# Patient Record
Sex: MALE | Race: WHITE | NOT HISPANIC OR LATINO | Employment: OTHER | URBAN - METROPOLITAN AREA
[De-identification: names, ages, dates, MRNs, and addresses within clinical notes are randomized per-mention and may not be internally consistent; named-entity substitution may affect disease eponyms.]

---

## 2017-02-06 ENCOUNTER — OFFICE VISIT (OUTPATIENT)
Dept: PEDIATRICS | Facility: CLINIC | Age: 37
End: 2017-02-06
Payer: COMMERCIAL

## 2017-02-06 VITALS
TEMPERATURE: 98.2 F | BODY MASS INDEX: 41.75 KG/M2 | SYSTOLIC BLOOD PRESSURE: 140 MMHG | HEART RATE: 96 BPM | DIASTOLIC BLOOD PRESSURE: 78 MMHG | HEIGHT: 73 IN | WEIGHT: 315 LBS | OXYGEN SATURATION: 99 %

## 2017-02-06 DIAGNOSIS — E66.01 MORBID OBESITY WITH BMI OF 50.0-59.9, ADULT (H): ICD-10-CM

## 2017-02-06 DIAGNOSIS — G47.33 OSA (OBSTRUCTIVE SLEEP APNEA): Primary | ICD-10-CM

## 2017-02-06 DIAGNOSIS — R03.0 ELEVATED BLOOD PRESSURE READING WITHOUT DIAGNOSIS OF HYPERTENSION: ICD-10-CM

## 2017-02-06 PROCEDURE — 99214 OFFICE O/P EST MOD 30 MIN: CPT | Performed by: INTERNAL MEDICINE

## 2017-02-06 NOTE — PATIENT INSTRUCTIONS
INSTRUCTIONS FOR TODAY:     schedule visit with Sleep center   blood pressure--continue weight watchers, return for BP follow-up in 6 months     Dr Hsieh

## 2017-02-06 NOTE — MR AVS SNAPSHOT
After Visit Summary   2/6/2017    Jeffry Younger    MRN: [id number]           Patient Information     Date Of Birth          1980        Visit Information        Provider Department      2/6/2017 1:00 PM Russel Hsieh MD [facility]        Today's Diagnoses     PAOLA (obstructive sleep apnea)    -  1     Elevated blood pressure reading without diagnosis of hypertension           Care Instructions    INSTRUCTIONS FOR TODAY:     schedule visit with Sleep center   blood pressure--continue weight watchers, return for BP follow-up in 6 months     Dr Hsieh          Follow-ups after your visit        Additional Services     SLEEP EVALUATION & MANAGEMENT REFERRAL - ADULT       Please be aware that coverage of these services is subject to the terms and limitations of your health insurance plan.  Call member services at your health plan with any benefit or coverage questions.      Please bring the following to your appointment:    >>   List of current medications   >>   This referral request   >>   Any documents/labs given to you for this referral    [facility] [phone] (Age 18 and up)                  Your next 10 appointments already scheduled     Feb 22, 2017  8:30 AM   Office Visit with EMMANUEL Marie [facility] ([facility])    [address]  [address]  [facility] [address] [phone]           Bring a current list of meds and any records pertaining to this visit.  For Physicals, please bring immunization records and any forms needing to be filled out.  Please arrive 10 minutes early to complete paperwork.              Future tests that were ordered for you today     Open Future Orders        Priority Expected Expires Ordered    SLEEP EVALUATION & MANAGEMENT REFERRAL - ADULT Routine  2/6/2018 2/6/2017            Who to contact     If you have questions or need follow up information about today's  "clinic visit or your schedule please contact [facility] directly at [phone].  Normal or non-critical lab and imaging results will be communicated to you by Neo Technology[employer], letter or phone within 4 business days after the clinic has received the results. If you do not hear from us within 7 days, please contact the clinic through [employer] or phone. If you have a critical or abnormal lab result, we will notify you by phone as soon as possible.  Submit refill requests through [email or web address] or call your pharmacy and they will forward the refill request to us. Please allow 3 business days for your refill to be completed.          Additional Information About Your Visit        [employer]ply[employer]ured Information     [email or web address] gives you secure access to your electronic health record. If you see a primary care provider, you can also send messages to your care team and make appointments. If you have questions, please call your primary care clinic.  If you do not have a primary care provider, please call [phone] and they will assist you.        Care EveryWhere ID     This is your Care EveryWhere ID. This could be used by other organizations to access your [place] medical records  [id number]        Your Vitals Were     Pulse Temperature Height BMI (Body Mass Index) Pulse Oximetry       96 98.2  F (36.8  C) (Oral) 6' 1\" (1.854 m) 52.79 kg/m2 99%        Blood Pressure from Last 3 Encounters:   02/06/17 140/78   05/15/16 153/99   09/29/15 124/84    Weight from Last 3 Encounters:   02/06/17 400 lb (181.439 kg)   05/15/16 404 lb 14.4 oz (183.661 kg)   09/29/15 396 lb (179.624 kg)                 Today's Medication Changes          These changes are accurate as of: 2/6/17  1:19 PM.  If you have any questions, ask your nurse or doctor.               Stop taking these medicines if you haven't already. Please contact your care team if you have questions.     HYDROcodone-acetaminophen 5-325 MG per tablet   Commonly known as:  NORCO   Stopped " by:  Russel Hsieh MD                    Primary Care Provider Office Phone # Fax #    Russel Hsieh [phone]       [facility] [address] [place] [address] [address] [place] [address]        Thank you!     Thank you for choosing [facility]  for your care. Our goal is always to provide you with excellent care. Hearing back from our patients is one way we can continue to improve our services. Please take a few minutes to complete the written survey that you may receive in the mail after your visit with us. Thank you!             Your Updated Medication List - Protect others around you: Learn how to safely use, store and throw away your medicines at www.disposemymeds.org.      Notice  As of 2/6/2017  1:19 PM    You have not been prescribed any medications.

## 2017-02-06 NOTE — PROGRESS NOTES
"  SUBJECTIVE:                                                    Jeffry Younger is a 36 year old male who presents to clinic today for the following health issues:      Poor sleep      Duration: months    Description (location/character/radiation): loud snoring, sleepy throughout the day    Intensity:  moderate    Accompanying signs and symptoms: waking up feeling tired    History (similar episodes/previous evaluation): as above    Precipitating or alleviating factors: obesity    Therapies tried and outcome: None     Elevated BP today.  No current rx  Body mass index is 52.79 kg/(m^2).  Has started weight watchers with wife-did lose 20lbs  Got off track this summer when he rolled his ankle      Patient Active Problem List   Diagnosis     Morbid obesity with BMI of 50.0-59.9, adult (H)     CARDIOVASCULAR SCREENING; LDL GOAL LESS THAN 160     Past Surgical History   Procedure Laterality Date     As esophagoscopy, diagnostic       EGD       Social History   Substance Use Topics     Smoking status: Never Smoker      Smokeless tobacco: Never Used     Alcohol Use: No     Family History   Problem Relation Age of Onset     DIABETES Father      Hypertension Father      Colon Cancer No family hx of      Prostate Cancer No family hx of      CEREBROVASCULAR DISEASE No family hx of      Coronary Artery Disease No family hx of          No current outpatient prescriptions on file.       ROS: The following systems have been completely reviewed and are negative except as noted in the HPI: CONSTITUTIONAL,  CARDIOVASCULAR, PULMONARY    OBJECTIVE:                                                    /78 mmHg  Pulse 96  Temp(Src) 98.2  F (36.8  C) (Oral)  Ht 6' 1\" (1.854 m)  Wt 400 lb (181.439 kg)  BMI 52.79 kg/m2  SpO2 99% Body mass index is 52.79 kg/(m^2).  GENERAL:  alert,  no distress  HENT:  oropharynx-  Mild uvular hypertrophy  NECK: obese, no tenderness, no adenopathy  RESP: lungs clear to auscultation - no rales, no " rhonchi, no wheezes  CV: regular rates and rhythm, normal S1 S2     ASSESSMENT/PLAN:                                                        ICD-10-CM    1. PAOLA (obstructive sleep apnea) G47.33 SLEEP EVALUATION & MANAGEMENT REFERRAL - ADULT     Likely PAOLA-pathophysiology discussed  Referred to sleep center.  Discussed CPAP     2. Elevated blood pressure reading without diagnosis of hypertension R03.0 6 month trial of lifestyle changes       3. Morbid obesity with BMI of 50.0-59.9, adult (H) E66.01 Resume weight watchers    Z68.43       Russel Hsieh MD  [facility]

## 2017-02-22 ENCOUNTER — OFFICE VISIT (OUTPATIENT)
Dept: FAMILY MEDICINE | Facility: CLINIC | Age: 37
End: 2017-02-22
Payer: COMMERCIAL

## 2017-02-22 VITALS
HEART RATE: 72 BPM | OXYGEN SATURATION: 97 % | DIASTOLIC BLOOD PRESSURE: 91 MMHG | TEMPERATURE: 97.8 F | SYSTOLIC BLOOD PRESSURE: 145 MMHG

## 2017-02-22 DIAGNOSIS — Z71.84 TRAVEL ADVICE ENCOUNTER: Primary | ICD-10-CM

## 2017-02-22 DIAGNOSIS — Z23 NEED FOR VACCINATION: ICD-10-CM

## 2017-02-22 PROCEDURE — 90632 HEPA VACCINE ADULT IM: CPT | Mod: GA | Performed by: NURSE PRACTITIONER

## 2017-02-22 PROCEDURE — 90734 MENACWYD/MENACWYCRM VACC IM: CPT | Mod: GA | Performed by: NURSE PRACTITIONER

## 2017-02-22 PROCEDURE — 90717 YELLOW FEVER VACCINE SUBQ: CPT | Mod: GA | Performed by: NURSE PRACTITIONER

## 2017-02-22 PROCEDURE — 99402 PREV MED CNSL INDIV APPRX 30: CPT | Mod: 25 | Performed by: NURSE PRACTITIONER

## 2017-02-22 PROCEDURE — 90471 IMMUNIZATION ADMIN: CPT | Mod: GA | Performed by: NURSE PRACTITIONER

## 2017-02-22 PROCEDURE — 90472 IMMUNIZATION ADMIN EACH ADD: CPT | Mod: GA | Performed by: NURSE PRACTITIONER

## 2017-02-22 RX ORDER — AZITHROMYCIN 500 MG/1
500 TABLET, FILM COATED ORAL DAILY
Qty: 3 TABLET | Refills: 0 | Status: SHIPPED | OUTPATIENT
Start: 2017-02-22 | End: 2017-02-25

## 2017-02-22 RX ORDER — ATOVAQUONE AND PROGUANIL HYDROCHLORIDE 250; 100 MG/1; MG/1
1 TABLET, FILM COATED ORAL DAILY
Qty: 22 TABLET | Refills: 0 | Status: SHIPPED | OUTPATIENT
Start: 2017-02-22 | End: 2018-01-16

## 2017-02-22 NOTE — MR AVS SNAPSHOT
After Visit Summary   2/22/2017    Jeffry Younger    MRN: [id number]           Patient Information     Date Of Birth          1980        Visit Information        Provider Department      2/22/2017 8:30 AM Aruna Thompson APRN [facility]        Today's Diagnoses     Travel advice encounter    -  1    Need for vaccination          Care Instructions    Today February 22, 2017 you received the    Hepatitis A Vaccine -     Yellow Fever (YF)    Meningococcal (Menactra) Vaccine  .    These appointments can be made as a NURSE ONLY visit.    **It is very important for the vaccinations to be given on the scheduled day(s), this helps ensure you receive the full effectiveness of the vaccine.**    Please call [facility] with any questions [phone]    Thank you for visiting [facility]            Follow-ups after your visit        Your next 10 appointments already scheduled     Mar 01, 2017 10:00 AM CST   New Sleep Patient with Montana James MD   [facility]    [address] [facility] [address] [place] [address] [phone]              Who to contact     If you have questions or need follow up information about today's clinic visit or your schedule please contact [facility] directly at [phone].  Normal or non-critical lab and imaging results will be communicated to you by MyChart, letter or phone within 4 business days after the clinic has received the results. If you do not hear from us within 7 days, please contact the clinic through MyChart or phone. If you have a critical or abnormal lab result, we will notify you by phone as soon as possible.  Submit refill requests through StepLeader or call your pharmacy and they will forward the refill request to us. Please allow 3 business days for your refill to be completed.          Additional Information About Your  Visit        U[employer]ardt Information     [email or web address] gives you secure access to your electronic health record. If you see a primary care provider, you can also send messages to your care team and make appointments. If you have questions, please call your primary care clinic.  If you do not have a primary care provider, please call [phone] and they will assist you.        Care EveryWhere ID     This is your Care EveryWhere ID. This could be used by other organizations to access your [place] medical records  [id number]        Your Vitals Were     Pulse Temperature Pulse Oximetry             72 97.8  F (36.6  C) (Oral) 97%          Blood Pressure from Last 3 Encounters:   02/22/17 (!) 145/91   02/06/17 140/78   05/15/16 (!) 153/99    Weight from Last 3 Encounters:   02/06/17 (!) 400 lb (181.4 kg)   05/15/16 (!) 404 lb 14.4 oz (183.7 kg)   09/29/15 (!) 396 lb (179.6 kg)              We Performed the Following     HEPA VACCINE ADULT IM     MENINGOCOCCAL VACCINE,IM (MENACTRA)     YELLOW FEVER IMMUNIZATN,LIVE,SUBCUT          Today's Medication Changes          These changes are accurate as of: 2/22/17  8:42 AM.  If you have any questions, ask your nurse or doctor.               Start taking these medicines.        Dose/Directions    atovaquone-proguanil 250-100 MG per tablet   Commonly known as:  MALARONE   Used for:  Need for vaccination, Travel advice encounter   Started by:  Aruna Thompson APRN CNP        Dose:  1 tablet   Take 1 tablet by mouth daily Start 2 days before exposure to Malaria and continue daily till  7 days after exposure.   Quantity:  22 tablet   Refills:  0       azithromycin 500 MG tablet   Commonly known as:  ZITHROMAX   Used for:  Travel advice encounter   Started by:  Aruna Thompson APRN CNP        Dose:  500 mg   Take 1 tablet (500 mg) by mouth daily for 3 doses Take 1 tablet a day for up to 3 days for severe diarrhea   Quantity:  3 tablet   Refills:  0            Where to get your  medicines      These medications were sent to [facility] - [place], [place] - [address]  [address], [facility] [address]     Phone:  [phone]     atovaquone-proguanil 250-100 MG per tablet    azithromycin 500 MG tablet                Primary Care Provider Office Phone # Fax #    Russel Hsieh -[phone]       [facility] [address] [place] [address]        Thank you!     Thank you for choosing [facility]  for your care. Our goal is always to provide you with excellent care. Hearing back from our patients is one way we can continue to improve our services. Please take a few minutes to complete the written survey that you may receive in the mail after your visit with us. Thank you!             Your Updated Medication List - Protect others around you: Learn how to safely use, store and throw away your medicines at www.disposemymeds.org.          This list is accurate as of: 2/22/17  8:42 AM.  Always use your most recent med list.                   Brand Name Dispense Instructions for use    atovaquone-proguanil 250-100 MG per tablet    MALARONE    22 tablet    Take 1 tablet by mouth daily Start 2 days before exposure to Malaria and continue daily till  7 days after exposure.       azithromycin 500 MG tablet    ZITHROMAX    3 tablet    Take 1 tablet (500 mg) by mouth daily for 3 doses Take 1 tablet a day for up to 3 days for severe diarrhea

## 2017-02-22 NOTE — PATIENT INSTRUCTIONS
Today February 22, 2017 you received the    Hepatitis A Vaccine -     Yellow Fever (YF)    Meningococcal (Menactra) Vaccine  .    These appointments can be made as a NURSE ONLY visit.    **It is very important for the vaccinations to be given on the scheduled day(s), this helps ensure you receive the full effectiveness of the vaccine.**    Please call [facility] with any questions [phone]    Thank you for visiting [facility]

## 2017-02-22 NOTE — NURSING NOTE
"Chief Complaint   Patient presents with     Travel Clinic     Count includes the [facility]      BP (!) 145/91  Pulse 72  Temp 97.8  F (36.6  C) (Oral)  SpO2 97% Estimated body mass index is 52.77 kg/(m^2) as calculated from the following:    Height as of 2/6/17: 6' 1\" (1.854 m).    Weight as of 2/6/17: 400 lb (181.4 kg).  bp completed using cuff size: large       Health Maintenance addressed:  NONE    n/a    Karen Pena MA     "

## 2017-02-22 NOTE — PROGRESS NOTES
Nurse Note      Itinerary:  [place]      Departure Date: 04/15/2017      Return Date: 04/26/2017      Length of Trip 11 days       Reason for Travel: Tourism           Urban or rural: both      Accommodations: Hotel        IMMUNIZATION HISTORY  Have you received any immunizations within the past 4 weeks?  No  Have you ever fainted from having your blood drawn or from an injection?  No  Have you ever had a fever reaction to vaccination?  No  Have you ever had any bad reaction or side effect from any vaccination?  No  Have you ever had hepatitis A or B vaccine?  Yes  Do you live (or work closely) with anyone who has AIDS, an AIDS-like condition, any other immune disorder or who is on chemotherapy for cancer?  No  Do you have a family history of immunodeficiency?  No  Have you received any injection of immune globulin or any blood products during the past 12 months?  No    Patient roomed by REGINA Day  Jeffry Younger is a 36 year old male seen today seen today with spouse for counsultation for international travel to [place] for Tourism Visiting  relatives.  Patient will be departing in  2 month(s) and staying for   2 week(s) and  traveling with spouse.      Patient itinerary :  will be in the starting in [facility] then traveling to smaller villages to the north region of [place] which presents risk for Malaria, Yellow Fever, Dengue Fever, Chikungungya, Zika,  Trypanosomiasis, Schistosomiasis, Rabies, food borne illnesses, motor vehicle accidents, Typhoid, Leishmaniasis and Lassa Fever. exposure.      Patient's activities will include sightseeing and visiting a family member who has been living in [place] for several Years    Patient's country of birth is [place]      Special medical concerns: Obesity  Pre-travel questionnaire was completed by patient and reviewed by provider.     Vitals: There were no vitals taken for this visit.  BMI= There is no height or weight on file to calculate  BMI.    EXAM:  General:  Well-nourished, well-developed in no acute distress.  Appears to be stated age, interacts appropriately and expresses understanding of information given to patient.    No current outpatient prescriptions on file.     Patient Active Problem List   Diagnosis     Morbid obesity with BMI of 50.0-59.9, adult (H)     CARDIOVASCULAR SCREENING; LDL GOAL LESS THAN 160     No Known Allergies      Immunizations discussed include:   Hepatitis A:  Give on dose today to finish series   Hepatitis B: Up to date  Influenza: Up to date  Typhoid: Up to date  Rabies: Declined  Not concerned about risk of disease  reviewed managment of a animal bite or scratch (washing wound, seek medical care within 24 hours for post exposure prophylaxis )  Yellow Fever: Ordered/given today - side effects, precautions, allergies, risks discussed. Patient expressed understanding.  Yoruba Encephalitis: Not indicated  Meningococcus: Ordered/given today, risks, benefits and side effects reviewed  Tetanus/Diphtheria: Up to date  Measles/Mumps/Rubella: Up to date  Cholera: Not needed  Polio: Up to date  Pneumococcal: Under age of 65  Varicella: Immune by disease history per patient report  Zostavax:  Not indicated  HPV:  Not indicated  TB:  Low risk    Altitude Exposure on this trip: non    ASSESSMENT/PLAN:    ICD-10-CM    1. Travel advice encounter Z71.89 HEPA VACCINE ADULT IM     YELLOW FEVER IMMUNIZATN,LIVE,SUBCUT     MENINGOCOCCAL VACCINE,IM (MENACTRA)     atovaquone-proguanil (MALARONE) 250-100 MG per tablet     azithromycin (ZITHROMAX) 500 MG tablet   2. Need for vaccination Z23 HEPA VACCINE ADULT IM     YELLOW FEVER IMMUNIZATN,LIVE,SUBCUT     MENINGOCOCCAL VACCINE,IM (MENACTRA)     atovaquone-proguanil (MALARONE) 250-100 MG per tablet     I have reviewed general recommendations for safe travel   including: food/water precautions, insect precautions, safer sex   practices given high prevalence of Zika, HIV and other STDs,    roadway safety. Educational materials and Travax report provided.    Malaraia prophylaxis recommended: Malarone  Symptomatic treatment for traveler's diarrhea: TMP/SMX  Altitude illness prevention and treatment: none      Evacuation insurance advised and resources were provided to patient.    Total visit time 30 minutes  with over 50% of time spent counseling patient as detailed above.    Aruna Thompson CNP

## 2017-03-01 ENCOUNTER — OFFICE VISIT (OUTPATIENT)
Dept: SLEEP MEDICINE | Facility: CLINIC | Age: 37
End: 2017-03-01
Payer: COMMERCIAL

## 2017-03-01 VITALS
BODY MASS INDEX: 41.75 KG/M2 | HEIGHT: 73 IN | HEART RATE: 97 BPM | SYSTOLIC BLOOD PRESSURE: 150 MMHG | DIASTOLIC BLOOD PRESSURE: 101 MMHG | OXYGEN SATURATION: 96 % | WEIGHT: 315 LBS

## 2017-03-01 DIAGNOSIS — G47.9 SLEEP DISTURBANCE: Primary | ICD-10-CM

## 2017-03-01 DIAGNOSIS — E66.01 MORBID OBESITY WITH BMI OF 50.0-59.9, ADULT (H): ICD-10-CM

## 2017-03-01 PROCEDURE — 99244 OFF/OP CNSLTJ NEW/EST MOD 40: CPT | Performed by: INTERNAL MEDICINE

## 2017-03-01 NOTE — MR AVS SNAPSHOT
"              After Visit Summary   3/1/2017    Jeffry Younger    MRN: [id number]           Patient Information     Date Of Birth          1980        Visit Information        Provider Department      3/1/2017 10:00 AM Montana James MD [facility] [place]        Today's Diagnoses     Sleep disturbance    -  1    Morbid obesity with BMI of 50.0-59.9, adult (H)          Care Instructions    MY TREATMENT INFORMATION FOR SLEEP DISTURBANCE-  Jeffry Younger    DOCTOR : Montana James  SLEEP CENTER :  [place]  MY CONTACT NUMBER:[phone]        If I haven't had a sleep study yet, what can I expect?  A personal story from [employer]  https://www.Gridsum.com/watch?v=AxPLmlRpnCs        Suspected sleep apnea: Sleep study ordered.    Follow up in sleep clinic 1-2 weeks after sleep study to discuss results of sleep study and treatment options.    Patient was advised not to drive if drowsy or sleepy.    Frequently asked questions:  1. What is Obstructive Sleep Apnea (PAOLA)? PAOLA is the most common type of sleep apnea. Apnea literally means, \"without breath.\" It is characterized by repetitive pauses in breathing, despite continued effort to breathe, and is usually associated with a reduction in blood oxygen saturation. Apneas can last 10 to over 60 seconds. It is caused by narrowing or collapse of the upper airway as muscles relax during sleep. Severity of sleep apnea is determined by frequency of breathing events and their effect on your sleep and oxygen levels determined during sleep testing.   2. What are the consequences of PAOLA? Symptoms include: daytime sleepiness- possibly increasing the risk of falling asleep while driving, unrefreshing/restless sleep, snoring, insomnia, waking frequently to urinate, waking with heartburn or reflux, reduced concentration and memory, and morning headaches. Other health consequences may include development of high blood pressure and other cardiovascular disease in " persons who are susceptible. Untreated PAOLA  can contribute to heart disease, stroke and diabetes.   3. What are the treatment options? In most situations, sleep apnea is a lifelong disease that must be managed with daily therapy. Medications are not effective for sleep apnea and surgery is generally not performed until other therapies have been tried. Therapy is usually tailored to the individual patient based on many factors including your wishes as well as severity of sleep apnea and severity of obesity. Continuous Positive Airway (CPAP) is the most reliable treatment. An oral device to hold your jaw forward is usually the next most reliable option. Other options include postioning devices (to keep you off your back), weight loss, and surgery including a tongue pacing device. There is more detail about some of these options below.            1. CPAP-  WHAT DOES IT DO AND HOW CAN I LEARN TO WEAR IT?                               BEFORE I START, CAN I WATCH A MOVIE TO GET A PLAN ON HOW TO USE CPAP?  https://www.Vozeeme.com/watch?o=j1G59ho776H      Continuous positive airway pressure, or CPAP, is the most effective treatment for obstructive sleep apnea. It works by blowing room air, through a mask, to hold your throat open. A decision to use CPAP is a major step forward in the pursuit of a healthier life. The successful use of CPAP will help you breathe easier, sleep better and live healthier. You can choose CPAP equipment from any durable medical equipment provider that meets your needs.  Using CPAP can be a positive experience if you keep these chaudhry points in mind:  1. Commitment  CPAP is not a quick fix for your problem. It involves a long-term commitment to improve your sleep and your health.    2. Communication  Stay in close communication with both your sleep doctor and your CPAP supplier. Ask lots of questions and seek help when you need it.    3. Consistency  Use CPAP all night, every night and for every nap.  "You will receive the maximum health benefits from CPAP when you use it every time that you sleep. This will also make it easier for your body to adjust to the treatment.    4. Correction  The first machine and mask that you try may not be the best ones for you. Work with your sleep doctor and your CPAP supplier to make corrections to your equipment selection. Ask about trying a different type of machine or mask if you have ongoing problems. Make sure that your mask is a good fit and learn to use your equipment properly.    5. Challenge  Tell a family member or close friend to ask you each morning if you used your CPAP the previous night. Have someone to challenge you to give it your best effort.    6. Connection   Your adjustment to CPAP will be easier if you are able to connect with others who use the same treatment. Ask your sleep doctor if there is a support group in your area for people who have sleep apnea, or look for one on the Internet.  7. Comfort   Increase your level of comfort by using a saline spray, decongestant or heated humidifier if CPAP irritates your nose, mouth or throat. Use your unit's \"ramp\" setting to slowly get used to the air pressure level. There may be soft pads you can buy that will fit over your mask straps. Look on www.CPAP.com for accessories that can help make CPAP use more comfortable.  8. Cleaning   Clean your mask, tubing and headgear on a regular basis. Put this time in your schedule so that you don't forget to do it. Check and replace the filters for your CPAP unit and humidifier.    9. Completion   Although you are never finished with CPAP therapy, you should reward yourself by celebrating the completion of your first month of treatment. Expect this first month to be your hardest period of adjustment. It will involve some trial and error as you find the machine, mask and pressure settings that are right for you.    10. Continuation  After your first month of treatment, continue " to make a daily commitment to use your CPAP all night, every night and for every nap.    CPAP-Tips to starting with success:  Begin using your CPAP for short periods of time during the day while you watch TV or read.    Use CPAP every night and for every nap. Using it less often reduces the health benefits and makes it harder for your body to get used to it.    Make small adjustments to your mask, tubing, straps and headgear until you get the right fit. Tightening the mask may actually worsen the leak.  If it leaves significant marks on your face or irritates the bridge of your nose, it may not be the best mask for you.  Speak with the person who supplied the mask and consider trying other masks. Insurances will allow you to try different masks during the first month of starting CPAP.  Insurance also covers a new mask, hose and filter about every 6 months.    Use a saline nasal spray to ease mild nasal congestion. Neti-Pot or saline nasal rinses may also help. Nasal gel sprays can help reduce nasal dryness.  Biotene mouthwash can be helpful to protect your teeth if you experience frequent dry mouth.  Dry mouth may be a sign of air escaping out of your mouth or out of the mask in the case of a full face mask.  Speak with your provider if you expect that is the case.     Take a nasal decongestant to relieve more severe nasal or sinus congestion.  Do not use Afrin (oxymetazoline) nasal spray more than 3 days in a row.  Speak with your sleep doctor if your nasal congestion is chronic.    Use a heated humidifier that fits your CPAP model to enhance your breathing comfort. Adjust the heat setting up if you get a dry nose or throat, down if you get condensation in the hose or mask.  Position the CPAP lower than you so that any condensation in the hose drains back into the machine rather than towards the mask.    Try a system that uses nasal pillows if traditional masks give you problems.    Clean your mask, tubing and  headgear once a week. Make sure the equipment dries fully.    Regularly check and replace the filters for your CPAP unit and humidifier.    Work closely with your sleep provider and your CPAP supplier to make sure that you have the machine, mask and air pressure setting that works best for you. It is better to stop using it and call your provider to solve problems than to lay awake all night frustrated with the device.    BESIDES CPAP, WHAT OTHER THERAPIES ARE THERE?      Positioning Device  Positioning devices are generally used when sleep apnea is mild and only occurs on your back.This example shows a pillow that straps around the waist. It may be appropriate for those whose sleep study shows milder sleep apnea that occurs primarily when lying flat on one's back. Preliminary studies have shown benefit but effectiveness at home may need to be verified by a home sleep test. These devices are generally not covered by medical insurance.                      Oral Appliance  What is oral appliance therapy?  An oral appliance is a small acrylic device that fits over the upper and lower teeth or tongue (similar to an orthodontic retainer or a mouth guard). This device slightly advances the lower jaw or tongue, which moves the base of the tongue forward, opens the airway, improves breathing and can effectively treat snoring and obstructive sleep apnea sleep apnea. The appliance is fabricated and customized by a qualified dentist with experience in treating snoring and sleep apnea. Oral appliances are usually well tolerated and have relatively high compliance by patients1, 2, 3.  When is an oral appliance indicated?  Oral appliance therapy is recommended as a first-line treatment for patients with primary snoring, mild sleep apnea, and for patients with moderate sleep apnea who prefer appliance therapy to use of CPAP4, 5. Severity of sleep apnea is determined by sleep testing and is based on the number of respiratory events  per hour of sleep.   How successful is oral appliance therapy?  The success rate of oral appliance therapy in patients with mild sleep apnea is 75-80% while in patients with moderate sleep apnea it is 50-70%. The chance of success in patients with severe sleep apnea is 40-50%. The research also shows that oral appliances have a beneficial effect on the cardiovascular health of PAOLA patients at the same magnitude as CPAP therapy7.  Oral appliances should be a second-line treatment in cases of severe sleep apnea, but if not completely successful then a combination therapy utilizing CPAP plus oral appliance therapy may be effective. Oral appliances tend to be effective in a broad range of patients although studies show that the patients who have the highest success are females, younger patients, those with milder disease, and less severe obesity. 3, 6.   The chances of success are lower in patients who have more severe PAOLA, are older, and those who are morbidly obese.     Example of an oral appliance   Finding a dentist that practices dental sleep medicine  Specific training is available through the American Academy of Dental Sleep Medicine for dentists interested in working in the field of sleep. To find a dentist who is educated in the field of sleep and the use of oral appliances, near you, visit the Web site of the American Academy of Dental Sleep Medicine; also see   http://www.accpstorage.org/newOrganization/patients/oralAppliances.pdf  To search for a dentist certified in these practices:  Http://aadsm.org/FindADentist.aspx?1  1. Huy, et al. Objectively measured vs self-reported compliance during oral appliance therapy for sleep-disordered breathing. Chest 2013; 144(5): 0831-2478.  2. Price et al. Objective measurement of compliance during oral appliance therapy for sleep-disordered breathing. Thorax 2013; 68(1): 91-96.  3. Etienne et al. Mandibular advancement devices in 620 men and women with  PAOLA and snoring: tolerability and predictors of treatment success. Chest 2004; 125: 1499-3961.  4. Nevaeh et al. Oral appliances for snoring and PAOLA: a review. Sleep 2006; 29: 244-262.  5. Nahid et al. Oral appliance treatment for PAOLA: an update. J Clin Sleep Med 2014; 10(2): 215-227.  6. Moses et al. Predictors of OSAH treatment outcome. J Dent Res 2007; 86: 9139-3070.      Weight Loss:    Weight management is a personal decision.  If you are interested in exploring weight loss strategies, the following discussion covers the impact on weight loss on sleep apnea and the approaches that may be successful.    Weight loss decreases severity of sleep apnea in most people with obesity. For those with mild obesity who have developed snoring with weight gain, even 15-30 pound weight loss can improve and occasionally eliminate sleep apnea.  Structured and life-long dietary and health habits are necessary to lose weight and keep healthier weight levels.     Though there may be significant health benefits from weight loss, long-term weight loss is very difficult to achieve- studies show success with dietary management in less than 10% of people. In addition, substantial weight loss may require years of dietary control and may be difficult if patients have severe obesity. In these cases, surgical management may be considered.  Finally, older individuals who have tolerated obesity without health complications may be less likely to benefit from weight loss strategies.    Your BMI is Body mass index is 52.79 kg/(m^2).  Body mass index (BMI) is one way to tell whether you are at a healthy weight, overweight, or obese. It measures your weight in relation to your height.  A BMI of 18.5 to 24.9 is in the healthy range. A person with a BMI of 25 to 29.9 is considered overweight, and someone with a BMI of 30 or greater is considered obese. More than two-thirds of American adults are considered overweight or obese.  Being  overweight or obese increases the risk for further weight gain. Excess weight may lead to heart disease and diabetes.  Creating and following plans for healthy eating and physical activity may help you improve your health.  Weight control is part of healthy lifestyle and includes exercise, emotional health, and healthy eating habits. Careful eating habits lifelong are the mainstay of weight control. Though there are significant health benefits from weight loss, long-term weight loss with diet alone may be very difficult to achieve- studies show long-term success with dietary management in less than 10% of people. Attaining a healthy weight may be especially difficult to achieve in those with severe obesity. In some cases, medications, devices and surgical management might be considered.  What can you do?  If you are overweight or obese and are interested in methods for weight loss, you should discuss this with your provider.     Consider reducing daily calorie intake by 500 calories.     Keep a food journal.     Avoiding skipping meals, consider cutting portions instead.    Diet combined with exercise helps maintain muscle while optimizing fat loss. Strength training is particularly important for building and maintaining muscle mass. Exercise helps reduce stress, increase energy, and improves fitness. Increasing exercise without diet control, however, may not burn enough calories to loose weight.       Start walking three days a week 10-20 minutes at a time    Work towards walking thirty minutes five days a week     Eventually, increase the speed of your walking for 1-2 minutes at time    In addition, we recommend that you review healthy lifestyles and methods for weight loss available through the National Institutes of Health patient information sites:  http://win.niddk.nih.gov/publications/index.htm    And look into health and wellness programs that may be available through your health insurance provider,  employer, local community center, or [employer].    Weight management plan: Patient was referred to their PCP to discuss a diet and exercise plan.    Surgery:    Upper Airway Surgery for PAOLA  Surgery for PAOLA is a second-line treatment option in the management of sleep apnea.  Surgery should be considered for patients who are having a difficult time tolerating CPAP.    Surgery for PAOLA is directed at areas that are responsible for narrowing or complete obstruction of the airway during sleep.  There are a wide range of procedures available to enlarge and/or stabilize the airway to prevent blockage of breathing in the three major areas where it can occur: the palate, tongue, and nasal regions.  Successful surgical treatment depends on the accurate identification of the factors responsible for obstructive sleep apnea in each person.  A personalized approach is required because there is no single treatment that works well for everyone.  Because of anatomic variation, consultation with an examination by a sleep surgeon is a critical first step in determining what surgical options are best for each patient.  In some cases, examination during sedation may be recommended in order to guide the selection of procedures.  Patients will be counseled about risks and benefits as well as the typical recovery course after surgery. Surgery is typically not a cure for a person s PAOLA.  However, surgery will often significantly improve one s PAOLA severity (termed  success rate ).  Even in the absence of a cure, surgery will decrease the cardiovascular risk associated with OSA7; improve overall quality of life8 (sleepiness, functionality, sleep quality, etc).          Palate Procedures:  Patients with PAOLA often have narrowing of their airway in the region of their tonsils and uvula.  The goals of palate procedures are to widen the airway in this region as well as to help the tissues resist collapse.  Modern palate procedure techniques focus  on tissue conservation and soft tissue rearrangement, rather than tissue removal.  Often the uvula is preserved in this procedure. Residual sleep apnea is common in patient after pharyngoplasty with an average reduction in sleep apnea events of 33%2.      Tongue Procedures:  While patients are awake, the muscles that surround the throat are active and keep this region open for breathing. These muscles relax during sleep, allowing the tongue and other structures to collapse and block breathing.  There are several different tongue procedures available.  Selection of a tongue base procedure depends on characteristics seen on physical exam.  Generally, procedures are aimed at removing bulky tissues in this area or preventing the back of the tongue from falling back during sleep.  Success rates for tongue surgery range from 50-62%3.    Hypoglossal Nerve Stimulation:  Hypoglossal nerve stimulation has recently received approval from the [place] Food and Drug Administration for the treatment of obstructive sleep apnea.  This is based on research showing that the system was safe and effective in treating sleep apnea6.  Results showed that the median AHI score decreased 68%, from 29.3 to 9.0. This therapy uses an implant system that senses breathing patterns and delivers mild stimulation to airway muscles, which keeps the airway open during sleep.  The system consists of three fully implanted components: a small generator (similar in size to a pacemaker), a breathing sensor, and a stimulation lead.  Using a small handheld remote, a patient turns the therapy on before bed and off upon awakening.    Candidates for this device must be greater than 22 years of age, have moderate to severe PAOLA (AHI between 20-65), BMI less than 32, have tried CPAP/oral appliance without success, and have appropriate upper airway anatomy (determined by a sleep endoscopy performed by Dr. Melgar).    Hypoglossal Nerve Stimulation Pathway:    The  sleep surgeon s office will work with the patient through the insurance prior-authorization process (including communications and appeals).    Nasal Procedures:  Nasal obstruction can interfere with nasal breathing during the day and night.  Studies have shown that relief of nasal obstruction can improve the ability of some patients to tolerate positive airway pressure therapy for obstructive sleep apnea1.  Treatment options include medications such as nasal saline, topical corticosteroid and antihistamine sprays, and oral medications such as antihistamines or decongestants. Non-surgical treatments can include external nasal dilators for selected patients. If these are not successful by themselves, surgery can improve the nasal airway either alone or in combination with these other options.      Combination Procedures:  Combination of surgical procedures and other treatments may be recommended, particularly if patients have more than one area of narrowing or persistent positional disease.  The success rate of combination surgery ranges from 66-80%2,3.      1. [place]yenne ENGLE. The Role of the Nose in Snoring and Obstructive Sleep Apnoea: An Update.  Eur Arch Otorhinolaryngol. 2011; 268: 1365-73.  2.  Sandra SM; Karolyn JA; Samuel JR; Pallanch JF; Malcolm MB; Gayle SG; Lauryn ORDOÑEZ. Surgical modifications of the upper airway for obstructive sleep apnea in adults: a systematic review and meta-analysis. SLEEP 2010;33(10):0251-3559. Yuri HUANG. Hypopharyngeal surgery in obstructive sleep apnea: an evidence-based medicine review.  Arch Otolaryngol Head Neck Surg. 2006 Feb;132(2):206-13.  3. Cruz YH1, Panda Y, Chandler WICHO. The efficacy of anatomically based multilevel surgery for obstructive sleep apnea. Otolaryngol Head Neck Surg. 2003 Oct;129(4):327-35.  4. Yuri HUANG, Goldberg A. Hypopharyngeal Surgery in Obstructive Sleep Apnea: An Evidence-Based Medicine Review. Arch Otolaryngol Head Neck Surg. 2006  Feb;132(2):206-13.  5. Carmella KAISER et al. Upper-Airway Stimulation for Obstructive Sleep Apnea.  N Engl J Med. 2014 Jan 9;370(2):139-49.  6. Elver Y et al. Increased Incidence of Cardiovascular Disease in Middle-aged Men with Obstructive Sleep Apnea. Am J Respir Crit Care Med; 2002 166: 159-165  7. Godwin EM et al. Studying Life Effects and Effectiveness of Palatopharyngoplasty (SLEEP) study: Subjective Outcomes of Isolated Uvulopalatopharyngoplasty. Otolaryngol Head Neck Surg. 2011; 144: 623-631.  Your blood pressure was checked while you were in clinic today.  Please read the guidelines below about what these numbers mean and what you should do about them.  Your systolic blood pressure is the top number.  This is the pressure when the heart is pumping.  Your diastolic blood pressure is the bottom number.  This is the pressure in between beats.  If your systolic blood pressure is less than 120 and your diastolic blood pressure is less than 80, then your blood pressure is normal. There is nothing more that you need to do about it  If your systolic blood pressure is 120-139 or your diastolic blood pressure is 80-89, your blood pressure may be higher than it should be.  You should have your blood pressure re-checked within a year by a primary care provider.  If your systolic blood pressure is 140 or greater or your diastolic blood pressure is 90 or greater, you may have high blood pressure.  High blood pressure is treatable, but if left untreated over time it can put you at risk for heart attack, stroke, or kidney failure.  You should have your blood pressure re-checked by a primary care provider within the next four weeks.          Follow-ups after your visit        Future tests that were ordered for you today     Open Future Orders        Priority Expected Expires Ordered    Comprehensive Sleep Study Routine  8/28/2017 3/1/2017    ABG-Blood Gas Arterial ([place] / [place]) Routine  4/30/2017 3/1/2017           "  Who to contact     If you have questions or need follow up information about today's clinic visit or your schedule please contact [facility] directly at [phone].  Normal or non-critical lab and imaging results will be communicated to you by MyChart, letter or phone within 4 business days after the clinic has received the results. If you do not hear from us within 7 days, please contact the clinic through MyChart or phone. If you have a critical or abnormal lab result, we will notify you by phone as soon as possible.  Submit refill requests through [facility] or call your pharmacy and they will forward the refill request to us. Please allow 3 business days for your refill to be completed.          Additional Information About Your Visit        [email or web address] Information     [facility] gives you secure access to your electronic health record. If you see a primary care provider, you can also send messages to your care team and make appointments. If you have questions, please call your primary care clinic.  If you do not have a primary care provider, please call [phone] and they will assist you.        Care EveryWhere ID     This is your Care EveryWhere ID. This could be used by other organizations to access your [facility] medical records  [id number]        Your Vitals Were     Pulse Height Pulse Oximetry BMI (Body Mass Index)          97 1.854 m (6' 0.99\") 96% 52.79 kg/m2         Blood Pressure from Last 3 Encounters:   03/01/17 (!) 150/101   02/22/17 (!) 145/91   02/06/17 140/78    Weight from Last 3 Encounters:   03/01/17 (!) 181.4 kg (400 lb)   02/06/17 (!) 181.4 kg (400 lb)   05/15/16 (!) 183.7 kg (404 lb 14.4 oz)              We Performed the Following     SLEEP EVALUATION & MANAGEMENT REFERRAL - ADULT        Primary Care Provider Office Phone # Fax #    Russel Hsieh [phone]       [facility] [place] [address] [place] [address]        Thank " you!     [place] [facility] [place]. Our goal is always to provide you with excellent care. Hearing back from our patients is one way we can continue to improve our services. Please take a few minutes to complete the written survey that you may receive in the mail after your visit with us. Thank you!             Your Updated Medication List - Protect others around you: Learn how to safely use, store and throw away your medicines at www.disposemymeds.org.          This list is accurate as of: 3/1/17 10:31 AM.  Always use your most recent med list.                   Brand Name Dispense Instructions for use    atovaquone-proguanil 250-100 MG per tablet    MALARONE    22 tablet    Take 1 tablet by mouth daily Start 2 days before exposure to Malaria and continue daily till  7 days after exposure.

## 2017-03-01 NOTE — PATIENT INSTRUCTIONS
"MY TREATMENT INFORMATION FOR SLEEP DISTURBANCE-  Jeffry Younger    DOCTOR : [place] [facility]  SLEEP CENTER :  [place]  MY CONTACT NUMBER:[phone]        If I haven't had a sleep study yet, what can I expect?  A personal story from Rogelio  [email or web address].com/watch?v=AxPLmlRpnCs        Suspected sleep apnea: Sleep study ordered.    Follow up in sleep clinic 1-2 weeks after sleep study to discuss results of sleep study and treatment options.    Patient was advised not to drive if drowsy or sleepy.    Frequently asked questions:  1. What is Obstructive Sleep Apnea (PAOLA)? PAOLA is the most common type of sleep apnea. Apnea literally means, \"without breath.\" It is characterized by repetitive pauses in breathing, despite continued effort to breathe, and is usually associated with a reduction in blood oxygen saturation. Apneas can last 10 to over 60 seconds. It is caused by narrowing or collapse of the upper airway as muscles relax during sleep. Severity of sleep apnea is determined by frequency of breathing events and their effect on your sleep and oxygen levels determined during sleep testing.   2. What are the consequences of PAOLA? Symptoms include: daytime sleepiness- possibly increasing the risk of falling asleep while driving, unrefreshing/restless sleep, snoring, insomnia, waking frequently to urinate, waking with heartburn or reflux, reduced concentration and memory, and morning headaches. Other health consequences may include development of high blood pressure and other cardiovascular disease in persons who are susceptible. Untreated PAOLA  can contribute to heart disease, stroke and diabetes.   3. What are the treatment options? In most situations, sleep apnea is a lifelong disease that must be managed with daily therapy. Medications are not effective for sleep apnea and surgery is generally not performed until other therapies have been tried. Therapy is usually tailored to the individual patient based on " many factors including your wishes as well as severity of sleep apnea and severity of obesity. Continuous Positive Airway (CPAP) is the most reliable treatment. An oral device to hold your jaw forward is usually the next most reliable option. Other options include postioning devices (to keep you off your back), weight loss, and surgery including a tongue pacing device. There is more detail about some of these options below.            1. CPAP-  WHAT DOES IT DO AND HOW CAN I LEARN TO WEAR IT?                               BEFORE I START, CAN I WATCH A MOVIE TO GET A PLAN ON HOW TO USE CPAP?  https://www.[employer].com/watch?v=n0C01wp399T      Continuous positive airway pressure, or CPAP, is the most effective treatment for obstructive sleep apnea. It works by blowing room air, through a mask, to hold your throat open. A decision to use CPAP is a major step forward in the pursuit of a healthier life. The successful use of CPAP will help you breathe easier, sleep better and live healthier. You can choose CPAP equipment from any durable medical equipment provider that meets your needs.  Using CPAP can be a positive experience if you keep these chaudhry points in mind:  1. Commitment  CPAP is not a quick fix for your problem. It involves a long-term commitment to improve your sleep and your health.    2. Communication  Stay in close communication with both your sleep doctor and your CPAP supplier. Ask lots of questions and seek help when you need it.    3. Consistency  Use CPAP all night, every night and for every nap. You will receive the maximum health benefits from CPAP when you use it every time that you sleep. This will also make it easier for your body to adjust to the treatment.    4. Correction  The first machine and mask that you try may not be the best ones for you. Work with your sleep doctor and your CPAP supplier to make corrections to your equipment selection. Ask about trying a different type of machine or mask if  "you have ongoing problems. Make sure that your mask is a good fit and learn to use your equipment properly.    5. Challenge  Tell a family member or close friend to ask you each morning if you used your CPAP the previous night. Have someone to challenge you to give it your best effort.    6. Connection   Your adjustment to CPAP will be easier if you are able to connect with others who use the same treatment. Ask your sleep doctor if there is a support group in your area for people who have sleep apnea, or look for one on the Internet.  7. Comfort   Increase your level of comfort by using a saline spray, decongestant or heated humidifier if CPAP irritates your nose, mouth or throat. Use your unit's \"ramp\" setting to slowly get used to the air pressure level. There may be soft pads you can buy that will fit over your mask straps. Look on www.CPAP.com for accessories that can help make CPAP use more comfortable.  8. Cleaning   Clean your mask, tubing and headgear on a regular basis. Put this time in your schedule so that you don't forget to do it. Check and replace the filters for your CPAP unit and humidifier.    9. Completion   Although you are never finished with CPAP therapy, you should reward yourself by celebrating the completion of your first month of treatment. Expect this first month to be your hardest period of adjustment. It will involve some trial and error as you find the machine, mask and pressure settings that are right for you.    10. Continuation  After your first month of treatment, continue to make a daily commitment to use your CPAP all night, every night and for every nap.    CPAP-Tips to starting with success:  Begin using your CPAP for short periods of time during the day while you watch TV or read.    Use CPAP every night and for every nap. Using it less often reduces the health benefits and makes it harder for your body to get used to it.    Make small adjustments to your mask, tubing, straps " and headgear until you get the right fit. Tightening the mask may actually worsen the leak.  If it leaves significant marks on your face or irritates the bridge of your nose, it may not be the best mask for you.  Speak with the person who supplied the mask and consider trying other masks. Insurances will allow you to try different masks during the first month of starting CPAP.  Insurance also covers a new mask, hose and filter about every 6 months.    Use a saline nasal spray to ease mild nasal congestion. Neti-Pot or saline nasal rinses may also help. Nasal gel sprays can help reduce nasal dryness.  Biotene mouthwash can be helpful to protect your teeth if you experience frequent dry mouth.  Dry mouth may be a sign of air escaping out of your mouth or out of the mask in the case of a full face mask.  Speak with your provider if you expect that is the case.     Take a nasal decongestant to relieve more severe nasal or sinus congestion.  Do not use Afrin (oxymetazoline) nasal spray more than 3 days in a row.  Speak with your sleep doctor if your nasal congestion is chronic.    Use a heated humidifier that fits your CPAP model to enhance your breathing comfort. Adjust the heat setting up if you get a dry nose or throat, down if you get condensation in the hose or mask.  Position the CPAP lower than you so that any condensation in the hose drains back into the machine rather than towards the mask.    Try a system that uses nasal pillows if traditional masks give you problems.    Clean your mask, tubing and headgear once a week. Make sure the equipment dries fully.    Regularly check and replace the filters for your CPAP unit and humidifier.    Work closely with your sleep provider and your CPAP supplier to make sure that you have the machine, mask and air pressure setting that works best for you. It is better to stop using it and call your provider to solve problems than to lay awake all night frustrated with the  device.    BESIDES CPAP, WHAT OTHER THERAPIES ARE THERE?      Positioning Device  Positioning devices are generally used when sleep apnea is mild and only occurs on your back.This example shows a pillow that straps around the waist. It may be appropriate for those whose sleep study shows milder sleep apnea that occurs primarily when lying flat on one's back. Preliminary studies have shown benefit but effectiveness at home may need to be verified by a home sleep test. These devices are generally not covered by medical insurance.                      Oral Appliance  What is oral appliance therapy?  An oral appliance is a small acrylic device that fits over the upper and lower teeth or tongue (similar to an orthodontic retainer or a mouth guard). This device slightly advances the lower jaw or tongue, which moves the base of the tongue forward, opens the airway, improves breathing and can effectively treat snoring and obstructive sleep apnea sleep apnea. The appliance is fabricated and customized by a qualified dentist with experience in treating snoring and sleep apnea. Oral appliances are usually well tolerated and have relatively high compliance by patients1, 2, 3.  When is an oral appliance indicated?  Oral appliance therapy is recommended as a first-line treatment for patients with primary snoring, mild sleep apnea, and for patients with moderate sleep apnea who prefer appliance therapy to use of CPAP4, 5. Severity of sleep apnea is determined by sleep testing and is based on the number of respiratory events per hour of sleep.   How successful is oral appliance therapy?  The success rate of oral appliance therapy in patients with mild sleep apnea is 75-80% while in patients with moderate sleep apnea it is 50-70%. The chance of success in patients with severe sleep apnea is 40-50%. The research also shows that oral appliances have a beneficial effect on the cardiovascular health of PAOLA patients at the same magnitude  as CPAP therapy7.  Oral appliances should be a second-line treatment in cases of severe sleep apnea, but if not completely successful then a combination therapy utilizing CPAP plus oral appliance therapy may be effective. Oral appliances tend to be effective in a broad range of patients although studies show that the patients who have the highest success are females, younger patients, those with milder disease, and less severe obesity. 3, 6.   The chances of success are lower in patients who have more severe PAOLA, are older, and those who are morbidly obese.     Example of an oral appliance   Finding a dentist that practices dental sleep medicine  Specific training is available through the American Academy of Dental Sleep Medicine for dentists interested in working in the field of sleep. To find a dentist who is educated in the field of sleep and the use of oral appliances, near you, visit the Web site of the American Academy of Dental Sleep Medicine; also see   http://www.accpstorage.org/newOrganization/patients/oralAppliances.pdf  To search for a dentist certified in these practices:  Http://aadsm.org/FindADentist.aspx?1  1. Huy et al. Objectively measured vs self-reported compliance during oral appliance therapy for sleep-disordered breathing. Chest 2013; 144(5): 9769-0845.  2. Price, et al. Objective measurement of compliance during oral appliance therapy for sleep-disordered breathing. Thorax 2013; 68(1): 91-96.  3. Etienne, et al. Mandibular advancement devices in 620 men and women with PAOLA and snoring: tolerability and predictors of treatment success. Chest 2004; 125: 3424-2561.  4. Nevaeh, et al. Oral appliances for snoring and PAOLA: a review. Sleep 2006; 29: 244-262.  5. Nahid, et al. Oral appliance treatment for PAOLA: an update. J Clin Sleep Med 2014; 10(2): 215-227.  6. Moses et al. Predictors of OSAH treatment outcome. J Dent Res 2007; 86: 5390-1876.      Weight Loss:    Weight  management is a personal decision.  If you are interested in exploring weight loss strategies, the following discussion covers the impact on weight loss on sleep apnea and the approaches that may be successful.    Weight loss decreases severity of sleep apnea in most people with obesity. For those with mild obesity who have developed snoring with weight gain, even 15-30 pound weight loss can improve and occasionally eliminate sleep apnea.  Structured and life-long dietary and health habits are necessary to lose weight and keep healthier weight levels.     Though there may be significant health benefits from weight loss, long-term weight loss is very difficult to achieve- studies show success with dietary management in less than 10% of people. In addition, substantial weight loss may require years of dietary control and may be difficult if patients have severe obesity. In these cases, surgical management may be considered.  Finally, older individuals who have tolerated obesity without health complications may be less likely to benefit from weight loss strategies.    Your BMI is Body mass index is 52.79 kg/(m^2).  Body mass index (BMI) is one way to tell whether you are at a healthy weight, overweight, or obese. It measures your weight in relation to your height.  A BMI of 18.5 to 24.9 is in the healthy range. A person with a BMI of 25 to 29.9 is considered overweight, and someone with a BMI of 30 or greater is considered obese. More than two-thirds of American adults are considered overweight or obese.  Being overweight or obese increases the risk for further weight gain. Excess weight may lead to heart disease and diabetes.  Creating and following plans for healthy eating and physical activity may help you improve your health.  Weight control is part of healthy lifestyle and includes exercise, emotional health, and healthy eating habits. Careful eating habits lifelong are the mainstay of weight control. Though there  are significant health benefits from weight loss, long-term weight loss with diet alone may be very difficult to achieve- studies show long-term success with dietary management in less than 10% of people. Attaining a healthy weight may be especially difficult to achieve in those with severe obesity. In some cases, medications, devices and surgical management might be considered.  What can you do?  If you are overweight or obese and are interested in methods for weight loss, you should discuss this with your provider.     Consider reducing daily calorie intake by 500 calories.     Keep a food journal.     Avoiding skipping meals, consider cutting portions instead.    Diet combined with exercise helps maintain muscle while optimizing fat loss. Strength training is particularly important for building and maintaining muscle mass. Exercise helps reduce stress, increase energy, and improves fitness. Increasing exercise without diet control, however, may not burn enough calories to loose weight.       Start walking three days a week 10-20 minutes at a time    Work towards walking thirty minutes five days a week     Eventually, increase the speed of your walking for 1-2 minutes at time    In addition, we recommend that you review healthy lifestyles and methods for weight loss available through the National Institutes of Health patient information sites:  http://win.niddk.nih.gov/publications/index.htm    And look into health and wellness programs that may be available through your health insurance provider, employer, local community center, or nadir club.    Weight management plan: Patient was referred to their PCP to discuss a diet and exercise plan.    Surgery:    Upper Airway Surgery for PAOLA  Surgery for PAOLA is a second-line treatment option in the management of sleep apnea.  Surgery should be considered for patients who are having a difficult time tolerating CPAP.    Surgery for PAOLA is directed at areas that are  responsible for narrowing or complete obstruction of the airway during sleep.  There are a wide range of procedures available to enlarge and/or stabilize the airway to prevent blockage of breathing in the three major areas where it can occur: the palate, tongue, and nasal regions.  Successful surgical treatment depends on the accurate identification of the factors responsible for obstructive sleep apnea in each person.  A personalized approach is required because there is no single treatment that works well for everyone.  Because of anatomic variation, consultation with an examination by a sleep surgeon is a critical first step in determining what surgical options are best for each patient.  In some cases, examination during sedation may be recommended in order to guide the selection of procedures.  Patients will be counseled about risks and benefits as well as the typical recovery course after surgery. Surgery is typically not a cure for a person s PAOLA.  However, surgery will often significantly improve one s PAOLA severity (termed  success rate ).  Even in the absence of a cure, surgery will decrease the cardiovascular risk associated with OSA7; improve overall quality of life8 (sleepiness, functionality, sleep quality, etc).          Palate Procedures:  Patients with PAOLA often have narrowing of their airway in the region of their tonsils and uvula.  The goals of palate procedures are to widen the airway in this region as well as to help the tissues resist collapse.  Modern palate procedure techniques focus on tissue conservation and soft tissue rearrangement, rather than tissue removal.  Often the uvula is preserved in this procedure. Residual sleep apnea is common in patient after pharyngoplasty with an average reduction in sleep apnea events of 33%2.      Tongue Procedures:  While patients are awake, the muscles that surround the throat are active and keep this region open for breathing. These muscles relax  during sleep, allowing the tongue and other structures to collapse and block breathing.  There are several different tongue procedures available.  Selection of a tongue base procedure depends on characteristics seen on physical exam.  Generally, procedures are aimed at removing bulky tissues in this area or preventing the back of the tongue from falling back during sleep.  Success rates for tongue surgery range from 50-62%3.    Hypoglossal Nerve Stimulation:  Hypoglossal nerve stimulation has recently received approval from the [place] Food and Drug Administration for the treatment of obstructive sleep apnea.  This is based on research showing that the system was safe and effective in treating sleep apnea6.  Results showed that the median AHI score decreased 68%, from 29.3 to 9.0. This therapy uses an implant system that senses breathing patterns and delivers mild stimulation to airway muscles, which keeps the airway open during sleep.  The system consists of three fully implanted components: a small generator (similar in size to a pacemaker), a breathing sensor, and a stimulation lead.  Using a small handheld remote, a patient turns the therapy on before bed and off upon awakening.    Candidates for this device must be greater than 22 years of age, have moderate to severe PAOLA (AHI between 20-65), BMI less than 32, have tried CPAP/oral appliance without success, and have appropriate upper airway anatomy (determined by a sleep endoscopy performed by Dr. Melgar).    Hypoglossal Nerve Stimulation Pathway:    The sleep surgeon s office will work with the patient through the insurance prior-authorization process (including communications and appeals).    Nasal Procedures:  Nasal obstruction can interfere with nasal breathing during the day and night.  Studies have shown that relief of nasal obstruction can improve the ability of some patients to tolerate positive airway pressure therapy for obstructive sleep apnea1.   Treatment options include medications such as nasal saline, topical corticosteroid and antihistamine sprays, and oral medications such as antihistamines or decongestants. Non-surgical treatments can include external nasal dilators for selected patients. If these are not successful by themselves, surgery can improve the nasal airway either alone or in combination with these other options.      Combination Procedures:  Combination of surgical procedures and other treatments may be recommended, particularly if patients have more than one area of narrowing or persistent positional disease.  The success rate of combination surgery ranges from 66-80%2,3.      1. Cheyenne ENGLE. The Role of the Nose in Snoring and Obstructive Sleep Apnoea: An Update.  Eur Arch Otorhinolaryngol. 2011; 268: 1365-73.  2.  Sandra SM; Karolyn JA; Samuel JR; Pallanch JF; Malcolm MB; Gayle SG; Lauryn ORDOÑEZ. Surgical modifications of the upper airway for obstructive sleep apnea in adults: a systematic review and meta-analysis. SLEEP 2010;33(10):1425-4259. Yuri HUANG. Hypopharyngeal surgery in obstructive sleep apnea: an evidence-based medicine review.  Arch Otolaryngol Head Neck Surg. 2006 Feb;132(2):206-13.  3. Cruz YH1, Panda Y, Chandler WICHO. The efficacy of anatomically based multilevel surgery for obstructive sleep apnea. Otolaryngol Head Neck Surg. 2003 Oct;129(4):327-35.  4. Yuri HUANG, Goldberg A. Hypopharyngeal Surgery in Obstructive Sleep Apnea: An Evidence-Based Medicine Review. Arch Otolaryngol Head Neck Surg. 2006 Feb;132(2):206-13.  5. Carmella KAISER et al. Upper-Airway Stimulation for Obstructive Sleep Apnea.  N Engl J Med. 2014 Jan 9;370(2):139-49.  6. Elver Y et al. Increased Incidence of Cardiovascular Disease in Middle-aged Men with Obstructive Sleep Apnea. Am J Respir Crit Care Med; 2002 166: 159-165  7. Citlali MEIER et al. Studying Life Effects and Effectiveness of Palatopharyngoplasty (SLEEP) study: Subjective Outcomes of Isolated  Uvulopalatopharyngoplasty. Otolaryngol Head Neck Surg. 2011; 144: 623-631.  Your blood pressure was checked while you were in clinic today.  Please read the guidelines below about what these numbers mean and what you should do about them.  Your systolic blood pressure is the top number.  This is the pressure when the heart is pumping.  Your diastolic blood pressure is the bottom number.  This is the pressure in between beats.  If your systolic blood pressure is less than 120 and your diastolic blood pressure is less than 80, then your blood pressure is normal. There is nothing more that you need to do about it  If your systolic blood pressure is 120-139 or your diastolic blood pressure is 80-89, your blood pressure may be higher than it should be.  You should have your blood pressure re-checked within a year by a primary care provider.  If your systolic blood pressure is 140 or greater or your diastolic blood pressure is 90 or greater, you may have high blood pressure.  High blood pressure is treatable, but if left untreated over time it can put you at risk for heart attack, stroke, or kidney failure.  You should have your blood pressure re-checked by a primary care provider within the next four weeks.

## 2017-03-01 NOTE — NURSING NOTE
"Chief Complaint   Patient presents with     Consult     not sleeping well, wakes up frequently, snores heavily, witnessed apneas, sometimes dream trying to wake up but cannot. , cannot move, focuses on trying  to move a limb to wake up.       Initial BP (!) 150/101 (BP Location: Right arm, Patient Position: Chair, Cuff Size: Adult Large)  Pulse 97  Ht 1.854 m (6' 0.99\")  Wt (!) 181.4 kg (400 lb)  SpO2 96%  BMI 52.79 kg/m2 Estimated body mass index is 52.79 kg/(m^2) as calculated from the following:    Height as of this encounter: 1.854 m (6' 0.99\").    Weight as of this encounter: 181.4 kg (400 lb).  Medication Reconciliation: complete     ESS 9  Neck Circumference 53 cm, 21 inches    Mariana Alvarado CNA, Clinical Coordinator  "

## 2017-03-01 NOTE — PROGRESS NOTES
Sleep [facility]  Outpatient Sleep Medicine Consultation  March 1, 2017      Name: Jeffry Younger MRN# [id number]   Age: 36 year old YOB: 1980     Date of Consultation: March 1, 2017  Consultation is requested by: Russel Hsieh MD  [address] [place] [address]  Primary care provider: Russel Hsieh  [place] [facility]: [facility]          Reason for Sleep Consult:     Jeffry Younger is a 36 year old male nightly apnea, snoring and poor quality of sleep         Assessment and Plan:     Summary Sleep Diagnoses/Recommendations:    1. Sleep Disturbance:  High suspicion of sleep disordered breathing based on patient's symptoms (snoring, excessive daytime sleepiness, witnessed apneas), high BMI, neck circumference and oropharyngeal examination). Will schedule PSG with PAP titration in second half if patient meets criteria for PAOLA in first half of PSG with transcutaneous CO 2 monitoring and ABG's for suspected obesity hypoventilation syndrome. We also discussed the pathophysiology of sleep disordered breathing and the importance of treating it if S/he should have it. Patient is advised not to drive if he/she feels drowsy or sleepy. Maintenance insomnia is due to above. Advised good sleep hygiene. Follow up after sleep study to discuss the result of sleep study and treatment options.    2. Morbid Obesity with suspected obesity hypoventilation:  Counseled regarding weight loss through diet modification and increased physical activity. Patient was given instuctions of weight loss and advised to follow up her PCP for further weight loss interventions. Referral bariatric clinic was discussed with patient but he is not interested at this time and he wants to continue weight watcher follow up.    3.  Elevated blood pressure, most likely due to undiagnosed hypertension: may be related to untreated sleep apnea.  - Recommend follow up with PCP for  treatment of anti-hypertensive medications. As above sleep study ordered.      Orders Placed This Encounter   Procedures     Comprehensive Sleep Study     ABG-Blood Gas Arterial ([place] / [place])       Summary Counseling:  See instructions    Counseling included a comprehensive review of diagnostic and therapeutic strategies as well as risks of inadequate therapy.  Educational materials provided in instructions.    All questions were answered.  The patient indicates understanding of the above issues and agrees with the plan set forth.           History of Present Illness:     Jeffry Younger is a 36 year old male with history of morbid obesity and elevated blood pressure who presents to the [facility] in [place] with complains of sleep disturbance and evaluation of sleep apnea. Patient complains loud snoring, witnessed apnea, poor sleep quality, frequent awakening at night. His wife report loud snoring and witnessed apnea, worse for the last 3 months. Sometimes dreams, but trying to wake up and can not. He has headaches, toss and turn and dry mouth. He has occasional sleep paralysis. He has dyspnea on exertion and leg edema.      Please see below for sleep ROS details.    PREVIOUS IN- LAB or HOME SLEEP STUDIES:   None     SLEEP-WAKE SCHEDULE:     Jeffry Younger     -Describes themself as a night person;      -ON WEEKDAYS, goes to sleep at 11:15 PM during the week; awakens  8:30 AM without an alarm; falls asleep in 30-60 minutes; has difficulty falling asleep.     -ON WEEKENDS, goes to sleep at 12:30 AM and wakes up at 9:30 AM without an alarm; falls asleep in 30 minutes.       -Awakens 4 times a night for 20 minutes before falling back to sleep; awakens to bathroom, uncertain reasons and external stimuli.      -Total sleep time: 6-7 hours per night.    -Naps 5 times per week for 30-45 minutes, feels refreshed after naps; takes some inadvertant naps.       BEDTIME ACTIVITIES AND SHIFT  WORK:    Jeffry Younger    -does use electronics in bed and read in bed and does not watch TV in bed.     -does not do shift work.  He/she works day shifts.       SCALES       SLEEP APNEA: Stopbang score: 7       INSOMNIA:  Insomnia severity score: N/A       SLEEPINESS: [place] sleepiness scale (ESS):  9   Drowsy driving/near accidents: No          PHQ9: N/A    SLEEP COMPLAINTS:   Snoring- 7 days/week  Witness apnea: Yes  Gasping/Choking: No  Excessive daytime sleep: Yes  Toss/turn: Yes  Excessive tiredness/fatigue:  Yes  Morning headaches: Yes  Dry mouth/throat: Yes  Dyspnea: Yes  Coexisting Lung disease: No    Coexisting Heart disease: No    Does patient have a bed partner: Yes  Has bed partner been sleeping separately because of snoring:  No            RLS Screen: When you try to relax in the evening or sleep at  night, do you ever have unpleasant, restless feelings in your  legs that can be relieved by walking or movement? No, his legs itches but no urge to move    Periodic limb movement: No    Narcolepsy:       denies sudden urges of sleep attacks     denies cataplexy     occasional sleep paralysis, 1x a month      denies hallucinations     Sleep Behaviors:     denies leg symptoms/movements     denies motor restlessness     denies night terrors     denies bruxism     denies automatic behaviors    Other subjective complaints:     denies anxiety or rumination      denies pain and discomfort at  night     denies waking up with heart pounding or racing     denies GERD/heartburn         Parasomnia:   NREM - denies recurrent persistent confusional arousal, night eating, sleep walking or sleep terrors   REM  - denies dream enactment; injuries     Safety: None             Medications:     Current Outpatient Prescriptions   Medication Sig     atovaquone-proguanil (MALARONE) 250-100 MG per tablet Take 1 tablet by mouth daily Start 2 days before exposure to Malaria and continue daily till  7 days after exposure.     No  "current facility-administered medications for this visit.         Medication that can affect sleep: None    No Known Allergies         Past Medical History:     Does not need 02 supplement at night     Past Medical History   Diagnosis Date     Obesity                Past Surgical History:    No previous upper airway surgery     Past Surgical History   Procedure Laterality Date     As esophagoscopy, diagnostic       EGD            Social History:     Social History   Substance Use Topics     Smoking status: Never Smoker     Smokeless tobacco: Never Used     Alcohol use No         Chemical History:     Tobacco: No     Uses 1 cups/day of coffee, 2-3 sodas/day. Last caffeine intake is usually before supper    Supplements for wakefulness: No    EtOH: Yes, rarely  Recreational Drugs: No    Psych Hx:   None    Current dangers to self or others: None           Family History:     Family History   Problem Relation Age of Onset     DIABETES Father      Hypertension Father      Colon Cancer No family hx of      Prostate Cancer No family hx of      CEREBROVASCULAR DISEASE No family hx of      Coronary Artery Disease No family hx of         Sleep Family Hx:        RLS- No  PAOLA - No  Insomnia - No  Parasomnia - No         Review of Systems:     A complete 10 point review of systems was negative other than HPI or as commented below:   Patient denies chest pain, dyspnea with activity and or rest, wheezing, abdominal pain, n&v, fever, chills, dysuria, leg pain or swelling. Patient is also denies ear pain, sore throat, postnasal drip, running nose, dry cough. He has headaches.      Jeffry Younger has gained 30-40 pounds in 10 years.            Physical Examination:   BP (!) 150/101 (BP Location: Right arm, Patient Position: Chair, Cuff Size: Adult Large)  Pulse 97  Ht 1.854 m (6' 0.99\")  Wt (!) 181.4 kg (400 lb)  SpO2 96%  BMI 52.79 kg/m2     Neck Circumference: 53 cm   Constitutional: . Awake, alert, cooperative, in no apparent " distress  Mood: euthymic; affect congruent with full range and intensity.  Attention/Concentration:  Normal   Eyes: Pupils round and reactive. No icterus.  ENT: Mallampati Class: IV.   Tonsillar Stage: 3  extending beyond pillars on right side and +1 on left side  Clear nasal passages. Enlarged inferior turbinates. Nasal deviated septum mildly to left.  Oropharynx: No high arched palate. No pharyngeal erythema or exudates, elongated uvula. lateral narrowing  Tongue: No macroglossia   Dentition: Good.  Dentures: None  Neck: Supple, no thyroid enlargement.   Cardiovascular: Regular S1 and S2, no gallops or murmurs.   Pulmonary:  Chest symmetric, lungs clear bilaterally and no crackles, wheezes or rales.  Abdomen: Soft, obese, non tender.  Extremities:  trace pedal edema.  Muscle/joint: Strength and tone normal   Skin:  No rash or significant lesions.   Neurologic: Alert, oriented x3, no focal neurological deficit.           Data: All pertinent previous laboratory data reviewed     No results found for: PH, PHARTERIAL, PO2, [id number], SAT, PCO2, HCO3, BASEEXCESS, WILLIAM, BEB  No results found for: TSH  Lab Results   Component Value Date    GLC 89 09/29/2015     No results found for: HGB  Lab Results   Component Value Date    BUN 15 09/29/2015    CR 1.08 09/29/2015     Lab Results   Component Value Date    CO2 26 09/29/2015     No results found for: AMOL      Echocardiography: No    Chest x-ray: No    PFT: No        Copy to: Russel Hsieh MD 3/1/2017   [place] [address], [place], [place] [id number] [phone] Clinic    Total time spent with patient: 41 minutes with this patient today in which 25 minutes was spent in counseling/coordination of care and going over planned testing and recommendations.

## 2017-03-05 ENCOUNTER — THERAPY VISIT (OUTPATIENT)
Dept: SLEEP MEDICINE | Facility: CLINIC | Age: 37
End: 2017-03-05
Payer: COMMERCIAL

## 2017-03-05 DIAGNOSIS — G47.9 SLEEP DISTURBANCE: ICD-10-CM

## 2017-03-05 PROCEDURE — 95810 POLYSOM 6/> YRS 4/> PARAM: CPT | Performed by: INTERNAL MEDICINE

## 2017-03-05 NOTE — MR AVS SNAPSHOT
After Visit Summary   3/5/2017    Jeffry Younger    MRN: [id number]           Patient Information     Date Of Birth          1980        Visit Information        Provider Department      3/5/2017 8:30 PM BED 3  SLEEP [facility]        Today's Diagnoses     Sleep disturbance          Care Instructions    Diagnostic PSG completed per provider order.  Patient met criteria for PAP therapy. (Too late to apply CPAP)        Follow-ups after your visit        Your next 10 appointments already scheduled     Mar 13, 2017 10:00 AM CDT   Return Sleep Patient with Montana James MD   [facility]    [address]  [facility] [address] [phone]              Who to contact     If you have questions or need follow up information about today's clinic visit or your schedule please contact [facility] directly at [phone].  Normal or non-critical lab and imaging results will be communicated to you by [employer], letter or phone within 4 business days after the clinic has received the results. If you do not hear from us within 7 days, please contact the clinic through [employer] or phone. If you have a critical or abnormal lab result, we will notify you by phone as soon as possible.  Submit refill requests through [employer] or call your pharmacy and they will forward the refill request to us. Please allow 3 business days for your refill to be completed.          Additional Information About Your Visit        [employer] Information     [employer] gives you secure access to your electronic health record. If you see a primary care provider, you can also send messages to your care team and make appointments. If you have questions, please call your primary care clinic.  If you do not have a primary care provider, please call [phone] and they will assist you.        Care EveryWhere ID     This is  your Care EveryWhere ID. This could be used by other organizations to access your [facility] medical records  [id number]         Blood Pressure from Last 3 Encounters:   03/01/17 (!) 150/101   02/22/17 (!) 145/91   02/06/17 140/78    Weight from Last 3 Encounters:   03/01/17 (!) 181.4 kg (400 lb)   02/06/17 (!) 181.4 kg (400 lb)   05/15/16 (!) 183.7 kg (404 lb 14.4 oz)              We Performed the Following     Comprehensive Sleep Study        Primary Care Provider Office Phone # Fax #    Russel Hsieh [phone]       [address] [place] [address]        Thank you!     Thank you for choosing [facility]  for your care. Our goal is always to provide you with excellent care. Hearing back from our patients is one way we can continue to improve our services. Please take a few minutes to complete the written survey that you may receive in the mail after your visit with us. Thank you!             Your Updated Medication List - Protect others around you: Learn how to safely use, store and throw away your medicines at www.disposemymeds.org.          This list is accurate as of: 3/5/17 11:59 PM.  Always use your most recent med list.                   Brand Name Dispense Instructions for use    atovaquone-proguanil 250-100 MG per tablet    MALARONE    22 tablet    Take 1 tablet by mouth daily Start 2 days before exposure to Malaria and continue daily till  7 days after exposure.

## 2017-03-06 LAB
BASE DEFICIT BLDA-SCNC: 1.6 MMOL/L
HCO3 BLD-SCNC: 23 MMOL/L (ref 21–28)
OXYHGB MFR BLD: 97 % (ref 92–100)
PCO2 BLD: 35 MM HG (ref 35–45)
PH BLD: 7.42 PH (ref 7.35–7.45)
PO2 BLD: 94 MM HG (ref 80–105)

## 2017-03-06 PROCEDURE — 82805 BLOOD GASES W/O2 SATURATION: CPT | Performed by: PATHOLOGY

## 2017-03-06 PROCEDURE — 36415 COLL VENOUS BLD VENIPUNCTURE: CPT | Performed by: INTERNAL MEDICINE

## 2017-03-06 NOTE — PATIENT INSTRUCTIONS
Diagnostic PSG completed per provider order.  Patient met criteria for PAP therapy. (Too late to apply CPAP)

## 2017-03-07 PROBLEM — G47.33 OSA (OBSTRUCTIVE SLEEP APNEA): Status: ACTIVE | Noted: 2017-03-07

## 2017-03-07 NOTE — PROCEDURES
SLEEP STUDY INTERPRETATION  POLYSOMNOGRAPHY REPORT      Patient: Jeffry Younger  YOB: 1980  Study Date: 3/5/2017  MRN: [id number]  Referring Provider:  Russel Hsieh MD  Ordering Provider: MD James Abdullahi    Indications for Polysomnography: The patient is a 36 y old Male who is 6' and weighs 400.0 lbs.  His BMI is 54.8, [place] sleepiness scale 9.0 and neck size is 53.0.  Relevant medical history includes morbid obesity, elevated blood pressure, loud snoring, witnessed apnea, dyspnea on exertion and edema of lower extremities. A diagnostic polysomnogram was performed to evaluate for sleep apnea/ hypoventilation/hypoxemia.    Polysomnogram Data:  A full night polysomnogram recorded the standard physiologic parameters including EEG, EOG, EMG, ECG, nasal and oral airflow.  Respiratory parameters of chest and abdominal movements were recorded with respiratory inductance plethysmography.  Oxygen saturation was recorded by pulse oximetry.  Transcutaneous CO2 monitoring was used.    Sleep Architecture: All stages of sleep were achieved. Sleep fragmentation and poor sleep efficiency.  The total recording time of the polysomnogram was 500.1 minutes.  The total sleep time was 387.5 minutes.  Sleep latency was decreased at 5.8 minutes without the use of a sleep aid.  REM latency was delayed at 270.5 minutes.  Arousal index was increased at 28.3 arousals per hour.  Sleep efficiency was decreased at 77.5%.  Wake after sleep onset was 106.0 minutes.  The patient spent 15.1% of total sleep time in Stage N1, 53.7% in Stage N2, 11.2% in Stages N3, and 20.0% in REM.  Time in REM supine was 0 minutes.    Respiration: Mild sleep disordered breathing predominantly REM and supine sleep. Limited supine sleep position may underestimate the severity of sleep disordered breathing. There was a poor PTAF (nasal pressure transducer) signal, may also affected the scoring.    Events - The polysomnogram revealed a presence of 29  obstructive, 2 central, and 0 mixed apneas resulting in an apnea index of 4.8 events per hour.  There were 48 hypopneas resulting in a hypopnea index of 7.4 events per hour.  The combined apnea/hypopnea index was 12.2 events per hour.  The REM AHI was 32.5 events per hour.  The supine AHI was 45.0 events per hour.  The RERA index was 1.5 events per hour.   The RDI was 13.7 events per hour.    Snoring - was reported as loud.    Respiratory rate and pattern - was notable for normal respiratory rate and pattern.    Sustained Sleep Associated Hypoventilation - Transcutaneous carbon dioxide monitoring was used, however significant hypoventilation was not suggested by oximetry, or was not present with a maximum change of 4 mmHg.    Sleep Associated Hypoxemia - (Greater than 5 minutes O2 sat below 89%) was not present.  Baseline oxygen saturation was 95.0%. Lowest oxygen saturation was 85.0%.  Time spent less than or equal to 88% was 1.0 minutes.  Time spent less than or equal to 89% was 2.0 minutes.    Arterial blood gases (ABG s):  PH 7.42, PaCO2 35, PaO2 94, HCO3 23.  13.7 1.5 12.2     Movement Activity:  Mild periodic leg movements were noted mostly early portion of the study.    Periodic Limb Activity - There were 75 PLMs during the entire study. The PLM index was 11.6 movements per hour.  The PLM Arousal Index was 1.2 per hour.    REM EMG Activity - Excessive transient / sustained muscle activity was not present.    Nocturnal Behavior - Abnormal sleep related behaviors were not noted during NREM / REM sleep.      Bruxism - None apparent.    Cardiac Summary:  Normal sinus rhythm throughout the night.  The average pulse rate was 80.2 bpm.  The minimum pulse rate was 63.1 bpm while the maximum pulse rate was 107.4 bpm. The rhythm is normal sinus. Arrhythmias were not noted.  Cardiac Comments: Normal Sinus Rhythm    Assessment:    Obstructive Sleep Apnea G47.33   Periodic Limb Movement Disorder  G47.61    Recommendations:    The severity of sleep apnea was underestimated by limited time of supine sleep.    Based on the presence of mild obstructive sleep apnea and excessive daytime sleepiness, treatment could be empirically initiated with Auto-titrating PAP therapy with a range of 8 - 16 cmH2O. Recommend clinical follow up with sleep management team.     Patient may be a candidate for dental appliance through referral to Sleep Dentistry for the treatment of obstructive sleep apnea if patient is not interested PAP therapy.    the presence of periodic limb movements may be related to sleep fragmentation, but will re-evaluate the clinical presence of restless leg syndrome and Ferritin level in next clinic visit.    Advise regarding the risks of drowsy driving.    Suggest optimizing sleep schedule and avoiding sleep deprivation.    Weight management (if BMI > 30).    Follow up primary care doctor as scheduled.      _____________________________________   electronically signed by: YAHIR GRANADOS MD (3/7/17)       CC:  Russel Hsieh MD          Range(%) Time in range (min) Time in range (%) Time in or below range (min) Time in or below range (%)   0.0 - 89.0 2.0 0.4% 2.0 0.4%   0.0 - 88.0 1.0 0.2% 1.0 0.2%      12.2 45.0 32.5

## 2017-03-13 ENCOUNTER — TELEPHONE (OUTPATIENT)
Dept: SLEEP MEDICINE | Facility: CLINIC | Age: 37
End: 2017-03-13

## 2017-03-13 ENCOUNTER — OFFICE VISIT (OUTPATIENT)
Dept: SLEEP MEDICINE | Facility: CLINIC | Age: 37
End: 2017-03-13
Payer: COMMERCIAL

## 2017-03-13 VITALS
HEIGHT: 73 IN | OXYGEN SATURATION: 97 % | SYSTOLIC BLOOD PRESSURE: 151 MMHG | DIASTOLIC BLOOD PRESSURE: 89 MMHG | RESPIRATION RATE: 15 BRPM | HEART RATE: 91 BPM | BODY MASS INDEX: 41.75 KG/M2 | WEIGHT: 315 LBS

## 2017-03-13 DIAGNOSIS — E66.01 MORBID OBESITY WITH BMI OF 50.0-59.9, ADULT (H): ICD-10-CM

## 2017-03-13 DIAGNOSIS — G47.33 OSA (OBSTRUCTIVE SLEEP APNEA): Primary | ICD-10-CM

## 2017-03-13 PROCEDURE — 99214 OFFICE O/P EST MOD 30 MIN: CPT | Performed by: INTERNAL MEDICINE

## 2017-03-13 NOTE — MR AVS SNAPSHOT
After Visit Summary   3/13/2017    Jeffry Younger    MRN: [id number]           Patient Information     Date Of Birth          1980        Visit Information        Provider Department      3/13/2017 10:00 AM Montana James MD [facility] [place]        Today's Diagnoses     PALOA (obstructive sleep apnea)    -  1    Morbid obesity with BMI of 50.0-59.9, adult (H)          Care Instructions    MY TREATMENT INFORMATION FOR SLEEP APNEA-  Jeffry Younger    MY CONTACT NUMBERS ARE:  [phone]  DOCTOR : Montana James  SLEEP CENTER :  [place]  CPAP EQUIPMENT     You have sleep apnea, auto-CPAP is prescribed for you.    You will be provided with an  auto-titrating CPAP with a pressure range of 8-16 cmH2O with heated humidity to limit nasal congestion. Adjust the heat level on humidifier to find a setting that prevents dry nose but does not cause condensation in the hose or mask. Use distilled water in the humidifier.    The CPAP has a ramp function that starts the pressure lower than your prescribed pressure and gradually increases it over a number of minutes.  This may make it easier to fall asleep.                  Try to use the CPAP every-night, all night, at least 7-8 hours each night.  Daytime naps are not advised, but use CPAP if taking naps. Many insurances require that we prove you are using the CPAP at least 4 hours on at least 70% of nights over a 30 day period. We have 90 days to meet those criteria.       Patient was advised not to drive if drowsy or sleepy.                Discussed weight management and the impact of weight gain on sleep apnea.  Let me know if you snore or feel the pressure is too high.    You can get new supplies (mask, hose and filter) for your CPAP every 3-6 months, covered by insurance. You do not need to get supplies that often, but they are available if you would like them.  You may exchange the mask once within the first month if you feel the  "initial mask does not fit well.  Contact your medical equipment provider for equipment issues.  Please let me know if you have any return of snoring, daytime sleepiness or poor sleep quality. We will want to make sure your CPAP is adequately treating your apnea.  There is a website called CPAP.com that has accessories that may make CPAP use easier. Please visit it at your convenience.    Our phone number is [phone]    Follow-up 4-6 weeks after PAP usage.  Bring your CPAP machine with you to the follow up appointment.      Frequently asked questions:  1. What is Obstructive Sleep Apnea (PAOLA)? PAOLA is the most common type of sleep apnea. Apnea literally means, \"without breath.\" It is characterized by repetitive pauses in breathing, despite continued effort to breathe, and is usually associated with a reduction in blood oxygen saturation. Apneas can last 10 to over 60 seconds. It is caused by narrowing or collapse of the upper airway as muscles relax during sleep. Severity of sleep apnea is determined by frequency of breathing events and their effect on your sleep and oxygen levels determined during sleep testing.   2. What are the consequences of PAOLA? Symptoms include: daytime sleepiness- possibly increasing the risk of falling asleep while driving, unrefreshing/restless sleep, snoring, insomnia, waking frequently to urinate, waking with heartburn or reflux, reduced concentration and memory, and morning headaches. Other health consequences may include development of high blood pressure and other cardiovascular disease in persons who are susceptible. Untreated PAOLA  can contribute to heart disease, stroke and diabetes.   3. What are the treatment options? In most situations, sleep apnea is a lifelong disease that must be managed with daily therapy. Medications are not effective for sleep apnea and surgery is generally not performed until other therapies have been tried. Therapy is usually tailored to the individual " patient based on many factors including your wishes as well as severity of sleep apnea and severity of obesity. Continuous Positive Airway (CPAP) is the most reliable treatment. An oral device to hold your jaw forward is usually      IF I HAVE SLEEP APNEA.....  WHERE CAN I FIND MORE INFORMATION?    American Academy of Sleep Medicine Patient information on sleep disorders:  http://yoursleep.aasmnet.org    THINGS I SHOULD REMEMBER  In most situations, sleep apnea is a lifelong disease that must be managed with daily therapy. Untreated disease, when severe, may result in an increased risk for an array of problems from heart disease to mood changes, car accidents and shorter lifespan.    CPAP-  WHY AND HOW?                                    Continuous positive airway pressure, or CPAP, is the most effective treatment for obstructive sleep apnea. A decision to use CPAP is a major step forward in the pursuit of a healthier life. The successful use of CPAP will help you breathe easier, sleep better and live healthier. Using CPAP can be a positive experience if you keep these chaudhry points in mind:  1. Commitment  CPAP is not a quick fix for your problem. It involves a long-term commitment to improve your sleep and your health.    2. Communication  Stay in close communication with both your sleep doctor and your CPAP supplier. Ask lots of questions and seek help when you need it.    3. Consistency  Use CPAP all night, every night and for every nap. You will receive the maximum health benefits from CPAP when you use it every time that you sleep. This will also make it easier for your body to adjust to the treatment.    4. Correction  The first machine and mask that you try may not be the best ones for you. Work with your sleep doctor and your CPAP supplier to make corrections to your equipment selection. Ask about trying a different type of machine or mask if you have ongoing problems. Make sure that your mask is a good fit  "and learn to use your equipment properly.    5. Challenge  Tell a family member or close friend to ask you each morning if you used your CPAP the previous night. Have someone to challenge you to give it your best effort.    6. Connection   Your adjustment to CPAP will be easier if you are able to connect with others who use the same treatment. Ask your sleep doctor if there is a support group in your area for people who have sleep apnea, or look for one on the Internet.    7. Comfort   Increase your level of comfort by using a saline spray, decongestant or heated humidifier if CPAP irritates your nose, mouth or throat. Use your unit's \"ramp\" setting to slowly get used to the air pressure level. There may be soft pads you can buy that will fit over your mask straps. Look on www.CPAP.com for accessories such as these straps, a pillow contoured for side-sleeping with CPAP, longer hoses, hose covers to reduce condensation, or stands to keep the hose out of your way.                                                              8. Cleaning   Clean your mask, tubing and headgear on a regular basis. Put this time in your schedule so that you don't forget to do it. Check and replace the filters for your CPAP unit and humidifier.    9. Completion   Although you are never finished with CPAP therapy, you should reward yourself by celebrating the completion of your first month of treatment. Expect this first month to be your hardest period of adjustment. It will involve some trial and error as you find the machine, mask and pressure settings that are right for you.    10. Continuation  After your first month of treatment, continue to make a daily commitment to use your CPAP all night, every night and for every nap.    CPAP-Tips to starting with success:  Begin using your CPAP for short periods of time during the day while you watch TV or read.    Use CPAP every night and for every nap. Using it less often reduces the health " benefits and makes it harder for your body to get used to it.    Newer CPAP models are virtually silent; however, if you find the sound of your CPAP machine to be bothersome, place the unit under your bed to dampen the sound.     Make small adjustments to your mask, tubing, straps and headgear until you get the right fit. Tightening the mask may actually worsen the leak.  If it leaves significant marks on your face or irritates the bridge of your nose, it may not be the best mask for you.  Speak with the person who supplied the mask and consider trying other masks.    Use a saline nasal spray to ease mild nasal congestion. Neti-Pot or saline nasal rinses may also help. Nasal gel sprays can help reduce nasal dryness.  Biotene mouthwash can be helpful to protect your teeth if you experience frequent dry mouth.  Dry mouth may be a sign of air escaping out of your mouth or out of the mask in the case of a full face mask.  Speak with your provider if you expect that is the case.     Take a nasal decongestant to relieve more severe nasal or sinus congestion.  Do not use Afrin (oxymetazoline) nasal spray more than 3 days in a row.  Speak with your sleep doctor if your nasal congestion is chronic.    Use a heated humidifier that fits your CPAP model to enhance your breathing comfort. Adjust the heat setting up if you get a dry nose or throat, down if you get condensation in the hose or mask.  Position the CPAP lower than you so that any condensation in the hose drains back into the machine rather than towards the mask.    Try a system that uses nasal pillows if traditional masks give you problems.    Clean your mask, tubing and headgear once a week. Make sure the equipment dries fully.    Regularly check and replace the filters for your CPAP unit and humidifier.    Work closely with your sleep doctor and your CPAP supplier to make sure that you have the machine, mask and air pressure setting that works best for  you.      Your BMI is Body mass index is 52.77 kg/(m^2).  Weight management is a personal decision.  If you are interested in exploring weight loss strategies, the following discussion covers the approaches that may be successful. Body mass index (BMI) is one way to tell whether you are at a healthy weight, overweight, or obese. It measures your weight in relation to your height.  A BMI of 18.5 to 24.9 is in the healthy range. A person with a BMI of 25 to 29.9 is considered overweight, and someone with a BMI of 30 or greater is considered obese. More than two-thirds of American adults are considered overweight or obese.  Being overweight or obese increases the risk for further weight gain. Excess weight may lead to heart disease and diabetes.  Creating and following plans for healthy eating and physical activity may help you improve your health.  Weight control is part of healthy lifestyle and includes exercise, emotional health, and healthy eating habits. Careful eating habits lifelong are the mainstay of weight control. Though there are significant health benefits from weight loss, long-term weight loss with diet alone may be very difficult to achieve- studies show long-term success with dietary management in less than 10% of people. Attaining a healthy weight may be especially difficult to achieve in those with severe obesity. In some cases, medications, devices and surgical management might be considered.  What can you do?  If you are overweight or obese and are interested in methods for weight loss, you should discuss this with your provider.     Consider reducing daily calorie intake by 500 calories.     Keep a food journal.     Avoiding skipping meals, consider cutting portions instead.    Diet combined with exercise helps maintain muscle while optimizing fat loss. Strength training is particularly important for building and maintaining muscle mass. Exercise helps reduce stress, increase energy, and improves  fitness. Increasing exercise without diet control, however, may not burn enough calories to loose weight.       Start walking three days a week 10-20 minutes at a time    Work towards walking thirty minutes five days a week     Eventually, increase the speed of your walking for 1-2 minutes at time    In addition, we recommend that you review healthy lifestyles and methods for weight loss available through the National Institutes of Health patient information sites:  http://win.niddk.nih.gov/publications/index.htm    And look into health and wellness programs that may be available through your health insurance provider, employer, local community center, or [employer]ir club.    Weight management plan: Patient was referred to their PCP to discuss a diet and exercise plan.     Your blood pressure was checked while you were in clinic today.  Please read the guidelines below about what these numbers mean and what you should do about them.  Your systolic blood pressure is the top number.  This is the pressure when the heart is pumping.  Your diastolic blood pressure is the bottom number.  This is the pressure in between beats.  If your systolic blood pressure is less than 120 and your diastolic blood pressure is less than 80, then your blood pressure is normal. There is nothing more that you need to do about it  If your systolic blood pressure is 120-139 or your diastolic blood pressure is 80-89, your blood pressure may be higher than it should be.  You should have your blood pressure re-checked within a year by a primary care provider.  If your systolic blood pressure is 140 or greater or your diastolic blood pressure is 90 or greater, you may have high blood pressure.  High blood pressure is treatable, but if left untreated over time it can put you at risk for heart attack, stroke, or kidney failure.  You should have your blood pressure re-checked by a primary care provider within the next four weeks.            Follow-ups  "after your visit        Who to contact     If you have questions or need follow up information about today's clinic visit or your schedule please contact [facility] directly at [phone].  Normal or non-critical lab and imaging results will be communicated to you by MyChart, letter or phone within 4 business days after the clinic has received the results. If you do not hear from us within 7 days, please contact the clinic through MyChart or phone. If you have a critical or abnormal lab result, we will notify you by phone as soon as possible.  Submit refill requests through [employer] or call your pharmacy and they will forward the refill request to us. Please allow 3 business days for your refill to be completed.          Additional Information About Your Visit        [employer] Information     [employer] gives you secure access to your electronic health record. If you see a primary care provider, you can also send messages to your care team and make appointments. If you have questions, please call your primary care clinic.  If you do not have a primary care provider, please call [phone] and they will assist you.        Care EveryWhere ID     This is your Care EveryWhere ID. This could be used by other organizations to access your [facility] medical records  [id number]        Your Vitals Were     Pulse Respirations Height Pulse Oximetry BMI (Body Mass Index)       91 15 1.854 m (6' 0.99\") 97% 52.77 kg/m2        Blood Pressure from Last 3 Encounters:   03/13/17 151/89   03/01/17 (!) 150/101   02/22/17 (!) 145/91    Weight from Last 3 Encounters:   03/13/17 (!) 181.4 kg (399 lb 14.6 oz)   03/01/17 (!) 181.4 kg (400 lb)   02/06/17 (!) 181.4 kg (400 lb)              We Performed the Following     Comprehensive DME        Primary Care Provider Office Phone # Fax #    Russel Hsieh [phone]       Dorena SYEDA [address] [place] [address]      "   Thank you!     Thank you for choosing [facility]  for your care. Our goal is always to provide you with excellent care. Hearing back from our patients is one way we can continue to improve our services. Please take a few minutes to complete the written survey that you may receive in the mail after your visit with us. Thank you!             Your Updated Medication List - Protect others around you: Learn how to safely use, store and throw away your medicines at www.disposemymeds.org.          This list is accurate as of: 3/13/17 10:09 AM.  Always use your most recent med list.                   Brand Name Dispense Instructions for use    atovaquone-proguanil 250-100 MG per tablet    MALARONE    22 tablet    Take 1 tablet by mouth daily Start 2 days before exposure to Malaria and continue daily till  7 days after exposure.

## 2017-03-13 NOTE — NURSING NOTE
"Chief Complaint   Patient presents with     RECHECK     F/u Psg 3/5       Initial /89  Pulse 91  Resp 15  Ht 1.854 m (6' 0.99\")  Wt (!) 181.4 kg (399 lb 14.6 oz)  SpO2 97%  BMI 52.77 kg/m2 Estimated body mass index is 52.77 kg/(m^2) as calculated from the following:    Height as of this encounter: 1.854 m (6' 0.99\").    Weight as of this encounter: 181.4 kg (399 lb 14.6 oz).  Medication Reconciliation: complete         Marjorie Diaz LPN/MA  "

## 2017-03-13 NOTE — PATIENT INSTRUCTIONS
MY TREATMENT INFORMATION FOR SLEEP APNEA-  Jeffry Younger    MY CONTACT NUMBERS ARE:  [phone]  DOCTOR : [place] GRIM[facility]  SLEEP CENTER :  [place]  CPAP EQUIPMENT     You have sleep apnea, auto-CPAP is prescribed for you.    You will be provided with an  auto-titrating CPAP with a pressure range of 8-16 cmH2O with heated humidity to limit nasal congestion. Adjust the heat level on humidifier to find a setting that prevents dry nose but does not cause condensation in the hose or mask. Use distilled water in the humidifier.    The CPAP has a ramp function that starts the pressure lower than your prescribed pressure and gradually increases it over a number of minutes.  This may make it easier to fall asleep.                  Try to use the CPAP every-night, all night, at least 7-8 hours each night.  Daytime naps are not advised, but use CPAP if taking naps. Many insurances require that we prove you are using the CPAP at least 4 hours on at least 70% of nights over a 30 day period. We have 90 days to meet those criteria.       Patient was advised not to drive if drowsy or sleepy.                Discussed weight management and the impact of weight gain on sleep apnea.  Let me know if you snore or feel the pressure is too high.    You can get new supplies (mask, hose and filter) for your CPAP every 3-6 months, covered by insurance. You do not need to get supplies that often, but they are available if you would like them.  You may exchange the mask once within the first month if you feel the initial mask does not fit well.  Contact your medical equipment provider for equipment issues.  Please let me know if you have any return of snoring, daytime sleepiness or poor sleep quality. We will want to make sure your CPAP is adequately treating your apnea.  There is a website called CPAP.com that has accessories that may make CPAP use easier. Please visit it at your convenience.    Our phone number is  "[phone]    Follow-up 4-6 weeks after PAP usage.  Bring your CPAP machine with you to the follow up appointment.      Frequently asked questions:  1. What is Obstructive Sleep Apnea (PAOLA)? PAOLA is the most common type of sleep apnea. Apnea literally means, \"without breath.\" It is characterized by repetitive pauses in breathing, despite continued effort to breathe, and is usually associated with a reduction in blood oxygen saturation. Apneas can last 10 to over 60 seconds. It is caused by narrowing or collapse of the upper airway as muscles relax during sleep. Severity of sleep apnea is determined by frequency of breathing events and their effect on your sleep and oxygen levels determined during sleep testing.   2. What are the consequences of PAOLA? Symptoms include: daytime sleepiness- possibly increasing the risk of falling asleep while driving, unrefreshing/restless sleep, snoring, insomnia, waking frequently to urinate, waking with heartburn or reflux, reduced concentration and memory, and morning headaches. Other health consequences may include development of high blood pressure and other cardiovascular disease in persons who are susceptible. Untreated PAOLA  can contribute to heart disease, stroke and diabetes.   3. What are the treatment options? In most situations, sleep apnea is a lifelong disease that must be managed with daily therapy. Medications are not effective for sleep apnea and surgery is generally not performed until other therapies have been tried. Therapy is usually tailored to the individual patient based on many factors including your wishes as well as severity of sleep apnea and severity of obesity. Continuous Positive Airway (CPAP) is the most reliable treatment. An oral device to hold your jaw forward is usually      IF I HAVE SLEEP APNEA.....  WHERE CAN I FIND MORE INFORMATION?    American Academy of Sleep Medicine Patient information on sleep " disorders:  http://yoursleep.aasmnet.org    THINGS I SHOULD REMEMBER  In most situations, sleep apnea is a lifelong disease that must be managed with daily therapy. Untreated disease, when severe, may result in an increased risk for an array of problems from heart disease to mood changes, car accidents and shorter lifespan.    CPAP-  WHY AND HOW?                                    Continuous positive airway pressure, or CPAP, is the most effective treatment for obstructive sleep apnea. A decision to use CPAP is a major step forward in the pursuit of a healthier life. The successful use of CPAP will help you breathe easier, sleep better and live healthier. Using CPAP can be a positive experience if you keep these chaudhry points in mind:  1. Commitment  CPAP is not a quick fix for your problem. It involves a long-term commitment to improve your sleep and your health.    2. Communication  Stay in close communication with both your sleep doctor and your CPAP supplier. Ask lots of questions and seek help when you need it.    3. Consistency  Use CPAP all night, every night and for every nap. You will receive the maximum health benefits from CPAP when you use it every time that you sleep. This will also make it easier for your body to adjust to the treatment.    4. Correction  The first machine and mask that you try may not be the best ones for you. Work with your sleep doctor and your CPAP supplier to make corrections to your equipment selection. Ask about trying a different type of machine or mask if you have ongoing problems. Make sure that your mask is a good fit and learn to use your equipment properly.    5. Challenge  Tell a family member or close friend to ask you each morning if you used your CPAP the previous night. Have someone to challenge you to give it your best effort.    6. Connection   Your adjustment to CPAP will be easier if you are able to connect with others who use the same treatment. Ask your sleep  "doctor if there is a support group in your area for people who have sleep apnea, or look for one on the Internet.    7. Comfort   Increase your level of comfort by using a saline spray, decongestant or heated humidifier if CPAP irritates your nose, mouth or throat. Use your unit's \"ramp\" setting to slowly get used to the air pressure level. There may be soft pads you can buy that will fit over your mask straps. Look on www.CPAP.com for accessories such as these straps, a pillow contoured for side-sleeping with CPAP, longer hoses, hose covers to reduce condensation, or stands to keep the hose out of your way.                                                              8. Cleaning   Clean your mask, tubing and headgear on a regular basis. Put this time in your schedule so that you don't forget to do it. Check and replace the filters for your CPAP unit and humidifier.    9. Completion   Although you are never finished with CPAP therapy, you should reward yourself by celebrating the completion of your first month of treatment. Expect this first month to be your hardest period of adjustment. It will involve some trial and error as you find the machine, mask and pressure settings that are right for you.    10. Continuation  After your first month of treatment, continue to make a daily commitment to use your CPAP all night, every night and for every nap.    CPAP-Tips to starting with success:  Begin using your CPAP for short periods of time during the day while you watch TV or read.    Use CPAP every night and for every nap. Using it less often reduces the health benefits and makes it harder for your body to get used to it.    Newer CPAP models are virtually silent; however, if you find the sound of your CPAP machine to be bothersome, place the unit under your bed to dampen the sound.     Make small adjustments to your mask, tubing, straps and headgear until you get the right fit. Tightening the mask may actually worsen " the leak.  If it leaves significant marks on your face or irritates the bridge of your nose, it may not be the best mask for you.  Speak with the person who supplied the mask and consider trying other masks.    Use a saline nasal spray to ease mild nasal congestion. Neti-Pot or saline nasal rinses may also help. Nasal gel sprays can help reduce nasal dryness.  Biotene mouthwash can be helpful to protect your teeth if you experience frequent dry mouth.  Dry mouth may be a sign of air escaping out of your mouth or out of the mask in the case of a full face mask.  Speak with your provider if you expect that is the case.     Take a nasal decongestant to relieve more severe nasal or sinus congestion.  Do not use Afrin (oxymetazoline) nasal spray more than 3 days in a row.  Speak with your sleep doctor if your nasal congestion is chronic.    Use a heated humidifier that fits your CPAP model to enhance your breathing comfort. Adjust the heat setting up if you get a dry nose or throat, down if you get condensation in the hose or mask.  Position the CPAP lower than you so that any condensation in the hose drains back into the machine rather than towards the mask.    Try a system that uses nasal pillows if traditional masks give you problems.    Clean your mask, tubing and headgear once a week. Make sure the equipment dries fully.    Regularly check and replace the filters for your CPAP unit and humidifier.    Work closely with your sleep doctor and your CPAP supplier to make sure that you have the machine, mask and air pressure setting that works best for you.      Your BMI is Body mass index is 52.77 kg/(m^2).  Weight management is a personal decision.  If you are interested in exploring weight loss strategies, the following discussion covers the approaches that may be successful. Body mass index (BMI) is one way to tell whether you are at a healthy weight, overweight, or obese. It measures your weight in relation to your  height.  A BMI of 18.5 to 24.9 is in the healthy range. A person with a BMI of 25 to 29.9 is considered overweight, and someone with a BMI of 30 or greater is considered obese. More than two-thirds of American adults are considered overweight or obese.  Being overweight or obese increases the risk for further weight gain. Excess weight may lead to heart disease and diabetes.  Creating and following plans for healthy eating and physical activity may help you improve your health.  Weight control is part of healthy lifestyle and includes exercise, emotional health, and healthy eating habits. Careful eating habits lifelong are the mainstay of weight control. Though there are significant health benefits from weight loss, long-term weight loss with diet alone may be very difficult to achieve- studies show long-term success with dietary management in less than 10% of people. Attaining a healthy weight may be especially difficult to achieve in those with severe obesity. In some cases, medications, devices and surgical management might be considered.  What can you do?  If you are overweight or obese and are interested in methods for weight loss, you should discuss this with your provider.     Consider reducing daily calorie intake by 500 calories.     Keep a food journal.     Avoiding skipping meals, consider cutting portions instead.    Diet combined with exercise helps maintain muscle while optimizing fat loss. Strength training is particularly important for building and maintaining muscle mass. Exercise helps reduce stress, increase energy, and improves fitness. Increasing exercise without diet control, however, may not burn enough calories to loose weight.       Start walking three days a week 10-20 minutes at a time    Work towards walking thirty minutes five days a week     Eventually, increase the speed of your walking for 1-2 minutes at time    In addition, we recommend that you review healthy lifestyles and methods  for weight loss available through the National Institutes of Health patient information sites:  http://win.niddk.nih.gov/publications/index.htm    And look into health and wellness programs that may be available through your health insurance provider, employer, local community center, or nadir club.    Weight management plan: Patient was referred to their PCP to discuss a diet and exercise plan.     Your blood pressure was checked while you were in clinic today.  Please read the guidelines below about what these numbers mean and what you should do about them.  Your systolic blood pressure is the top number.  This is the pressure when the heart is pumping.  Your diastolic blood pressure is the bottom number.  This is the pressure in between beats.  If your systolic blood pressure is less than 120 and your diastolic blood pressure is less than 80, then your blood pressure is normal. There is nothing more that you need to do about it  If your systolic blood pressure is 120-139 or your diastolic blood pressure is 80-89, your blood pressure may be higher than it should be.  You should have your blood pressure re-checked within a year by a primary care provider.  If your systolic blood pressure is 140 or greater or your diastolic blood pressure is 90 or greater, you may have high blood pressure.  High blood pressure is treatable, but if left untreated over time it can put you at risk for heart attack, stroke, or kidney failure.  You should have your blood pressure re-checked by a primary care provider within the next four weeks.

## 2017-03-13 NOTE — PROGRESS NOTES
Sleep Study Follow-Up Visit:    Date on this visit: 3/13/2017    ASSESSMENT / PLAN:       PAOLA (obstructive sleep apnea) REM/Supine predominant  Morbid obesity with BMI of 50.0-59.9, adult (H)    Discussed with patient the recent sleep study and treatment options, we recommend Auto-PAP 8-16 cmH2O in addition to weight loss and avoiding sleeping supine position. Patient was advised to use PAP therapy for at least 7-8 hours and during naps (naps are not recommended).  Instructions given. Follow up 4-6 weeks after PAP use.  Counseled regarding weight loss through diet modification and increased physical activity. Patient was given instuctions of weight loss and advised to follow up her PCP for further weight loss interventions. He joined weight watchers and if no success will refer to bariatric surgery team.    Elevated blood  Pressure: may be related untreated sleep apnea. advised to follow up PCP and notify Dr. Hsieh for high BP.      All questions were answered.  The patient indicates understanding of the above issues and agrees with the plan set forth.    Orders Placed This Encounter   Procedures     Comprehensive DME       He will follow up with me in about 6-8 week(s).       BRIEF SUMMARY:    Jeffry Younger is a 36 year old male with history of morbid obesity and elevated blood pressure comes in today for follow-up of his sleep study done on 3/5/17 at the [facility] for result of sleep study and treatment of sleep apnea.    Sleep latency 5.8 minutes without Ambien.  REM achieved.   REM latency delayed at 270.5 minutes.  Sleep efficiency 77.5%. Total sleep time 387.5 minutes.    Sleep architecture:  Stage 1, 15.1% (5%), stage 2, 53.7% (45-55%), stage 3, 11.2% (15-20%), stage REM, 20% (20-25%).  AHI was 12.2/hour, with mild desaturations down to 85%. S/He spent 2 minutes below 89% SpO2. RDI 13.7/hour.  REM AHI 32.5/hour.  Supine AHI 45/hour.  Periodic Limb Movement Index 11.6/hour.       ABG on room  "air: PH 7.42/ PaCO2 35/ PaO2 94/ HCO3 23    These findings were reviewed with the patient and copy of the sleep study result was given.    Comorbidities include: morbid obesity and elevated blood pressure    Past medical/surgical history, family history, social history, medications and allergies were reviewed.      Problem List:  Patient Active Problem List    Diagnosis Date Noted     PAOLA (obstructive sleep apnea) 03/07/2017     Priority: Medium     Diagnostic Study  Sleep Study 3/5/2017 - (400.0 lbs) AHI 12.2, RDI 13.7, Supine AHI 45.0, REM AHI 32.5, Low O2 85.0%, Time Spent ?88% 1.0 minutes / Time Spent ?89% 2.0 minutes       Morbid obesity with BMI of 50.0-59.9, adult (H) 09/29/2015     Priority: Medium     CARDIOVASCULAR SCREENING; LDL GOAL LESS THAN 160 09/29/2015     Priority: Medium        PHYSICAL EXAMINATION:  /89  Pulse 91  Resp 15  Ht 1.854 m (6' 0.99\")  Wt (!) 181.4 kg (399 lb 14.6 oz)  SpO2 97%  BMI 52.77 kg/m2          Data: All pertinent previous laboratory data reviewed     Lab Results   Component Value Date    PH 7.42 03/06/2017    PO2 94 03/06/2017    PCO2 35 03/06/2017    HCO3 23 03/06/2017     No results found for: TSH  Lab Results   Component Value Date    GLC 89 09/29/2015     No results found for: HGB  Lab Results   Component Value Date    BUN 15 09/29/2015    CR 1.08 09/29/2015     No results found for: AMOL     Twenty-five minutes spent with patient, all of which were spent face-to-face counseling, consulting, coordinating plan of care and going over sleep test results.          [place] James MD 3/13/2017   [facility]  [address], [place] [address] [phone] Clinic      CC: No ref. provider found  Copy to: Russel Hsieh    "

## 2017-03-17 ENCOUNTER — DOCUMENTATION ONLY (OUTPATIENT)
Dept: SLEEP MEDICINE | Facility: CLINIC | Age: 37
End: 2017-03-17

## 2017-03-17 NOTE — PROGRESS NOTES
Patient was offered choice of vendor and chose [facility].  Patient Jeffry Younger was set up at [place] on March 17, 2017. Patient received a Resmed AirSense 10 Auto. Pressures were set at 8-16 cm H2O.   Patient s ramp is 5 cm H2O for 15 min and FLEX/EPR is EPR.  Patient received a [employer] Mask name: [id number]  Nasal mask Size Medium, heated tubing and heated humidifier.  Patient is enrolled in the STM Program and does need to meet compliance. Patient has a follow up on 05/01/2017 with Dr. James.    Isha Gonzalez

## 2017-03-20 ENCOUNTER — DOCUMENTATION ONLY (OUTPATIENT)
Dept: SLEEP MEDICINE | Facility: CLINIC | Age: 37
End: 2017-03-20

## 2017-03-20 NOTE — PROGRESS NOTES
3 DAY STM VISIT    Patient contacted for 3 day STM visit  Message left for patient to return call    Current settings:  EPAP Min Auto CPAP: 8.0 (The minimum allowable pressure in cmH2O)       EPAP Max Auto CPAP: 16.0 (The maximum allowable pressure in cmH2O)       Assessment:  Pt is using nightly  Action plan: Pt to have f/u 14 day STM visit.

## 2017-03-21 NOTE — PROGRESS NOTES
Pt returned call and states things are going well and has no issues or complaints.  Pt is benefiting from therapy.

## 2017-04-03 ENCOUNTER — DOCUMENTATION ONLY (OUTPATIENT)
Dept: SLEEP MEDICINE | Facility: CLINIC | Age: 37
End: 2017-04-03

## 2017-04-03 NOTE — PROGRESS NOTES
14 DAY STM VISIT    Subjective measures:  Score 0  Pt states things are going well and has no issues or complaints.  Pt is benefiting from therapy.      Assessment: Pt meeting objective benchmarks.  Patient meeting subjective benchmarks.   Action plan: Pt to have 30 day STM visit.  [place] will [place] end [place] and will not be taking device with him.    Device settings:    EPAP Min Auto CPAP: 8.0 (The minimum allowable pressure in cmH2O)    EPAP Max Auto CPAP: 16.0 (The maximum allowable pressure in cmH2O)    Target (95% of Target) 14 day average (Resmed): 11.3cm H20      Objective measures: 14 day rolling measure     % compliance greater than four hours rolling average 14 days: 92.8 %     95% OF Leak in litres Rolling Average 14 Days: 3.84 lpm last data upload        AHI Rolling Average 14 Day: 0.59  last data upload        Time mask on face 14 day average: 427 min         Objective measure goal  Compliance   Goal >70%  Leak   Goal < 10%  AHI  Goal < 5  Usage  Goal >240

## 2017-04-14 ENCOUNTER — DOCUMENTATION ONLY (OUTPATIENT)
Dept: SLEEP MEDICINE | Facility: CLINIC | Age: 37
End: 2017-04-14

## 2017-04-14 NOTE — PROGRESS NOTES
Patient returned call.    Subjective measure:  Score 0.  Pt doing well with therapy. No issues with pressure or leak.  Pt will be going out of the country and will not be bringing his device. (10 days)

## 2017-04-14 NOTE — PROGRESS NOTES
30 DAY [facility] VISIT    Message left for patient to return call     Assessment: Pt meeting objective benchmarks.     Action plan: Waiting for patient to return call.  and Pt to have 6 month [facility] visit  Patient has a follow up visit with Dr. James on 05/01/2017.   Device settings:    EPAP Min Auto CPAP: 8.0 (The minimum allowable pressure in cmH2O)    EPAP Max Auto CPAP: 16.0 (The maximum allowable pressure in cmH2O)    Target (95% of Target) 14 day average (Resmed): 11.1cm H20    Objective measures: 14 day rolling measures      % compliance greater than four hours rolling average 14 days: 100 %     95% OF Leak in litres Rolling Average 14 Days: 5.6 lpm  last  upload     AHI Rolling Average 14 Day: 0.67 last  upload     Time mask on face 14 day average: 414 min        Objective measure goal  Compliance   Goal >70%  Leak   Goal < 10%  AHI  Goal < 5  Usage  Goal >240

## 2017-05-01 ENCOUNTER — OFFICE VISIT (OUTPATIENT)
Dept: SLEEP MEDICINE | Facility: CLINIC | Age: 37
End: 2017-05-01
Payer: COMMERCIAL

## 2017-05-01 VITALS
OXYGEN SATURATION: 98 % | RESPIRATION RATE: 12 BRPM | DIASTOLIC BLOOD PRESSURE: 86 MMHG | SYSTOLIC BLOOD PRESSURE: 138 MMHG | BODY MASS INDEX: 41.75 KG/M2 | HEART RATE: 97 BPM | HEIGHT: 73 IN | WEIGHT: 315 LBS

## 2017-05-01 DIAGNOSIS — E66.01 MORBID OBESITY WITH BMI OF 50.0-59.9, ADULT (H): ICD-10-CM

## 2017-05-01 DIAGNOSIS — G47.33 OSA (OBSTRUCTIVE SLEEP APNEA): Primary | ICD-10-CM

## 2017-05-01 PROCEDURE — 99214 OFFICE O/P EST MOD 30 MIN: CPT | Performed by: INTERNAL MEDICINE

## 2017-05-01 NOTE — PATIENT INSTRUCTIONS
Your BMI is Body mass index is 52.77 kg/(m^2).  Weight management is a personal decision.  If you are interested in exploring weight loss strategies, the following discussion covers the approaches that may be successful. Body mass index (BMI) is one way to tell whether you are at a healthy weight, overweight, or obese. It measures your weight in relation to your height.  A BMI of 18.5 to 24.9 is in the healthy range. A person with a BMI of 25 to 29.9 is considered overweight, and someone with a BMI of 30 or greater is considered obese. More than two-thirds of American adults are considered overweight or obese.  Being overweight or obese increases the risk for further weight gain. Excess weight may lead to heart disease and diabetes.  Creating and following plans for healthy eating and physical activity may help you improve your health.  Weight control is part of healthy lifestyle and includes exercise, emotional health, and healthy eating habits. Careful eating habits lifelong are the mainstay of weight control. Though there are significant health benefits from weight loss, long-term weight loss with diet alone may be very difficult to achieve- studies show long-term success with dietary management in less than 10% of people. Attaining a healthy weight may be especially difficult to achieve in those with severe obesity. In some cases, medications, devices and surgical management might be considered.  What can you do?  If you are overweight or obese and are interested in methods for weight loss, you should discuss this with your provider.     Consider reducing daily calorie intake by 500 calories.     Keep a food journal.     Avoiding skipping meals, consider cutting portions instead.    Diet combined with exercise helps maintain muscle while optimizing fat loss. Strength training is particularly important for building and maintaining muscle mass. Exercise helps reduce stress, increase energy, and  improves fitness. Increasing exercise without diet control, however, may not burn enough calories to loose weight.       Start walking three days a week 10-20 minutes at a time    Work towards walking thirty minutes five days a week     Eventually, increase the speed of your walking for 1-2 minutes at time    In addition, we recommend that you review healthy lifestyles and methods for weight loss available through the National Institutes of Health patient information sites:  http://win.niddk.nih.gov/publications/index.htm    And look into health and wellness programs that may be available through your health insurance provider, employer, local community center, or nadir club.    Weight management plan: Patient was referred to their PCP to discuss a diet and exercise plan.     Your blood pressure was checked while you were in clinic today.  Please read the guidelines below about what these numbers mean and what you should do about them.  Your systolic blood pressure is the top number.  This is the pressure when the heart is pumping.  Your diastolic blood pressure is the bottom number.  This is the pressure in between beats.  If your systolic blood pressure is less than 120 and your diastolic blood pressure is less than 80, then your blood pressure is normal. There is nothing more that you need to do about it  If your systolic blood pressure is 120-139 or your diastolic blood pressure is 80-89, your blood pressure may be higher than it should be.  You should have your blood pressure re-checked within a year by a primary care provider.  If your systolic blood pressure is 140 or greater or your diastolic blood pressure is 90 or greater, you may have high blood pressure.  High blood pressure is treatable, but if left untreated over time it can put you at risk for heart attack, stroke, or kidney failure.  You should have your blood pressure re-checked by a primary care provider within the next four weeks.

## 2017-05-01 NOTE — NURSING NOTE
"Chief Complaint   Patient presents with     RECHECK     f/u cpap       Initial Pulse 97  Resp 12  Ht 1.854 m (6' 0.99\")  Wt (!) 181.4 kg (399 lb 14.6 oz)  SpO2 98%  BMI 52.77 kg/m2 Estimated body mass index is 52.77 kg/(m^2) as calculated from the following:    Height as of this encounter: 1.854 m (6' 0.99\").    Weight as of this encounter: 181.4 kg (399 lb 14.6 oz).  Medication Reconciliation: complete         Marjorie Diaz LPN/MA  "

## 2017-05-01 NOTE — PROGRESS NOTES
[facility]  Out[facility] Medicine Follow-up  May 1, 2017      Name: Jeffry Younger MRN# [id number]   Age: 37 year old YOB: 1980   Date of visit: May 1, 2017  Primary care provider: Russel Hsieh  Home clinic: [facility]              Assessment and Plan:     1. Obstructive Sleep Apnea:  - Partial compliance of PAP use because he did not use for 10 days while trip in [place] where there was no electricity. Explained the need for PAP compliance and he showed understanding..  - Clinical response: good  - Recommend using CPAP every night for at least 7-8 hours each night  - Daytime naps are not advised, but use CPAP if taking naps  - Patient was advised not to drive if drowsy or sleepy.  - Follow up in sleep clinic in 3 months    2. Obesity: Encouraged patient to lose weight. Counseled regarding weight loss through diet modification and increased physical activity. Patient was given nstuctions of weight loss and advised to follow up her PCP for further weight loss interventions. Weight loss instructions given.      No orders of the defined types were placed in this encounter.        Summary Counseling:  New sleep schedule recommendation: given    Check out http://yoursleep.aasmnet.org/    All questions were answered.  The patient indicates understanding of the above issues and agrees with the plan set forth.           Chief Complaint:    Follow up            History of Present Illness:     Jeffry Younger is a 37 year old male with history of PAOLA and morbid obesity who comes to [facility] for follow up. Patient was diagnosed sleep apnea and was prescribed CPAP and was setup on March 17, 2017. Patient received a Resmed AirSense 10 Auto. Pressures were set at 8-16 cm H2O. Patient s ramp is 5 cm H2O for 15 min and FLEX/EPR is EPR. Patient received a [employer] Mask name: DREAMWEAR Nasal mask Size Medium, heated tubing and heated humidifier. He uses  his CPAP every night except he did not use of 10 days in the last one month due to travel in [place]. When he uses CPAP, he feels well, refreshed in morning and no snoring during the sleep as per his wife.      PREVIOUS SLEEP STUDIES:  Date: 3/5/17  AHI: 12.2/hr (REM 32.5/hr and supine 45/hr)  Intervention: CPAP  Lowest O 2 sat: 85%    CPAP Compliance:  Dates: 4/01 /2017- 4/30 /2017       63 % >4hour use   Days used: 19/30  Average daily use (days used): 6.40 hrs  Leak 95 percentile: 7.6L/min  Residual AHI: 0.7/hr  Pressure: 8-16 cmH2O  95% percentile pressure: 11 cmH2O    [place] Sleepiness Scale score today: 3/24  Pre-PAP [place] Sleepiness Scale score: 9/24    Interface:  Mask: nasal  Chin strap: No  Leak: No  Humidity: Yes    Difficulties with therapy:    [-] Difficulty tolerating the pressure  [-] Epistaxis:   [-] Nasal congestion   [-] Dry mouth:  [-] Mouth breathing:   [-] Pain/skin breakdown:     Improvements noted with CPAP:   [+] waking up more refreshed  [+] sleeping better with less arousals  [+] nocturia improved   [+] improved energy level during the day    SLEEP-WAKE SCHEDULE: unchanged    Other sleep problems: None    Drowsy driving / near incidents: No    Medications that affect sleep: None            Medications:     Current Outpatient Prescriptions   Medication Sig     order for DME Equipment ordered: RESMED Auto PAP Mask type: Nasal Settings: 8-16 cmH2O     atovaquone-proguanil (MALARONE) 250-100 MG per tablet Take 1 tablet by mouth daily Start 2 days before exposure to [place] and continue daily till  7 days after exposure.     No current facility-administered medications for this visit.         No Known Allergies         Past Medical History:     Past Medical History:   Diagnosis Date     Obesity              Past Surgical History:      Past Surgical History:   Procedure Laterality Date     AS ESOPHAGOSCOPY, DIAGNOSTIC      EGD       Social History   Substance Use Topics     Smoking status:  "Never Smoker     Smokeless tobacco: Never Used     Alcohol use No       Family History   Problem Relation Age of Onset     DIABETES Father      Hypertension Father      Colon Cancer No family hx of      Prostate Cancer No family hx of      CEREBROVASCULAR DISEASE No family hx of      Coronary Artery Disease No family hx of             Physical Examination:   /86  Pulse 97  Resp 12  Ht 1.854 m (6' 0.99\")  Wt (!) 181.4 kg (399 lb 14.6 oz)  SpO2 98%  BMI 52.77 kg/m2            Data: All pertinent previous laboratory data reviewed     Lab Results   Component Value Date    PH 7.42 03/06/2017    PO2 94 03/06/2017    PCO2 35 03/06/2017    HCO3 23 03/06/2017     No results found for: TSH  Lab Results   Component Value Date    GLC 89 09/29/2015     No results found for: HGB  Lab Results   Component Value Date    BUN 15 09/29/2015    CR 1.08 09/29/2015     No results found for: AMOL      He will follow up with me in about 3 month(s).   Patient is discharged from sleep clinic and instructions given.        Copy to: Russel Hsieh MD 5/1/2017     [facility]  [id number] [place] [address]       Twenty-five minutes spent with patient, all of which were spent face-to-face counseling, consulting, coordinating plan of care and going over PAP download/sleep study and chart review.        "

## 2017-05-01 NOTE — MR AVS SNAPSHOT
After Visit Summary   5/1/2017    Jeffry Younger    MRN: [id number]           Patient Information     Date Of Birth          1980        Visit Information        Provider Department      5/1/2017 2:30 PM oMntana James MD [facility]        Today's Diagnoses     PAOLA (obstructive sleep apnea)    -  1    Morbid obesity with BMI of 50.0-59.9, adult (H)          Care Instructions                Your BMI is Body mass index is 52.77 kg/(m^2).  Weight management is a personal decision.  If you are interested in exploring weight loss strategies, the following discussion covers the approaches that may be successful. Body mass index (BMI) is one way to tell whether you are at a healthy weight, overweight, or obese. It measures your weight in relation to your height.  A BMI of 18.5 to 24.9 is in the healthy range. A person with a BMI of 25 to 29.9 is considered overweight, and someone with a BMI of 30 or greater is considered obese. More than two-thirds of American adults are considered overweight or obese.  Being overweight or obese increases the risk for further weight gain. Excess weight may lead to heart disease and diabetes.  Creating and following plans for healthy eating and physical activity may help you improve your health.  Weight control is part of healthy lifestyle and includes exercise, emotional health, and healthy eating habits. Careful eating habits lifelong are the mainstay of weight control. Though there are significant health benefits from weight loss, long-term weight loss with diet alone may be very difficult to achieve- studies show long-term success with dietary management in less than 10% of people. Attaining a healthy weight may be especially difficult to achieve in those with severe obesity. In some cases, medications, devices and surgical management might be considered.  What can you do?  If you are overweight or obese and are interested in methods for  weight loss, you should discuss this with your provider.     Consider reducing daily calorie intake by 500 calories.     Keep a food journal.     Avoiding skipping meals, consider cutting portions instead.    Diet combined with exercise helps maintain muscle while optimizing fat loss. Strength training is particularly important for building and maintaining muscle mass. Exercise helps reduce stress, increase energy, and improves fitness. Increasing exercise without diet control, however, may not burn enough calories to loose weight.       Start walking three days a week 10-20 minutes at a time    Work towards walking thirty minutes five days a week     Eventually, increase the speed of your walking for 1-2 minutes at time    In addition, we recommend that you review healthy lifestyles and methods for weight loss available through the National Institutes of Health patient information sites:  http://win.niddk.nih.gov/publications/index.htm    And look into health and wellness programs that may be available through your health insurance provider, employer, local community center, or nadir club.    Weight management plan: Patient was referred to their PCP to discuss a diet and exercise plan.     Your blood pressure was checked while you were in clinic today.  Please read the guidelines below about what these numbers mean and what you should do about them.  Your systolic blood pressure is the top number.  This is the pressure when the heart is pumping.  Your diastolic blood pressure is the bottom number.  This is the pressure in between beats.  If your systolic blood pressure is less than 120 and your diastolic blood pressure is less than 80, then your blood pressure is normal. There is nothing more that you need to do about it  If your systolic blood pressure is 120-139 or your diastolic blood pressure is 80-89, your blood pressure may be higher than it should be.  You should have your blood pressure re-checked within a  year by a primary care provider.  If your systolic blood pressure is 140 or greater or your diastolic blood pressure is 90 or greater, you may have high blood pressure.  High blood pressure is treatable, but if left untreated over time it can put you at risk for heart attack, stroke, or kidney failure.  You should have your blood pressure re-checked by a primary care provider within the next four weeks.              Follow-ups after your visit        Your next 10 appointments already scheduled     Aug 07, 2017  2:00 PM CDT   Return Sleep Patient with Montana James MD   [facility] ([facility])    [address]  [place] [address] [phone]              Who to contact     If you have questions or need follow up information about today's clinic visit or your schedule please contact [facility] directly at [phone].  Normal or non-critical lab and imaging results will be communicated to you by MyChart, letter or phone within 4 business days after the clinic has received the results. If you do not hear from us within 7 days, please contact the clinic through [employer] or phone. If you have a critical or abnormal lab result, we will notify you by phone as soon as possible.  Submit refill requests through [employer] or call your pharmacy and they will forward the refill request to us. Please allow 3 business days for your refill to be completed.          Additional Information About Your Visit        [employer] Information     [employer] gives you secure access to your electronic health record. If you see a primary care provider, you can also send messages to your care team and make appointments. If you have questions, please call your primary care clinic.  If you do not have a primary care provider, please call [phone] and they will assist you.        Care EveryWhere ID     This is your Care EveryWhere ID. This could be  "used by other organizations to access your [facility] medical records  [id number]        Your Vitals Were     Pulse Respirations Height Pulse Oximetry BMI (Body Mass Index)       97 12 1.854 m (6' 0.99\") 98% 52.77 kg/m2        Blood Pressure from Last 3 Encounters:   05/01/17 138/86   03/13/17 151/89   03/01/17 (!) 150/101    Weight from Last 3 Encounters:   05/01/17 (!) 181.4 kg (399 lb 14.6 oz)   03/13/17 (!) 181.4 kg (399 lb 14.6 oz)   03/01/17 (!) 181.4 kg (400 lb)              Today, you had the following     No orders found for display       Primary Care Provider Office Phone # Fax #    Russel Hsieh [phone]       [address] [place] [address]        Thank you!     Thank you for choosing [facility]  for your care. Our goal is always to provide you with excellent care. Hearing back from our patients is one way we can continue to improve our services. Please take a few minutes to complete the written survey that you may receive in the mail after your visit with us. Thank you!             Your Updated Medication List - Protect others around you: Learn how to safely use, store and throw away your medicines at www.disposemymeds.org.          This list is accurate as of: 5/1/17  2:52 PM.  Always use your most recent med list.                   Brand Name Dispense Instructions for use    atovaquone-proguanil 250-100 MG per tablet    MALARONE    22 tablet    Take 1 tablet by mouth daily Start 2 days before exposure to Malaria and continue daily till  7 days after exposure.       order for DME      Equipment ordered: RESMED Auto PAP Mask type: Nasal Settings: 8-16 cmH2O         "

## 2017-08-07 ENCOUNTER — OFFICE VISIT (OUTPATIENT)
Dept: SLEEP MEDICINE | Facility: CLINIC | Age: 37
End: 2017-08-07
Payer: COMMERCIAL

## 2017-08-07 VITALS
WEIGHT: 315 LBS | OXYGEN SATURATION: 97 % | HEART RATE: 83 BPM | RESPIRATION RATE: 16 BRPM | DIASTOLIC BLOOD PRESSURE: 87 MMHG | BODY MASS INDEX: 41.75 KG/M2 | SYSTOLIC BLOOD PRESSURE: 134 MMHG | HEIGHT: 73 IN

## 2017-08-07 DIAGNOSIS — G47.33 OSA (OBSTRUCTIVE SLEEP APNEA): Primary | ICD-10-CM

## 2017-08-07 DIAGNOSIS — E66.01 MORBID OBESITY WITH BMI OF 50.0-59.9, ADULT (H): ICD-10-CM

## 2017-08-07 PROCEDURE — 99213 OFFICE O/P EST LOW 20 MIN: CPT | Performed by: INTERNAL MEDICINE

## 2017-08-07 NOTE — PROGRESS NOTES
[facility]  Outpatient Sleep Medicine Follow-up  August 7, 2017      Name: Jeffry Younger MRN# [id number]   Age: 37 year old YOB: 1980   Date of visit: August 7, 2017  Primary care provider: Russel Hsieh         Assessment and Plan:     1. Obstructive Sleep Apnea:  - Full compliance of PAP use.  - Clinical response: good  - Recommend using CPAP every night for at least 7-8 hours each night  - Daytime naps are not advised, but use CPAP if taking naps  - Patient was advised not to drive if drowsy or sleepy.  - Follow up in sleep clinic in 12 months    2. Obesity: Encouraged patient to lose weight. Counseled regarding weight loss through diet modification and increased physical activity. Patient was given nstuctions of weight loss and advised to follow up her PCP for further weight loss interventions. Weight loss instructions given.      No orders of the defined types were placed in this encounter.        Summary Counseling:  New sleep schedule recommendation: given    Check out http://yoursleep.aasmnet.org/    All questions were answered.  The patient indicates understanding of the above issues and agrees with the plan set forth.           Chief Complaint:    Routine CPAP Follow up            History of Present Illness:     Jeffry Younger is a 37 year old male with history of morbid obesity and PAOLA  who comes to [facility] for follow up. He was diagnosed sleep apnea and was setup auto-CPAP on 3/7/17 with pressure of 8-16 cmH2O. Last visit, he was 63% compliant of PAP use, but no he is 100% compliant. He uses PAP every night. He has no complaints. He feels better.      PREVIOUS SLEEP STUDIES:  Date: 3/5/17  AHI: 12.2/hr (REM 32.5/hr and supine 45/hr)  Intervention: CPAP  Lowest O 2 sat: 85%    CPAP Compliance:  Dates: 6/28 /2017- 7/27 /2017       100 % >4hour use   Days used: 30/30  Average daily use (days used): 7.23 hrs  Leak 95 percentile: 5.4L/min  Residual AHI:  "0.8/hr  Pressure:  8-16 cmH2O  95% percentile pressure: 11.3 cmH2O    [place] Sleepiness Scale score today:6 /24  [place] Sleepiness Scale score last visit: 3/24   Pre-PAP [place] Sleepiness Scale score:9 /24    PAP machine: ResMed    Interface:  Mask: nasal pillow  Chin strap: No  Leak: No  Humidity: Yes    Difficulties with therapy:    [-] Difficulty tolerating the pressure  [-] Epistaxis:   [-] Nasal congestion   [-] Dry mouth:  [-] Mouth breathing:   [-] Pain/skin breakdown:     Improvements noted with CPAP:   [+] waking up more refreshed  [+] sleeping better with less arousals  [+] nocturia improved   [+] improved energy level during the day    SLEEP-WAKE SCHEDULE: unchanged    Other sleep problems: None     Drowsy driving / near incidents: No    Medications that affect sleep: No            Medications:     Current Outpatient Prescriptions   Medication Sig     order for DME Equipment ordered: RESMED Auto PAP Mask type: Nasal Settings: 8-16 cmH2O     atovaquone-proguanil (MALARONE) 250-100 MG per tablet Take 1 tablet by mouth daily Start 2 days before exposure to Malaria and continue daily till  7 days after exposure.     No current facility-administered medications for this visit.         No Known Allergies         Past Medical History:     Past Medical History:   Diagnosis Date     Obesity              Past Surgical History:      Past Surgical History:   Procedure Laterality Date     AS ESOPHAGOSCOPY, DIAGNOSTIC      EGD       Social History   Substance Use Topics     Smoking status: Never Smoker     Smokeless tobacco: Never Used     Alcohol use No       Family History   Problem Relation Age of Onset     DIABETES Father      Hypertension Father      Colon Cancer No family hx of      Prostate Cancer No family hx of      CEREBROVASCULAR DISEASE No family hx of      Coronary Artery Disease No family hx of             Physical Examination:   /87  Pulse 83  Resp 16  Ht 1.854 m (6' 1\")  Wt (!) 181 kg (399 " lb)  SpO2 97%  BMI 52.64 kg/m2            Data: All pertinent previous laboratory data reviewed     Lab Results   Component Value Date    PH 7.42 03/06/2017    PO2 94 03/06/2017    PCO2 35 03/06/2017    HCO3 23 03/06/2017     No results found for: TSH  Lab Results   Component Value Date    GLC 89 09/29/2015     No results found for: HGB  Lab Results   Component Value Date    BUN 15 09/29/2015    CR 1.08 09/29/2015     No results found for: AMOL      He will follow up with me in about 12 month(s).         Copy to: Russel Hsieh MD 8/7/2017     [facility]  [id number] [place] [id number]       Fifteen minutes spent with patient, all of which were spent face-to-face counseling, consulting, coordinating plan of care and going over PAP download/sleep study and chart review.

## 2017-08-07 NOTE — MR AVS SNAPSHOT
After Visit Summary   8/7/2017    Jeffry Younger    MRN: [id number]           Patient Information     Date Of Birth          1980        Visit Information        Provider Department      8/7/2017 2:00 PM Montana James MD [facility]        Today's Diagnoses     PAOLA (obstructive sleep apnea)    -  1    Morbid obesity with BMI of 50.0-59.9, adult (H)          Care Instructions    MY TREATMENT INFORMATION FOR SLEEP APNEA-  Jeffry Younger    MY CONTACT NUMBERS ARE:  [phone]  DOCTOR : Montana James  SLEEP CENTER :  [facility]  CPAP EQUIPMENT     Your sleep apnea is controlled with CPAP.   Continue use of CPAP:  - Recommend using CPAP every night for at least 7-8 hours each night  - Daytime naps are not advised, but use CPAP if taking naps.    You met all objective measure goal  Compliance  Goal >70%  Leak   Goal < 10%  AHI  Goal < 5 events per hour   Usage  Goal >420 minutes      Patient was advised not to drive if drowsy or sleepy.      Follow up in 12 months.          Your BMI is Body mass index is 52.64 kg/(m^2).  Weight management is a personal decision.  If you are interested in exploring weight loss strategies, the following discussion covers the approaches that may be successful. Body mass index (BMI) is one way to tell whether you are at a healthy weight, overweight, or obese. It measures your weight in relation to your height.  A BMI of 18.5 to 24.9 is in the healthy range. A person with a BMI of 25 to 29.9 is considered overweight, and someone with a BMI of 30 or greater is considered obese. More than two-thirds of American adults are considered overweight or obese.  Being overweight or obese increases the risk for further weight gain. Excess weight may lead to heart disease and diabetes.  Creating and following plans for healthy eating and physical activity may help you improve your health.  Weight control is part of healthy lifestyle and includes  exercise, emotional health, and healthy eating habits. Careful eating habits lifelong are the mainstay of weight control. Though there are significant health benefits from weight loss, long-term weight loss with diet alone may be very difficult to achieve- studies show long-term success with dietary management in less than 10% of people. Attaining a healthy weight may be especially difficult to achieve in those with severe obesity. In some cases, medications, devices and surgical management might be considered.  What can you do?  If you are overweight or obese and are interested in methods for weight loss, you should discuss this with your provider.     Consider reducing daily calorie intake by 500 calories.     Keep a food journal.     Avoiding skipping meals, consider cutting portions instead.    Diet combined with exercise helps maintain muscle while optimizing fat loss. Strength training is particularly important for building and maintaining muscle mass. Exercise helps reduce stress, increase energy, and improves fitness. Increasing exercise without diet control, however, may not burn enough calories to loose weight.       Start walking three days a week 10-20 minutes at a time    Work towards walking thirty minutes five days a week     Eventually, increase the speed of your walking for 1-2 minutes at time    In addition, we recommend that you review healthy lifestyles and methods for weight loss available through the National Institutes of Health patient information sites:  http://win.niddk.nih.gov/publications/index.htm    And look into health and wellness programs that may be available through your health insurance provider, employer, local community center, or nadir club.    Weight management plan: Patient was referred to their PCP to discuss a diet and exercise plan.     Your blood pressure was checked while you were in clinic today.  Please read the guidelines below about what these numbers mean and what  you should do about them.  Your systolic blood pressure is the top number.  This is the pressure when the heart is pumping.  Your diastolic blood pressure is the bottom number.  This is the pressure in between beats.  If your systolic blood pressure is less than 120 and your diastolic blood pressure is less than 80, then your blood pressure is normal. There is nothing more that you need to do about it  If your systolic blood pressure is 120-139 or your diastolic blood pressure is 80-89, your blood pressure may be higher than it should be.  You should have your blood pressure re-checked within a year by a primary care provider.  If your systolic blood pressure is 140 or greater or your diastolic blood pressure is 90 or greater, you may have high blood pressure.  High blood pressure is treatable, but if left untreated over time it can put you at risk for heart attack, stroke, or kidney failure.  You should have your blood pressure re-checked by a primary care provider within the next four weeks.              Follow-ups after your visit        Who to contact     If you have questions or need follow up information about today's clinic visit or your schedule please contact [facility] directly at [phone].  Normal or non-critical lab and imaging results will be communicated to you by MyChart, letter or phone within 4 business days after the clinic has received the results. If you do not hear from us within 7 days, please contact the clinic through [employer] or phone. If you have a critical or abnormal lab result, we will notify you by phone as soon as possible.  Submit refill requests through [employer] or call your pharmacy and they will forward the refill request to us. Please allow 3 business days for your refill to be completed.          Additional Information About Your Visit        [employer] Information     [employer] gives you secure access to your electronic health record. If you see a primary  "care provider, you can also send messages to your care team and make appointments. If you have questions, please call your primary care clinic.  If you do not have a primary care provider, please call [phone] and they will assist you.        Care EveryWhere ID     This is your Care EveryWhere ID. This could be used by other organizations to access your [place] medical records  [id number]        Your Vitals Were     Pulse Respirations Height Pulse Oximetry BMI (Body Mass Index)       83 16 1.854 m (6' 1\") 97% 52.64 kg/m2        Blood Pressure from Last 3 Encounters:   08/07/17 134/87   05/01/17 138/86   03/13/17 151/89    Weight from Last 3 Encounters:   08/07/17 (!) 181 kg (399 lb)   05/01/17 (!) 181.4 kg (399 lb 14.6 oz)   03/13/17 (!) 181.4 kg (399 lb 14.6 oz)              Today, you had the following     No orders found for display       Primary Care Provider Office Phone # Fax #    Russel Hsieh [phone]       [facility] [address] [place] [address]        Equal Access to Services     KESHA MEHTA AH: Hadii aad ku hadasho Soomaali, waaxda luqadaha, qaybta kaalmada adeegyada, annette loredo. So [facility] [phone].    ATENCIÓN: Si habla español, tiene a schwartz disposición servicios gratuitos de asistencia lingüística. Cesarame al [phone].    We comply with applicable federal civil rights laws and [place] laws. We do not discriminate on the basis of race, color, national origin, age, disability sex, sexual orientation or gender identity.            Thank you!     Thank you for choosing [facility]  for your care. Our goal is always to provide you with excellent care. Hearing back from our patients is one way we can continue to improve our services. Please take a few minutes to complete the written survey that you may receive in the mail after your visit with us. Thank you!             Your Updated Medication " List - Protect others around you: Learn how to safely use, store and throw away your medicines at www.disposemymeds.org.          This list is accurate as of: 8/7/17  2:18 PM.  Always use your most recent med list.                   Brand Name Dispense Instructions for use Diagnosis    atovaquone-proguanil 250-100 MG per tablet    MALARONE    22 tablet    Take 1 tablet by mouth daily Start 2 days before exposure to Malaria and continue daily till  7 days after exposure.    Need for vaccination, Travel advice encounter       order for DME      Equipment ordered: RESMED Auto PAP Mask type: Nasal Settings: 8-16 cmH2O

## 2017-08-07 NOTE — NURSING NOTE
"Chief Complaint   Patient presents with     RECHECK     f/u cpap       Initial /87  Pulse 83  Resp 16  Ht 1.854 m (6' 1\")  Wt (!) 181 kg (399 lb)  SpO2 97%  BMI 52.64 kg/m2 Estimated body mass index is 52.64 kg/(m^2) as calculated from the following:    Height as of this encounter: 1.854 m (6' 1\").    Weight as of this encounter: 181 kg (399 lb).  Medication Reconciliation: complete         Marjorie Diaz LPN/MA  "

## 2017-08-07 NOTE — PATIENT INSTRUCTIONS
MY TREATMENT INFORMATION FOR SLEEP APNEA-  Jeffry Younger    MY CONTACT NUMBERS ARE:  [phone]  DOCTOR : Montana GRIMM [facility]  SLEEP CENTER :  [place]  CPAP EQUIPMENT     Your sleep apnea is controlled with CPAP.   Continue use of CPAP:  - Recommend using CPAP every night for at least 7-8 hours each night  - Daytime naps are not advised, but use CPAP if taking naps.    You met all objective measure goal  Compliance  Goal >70%  Leak   Goal < 10%  AHI  Goal < 5 events per hour   Usage  Goal >420 minutes      Patient was advised not to drive if drowsy or sleepy.      Follow up in 12 months.          Your BMI is Body mass index is 52.64 kg/(m^2).  Weight management is a personal decision.  If you are interested in exploring weight loss strategies, the following discussion covers the approaches that may be successful. Body mass index (BMI) is one way to tell whether you are at a healthy weight, overweight, or obese. It measures your weight in relation to your height.  A BMI of 18.5 to 24.9 is in the healthy range. A person with a BMI of 25 to 29.9 is considered overweight, and someone with a BMI of 30 or greater is considered obese. More than two-thirds of American adults are considered overweight or obese.  Being overweight or obese increases the risk for further weight gain. Excess weight may lead to heart disease and diabetes.  Creating and following plans for healthy eating and physical activity may help you improve your health.  Weight control is part of healthy lifestyle and includes exercise, emotional health, and healthy eating habits. Careful eating habits lifelong are the mainstay of weight control. Though there are significant health benefits from weight loss, long-term weight loss with diet alone may be very difficult to achieve- studies show long-term success with dietary management in less than 10% of people. Attaining a healthy weight may be especially difficult to achieve in those with severe obesity.  In some cases, medications, devices and surgical management might be considered.  What can you do?  If you are overweight or obese and are interested in methods for weight loss, you should discuss this with your provider.     Consider reducing daily calorie intake by 500 calories.     Keep a food journal.     Avoiding skipping meals, consider cutting portions instead.    Diet combined with exercise helps maintain muscle while optimizing fat loss. Strength training is particularly important for building and maintaining muscle mass. Exercise helps reduce stress, increase energy, and improves fitness. Increasing exercise without diet control, however, may not burn enough calories to loose weight.       Start walking three days a week 10-20 minutes at a time    Work towards walking thirty minutes five days a week     Eventually, increase the speed of your walking for 1-2 minutes at time    In addition, we recommend that you review healthy lifestyles and methods for weight loss available through the National Institutes of Health patient information sites:  http://win.niddk.nih.gov/publications/index.htm    And look into health and wellness programs that may be available through your health insurance provider, employer, local community center, or [employer] club.    Weight management plan: Patient was referred to their PCP to discuss a diet and exercise plan.     Your blood pressure was checked while you were in clinic today.  Please read the guidelines below about what these numbers mean and what you should do about them.  Your systolic blood pressure is the top number.  This is the pressure when the heart is pumping.  Your diastolic blood pressure is the bottom number.  This is the pressure in between beats.  If your systolic blood pressure is less than 120 and your diastolic blood pressure is less than 80, then your blood pressure is normal. There is nothing more that you need to do about it  If your systolic blood pressure  is 120-139 or your diastolic blood pressure is 80-89, your blood pressure may be higher than it should be.  You should have your blood pressure re-checked within a year by a primary care provider.  If your systolic blood pressure is 140 or greater or your diastolic blood pressure is 90 or greater, you may have high blood pressure.  High blood pressure is treatable, but if left untreated over time it can put you at risk for heart attack, stroke, or kidney failure.  You should have your blood pressure re-checked by a primary care provider within the next four weeks.

## 2017-10-02 ENCOUNTER — VIRTUAL VISIT (OUTPATIENT)
Dept: FAMILY MEDICINE | Facility: OTHER | Age: 37
End: 2017-10-02

## 2018-01-16 ENCOUNTER — OFFICE VISIT (OUTPATIENT)
Dept: PEDIATRICS | Facility: CLINIC | Age: 38
End: 2018-01-16
Payer: COMMERCIAL

## 2018-01-16 VITALS
HEART RATE: 68 BPM | BODY MASS INDEX: 41.75 KG/M2 | DIASTOLIC BLOOD PRESSURE: 82 MMHG | HEIGHT: 73 IN | SYSTOLIC BLOOD PRESSURE: 138 MMHG | WEIGHT: 315 LBS | TEMPERATURE: 99.1 F

## 2018-01-16 DIAGNOSIS — R50.9 FEVER, UNSPECIFIED FEVER CAUSE: ICD-10-CM

## 2018-01-16 DIAGNOSIS — J01.90 ACUTE NON-RECURRENT SINUSITIS, UNSPECIFIED LOCATION: Primary | ICD-10-CM

## 2018-01-16 LAB
FLUAV+FLUBV AG SPEC QL: NEGATIVE
FLUAV+FLUBV AG SPEC QL: NEGATIVE
SPECIMEN SOURCE: NORMAL

## 2018-01-16 PROCEDURE — 87804 INFLUENZA ASSAY W/OPTIC: CPT | Performed by: INTERNAL MEDICINE

## 2018-01-16 PROCEDURE — 99213 OFFICE O/P EST LOW 20 MIN: CPT | Performed by: INTERNAL MEDICINE

## 2018-01-16 NOTE — NURSING NOTE
"Chief Complaint   Patient presents with     URI       Initial /82 (Cuff Size: Adult Large)  Pulse 68  Temp 99.1  F (37.3  C) (Oral)  Ht 6' 1\" (1.854 m)  Wt (!) 422 lb (191.4 kg)  BMI 55.68 kg/m2 Estimated body mass index is 55.68 kg/(m^2) as calculated from the following:    Height as of this encounter: 6' 1\" (1.854 m).    Weight as of this encounter: 422 lb (191.4 kg).  Medication Reconciliation: yolis Mahan, SHELIA    "

## 2018-01-16 NOTE — MR AVS SNAPSHOT
After Visit Summary   1/16/2018    Jeffry Younger    MRN: [id number]           Patient Information     Date Of Birth          1980        Visit Information        Provider Department      1/16/2018 10:20 AM Russel Hsieh MD [facility]        Today's Diagnoses     Acute non-recurrent sinusitis, unspecified location    -  1    Fever, unspecified fever cause          Care Instructions    INSTRUCTIONS FOR TODAY:     Augmentin for 10 days    Symptom management for cough and upper respiratory symptoms:    Sore throat: Warm salt water gargle as needed    Cough: Guaifenesin- an expectorant that helps to thin mucus and allow it to be cleared more easily.  Dextromethorphan helps to suppress cough.    Nasal and sinus congestion and drainage: Decongestants such as pseudoephedrine or phenylephrine help to reduce secretions and relieve stuffiness and pressure-related discomfort. Nasal saline spray can also be used to relieve sinus congestion as well.  Oxymetazoline (Afrin) spray can be used up to 3 days to manage nasal stuffiness.    Muscle aches and headache: Both acetaminophen and ibuprofen are effective-ibuprofen is slightly more effective for muscle aches and headache.  Ibuprofen should always be taken with food and plenty of fluids.    Probiotics (yogurt, culturelle, Florastor) for 1-2 weeks after antibiotics completed    Dr Hsieh            Follow-ups after your visit        Who to contact     If you have questions or need follow up information about today's clinic visit or your schedule please [facility] directly at [phone].  Normal or non-critical lab and imaging results will be communicated to you by MyChart, letter or phone within 4 business days after the clinic has received the results. If you do not hear from us within 7 days, please contact the clinic through MyChart or phone. If you have a critical or abnormal lab result, we will notify you by phone as  "soon as possible.  Submit refill requests through [employer]) or call your pharmacy and they will forward the refill request to us. Please allow 3 business days for your refill to be completed.          Additional Information About Your Visit        [employer] Information     [employer] gives you secure access to your electronic health record. If you see a primary care provider, you can also send messages to your care team and make appointments. If you have questions, please call your primary care clinic.  If you do not have a primary care provider, please call [phone] and they will assist you.        Care EveryWhere ID     This is your Care EveryWhere ID. This could be used by other organizations to access your [place] medical records  [id number]        Your Vitals Were     Pulse Temperature Height BMI (Body Mass Index)          68 99.1  F (37.3  C) (Oral) 6' 1\" (1.854 m) 55.68 kg/m2         Blood Pressure from Last 3 Encounters:   01/16/18 138/82   08/07/17 134/87   05/01/17 138/86    Weight from Last 3 Encounters:   01/16/18 (!) 422 lb (191.4 kg)   08/07/17 (!) 399 lb (181 kg)   05/01/17 (!) 399 lb 14.6 oz (181.4 kg)              We Performed the Following     Influenza A/B antigen          Today's Medication Changes          These changes are accurate as of: 1/16/18 11:03 AM.  If you have any questions, ask your nurse or doctor.               Start taking these medicines.        Dose/Directions    amoxicillin-clavulanate 875-125 MG per tablet   Commonly known as:  AUGMENTIN   Used for:  Acute non-recurrent sinusitis, unspecified location   Started by:  Russel Hsieh MD        Dose:  1 tablet   Take 1 tablet by mouth 2 times daily   Quantity:  20 tablet   Refills:  0         Stop taking these medicines if you haven't already. Please contact your care team if you have questions.     atovaquone-proguanil 250-100 MG per tablet   Commonly known as:  MALARONE   Stopped by:  Russel Hsieh MD           order " for DME   Stopped by:  Russel Hsieh MD                Where to get your medicines      These medications were sent to [id number] - [place], [place] - [address]  [address], [facility] [address]     Phone:  [phone]     amoxicillin-clavulanate 875-125 MG per tablet                Primary Care Provider Office Phone # Fax #    Russel Hsieh [phone] [address] [place] [address]        Equal Access to Services     [facility]: Hadii aad ku hadasho Soomaali, waaxda luqadaha, qaybta kaalmada adeegyada, waxay vincentin haylawrencen sumit weldon . So [facility] [phone].    ATENCIÓN: Si habla español, tiene a schwartz disposición servicios gratuitos de asistencia lingüística. [facility] [phone].    We comply with applicable federal civil rights laws and [place] laws. We do not discriminate on the basis of race, color, national origin, age, disability, sex, sexual orientation, or gender identity.            Thank you!     Thank you for choosing [facility]  for your care. Our goal is always to provide you with excellent care. Hearing back from our patients is one way we can continue to improve our services. Please take a few minutes to complete the written survey that you may receive in the mail after your visit with us. Thank you!             Your Updated Medication List - Protect others around you: Learn how to safely use, store and throw away your medicines at www.disposemymeds.org.          This list is accurate as of: 1/16/18 11:03 AM.  Always use your most recent med list.                   Brand Name Dispense Instructions for use Diagnosis    amoxicillin-clavulanate 875-125 MG per tablet    AUGMENTIN    20 tablet    Take 1 tablet by mouth 2 times daily    Acute non-recurrent sinusitis, unspecified location

## 2018-01-16 NOTE — PATIENT INSTRUCTIONS
INSTRUCTIONS FOR TODAY:     Augmentin for 10 days    Symptom management for cough and upper respiratory symptoms:    Sore throat: Warm salt water gargle as needed    Cough: Guaifenesin- an expectorant that helps to thin mucus and allow it to be cleared more easily.  Dextromethorphan helps to suppress cough.    Nasal and sinus congestion and drainage: Decongestants such as pseudoephedrine or phenylephrine help to reduce secretions and relieve stuffiness and pressure-related discomfort. Nasal saline spray can also be used to relieve sinus congestion as well.  Oxymetazoline (Afrin) spray can be used up to 3 days to manage nasal stuffiness.    Muscle aches and headache: Both acetaminophen and ibuprofen are effective-ibuprofen is slightly more effective for muscle aches and headache.  Ibuprofen should always be taken with food and plenty of fluids.    Probiotics (yogurt, culturelle, Florastor) for 1-2 weeks after antibiotics completed    Dr Hsieh

## 2018-01-16 NOTE — PROGRESS NOTES
"  SUBJECTIVE:   Jeffry Younger is a 37 year old male who presents to clinic today for the following health issues:    Acute Illness   Acute illness concerns: cough  Onset: intermittent nasal congestion and cough past few weeks, facial pain/fatigue in past 3-4 days    Fever: YES- started yesterday    Chills/Sweats: YES    Headache (location?): YES    Sinus Pressure:no    Conjunctivitis:  no    Ear Pain: no    Rhinorrhea: YES    Congestion: YES    Sore Throat: no     Cough: YES    Wheeze: no    Decreased Appetite: no    Nausea: no    Vomiting: no    Diarrhea:  no    Dysuria/Freq.: no    Fatigue/Achiness: YES    Sick/Strep Exposure: no     Therapies Tried and outcome:       Patient Active Problem List   Diagnosis     Morbid obesity with BMI of 50.0-59.9, adult (H)     CARDIOVASCULAR SCREENING; LDL GOAL LESS THAN 160     PAOLA (obstructive sleep apnea)     Past Surgical History:   Procedure Laterality Date     AS ESOPHAGOSCOPY, DIAGNOSTIC      EGD       Social History   Substance Use Topics     Smoking status: Never Smoker     Smokeless tobacco: Never Used     Alcohol use No     Family History   Problem Relation Age of Onset     DIABETES Father      Hypertension Father      Colon Cancer No family hx of      Prostate Cancer No family hx of      CEREBROVASCULAR DISEASE No family hx of      Coronary Artery Disease No family hx of            OBJECTIVE:                                                    /82 (Cuff Size: Adult Large)  Pulse 68  Temp 99.1  F (37.3  C) (Oral)  Ht 6' 1\" (1.854 m)  Wt (!) 422 lb (191.4 kg)  BMI 55.68 kg/m2 Body mass index is 55.68 kg/(m^2).  GENERAL:  alert,  no distress  HENT: ear canals- normal; TMs- normal; oropharynx-normal  NECK: no tenderness, no adenopathy  RESP: lungs clear to auscultation - no rales, no rhonchi, no wheezes  CV: regular rates and rhythm, normal S1 S2     Results for orders placed or performed in visit on 01/16/18   Influenza A/B antigen   Result Value Ref Range    " Influenza A/B Agn Specimen Nasal     Influenza A Negative NEG^Negative    Influenza B Negative NEG^Negative        ASSESSMENT/PLAN:                                                        ICD-10-CM    1. Acute non-recurrent sinusitis, unspecified location J01.90 amoxicillin-clavulanate (AUGMENTIN) 875-125 MG per tablet       augmentin 10 days.  Fluids/rest/ reviewed otc symptom mgmt.  rec probiotics for 1-2 weeks after abx course completed     2. Fever, unspecified fever cause R50.9 Influenza A/B antigen        Russel Hsieh MD  [facility]

## 2018-03-15 ENCOUNTER — OFFICE VISIT (OUTPATIENT)
Dept: SLEEP MEDICINE | Facility: CLINIC | Age: 38
End: 2018-03-15
Payer: COMMERCIAL

## 2018-03-15 VITALS
HEART RATE: 111 BPM | OXYGEN SATURATION: 96 % | DIASTOLIC BLOOD PRESSURE: 96 MMHG | HEIGHT: 73 IN | SYSTOLIC BLOOD PRESSURE: 153 MMHG | WEIGHT: 315 LBS | RESPIRATION RATE: 14 BRPM | BODY MASS INDEX: 41.75 KG/M2

## 2018-03-15 DIAGNOSIS — E66.01 MORBID OBESITY WITH BMI OF 50.0-59.9, ADULT (H): ICD-10-CM

## 2018-03-15 DIAGNOSIS — G47.33 OSA (OBSTRUCTIVE SLEEP APNEA): Primary | ICD-10-CM

## 2018-03-15 DIAGNOSIS — I10 ESSENTIAL HYPERTENSION: ICD-10-CM

## 2018-03-15 PROCEDURE — 99213 OFFICE O/P EST LOW 20 MIN: CPT | Performed by: INTERNAL MEDICINE

## 2018-03-15 NOTE — NURSING NOTE
"Chief Complaint   Patient presents with     RECHECK     f/u cpap       Initial BP (!) 153/96  Pulse 111  Resp 14  Ht 1.854 m (6' 1\")  Wt (!) 181 kg (399 lb)  SpO2 96%  BMI 52.64 kg/m2 Estimated body mass index is 52.64 kg/(m^2) as calculated from the following:    Height as of this encounter: 1.854 m (6' 1\").    Weight as of this encounter: 181 kg (399 lb).  Medication Reconciliation: complete       Marjorie Diaz LPN/SHELIA  "

## 2018-03-15 NOTE — PROGRESS NOTES
[facility]  [facility] Medicine Follow-up  March 15, 2018      Name: Jeffry Younger MRN# [id number]   Age: 37 year old YOB: 1980   Date of visit: March 15, 2018  Primary care provider: Russel Hsieh         Assessment and Plan:     1. Obstructive Sleep Apnea:  - Full compliance of PAP use.  - Clinical response: good  - Recommend using CPAP every night for at least 7-8 hours each night  - Daytime naps are not advised, but use CPAP if taking naps  - Patient was advised not to drive if drowsy or sleepy.  - Follow up in sleep clinic in 12 months.    2. Obesity: Encouraged patient to lose weight. Counseled regarding weight loss through diet modification and increased physical activity. Patient was given nstuctions of weight loss and advised to follow up her PCP for further weight loss interventions. Weight loss instructions given.    3.  Hypertension: his sleep disordered breathing is controlled but has high BP, recommend follow up with PCP for anti-hypertensive medications, if BP remains elevated.  -        Orders Placed This Encounter   Procedures     Comprehensive DME       Summary Counseling:  New sleep schedule recommendation: given    Check out [email or web address]    All questions were answered.  The patient indicates understanding of the above issues and agrees with the plan set forth.           Chief Complaint:    Routine Follow up            History of Present Illness:     Jeffry Younger is a 37 year old male with history of PAOLA and obesity who comes to [facility] for follow up. Please see H&P for his presenting sleep symptoms for additional details.  He was diagnosed sleep apnea and was setup auto-CPAP on 3/7/17 with pressure of 8-16 cmH2O.  Last visit, he was 100%compliant of PAP over 4 hrs usage.   Today, he is 100% compliant of PAP over 4 hrs usage and has good benefits. He sleeps well at night and no snoring. He has bad week related to  stress and his BP is elevated today.    PREVIOUS SLEEP STUDIES:  Date: 3/5/17  AHI: 12.2/hr (REM 32.5/hr and supine 45/hr)  Intervention: CPAP  Lowest O 2 sat: 85%    CPAP Compliance:  Dates:  2/12/2018- 3/13/2018       100 % >4hour use   Days used: 30/30  Average daily use (days used): 7.38 hrs  Leak 95 percentile: 12.5  L/min  Residual AHI: 0.6/hr  Pressure:  8-16 cmH2O  95% (90%) percentile pressure: 11.4 cmH2O (max pressure 12.3)    [place] Sleepiness Scale score today: 3/24  [place] Sleepiness Scale score last visit: 6/24   Pre-PAP [place] Sleepiness Scale score: 9/24    PAP machine: [employer]    Interface:  Mask: nasal mask  Chin strap: No  Leak: No  Humidity: Yes    Difficulties with therapy:    [-] Difficulty tolerating the pressure  [-] Epistaxis:   [-] Nasal congestion   [-] Dry mouth:  [-] Mouth breathing:   [-] Pain/skin breakdown:     Improvements noted with CPAP:   [+] waking up more refreshed  [+] sleeping better with less arousals  [+] nocturia improved   [+] improved energy level during the day    SLEEP-WAKE SCHEDULE: unchanged    Other sleep problems: None     Drowsy driving / near incidents: No    Medications that affect sleep: No            Medications:     No current outpatient prescriptions on file.     No current facility-administered medications for this visit.         No Known Allergies         Past Medical History:     Past Medical History:   Diagnosis Date     Obesity              Past Surgical History:      Past Surgical History:   Procedure Laterality Date     AS ESOPHAGOSCOPY, DIAGNOSTIC      EGD       Social History   Substance Use Topics     Smoking status: Never Smoker     Smokeless tobacco: Never Used     Alcohol use No       Family History   Problem Relation Age of Onset     DIABETES Father      Hypertension Father      Colon Cancer No family hx of      Prostate Cancer No family hx of      CEREBROVASCULAR DISEASE No family hx of      Coronary Artery Disease No family hx of              "Physical Examination:   BP (!) 153/96  Pulse 111  Resp 14  Ht 1.854 m (6' 1\")  Wt (!) 181 kg (399 lb)  SpO2 96%  BMI 52.64 kg/m2            Data: All pertinent previous laboratory data reviewed     Lab Results   Component Value Date    PH 7.42 03/06/2017    PO2 94 03/06/2017    PCO2 35 03/06/2017    HCO3 23 03/06/2017     No results found for: TSH  Lab Results   Component Value Date    GLC 89 09/29/2015     No results found for: HGB  Lab Results   Component Value Date    BUN 15 09/29/2015    CR 1.08 09/29/2015     No results found for: AMOL      He will follow up with me in about 12 month(s).         Copy to: uRssel Hsieh MD 3/15/2018     [facility]  [id number] [place], [place] [id number]       Fifteen minutes spent with patient, all of which were spent face-to-face counseling, consulting, coordinating plan of care and going over PAP download/sleep study and chart review.          "

## 2018-03-15 NOTE — MR AVS SNAPSHOT
After Visit Summary   3/15/2018    Jeffry Younger    MRN: [id number]           Patient Information     Date Of Birth          1980        Visit Information        Provider Department      3/15/2018 2:30 PM Montana James MD [facility]        Today's Diagnoses     PAOLA (obstructive sleep apnea)    -  1      Care Instructions    MY TREATMENT INFORMATION FOR SLEEP APNEA-  Jeffry Younger    MY CONTACT NUMBERS ARE:  [phone]  DOCTOR : Montana James  SLEEP CENTER :  [place]    Your sleep apnea is controlled with CPAP.   Continue use of CPAP:  - Recommend using CPAP every night for at least 7-8 hours each night  - Daytime naps are not advised, but use CPAP if taking naps.    You met all objective measure goal  Compliance  Goal >70% (preferrably 100%)  Leak   Goal < 10% (less than 24 L/min)  AHI  Goal < 5 events per hour   Usage  Goal 7-8 hours.      Patient was advised not to drive if drowsy or sleepy.      Follow up in 12 months.        Your BMI is Body mass index is 52.64 kg/(m^2).  Weight management is a personal decision.  If you are interested in exploring weight loss strategies, the following discussion covers the approaches that may be successful. Body mass index (BMI) is one way to tell whether you are at a healthy weight, overweight, or obese. It measures your weight in relation to your height.  A BMI of 18.5 to 24.9 is in the healthy range. A person with a BMI of 25 to 29.9 is considered overweight, and someone with a BMI of 30 or greater is considered obese. More than two-thirds of American adults are considered overweight or obese.  Being overweight or obese increases the risk for further weight gain. Excess weight may lead to heart disease and diabetes.  Creating and following plans for healthy eating and physical activity may help you improve your health.  Weight control is part of healthy lifestyle and includes exercise, emotional health, and healthy  eating habits. Careful eating habits lifelong are the mainstay of weight control. Though there are significant health benefits from weight loss, long-term weight loss with diet alone may be very difficult to achieve- studies show long-term success with dietary management in less than 10% of people. Attaining a healthy weight may be especially difficult to achieve in those with severe obesity. In some cases, medications, devices and surgical management might be considered.  What can you do?  If you are overweight or obese and are interested in methods for weight loss, you should discuss this with your provider.     Consider reducing daily calorie intake by 500 calories.     Keep a food journal.     Avoiding skipping meals, consider cutting portions instead.    Diet combined with exercise helps maintain muscle while optimizing fat loss. Strength training is particularly important for building and maintaining muscle mass. Exercise helps reduce stress, increase energy, and improves fitness. Increasing exercise without diet control, however, may not burn enough calories to loose weight.       Start walking three days a week 10-20 minutes at a time    Work towards walking thirty minutes five days a week     Eventually, increase the speed of your walking for 1-2 minutes at time    In addition, we recommend that you review healthy lifestyles and methods for weight loss available through the National Institutes of Health patient information sites:  http://win.niddk.nih.gov/publications/index.htm    And look into health and wellness programs that may be available through your health insurance provider, employer, local community center, or nadir club.    Weight management plan: Patient was referred to their PCP to discuss a diet and exercise plan.     Your blood pressure was checked while you were in clinic today.  Please read the guidelines below about what these numbers mean and what you should do about them.  Your systolic  blood pressure is the top number.  This is the pressure when the heart is pumping.  Your diastolic blood pressure is the bottom number.  This is the pressure in between beats.  If your systolic blood pressure is less than 120 and your diastolic blood pressure is less than 80, then your blood pressure is normal. There is nothing more that you need to do about it  If your systolic blood pressure is 120-139 or your diastolic blood pressure is 80-89, your blood pressure may be higher than it should be.  You should have your blood pressure re-checked within a year by a primary care provider.  If your systolic blood pressure is 140 or greater or your diastolic blood pressure is 90 or greater, you may have high blood pressure.  High blood pressure is treatable, but if left untreated over time it can put you at risk for heart attack, stroke, or kidney failure.  You should have your blood pressure re-checked by a primary care provider within the next four weeks.              Follow-ups after your visit        Who to contact     If you have questions or need follow up information about today's clinic visit or your schedule please contact [facility] directly at [phone].  Normal or non-critical lab and imaging results will be communicated to you by Ident[employer], letter or phone within 4 business days after the clinic has received the results. If you do not hear from us within 7 days, please contact the clinic through [employer] or phone. If you have a critical or abnormal lab result, we will notify you by phone as soon as possible.  Submit refill requests through [employer] or call your pharmacy and they will forward the refill request to us. Please allow 3 business days for your refill to be completed.          Additional Information About Your Visit        [employer] Information     [employer] gives you secure access to your electronic health record. If you see a primary care provider, you can also send messages  "to your care team and make appointments. If you have questions, please call your primary care clinic.  If you do not have a primary care provider, please call [phone] and they will assist you.        Care EveryWhere ID     This is your Care EveryWhere ID. This could be used by other organizations to access your [facility] medical records  [id number]        Your Vitals Were     Pulse Respirations Height Pulse Oximetry BMI (Body Mass Index)       111 14 1.854 m (6' 1\") 96% 52.64 kg/m2        Blood Pressure from Last 3 Encounters:   03/15/18 (!) 153/96   01/16/18 138/82   08/07/17 134/87    Weight from Last 3 Encounters:   03/15/18 (!) 181 kg (399 lb)   01/16/18 (!) 191.4 kg (422 lb)   08/07/17 (!) 181 kg (399 lb)              Today, you had the following     No orders found for display         Today's Medication Changes          These changes are accurate as of 3/15/18  2:34 PM.  If you have any questions, ask your nurse or doctor.               Stop taking these medicines if you haven't already. Please contact your care team if you have questions.     amoxicillin-clavulanate 875-125 MG per tablet   Commonly known as:  AUGMENTIN                    Primary Care Provider Office Phone # Fax #    Russel Hsieh [phone] [address] [place] [address]        Equal Access to Services     [facility] AH: Hadii nidia woods hadasho Sonilo, waaxda luqadaha, qaybta kaalmada flower, annette loredo. So [facility] [phone].    ATENCIÓN: Si habla español, tiene a schwartz disposición servicios gratuitos de asistencia lingüística. Llame al [phone].    We comply with applicable federal civil rights laws and [place] laws. We do not discriminate on the basis of race, color, national origin, age, disability, sex, sexual orientation, or gender identity.            Thank you!     Thank you for choosing [facility]  for your care. Our goal is " always to provide you with excellent care. Hearing back from our patients is one way we can continue to improve our services. Please take a few minutes to complete the written survey that you may receive in the mail after your visit with us. Thank you!             Your Updated Medication List - Protect others around you: Learn how to safely use, store and throw away your medicines at www.disposemymeds.org.      Notice  As of 3/15/2018  2:34 PM    You have not been prescribed any medications.

## 2018-03-15 NOTE — PATIENT INSTRUCTIONS
MY TREATMENT INFORMATION FOR SLEEP APNEA-  Jeffry Younger    MY CONTACT NUMBERS ARE:  [phone]  DOCTOR : Montana GRIMM [facility]  SLEEP CENTER :  [place]    Your sleep apnea is controlled with CPAP.   Continue use of CPAP:  - Recommend using CPAP every night for at least 7-8 hours each night  - Daytime naps are not advised, but use CPAP if taking naps.    You met all objective measure goal  Compliance  Goal >70% (preferrably 100%)  Leak   Goal < 10% (less than 24 L/min)  AHI  Goal < 5 events per hour   Usage  Goal 7-8 hours.      Patient was advised not to drive if drowsy or sleepy.      Follow up in 12 months.        Your BMI is Body mass index is 52.64 kg/(m^2).  Weight management is a personal decision.  If you are interested in exploring weight loss strategies, the following discussion covers the approaches that may be successful. Body mass index (BMI) is one way to tell whether you are at a healthy weight, overweight, or obese. It measures your weight in relation to your height.  A BMI of 18.5 to 24.9 is in the healthy range. A person with a BMI of 25 to 29.9 is considered overweight, and someone with a BMI of 30 or greater is considered obese. More than two-thirds of American adults are considered overweight or obese.  Being overweight or obese increases the risk for further weight gain. Excess weight may lead to heart disease and diabetes.  Creating and following plans for healthy eating and physical activity may help you improve your health.  Weight control is part of healthy lifestyle and includes exercise, emotional health, and healthy eating habits. Careful eating habits lifelong are the mainstay of weight control. Though there are significant health benefits from weight loss, long-term weight loss with diet alone may be very difficult to achieve- studies show long-term success with dietary management in less than 10% of people. Attaining a healthy weight may be especially difficult to achieve in those  with severe obesity. In some cases, medications, devices and surgical management might be considered.  What can you do?  If you are overweight or obese and are interested in methods for weight loss, you should discuss this with your provider.     Consider reducing daily calorie intake by 500 calories.     Keep a food journal.     Avoiding skipping meals, consider cutting portions instead.    Diet combined with exercise helps maintain muscle while optimizing fat loss. Strength training is particularly important for building and maintaining muscle mass. Exercise helps reduce stress, increase energy, and improves fitness. Increasing exercise without diet control, however, may not burn enough calories to loose weight.       Start walking three days a week 10-20 minutes at a time    Work towards walking thirty minutes five days a week     Eventually, increase the speed of your walking for 1-2 minutes at time    In addition, we recommend that you review healthy lifestyles and methods for weight loss available through the National Institutes of Health patient information sites:  http://win.niddk.nih.gov/publications/index.htm    And look into health and wellness programs that may be available through your health insurance provider, employer, local community center, or nadir club.    Weight management plan: Patient was referred to their PCP to discuss a diet and exercise plan.     Your blood pressure was checked while you were in clinic today.  Please read the guidelines below about what these numbers mean and what you should do about them.  Your systolic blood pressure is the top number.  This is the pressure when the heart is pumping.  Your diastolic blood pressure is the bottom number.  This is the pressure in between beats.  If your systolic blood pressure is less than 120 and your diastolic blood pressure is less than 80, then your blood pressure is normal. There is nothing more that you need to do about it  If your  systolic blood pressure is 120-139 or your diastolic blood pressure is 80-89, your blood pressure may be higher than it should be.  You should have your blood pressure re-checked within a year by a primary care provider.  If your systolic blood pressure is 140 or greater or your diastolic blood pressure is 90 or greater, you may have high blood pressure.  High blood pressure is treatable, but if left untreated over time it can put you at risk for heart attack, stroke, or kidney failure.  You should have your blood pressure re-checked by a primary care provider within the next four weeks.

## 2018-03-22 ENCOUNTER — OFFICE VISIT (OUTPATIENT)
Dept: URGENT CARE | Facility: URGENT CARE | Age: 38
End: 2018-03-22
Payer: COMMERCIAL

## 2018-03-22 VITALS
DIASTOLIC BLOOD PRESSURE: 90 MMHG | OXYGEN SATURATION: 98 % | SYSTOLIC BLOOD PRESSURE: 142 MMHG | TEMPERATURE: 97.8 F | HEART RATE: 106 BPM

## 2018-03-22 DIAGNOSIS — J02.9 ACUTE PHARYNGITIS, UNSPECIFIED ETIOLOGY: Primary | ICD-10-CM

## 2018-03-22 LAB
BASOPHILS # BLD AUTO: 0 10E9/L (ref 0–0.2)
BASOPHILS NFR BLD AUTO: 0.2 %
DEPRECATED S PYO AG THROAT QL EIA: NORMAL
DIFFERENTIAL METHOD BLD: NORMAL
EOSINOPHIL # BLD AUTO: 0.2 10E9/L (ref 0–0.7)
EOSINOPHIL NFR BLD AUTO: 2 %
LYMPHOCYTES # BLD AUTO: 1.7 10E9/L (ref 0.8–5.3)
LYMPHOCYTES NFR BLD AUTO: 18.6 %
MONOCYTES # BLD AUTO: 0.8 10E9/L (ref 0–1.3)
MONOCYTES NFR BLD AUTO: 8.6 %
NEUTROPHILS # BLD AUTO: 6.5 10E9/L (ref 1.6–8.3)
NEUTROPHILS NFR BLD AUTO: 70.6 %
SPECIMEN SOURCE: NORMAL
WBC # BLD AUTO: 9.3 10E9/L (ref 4–11)

## 2018-03-22 PROCEDURE — 99213 OFFICE O/P EST LOW 20 MIN: CPT | Performed by: PHYSICIAN ASSISTANT

## 2018-03-22 PROCEDURE — 85048 AUTOMATED LEUKOCYTE COUNT: CPT | Performed by: PHYSICIAN ASSISTANT

## 2018-03-22 PROCEDURE — 87880 STREP A ASSAY W/OPTIC: CPT | Performed by: PHYSICIAN ASSISTANT

## 2018-03-22 PROCEDURE — 36415 COLL VENOUS BLD VENIPUNCTURE: CPT | Performed by: PHYSICIAN ASSISTANT

## 2018-03-22 PROCEDURE — 85004 AUTOMATED DIFF WBC COUNT: CPT | Performed by: PHYSICIAN ASSISTANT

## 2018-03-22 PROCEDURE — 87081 CULTURE SCREEN ONLY: CPT | Performed by: PHYSICIAN ASSISTANT

## 2018-03-22 RX ORDER — PREDNISONE 20 MG/1
TABLET ORAL
Qty: 11 TABLET | Refills: 0 | Status: SHIPPED | OUTPATIENT
Start: 2018-03-22 | End: 2018-06-26

## 2018-03-22 ASSESSMENT — ENCOUNTER SYMPTOMS
COUGH: 0
DIARRHEA: 0
NAUSEA: 0
MYALGIAS: 0
FEVER: 0
VOMITING: 0
TROUBLE SWALLOWING: 1
SORE THROAT: 1
HEADACHES: 0
FATIGUE: 0

## 2018-03-22 NOTE — MR AVS SNAPSHOT
After Visit Summary   3/22/2018    Jeffry Younger    MRN: [id number]           Patient Information     Date Of Birth          1980        Visit Information        Provider Department      3/22/2018 9:10 AM Catalina Garcia PA-C [facility]        Today's Diagnoses     Acute pharyngitis, unspecified etiology    -  1       Follow-ups after your visit        Your next 10 appointments already scheduled     Mar 13, 2019  2:00 PM CDT   Return Sleep Patient with Montana James MD   [facility] ([facility])    [address]  [facility] [address] [phone]              Who to contact     If you have questions or need follow up information about today's clinic visit or your schedule please contact [facility] directly at [phone].  Normal or non-critical lab and imaging results will be communicated to you by MyChart, letter or phone within 4 business days after the clinic has received the results. If you do not hear from us within 7 days, please contact the clinic through [employer] or phone. If you have a critical or abnormal lab result, we will notify you by phone as soon as possible.  Submit refill requests through [employer] or call your pharmacy and they will forward the refill request to us. Please allow 3 business days for your refill to be completed.          Additional Information About Your Visit        MyChart Information     [employer] gives you secure access to your electronic health record. If you see a primary care provider, you can also send messages to your care team and make appointments. If you have questions, please call your primary care clinic.  If you do not have a primary care provider, please call [phone] and they will assist you.        Care EveryWhere ID     This is your Care EveryWhere ID. This could be used by other organizations to access your [facility]  records  [id number]        Your Vitals Were     Pulse Temperature Pulse Oximetry             106 97.8  F (36.6  C) (Tympanic) 98%          Blood Pressure from Last 3 Encounters:   03/22/18 142/90   03/15/18 (!) 153/96   01/16/18 138/82    Weight from Last 3 Encounters:   03/15/18 (!) 399 lb (181 kg)   01/16/18 (!) 422 lb (191.4 kg)   08/07/17 (!) 399 lb (181 kg)              We Performed the Following     Beta strep group A culture     Strep, Rapid Screen     WBC with Diff          Today's Medication Changes          These changes are accurate as of 3/22/18 10:18 AM.  If you have any questions, ask your nurse or doctor.               Start taking these medicines.        Dose/Directions    lidocaine (viscous) 2 % solution   Commonly known as:  XYLOCAINE   Used for:  Acute pharyngitis, unspecified etiology        Dose:  5 mL   Take 5 mLs by mouth every 2 hours as needed for moderate pain Apply to affected area PRN pain   Quantity:  100 mL   Refills:  0       predniSONE 20 MG tablet   Commonly known as:  DELTASONE   Used for:  Acute pharyngitis, unspecified etiology        Take 3 tabs qd x 1 day then 2 tabs qd x 4 days   Quantity:  11 tablet   Refills:  0            Where to get your medicines      These medications were sent to [facility] [address]  [address] [address]     Phone:  [phone]     lidocaine (viscous) 2 % solution    predniSONE 20 MG tablet                Primary Care Provider Office Phone # Fax #    Russel Hsieh [phone] [address] [place] [address]        Equal Access to Services     [facility] AH: Hadii aad chuck hadasho Soomaali, waaxda luqadaha, qaybta kaalmada adeegyada, annette goodsonn sumit loredo. So [facility] [phone].    ATENCIÓN: Si habla español, tiene a schwartz disposición servicios gratuitos de asistencia lingüística. Llame al  [phone].    We comply with applicable federal civil rights laws and [place] laws. We do not discriminate on the basis of race, color, national origin, age, disability, sex, sexual orientation, or gender identity.            Thank you!     Thank you for choosing [facility]  for your care. Our goal is always to provide you with excellent care. Hearing back from our patients is one way we can continue to improve our services. Please take a few minutes to complete the written survey that you may receive in the mail after your visit with us. Thank you!             Your Updated Medication List - Protect others around you: Learn how to safely use, store and throw away your medicines at www.disposemymeds.org.          This list is accurate as of 3/22/18 10:18 AM.  Always use your most recent med list.                   Brand Name Dispense Instructions for use Diagnosis    lidocaine (viscous) 2 % solution    XYLOCAINE    100 mL    Take 5 mLs by mouth every 2 hours as needed for moderate pain Apply to affected area PRN pain    Acute pharyngitis, unspecified etiology       predniSONE 20 MG tablet    DELTASONE    11 tablet    Take 3 tabs qd x 1 day then 2 tabs qd x 4 days    Acute pharyngitis, unspecified etiology

## 2018-03-23 LAB
BACTERIA SPEC CULT: NORMAL
SPECIMEN SOURCE: NORMAL

## 2018-06-24 ASSESSMENT — ENCOUNTER SYMPTOMS
DIZZINESS: 0
SORE THROAT: 0
HEARTBURN: 0
HEMATURIA: 0
WEAKNESS: 0
SHORTNESS OF BREATH: 0
EYE PAIN: 0
CHILLS: 0
CONSTIPATION: 0
HEADACHES: 0
DIARRHEA: 0
JOINT SWELLING: 0
MYALGIAS: 0
ABDOMINAL PAIN: 0
PARESTHESIAS: 0
FREQUENCY: 0
NERVOUS/ANXIOUS: 1
COUGH: 0
FEVER: 0
DYSURIA: 0
HEMATOCHEZIA: 0
PALPITATIONS: 0
NAUSEA: 0
ARTHRALGIAS: 0

## 2018-06-26 ENCOUNTER — OFFICE VISIT (OUTPATIENT)
Dept: PEDIATRICS | Facility: CLINIC | Age: 38
End: 2018-06-26
Payer: COMMERCIAL

## 2018-06-26 VITALS
WEIGHT: 315 LBS | BODY MASS INDEX: 41.75 KG/M2 | HEART RATE: 66 BPM | SYSTOLIC BLOOD PRESSURE: 132 MMHG | HEIGHT: 73 IN | DIASTOLIC BLOOD PRESSURE: 86 MMHG | TEMPERATURE: 98.1 F

## 2018-06-26 DIAGNOSIS — Z13.6 CARDIOVASCULAR SCREENING; LDL GOAL LESS THAN 160: ICD-10-CM

## 2018-06-26 DIAGNOSIS — G47.33 OSA (OBSTRUCTIVE SLEEP APNEA): ICD-10-CM

## 2018-06-26 DIAGNOSIS — Z00.01 ENCOUNTER FOR ROUTINE ADULT HEALTH EXAMINATION WITH ABNORMAL FINDINGS: Primary | ICD-10-CM

## 2018-06-26 DIAGNOSIS — F41.1 GAD (GENERALIZED ANXIETY DISORDER): ICD-10-CM

## 2018-06-26 DIAGNOSIS — E66.01 MORBID OBESITY WITH BMI OF 50.0-59.9, ADULT (H): ICD-10-CM

## 2018-06-26 LAB — HBA1C MFR BLD: 6.5 % (ref 0–5.6)

## 2018-06-26 PROCEDURE — 99213 OFFICE O/P EST LOW 20 MIN: CPT | Mod: 25 | Performed by: INTERNAL MEDICINE

## 2018-06-26 PROCEDURE — 36415 COLL VENOUS BLD VENIPUNCTURE: CPT | Performed by: INTERNAL MEDICINE

## 2018-06-26 PROCEDURE — 99395 PREV VISIT EST AGE 18-39: CPT | Performed by: INTERNAL MEDICINE

## 2018-06-26 PROCEDURE — 80053 COMPREHEN METABOLIC PANEL: CPT | Performed by: INTERNAL MEDICINE

## 2018-06-26 PROCEDURE — 80061 LIPID PANEL: CPT | Performed by: INTERNAL MEDICINE

## 2018-06-26 PROCEDURE — 83036 HEMOGLOBIN GLYCOSYLATED A1C: CPT | Performed by: INTERNAL MEDICINE

## 2018-06-26 ASSESSMENT — ENCOUNTER SYMPTOMS
HEMATOCHEZIA: 0
PALPITATIONS: 0
SHORTNESS OF BREATH: 0
FEVER: 0
PARESTHESIAS: 0
SORE THROAT: 0
DYSURIA: 0
HEADACHES: 0
CONSTIPATION: 0
NAUSEA: 0
ARTHRALGIAS: 0
ABDOMINAL PAIN: 0
MYALGIAS: 0
WEAKNESS: 0
COUGH: 0
FREQUENCY: 0
DIARRHEA: 0
HEARTBURN: 0
HEMATURIA: 0
JOINT SWELLING: 0
NERVOUS/ANXIOUS: 1
EYE PAIN: 0
DIZZINESS: 0
CHILLS: 0

## 2018-06-26 ASSESSMENT — ANXIETY QUESTIONNAIRES
7. FEELING AFRAID AS IF SOMETHING AWFUL MIGHT HAPPEN: NOT AT ALL
GAD7 TOTAL SCORE: 4
1. FEELING NERVOUS, ANXIOUS, OR ON EDGE: SEVERAL DAYS
3. WORRYING TOO MUCH ABOUT DIFFERENT THINGS: SEVERAL DAYS
5. BEING SO RESTLESS THAT IT IS HARD TO SIT STILL: NOT AT ALL
IF YOU CHECKED OFF ANY PROBLEMS ON THIS QUESTIONNAIRE, HOW DIFFICULT HAVE THESE PROBLEMS MADE IT FOR YOU TO DO YOUR WORK, TAKE CARE OF THINGS AT HOME, OR GET ALONG WITH OTHER PEOPLE: SOMEWHAT DIFFICULT
2. NOT BEING ABLE TO STOP OR CONTROL WORRYING: NOT AT ALL
6. BECOMING EASILY ANNOYED OR IRRITABLE: SEVERAL DAYS

## 2018-06-26 ASSESSMENT — PATIENT HEALTH QUESTIONNAIRE - PHQ9: 5. POOR APPETITE OR OVEREATING: SEVERAL DAYS

## 2018-06-26 NOTE — PROGRESS NOTES
SUBJECTIVE:   CC: Jeffry Younger is an 38 year old male who presents for preventative health visit.     Physical   Annual:     Getting at least 3 servings of Calcium per day:  Yes    Bi-annual eye exam:  Yes    Dental care twice a year:  Yes    Sleep apnea or symptoms of sleep apnea:  Excessive snoring    Diet:  Regular (no restrictions)    Frequency of exercise:  2-3 days/week    Duration of exercise:  15-30 minutes    Taking medications regularly:  Yes    Medication side effects:  Not applicable    Additional concerns today regarding anxiety.  Patient complains of anxiety symptoms which have been increasingly problematic both at home and at work over the past year or so.  Does indicate he has had intermittent problems with anxiety for the past few years.  Denies depression.  Often manages anxiety with soda pop intake and uses food as comfort at times.    Today's PHQ-2 Score:   PHQ-2 ( 1999 [employer]) 6/24/2018   Q1: Little interest or pleasure in doing things 1   Q2: Feeling down, depressed or hopeless 0   PHQ-2 Score 1   Q1: Little interest or pleasure in doing things Several days   Q2: Feeling down, depressed or hopeless Not at all   PHQ-2 Score 1       Abuse: Current or Past(Physical, Sexual or Emotional)- No  Do you feel safe in your environment - Yes    Social History   Substance Use Topics     Smoking status: Never Smoker     Smokeless tobacco: Never Used     Alcohol use No     Alcohol Use 6/24/2018   If you drink alcohol do you typically have greater than 3 drinks per day OR greater than 7 drinks per week? No   No flowsheet data found.    Last PSA: No results found for: PSA    Reviewed orders with patient. Reviewed health maintenance and updated orders accordingly - Yes      Reviewed and updated as needed this visit by clinical staff  Tobacco  Allergies  Meds         Reviewed and updated as needed this visit by Provider        Patient Active Problem List   Diagnosis     Morbid obesity with BMI of 50.0-59.9,  "adult (H)     CARDIOVASCULAR SCREENING; LDL GOAL LESS THAN 160     PAOLA (obstructive sleep apnea)     Past Medical History:   Diagnosis Date     Obesity        Past Surgical History:   Procedure Laterality Date     AS ESOPHAGOSCOPY, DIAGNOSTIC      EGD       Family History   Problem Relation Age of Onset     Diabetes Father      Hypertension Father      Colon Cancer No family hx of      Prostate Cancer No family hx of      Cerebrovascular Disease No family hx of      Coronary Artery Disease No family hx of        ALLERGIES   No Known Allergies      Review of Systems   Constitutional: Negative for chills and fever.   HENT: Negative for congestion, ear pain, hearing loss and sore throat.    Eyes: Negative for pain and visual disturbance.   Respiratory: Negative for cough and shortness of breath.    Cardiovascular: Negative for chest pain, palpitations and peripheral edema.   Gastrointestinal: Negative for abdominal pain, constipation, diarrhea, heartburn, hematochezia and nausea.   Genitourinary: Negative for discharge, dysuria, frequency, genital sores, hematuria, impotence and urgency.   Musculoskeletal: Negative for arthralgias, joint swelling and myalgias.   Skin: Negative for rash.   Neurological: Negative for dizziness, weakness, headaches and paresthesias.   Psychiatric/Behavioral: Negative for mood changes. The patient is nervous/anxious.          OBJECTIVE:   /86 (Cuff Size: Adult Large)  Pulse 66  Temp 98.1  F (36.7  C) (Oral)  Ht 6' 1\" (1.854 m)  Wt (!) 428 lb (194.1 kg)  BMI 56.47 kg/m2    Physical Exam  GENERAL: alert and no distress  EYES: Eyes grossly normal to inspection, PERRL and conjunctivae and sclerae normal  HENT: ear canals and TM's normal, nose and mouth without ulcers or lesions  NECK: no adenopathy, no asymmetry, masses, or scars and thyroid normal to palpation  RESP: lungs clear to auscultation - no rales, rhonchi or wheezes  CV: regular rate and rhythm, normal S1 S2, no S3 or S4, " "no murmur, click or rub, no peripheral edema and peripheral pulses strong  ABDOMEN: soft, nontender, no hepatosplenomegaly, no masses and bowel sounds normal  MS: no gross musculoskeletal defects noted, no edema  SKIN: no suspicious lesions or rashes  NEURO: Normal strength and tone, mentation intact and speech normal  PSYCH: mentation appears normal, affect normal/bright    ASSESSMENT/PLAN:       ICD-10-CM    1. Encounter for routine adult health examination with abnormal findings Z00.01        2. Morbid obesity with BMI of 50.0-59.9, adult (H) E66.01 Weight loss goals and weight mgmt discussed  See AVS    Z68.43    3. PAOLA (obstructive sleep apnea) G47.33 Improved, continue CPAP     4. ADAMA (generalized anxiety disorder) F41.1 MENTAL HEALTH REFERRAL  - Adult; Outpatient Treatment; Individual/Couples/Family/Group Therapy/Health Psychology; Other: [facility] [phone]; We will contact you to schedule the appointment or please call with any questi...       Generalized anxiety disorder.  Discussed counseling referral for CBT as well as potential role for SSRI.  Patient prefers to start with counseling.          5. CARDIOVASCULAR SCREENING; LDL GOAL LESS THAN 160 Z13.6 Lipid panel reflex to direct LDL Fasting     Comprehensive metabolic panel (BMP + Alb, Alk Phos, ALT, AST, Total. Bili, TP)     Hemoglobin A1c       COUNSELING:   Reviewed preventive health counseling, as reflected in patient instructions       Regular exercise       Healthy diet/nutrition    BP Readings from Last 1 Encounters:   06/26/18 132/86     Estimated body mass index is 56.47 kg/(m^2) as calculated from the following:    Height as of this encounter: 6' 1\" (1.854 m).    Weight as of this encounter: 428 lb (194.1 kg).    BP Screening:   Last 3 BP Readings:    BP Readings from Last 3 Encounters:   06/26/18 132/86   03/22/18 142/90   03/15/18 (!) 153/96       The following was recommended to the patient:  Re-screen BP within a " year and recommended lifestyle modifications  Weight management plan: Discussed healthy diet and exercise guidelines and patient will follow up in 6 months in clinic to re-evaluate.     reports that he has never smoked. He has never used smokeless tobacco.      Counseling Resources:  ATP IV Guidelines  Pooled Cohorts Equation Calculator  FRAX Risk Assessment  ICSI Preventive Guidelines  Dietary Guidelines for Americans, 2010  USDA's MyPlate  ASA Prophylaxis  Lung CA Screening    Russel Hsieh MD, MD  [facility]

## 2018-06-26 NOTE — MR AVS SNAPSHOT
After Visit Summary   6/26/2018    Jeffry Younger    MRN: [id number]           Patient Information     Date Of Birth          1980        Visit Information        Provider Department      6/26/2018 9:20 AM Russel Hsieh MD [facility]        Today's Diagnoses     Encounter for routine adult health examination with abnormal findings    -  1    ADAMA (generalized anxiety disorder)        PAOLA (obstructive sleep apnea)        CARDIOVASCULAR SCREENING; LDL GOAL LESS THAN 160          Care Instructions    INSTRUCTIONS FOR TODAY:    Schedule with counseling     weight management:  Consider [employer] and [place] weight loss program.    Factors:  Soda.   Start substituting bottle water gradually.    Reducing portion sizes (?MyFitness Pal elizabeth or other method)  Identify stress eating    Weight Management Strategies:     Start tracking calories:  Daily calorie goals for successful weight loss:  Women: 1200-1500Kcal/day  Men 1500-1800Kcal/day  Start with reducing your diet by 250-500Kcal daily for 1 week at a time  (for example:   reduce 2500Kcal/day diet to 2000Kcal/day diet for 1-2 weeks then try to restrict further)    Examples of effective diets or weight loss plans:      Reduced calorie   (Weight Watchers, Jo Cisneros, Nutrisystem)      Low to moderate carbohydrate restrictive diet  (South Beach, Zone, Paleo)      Low fat   (American Heart Association diet)      Meal Replacement diet (liquid meals, bars)    Exercise is important for losing weight, but diet changes and calorie restriction should be the focus.    Once the goal weight is achieved, exercise and an active lifestyle plays an important part in preventing recurrent weight gain:       Guidelines to maintain a healthy weight:  60 min or more of moderate activity at least 5 days per week.        Dr Hsieh    Preventive Health Recommendations  Male Ages 26 - 39    Yearly exam:             See your health care provider every year in  order to  o   Review health changes.   o   Discuss preventive care.    o   Review your medicines if your doctor has prescribed any.    You should be tested each year for STDs (sexually transmitted diseases), if you re at risk.     After age 35, talk to your provider about cholesterol testing. If you are at risk for heart disease, have your cholesterol tested at least every 5 years.     If you are at risk for diabetes, you should have a diabetes test (fasting glucose).  Shots: Get a flu shot each year. Get a tetanus shot every 10 years.     Nutrition:    Eat at least 5 servings of fruits and vegetables daily.     Eat whole-grain bread, whole-wheat pasta and brown rice instead of white grains and rice.     Get adequate Calcium and Vitamin D.     Lifestyle    Exercise for at least 150 minutes a week (30 minutes a day, 5 days a week). This will help you control your weight and prevent disease.     Limit alcohol to one drink per day.     No smoking.     Wear sunscreen to prevent skin cancer.     See your dentist every six months for an exam and cleaning.             Follow-ups after your visit        Additional Services     MENTAL HEALTH REFERRAL  - Adult; Outpatient Treatment; Individual/Couples/Family/Group Therapy/Health Psychology; Other: [facility] [phone]; We will contact you to schedule the appointment or please call with any questi...       All scheduling is subject to the client's specific insurance plan & benefits, provider/location availability, and provider clinical specialities.  Please arrive 15 minutes early for your first appointment and bring your completed paperwork.    Please be aware that coverage of these services is subject to the terms and limitations of your health insurance plan.  Call member services at your health plan with any benefit or coverage questions.                            Your next 10 appointments already scheduled     Mar 13, 2019  2:00 PM CDT   Return  "Sleep Patient with Montana James MD   [facility] ([facility])    [address]  [place] [address] [phone]              Who to contact     If you have questions or need follow up information about today's clinic visit or your schedule please contact [facility] directly at [phone].  Normal or non-critical lab and imaging results will be communicated to you by Pilot Systemshart, letter or phone within 4 business days after the clinic has received the results. If you do not hear from us within 7 days, please contact the clinic through Resilinct or phone. If you have a critical or abnormal lab result, we will notify you by phone as soon as possible.  Submit refill requests through [employer] or call your pharmacy and they will forward the refill request to us. Please allow 3 business days for your refill to be completed.          Additional Information About Your Visit        Pilot Systemsharbulletn. Information     [employer] gives you secure access to your electronic health record. If you see a primary care provider, you can also send messages to your care team and make appointments. If you have questions, please call your primary care clinic.  If you do not have a primary care provider, please call [phone] and they will assist you.        Care EveryWhere ID     This is your Care EveryWhere ID. This could be used by other organizations to access your [facility] medical records  [id number]        Your Vitals Were     Pulse Temperature Height BMI (Body Mass Index)          66 98.1  F (36.7  C) (Oral) 6' 1\" (1.854 m) 56.47 kg/m2         Blood Pressure from Last 3 Encounters:   06/26/18 132/86   03/22/18 142/90   03/15/18 (!) 153/96    Weight from Last 3 Encounters:   06/26/18 (!) 428 lb (194.1 kg)   03/15/18 (!) 399 lb (181 kg)   01/16/18 (!) 422 lb (191.4 kg)              We Performed the Following     Comprehensive metabolic panel (BMP + Alb, Alk " Phos, ALT, AST, Total. Bili, TP)     Hemoglobin A1c     Lipid panel reflex to direct LDL Fasting     MENTAL HEALTH REFERRAL  - Adult; Outpatient Treatment; Individual/Couples/Family/Group Therapy/Health Psychology; Other: [facility] [phone]; We will contact you to schedule the appointment or please call with any questi...          Today's Medication Changes          These changes are accurate as of 6/26/18  9:52 AM.  If you have any questions, ask your nurse or doctor.               Stop taking these medicines if you haven't already. Please contact your care team if you have questions.     lidocaine (viscous) 2 % solution   Commonly known as:  XYLOCAINE   Stopped by:  Russel Hsieh MD           predniSONE 20 MG tablet   Commonly known as:  DELTASONE   Stopped by:  Russel Hsieh MD                    Primary Care Provider Office Phone # Fax #    Russel Hsieh [phone]       [address] [place] [address]        Equal Access to Services     [facility] AH: Hadii aad ku hadasho Soomaali, waaxda luqadaha, qaybta kaalmada adeegyada, waxay idiin hayaan sumit weldon . So [facility] [phone].    ATENCIÓN: Si habla español, tiene a schwartz disposición servicios gratuitos de asistencia lingüística. Llame al [phone].    We comply with applicable federal civil rights laws and [place] laws. We do not discriminate on the basis of race, color, national origin, age, disability, sex, sexual orientation, or gender identity.            Thank you!     Thank you for choosing [facility]  for your care. Our goal is always to provide you with excellent care. Hearing back from our patients is one way we can continue to improve our services. Please take a few minutes to complete the written survey that you may receive in the mail after your visit with us. Thank you!             Your Updated Medication List - Protect others around you: Learn  how to safely use, store and throw away your medicines at www.disposemymeds.org.      Notice  As of 6/26/2018  9:52 AM    You have not been prescribed any medications.

## 2018-06-26 NOTE — PATIENT INSTRUCTIONS
INSTRUCTIONS FOR TODAY:    Schedule with counseling     weight management:  Consider [employer] and [place] weight loss program.    Factors:  Soda.   Start substituting bottle water gradually.    Reducing portion sizes (?MyFitness Pal elizabeth or other method)  Identify stress eating    Weight Management Strategies:     Start tracking calories:  Daily calorie goals for successful weight loss:  Women: 1200-1500Kcal/day  Men 1500-1800Kcal/day  Start with reducing your diet by 250-500Kcal daily for 1 week at a time  (for example:   reduce 2500Kcal/day diet to 2000Kcal/day diet for 1-2 weeks then try to restrict further)    Examples of effective diets or weight loss plans:      Reduced calorie   (Weight Watchers, [place], Nutrisystem)      Low to moderate carbohydrate restrictive diet  (South Beach, Zone, Paleo)      Low fat   (American Heart Association diet)      Meal Replacement diet (liquid meals, bars)    Exercise is important for losing weight, but diet changes and calorie restriction should be the focus.    Once the goal weight is achieved, exercise and an active lifestyle plays an important part in preventing recurrent weight gain:       Guidelines to maintain a healthy weight:  60 min or more of moderate activity at least 5 days per week.        Dr Hsieh    Preventive Health Recommendations  Male Ages 26 - 39    Yearly exam:             See your health care provider every year in order to  o   Review health changes.   o   Discuss preventive care.    o   Review your medicines if your doctor has prescribed any.    You should be tested each year for STDs (sexually transmitted diseases), if you re at risk.     After age 35, talk to your provider about cholesterol testing. If you are at risk for heart disease, have your cholesterol tested at least every 5 years.     If you are at risk for diabetes, you should have a diabetes test (fasting glucose).  Shots: Get a flu shot each year. Get a tetanus shot every 10 years.      Nutrition:    Eat at least 5 servings of fruits and vegetables daily.     Eat whole-grain bread, whole-wheat pasta and brown rice instead of white grains and rice.     Get adequate Calcium and Vitamin D.     Lifestyle    Exercise for at least 150 minutes a week (30 minutes a day, 5 days a week). This will help you control your weight and prevent disease.     Limit alcohol to one drink per day.     No smoking.     Wear sunscreen to prevent skin cancer.     See your dentist every six months for an exam and cleaning.

## 2018-06-27 LAB
ALBUMIN SERPL-MCNC: 3.6 G/DL (ref 3.4–5)
ALP SERPL-CCNC: 76 U/L (ref 40–150)
ALT SERPL W P-5'-P-CCNC: 40 U/L (ref 0–70)
ANION GAP SERPL CALCULATED.3IONS-SCNC: 8 MMOL/L (ref 3–14)
AST SERPL W P-5'-P-CCNC: 16 U/L (ref 0–45)
BILIRUB SERPL-MCNC: 0.6 MG/DL (ref 0.2–1.3)
BUN SERPL-MCNC: 14 MG/DL (ref 7–30)
CALCIUM SERPL-MCNC: 8.8 MG/DL (ref 8.5–10.1)
CHLORIDE SERPL-SCNC: 107 MMOL/L (ref 94–109)
CHOLEST SERPL-MCNC: 147 MG/DL
CO2 SERPL-SCNC: 25 MMOL/L (ref 20–32)
CREAT SERPL-MCNC: 0.93 MG/DL (ref 0.66–1.25)
GFR SERPL CREATININE-BSD FRML MDRD: >90 ML/MIN/1.7M2
GLUCOSE SERPL-MCNC: 128 MG/DL (ref 70–99)
HDLC SERPL-MCNC: 33 MG/DL
LDLC SERPL CALC-MCNC: 84 MG/DL
NONHDLC SERPL-MCNC: 114 MG/DL
POTASSIUM SERPL-SCNC: 4.2 MMOL/L (ref 3.4–5.3)
PROT SERPL-MCNC: 7.2 G/DL (ref 6.8–8.8)
SODIUM SERPL-SCNC: 140 MMOL/L (ref 133–144)
TRIGL SERPL-MCNC: 152 MG/DL

## 2018-06-27 ASSESSMENT — ANXIETY QUESTIONNAIRES: GAD7 TOTAL SCORE: 4

## 2018-07-01 ENCOUNTER — TELEPHONE (OUTPATIENT)
Dept: PEDIATRICS | Facility: CLINIC | Age: 38
End: 2018-07-01

## 2018-07-01 DIAGNOSIS — E11.65 TYPE 2 DIABETES MELLITUS WITH HYPERGLYCEMIA, WITHOUT LONG-TERM CURRENT USE OF INSULIN (H): Primary | ICD-10-CM

## 2018-07-01 DIAGNOSIS — E78.5 HYPERLIPIDEMIA LDL GOAL <100: ICD-10-CM

## 2018-07-01 NOTE — TELEPHONE ENCOUNTER
Please call patient, sent a my chart note regarding recent results.  A1c is 6.5, meeting the criteria for adult onset diabetes.  I have recommended he meet with a diabetes educator for diabetes education sessions and follow-up with me in 1 month.  He does not require any additional medication at this time.  Please assist patient with scheduling diabetes education visit and schedule patient for a follow-up visit with me in 4 weeks.

## 2018-07-02 PROBLEM — F41.1 GAD (GENERALIZED ANXIETY DISORDER): Status: ACTIVE | Noted: 2018-06-26

## 2018-07-02 PROBLEM — F41.1 GAD (GENERALIZED ANXIETY DISORDER): Status: ACTIVE | Noted: 2018-07-02

## 2018-07-02 NOTE — TELEPHONE ENCOUNTER
Called patient and helped him set up the diabetes Ed appointment and the follow up appointment with Dr Hsieh.

## 2018-07-25 ENCOUNTER — ALLIED HEALTH/NURSE VISIT (OUTPATIENT)
Dept: EDUCATION SERVICES | Facility: CLINIC | Age: 38
End: 2018-07-25
Payer: COMMERCIAL

## 2018-07-25 DIAGNOSIS — E11.65 TYPE 2 DIABETES MELLITUS WITH HYPERGLYCEMIA, WITHOUT LONG-TERM CURRENT USE OF INSULIN (H): Primary | ICD-10-CM

## 2018-07-25 PROCEDURE — G0108 DIAB MANAGE TRN  PER INDIV: HCPCS | Performed by: DIETITIAN, REGISTERED

## 2018-07-25 RX ORDER — LANCETS
EACH MISCELLANEOUS
Qty: 100 EACH | Refills: 3 | Status: SHIPPED | OUTPATIENT
Start: 2018-07-25 | End: 2018-07-25 | Stop reason: ALTCHOICE

## 2018-07-25 RX ORDER — LANCETS
1 EACH MISCELLANEOUS DAILY
Qty: 102 EACH | Refills: 3 | Status: SHIPPED | OUTPATIENT
Start: 2018-07-25

## 2018-07-25 NOTE — PATIENT INSTRUCTIONS
My Diabetes Care Goals:    1. Aim for no more than 60 gms of carbohydrate at meals. Snacks can be up to 15 -30 gms if needed.    2. Blood sugar targets:  before meals, less than 180 mg/dl 2 hrs after a meal.    3. Try to increase walking to 30 min 5x/week.    Jackie Santos RD, LD, CDE  Diabetes      Bring blood glucose meter and logbook with you to all doctor and follow-up appointments.     [facility] for the [facility]:  For Your Diabetes Education and Nutrition Appointments Call:  [phone]   For Diabetes Education or Nutrition Related Questions:   Phone: [phone]  E-mail: [email or web address]  Fax: [phone]   If you need a medication refill please contact your pharmacy. Please allow 3 business days for your refills to be completed.    Instructions for emailing the Diabetes Educators    If you need to communicate a non-urgent message to a Diabetes Educator via email, please send to [email or web address].    Please follow the following email guidelines:    Subject line: Secure: your clinic name (example: Secure: [facility])  In the email please include: First name, middle initial, last name and date of birth.    We will be in touch with you within one (1) business day.

## 2018-07-25 NOTE — PROGRESS NOTES
"Diabetes Self Management Training: Initial Assessment Visit for Newly Diagnosed Patients (Complete AADE Goals Flowsheet)    Jeffry Younger presents today for education related to Type 2 diabetes.    He is accompanied by self    Patient's diabetes management related comments/concerns: knows what he needs to do- \"it's just a matter of doing it\"- but I am making this a priority, wife has T1DM    Patient's emotional response to diabetes: expresses readiness to learn and concern for health and well-being    Patient would like this visit to be focused around the following diabetes-related behaviors and goals: Healthy Eating    ASSESSMENT:  Patient Problem List and Family Medical History reviewed for relevant medical history, current medical status, and diabetes risk factors.    Current Diabetes Management per Patient:  Taking diabetes medications? no    Do you have any difficulty affording your medications or glucose monitoring supplies?     No    Patient glucose self monitoring as follows: BG meter taught today.   BG meter: Accu-Chek Guide meter  BG result at today's visit: post breakfast 202 mg/dl    BG values are: unable to assess  Patient's most recent   Lab Results   Component Value Date    A1C 6.5 06/26/2018    is meeting goal of <7.0    Nutrition:  Patient typically eats 2 meals per day- usually at home (works remotely as an ), usually shares an entree with wife when dining out    Breakfast - usually skips  Lunch - 11am- leftover chicken delano or sandwich or left over tacos   Dinner - [employer] (meal ingredient delivery)  Snacks - likes salty snacks- chips or popcorn    Beverages: regular soda 3-4 cans per day    Cultural/Gnosticist diet restrictions: No     Biggest Challenge to Healthy Eating: avoiding regular soda    Physical Activity:    Limitations: none  Takes short walks with the dog    Diabetes Risk Factors:  family history, hyperlipidemia, overweight/obesity and inactivity    Relevant co-morbidities " "and related health problems:  Significant for:  dyslipidemia and obesity    Diabetes Complications:  Not discussed today.    Recent health service and resource utilization related to diabetes (hyperglycemia, hypoglycemia, etc):   None    Vitals:  There were no vitals taken for this visit.  Estimated body mass index is 56.47 kg/(m^2) as calculated from the following:    Height as of 6/26/18: 1.854 m (6' 1\").    Weight as of 6/26/18: 194.1 kg (428 lb).   Last 3 BP:   BP Readings from Last 3 Encounters:   06/26/18 132/86   03/22/18 142/90   03/15/18 (!) 153/96       History   Smoking Status     Never Smoker   Smokeless Tobacco     Never Used       Labs:  Lab Results   Component Value Date    A1C 6.5 06/26/2018     Lab Results   Component Value Date     06/26/2018     Lab Results   Component Value Date    LDL 84 06/26/2018     HDL Cholesterol   Date Value Ref Range Status   06/26/2018 33 (L) >39 mg/dL Final   ]  GFR Estimate   Date Value Ref Range Status   06/26/2018 >90 >60 mL/min/1.7m2 Final     Comment:     Non  GFR Calc     GFR Estimate If Black   Date Value Ref Range Status   06/26/2018 >90 >60 mL/min/1.7m2 Final     Comment:      GFR Calc     Lab Results   Component Value Date    CR 0.93 06/26/2018     No results found for: MICROALBUMIN    Socio/Economic/Cultural Considerations:    Support system: spouse/significant other    Cultural Influences/Ethnic Background:  American    Health Literacy/Numeracy:  \"With diabetes, it's helpful to use forms and log books to write down blood sugars and what you're eating at times to help understand how foods affect your blood sugars. With this in mind how confident are you at filling out medical forms, such as these, by yourself?  Extremely    Health Beliefs and Attitudes:   Patient Activation Measure Survey Score:  No flowsheet data found.    Stage of Change: PREPARATION (Decided to change - considering how)      Diabetes knowledge and " skills assessment:     Patient is knowledgeable in diabetes management concepts related to: new dx    Patient needs further education on the following diabetes management concepts: Healthy Eating, Being Active and Monitoring    Barriers to Learning Assessment: No Barriers identified    Based on learning assessment above, most appropriate setting for further diabetes education would be: Group class or Individual setting.    INTERVENTION:   Education provided today on:  AADE Self-Care Behaviors:  Healthy Eating: carbohydrate counting, consistency in amount, composition, and timing of food intake, weight reduction, heart healthy diet, eating out, portion control, plate planning method and label reading  Being Active: relationship to blood glucose and describe appropriate activity program  Monitoring: purpose, proper technique, log and interpret results, individual blood glucose targets, frequency of monitoring and proper sharps disposal    Opportunities for ongoing education and support in diabetes-self management were discussed.    Pt verbalized understanding of concepts discussed and recommendations provided today.       Education Materials Provided:  [facility] Understanding Diabetes Booklet, Safe Disposal Options for Needles & Syringes and BG Log Sheet    PLAN:  See Patient Instructions for co-developed, patient-stated behavior change goals.  AVS printed and provided to patient today.    FOLLOW-UP:  Follow-up appointment scheduled on 9/25/18.  Chart routed to referring provider.    Ongoing plan for education and support: Written resources (magazines, books, etc.), Follow-up visit with diabetes educator  and Follow-up with primary care provider    Jackie Santos RD, ABDIE  Diabetes     Time Spent: 60 minutes  Encounter Type: Individual    Any diabetes medication dose changes were made via the CDE Protocol and Collaborative Practice Agreement with the patient's primary care provider. A copy of this  encounter was shared with the provider.

## 2018-07-25 NOTE — LETTER
"    7/25/2018         RE: Jeffry Younger  [address]  [place] [address]        Dear Colleague,    Thank you for referring your patient, Jeffry Younger, to the [facility]. Please see a copy of my visit note below.    Diabetes Self Management Training: Initial Assessment Visit for Newly Diagnosed Patients (Complete AADE Goals Flowsheet)    Jeffry Younger presents today for education related to Type 2 diabetes.    He is accompanied by self    Patient's diabetes management related comments/concerns: knows what he needs to do- \"it's just a matter of doing it\"- but I am making this a priority, wife has T1DM    Patient's emotional response to diabetes: expresses readiness to learn and concern for health and well-being    Patient would like this visit to be focused around the following diabetes-related behaviors and goals: Healthy Eating    ASSESSMENT:  Patient Problem List and Family Medical History reviewed for relevant medical history, current medical status, and diabetes risk factors.    Current Diabetes Management per Patient:  Taking diabetes medications? no    Do you have any difficulty affording your medications or glucose monitoring supplies?     No    Patient glucose self monitoring as follows: BG meter taught today.   BG meter: Accu-Chek Guide meter  BG result at today's visit: post breakfast 202 mg/dl    BG values are: unable to assess  Patient's most recent   Lab Results   Component Value Date    A1C 6.5 06/26/2018    is meeting goal of <7.0    Nutrition:  Patient typically eats 2 meals per day- usually at home (works remotely as an ), usually shares an entree with wife when dining out    Breakfast - usually skips  Lunch - 11am- leftover chicken delano or sandwich or left over tacos   Dinner - [employer] (meal ingredient delivery)  Snacks - likes salty snacks- chips or popcorn    Beverages: regular soda 3-4 cans per day    Cultural/Hoahaoism diet restrictions: No " "    Biggest Challenge to Healthy Eating: avoiding regular soda    Physical Activity:    Limitations: none  Takes short walks with the dog    Diabetes Risk Factors:  family history, hyperlipidemia, overweight/obesity and inactivity    Relevant co-morbidities and related health problems:  Significant for:  dyslipidemia and obesity    Diabetes Complications:  Not discussed today.    Recent health service and resource utilization related to diabetes (hyperglycemia, hypoglycemia, etc):   None    Vitals:  There were no vitals taken for this visit.  Estimated body mass index is 56.47 kg/(m^2) as calculated from the following:    Height as of 6/26/18: 1.854 m (6' 1\").    Weight as of 6/26/18: 194.1 kg (428 lb).   Last 3 BP:   BP Readings from Last 3 Encounters:   06/26/18 132/86   03/22/18 142/90   03/15/18 (!) 153/96       History   Smoking Status     Never Smoker   Smokeless Tobacco     Never Used       Labs:  Lab Results   Component Value Date    A1C 6.5 06/26/2018     Lab Results   Component Value Date     06/26/2018     Lab Results   Component Value Date    LDL 84 06/26/2018     HDL Cholesterol   Date Value Ref Range Status   06/26/2018 33 (L) >39 mg/dL Final   ]  GFR Estimate   Date Value Ref Range Status   06/26/2018 >90 >60 mL/min/1.7m2 Final     Comment:     Non  GFR Calc     GFR Estimate If Black   Date Value Ref Range Status   06/26/2018 >90 >60 mL/min/1.7m2 Final     Comment:      GFR Calc     Lab Results   Component Value Date    CR 0.93 06/26/2018     No results found for: MICROALBUMIN    Socio/Economic/Cultural Considerations:    Support system: spouse/significant other    Cultural Influences/Ethnic Background:  American    Health Literacy/Numeracy:  \"With diabetes, it's helpful to use forms and log books to write down blood sugars and what you're eating at times to help understand how foods affect your blood sugars. With this in mind how confident are you at filling out " medical forms, such as these, by yourself?  Extremely    Health Beliefs and Attitudes:   Patient Activation Measure Survey Score:  No flowsheet data found.    Stage of Change: PREPARATION (Decided to change - considering how)      Diabetes knowledge and skills assessment:     Patient is knowledgeable in diabetes management concepts related to: new dx    Patient needs further education on the following diabetes management concepts: Healthy Eating, Being Active and Monitoring    Barriers to Learning Assessment: No Barriers identified    Based on learning assessment above, most appropriate setting for further diabetes education would be: Group class or Individual setting.    INTERVENTION:   Education provided today on:  AADE Self-Care Behaviors:  Healthy Eating: carbohydrate counting, consistency in amount, composition, and timing of food intake, weight reduction, heart healthy diet, eating out, portion control, plate planning method and label reading  Being Active: relationship to blood glucose and describe appropriate activity program  Monitoring: purpose, proper technique, log and interpret results, individual blood glucose targets, frequency of monitoring and proper sharps disposal    Opportunities for ongoing education and support in diabetes-self management were discussed.    Pt verbalized understanding of concepts discussed and recommendations provided today.       Education Materials Provided:  Demond Understanding Diabetes Booklet, Safe Disposal Options for Needles & Syringes and BG Log Sheet    PLAN:  See Patient Instructions for co-developed, patient-stated behavior change goals.  AVS printed and provided to patient today.    FOLLOW-UP:  Follow-up appointment scheduled on 9/25/18.  Chart routed to referring provider.    Ongoing plan for education and support: Written resources (magazines, books, etc.), Follow-up visit with diabetes educator  and Follow-up with primary care provider    Jackie Santos RD,  CDE  Diabetes     Time Spent: 60 minutes  Encounter Type: Individual    Any diabetes medication dose changes were made via the CDE Protocol and Collaborative Practice Agreement with the patient's primary care provider. A copy of this encounter was shared with the provider.

## 2018-07-25 NOTE — MR AVS SNAPSHOT
After Visit Summary   7/25/2018    Jeffry Younger    MRN: [id number]           Patient Information     Date Of Birth          1980        Visit Information        Provider Department      7/25/2018 10:30 AM Jackie Santos [facility]        Today's Diagnoses     Type 2 diabetes mellitus with hyperglycemia, without long-term current use of insulin (H)    -  1      Care Instructions    My Diabetes Care Goals:    1. Aim for no more than 60 gms of carbohydrate at meals. Snacks can be up to 15 -30 gms if needed.    2. Blood sugar targets:  before meals, less than 180 mg/dl 2 hrs after a meal.    3. Try to increase walking to 30 min 5x/week.    Jackie Santos RD, LD, CDE  Diabetes      Bring blood glucose meter and logbook with you to all doctor and follow-up appointments.     [facility] for the [facility]:  For Your Diabetes Education and Nutrition Appointments Call:  [phone]   For Diabetes Education or Nutrition Related Questions:   Phone: [phone]  E-mail: [email or web address]  Fax: [phone]   If you need a medication refill please contact your pharmacy. Please allow 3 business days for your refills to be completed.    Instructions for emailing the Diabetes Educators    If you need to communicate a non-urgent message to a Diabetes Educator via email, please send to [email or web address].    Please follow the following email guidelines:    Subject line: Secure: your clinic name (example: Secure: [facility])  In the email please include: First name, middle initial, last name and date of birth.    We will be in touch with you within one (1) business day.             Follow-ups after your visit        Your next 10 appointments already scheduled     Aug 06, 2018  9:20 AM CDT   Office Visit with Russel Hsieh MD   [facility] [place] ([facility] [place])    [address] [place] [address]  [facility]  [address]  [place] [address] [phone]           Bring a current list of meds and any records pertaining to this visit. For Physicals, please bring immunization records and any forms needing to be filled out. Please arrive 10 minutes early to complete paperwork.            Sep 25, 2018 10:30 AM CDT   Diabetic Education with [facility]   [facility] ([facility])    [address]  [address]  [place] [address] [phone]            Mar 13, 2019  2:00 PM CDT   Return Sleep Patient with Montana James MD   [facility] ([facility])    [address]  [facility] [address] [phone]              Who to contact     If you have questions or need follow up information about today's clinic visit or your schedule please contact [facility] directly at [phone].  Normal or non-critical lab and imaging results will be communicated to you by Vitriflexhart, letter or phone within 4 business days after the clinic has received the results. If you do not hear from us within 7 days, please contact the clinic through [employer] or phone. If you have a critical or abnormal lab result, we will notify you by phone as soon as possible.  Submit refill requests through [employer] or call your pharmacy and they will forward the refill request to us. Please allow 3 business days for your refill to be completed.          Additional Information About Your Visit        [employer] Information     [employer] gives you secure access to your electronic health record. If you see a primary care provider, you can also send messages to your care team and make appointments. If you have questions, please call your primary care clinic.  If you do not have a primary care provider, please call [phone] and they will assist you.        Care EveryWhere ID     This is your Care  EveryWhere ID. This could be used by other organizations to access your [facility] medical records  [id number]         Blood Pressure from Last 3 Encounters:   06/26/18 132/86   03/22/18 142/90   03/15/18 (!) 153/96    Weight from Last 3 Encounters:   06/26/18 (!) 194.1 kg (428 lb)   03/15/18 (!) 181 kg (399 lb)   01/16/18 (!) 191.4 kg (422 lb)              Today, you had the following     No orders found for display         Today's Medication Changes          These changes are accurate as of 7/25/18 11:24 AM.  If you have any questions, ask your nurse or doctor.               Start taking these medicines.        Dose/Directions    blood glucose monitoring test strip   Commonly known as:  no brand specified   Used for:  Type 2 diabetes mellitus with hyperglycemia, without long-term current use of insulin (H)        Use to test blood sugar 1 times daily or as directed.   Quantity:  100 strip   Refills:  6       thin lancets   Commonly known as:  NO BRAND SPECIFIED   Used for:  Type 2 diabetes mellitus with hyperglycemia, without long-term current use of insulin (H)        Use to test blood sugar 1 times daily or as directed.   Quantity:  100 each   Refills:  3            Where to get your medicines      These medications were sent to [facility] [address] [place] [address]  [id number] [place] [address]     Phone:  [phone]     blood glucose monitoring test strip    thin lancets                Primary Care Provider Office Phone # Fax #    Russel Hsieh [phone]       [facility] [place] [address]        Equal Access to Services     [facility] : Hadii nidia bo Sonilo, waaxda luqadaha, qaybta kaalmada annette crenshaw. So [facility] [phone].    ATENCIÓN: Si habla español, tiene a schwartz disposición servicios gratuitos de asistencia lingüística. Llame al [phone].    We comply with applicable  federal civil rights laws and [place] laws. We do not discriminate on the basis of race, color, national origin, age, disability, sex, sexual orientation, or gender identity.            Thank you!     Thank you for choosing [facility]  for your care. Our goal is always to provide you with excellent care. Hearing back from our patients is one way we can continue to improve our services. Please take a few minutes to complete the written survey that you may receive in the mail after your visit with us. Thank you!             Your Updated Medication List - Protect others around you: Learn how to safely use, store and throw away your medicines at www.disposemymeds.org.          This list is accurate as of 7/25/18 11:24 AM.  Always use your most recent med list.                   Brand Name Dispense Instructions for use Diagnosis    blood glucose monitoring test strip    no brand specified    100 strip    Use to test blood sugar 1 times daily or as directed.    Type 2 diabetes mellitus with hyperglycemia, without long-term current use of insulin (H)       thin lancets    NO BRAND SPECIFIED    100 each    Use to test blood sugar 1 times daily or as directed.    Type 2 diabetes mellitus with hyperglycemia, without long-term current use of insulin (H)

## 2018-08-06 ENCOUNTER — OFFICE VISIT (OUTPATIENT)
Dept: PEDIATRICS | Facility: CLINIC | Age: 38
End: 2018-08-06
Payer: COMMERCIAL

## 2018-08-06 VITALS
SYSTOLIC BLOOD PRESSURE: 132 MMHG | WEIGHT: 315 LBS | HEART RATE: 64 BPM | TEMPERATURE: 98.1 F | BODY MASS INDEX: 55.28 KG/M2 | DIASTOLIC BLOOD PRESSURE: 80 MMHG

## 2018-08-06 DIAGNOSIS — E78.5 HYPERLIPIDEMIA LDL GOAL <100: ICD-10-CM

## 2018-08-06 DIAGNOSIS — E11.65 TYPE 2 DIABETES MELLITUS WITH HYPERGLYCEMIA, WITHOUT LONG-TERM CURRENT USE OF INSULIN (H): Primary | ICD-10-CM

## 2018-08-06 DIAGNOSIS — E66.01 MORBID OBESITY WITH BMI OF 50.0-59.9, ADULT (H): ICD-10-CM

## 2018-08-06 PROCEDURE — 99214 OFFICE O/P EST MOD 30 MIN: CPT | Performed by: INTERNAL MEDICINE

## 2018-08-06 RX ORDER — METFORMIN HCL 500 MG
500 TABLET, EXTENDED RELEASE 24 HR ORAL
Qty: 90 TABLET | Refills: 1 | Status: SHIPPED | OUTPATIENT
Start: 2018-08-06 | End: 2018-12-31

## 2018-08-06 RX ORDER — ATORVASTATIN CALCIUM 10 MG/1
10 TABLET, FILM COATED ORAL DAILY
Qty: 90 TABLET | Refills: 3 | Status: SHIPPED | OUTPATIENT
Start: 2018-08-06 | End: 2019-06-11

## 2018-08-06 NOTE — PATIENT INSTRUCTIONS
INSTRUCTIONS FOR TODAY:     start metformin and atorvastatin   return for diabetes follow-up with fasting labwork in 3 months    Dr Hsieh

## 2018-08-06 NOTE — PROGRESS NOTES
SUBJECTIVE:   Jeffry Younger is a 38 year old male who presents to clinic today for the following health issues:      Diabetes Follow-up    Patient is checking blood sugars: once daily.  Results are as follows:         am - 130    Diabetic concerns: None     Symptoms of hypoglycemia (low blood sugar): none     Paresthesias (numbness or burning in feet) or sores: No     Date of last diabetic eye exam: 2018    BP Readings from Last 2 Encounters:   06/26/18 132/86   03/22/18 142/90     Hemoglobin A1C (%)   Date Value   06/26/2018 6.5 (H)   09/29/2015 5.7     LDL Cholesterol Calculated (mg/dL)   Date Value   06/26/2018 84   09/29/2015 87       Diabetes Management Resources    Amount of exercise or physical activity: walk daily    Problems taking medications regularly: No    Medication side effects: none    Diet: low fat/cholesterol and carbohydrate counting      Patient Active Problem List   Diagnosis     Morbid obesity with BMI of 50.0-59.9, adult (H)     PAOLA (obstructive sleep apnea)     Type 2 diabetes mellitus with hyperglycemia, without long-term current use of insulin (H)     Hyperlipidemia LDL goal <100     ADAMA (generalized anxiety disorder)     Past Surgical History:   Procedure Laterality Date     AS ESOPHAGOSCOPY, DIAGNOSTIC      EGD       Social History   Substance Use Topics     Smoking status: Never Smoker     Smokeless tobacco: Never Used     Alcohol use No     Family History   Problem Relation Age of Onset     Diabetes Father      Hypertension Father      Colon Cancer No family hx of      Prostate Cancer No family hx of      Cerebrovascular Disease No family hx of      Coronary Artery Disease No family hx of              ROS: The following systems have been completely reviewed and are negative except as noted in the HPI: CONSTITUTIONAL, CARDIOVASCULAR, PULMONARY    OBJECTIVE:                                                    /80 (Cuff Size: Adult Large)  Pulse 64  Temp 98.1  F (36.7  C) (Oral)   Wt (!) 419 lb (190.1 kg)  BMI 55.28 kg/m2 Body mass index is 55.28 kg/(m^2).  GENERAL:  alert,  no distress  NECK: no tenderness, no adenopathy  RESP: lungs clear to auscultation - no rales, no rhonchi, no wheezes  CV: regular rates and rhythm, normal S1 S2, no S3 or S4 and no murmur, no click or rub   MS: extremities- no edema     ASSESSMENT/PLAN:                                                        ICD-10-CM    1. Type 2 diabetes mellitus with hyperglycemia, without long-term current use of insulin (H) E11.65 ASPIRIN NOT PRESCRIBED (INTENTIONAL)     metFORMIN (GLUCOPHAGE-XR) 500 MG 24 hr tablet       Recent visit with diabetes ed.  Has started diet changes (eliminating soda -from 5-6 per day to 1 and drinking more water, reducing rice/CHO intake)     2. Hyperlipidemia LDL goal <100 E78.5 atorvastatin (LIPITOR) 10 MG tablet     Lipid panel reflex to direct LDL Fasting     Comprehensive metabolic panel      Rec adding low dose atorvastatin based on guidelines, side effects reviewed      Rpt fasting labs at next follow-up in 3 months     3. Morbid obesity with BMI of 50.0-59.9, adult (H) E66.01 Discussed weight loss strategies and goals    Z68.43       Russel Hsieh MD  [facility]

## 2018-08-06 NOTE — MR AVS SNAPSHOT
After Visit Summary   8/6/2018    Jeffry Younger    MRN: [id number]           Patient Information     Date Of Birth          1980        Visit Information        Provider Department      8/6/2018 9:20 AM Russel [facility]sieh MD [facility]        Today's Diagnoses     Type 2 diabetes mellitus with hyperglycemia, without long-term current use of insulin (H)    -  1    Hyperlipidemia LDL goal <100          Care Instructions    INSTRUCTIONS FOR TODAY:     start metformin and atorvastatin   return for diabetes follow-up with fasting labwork in 3 months    Dr Hsieh            Follow-ups after your visit        Your next 10 appointments already scheduled     Sep 25, 2018 10:30 AM CDT   Diabetic Education with  DIABETIC ED RESOURCE   [facility] [place] ([facility])    [address]  [address]  [facility] [address] [phone]            Mar 13, 2019  2:00 PM CDT   Return Sleep Patient with Montana James MD   [facility] ([facility])    [address]  [facility] [address]   [phone]              Future tests that were ordered for you today     Open Future Orders        Priority Expected Expires Ordered    Lipid panel reflex to direct LDL Fasting Routine  10/6/2018 8/6/2018    Comprehensive metabolic panel Routine  10/6/2018 8/6/2018            Who to contact     If you have questions or need follow up information about today's clinic visit or your schedule please contact [facility]] directly at [phone].  Normal or non-critical lab and imaging results will be communicated to you by MyChart, letter or phone within 4 business days after the clinic has received the results. If you do not hear from us within 7 days, please contact the clinic through MyChart or phone. If you have a critical or abnormal lab result, we will notify you by phone as soon as  possible.  Submit refill requests through Kinvey or call your pharmacy and they will forward the refill request to us. Please allow 3 business days for your refill to be completed.          Additional Information About Your Visit        [employer] Information     Kinvey gives you secure access to your electronic health record. If you see a primary care provider, you can also send messages to your care team and make appointments. If you have questions, please call your primary care clinic.  If you do not have a primary care provider, please call [phone] and they will assist you.        Care EveryWhere ID     This is your Care EveryWhere ID. This could be used by other organizations to access your [place] medical records  [id number]        Your Vitals Were     Pulse Temperature BMI (Body Mass Index)             64 98.1  F (36.7  C) (Oral) 55.28 kg/m2          Blood Pressure from Last 3 Encounters:   08/06/18 132/80   06/26/18 132/86   03/22/18 142/90    Weight from Last 3 Encounters:   08/06/18 (!) 419 lb (190.1 kg)   06/26/18 (!) 428 lb (194.1 kg)   03/15/18 (!) 399 lb (181 kg)                 Today's Medication Changes          These changes are accurate as of 8/6/18  9:41 AM.  If you have any questions, ask your nurse or doctor.               Start taking these medicines.        Dose/Directions    ASPIRIN NOT PRESCRIBED   Commonly known as:  INTENTIONAL   Used for:  Type 2 diabetes mellitus with hyperglycemia, without long-term current use of insulin (H)   Started by:  Russle Hsieh MD        Please choose reason not prescribed, below   Quantity:  0 each   Refills:  0       atorvastatin 10 MG tablet   Commonly known as:  LIPITOR   Used for:  Hyperlipidemia LDL goal <100   Started by:  Russel Hsieh MD        Dose:  10 mg   Take 1 tablet (10 mg) by mouth daily   Quantity:  90 tablet   Refills:  3       metFORMIN 500 MG 24 hr tablet   Commonly known as:  GLUCOPHAGE-XR   Used for:  Type 2 diabetes  mellitus with hyperglycemia, without long-term current use of insulin (H)   Started by:  Russel Hsieh MD        Dose:  500 mg   Take 1 tablet (500 mg) by mouth daily (with dinner)   Quantity:  90 tablet   Refills:  1            Where to get your medicines      These medications were sent to [facility] - [place], [place] - [address]  [address], [facility] [address]     Phone:  [phone]     atorvastatin 10 MG tablet    metFORMIN 500 MG 24 hr tablet         Some of these will need a paper prescription and others can be bought over the counter.  Ask your nurse if you have questions.     You don't need a prescription for these medications     ASPIRIN NOT PRESCRIBED                Primary Care Provider Office Phone # Fax #    Russel Hsieh [phone] [address] [place] [address]        Equal Access to Services     [facility] AH: Hadii aad chuck hadasho Soshivamali, waaxda luqadaha, qaybta kaalmada adeegyada, waxay vincentin haylawrencen sumit weldon . So [facility] [phone].    ATENCIÓN: Si habla español, tiene a schwartz disposición servicios gratuitos de asistencia lingüística. Llame al [phone].    We comply with applicable federal civil rights laws and [place] laws. We do not discriminate on the basis of race, color, national origin, age, disability, sex, sexual orientation, or gender identity.            Thank you!     Thank you for choosing [facility]  for your care. Our goal is always to provide you with excellent care. Hearing back from our patients is one way we can continue to improve our services. Please take a few minutes to complete the written survey that you may receive in the mail after your visit with us. Thank you!             Your Updated Medication List - Protect others around you: Learn how to safely use, store and throw away your medicines at www.disposemymeds.org.           This list is accurate as of 8/6/18  9:41 AM.  Always use your most recent med list.                   Brand Name Dispense Instructions for use Diagnosis    ASPIRIN NOT PRESCRIBED    INTENTIONAL    0 each    Please choose reason not prescribed, below    Type 2 diabetes mellitus with hyperglycemia, without long-term current use of insulin (H)       atorvastatin 10 MG tablet    LIPITOR    90 tablet    Take 1 tablet (10 mg) by mouth daily    Hyperlipidemia LDL goal <100       blood glucose monitoring lancets     102 each    1 each daily    Type 2 diabetes mellitus with hyperglycemia, without long-term current use of insulin (H)       blood glucose monitoring test strip    no brand specified    100 strip    Use to test blood sugar 2 times daily or as directed. Accu-chek Guide test strips.    Type 2 diabetes mellitus with hyperglycemia, without long-term current use of insulin (H)       metFORMIN 500 MG 24 hr tablet    GLUCOPHAGE-XR    90 tablet    Take 1 tablet (500 mg) by mouth daily (with dinner)    Type 2 diabetes mellitus with hyperglycemia, without long-term current use of insulin (H)

## 2018-08-11 ENCOUNTER — OFFICE VISIT (OUTPATIENT)
Dept: URGENT CARE | Facility: URGENT CARE | Age: 38
End: 2018-08-11
Payer: COMMERCIAL

## 2018-08-11 VITALS
DIASTOLIC BLOOD PRESSURE: 92 MMHG | SYSTOLIC BLOOD PRESSURE: 140 MMHG | OXYGEN SATURATION: 97 % | TEMPERATURE: 98.2 F | WEIGHT: 315 LBS | BODY MASS INDEX: 55.94 KG/M2 | HEART RATE: 85 BPM

## 2018-08-11 DIAGNOSIS — H65.192 ACUTE MUCOID OTITIS MEDIA OF LEFT EAR: Primary | ICD-10-CM

## 2018-08-11 PROCEDURE — 99213 OFFICE O/P EST LOW 20 MIN: CPT | Performed by: PHYSICIAN ASSISTANT

## 2018-08-11 NOTE — PROGRESS NOTES
SUBJECTIVE:  Jeffry Younger is a 38 year old male who presents with left ear pain, fullness and pressure for 1 week(s).   Severity: moderate   Timing:gradual onset  Additional symptoms include none.      History of recurrent otitis: no    Past Medical History:   Diagnosis Date     Obesity      Current Outpatient Prescriptions   Medication Sig Dispense Refill     amoxicillin-clavulanate (AUGMENTIN) 875-125 MG per tablet Take 1 tablet by mouth 2 times daily 20 tablet 0     atorvastatin (LIPITOR) 10 MG tablet Take 1 tablet (10 mg) by mouth daily 90 tablet 3     metFORMIN (GLUCOPHAGE-XR) 500 MG 24 hr tablet Take 1 tablet (500 mg) by mouth daily (with dinner) 90 tablet 1     blood glucose monitoring (ACCU-CHEK FASTCLIX) lancets 1 each daily (Patient not taking: Reported on 8/11/2018) 102 each 3     blood glucose monitoring (NO BRAND SPECIFIED) test strip Use to test blood sugar 2 times daily or as directed. Accu-chek Guide test strips. (Patient not taking: Reported on 8/11/2018) 100 strip 6     Social History   Substance Use Topics     Smoking status: Never Smoker     Smokeless tobacco: Never Used     Alcohol use No       ROS:   Review of systems negative except as stated above.    OBJECTIVE:  BP (!) 140/92  Pulse 85  Temp 98.2  F (36.8  C) (Tympanic)  Wt (!) 424 lb (192.3 kg)  SpO2 97%  BMI 55.94 kg/m2   EXAM:  The right TM is normal: no effusions, no erythema, and normal landmarks     The right auditory canal is normal and without drainage, edema or erythema  The left TM is dull with mucoid effusion   The left auditory canal is normal and without drainage, edema or erythema  Oropharynx exam is normal: no lesions, erythema, adenopathy or exudate.  GENERAL: no acute distress  EYES: EOMI,  PERRL, conjunctiva clear  NECK: supple, non-tender to palpation, no adenopathy noted  RESP: lungs clear to auscultation - no rales, rhonchi or wheezes  CV: regular rates and rhythm, normal S1 S2, no murmur noted  SKIN: no suspicious  lesions or rashes     assessment/plan:  (H65.112) Acute mucoid otitis media of left ear  (primary encounter diagnosis)  Comment:   Plan: amoxicillin-clavulanate (AUGMENTIN) 875-125 MG         per tablet         Med as directed and OTC med for sx relief.  Follow-up with PCP as needed

## 2018-08-11 NOTE — MR AVS SNAPSHOT
After Visit Summary   8/11/2018    Jeffry Younger    MRN: [id number]           Patient Information     Date Of Birth          1980        Visit Information        Provider Department      8/11/2018 3:40 PM Catalina Garcia PA-C [facility]        Today's Diagnoses     Acute mucoid otitis media of left ear    -  1       Follow-ups after your visit        Your next 10 appointments already scheduled     Sep 25, 2018 10:30 AM CDT   Diabetic Education with EA DIABETIC ED RESOURCE   [facility] [place] ([facility])    [address]  [address]  [facility] [address]   [phone]            Mar 13, 2019  2:00 PM CDT   Return Sleep Patient with Montana James MD   [facility] ([facility])    [address]  [facility] [address] [phone]              Who to contact     If you have questions or need follow up information about today's clinic visit or your schedule please contact [facility] directly at [phone].  Normal or non-critical lab and imaging results will be communicated to you by Yasuuhart, letter or phone within 4 business days after the clinic has received the results. If you do not hear from us within 7 days, please contact the clinic through [employer] or phone. If you have a critical or abnormal lab result, we will notify you by phone as soon as possible.  Submit refill requests through [email or web address] or call your pharmacy and they will forward the refill request to us. Please allow 3 business days for your refill to be completed.          Additional Information About Your Visit        Yasuuhart Information     [email or web address] gives you secure access to your electronic health record. If you see a primary care provider, you can also send messages to your care team and make appointments. If you have questions, please call your primary care clinic.  If you do  not have a primary care provider, please call [phone] and they will assist you.        Care EveryWhere ID     This is your Care EveryWhere ID. This could be used by other organizations to access your [place] medical records  [id number]        Your Vitals Were     Pulse Temperature Pulse Oximetry BMI (Body Mass Index)          85 98.2  F (36.8  C) (Tympanic) 97% 55.94 kg/m2         Blood Pressure from Last 3 Encounters:   08/11/18 (!) 140/92   08/06/18 132/80   06/26/18 132/86    Weight from Last 3 Encounters:   08/11/18 (!) 424 lb (192.3 kg)   08/06/18 (!) 419 lb (190.1 kg)   06/26/18 (!) 428 lb (194.1 kg)              Today, you had the following     No orders found for display         Today's Medication Changes          These changes are accurate as of 8/11/18  6:26 PM.  If you have any questions, ask your nurse or doctor.               Start taking these medicines.        Dose/Directions    amoxicillin-clavulanate 875-125 MG per tablet   Commonly known as:  AUGMENTIN   Used for:  Acute mucoid otitis media of left ear   Started by:  Catalina Garcia PA-C        Dose:  1 tablet   Take 1 tablet by mouth 2 times daily   Quantity:  20 tablet   Refills:  0            Where to get your medicines      These medications were sent to [facility] [address] [address] [address]  [address] [address]     Phone:  [phone]     amoxicillin-clavulanate 875-125 MG per tablet                Primary Care Provider Office Phone # Fax #    Russel Hsieh [phone] [address] [place] [address]        Equal Access to Services     [facility] AH: Tammi Villasenor, ezra anna, qaybta kaalmanan crenshaw, annette loredo. So [facility] [phone].    ATENCIÓN: Si habla español, tiene a schwartz disposición servicios gratuitos de asistencia lingüística. Llame al  [phone].    We comply with applicable federal civil rights laws and [place] laws. We do not discriminate on the basis of race, color, national origin, age, disability, sex, sexual orientation, or gender identity.            Thank you!     Thank you for choosing [facility]  for your care. Our goal is always to provide you with excellent care. Hearing back from our patients is one way we can continue to improve our services. Please take a few minutes to complete the written survey that you may receive in the mail after your visit with us. Thank you!             Your Updated Medication List - Protect others around you: Learn how to safely use, store and throw away your medicines at www.disposemymeds.org.          This list is accurate as of 8/11/18  6:26 PM.  Always use your most recent med list.                   Brand Name Dispense Instructions for use Diagnosis    amoxicillin-clavulanate 875-125 MG per tablet    AUGMENTIN    20 tablet    Take 1 tablet by mouth 2 times daily    Acute mucoid otitis media of left ear       atorvastatin 10 MG tablet    LIPITOR    90 tablet    Take 1 tablet (10 mg) by mouth daily    Hyperlipidemia LDL goal <100       blood glucose monitoring lancets     102 each    1 each daily    Type 2 diabetes mellitus with hyperglycemia, without long-term current use of insulin (H)       blood glucose monitoring test strip    no brand specified    100 strip    Use to test blood sugar 2 times daily or as directed. Accu-chek Guide test strips.    Type 2 diabetes mellitus with hyperglycemia, without long-term current use of insulin (H)       metFORMIN 500 MG 24 hr tablet    GLUCOPHAGE-XR    90 tablet    Take 1 tablet (500 mg) by mouth daily (with dinner)    Type 2 diabetes mellitus with hyperglycemia, without long-term current use of insulin (H)

## 2018-09-06 ENCOUNTER — MYC MEDICAL ADVICE (OUTPATIENT)
Dept: PEDIATRICS | Facility: CLINIC | Age: 38
End: 2018-09-06

## 2018-09-06 NOTE — TELEPHONE ENCOUNTER
Recommended OV for recheck of ears. See below.     Mary Palacios RN -- [facility]

## 2018-09-07 ENCOUNTER — OFFICE VISIT (OUTPATIENT)
Dept: PEDIATRICS | Facility: CLINIC | Age: 38
End: 2018-09-07
Payer: COMMERCIAL

## 2018-09-07 VITALS
HEIGHT: 73 IN | HEART RATE: 81 BPM | OXYGEN SATURATION: 97 % | DIASTOLIC BLOOD PRESSURE: 82 MMHG | TEMPERATURE: 98.1 F | SYSTOLIC BLOOD PRESSURE: 132 MMHG | WEIGHT: 315 LBS | BODY MASS INDEX: 41.75 KG/M2

## 2018-09-07 DIAGNOSIS — E11.65 TYPE 2 DIABETES MELLITUS WITH HYPERGLYCEMIA, WITHOUT LONG-TERM CURRENT USE OF INSULIN (H): ICD-10-CM

## 2018-09-07 DIAGNOSIS — Z23 NEED FOR 23-POLYVALENT PNEUMOCOCCAL POLYSACCHARIDE VACCINE: ICD-10-CM

## 2018-09-07 DIAGNOSIS — H60.392 INFECTIVE OTITIS EXTERNA, LEFT: Primary | ICD-10-CM

## 2018-09-07 DIAGNOSIS — Z23 NEED FOR PROPHYLACTIC VACCINATION AND INOCULATION AGAINST INFLUENZA: ICD-10-CM

## 2018-09-07 PROCEDURE — 90472 IMMUNIZATION ADMIN EACH ADD: CPT | Performed by: INTERNAL MEDICINE

## 2018-09-07 PROCEDURE — 90732 PPSV23 VACC 2 YRS+ SUBQ/IM: CPT | Performed by: INTERNAL MEDICINE

## 2018-09-07 PROCEDURE — 90471 IMMUNIZATION ADMIN: CPT | Performed by: INTERNAL MEDICINE

## 2018-09-07 PROCEDURE — 99214 OFFICE O/P EST MOD 30 MIN: CPT | Mod: 25 | Performed by: INTERNAL MEDICINE

## 2018-09-07 PROCEDURE — 90686 IIV4 VACC NO PRSV 0.5 ML IM: CPT | Performed by: INTERNAL MEDICINE

## 2018-09-07 RX ORDER — CIPROFLOXACIN 500 MG/1
500 TABLET, FILM COATED ORAL 2 TIMES DAILY
Qty: 20 TABLET | Refills: 0 | Status: SHIPPED | OUTPATIENT
Start: 2018-09-07 | End: 2018-09-17

## 2018-09-07 NOTE — MR AVS SNAPSHOT
After Visit Summary   9/7/2018    Jeffry Younger    MRN: [id number]           Patient Information     Date Of Birth          1980        Visit Information        Provider Department      9/7/2018 7:10 AM Cam Hernández MD [facility]        Today's Diagnoses     Infective otitis externa, left    -  1    Need for prophylactic vaccination and inoculation against influenza        Need for 23-polyvalent pneumococcal polysaccharide vaccine          Care Instructions    Nice to meet you!    Otitis externa - treating with oral antibiotics and ear drops.     Come back in 10 days to have ear rechecked.     Call sooner if not improving, fevers, etc          Follow-ups after your visit        Follow-up notes from your care team     Return in about 10 days (around 9/17/2018).      Your next 10 appointments already scheduled     Sep 17, 2018 10:10 AM CDT   [facility] with Cam [facility]dez MD   [facility]    [address]  [address]  [facility] [address] [phone]            Mar 13, 2019  2:00 PM CDT   Return Sleep Patient with Montana James MD   [facility] ([facility])    [address]  [facility] [address] [phone]              Who to contact     If you have questions or need follow up information about today's clinic visit or your schedule please contact [facility] directly at [phone].  Normal or non-critical lab and imaging results will be communicated to you by MyChart, letter or phone within 4 business days after the clinic has received the results. If you do not hear from us within 7 days, please contact the clinic through MyChart or phone. If you have a critical or abnormal lab result, we will notify you by phone as soon as possible.  Submit refill requests through Rentabilities or call your pharmacy and they will forward the  "refill request to us. Please allow 3 business days for your refill to be completed.          Additional Information About Your Visit        [employer] Information     [facility] gives you secure access to your electronic health record. If you see a primary care provider, you can also send messages to your care team and make appointments. If you have questions, please call your primary care clinic.  If you do not have a primary care provider, please call [phone] and they will assist you.        Care EveryWhere ID     This is your Care EveryWhere ID. This could be used by other organizations to access your [place] medical records  [id number]        Your Vitals Were     Pulse Temperature Height Pulse Oximetry BMI (Body Mass Index)       81 98.1  F (36.7  C) (Oral) 6' 1\" (1.854 m) 97% 55.86 kg/m2        Blood Pressure from Last 3 Encounters:   09/07/18 132/82   08/11/18 (!) 140/92   08/06/18 132/80    Weight from Last 3 Encounters:   09/07/18 (!) 423 lb 6.4 oz (192.1 kg)   08/11/18 (!) 424 lb (192.3 kg)   08/06/18 (!) 419 lb (190.1 kg)              We Performed the Following     FLU VACCINE, SPLIT VIRUS, IM (QUADRIVALENT) [16241]- >3 YRS     PPSV23, IM/SUBQ (2+ YRS) - [id number]     VACCINE ADMINISTRATION, EACH ADDITIONAL     Vaccine Administration, Initial [11135]          Today's Medication Changes          These changes are accurate as of 9/7/18  7:50 AM.  If you have any questions, ask your nurse or doctor.               Start taking these medicines.        Dose/Directions    ciprofloxacin 500 MG tablet   Commonly known as:  CIPRO   Used for:  Infective otitis externa, left   Started by:  Cam Hernández MD        Dose:  500 mg   Take 1 tablet (500 mg) by mouth 2 times daily   Quantity:  20 tablet   Refills:  0       ciprofloxacin-hydrocortisone otic suspension   Commonly known as:  CIPRO HC   Used for:  Infective otitis externa, left   Started by:  Cam Hernández MD        Dose:  3 drop "   Place 3 drops Into the left ear 2 times daily for 7 days   Quantity:  10 mL   Refills:  0            Where to get your medicines      These medications were sent to [facility] - [place], [place] - [address]  [address], [facility] [address]     Phone:  [phone]     ciprofloxacin 500 MG tablet    ciprofloxacin-hydrocortisone otic suspension                Primary Care Provider Office Phone # Fax #    Russel Hsieh [phone] [address] [place] [address]        Equal Access to Services     [facility]: Hadii aad ku hadasho Soomaali, waaxda luqadaha, qaybta kaalmada adeegyada, annette patricia hayjyoti weldon . So [place] [phone].    ATENCIÓN: Si habla español, tiene a schwartz disposición servicios gratuitos de asistencia lingüística. [facility] [phone].    We comply with applicable federal civil rights laws and [place] laws. We do not discriminate on the basis of race, color, national origin, age, disability, sex, sexual orientation, or gender identity.            Thank you!     Thank you for choosing [facility]  for your care. Our goal is always to provide you with excellent care. Hearing back from our patients is one way we can continue to improve our services. Please take a few minutes to complete the written survey that you may receive in the mail after your visit with us. Thank you!             Your Updated Medication List - Protect others around you: Learn how to safely use, store and throw away your medicines at www.disposemymeds.org.          This list is accurate as of 9/7/18  7:50 AM.  Always use your most recent med list.                   Brand Name Dispense Instructions for use Diagnosis    atorvastatin 10 MG tablet    LIPITOR    90 tablet    Take 1 tablet (10 mg) by mouth daily    Hyperlipidemia LDL goal <100       blood glucose monitoring lancets     102 each    1  each daily    Type 2 diabetes mellitus with hyperglycemia, without long-term current use of insulin (H)       blood glucose monitoring test strip    no brand specified    100 strip    Use to test blood sugar 2 times daily or as directed. Accu-chek Guide test strips.    Type 2 diabetes mellitus with hyperglycemia, without long-term current use of insulin (H)       ciprofloxacin 500 MG tablet    CIPRO    20 tablet    Take 1 tablet (500 mg) by mouth 2 times daily    Infective otitis externa, left       ciprofloxacin-hydrocortisone otic suspension    CIPRO HC    10 mL    Place 3 drops Into the left ear 2 times daily for 7 days    Infective otitis externa, left       metFORMIN 500 MG 24 hr tablet    GLUCOPHAGE-XR    90 tablet    Take 1 tablet (500 mg) by mouth daily (with dinner)    Type 2 diabetes mellitus with hyperglycemia, without long-term current use of insulin (H)

## 2018-09-07 NOTE — PROGRESS NOTES

## 2018-09-07 NOTE — PROGRESS NOTES
SUBJECTIVE:   Jeffry Younger is a 38 year old male who presents to clinic today for the following health issues:    Ear Pain    Duration: started end of July    Description (location/character/radiation): Mostly Left ear, hard to hear, feel heavy    Intensity:  mild, 2/10    Accompanying signs and symptoms: none    History (similar episodes/previous evaluation): yes, evaluated in  8/11 and was given antibiotics, things got better for a little bit    Therapies tried and outcome: one round of antibiotics      Diabetic    Left ear pain started at the end of July after going fishing.   Was evaluated in Urgent Care on 8/11 - acute mucoid otitis media -> Augmentin.    Things initially got better but then worse.     Has had some discharge out of the left ear.     No uri symptoms. No headaches. No neuro symptoms.     Problem list and histories reviewed & adjusted, as indicated.  Additional history: as documented    Patient Active Problem List   Diagnosis     Morbid obesity with BMI of 50.0-59.9, adult (H)     PAOLA (obstructive sleep apnea)     Type 2 diabetes mellitus with hyperglycemia, without long-term current use of insulin (H)     Hyperlipidemia LDL goal <100     ADAMA (generalized anxiety disorder)     Past Surgical History:   Procedure Laterality Date     AS ESOPHAGOSCOPY, DIAGNOSTIC      EGD       Social History   Substance Use Topics     Smoking status: Never Smoker     Smokeless tobacco: Never Used     Alcohol use No     Family History   Problem Relation Age of Onset     Diabetes Father      Hypertension Father      Colon Cancer No family hx of      Prostate Cancer No family hx of      Cerebrovascular Disease No family hx of      Coronary Artery Disease No family hx of          Current Outpatient Prescriptions   Medication Sig Dispense Refill     atorvastatin (LIPITOR) 10 MG tablet Take 1 tablet (10 mg) by mouth daily 90 tablet 3     blood glucose monitoring (ACCU-CHEK FASTCLIX) lancets 1 each daily 102 each 3      "blood glucose monitoring (NO BRAND SPECIFIED) test strip Use to test blood sugar 2 times daily or as directed. Accu-chek Guide test strips. 100 strip 6     ciprofloxacin (CIPRO) 500 MG tablet Take 1 tablet (500 mg) by mouth 2 times daily 20 tablet 0     ciprofloxacin-hydrocortisone (CIPRO HC) otic suspension Place 3 drops Into the left ear 2 times daily for 7 days 10 mL 0     metFORMIN (GLUCOPHAGE-XR) 500 MG 24 hr tablet Take 1 tablet (500 mg) by mouth daily (with dinner) 90 tablet 1     No Known Allergies    Reviewed and updated as needed this visit by clinical staff  Tobacco  Allergies  Meds  Med Hx  Surg Hx  Fam Hx  Soc Hx      Reviewed and updated as needed this visit by Provider         ROS:  Constitutional, HEENT, cardiovascular, pulmonary, gi and gu systems are negative, except as otherwise noted.    OBJECTIVE:     /82 (BP Location: Right arm, Patient Position: Sitting, Cuff Size: Adult Large)  Pulse 81  Temp 98.1  F (36.7  C) (Oral)  Ht 6' 1\" (1.854 m)  Wt (!) 423 lb 6.4 oz (192.1 kg)  SpO2 97%  BMI 55.86 kg/m2  Body mass index is 55.86 kg/(m^2).  GENERAL: healthy, alert and no distress  EYES: Eyes grossly normal to inspection, PERRL and conjunctivae and sclerae normal  HENT: right ear canals and TM normal, left ear canal with purulent discharge, TM is intact but dull with an effusion, nose and mouth without ulcers or lesions, no mastoid tednerness  NECK: no adenopathy, no asymmetry, masses, or scars and thyroid normal to palpation  RESP: lungs clear to auscultation - no rales, rhonchi or wheezes  CV: regular rate and rhythm, normal S1 S2, no S3 or S4, no murmur, click or rub, no peripheral edema and peripheral pulses strong  NEURO: Normal strength and tone, mentation intact and speech normal, EOMI    Diagnostic Test Results:  none     ASSESSMENT/PLAN:       ICD-10-CM    1. Infective otitis externa, left H60.392 ciprofloxacin (CIPRO) 500 MG tablet     ciprofloxacin-hydrocortisone (CIPRO HC) " otic suspension   2. Need for prophylactic vaccination and inoculation against influenza Z23 FLU VACCINE, SPLIT VIRUS, IM (QUADRIVALENT) [95695]- >3 YRS     Vaccine Administration, Initial [42662]   3. Need for 23-polyvalent pneumococcal polysaccharide vaccine Z23 PPSV23, IM/SUBQ (2+ YRS) - [id number]     VACCINE ADMINISTRATION, EACH ADDITIONAL   4. Type 2 diabetes mellitus with hyperglycemia, without long-term current use of insulin (H) E11.65      Today with otitis externa, dull TM. Recently completed treatment with Augmentin. Will treat with both oral and topical antibiotics given DM. Recheck scheduled & anticipatory guidance given - if treatment failure may need eval by ENT.    Patient Instructions   Nice to meet you!    Otitis externa - treating with oral antibiotics and ear drops.     Come back in 10 days to have ear rechecked.     Call sooner if not improving, fevers, etc      Cam Hernández MD  [facility]

## 2018-09-07 NOTE — PATIENT INSTRUCTIONS
Nice to meet you!    Otitis externa - treating with oral antibiotics and ear drops.     Come back in 10 days to have ear rechecked.     Call sooner if not improving, fevers, etc

## 2018-09-17 ENCOUNTER — OFFICE VISIT (OUTPATIENT)
Dept: PEDIATRICS | Facility: CLINIC | Age: 38
End: 2018-09-17
Payer: COMMERCIAL

## 2018-09-17 VITALS
OXYGEN SATURATION: 95 % | HEIGHT: 73 IN | TEMPERATURE: 98.4 F | BODY MASS INDEX: 41.75 KG/M2 | WEIGHT: 315 LBS | HEART RATE: 87 BPM | DIASTOLIC BLOOD PRESSURE: 80 MMHG | SYSTOLIC BLOOD PRESSURE: 128 MMHG

## 2018-09-17 DIAGNOSIS — H60.392 INFECTIVE OTITIS EXTERNA, LEFT: Primary | ICD-10-CM

## 2018-09-17 DIAGNOSIS — E11.65 TYPE 2 DIABETES MELLITUS WITH HYPERGLYCEMIA, WITHOUT LONG-TERM CURRENT USE OF INSULIN (H): ICD-10-CM

## 2018-09-17 PROCEDURE — 99213 OFFICE O/P EST LOW 20 MIN: CPT | Performed by: INTERNAL MEDICINE

## 2018-09-17 NOTE — MR AVS SNAPSHOT
After Visit Summary   9/17/2018    Jeffry Younger    MRN: [id number]           Patient Information     Date Of Birth          1980        Visit Information        Provider Department      9/17/2018 10:10 AM Cam Hernández MD [facility]        Today's Diagnoses     Infective otitis externa, left    -  1    Type 2 diabetes mellitus with hyperglycemia, without long-term current use of insulin (H)          Care Instructions    Nice to see you today!    Ear looks better but not fully gone - keep doing the drops until you see ENT.   ENT referral - you have to call them to schedule.    Call if headaches, fevers, drainage, ear pain, etc.    Follow up with Dr. Hsieh in November for diabetes.          Follow-ups after your visit        Additional Services     OTOLARYNGOLOGY REFERRAL       Your provider has referred you to: N: [facility] - [place] [phone]   [email or web address]  N: [facility] [place] [phone]   [email or web address]  [place] [phone]   [email or web address]  [place] [phone]   [email or web address]    Please be aware that coverage of these services is subject to the terms and limitations of your health insurance plan.  Call member services at your health plan with any benefit or coverage questions.      Please bring the following with you to your appointment:    (1) Any X-Rays, CTs or MRIs which have been performed.  Contact the facility where they were done to arrange for  prior to your scheduled appointment.   (2) List of current medications  (3) This referral request   (4) Any documents/labs given to you for this referral                  Follow-up notes from your care team     Return in about 2 months (around 11/17/2018) for Diabetes with Dr. Hsieh.      Your next 10 appointments already scheduled     Mar 13, 2019  2:00 PM CDT   Return Sleep Patient with [place]  "TOOTIE James MD   [facility] ([facility])    [address]  [place] [address] [phone]              Who to contact     If you have questions or need follow up information about today's clinic visit or your schedule please contact [facility] directly at [phone].  Normal or non-critical lab and imaging results will be communicated to you by MyChart, letter or phone within 4 business days after the clinic has received the results. If you do not hear from us within 7 days, please contact the clinic through [employer] or phone. If you have a critical or abnormal lab result, we will notify you by phone as soon as possible.  Submit refill requests through [employer] or call your pharmacy and they will forward the refill request to us. Please allow 3 business days for your refill to be completed.          Additional Information About Your Visit        [employer] Information     [employer] gives you secure access to your electronic health record. If you see a primary care provider, you can also send messages to your care team and make appointments. If you have questions, please call your primary care clinic.  If you do not have a primary care provider, please call [phone] and they will assist you.        Care EveryWhere ID     This is your Care EveryWhere ID. This could be used by other organizations to access your [facility] medical records  [id number]        Your Vitals Were     Pulse Temperature Height Pulse Oximetry BMI (Body Mass Index)       87 98.4  F (36.9  C) (Oral) 6' 1\" (1.854 m) 95% 55.87 kg/m2        Blood Pressure from Last 3 Encounters:   09/17/18 128/80   09/07/18 132/82   08/11/18 (!) 140/92    Weight from Last 3 Encounters:   09/17/18 (!) 423 lb 8.1 oz (192.1 kg)   09/07/18 (!) 423 lb 6.4 oz (192.1 kg)   08/11/18 (!) 424 lb (192.3 kg)              We Performed the Following     OTOLARYNGOLOGY REFERRAL        Primary Care " Provider Office Phone # Fax #    Russel Hsieh [phone] [address] [place] [address]        Equal Access to Services     OLIVIAJOSE JANINE : Hadshruthi nidia woods cherylo Sonilo, wakristineda luqadaha, qaybta kaalmada flower, annette mariano lasumanthjarrett loredo. So [facility] [phone].    ATENCIÓN: Si habla español, tiene a schwartz disposición servicios gratuitos de asistencia lingüística. Llame al [phone].    We comply with applicable federal civil rights laws and [place] laws. We do not discriminate on the basis of race, color, national origin, age, disability, sex, sexual orientation, or gender identity.            Thank you!     Thank you for choosing [facility]  for your care. Our goal is always to provide you with excellent care. Hearing back from our patients is one way we can continue to improve our services. Please take a few minutes to complete the written survey that you may receive in the mail after your visit with us. Thank you!             Your Updated Medication List - Protect others around you: Learn how to safely use, store and throw away your medicines at www.disposemymeds.org.          This list is accurate as of 9/17/18 10:30 AM.  Always use your most recent med list.                   Brand Name Dispense Instructions for use Diagnosis    atorvastatin 10 MG tablet    LIPITOR    90 tablet    Take 1 tablet (10 mg) by mouth daily    Hyperlipidemia LDL goal <100       blood glucose monitoring lancets     102 each    1 each daily    Type 2 diabetes mellitus with hyperglycemia, without long-term current use of insulin (H)       blood glucose monitoring test strip    no brand specified    100 strip    Use to test blood sugar 2 times daily or as directed. Accu-chek Guide test strips.    Type 2 diabetes mellitus with hyperglycemia, without long-term current use of insulin (H)       metFORMIN 500 MG 24 hr tablet    GLUCOPHAGE-XR    90 tablet    Take 1  tablet (500 mg) by mouth daily (with dinner)    Type 2 diabetes mellitus with hyperglycemia, without long-term current use of insulin (H)

## 2018-09-17 NOTE — PATIENT INSTRUCTIONS
Nice to see you today!    Ear looks better but not fully gone - keep doing the drops until you see ENT.   ENT referral - you have to call them to schedule.    Call if headaches, fevers, drainage, ear pain, etc.    Follow up with Dr. Hsieh in November for diabetes.

## 2018-09-17 NOTE — PROGRESS NOTES
SUBJECTIVE:   Jeffry Younger is a 38 year old male who presents to clinic today for the following health issues:    Patient is here for follow up on Left ear. Patient reports that everything is better and there are no problems.     -----    Completed abx, ear feels better, no drainage, no fevers.     Problem list and histories reviewed & adjusted, as indicated.  Additional history: as documented    Patient Active Problem List   Diagnosis     Morbid obesity with BMI of 50.0-59.9, adult (H)     PAOLA (obstructive sleep apnea)     Type 2 diabetes mellitus with hyperglycemia, without long-term current use of insulin (H)     Hyperlipidemia LDL goal <100     ADAMA (generalized anxiety disorder)     Past Surgical History:   Procedure Laterality Date     AS ESOPHAGOSCOPY, DIAGNOSTIC      EGD       Social History   Substance Use Topics     Smoking status: Never Smoker     Smokeless tobacco: Never Used     Alcohol use No     Family History   Problem Relation Age of Onset     Diabetes Father      Hypertension Father      Colon Cancer No family hx of      Prostate Cancer No family hx of      Cerebrovascular Disease No family hx of      Coronary Artery Disease No family hx of          Current Outpatient Prescriptions   Medication Sig Dispense Refill     atorvastatin (LIPITOR) 10 MG tablet Take 1 tablet (10 mg) by mouth daily 90 tablet 3     blood glucose monitoring (ACCU-CHEK FASTCLIX) lancets 1 each daily 102 each 3     blood glucose monitoring (NO BRAND SPECIFIED) test strip Use to test blood sugar 2 times daily or as directed. Accu-chek Guide test strips. 100 strip 6     metFORMIN (GLUCOPHAGE-XR) 500 MG 24 hr tablet Take 1 tablet (500 mg) by mouth daily (with dinner) 90 tablet 1     No Known Allergies    Reviewed and updated as needed this visit by clinical staff       Reviewed and updated as needed this visit by Provider         ROS:  Constitutional, HEENT, cardiovascular, pulmonary, gi and gu systems are negative, except as  "otherwise noted.    OBJECTIVE:     /80  Pulse 87  Temp 98.4  F (36.9  C) (Oral)  Ht 6' 1\" (1.854 m)  Wt (!) 423 lb 8.1 oz (192.1 kg)  SpO2 95%  BMI 55.87 kg/m2  Body mass index is 55.87 kg/(m^2).  GENERAL: healthy, alert and no distress  HENT: right ear canal and TM normal, left ear canal with white adherent material, no debris, TM whitish color favor adherent material vs mucoid effusion. No mastoid tenderness. Nose and mouth without ulcers or lesions  NECK: no adenopathy, no asymmetry, masses, or scars and thyroid normal to palpation    Diagnostic Test Results:  none     ASSESSMENT/PLAN:       ICD-10-CM    1. Infective otitis externa, left H60.392 OTOLARYNGOLOGY REFERRAL   2. Type 2 diabetes mellitus with hyperglycemia, without long-term current use of insulin (H) E11.65      Persistent infective otitis externa vs otomycosis - s/p Augmentin, cipro po + topical. Improved from last visit - symptoms have resolved. No debris that I could send for [place]Y. Will have him continue cipro drops (has plenty at home) and follow up with ENT. Anticipatory guidance given - see PI.    Patient Instructions   Nice to see you today!    Ear looks better but not fully gone - keep doing the drops until you see ENT.   ENT referral - you have to call them to schedule.    Call if headaches, fevers, drainage, ear pain, etc.    Follow up with Dr. Hsieh in November for diabetes.    Cam Hernández MD  [facility]  "

## 2018-09-25 ENCOUNTER — TRANSFERRED RECORDS (OUTPATIENT)
Dept: HEALTH INFORMATION MANAGEMENT | Facility: CLINIC | Age: 38
End: 2018-09-25

## 2018-11-13 ENCOUNTER — ALLIED HEALTH/NURSE VISIT (OUTPATIENT)
Dept: EDUCATION SERVICES | Facility: CLINIC | Age: 38
End: 2018-11-13
Payer: COMMERCIAL

## 2018-11-13 DIAGNOSIS — E11.65 TYPE 2 DIABETES MELLITUS WITH HYPERGLYCEMIA, WITHOUT LONG-TERM CURRENT USE OF INSULIN (H): Primary | ICD-10-CM

## 2018-11-13 PROCEDURE — G0108 DIAB MANAGE TRN  PER INDIV: HCPCS

## 2018-11-13 NOTE — PATIENT INSTRUCTIONS
Work on avoiding regular soda    Consider joining gym- use treadmill 3-4x/week    Follow up with primary provider next month- consider increase in Metformin if blood sugars do not improve with further lifestyle changes    Jackie Santos RD, LD, CDE  Diabetes     Diabetes Support Resources:  Exercise: Local Fitness Centers: [employer], [employer], [employer], [employer], etc.     Bring blood glucose meter and logbook with you to all doctor and follow-up appointments.     [place] [facility] for the [facility] Area:  For Your Diabetes Education and Nutrition Appointments Call:  [phone]   For Diabetes Education or Nutrition Related Questions:   Phone: [phone]  E-mail: [email or web address]  Fax: [phone]   If you need a medication refill please contact your pharmacy. Please allow 3 business days for your refills to be completed.    Instructions for emailing the Diabetes Educators    If you need to communicate a non-urgent message to a Diabetes Educator via email, please send to [email or web address].    Please follow the following email guidelines:    Subject line: Secure: your clinic name (example: Secure: [facility])  In the email please include: First name, middle initial, last name and date of birth.    We will be in touch with you within one (1) business day.

## 2018-11-13 NOTE — PROGRESS NOTES
"Diabetes Self-Management Education & Support    Diabetes Education Self Management & Training    SUBJECTIVE/OBJECTIVE:  Diabetes education in the past 24mo: Yes  Diabetes type: Type 2  Disease course: Stable  Cultural Influences/Ethnic Background:  American    Diabetes Symptoms & Complications  Blurred vision: No  Fatigue: No  Foot ulcerations: No  Polydipsia: No  Polyphagia: No  Polyuria: No  Visual change: No  Weakness: No  Weight loss: No  Slow healing wounds: No  Weight trend: Stable  Autonomic neuropathy: No  CVA: No  Heart disease: No  Nephropathy: No  Peripheral neuropathy: No  Peripheral Vascular Disease: No  Retinopathy: No  Sexual dysfunction: No    Patient Problem List and Family Medical History reviewed for relevant medical history, current medical status, and diabetes risk factors.    Vitals:  There were no vitals taken for this visit.  Estimated body mass index is 55.87 kg/(m^2) as calculated from the following:    Height as of 9/17/18: 1.854 m (6' 1\").    Weight as of 9/17/18: 192.1 kg (423 lb 8.1 oz).   Last 3 BP:   BP Readings from Last 3 Encounters:   09/17/18 128/80   09/07/18 132/82   08/11/18 (!) 140/92       History   Smoking Status     Never Smoker   Smokeless Tobacco     Never Used       Labs:  Lab Results   Component Value Date    A1C 6.5 06/26/2018     Lab Results   Component Value Date     06/26/2018     Lab Results   Component Value Date    LDL 84 06/26/2018     HDL Cholesterol   Date Value Ref Range Status   06/26/2018 33 (L) >39 mg/dL Final   ]  GFR Estimate   Date Value Ref Range Status   06/26/2018 >90 >60 mL/min/1.7m2 Final     Comment:     Non  GFR Calc     GFR Estimate If Black   Date Value Ref Range Status   06/26/2018 >90 >60 mL/min/1.7m2 Final     Comment:      GFR Calc     Lab Results   Component Value Date    CR 0.93 06/26/2018     No results found for: MICROALBUMIN    Healthy Eating  Cultural/Buddhist diet restrictions?: No  Meal " planning: Avoiding sweets, Smaller portions  Meals include: Lunch, Dinner, Snacks  Snacks: trying to eat healthier snacks (avoiding chips)  Other: has been decreasing portions, eating more vegetables   biggest challenge is avoiding regular soda- currently drinking 2-3 cans per day  Beverages: Water, Soda  Has patient met with a dietitian in the past?: No    Being Active  How intense was your typical exercise? : Moderate (like brisk walking)- was walking dog 2x/day until recent cold weather  Barrier to exercise: Time, Access    Monitoring  Blood Glucose Meter: Accu-check  Home Glucose (Sugar) Monitorin-2 times per day  Blood glucose trend: Fluctuating minimally  Low Glucose Range (mg/dL): >200  High Glucose Range (mg/dL): 140-180  Overall Range (mg/dL): 130-140        Taking Medications  Diabetes Medication(s)     Biguanides Sig    metFORMIN (GLUCOPHAGE-XR) 500 MG 24 hr tablet Take 1 tablet (500 mg) by mouth daily (with dinner)        Current Treatments: Diet, Oral Agent (monotherapy)  Side effects: none    Problem Solving  Hypoglycemia Frequency: Rarely  Patient carries a carbohydrate source: No  Medical alert: No  Severe weather/disaster plan for diabetes management?: No  DKA prevention plan?: No  Sick day plan for diabetes management?: (P) No    Hypoglycemia symptoms  Confusion: No  Dizziness or Light-Headedness: No  Headaches: No  Hunger: No  Mood changes: No  Nervousness/Anxiety: No  Sleepiness: No  Speech difficulty: No  Sweats: No  Tremors: No    Hypoglycemia Complications  Blackouts: No  Hospitalization: No  Nocturnal hypoglycemia: No  Required assistance: No  Required glucagon injection: No  Seizures: No    Reducing Risks  CAD Risks: Obesity, Sedentary lifestyle, Stress  Has dilated eye exam at least once a year?: Yes  Sees dentist every 6 months?: Yes  Sees podiatrist (foot doctor)?: No    Healthy Coping  Informal Support system:: Family, Friends, Spouse  Difficulty affording diabetes management  "supplies?: No  Patient Activation Measure Survey Score:  No flowsheet data found.    ASSESSMENT:  Pt notes high blood sugar yesterday of 297 mg/dl after eating a [employer] fast food meal with regular soda, states \"I was curious and knew /i wasn't feeling well- I won't make that food choice again\".     BG values are above target. Patient may benefit from increased dose of Metformin- however, patient feels he can work harder on diet- and will plan to have A1c repeated next month along with PCP follow up to further discuss medication dosing as needed.    Goals Addressed as of 11/13/2018 at 9:15 AM        Patient Stated      Healthy Eating (pt-stated)     Added 11/13/18 by Jackie Santos RD           My Goal: I will work on avoiding regular soda    What I need to meet my goal: have calorie free/ sugar options that are enjoyable, keep regular soda out of the house    I plan to meet my goal by this date: 1 month (12/13/18)          Problem Solving (pt-stated)     Added 11/13/18 by Jackie Santos RD           My Goal: I will reduce blood sugars by increasing exercise, start with using treadmill 2-3x/week    What I need to meet my goal: prioritize time, obtain gym membership    I plan to meet my goal by this date: 1 month (12/13/18)              Patient's most recent   Lab Results   Component Value Date    A1C 6.5 06/26/2018    is meeting goal of <7.0    INTERVENTION:   Diabetes knowledge and skills assessment:     Patient is knowledgeable in diabetes management concepts related to: Healthy Eating, Being Active, Monitoring and Taking Medication    Patient needs further education on the following diabetes management concepts: Healthy Eating, Being Active, Monitoring, Taking Medication, Problem Solving, Reducing Risks and Healthy Coping    Based on learning assessment above, most appropriate setting for further diabetes education would be: Group class or Individual setting.    Education provided today on:  AADE Self-Care " Behaviors:  Healthy Eating: glycemic effect of high sugar beverages, discussed alternatives that patient currently keeps on hand  Being Active: discussed goal setting for increase in exercise  Monitoring: log and interpret results and individual blood glucose targets  Taking Medication: discussing Metformin dosing options  Problem Solving: high blood glucose - causes, signs/symptoms, treatment and prevention and when to call health care provider  Reducing Risks: prevention, early diagnostic measures and treatment of complications, annual eye exam, A1C - goals, relating to blood glucose levels, how often to check, lipids levels and goals and blood pressure and goals  Healthy Coping: benefits of making appropriate lifestyle changes and utilize support systems    Opportunities for ongoing education and support in diabetes-self management were discussed.    Pt verbalized understanding of concepts discussed and recommendations provided today.       Education Materials Provided:  No new materials provided today    PLAN:  See Patient Instructions for co-developed, patient-stated behavior change goals.  AVS printed and provided to patient today. See Follow-Up section for recommended follow-up.    Jackie Santos RD, CDE  Diabetes     Time Spent: 30 minutes  Encounter Type: Individual    Any diabetes medication dose changes were made via the CDE Protocol and Collaborative Practice Agreement with the patient's primary care provider. A copy of this encounter was shared with the provider.

## 2018-11-13 NOTE — MR AVS SNAPSHOT
After Visit Summary   11/13/2018    Jeffry Younger    MRN: [id number]           Patient Information     Date Of Birth          1980        Visit Information        Provider Department      11/13/2018 8:30 AM [facility] DIABETIC ED RESOURCE [facility] [place]        Today's Diagnoses     Type 2 diabetes mellitus with hyperglycemia, without long-term current use of insulin (H)    -  1      Care Instructions    Work on avoiding regular soda    Consider joining gym- use treadmill 3-4x/week    Follow up with primary provider next month- consider increase in Metformin if blood sugars do not improve with further lifestyle changes    Jackie Santos RD, LD, CDE  Diabetes     Diabetes Support Resources:  Exercise: Local Fitness Centers: [employer], [employer], [employer], [employer], etc.     Bring blood glucose meter and logbook with you to all doctor and follow-up appointments.     [facility]:  For Your Diabetes Education and Nutrition Appointments Call:  [phone]   For Diabetes Education or Nutrition Related Questions:   Phone: [phone]  E-mail: [email or web address]  Fax: [phone]   If you need a medication refill please contact your pharmacy. Please allow 3 business days for your refills to be completed.    Instructions for emailing the Diabetes Educators    If you need to communicate a non-urgent message to a Diabetes Educator via email, please send to [email or web address].    Please follow the following email guidelines:    Subject line: Secure: your clinic name (example: Secure: [facility])  In the email please include: First name, middle initial, last name and date of birth.    We will be in touch with you within one (1) business day.             Follow-ups after your visit        Your next 10 appointments already scheduled     Jan 22, 2019  8:30 AM [facility]   Diabetes Education with LU  DIABETIC ED RESOURCE   [facility] [place] ([facility])    [address]  [address]  [place] [id number] [phone]            Mar 13, 2019  2:00 PM CDT   Return Sleep Patient with Montana James MD   [facility] ([facility])    [address] [id number] [phone]              Who to contact     If you have questions or need follow up information about today's clinic visit or your schedule please contact [facility] [place] directly at [phone].  Normal or non-critical lab and imaging results will be communicated to you by Quiblyhart, letter or phone within 4 business days after the clinic has received the results. If you do not hear from us within 7 days, please contact the clinic through Quiblyhart or phone. If you have a critical or abnormal lab result, we will notify you by phone as soon as possible.  Submit refill requests through [employer] or call your pharmacy and they will forward the refill request to us. Please allow 3 business days for your refill to be completed.          Additional Information About Your Visit        QuiblyharTwillion Information     [employer] gives you secure access to your electronic health record. If you see a primary care provider, you can also send messages to your care team and make appointments. If you have questions, please call your primary care clinic.  If you do not have a primary care provider, please call [phone] and they will assist you.        Care EveryWhere ID     This is your Care EveryWhere ID. This could be used by other organizations to access your [facility] medical records  [id number]         Blood Pressure from Last 3 Encounters:   09/17/18 128/80   09/07/18 132/82   08/11/18 (!) 140/92    Weight from Last 3 Encounters:   09/17/18 (!) 192.1 kg (423 lb 8.1 oz)   09/07/18 (!) 192.1 kg (423 lb 6.4 oz)   08/11/18 (!) 192.3 kg (424 lb)               We Performed the Following     DIABETES EDUCATION - Individual  []        Primary Care Provider Office Phone # Fax #    Russel Hsieh [phone] [address] [place] [address]        Goals        General    Healthy Eating (pt-stated)     Notes - Note created  11/13/2018  9:13 AM by Jackie Santos RD    My Goal: I will try to avoid regular soda    What I need to meet my goal: have calorie free/ sugar options that are enjoyable, keep regular soda out of the house    I plan to meet my goal by this date: 1 month (12/13/18)        Problem Solving (pt-stated)     Notes - Note created  11/13/2018  9:15 AM by Jackie Santos RD    My Goal: I will reduce blood sugars by increasing exercise, start with using treadmill 2-3x/week    What I need to meet my goal: prioritize time, obtain gym membership    I plan to meet my goal by this date: 1 month (12/13/18)          Equal Access to Services     [facility]: Hadii niida woods hadasho Soomaali, waaxda luqadaha, qaybta kaalmada adeegyada, waxay belem weldon . So [facility] [phone].    ATENCIÓN: Si habla español, tiene a schwartz disposición servicios gratuitos de asistencia lingüística. Llame al [phone].    We comply with applicable federal civil rights laws and [place] laws. We do not discriminate on the basis of race, color, national origin, age, disability, sex, sexual orientation, or gender identity.            Thank you!     Thank you for choosing [facility] [place]  for your care. Our goal is always to provide you with excellent care. Hearing back from our patients is one way we can continue to improve our services. Please take a few minutes to complete the written survey that you may receive in the mail after your visit with us. Thank you!             Your Updated Medication List - Protect others around you: Learn how to safely use, store and throw away your medicines at  www.disposemymeds.org.          This list is accurate as of 11/13/18  9:25 AM.  Always use your most recent med list.                   Brand Name Dispense Instructions for use Diagnosis    atorvastatin 10 MG tablet    LIPITOR    90 tablet    Take 1 tablet (10 mg) by mouth daily    Hyperlipidemia LDL goal <100       blood glucose monitoring lancets     102 each    1 each daily    Type 2 diabetes mellitus with hyperglycemia, without long-term current use of insulin (H)       blood glucose monitoring test strip    no brand specified    100 strip    Use to test blood sugar 2 times daily or as directed. Accu-chek Guide test strips.    Type 2 diabetes mellitus with hyperglycemia, without long-term current use of insulin (H)       metFORMIN 500 MG 24 hr tablet    GLUCOPHAGE-XR    90 tablet    Take 1 tablet (500 mg) by mouth daily (with dinner)    Type 2 diabetes mellitus with hyperglycemia, without long-term current use of insulin (H)

## 2018-12-06 ENCOUNTER — MYC MEDICAL ADVICE (OUTPATIENT)
Dept: PEDIATRICS | Facility: CLINIC | Age: 38
End: 2018-12-06

## 2018-12-06 NOTE — TELEPHONE ENCOUNTER
Pt sent a [employer] message stating that his blood sugars have been running high.  Requesting recommendation on increasing his Metformin.  Pt has an appt with  on 12/31/18.    Please advise-    Cris Snowden RN

## 2018-12-31 ENCOUNTER — OFFICE VISIT (OUTPATIENT)
Dept: PEDIATRICS | Facility: CLINIC | Age: 38
End: 2018-12-31
Payer: COMMERCIAL

## 2018-12-31 VITALS
OXYGEN SATURATION: 98 % | DIASTOLIC BLOOD PRESSURE: 86 MMHG | WEIGHT: 315 LBS | BODY MASS INDEX: 55.28 KG/M2 | SYSTOLIC BLOOD PRESSURE: 138 MMHG | TEMPERATURE: 98.3 F | HEART RATE: 74 BPM

## 2018-12-31 DIAGNOSIS — E78.5 HYPERLIPIDEMIA LDL GOAL <100: ICD-10-CM

## 2018-12-31 DIAGNOSIS — E66.01 MORBID OBESITY WITH BMI OF 50.0-59.9, ADULT (H): ICD-10-CM

## 2018-12-31 DIAGNOSIS — E11.65 TYPE 2 DIABETES MELLITUS WITH HYPERGLYCEMIA, WITHOUT LONG-TERM CURRENT USE OF INSULIN (H): Primary | ICD-10-CM

## 2018-12-31 LAB — HBA1C MFR BLD: 8.1 % (ref 0–5.6)

## 2018-12-31 PROCEDURE — 36415 COLL VENOUS BLD VENIPUNCTURE: CPT | Performed by: INTERNAL MEDICINE

## 2018-12-31 PROCEDURE — 99214 OFFICE O/P EST MOD 30 MIN: CPT | Performed by: INTERNAL MEDICINE

## 2018-12-31 PROCEDURE — 82043 UR ALBUMIN QUANTITATIVE: CPT | Performed by: INTERNAL MEDICINE

## 2018-12-31 PROCEDURE — 83036 HEMOGLOBIN GLYCOSYLATED A1C: CPT | Performed by: INTERNAL MEDICINE

## 2018-12-31 PROCEDURE — 80053 COMPREHEN METABOLIC PANEL: CPT | Performed by: INTERNAL MEDICINE

## 2018-12-31 PROCEDURE — 80061 LIPID PANEL: CPT | Performed by: INTERNAL MEDICINE

## 2018-12-31 RX ORDER — METFORMIN HCL 500 MG
500 TABLET, EXTENDED RELEASE 24 HR ORAL 2 TIMES DAILY WITH MEALS
Qty: 180 TABLET | Refills: 1 | Status: SHIPPED | OUTPATIENT
Start: 2018-12-31 | End: 2019-06-11

## 2018-12-31 NOTE — PROGRESS NOTES
SUBJECTIVE:   Jeffry Younger is a 38 year old male who presents to clinic today for the following health issues:      Diabetes Follow-up    Patient is checking blood sugars: once daily.  Results are as follows:         am - 140-160 before breakfast, PM - 150-160   2hrs post prandial     recently increased metformin to 1000mg daily    Diabetic concerns: None     Symptoms of hypoglycemia (low blood sugar): none     Paresthesias (numbness or burning in feet) or sores: No     Date of last diabetic eye exam:     BP Readings from Last 2 Encounters:   12/31/18 138/86   09/17/18 128/80     Hemoglobin A1C (%)   Date Value       06/26/2018 6.5 (H)     LDL Cholesterol Calculated (mg/dL)   Date Value   06/26/2018 84   09/29/2015 87       Diabetes Management Resources    Amount of exercise or physical activity: None    Problems taking medications regularly: No    Medication side effects: none    Diet: low fat/cholesterol      Patient Active Problem List   Diagnosis     Morbid obesity with BMI of 50.0-59.9, adult (H)     PAOLA (obstructive sleep apnea)     Type 2 diabetes mellitus with hyperglycemia, without long-term current use of insulin (H)     Hyperlipidemia LDL goal <100     ADAMA (generalized anxiety disorder)     Past Surgical History:   Procedure Laterality Date     AS ESOPHAGOSCOPY, DIAGNOSTIC      EGD       Social History     Tobacco Use     Smoking status: Never Smoker     Smokeless tobacco: Never Used   Substance Use Topics     Alcohol use: No     Alcohol/week: 0.0 oz     Family History   Problem Relation Age of Onset     Diabetes Father      Hypertension Father      Colon Cancer No family hx of      Prostate Cancer No family hx of      Cerebrovascular Disease No family hx of      Coronary Artery Disease No family hx of          Current Outpatient Medications   Medication Sig Dispense Refill     atorvastatin (LIPITOR) 10 MG tablet Take 1 tablet (10 mg) by mouth daily 90 tablet 3     blood glucose monitoring (ACCU-CHEK  FASTCLIX) lancets 1 each daily 102 each 3     blood glucose monitoring (NO BRAND SPECIFIED) test strip Use to test blood sugar 2 times daily or as directed. Accu-chek Guide test strips. 100 strip 6     metFORMIN (GLUCOPHAGE-XR) 500 MG 24 hr tablet Take 1 tablet (500 mg) by mouth 2 times daily (with meals) 180 tablet 1       ROS: The following systems have been completely reviewed and are negative except as noted in the HPI: CONSTITUTIONAL, CARDIOVASCULAR, PULMONARY    OBJECTIVE:                                                    /86 (Cuff Size: Adult Large)   Pulse 74   Temp 98.3  F (36.8  C) (Oral)   Wt (!) 190.1 kg (419 lb)   SpO2 98%   BMI 55.28 kg/m   Body mass index is 55.28 kg/m .  GENERAL:  alert,  no distress  NECK: no tenderness, no adenopathy  RESP: lungs clear to auscultation - no rales, no rhonchi, no wheezes  CV: regular rates and rhythm, normal S1 S2, no S3 or S4 and no murmur, no click or rub  MS: extremities- no edema     ASSESSMENT/PLAN:                                                        ICD-10-CM    1. Type 2 diabetes mellitus with hyperglycemia, without long-term current use of insulin (H) E11.65 metFORMIN (GLUCOPHAGE-XR) 500 MG 24 hr tablet     Hemoglobin A1c     Albumin Random Urine Quantitative with Creat Ratio      Fasting morning sugar not at goal.  Some GI upset on 1000 mg metformin.  If A1c is significantly higher, discussed adding Victoza versus sulfonylurea.      Diabetes follow-up in 3 months.     2. Hyperlipidemia LDL goal <100 E78.5 Lipid panel reflex to direct LDL Fasting     Comprehensive metabolic panel     Well-controlled, continue atorvastatin     3. Morbid obesity with BMI of 50.0-59.9, adult (H) E66.01  patient is starting to make dietary changes but admits to 2 regular sodas daily-decreased from 5-6/day.  Patient continues to work on diet changes and increasing activity.    Z68.43       Russel Hsieh MD  [facility]

## 2018-12-31 NOTE — PATIENT INSTRUCTIONS
INSTRUCTIONS FOR TODAY:     goal in 3 months:  No regular soda, 10 lb weight loss.   goal blood pressure is less than 135/85 (or lower)     Dr Hsieh

## 2019-01-20 ENCOUNTER — TELEPHONE (OUTPATIENT)
Dept: PEDIATRICS | Facility: CLINIC | Age: 39
End: 2019-01-20

## 2019-01-20 DIAGNOSIS — E11.65 TYPE 2 DIABETES MELLITUS WITH HYPERGLYCEMIA, WITHOUT LONG-TERM CURRENT USE OF INSULIN (H): Primary | ICD-10-CM

## 2019-01-20 RX ORDER — LIRAGLUTIDE 6 MG/ML
INJECTION SUBCUTANEOUS
Qty: 18 ML | Refills: 1 | Status: SHIPPED | OUTPATIENT
Start: 2019-01-20 | End: 2020-01-29

## 2019-01-21 NOTE — TELEPHONE ENCOUNTER
Patient called back and is leaving to go out of town on 1/31.  Can he be seen before that?  There are no openings for 60 minutes.  Please call patient at [phone].  Ok to leave message.

## 2019-01-21 NOTE — TELEPHONE ENCOUNTER
Lab Results   Component Value Date    A1C 8.1 12/31/2018      recent lab results discussed with pt.  A1C has worsened.  Current rx: metformin 1000mg daily (does not tolerate higher dose due to GI sx)    Add Victoza.  Script sent  MTM consult regarding Victoza  Please schedule pt ASAP with MTM to learn injections    Pt will RTC for diabetes visit in 3 months

## 2019-01-21 NOTE — TELEPHONE ENCOUNTER
Called patient back and left voicemail. Patient needs to schedule a 60 minute MTM appointment before the 31st. Stalin Gamboa has openings with FRIT[facility] MOFFETTM if he is willing to drive to [place]. Try to schedule him with Stalin.    Laurence MCCLELLAND - TC/FD  1/21/2019 11:27 AM

## 2019-01-21 NOTE — TELEPHONE ENCOUNTER
Called and left voicemail for pt to call back. Needs to schedule an MTM appointment with Lizbeth Padilla.     Laurence MCCLELLAND - NEW/FD  1/21/2019 8:50 AM

## 2019-01-21 NOTE — TELEPHONE ENCOUNTER
Patient is scheduled for 1/24 on Thursday at 10am  Lizbeth Padilla, PharmD [facility]  Medication Therapy Management Practitioner   #[phone]

## 2019-01-24 ENCOUNTER — OFFICE VISIT (OUTPATIENT)
Dept: PHARMACY | Facility: CLINIC | Age: 39
End: 2019-01-24
Payer: COMMERCIAL

## 2019-01-24 VITALS — DIASTOLIC BLOOD PRESSURE: 87 MMHG | HEART RATE: 92 BPM | SYSTOLIC BLOOD PRESSURE: 150 MMHG

## 2019-01-24 DIAGNOSIS — E78.5 HYPERLIPIDEMIA LDL GOAL <100: ICD-10-CM

## 2019-01-24 DIAGNOSIS — E66.01 MORBID OBESITY WITH BMI OF 50.0-59.9, ADULT (H): ICD-10-CM

## 2019-01-24 DIAGNOSIS — R03.0 ELEVATED BLOOD PRESSURE READING WITHOUT DIAGNOSIS OF HYPERTENSION: ICD-10-CM

## 2019-01-24 DIAGNOSIS — E11.65 TYPE 2 DIABETES MELLITUS WITH HYPERGLYCEMIA, WITHOUT LONG-TERM CURRENT USE OF INSULIN (H): Primary | ICD-10-CM

## 2019-01-24 PROCEDURE — 99607 MTMS BY PHARM ADDL 15 MIN: CPT | Performed by: PHARMACIST

## 2019-01-24 PROCEDURE — 99605 MTMS BY PHARM NP 15 MIN: CPT | Performed by: PHARMACIST

## 2019-01-24 NOTE — PROGRESS NOTES
SUBJECTIVE/OBJECTIVE:                           Jeffry Younger is a 38 year old male coming in for an initial visit for Medication Therapy Management.  He was referred to me from Dr. Hsieh.    Chief Complaint: He is coming in primarily for education for a new Victoza start.  Personal Healthcare Goals: His goals are to get his blood sugar down and continue with weight loss    Allergies/ADRs: None  Tobacco: No tobacco use  Alcohol: 1-2 drinks/month  Caffeine: coffee: 2 cups/day sodas: 2 bottles/day   Activity: walking, exercise band, stretches - 20 minutes/day   PMH: Reviewed in Epic    Medication Adherence/Access:  No missed medications    Diabetes:  Pt currently taking metformin 500 mg ER BID. Pt is experiencing the following side effects: diarrhea when started. This goes away after about 1 week from increasing a dose.  SMBG: two times daily.  Ranges (patient reported): 130-145 AM and 165 2 hours after a meal  ACEi/ARB: No.   Urine Albumin:   Lab Results   Component Value Date    UMALCR 7.90 12/31/2018      Aspirin: Not taking.  Diet/Exercise: was drinking 2-4 cans of soda per day. He is currently drinking 2 per day.   He has met with a CDE twice and has 3rd appointment in March    Lab Results   Component Value Date    A1C 8.1 12/31/2018    A1C 6.5 06/26/2018    A1C 5.7 09/29/2015       Hyperlipidemia: Current therapy includes atorvastatin 10mg once daily.  Pt reports no significant myalgias or other side effects.  The ASCVD Risk score (Premont GRAY Jr., et al., 2013) failed to calculate for the following reasons:    The 2013 ASCVD risk score is only valid for ages 40 to 79     Elevated Blood pressure without diagnosis of hypertension: Blood pressure today was elevated x2. He has had good blood pressures in the recent past. He does not self-monitor his blood pressure at home.     BP Readings from Last 6 Encounters:   01/24/19 150/87   12/31/18 138/86   09/17/18 128/80   09/07/18 132/82   08/11/18 (!) 140/92   08/06/18  132/80       Recent Labs   Lab Test 12/31/18  1156 06/26/18  0957 09/29/15  1119   CHOL 123 147 158   HDL 35* 33* 40*   LDL 67 84 87   TRIG 104 152* 156*   CHOLHDLRATIO  --   --  4.0       Wt Readings from Last 4 Encounters:   01/24/19 (!) (P) 410 lb 9.6 oz (186.2 kg)   12/31/18 (!) 419 lb (190.1 kg)   09/17/18 (!) 423 lb 8.1 oz (192.1 kg)   09/07/18 (!) 423 lb 6.4 oz (192.1 kg)     Liver Function Studies -   Recent Labs   Lab Test 12/31/18  1156   PROTTOTAL 6.8   ALBUMIN 3.6   BILITOTAL 0.6   ALKPHOS 80   AST 27   ALT 59       Today's Vitals: /87   Pulse 92   Wt (!) (P) 410 lb 9.6 oz (186.2 kg)   BMI (P) 54.17 kg/m      ASSESSMENT:                             Current medications were reviewed today.     Medication Adherence: excellent, no issues identified    Diabetes/Obesity: Needs improvement. He is currently not at goal of <7%. He is currently taking 1000 mg of ER metformin daily and would benefit from increasing this dose as tolerated to 1500 mg daily. He has been taking this BID. He may take metformin once daily if this is more convenient. Comprehensive patient education provided today regarding administration and storage, side effects, and mechanism of Victoza. He demonstrated ability to administer the medication by giving his first dose today in clinic. He may benefit from a smaller needle size for his pen.  He has lost nearly 13 pounds since September and would benefit from continued exercise and weight loss.     Hyperlipidemia: stable.  Patient is currently on a moderate intensity statin which is appropriate given his diabetes without other significant CVD risk and LDL < 70. An AHA/ACC 10-year ASCVD risk is not able to be calculated given his age.    Elevated Blood pressure without diagnosis of hypertension:  Needs improvement.  He has had good blood pressures recently but was elevated x2 today, possibly due to injection anxiety. He is encouraged to check his BP again when he goes to the pharmacy  or with his family member's device. He would benefit from close monitoring and follow-up of his blood pressures. If needed in the future, an ACE/ARB may be a good initial choice given his diabetes status although not specifically indicated based on lack of albuminuria.    PLAN:                            1. Diabetes    - Pt will increase Victoza to 1.2 mg once daily after 1 week if tolerating okay.    - A prescription was sent for smaller pen needles.    - Pt can take metformin 1000 mg once daily for convenience     2. Elevated Blood pressure     - Pt will re-check blood pressure at home or at the pharmacy    I spent 35 minutes with this patient today. All changes were made via collaborative practice agreement with Dr. Hsieh. A copy of the visit note was provided to the patient's primary care provider.    Will follow up as needed.    The patient was given a summary of these recommendations as an after visit summary.     Paul Crawford, PharmD Student    Lizbeth Padilla, PharmD [facility]  Medication Therapy Management Practitioner   #[phone]

## 2019-01-24 NOTE — PATIENT INSTRUCTIONS
Recommendations from today's MTM visit:                                                    MTM (medication therapy management) is a service provided by a clinical pharmacist designed to help you get the most of out of your medicines.     Good job on being willing to start the Victoza today.    Start Victoza 0.6mg once daily and increase to 1.2mg once daily after 1 week    Sent a prescription for the smaller insulin pen needle.    Take the metformin 2 tablets at dinner for convenience.    Blood pressure:  Check at home and if elevated then recheck.    Next MTM/pharmacist visit: as needed    To schedule another MTM appointment, please call the clinic directly or you may call the MTM scheduling line at [phone] or toll-free at [phone].     My Clinical Pharmacist's contact information:                                                      It was a pleasure seeing you today!  Please feel free to contact me with any questions or concerns you have.      Lizbeth Padilla, PharmD [facility]  Medication Therapy Management Practitioner   #[phone]    You may receive a survey about the MTM services you received.  I would appreciate your feedback to help me serve you better in the future. Please fill it out and return it when you can. Your comments will be anonymous.      Diabetes Goals:    Home Monitoring of Blood Sugars:Fasting  mg/dL.    Hemoglobin A1C: Less than 7%. Yours is   Lab Results   Component Value Date    A1C 8.1 12/31/2018   .    Blood Pressure: Less than 130/80mmHg. Yours is BP (!) (P) 160/99   Pulse (P) 89   Wt (!) (P) 410 lb 9.6 oz (186.2 kg)   BMI (P) 54.17 kg/m  .    Cholesterol: You are taking atorvastatin to help decrease the risk of heart disease.    Things you can do to help lower your blood sugars:    Diet: 3 meals and 1-2 snacks per day with 60-75 grams of carbohydrates per meal and 15-30 grams of carbohydrates per snack. Try to fill your plate at least half-full with vegetables, fill  one-quarter full with lean meats or protein, and also make sure you get at least some carbohydrate with every meal.    Exercise: 30 minutes per day of anything that will increase your heart rate and make you break a sweat! Gardening, walking, cleaning the house, changing the oil in your car, etc. If you feel like 30 minutes per day is too much, start small. Even lifting canned foods or working your arms with a resistance band in front of the TV can help.

## 2019-01-26 ENCOUNTER — MYC MEDICAL ADVICE (OUTPATIENT)
Dept: PEDIATRICS | Facility: CLINIC | Age: 39
End: 2019-01-26

## 2019-02-17 ENCOUNTER — MYC MEDICAL ADVICE (OUTPATIENT)
Dept: PEDIATRICS | Facility: CLINIC | Age: 39
End: 2019-02-17

## 2019-02-17 DIAGNOSIS — R11.0 NAUSEA: Primary | ICD-10-CM

## 2019-02-18 RX ORDER — ONDANSETRON 8 MG/1
8 TABLET, FILM COATED ORAL EVERY 8 HOURS PRN
Qty: 20 TABLET | Refills: 0 | Status: SHIPPED | OUTPATIENT
Start: 2019-02-18 | End: 2019-02-25

## 2019-02-18 NOTE — TELEPHONE ENCOUNTER
Per My  Chart message of 01/26-Smaller portion sizes will help some--the nausea should gradually improve.  If you can please try to give it a bit more time and lets touch base in about 1 week.  Certainly if the symptoms are worse before then, let me know --we could try adding a medication for nausea (temporarily)     Forwarded to provider for review.  QUINTIN Wallis RN

## 2019-02-27 ENCOUNTER — TELEPHONE (OUTPATIENT)
Dept: SLEEP MEDICINE | Facility: CLINIC | Age: 39
End: 2019-02-27

## 2019-02-27 NOTE — PROGRESS NOTES
LVM and mychart message to please call nurseline or mychart reschedule appointment with DR. James on 3/13/19 at 2PM.  Dr. James out of the office the entire week.

## 2019-02-28 ENCOUNTER — MYC MEDICAL ADVICE (OUTPATIENT)
Dept: PEDIATRICS | Facility: CLINIC | Age: 39
End: 2019-02-28

## 2019-02-28 NOTE — TELEPHONE ENCOUNTER
Pt sent a [employer] message with clarification of Victoza, he should be increasing to.    Cris Snowden RN

## 2019-03-05 ENCOUNTER — ALLIED HEALTH/NURSE VISIT (OUTPATIENT)
Dept: EDUCATION SERVICES | Facility: CLINIC | Age: 39
End: 2019-03-05
Payer: COMMERCIAL

## 2019-03-05 DIAGNOSIS — E11.65 TYPE 2 DIABETES MELLITUS WITH HYPERGLYCEMIA, WITHOUT LONG-TERM CURRENT USE OF INSULIN (H): Primary | ICD-10-CM

## 2019-03-05 PROCEDURE — G0108 DIAB MANAGE TRN  PER INDIV: HCPCS

## 2019-03-05 NOTE — PROGRESS NOTES
"Diabetes Self-Management Education & Support    Diabetes Education Self Management & Training    SUBJECTIVE/OBJECTIVE:  Diabetes education in the past 24mo: Yes  Diabetes type: Type 2  Disease course: Stable  Cultural Influences/Ethnic Background:  American    Diabetes Symptoms & Complications  Blurred vision: No  Fatigue: No  Foot ulcerations: No  Polydipsia: No  Polyphagia: No  Polyuria: No  Visual change: No  Weakness: No  Weight loss: No  Slow healing wounds: No  Weight trend: Stable  Autonomic neuropathy: No  CVA: No  Heart disease: No  Nephropathy: No  Peripheral neuropathy: No  Peripheral Vascular Disease: No  Retinopathy: No  Sexual dysfunction: No    Patient Problem List and Family Medical History reviewed for relevant medical history, current medical status, and diabetes risk factors.    Vitals:  There were no vitals taken for this visit.  Estimated body mass index is 54.17 kg/m  (pended) as calculated from the following:    Height as of 9/17/18: 1.854 m (6' 1\").    Weight as of 1/24/19: (P) 186.2 kg (410 lb 9.6 oz).   Last 3 BP:   BP Readings from Last 3 Encounters:   01/24/19 150/87   12/31/18 138/86   09/17/18 128/80       History   Smoking Status     Never Smoker   Smokeless Tobacco     Never Used       Labs:  Lab Results   Component Value Date    A1C 8.1 12/31/2018     Lab Results   Component Value Date     12/31/2018     Lab Results   Component Value Date    LDL 67 12/31/2018     HDL Cholesterol   Date Value Ref Range Status   12/31/2018 35 (L) >39 mg/dL Final   ]  GFR Estimate   Date Value Ref Range Status   12/31/2018 >90 >60 mL/min/[1.73_m2] Final     Comment:     Non  GFR Calc  Starting 12/18/2018, serum creatinine based estimated GFR (eGFR) will be   calculated using the Chronic Kidney Disease Epidemiology Collaboration   (CKD-EPI) equation.       GFR Estimate If Black   Date Value Ref Range Status   12/31/2018 >90 >60 mL/min/[1.73_m2] Final     Comment:      " American GFR Calc  Starting 2018, serum creatinine based estimated GFR (eGFR) will be   calculated using the Chronic Kidney Disease Epidemiology Collaboration   (CKD-EPI) equation.       Lab Results   Component Value Date    CR 0.96 2018     No results found for: MICROALBUMIN    Healthy Eating  Cultural/Restorationism diet restrictions?: No  Meal planning: Avoiding sweets, Carbohydrate counting, Smaller portions  Meals include: Lunch, Dinner  Beverages: Water, Diet soda, Soda  Has patient met with a dietitian in the past?: No    Being Active  How intense was your typical exercise? : Light (like stretching or slow walking)  Barrier to exercise: Time, Access    Monitoring  Blood Glucose Meter: Accu-check  Home Glucose (Sugar) Monitorin-2 times per day  Blood glucose trend: Increasing steadily  Low Glucose Range (mg/dL):   High Glucose Range (mg/dL): 140-180  Overall Range (mg/dL): 130-140  Unable to download meter due to technical errors.    Taking Medications  Diabetes Medication(s)     Biguanides       metFORMIN (GLUCOPHAGE-XR) 500 MG 24 hr tablet    Take 1 tablet (500 mg) by mouth 2 times daily (with meals)    Incretin Mimetic Agents (GLP-1 Receptor Agonists)       liraglutide (VICTOZA) 18 MG/3ML solution    Inject 0.6 mg Subcutaneous daily for 14 days, THEN 1.2 mg daily.          Current Treatments: Diet, Non-insulin Injectables, Oral Agent (monotherapy)    Problem Solving  Hypoglycemia Frequency: Rarely  Hypoglycemia Treatment: Juice  Patient carries a carbohydrate source: No  Medical alert: No  Severe weather/disaster plan for diabetes management?: Yes  DKA prevention plan?: No  Sick day plan for diabetes management?: (P) No    Hypoglycemia symptoms  Confusion: No  Dizziness or Light-Headedness: No  Headaches: No  Hunger: No  Mood changes: No  Nervousness/Anxiety: No  Sleepiness: No  Speech difficulty: No  Sweats: No  Tremors: No    Hypoglycemia Complications  Blackouts: No  Hospitalization:  No  Nocturnal hypoglycemia: No  Required assistance: No  Required glucagon injection: No  Seizures: No    Reducing Risks  CAD Risks: Obesity, Sedentary lifestyle, Stress  Has dilated eye exam at least once a year?: Yes  Sees dentist every 6 months?: Yes  Sees podiatrist (foot doctor)?: No    Healthy Coping  Informal Support system:: Family, Spouse  Difficulty affording diabetes management supplies?: No  Patient Activation Measure Survey Score:  No flowsheet data found.    ASSESSMENT:  Pt BG values improved since start of Victoza, per review of meter logbook. Pt reports he has cut out most of the regular soda, and drinking more water. Has lost 12# so far.  Did check out gym memberships- but decided he would rather work on getting more walking in, especially once the weather gets nicer.     Goals Addressed as of 3/5/2019 at 1:07 PM                 Today       Patient Stated      Healthy Eating (pt-stated)   80%    Added 11/13/18 by Jackie Santos, DULCE     My Goal: I will try to avoid regular soda    What I need to meet my goal: have calorie free/ sugar options that are enjoyable, keep regular soda out of the house    I plan to meet my goal by this date: 1 month (12/13/18)          Problem Solving (pt-stated)   On track    Added 11/13/18 by Jackie Santos, DULCE     My Goal: I will reduce blood sugars by increasing exercise, start with using treadmill 2-3x/week    What I need to meet my goal: prioritize time, obtain gym membership    I plan to meet my goal by this date: 1 month (12/13/18)            Patient's most recent   Lab Results   Component Value Date    A1C 8.1 12/31/2018    is not meeting goal of <7.0    INTERVENTION:   Diabetes knowledge and skills assessment:     Patient is knowledgeable in diabetes management concepts related to: Healthy Eating, Monitoring, Taking Medication and Healthy Coping    Patient needs further education on the following diabetes management concepts: Healthy Eating, Being Active,  Monitoring, Taking Medication, Problem Solving and Reducing Risks    Based on learning assessment above, most appropriate setting for further diabetes education would be: Group class or Individual setting.    Education provided today on:  AADE Self-Care Behaviors:  Healthy Eating: reinforced portion control  Being Active: describe appropriate activity program  Monitoring: meter trouble shooting, pt advised to call [employer] 1-800# due to battery issues- pt can have meter replaced free of charge  Taking Medication: side effects of prescribed medications, answered questions about dosing  Problem Solving: high blood glucose - causes, signs/symptoms, treatment and prevention and when to call health care provider  Reducing Risks: prevention, early diagnostic measures and treatment of complications and A1C - goals, relating to blood glucose levels, how often to check    Opportunities for ongoing education and support in diabetes-self management were discussed.    Pt verbalized understanding of concepts discussed and recommendations provided today.       Education Materials Provided:  No new materials provided today    PLAN:  No medication changes at today's visit.   Continue to work on portion control and incorporating more exercise.   Recommend CDE follow up in 1 year, sooner if questions or concerns.    Jackie Santos RD, CDE  Diabetes     Time Spent: 30 minutes  Encounter Type: Individual    Any diabetes medication dose changes were made via the CDE Protocol and Collaborative Practice Agreement with the patient's primary care provider. A copy of this encounter was shared with the provider.

## 2019-03-05 NOTE — PROGRESS NOTES
Diabetes Medication(s)     Biguanides       metFORMIN (GLUCOPHAGE-XR) 500 MG 24 hr tablet    Take 1 tablet (500 mg) by mouth 2 times daily (with meals)    Incretin Mimetic Agents (GLP-1 Receptor Agonists)       liraglutide (VICTOZA) 18 MG/3ML solution    Inject 0.6 mg Subcutaneous daily for 14 days, THEN 1.2 mg daily.          .  Lab Results   Component Value Date    A1C 8.1 12/31/2018    A1C 6.5 06/26/2018    A1C 5.7 09/29/2015

## 2019-03-20 ENCOUNTER — OFFICE VISIT (OUTPATIENT)
Dept: SLEEP MEDICINE | Facility: CLINIC | Age: 39
End: 2019-03-20
Payer: COMMERCIAL

## 2019-03-20 VITALS
RESPIRATION RATE: 14 BRPM | HEART RATE: 105 BPM | DIASTOLIC BLOOD PRESSURE: 94 MMHG | HEIGHT: 73 IN | WEIGHT: 315 LBS | SYSTOLIC BLOOD PRESSURE: 151 MMHG | BODY MASS INDEX: 41.75 KG/M2 | OXYGEN SATURATION: 97 %

## 2019-03-20 DIAGNOSIS — E66.01 MORBID OBESITY WITH BMI OF 50.0-59.9, ADULT (H): ICD-10-CM

## 2019-03-20 DIAGNOSIS — G47.33 OSA (OBSTRUCTIVE SLEEP APNEA): Primary | ICD-10-CM

## 2019-03-20 PROCEDURE — 99213 OFFICE O/P EST LOW 20 MIN: CPT | Performed by: INTERNAL MEDICINE

## 2019-03-20 ASSESSMENT — MIFFLIN-ST. JEOR: SCORE: 2829.56

## 2019-03-20 NOTE — PATIENT INSTRUCTIONS
MY TREATMENT INFORMATION FOR SLEEP APNEA-  Jeffry Younger    MY CONTACT NUMBERS ARE:  [phone]  [facility]  SLEEP [facility] :  [facility]    Your sleep apnea is controlled with auto-CPAP.  Blood pressure is elevated today, please see your primary care doctor for treatment of high blood pressure  Mask and supplies are ordered    Continue use of CPAP:  - Recommend using CPAP every night for at least 7-8 hours each night  - Daytime naps are not advised, but use CPAP if taking naps.    - Do not remove mask headgear when you go to bathroom at night, but just unplug the hose.    Objective measure goal  Compliance  Goal >70% (preferrably 100%)  Leak   Goal < 10% (less than 24 L/min)  AHI  Goal < 5 events per hour   Usage  Goal 7-8 hours.      Patient was advised not to drive if drowsy or sleepy.      Follow up in 12 months.        Your BMI is Body mass index is 53.83 kg/m .  Weight management is a personal decision.  If you are interested in exploring weight loss strategies, the following discussion covers the approaches that may be successful. Body mass index (BMI) is one way to tell whether you are at a healthy weight, overweight, or obese. It measures your weight in relation to your height.  A BMI of 18.5 to 24.9 is in the healthy range. A person with a BMI of 25 to 29.9 is considered overweight, and someone with a BMI of 30 or greater is considered obese. More than two-thirds of American adults are considered overweight or obese.  Being overweight or obese increases the risk for further weight gain. Excess weight may lead to heart disease and diabetes.  Creating and following plans for healthy eating and physical activity may help you improve your health.  Weight control is part of healthy lifestyle and includes exercise, emotional health, and healthy eating habits. Careful eating habits lifelong are the mainstay of weight control. Though there are significant health benefits from weight loss, long-term  weight loss with diet alone may be very difficult to achieve- studies show long-term success with dietary management in less than 10% of people. Attaining a healthy weight may be especially difficult to achieve in those with severe obesity. In some cases, medications, devices and surgical management might be considered.  What can you do?  If you are overweight or obese and are interested in methods for weight loss, you should discuss this with your provider.     Consider reducing daily calorie intake by 500 calories.     Keep a food journal.     Avoiding skipping meals, consider cutting portions instead.    Diet combined with exercise helps maintain muscle while optimizing fat loss. Strength training is particularly important for building and maintaining muscle mass. Exercise helps reduce stress, increase energy, and improves fitness. Increasing exercise without diet control, however, may not burn enough calories to loose weight.       Start walking three days a week 10-20 minutes at a time    Work towards walking thirty minutes five days a week     Eventually, increase the speed of your walking for 1-2 minutes at time    In addition, we recommend that you review healthy lifestyles and methods for weight loss available through the National Institutes of Health patient information sites:  http://win.niddk.nih.gov/publications/index.htm    And look into health and wellness programs that may be available through your health insurance provider, employer, local community center, or [employer] club.    Weight management plan: Patient was referred to their PCP to discuss a diet and exercise plan.     Your blood pressure was checked while you were in clinic today.  Please read the guidelines below about what these numbers mean and what you should do about them.  Your systolic blood pressure is the top number.  This is the pressure when the heart is pumping.  Your diastolic blood pressure is the bottom number.  This is the  pressure in between beats.  If your systolic blood pressure is less than 120 and your diastolic blood pressure is less than 80, then your blood pressure is normal. There is nothing more that you need to do about it  If your systolic blood pressure is 120-139 or your diastolic blood pressure is 80-89, your blood pressure may be higher than it should be.  You should have your blood pressure re-checked within a year by a primary care provider.  If your systolic blood pressure is 140 or greater or your diastolic blood pressure is 90 or greater, you may have high blood pressure.  High blood pressure is treatable, but if left untreated over time it can put you at risk for heart attack, stroke, or kidney failure.  You should have your blood pressure re-checked by a primary care provider within the next four weeks.

## 2019-03-20 NOTE — PROGRESS NOTES
[facility] Follow-up  March 20, 2019      Name: Jeffry Younger MRN# [id number]   Age: 37 year old YOB: 1980   Date of visit: March 20, 2019  Primary care provider: Russel Hsieh         Assessment and Plan:     1. Obstructive Sleep Apnea:  - Full compliance of PAP use low residual AHI of 0.6 events an hour.  - Machine downloads reviewed.  - Mask and supplies are reordered.  - Clinical response: good  - Recommend using CPAP every night for at least 7-8 hours each night  - Daytime naps are not advised, but use CPAP if taking naps  - Patient was advised not to drive if drowsy or sleepy.  - Follow up in sleep clinic in 12 months.    2.  Morbid obesity: Encouraged patient to lose weight. Counseled regarding weight loss through diet modification and increased physical activity. Patient was given nstuctions of weight loss and advised to follow up her PCP for further weight loss interventions. Weight loss instructions given.    3.  Hypertension: his sleep disordered breathing is controlled but has high BP, recommend follow up with PCP for anti-hypertensive medications treatment for persistent elevation high blood pressure.  -        No orders of the defined types were placed in this encounter.      Summary Counseling:  New sleep schedule recommendation: given    Check out [email or web address]    All questions were answered.  The patient indicates understanding of the above issues and agrees with the plan set forth.           Chief Complaint:    Routine Follow up            History of Present Illness:     Jeffry Younger is a 37 year old male with history of PAOLA and obesity who comes to [facility] for follow up. Please see H&P for his presenting sleep symptoms for additional details.  He was diagnosed sleep apnea and was setup auto-CPAP on 3/7/17 with pressure of 8-16 cmH2O.  Last visit on 3/15/2018, he was 100%compliant of PAP over 4 hrs  usage.   Today, he is using his Auto-PAP therapy with 100% compliant of PAP over 4 hrs usage, low residual AHI and has good benefits. He sleeps well at night and no snoring. His BP is elevated today but denies symptoms  Including headache and vision changes etc.    PREVIOUS SLEEP STUDIES:  Date: 3/5/17  AHI: 12.2/hr (REM 32.5/hr and supine 45/hr)  Intervention: CPAP  Lowest O 2 sat: 85%    CPAP Compliance:  Dates:  2/17/2019- 3/18/2019       100 % >4hour use   Days used: 30/30  Average daily use (days used): 7.27 hrs  Leak 95 percentile: 17.9  L/min  Residual AHI: 0.6/hr  Pressure:  8-16 cmH2O  95% (90%) percentile pressure: 11 cmH2O (max pressure 11.9)    [place] Sleepiness Scale score today: 3/24  [place] Sleepiness Scale score last visit: 3/24   Pre-PAP [place] Sleepiness Scale score: 9/24    PAP machine: Portable Scores    Interface:  Mask: nasal mask  Chin strap: No  Leak: No  Humidity: Yes    Difficulties with therapy:    [-] Difficulty tolerating the pressure  [-] Epistaxis:   [-] Nasal congestion, occasional and uses nasal spray  [-] Dry mouth:  [-] Mouth breathing:   [-] Pain/skin breakdown:     Improvements noted with CPAP:   [+] waking up more refreshed  [+] sleeping better with less arousals  [+] nocturia improved   [+] improved energy level during the day    SLEEP-WAKE SCHEDULE: unchanged    Other sleep problems: None     Drowsy driving / near incidents: No    Medications that affect sleep: No            Medications:     Current Outpatient Medications   Medication Sig     atorvastatin (LIPITOR) 10 MG tablet Take 1 tablet (10 mg) by mouth daily     blood glucose monitoring (ACCU-CHEK FASTCLIX) lancets 1 each daily     blood glucose monitoring (NO BRAND SPECIFIED) test strip Use to test blood sugar 2 times daily or as directed. Accu-chek Guide test strips.     insulin pen needle 29G X 5MM Use 1 pen needles daily or as directed.     liraglutide (VICTOZA) 18 MG/3ML solution Inject 0.6 mg Subcutaneous daily for 14  "days, THEN 1.2 mg daily.     metFORMIN (GLUCOPHAGE-XR) 500 MG 24 hr tablet Take 1 tablet (500 mg) by mouth 2 times daily (with meals)     No current facility-administered medications for this visit.         No Known Allergies         Past Medical History:     Past Medical History:   Diagnosis Date     Obesity              Past Surgical History:      Past Surgical History:   Procedure Laterality Date     AS ESOPHAGOSCOPY, DIAGNOSTIC      EGD       Social History     Tobacco Use     Smoking status: Never Smoker     Smokeless tobacco: Never Used   Substance Use Topics     Alcohol use: No     Alcohol/week: 0.0 oz       Family History   Problem Relation Age of Onset     Diabetes Father      Hypertension Father      Colon Cancer No family hx of      Prostate Cancer No family hx of      Cerebrovascular Disease No family hx of      Coronary Artery Disease No family hx of             Physical Examination:   BP (!) 151/94   Pulse 105   Resp 14   Ht 1.854 m (6' 1\")   Wt (!) 185.1 kg (408 lb)   SpO2 97%   BMI 53.83 kg/m              Data: All pertinent previous laboratory data reviewed     Lab Results   Component Value Date    PH 7.42 03/06/2017    PO2 94 03/06/2017    PCO2 35 03/06/2017    HCO3 23 03/06/2017     No results found for: TSH  Lab Results   Component Value Date     (H) 12/31/2018     (H) 06/26/2018     No results found for: HGB  Lab Results   Component Value Date    BUN 15 12/31/2018    BUN 14 06/26/2018    CR 0.96 12/31/2018    CR 0.93 06/26/2018     No results found for: AMOL      He will follow up with me in about 12 month(s).         Copy to: Russel Hsieh MD 3/20/2019     [facility]  [id number] [place] [id number]       Fifteen minutes spent with patient, all of which were spent face-to-face counseling, consulting, coordinating plan of care and going over PAP download/sleep study and chart review.          "

## 2019-03-20 NOTE — NURSING NOTE
"Chief Complaint   Patient presents with     RECHECK     Annual f/u cpap       Initial BP (!) 151/94   Pulse 105   Resp 14   Ht 1.854 m (6' 1\")   Wt (!) 185.1 kg (408 lb)   SpO2 97%   BMI 53.83 kg/m   Estimated body mass index is 53.83 kg/m  as calculated from the following:    Height as of this encounter: 1.854 m (6' 1\").    Weight as of this encounter: 185.1 kg (408 lb).    Medication Reconciliation: complete    Marjorie Diaz LPN      "

## 2019-05-12 ENCOUNTER — MYC MEDICAL ADVICE (OUTPATIENT)
Dept: PEDIATRICS | Facility: CLINIC | Age: 39
End: 2019-05-12

## 2019-06-11 ENCOUNTER — OFFICE VISIT (OUTPATIENT)
Dept: PEDIATRICS | Facility: CLINIC | Age: 39
End: 2019-06-11
Payer: COMMERCIAL

## 2019-06-11 VITALS
BODY MASS INDEX: 53.17 KG/M2 | WEIGHT: 315 LBS | TEMPERATURE: 97.7 F | HEART RATE: 88 BPM | DIASTOLIC BLOOD PRESSURE: 78 MMHG | SYSTOLIC BLOOD PRESSURE: 120 MMHG | OXYGEN SATURATION: 97 %

## 2019-06-11 DIAGNOSIS — E78.5 HYPERLIPIDEMIA LDL GOAL <100: ICD-10-CM

## 2019-06-11 DIAGNOSIS — E11.65 TYPE 2 DIABETES MELLITUS WITH HYPERGLYCEMIA, WITHOUT LONG-TERM CURRENT USE OF INSULIN (H): Primary | ICD-10-CM

## 2019-06-11 DIAGNOSIS — E66.01 MORBID OBESITY WITH BMI OF 50.0-59.9, ADULT (H): ICD-10-CM

## 2019-06-11 LAB
ALBUMIN SERPL-MCNC: 3.5 G/DL (ref 3.4–5)
ALP SERPL-CCNC: 94 U/L (ref 40–150)
ALT SERPL W P-5'-P-CCNC: 56 U/L (ref 0–70)
ANION GAP SERPL CALCULATED.3IONS-SCNC: 10 MMOL/L (ref 3–14)
AST SERPL W P-5'-P-CCNC: 19 U/L (ref 0–45)
BILIRUB SERPL-MCNC: 0.6 MG/DL (ref 0.2–1.3)
BUN SERPL-MCNC: 13 MG/DL (ref 7–30)
CALCIUM SERPL-MCNC: 8.5 MG/DL (ref 8.5–10.1)
CHLORIDE SERPL-SCNC: 110 MMOL/L (ref 94–109)
CHOLEST SERPL-MCNC: 115 MG/DL
CO2 SERPL-SCNC: 19 MMOL/L (ref 20–32)
CREAT SERPL-MCNC: 0.86 MG/DL (ref 0.66–1.25)
GFR SERPL CREATININE-BSD FRML MDRD: >90 ML/MIN/{1.73_M2}
GLUCOSE SERPL-MCNC: 151 MG/DL (ref 70–99)
HBA1C MFR BLD: 7.4 % (ref 0–5.6)
HDLC SERPL-MCNC: 33 MG/DL
LDLC SERPL CALC-MCNC: 60 MG/DL
NONHDLC SERPL-MCNC: 82 MG/DL
POTASSIUM SERPL-SCNC: 4.2 MMOL/L (ref 3.4–5.3)
PROT SERPL-MCNC: 6.7 G/DL (ref 6.8–8.8)
SODIUM SERPL-SCNC: 139 MMOL/L (ref 133–144)
TRIGL SERPL-MCNC: 109 MG/DL
TSH SERPL DL<=0.005 MIU/L-ACNC: 1.42 MU/L (ref 0.4–4)

## 2019-06-11 PROCEDURE — 84443 ASSAY THYROID STIM HORMONE: CPT | Performed by: INTERNAL MEDICINE

## 2019-06-11 PROCEDURE — 36415 COLL VENOUS BLD VENIPUNCTURE: CPT | Performed by: INTERNAL MEDICINE

## 2019-06-11 PROCEDURE — 80053 COMPREHEN METABOLIC PANEL: CPT | Performed by: INTERNAL MEDICINE

## 2019-06-11 PROCEDURE — 83036 HEMOGLOBIN GLYCOSYLATED A1C: CPT | Performed by: INTERNAL MEDICINE

## 2019-06-11 PROCEDURE — 80061 LIPID PANEL: CPT | Performed by: INTERNAL MEDICINE

## 2019-06-11 PROCEDURE — 99214 OFFICE O/P EST MOD 30 MIN: CPT | Performed by: INTERNAL MEDICINE

## 2019-06-11 RX ORDER — ATORVASTATIN CALCIUM 10 MG/1
10 TABLET, FILM COATED ORAL DAILY
Qty: 90 TABLET | Refills: 3 | Status: SHIPPED | OUTPATIENT
Start: 2019-06-11 | End: 2020-01-22

## 2019-06-11 RX ORDER — LIRAGLUTIDE 6 MG/ML
1.2 INJECTION SUBCUTANEOUS DAILY
Qty: 18 ML | Refills: 1 | Status: SHIPPED | OUTPATIENT
Start: 2019-06-11 | End: 2020-01-22

## 2019-06-11 RX ORDER — METFORMIN HCL 500 MG
1500 TABLET, EXTENDED RELEASE 24 HR ORAL DAILY
Qty: 270 TABLET | Refills: 1 | Status: SHIPPED | OUTPATIENT
Start: 2019-06-11 | End: 2019-12-23

## 2019-06-11 NOTE — PROGRESS NOTES
Subjective     Jeffry Younger is a 39 year old male who presents to clinic today for the following health issues:    Diabetes     Diabetes:   He presents for follow up of diabetes.  He is checking home blood glucose one time daily. He checks blood glucose before meals and at bedtime.  Blood glucose is sometimes over 200 and never under 70. He is aware of hypoglycemia symptoms including shakiness and weakness. He is concerned about other.  He is not experiencing numbness or burning in feet, excessive thirst, blurry vision, weight changes or redness, sores or blisters on feet. The patient has not had a diabetic eye exam in the last 12 months.     Diabetes Management Resources    He eats 2-3 servings of fruits and vegetables daily.He consumes 1 sweetened beverage(s) daily.  He is taking medications regularly.  History of Present Illness     Diabetes:   He presents for follow up of diabetes.  He is checking home blood glucose one time daily. He checks blood glucose before meals and at bedtime.  Blood glucose is sometimes over 200 and never under 70. He is aware of hypoglycemia symptoms including shakiness and weakness. He is concerned about other.  He is not experiencing numbness or burning in feet, excessive thirst, blurry vision, weight changes or redness, sores or blisters on feet. The patient has not had a diabetic eye exam in the last 12 months.     Diabetes Management Resources    He eats 2-3 servings of fruits and vegetables daily.He consumes 1 sweetened beverage(s) daily.  He is taking medications regularly.     Lab Results   Component Value Date    A1C 8.1 12/31/2018    A1C 6.5 06/26/2018     Lab Results   Component Value Date    LDL 67 12/31/2018    LDL 84 06/26/2018       Patient Active Problem List   Diagnosis     Morbid obesity with BMI of 50.0-59.9, adult (H)     PAOLA (obstructive sleep apnea)     Type 2 diabetes mellitus with hyperglycemia, without long-term current use of insulin (H)     Hyperlipidemia LDL  goal <100     ADAMA (generalized anxiety disorder)     Past Surgical History:   Procedure Laterality Date     AS ESOPHAGOSCOPY, DIAGNOSTIC      EGD       Social History     Tobacco Use     Smoking status: Never Smoker     Smokeless tobacco: Never Used   Substance Use Topics     Alcohol use: No     Alcohol/week: 0.0 oz     Frequency: 2-4 times a month     Drinks per session: 1 or 2     Binge frequency: Never     Family History   Problem Relation Age of Onset     Diabetes Father      Hypertension Father      Colon Cancer No family hx of      Prostate Cancer No family hx of      Cerebrovascular Disease No family hx of      Coronary Artery Disease No family hx of          Current Outpatient Medications   Medication Sig Dispense Refill     atorvastatin (LIPITOR) 10 MG tablet Take 1 tablet (10 mg) by mouth daily 90 tablet 3     blood glucose monitoring (ACCU-CHEK FASTCLIX) lancets 1 each daily 102 each 3     blood glucose monitoring (NO BRAND SPECIFIED) test strip Use to test blood sugar 2 times daily or as directed. Accu-chek Guide test strips. 100 strip 6     insulin pen needle 29G X 5MM Use 1 pen needles daily or as directed. 100 each 3     liraglutide (VICTOZA) 18 MG/3ML solution Inject 1.2 mg Subcutaneous daily 18 mL 1     liraglutide (VICTOZA) 18 MG/3ML solution Inject 0.6 mg Subcutaneous daily for 14 days, THEN 1.2 mg daily. 18 mL 1     metFORMIN (GLUCOPHAGE-XR) 500 MG 24 hr tablet Take 3 tablets (1,500 mg) by mouth daily 270 tablet 1       ROS: The following systems have been completely reviewed and are negative except as noted in the HPI: CONSTITUTIONAL, CARDIOVASCULAR, PULMONARY    OBJECTIVE:                                                    /78 (Cuff Size: Adult Large)   Pulse 88   Temp 97.7  F (36.5  C) (Oral)   Wt (!) 182.8 kg (403 lb)   SpO2 97%   BMI 53.17 kg/m   Body mass index is 53.17 kg/m .  GENERAL:  alert,  no distress  NECK: no tenderness, no adenopathy  RESP: lungs clear to auscultation -  no rales, no rhonchi, no wheezes  CV: regular rates and rhythm, normal S1 S2, no S3 or S4 and no murmur, no click or rub  MS: extremities- no edema     ASSESSMENT/PLAN:                                                        ICD-10-CM    1. Type 2 diabetes mellitus with hyperglycemia, without long-term current use of insulin (H) E11.65 Hemoglobin A1c     TSH with free T4 reflex     metFORMIN (GLUCOPHAGE-XR) 500 MG 24 hr tablet     liraglutide (VICTOZA) 18 MG/3ML solution        Lab Results   Component Value Date    A1C 7.4 06/11/2019      Improved.   Increase metformin to 1500mg daily.   Continue victoza.   Discussed diet, weight loss goals.  Next follow-up 6 months      2. Hyperlipidemia LDL goal <100 E78.5 Lipid panel reflex to direct LDL Fasting     atorvastatin (LIPITOR) 10 MG tablet     Comprehensive metabolic panel     Adequate control.  Continue current regimen without changes.      3. Morbid obesity with BMI of 50.0-59.9, adult (H) E66.01 Has reduced soda intake, drinking more water.  Starting to reduce carbohydrate intake.  Goal: reducing processed foods, increasing vegetables           Russel Hsieh MD  [facility]

## 2019-11-04 ENCOUNTER — HEALTH MAINTENANCE LETTER (OUTPATIENT)
Age: 39
End: 2019-11-04

## 2020-01-20 ENCOUNTER — PRE VISIT (OUTPATIENT)
Dept: PEDIATRICS | Facility: CLINIC | Age: 40
End: 2020-01-20

## 2020-01-20 ASSESSMENT — ENCOUNTER SYMPTOMS
FEVER: 0
ABDOMINAL PAIN: 0
COUGH: 0
HEMATURIA: 0
JOINT SWELLING: 0
ARTHRALGIAS: 0
MYALGIAS: 0
NERVOUS/ANXIOUS: 0
PARESTHESIAS: 0
HEMATOCHEZIA: 0
HEADACHES: 0
HEARTBURN: 0
CONSTIPATION: 0
FREQUENCY: 0
CHILLS: 0
NAUSEA: 0
SHORTNESS OF BREATH: 0
WEAKNESS: 0
DIZZINESS: 0
PALPITATIONS: 0
SORE THROAT: 0
DIARRHEA: 0
EYE PAIN: 0
DYSURIA: 0

## 2020-01-20 NOTE — TELEPHONE ENCOUNTER
Called pt to [facility] for upcoming appointment, pt did not answer. LVM for pt to call us back with any questions/concerns or if they need to cancel/reschedule their appointment.     Nupur Rincon, EMT at 11:39 AM on January 20, 2020  [facility] Health Guide   [phone]

## 2020-01-20 NOTE — PROGRESS NOTES
SUBJECTIVE:   CC: Jeffry Younger is an 39 year old male who presents for preventative health visit.     Healthy Habits:     Getting at least 3 servings of Calcium per day:  Yes    Bi-annual eye exam:  Yes    Dental care twice a year:  Yes    Sleep apnea or symptoms of sleep apnea:  Sleep apnea    Diet:  Diabetic    Frequency of exercise:  2-3 days/week    Duration of exercise:  15-30 minutes    Taking medications regularly:  Yes    Medication side effects:  None    PHQ-2 Total Score: 0    Additional concerns today:  No      Diabetes Follow-up    How often are you checking your blood sugar? A few times a week  What time of day are you checking your blood sugars (select all that apply)?  Before meals and after meals  Have you had any blood sugars above 200?  Yes sometimes   Have you had any blood sugars below 70?  No    What symptoms do you notice when your blood sugar is low?  None    What concerns do you have today about your diabetes? None     Do you have any of these symptoms? (Select all that apply)  No numbness or tingling in feet.  No redness, sores or blisters on feet.  No complaints of excessive thirst.  No reports of blurry vision.  No significant changes to weight.    Have you had a diabetic eye exam in the last 12 months? No has an eye exam scheduled in March      BP Readings from Last 2 Encounters:   01/22/20 130/70   06/11/19 120/78     Hemoglobin A1C (%)   Date Value   06/11/2019 7.4 (H)   12/31/2018 8.1 (H)     LDL Cholesterol Calculated (mg/dL)   Date Value   06/11/2019 60   12/31/2018 67         Today's PHQ-2 Score:   PHQ-2 ( 1999 [employer]) 1/22/2020   Q1: Little interest or pleasure in doing things 0   Q2: Feeling down, depressed or hopeless 0   PHQ-2 Score 0   Q1: Little interest or pleasure in doing things Not at all   Q2: Feeling down, depressed or hopeless Not at all   PHQ-2 Score 0       Abuse: Current or Past(Physical, Sexual or Emotional)- No  Do you feel safe in your environment?  Yes        Social History     Tobacco Use     Smoking status: Never Smoker     Smokeless tobacco: Never Used   Substance Use Topics     Alcohol use: No     Alcohol/week: 0.0 standard drinks     Frequency: Monthly or less     Drinks per session: 1 or 2     Binge frequency: Never         Alcohol Use 1/20/2020   Prescreen: >3 drinks/day or >7 drinks/week? No   Prescreen: >3 drinks/day or >7 drinks/week? -       Last PSA: No results found for: PSA    Reviewed orders with patient. Reviewed health maintenance and updated orders accordingly - Yes      Reviewed and updated as needed this visit by clinical staff  Tobacco  Allergies  Med Hx  Surg Hx  Fam Hx  Soc Hx        Reviewed and updated as needed this visit by Provider          Patient Active Problem List   Diagnosis     Morbid obesity with BMI of 50.0-59.9, adult (H)     PAOLA (obstructive sleep apnea)     Type 2 diabetes mellitus with hyperglycemia, without long-term current use of insulin (H)     Hyperlipidemia LDL goal <100     ADAMA (generalized anxiety disorder)     Past Medical History:   Diagnosis Date     Obesity        Past Surgical History:   Procedure Laterality Date     AS ESOPHAGOSCOPY, DIAGNOSTIC      EGD       Family History   Problem Relation Age of Onset     Diabetes Father      Hypertension Father      Colon Cancer No family hx of      Prostate Cancer No family hx of      Cerebrovascular Disease No family hx of      Coronary Artery Disease No family hx of        ALLERGIES   No Known Allergies    Current Outpatient Medications   Medication Sig Dispense Refill     atorvastatin (LIPITOR) 10 MG tablet Take 1 tablet (10 mg) by mouth daily 90 tablet 3     liraglutide (VICTOZA) 18 MG/3ML solution Inject 1.2 mg Subcutaneous daily 18 mL 1     metFORMIN (GLUCOPHAGE-XR) 500 MG 24 hr tablet Take 3 tablets (1,500 mg) by mouth daily 270 tablet 1     blood glucose monitoring (ACCU-CHEK FASTCLIX) lancets 1 each daily 102 each 3     blood glucose monitoring (NO BRAND  "SPECIFIED) test strip Use to test blood sugar 2 times daily or as directed. Accu-chek Guide test strips. 100 strip 6     insulin pen needle 29G X 5MM Use 1 pen needles daily or as directed. 100 each 3     liraglutide (VICTOZA) 18 MG/3ML solution Inject 0.6 mg Subcutaneous daily for 14 days, THEN 1.2 mg daily. 18 mL 1        Review of Systems   Constitutional: Negative for chills and fever.   HENT: Negative for congestion, ear pain, hearing loss and sore throat.    Eyes: Negative for pain and visual disturbance.   Respiratory: Negative for cough and shortness of breath.    Cardiovascular: Negative for chest pain, palpitations and peripheral edema.   Gastrointestinal: Negative for abdominal pain, constipation, diarrhea, heartburn, hematochezia and nausea.   Genitourinary: Negative for discharge, dysuria, frequency, genital sores, hematuria, impotence and urgency.   Musculoskeletal: Negative for arthralgias, joint swelling and myalgias.   Skin: Negative for rash.   Neurological: Negative for dizziness, weakness, headaches and paresthesias.   Psychiatric/Behavioral: Negative for mood changes. The patient is not nervous/anxious.          OBJECTIVE:   /70 (BP Location: Left arm, Patient Position: Sitting, Cuff Size: Adult Large)   Pulse 99   Temp 98.4  F (36.9  C) (Oral)   Resp 17   Ht 1.845 m (6' 0.64\")   Wt (!) 170.6 kg (376 lb 1.6 oz)   SpO2 97%   BMI 50.12 kg/m      Physical Exam  GENERAL: healthy, alert and no distress  EYES: Eyes grossly normal to inspection, PERRL and conjunctivae and sclerae normal  HENT: ear canals and TM's normal, nose and mouth without ulcers or lesions  NECK: no adenopathy, no asymmetry, masses, or scars and thyroid normal to palpation  RESP: lungs clear to auscultation - no rales, rhonchi or wheezes  CV: regular rate and rhythm, normal S1 S2, no S3 or S4, no murmur, click or rub, no peripheral edema and peripheral pulses strong  ABDOMEN: soft, nontender, no hepatosplenomegaly, no " "masses and bowel sounds normal  MS: no gross musculoskeletal defects noted, no edema  SKIN: no suspicious lesions or rashes  NEURO: Normal strength and tone, mentation intact and speech normal  PSYCH: mentation appears normal, affect normal/bright    ASSESSMENT/PLAN:       ICD-10-CM    1. Routine general medical examination at a health care facility Z00.00        2. Type 2 diabetes mellitus with hyperglycemia, without long-term current use of insulin (H) E11.65 HEMOGLOBIN A1C     Albumin Random Urine Quantitative with Creat Ratio     metFORMIN (GLUCOPHAGE-XR) 500 MG 24 hr tablet     liraglutide (VICTOZA) 18 MG/3ML solution      Current regimen Metformin 1500 mg daily, Victoza 1.2 mg daily.  A1c above goal, however has continued to lose significant weight since last visit.  Repeat A1c next follow-up 6 months.     3. Hyperlipidemia LDL goal <100 E78.5 Lipid panel reflex to direct LDL Fasting     Comprehensive metabolic panel (BMP + Alb, Alk Phos, ALT, AST, Total. Bili, TP)     atorvastatin (LIPITOR) 10 MG tablet  Lab Results   Component Value Date    LDL 60 06/11/2019      Well-controlled, fasting for lab work today.      4. Morbid obesity with BMI of 50.0-59.9, adult (H) E66.01   Significant weight loss (intentional ) since most recent visit.  Patient met with counseling and addressed underlying anxiety issues associated with stress eating.  In addition has reduced portion sizes, eliminated soda, eliminated desserts and increased healthy snacking.  Discussed weight loss goals-return for follow-up 6 months.    Z68.43    5. ADAMA (generalized anxiety disorder) F41.1   Improved.       COUNSELING:   Reviewed preventive health counseling, as reflected in patient instructions       Regular exercise       Healthy diet/nutrition       Colon cancer screening       Prostate cancer screening    Estimated body mass index is 50.12 kg/m  as calculated from the following:    Height as of this encounter: 1.845 m (6' 0.64\").    Weight " as of this encounter: 170.6 kg (376 lb 1.6 oz).     Weight management plan: Discussed healthy diet and exercise guidelines     reports that he has never smoked. He has never used smokeless tobacco.      Counseling Resources:  ATP IV Guidelines  Pooled Cohorts Equation Calculator  FRAX Risk Assessment  ICSI Preventive Guidelines  Dietary Guidelines for Americans, 2010  USDA's MyPlate  ASA Prophylaxis  Lung CA Screening    Russel Hsieh MD, MD  [facility]    Addendum:  Lab Results   Component Value Date    A1C 10.4 01/22/2020     Contacted patient today after the clinic visit.  Unfortunately significant jump in A1c today suggests his ongoing weight loss is secondary to poorly controlled diabetes rather than healthy lifestyle changes noted above.  Recommended increase metformin from 1500 to 2000 mg daily.  Patient has not been checking his blood sugars for the past few months.  Did recommend he check blood sugars before breakfast and before dinner record and email results in about 5 to 7 days.  Continue Victoza 1.2 mg daily.  Consider adding SGLT2 inhibitor versus once daily glargine insulin.    Russel Hsieh MD

## 2020-01-20 NOTE — PROGRESS NOTES
Pre-Visit Planning     Future Appointments   Date Time Provider Department Center   1/22/2020  9:00 AM Russel Hsieh MD [facility]     Arrival Time for this Appointment:  9:00 AM   Appointment Notes for this encounter:   Annual appt and diabetic numbers follow up    Questionnaires Reviewed/Assigned  Additional questionnaires assigned: PHQ-2    Patient preferred phone number: [phone]    Unable to reach. Left voicemail. Advised patient to call clinic back at [phone].    Previous appointment instructions    Pt had no in between care since last office visit with PCP.     Nupur Rincon, EMT at 9:16 AM on January 20, 2020  [facility] Health Guide   [phone]

## 2020-01-22 ENCOUNTER — OFFICE VISIT (OUTPATIENT)
Dept: PEDIATRICS | Facility: CLINIC | Age: 40
End: 2020-01-22
Payer: COMMERCIAL

## 2020-01-22 VITALS
WEIGHT: 315 LBS | HEIGHT: 73 IN | DIASTOLIC BLOOD PRESSURE: 70 MMHG | SYSTOLIC BLOOD PRESSURE: 130 MMHG | BODY MASS INDEX: 41.75 KG/M2 | OXYGEN SATURATION: 97 % | HEART RATE: 99 BPM | TEMPERATURE: 98.4 F | RESPIRATION RATE: 17 BRPM

## 2020-01-22 DIAGNOSIS — Z00.00 ROUTINE GENERAL MEDICAL EXAMINATION AT A HEALTH CARE FACILITY: Primary | ICD-10-CM

## 2020-01-22 DIAGNOSIS — E11.65 TYPE 2 DIABETES MELLITUS WITH HYPERGLYCEMIA, WITHOUT LONG-TERM CURRENT USE OF INSULIN (H): ICD-10-CM

## 2020-01-22 DIAGNOSIS — E66.01 MORBID OBESITY WITH BMI OF 50.0-59.9, ADULT (H): ICD-10-CM

## 2020-01-22 DIAGNOSIS — F41.1 GAD (GENERALIZED ANXIETY DISORDER): ICD-10-CM

## 2020-01-22 DIAGNOSIS — E78.5 HYPERLIPIDEMIA LDL GOAL <100: ICD-10-CM

## 2020-01-22 LAB — HBA1C MFR BLD: 10.4 % (ref 0–5.6)

## 2020-01-22 PROCEDURE — 36415 COLL VENOUS BLD VENIPUNCTURE: CPT | Performed by: INTERNAL MEDICINE

## 2020-01-22 PROCEDURE — 80061 LIPID PANEL: CPT | Performed by: INTERNAL MEDICINE

## 2020-01-22 PROCEDURE — 82043 UR ALBUMIN QUANTITATIVE: CPT | Performed by: INTERNAL MEDICINE

## 2020-01-22 PROCEDURE — 83036 HEMOGLOBIN GLYCOSYLATED A1C: CPT | Performed by: INTERNAL MEDICINE

## 2020-01-22 PROCEDURE — 99395 PREV VISIT EST AGE 18-39: CPT | Performed by: INTERNAL MEDICINE

## 2020-01-22 PROCEDURE — 80053 COMPREHEN METABOLIC PANEL: CPT | Performed by: INTERNAL MEDICINE

## 2020-01-22 RX ORDER — METFORMIN HCL 500 MG
1500 TABLET, EXTENDED RELEASE 24 HR ORAL DAILY
Qty: 270 TABLET | Refills: 1 | Status: SHIPPED | OUTPATIENT
Start: 2020-01-22 | End: 2020-01-29

## 2020-01-22 RX ORDER — ATORVASTATIN CALCIUM 10 MG/1
10 TABLET, FILM COATED ORAL DAILY
Qty: 90 TABLET | Refills: 3 | Status: SHIPPED | OUTPATIENT
Start: 2020-01-22 | End: 2020-10-08

## 2020-01-22 RX ORDER — LIRAGLUTIDE 6 MG/ML
1.2 INJECTION SUBCUTANEOUS DAILY
Qty: 18 ML | Refills: 1 | Status: SHIPPED | OUTPATIENT
Start: 2020-01-22 | End: 2020-05-04

## 2020-01-22 ASSESSMENT — ENCOUNTER SYMPTOMS
WEAKNESS: 0
HEMATOCHEZIA: 0
PARESTHESIAS: 0
DIZZINESS: 0
FEVER: 0
HEARTBURN: 0
COUGH: 0
CHILLS: 0
ARTHRALGIAS: 0
DYSURIA: 0
SHORTNESS OF BREATH: 0
DIARRHEA: 0
HEADACHES: 0
SORE THROAT: 0
EYE PAIN: 0
NAUSEA: 0
CONSTIPATION: 0
HEMATURIA: 0
MYALGIAS: 0
JOINT SWELLING: 0
FREQUENCY: 0
ABDOMINAL PAIN: 0
PALPITATIONS: 0
NERVOUS/ANXIOUS: 0

## 2020-01-22 ASSESSMENT — MIFFLIN-ST. JEOR: SCORE: 2679.11

## 2020-01-23 LAB
ALBUMIN SERPL-MCNC: 3.8 G/DL (ref 3.4–5)
ALP SERPL-CCNC: 102 U/L (ref 40–150)
ALT SERPL W P-5'-P-CCNC: 60 U/L (ref 0–70)
ANION GAP SERPL CALCULATED.3IONS-SCNC: 6 MMOL/L (ref 3–14)
AST SERPL W P-5'-P-CCNC: 20 U/L (ref 0–45)
BILIRUB SERPL-MCNC: 0.7 MG/DL (ref 0.2–1.3)
BUN SERPL-MCNC: 15 MG/DL (ref 7–30)
CALCIUM SERPL-MCNC: 8.9 MG/DL (ref 8.5–10.1)
CHLORIDE SERPL-SCNC: 104 MMOL/L (ref 94–109)
CHOLEST SERPL-MCNC: 120 MG/DL
CO2 SERPL-SCNC: 25 MMOL/L (ref 20–32)
CREAT SERPL-MCNC: 0.9 MG/DL (ref 0.66–1.25)
CREAT UR-MCNC: 377 MG/DL
GFR SERPL CREATININE-BSD FRML MDRD: >90 ML/MIN/{1.73_M2}
GLUCOSE SERPL-MCNC: 249 MG/DL (ref 70–99)
HDLC SERPL-MCNC: 35 MG/DL
LDLC SERPL CALC-MCNC: 56 MG/DL
MICROALBUMIN UR-MCNC: 70 MG/L
MICROALBUMIN/CREAT UR: 18.67 MG/G CR (ref 0–17)
NONHDLC SERPL-MCNC: 85 MG/DL
POTASSIUM SERPL-SCNC: 4.4 MMOL/L (ref 3.4–5.3)
PROT SERPL-MCNC: 7.1 G/DL (ref 6.8–8.8)
SODIUM SERPL-SCNC: 135 MMOL/L (ref 133–144)
TRIGL SERPL-MCNC: 147 MG/DL

## 2020-01-24 DIAGNOSIS — E11.65 TYPE 2 DIABETES MELLITUS WITH HYPERGLYCEMIA, WITHOUT LONG-TERM CURRENT USE OF INSULIN (H): ICD-10-CM

## 2020-01-26 RX ORDER — BLOOD SUGAR DIAGNOSTIC
STRIP MISCELLANEOUS
Qty: 200 STRIP | Refills: 1 | Status: SHIPPED | OUTPATIENT
Start: 2020-01-26

## 2020-01-26 NOTE — TELEPHONE ENCOUNTER
"Requested Prescriptions   Signed Prescriptions Disp Refills    ACCU-CHEK GUIDE test strip 200 strip 1     Sig: TEST TWICE DAILY AS DIRECTED       Diabetic Supplies Protocol Passed - 1/24/2020  6:24 PM        Passed - Medication is active on med list        Passed - Patient is 18 years of age or older        Passed - Recent (6 mo) or future (30 days) visit within the authorizing provider's specialty     Patient had office visit in the last 6 months or has a visit in the next 30 days with authorizing provider.  See \"Patient Info\" tab in inbasket, or \"Choose Columns\" in Meds & Orders section of the refill encounter.              "

## 2020-01-28 ENCOUNTER — MYC MEDICAL ADVICE (OUTPATIENT)
Dept: PEDIATRICS | Facility: CLINIC | Age: 40
End: 2020-01-28

## 2020-01-28 DIAGNOSIS — E11.65 TYPE 2 DIABETES MELLITUS WITH HYPERGLYCEMIA, WITHOUT LONG-TERM CURRENT USE OF INSULIN (H): ICD-10-CM

## 2020-01-28 NOTE — TELEPHONE ENCOUNTER
Per OV 1/22:    Contacted patient today after the clinic visit.  Unfortunately significant jump in A1c today suggests his ongoing weight loss is secondary to poorly controlled diabetes rather than healthy lifestyle changes noted above.  Recommended increase metformin from 1500 to 2000 mg daily.  Patient has not been checking his blood sugars for the past few months.  Did recommend he check blood sugars before breakfast and before dinner record and email results in about 5 to 7 days.  Continue Victoza 1.2 mg daily.  Consider adding SGLT2 inhibitor versus once daily glargine insulin.     Russel Hsieh MD    Will route [employer] message to Dr. Hsieh for review.

## 2020-01-29 RX ORDER — METFORMIN HCL 500 MG
1000 TABLET, EXTENDED RELEASE 24 HR ORAL 2 TIMES DAILY WITH MEALS
Qty: 90 TABLET | Refills: 1 | COMMUNITY
Start: 2020-01-29 | End: 2020-05-04

## 2020-02-16 ENCOUNTER — MYC MEDICAL ADVICE (OUTPATIENT)
Dept: PEDIATRICS | Facility: CLINIC | Age: 40
End: 2020-02-16

## 2020-03-23 ENCOUNTER — VIRTUAL VISIT (OUTPATIENT)
Dept: SLEEP MEDICINE | Facility: CLINIC | Age: 40
End: 2020-03-23
Payer: COMMERCIAL

## 2020-03-23 VITALS — WEIGHT: 315 LBS | BODY MASS INDEX: 40.43 KG/M2 | HEIGHT: 74 IN

## 2020-03-23 DIAGNOSIS — G47.33 OSA (OBSTRUCTIVE SLEEP APNEA): Primary | ICD-10-CM

## 2020-03-23 PROCEDURE — 99212 OFFICE O/P EST SF 10 MIN: CPT | Mod: TEL | Performed by: PHYSICIAN ASSISTANT

## 2020-03-23 ASSESSMENT — MIFFLIN-ST. JEOR: SCORE: 2681.67

## 2020-03-23 NOTE — PROGRESS NOTES
"Jeffry Younger is a 40 year old male who is being evaluated via a billable telephone visit.      The patient has been notified of following:     \"This telephone visit will be conducted via a call between you and your physician/provider. We have found that certain health care needs can be provided without the need for a physical exam.  This service lets us provide the care you need with a short phone conversation.  If a prescription is necessary we can send it directly to your pharmacy.  If lab work is needed we can place an order for that and you can then stop by our lab to have the test done at a later time.    If during the course of the call the physician/provider feels a telephone visit is not appropriate, you will not be charged for this service.\"     Jeffry Younger complains of    Chief Complaint   Patient presents with     Sleep Problem     Follow up guilherme        I have reviewed and updated the patient's Past Medical History, Social History, Family History and Medication List.    ALLERGIES  Patient has no known allergies.    Sleep Follow-Up Virtual Visit:    Date on this visit: 3/23/2020    Jeffry Younger has a phone visit today for follow-up of his CPAP use for mild GUILHERME. He was initially seen at the [facility] for poor sleep quality, loud snoring, observed apnea and sleep paralysis.     PREVIOUS SLEEP STUDIES:  Date: 3/5/17. AHI: 12.2/hr (REM 32.5/hr and supine 45/hr). Lowest O2 sat: 85%. Weight: 400 pounds.    Most recent weight in his chart is from January: 376 pounds.    He says things are going just fine. He really likes CPAP and benefits from it greatly. He washes supplies weekly.     PAP machine: ResMed Airsense. Pressures 8-16 cm.     Interface:  Mask: nasal mask- Dreamwear  Chin strap: No  Leak: No. The mask fits well, has to tighten it occasionally.  Humidity: Yes. Rarely has condensation, usually if he keeps the window open. The water chamber does not run out of water.   "   Difficulties with therapy:    [-] Difficulty tolerating the pressure. Very used to it.   [-] Epistaxis: no dry nose  [-] Nasal congestion, occasional and uses nasal spray  [+] Dry mouth: Very rarely  [+] Mouth breathing: very rarely  [+] Pain/skin breakdown: slight irritation on cheeks from straps when he goes too long between changing masks. He does use strap covers but does not wash them. Lotion helps in day. Only replaces mask every 4-5 months.   [-] Snoring with CPAP     Improvements noted with CPAP:   [+] waking up more refreshed  [+] sleeping better with less arousals  [+] nocturia slightly improved. Notices waking less, but some of his diabetes medications cause him to wake to urinate.   [+] improved energy level during the day    Past medical/surgical history, family history, social history, medications and allergies were reviewed.      Problem List:  Patient Active Problem List    Diagnosis Date Noted     Type 2 diabetes mellitus with hyperglycemia, without long-term current use of insulin (H) 07/01/2018     Priority: Medium     Hyperlipidemia LDL goal <100 07/01/2018     Priority: Medium     ADAMA (generalized anxiety disorder) 06/26/2018     Priority: Medium     PAOLA (obstructive sleep apnea) 03/07/2017     Priority: Medium     Diagnostic Study  Sleep Study 3/5/2017 - (400.0 lbs) AHI 12.2, RDI 13.7, Supine AHI 45.0, REM AHI 32.5, Low O2 85.0%, Time Spent ?88% 1.0 minutes / Time Spent ?89% 2.0 minutes       Morbid obesity with BMI of 50.0-59.9, adult (H) 09/29/2015     Priority: Medium        Impression/Plan:    (G47.33) PAOLA (obstructive sleep apnea)  (primary encounter diagnosis)  Comment: Doing very well with CPAP. Some minor irritation on cheeks from straps  Plan: Comprehensive DME        Continue auto CPAP 8-16 cm. A prescription was written for new supplies. He was advised to wash the strap covers to help prevent skin irritation. We reviewed recommendations for cleaning and replacing supplies.       He  will follow up with me in about 2 year(s).     12 minutes (9:21 AM to 9:33 AM) spent with patient, all of which were spent counseling, consulting, coordinating plan of care.      Bennett Goltz, PA-C    CC: No ref. provider found

## 2020-03-23 NOTE — PATIENT INSTRUCTIONS
Your BMI is Body mass index is 47.89 kg/m .  Weight management is a personal decision.  If you are interested in exploring weight loss strategies, the following discussion covers the approaches that may be successful. Body mass index (BMI) is one way to tell whether you are at a healthy weight, overweight, or obese. It measures your weight in relation to your height.  A BMI of 18.5 to 24.9 is in the healthy range. A person with a BMI of 25 to 29.9 is considered overweight, and someone with a BMI of 30 or greater is considered obese. More than two-thirds of American adults are considered overweight or obese.  Being overweight or obese increases the risk for further weight gain. Excess weight may lead to heart disease and diabetes.  Creating and following plans for healthy eating and physical activity may help you improve your health.  Weight control is part of healthy lifestyle and includes exercise, emotional health, and healthy eating habits. Careful eating habits lifelong are the mainstay of weight control. Though there are significant health benefits from weight loss, long-term weight loss with diet alone may be very difficult to achieve- studies show long-term success with dietary management in less than 10% of people. Attaining a healthy weight may be especially difficult to achieve in those with severe obesity. In some cases, medications, devices and surgical management might be considered.  What can you do?  If you are overweight or obese and are interested in methods for weight loss, you should discuss this with your provider.     Consider reducing daily calorie intake by 500 calories.     Keep a food journal.     Avoiding skipping meals, consider cutting portions instead.    Diet combined with exercise helps maintain muscle while optimizing fat loss. Strength training is particularly important for building and maintaining muscle mass. Exercise helps reduce stress, increase energy, and improves fitness.  Increasing exercise without diet control, however, may not burn enough calories to loose weight.       Start walking three days a week 10-20 minutes at a time    Work towards walking thirty minutes five days a week     Eventually, increase the speed of your walking for 1-2 minutes at time    In addition, we recommend that you review healthy lifestyles and methods for weight loss available through the National Institutes of Health patient information sites:  http://win.niddk.nih.gov/publications/index.htm    And look into health and wellness programs that may be available through your health insurance provider, employer, local community center, or [employer] club.    Weight management plan: Patient was referred to their PCP to discuss a diet and exercise plan.

## 2020-04-30 ENCOUNTER — MYC MEDICAL ADVICE (OUTPATIENT)
Dept: PEDIATRICS | Facility: CLINIC | Age: 40
End: 2020-04-30

## 2020-05-01 NOTE — TELEPHONE ENCOUNTER
Called and spoke with patient.  Patient would like to do a telephone visit, and is scheduled for Monday 05/04/20 at 9:00 am.     Sherice Trinidad

## 2020-05-04 ENCOUNTER — VIRTUAL VISIT (OUTPATIENT)
Dept: PEDIATRICS | Facility: CLINIC | Age: 40
End: 2020-05-04
Payer: COMMERCIAL

## 2020-05-04 DIAGNOSIS — E11.65 TYPE 2 DIABETES MELLITUS WITH HYPERGLYCEMIA, WITHOUT LONG-TERM CURRENT USE OF INSULIN (H): Primary | ICD-10-CM

## 2020-05-04 DIAGNOSIS — G47.33 OSA (OBSTRUCTIVE SLEEP APNEA): ICD-10-CM

## 2020-05-04 DIAGNOSIS — E78.5 HYPERLIPIDEMIA LDL GOAL <100: ICD-10-CM

## 2020-05-04 PROCEDURE — 99213 OFFICE O/P EST LOW 20 MIN: CPT | Mod: TEL | Performed by: INTERNAL MEDICINE

## 2020-05-04 RX ORDER — LIRAGLUTIDE 6 MG/ML
1.2 INJECTION SUBCUTANEOUS DAILY
Qty: 18 ML | Refills: 1 | Status: SHIPPED | OUTPATIENT
Start: 2020-05-04 | End: 2020-10-08

## 2020-05-04 RX ORDER — METFORMIN HCL 500 MG
1000 TABLET, EXTENDED RELEASE 24 HR ORAL 2 TIMES DAILY WITH MEALS
Qty: 360 TABLET | Refills: 1 | Status: SHIPPED | OUTPATIENT
Start: 2020-05-04 | End: 2020-10-08

## 2020-05-04 NOTE — PATIENT INSTRUCTIONS
INSTRUCTIONS FOR TODAY:     no changes today--your blood sugar readings are much better!   please return for labwork in August     Dr Hsieh       Patient Education   Resources for Diabetes  Diabetes websites and resources  For more information about support groups and other resources in your area, contact your diabetes educator or national diabetes groups, such as the American Diabetes Association.  General diabetes  American Diabetes Association   1-676.857.9618; www.diabetes.org  (general information on diabetes, gestational diabetes or diabetes in children)  National Center for Chronic Disease Prevention and Health Promotion  1-254.337.5940 (CDC-INFO);   www.cdc.gov/diabetes  Medline Plus  www.nlm.nih.gov/medlineplus/diabetes.html  National Diabetes Information Clearinghouse  1-359.555.5855; www.diabetes.niddk.nih.gov  National Diabetes Education Program  1-583.451.7761; www.ndep.nih.gov  American Association of Diabetes Educators   www.diabeteseducator.org (find an educator near you)  Diabetes and nutrition  My Food Advisor  http://diabetes.org/food-and-fitness/food/myfoodadvisor.html  Academy of Nutrition and Dietetics  www.eatright.org  [employer]man [employer] Library  www.nutrition.gov  Choose My Plate, USDA  www.choosemyplate.gov  Diabetes and nutrition apps    Calorie Sanjay    MyFitness Pal    Glucose Zaire    AADE Goal Tracker    [place] diabetes education programs  [facility] offers a number of resources to help you manage your diabetes, including diabetes classes and pharmacist support.    For adults  [facility]: [phone]    [place]: [phone]  [facility]  [phone]  [facility]   [phone]  [facility]  [phone]  [place] pharmacists  Your pharmacist can answer many of your questions about your medicines. To schedule an appointment with a Medication Therapy Management  pharmacist, call [phone] (toll free: [phone]).  For a complete list of [facility] pharmacies and contact information, go to [email or web address].  Additional [facility] services  [facility] also offers the following services to support your diabetes care.     Behavioral Health    Cardiovascular and Heart Care    Care Coordination    Eye Care    Foot Care    Kidney Care    Neurology    Nutrition Care    Physical Therapy and Diabetes Activity Program    Weight Management  Ask your provider or diabetes educator if you'd like more information.  For informational purposes only. Not to replace the advice of your health care provider.   Copyright   2007 [facility]. All rights reserved. [facility] 058144 - REV 08/16.

## 2020-05-04 NOTE — PROGRESS NOTES
"Jeffry Younger is a 40 year old male who is being evaluated via a billable telephone visit.      The patient has been notified of following:     \"This telephone visit will be conducted via a call between you and your physician/provider. We have found that certain health care needs can be provided without the need for a physical exam.  This service lets us provide the care you need with a short phone conversation.  If a prescription is necessary we can send it directly to your pharmacy.  If lab work is needed we can place an order for that and you can then stop by our lab to have the test done at a later time.    Telephone visits are billed at different rates depending on your insurance coverage. During this emergency period, for some insurers they may be billed the same as an in-person visit.  Please reach out to your insurance provider with any questions.    If during the course of the call the physician/provider feels a telephone visit is not appropriate, you will not be charged for this service.\"    Patient has given verbal consent for Telephone visit?  Yes    What phone number would you like to be contacted at? [phone]    How would you like to obtain your AVS? Noreen Nova     Jeffry Younger is a 40 year old male who presents to clinic today for the following health issues:    HPI  Diabetes Follow-up  Chronic problems general questions HPI Form  How many servings of fruits and vegetables do you eat daily?: 2-3  On average, how many sweetened beverages do you drink each day (Examples: soda, juice, sweet tea, etc.  Do NOT count diet or artificially sweetened beverages)?: 2  How many minutes a day do you exercise enough to make your heart beat faster?: 20 to 29  How many days a week do you exercise enough to make your heart beat faster?: 5  How many days per week do you miss taking your medication?: 0  Frequency of checking blood sugars:: one time daily  What time of day are you checking your blood sugars " : before meals  Have you had any blood sugars above 200?: No.   Fasting sugars generally 140-145, evening sugar before dinner 150  Have you had any blood sugars below 70?: No  Hypoglycemia symptoms:: shakiness, dizziness--none  Diabetic concerns:: none  Paraesthesia present:: none of these symptoms  Have you had a diabetic eye exam within the last year?: No    BP     Data Unavailable  5/4/2020    Lab Results   Component Value Date     01/22/2020     Lab Results   Component Value Date    A1C 10.4 01/22/2020     Lab Results   Component Value Date    LDL 56 01/22/2020     Lab Results   Component Value Date    MICROL 70 01/22/2020     No results found for: MICROALBUMIN    Hyperlipidemia Follow-Up      Are you regularly taking any medication or supplement to lower your cholesterol?   Yes- lipitor    Are you having muscle aches or other side effects that you think could be caused by your cholesterol lowering medication?  No    Hypertension Follow-up      Do you check your blood pressure regularly outside of the clinic? No     Are you following a low salt diet? No    Are your blood pressures ever more than 140 on the top number (systolic) OR more   than 90 on the bottom number (diastolic), for example 140/90? Not taking    BP Readings from Last 2 Encounters:   01/22/20 130/70   06/11/19 120/78     Hemoglobin A1C (%)   Date Value   01/22/2020 10.4 (H)   06/11/2019 7.4 (H)     LDL Cholesterol Calculated (mg/dL)   Date Value   01/22/2020 56   06/11/2019 60       Patient Active Problem List   Diagnosis     Morbid obesity with BMI of 50.0-59.9, adult (H)     PAOLA (obstructive sleep apnea)     Type 2 diabetes mellitus with hyperglycemia, without long-term current use of insulin (H)     Hyperlipidemia LDL goal <100     ADAMA (generalized anxiety disorder)     Past Surgical History:   Procedure Laterality Date     AS ESOPHAGOSCOPY, DIAGNOSTIC      EGD       Social History     Tobacco Use     Smoking status: Never Smoker      Smokeless tobacco: Never Used   Substance Use Topics     Alcohol use: No     Alcohol/week: 0.0 standard drinks     Frequency: Monthly or less     Drinks per session: 1 or 2     Binge frequency: Never     Family History   Problem Relation Age of Onset     Diabetes Father      Hypertension Father      Colon Cancer No family hx of      Prostate Cancer No family hx of      Cerebrovascular Disease No family hx of      Coronary Artery Disease No family hx of          Current Outpatient Medications   Medication Sig Dispense Refill     ACCU-CHEK GUIDE test strip TEST TWICE DAILY AS DIRECTED 200 strip 1     atorvastatin (LIPITOR) 10 MG tablet Take 1 tablet (10 mg) by mouth daily 90 tablet 3     blood glucose monitoring (ACCU-CHEK FASTCLIX) lancets 1 each daily 102 each 3     canagliflozin (INVOKANA) 100 MG tablet Take 1 tablet (100 mg) by mouth every morning (before breakfast) 90 tablet 1     insulin pen needle (BD ULTRA-FINE) 29G X 12.7MM miscellaneous USE ONCE DAILY OR AS NEEDED 100 each 11     liraglutide (VICTOZA) 18 MG/3ML solution Inject 1.2 mg Subcutaneous daily 18 mL 1     metFORMIN (GLUCOPHAGE-XR) 500 MG 24 hr tablet Take 2 tablets (1,000 mg) by mouth 2 times daily (with meals) 360 tablet 1       Reviewed and updated as needed this visit by Provider         Review of Systems   ROS COMP: Constitutional, cardiovascular, pulmonary, and gu systems are negative, except as otherwise noted.       Objective   Reported vitals:  There were no vitals taken for this visit.        Assessment/Plan:      ICD-10-CM    1. Type 2 diabetes mellitus with hyperglycemia, without long-term current use of insulin (H)  E11.65 canagliflozin (INVOKANA) 100 MG tablet     liraglutide (VICTOZA) 18 MG/3ML solution     metFORMIN (GLUCOPHAGE-XR) 500 MG 24 hr tablet        Tolerating invokana without side effects /Medication side effects discussed.         BG readings much closer to goal, continue current rx, rtc 8/20 for follow-up labwork     2.  Hyperlipidemia LDL goal <100  E78.5 Adequate control.  Continue current regimen without changes.      3. PAOLA (obstructive sleep apnea)  G47.33 Continue CPAP       Phone call duration:  12 minutes    Russel Hsieh MD, MD

## 2020-06-08 ENCOUNTER — OFFICE VISIT (OUTPATIENT)
Dept: URGENT CARE | Facility: URGENT CARE | Age: 40
End: 2020-06-08
Payer: COMMERCIAL

## 2020-06-08 VITALS
DIASTOLIC BLOOD PRESSURE: 76 MMHG | WEIGHT: 315 LBS | SYSTOLIC BLOOD PRESSURE: 134 MMHG | OXYGEN SATURATION: 97 % | TEMPERATURE: 97.9 F | BODY MASS INDEX: 49.3 KG/M2 | HEART RATE: 86 BPM

## 2020-06-08 DIAGNOSIS — K64.4 EXTERNAL HEMORRHOIDS: Primary | ICD-10-CM

## 2020-06-08 PROCEDURE — 99214 OFFICE O/P EST MOD 30 MIN: CPT | Performed by: PHYSICIAN ASSISTANT

## 2020-06-08 RX ORDER — HYDROCORTISONE 25 MG/G
CREAM TOPICAL 2 TIMES DAILY PRN
Qty: 28 G | Refills: 0 | Status: SHIPPED | OUTPATIENT
Start: 2020-06-08 | End: 2024-03-11

## 2020-06-08 NOTE — PROGRESS NOTES
CHIEF COMPLAINT:   Chief Complaint   Patient presents with     Urgent Care     Rectal Problem     Had constipatipn x 4-5 days ago-  has had some rectal pain and stinging- slight bleeding with bowel movemets         HPI: Jeffry Younger is a 40 year old male who presents to clinic today for evaluation of rectal pain. Patient reports that on Thursday he had a painful bowel movement 4-5 days of having constipation. He had bleeding on Thursday, and continues to have scant bleeding with bowel movements. Blood is bright red. The pain has been constant. He has used preparation H without improvement.   Denies having fever or chills. No abd pain. NO diarrhea       Past Medical History:   Diagnosis Date     Obesity      Past Surgical History:   Procedure Laterality Date     AS ESOPHAGOSCOPY, DIAGNOSTIC      EGD     Social History     Tobacco Use     Smoking status: Never Smoker     Smokeless tobacco: Never Used   Substance Use Topics     Alcohol use: No     Alcohol/week: 0.0 standard drinks     Frequency: Monthly or less     Drinks per session: 1 or 2     Binge frequency: Never     Current Outpatient Medications   Medication     ACCU-CHEK GUIDE test strip     atorvastatin (LIPITOR) 10 MG tablet     blood glucose monitoring (ACCU-CHEK FASTCLIX) lancets     canagliflozin (INVOKANA) 100 MG tablet     insulin pen needle (BD ULTRA-FINE) 29G X 12.7MM miscellaneous     liraglutide (VICTOZA) 18 MG/3ML solution     metFORMIN (GLUCOPHAGE-XR) 500 MG 24 hr tablet     No current facility-administered medications for this visit.      No Known Allergies    10 point ROS of systems including Constitutional, Eyes, Respiratory, Cardiovascular, Gastroenterology, Genitourinary, Integumentary, Muscularskeletal, Psychiatric were all negative except for pertinent positives noted in my HPI.        Exam:  /76   Pulse 86   Temp 97.9  F (36.6  C) (Tympanic)   Wt (!) 174.2 kg (384 lb)   SpO2 97%   BMI 49.30 kg/m    Constitutional: healthy, alert  and no distress  Head: Normocephalic, atraumatic.  Eyes: conjunctiva clear, no drainage  ENT: MMM  Rectal: TTP at 9' oclock position. No skin changes. TTP in rectal canal at same spot.    Skin: no rashes  Neurologic: Speech clear, gait normal. Moves all extremities.      ASSESSMENT/PLAN:  1. External hemorrhoids  Symptoms c/w external hemorrhoids. NO evidence of external thrombosed. NO skin changes to suggest abscess or cellulitis.   He will use HTC cream and benzocaine for pain.   Advised him to use as prescribed. If he does not improve in the next 2-3 days, please follow-up with colorectal.   - hydrocortisone, Perianal, (HYDROCORTISONE) 2.5 % cream; Place rectally 2 times daily as needed for hemorrhoids  Dispense: 28 g; Refill: 0  - benzocaine (AMERICAINE) 20 % rectal ointment; Place rectally every 3 hours as needed for moderate pain  Dispense: 28 g; Refill: 0  - COLORECTAL SURGERY REFERRAL    Diagnosis and treatment plan was reviewed with patient and/or family.   We went over any labs or imaging. Discussed worsening symptoms or little to no relief despite treatment plan to follow-up with PCP or return to clinic.  Patient verbalizes understanding. All questions were addressed and answered.   Alyse Kimble PA-C

## 2020-06-08 NOTE — PATIENT INSTRUCTIONS
Patient Education     Hemorrhoids    Hemorrhoids are swollen and inflamed veins inside the rectum and near the anus. The rectum is the last several inches of the colon. The anus is the passage between the rectum and the outside of the body.  Causes  The veins can become swollen due to increased pressure in them. This is most often caused by:    Chronic constipation or diarrhea    Straining when having a bowel movement    Sitting too long on the toilet    A low-fiber diet    Pregnancy  Symptoms    Bleeding from the rectum (this may be noticeable after bowel movements)    Lump near the anus    Itching around the anus    Pain around the anus  There are different types of hemorrhoids. Depending on the type you have and the severity, you may be able to treat yourself at home. In some cases, a procedure may be the best treatment option. Your healthcare provider can tell you more about this, if needed.  Home care  General care    To get relief from pain or itching, try:  ? Medicines. Your healthcare provider may recommend stool softeners, suppositories, or laxatives to help manage constipation. Use these exactly as directed.  ? Sitz baths. A sitz bath involves sitting in a few inches of warm bath water. Be careful not to make the water so hot that you burn yourself--test it before sitting in it. Soak for about 10 to 15 minutes a few times a day. This may help relieve pain.  ? Topical products. Your healthcare provider may prescribe or recommend creams, ointments, or pads that can be applied to the hemorrhoid. Use these exactly as directed.  Tips to help prevent hemorrhoids    Eat more fiber. Fiber adds bulk to stool and absorbs water as it moves through your colon. This makes stool softer and easier to pass.  ? Increase the fiber in your diet with more fiber-rich foods. These include fresh fruit, vegetables, and whole grains.  ? Take a fiber supplement or bulking agent, if advised by your healthcare provider. These  include products such as psyllium or methylcellulose.    Drink more water. Your healthcare provider may direct you to drink plenty of water. This can help keep stool soft.    Be more active. Frequent exercise aids digestion and helps prevent constipation. It may also help make bowel movements more regular.    Don t strain during bowel movements. This can make hemorrhoids more likely. Also, don t sit on the toilet for long periods of time.  Follow-up care  Follow up with your healthcare provider as advised. If a culture or imaging tests were done, someone will let you know the results when they are ready. This may take a few days or longer. If your healthcare provider recommends a procedure for your hemorrhoids, these options can be discussed. Options may include surgery and outpatient office treatments.  When to seek medical advice  Call your healthcare provider right away if any of these occur:    Increased bleeding from the rectum    Increased pain around the rectum or anus    Weakness or dizziness  Call 911  Call 911 if any of these occur:    Trouble breathing or swallowing    Fainting or loss of consciousness    Unusually fast heart rate    Vomiting blood    Large amounts of blood in stool or black, tarry stools  Date Last Reviewed: 9/1/2017 2000-2019 [employer]. [address] [place] [address], [place] [address]. All rights reserved. This information is not intended as a substitute for professional medical care. Always follow your healthcare professional's instructions.

## 2020-08-01 ENCOUNTER — TRANSFERRED RECORDS (OUTPATIENT)
Dept: HEALTH INFORMATION MANAGEMENT | Facility: CLINIC | Age: 40
End: 2020-08-01

## 2020-08-01 LAB — RETINOPATHY: NORMAL

## 2020-08-24 DIAGNOSIS — E11.65 TYPE 2 DIABETES MELLITUS WITH HYPERGLYCEMIA, WITHOUT LONG-TERM CURRENT USE OF INSULIN (H): ICD-10-CM

## 2020-08-24 LAB
ANION GAP SERPL CALCULATED.3IONS-SCNC: 10 MMOL/L (ref 3–14)
BUN SERPL-MCNC: 15 MG/DL (ref 7–30)
CALCIUM SERPL-MCNC: 9.9 MG/DL (ref 8.5–10.1)
CHLORIDE SERPL-SCNC: 103 MMOL/L (ref 94–109)
CO2 SERPL-SCNC: 22 MMOL/L (ref 20–32)
CREAT SERPL-MCNC: 0.9 MG/DL (ref 0.66–1.25)
GFR SERPL CREATININE-BSD FRML MDRD: >90 ML/MIN/{1.73_M2}
GLUCOSE SERPL-MCNC: 191 MG/DL (ref 70–99)
HBA1C MFR BLD: 8.8 % (ref 0–5.6)
POTASSIUM SERPL-SCNC: 4.1 MMOL/L (ref 3.4–5.3)
SODIUM SERPL-SCNC: 135 MMOL/L (ref 133–144)

## 2020-08-24 PROCEDURE — 80048 BASIC METABOLIC PNL TOTAL CA: CPT | Performed by: INTERNAL MEDICINE

## 2020-08-24 PROCEDURE — 83036 HEMOGLOBIN GLYCOSYLATED A1C: CPT | Performed by: INTERNAL MEDICINE

## 2020-08-24 PROCEDURE — 36415 COLL VENOUS BLD VENIPUNCTURE: CPT | Performed by: INTERNAL MEDICINE

## 2020-10-08 ENCOUNTER — VIRTUAL VISIT (OUTPATIENT)
Dept: PEDIATRICS | Facility: CLINIC | Age: 40
End: 2020-10-08
Payer: COMMERCIAL

## 2020-10-08 DIAGNOSIS — E11.65 TYPE 2 DIABETES MELLITUS WITH HYPERGLYCEMIA, WITHOUT LONG-TERM CURRENT USE OF INSULIN (H): Primary | ICD-10-CM

## 2020-10-08 DIAGNOSIS — E78.5 HYPERLIPIDEMIA LDL GOAL <100: ICD-10-CM

## 2020-10-08 DIAGNOSIS — G47.33 OSA (OBSTRUCTIVE SLEEP APNEA): ICD-10-CM

## 2020-10-08 PROCEDURE — 99214 OFFICE O/P EST MOD 30 MIN: CPT | Mod: TEL | Performed by: INTERNAL MEDICINE

## 2020-10-08 RX ORDER — LIRAGLUTIDE 6 MG/ML
1.8 INJECTION SUBCUTANEOUS DAILY
Qty: 27 ML | Refills: 1 | Status: SHIPPED | OUTPATIENT
Start: 2020-10-08 | End: 2020-12-18

## 2020-10-08 RX ORDER — METFORMIN HCL 500 MG
1000 TABLET, EXTENDED RELEASE 24 HR ORAL 2 TIMES DAILY WITH MEALS
Qty: 360 TABLET | Refills: 1 | Status: SHIPPED | OUTPATIENT
Start: 2020-10-08 | End: 2021-03-04

## 2020-10-08 RX ORDER — ATORVASTATIN CALCIUM 10 MG/1
10 TABLET, FILM COATED ORAL DAILY
Qty: 90 TABLET | Refills: 3 | Status: SHIPPED | OUTPATIENT
Start: 2020-10-08 | End: 2021-11-01

## 2020-10-08 NOTE — PATIENT INSTRUCTIONS
Jeffry thank you for your time today.  Here's a summary of our virtual visit and the plan we discussed.   Please let us know if you have any additional questions:    BP     Data Unavailable  10/8/2020    Lab Results   Component Value Date     08/24/2020     Lab Results   Component Value Date    A1C 8.8 08/24/2020     Lab Results   Component Value Date    LDL 56 01/22/2020     Lab Results   Component Value Date    MICROL 70 01/22/2020       Diabetes: goal A1C less than 7.   Still high     Increase Victoza to 1.8mg daily     Increase Invokana to 200mg each morning (monitor for any rashes around your groin or urinary symptoms (burning or discomfort)  Please schedule 3 month follow-up visit with labwork    Have a good day!  Dr Hsieh

## 2020-12-29 DIAGNOSIS — E11.65 TYPE 2 DIABETES MELLITUS WITH HYPERGLYCEMIA, WITHOUT LONG-TERM CURRENT USE OF INSULIN (H): ICD-10-CM

## 2020-12-29 LAB — HBA1C MFR BLD: 8.8 % (ref 0–5.6)

## 2020-12-29 PROCEDURE — 83036 HEMOGLOBIN GLYCOSYLATED A1C: CPT | Performed by: INTERNAL MEDICINE

## 2020-12-29 PROCEDURE — 36415 COLL VENOUS BLD VENIPUNCTURE: CPT | Performed by: INTERNAL MEDICINE

## 2021-01-03 ENCOUNTER — TELEPHONE (OUTPATIENT)
Dept: PEDIATRICS | Facility: CLINIC | Age: 41
End: 2021-01-03

## 2021-01-03 DIAGNOSIS — E11.65 TYPE 2 DIABETES MELLITUS WITH HYPERGLYCEMIA, WITHOUT LONG-TERM CURRENT USE OF INSULIN (H): Primary | ICD-10-CM

## 2021-01-03 RX ORDER — FLURBIPROFEN SODIUM 0.3 MG/ML
SOLUTION/ DROPS OPHTHALMIC
Qty: 100 EACH | Refills: 3 | Status: SHIPPED | OUTPATIENT
Start: 2021-01-03 | End: 2021-03-04

## 2021-01-03 RX ORDER — GLUCOSAMINE HCL/CHONDROITIN SU 500-400 MG
CAPSULE ORAL
Qty: 100 EACH | Refills: 3 | Status: SHIPPED | OUTPATIENT
Start: 2021-01-03 | End: 2021-12-08

## 2021-01-04 ENCOUNTER — MYC MEDICAL ADVICE (OUTPATIENT)
Dept: PEDIATRICS | Facility: CLINIC | Age: 41
End: 2021-01-04

## 2021-01-04 DIAGNOSIS — E11.65 TYPE 2 DIABETES MELLITUS WITH HYPERGLYCEMIA, WITHOUT LONG-TERM CURRENT USE OF INSULIN (H): Primary | ICD-10-CM

## 2021-01-04 NOTE — TELEPHONE ENCOUNTER
Please call Vladimir --a1c persistently high and consistent with poorly controlled blood sugars.   Recommend adding once daily insulin to current regimen.   I have sent the script.   Should be checking blood sugars before breakfast and dinner--please have him send home readings after 1 week.  If needed we can have him meet with nursing or MTM to learn injections

## 2021-01-04 NOTE — TELEPHONE ENCOUNTER
Huddled with Dr. Мария Daley to postpone starting Lantus. Recommend following up with MTM or Diabetic Educator soon for follow up.   Follow up with diabetes visit & A1C check in 3 months.     Updated patient. He agrees with plan.     He would like to speak with Diabetes Ed again. Saw KRIS Cummings last March 2019.     Patient will continue his medications. Will hold off on Lantus.     We scheduled a fasting diabetes visit for March.   Labs entered due to patient having lab appointment before visit.

## 2021-01-04 NOTE — TELEPHONE ENCOUNTER
Patient sent in [employer] message to see if he can continue to try diet changes and postpone starting the new Lantus insulin.     Will huddle with Dr. Hsieh.

## 2021-02-05 ENCOUNTER — VIRTUAL VISIT (OUTPATIENT)
Dept: EDUCATION SERVICES | Facility: CLINIC | Age: 41
End: 2021-02-05
Payer: COMMERCIAL

## 2021-02-05 DIAGNOSIS — E11.65 TYPE 2 DIABETES MELLITUS WITH HYPERGLYCEMIA, WITHOUT LONG-TERM CURRENT USE OF INSULIN (H): Primary | ICD-10-CM

## 2021-02-05 PROCEDURE — G0108 DIAB MANAGE TRN  PER INDIV: HCPCS | Mod: 95 | Performed by: DIETITIAN, REGISTERED

## 2021-02-05 NOTE — PROGRESS NOTES
"Diabetes Self-Management Education & Support    Presents for: Individual review    Type of Service: Telephone Visit    How would patient like to obtain AVS? Noreen    SUBJECTIVE/OBJECTIVE:  Presents for: Individual review  Accompanied by: Self  Diabetes education in the past 24mo: No  Diabetes type: Type 2  Disease course: Stable  Transportation concerns: No  Other concerns:: Glasses  Cultural Influences/Ethnic Background:  American    Diabetes Symptoms & Complications:  Fatigue: No  Polydipsia: No  Polyphagia: No  Polyuria: No  Visual change: No  Slow healing wounds: No  Weight trend: Decreasing  Complications assessed today?: Yes  Autonomic neuropathy: No  CVA: No  Heart disease: No  Nephropathy: No  Peripheral neuropathy: No  Foot ulcerations: No  Peripheral Vascular Disease: No  Retinopathy: No  Sexual dysfunction: No    Patient Problem List and Family Medical History reviewed for relevant medical history, current medical status, and diabetes risk factors.    Vitals:  There were no vitals taken for this visit.  Estimated body mass index is 49.3 kg/m  as calculated from the following:    Height as of 3/23/20: 1.88 m (6' 2\").    Weight as of 6/8/20: 174.2 kg (384 lb).   Last 3 BP:   BP Readings from Last 3 Encounters:   06/08/20 134/76   01/22/20 130/70   06/11/19 120/78       History   Smoking Status     Never Smoker   Smokeless Tobacco     Never Used       Labs:  Lab Results   Component Value Date    A1C 8.8 12/29/2020     Lab Results   Component Value Date     08/24/2020     Lab Results   Component Value Date    LDL 56 01/22/2020     HDL Cholesterol   Date Value Ref Range Status   01/22/2020 35 (L) >39 mg/dL Final   ]  GFR Estimate   Date Value Ref Range Status   08/24/2020 >90 >60 mL/min/[1.73_m2] Final     Comment:     Non  GFR Calc  Starting 12/18/2018, serum creatinine based estimated GFR (eGFR) will be   calculated using the Chronic Kidney Disease Epidemiology Collaboration "   (CKD-EPI) equation.       GFR Estimate If Black   Date Value Ref Range Status   08/24/2020 >90 >60 mL/min/[1.73_m2] Final     Comment:      GFR Calc  Starting 12/18/2018, serum creatinine based estimated GFR (eGFR) will be   calculated using the Chronic Kidney Disease Epidemiology Collaboration   (CKD-EPI) equation.       Lab Results   Component Value Date    CR 0.90 08/24/2020     No results found for: MICROALBUMIN    Healthy Eating:  Healthy Eating Assessed Today: Yes  Cultural/Pentecostal diet restrictions?: No  Meal planning/habits: Smaller portions, Other, Plate planning method  How many times a week on average do you eat food made away from home (restaurant/take-out)?: 2  Meals include: Lunch, Dinner  Breakfast: skips- has earlier lunch  Lunch: turkey sandwich, soup, fruit  Dinner: chicken, roasted broccoli or carrots, air fried potatoes  Other: has reduced fast food to 1-2 times/ week - recently joined [employer], had started drinking regular soda this past summer but has since staopped  Beverages: Water, Diet soda, Tea  Has patient met with a dietitian in the past?: Yes    Being Active:  Being Active Assessed Today: Yes  How intense was your typical exercise? : Light (like stretching or slow walking)(has a treadmill and stair stepper)  Barrier to exercise: Access    Monitoring:  Monitoring Assessed Today: Yes  Times checking blood sugar at home (number): 5+  Times checking blood sugar at home (per): Week  Blood glucose trend: Decreasing    Fasting glucose ~ 145 mg/dl (decreased from 175-185 mg/dl)    Taking Medications:  Diabetes Medication(s)     Biguanides       metFORMIN (GLUCOPHAGE-XR) 500 MG 24 hr tablet    Take 2 tablets (1,000 mg) by mouth 2 times daily (with meals)    Sodium-Glucose Co-Transporter 2 (SGLT2) Inhibitors       canagliflozin (INVOKANA) 100 MG tablet    Take 2 tablets (200 mg) by mouth every morning (before breakfast)    Incretin Mimetic Agents (GLP-1 Receptor Agonists)        liraglutide (VICTOZA) 18 MG/3ML solution    Inject 1.8 mg Subcutaneous daily          Taking Medication Assessed Today: Yes  Current Treatments: Oral Medication (taken by mouth), Non-insulin Injectables  Problems taking diabetes medications regularly?: No  Diabetes medication side effects?: No    Problem Solving:  Problem Solving Assessed Today: Yes  Is the patient at risk for hypoglycemia?: No  Hypoglycemia Frequency: Never  Medical ID: No  Does patient have DKA prevention plan?: No  Does patient have severe weather/disaster plan for diabetes management?: Yes  Does patient have sick day plan for diabetes management?: No    Hypoglycemia symptoms  Confusion: No  Dizziness or Light-Headedness: No  Headaches: No  Hunger: No  Mood changes: No  Nervousness/Anxiety: No  Sleepiness: No  Speech difficulty: No  Sweats: No  Feeling shaky: No    Hypoglycemia Complications  Blackouts: No  Hospitalization: No  Nocturnal hypoglycemia: No  Required assistance: No  Required glucagon injection: No  Seizures: No    Reducing Risks:  Reducing Risks Assessed Today: No  CAD Risks: Obesity, Sedentary lifestyle, Stress, Male sex, Diabetes Mellitus  Has dilated eye exam at least once a year?: Yes  Sees dentist every 6 months?: Yes  Feet checked by healthcare provider in the last year?: No    Healthy Coping:  Healthy Coping Assessed Today: Yes  Emotional response to diabetes: Concern for health and well-being  Informal Support system:: Family, Spouse  Stage of change: ACTION (Actively working towards change)  Support resources: Weight Management  Patient Activation Measure Survey Score:  DILAN Score (Last Two) 6/10/2019 1/20/2020   DILAN Raw Score 30 34   Activation Score 56 68.9   DILAN Level 3 3       Diabetes knowledge and skills assessment:   Patient is knowledgeable in diabetes management concepts related to: Healthy Eating, Being Active, Monitoring, Taking Medication, Problem Solving, Reducing Risks and Healthy Coping    Patient needs  "further education on the following diabetes management concepts: Healthy Eating, Taking Medication, Reducing Risks and Healthy Coping    Based on learning assessment above, most appropriate setting for further diabetes education would be: Group class or Individual setting.      INTERVENTION:  Education provided today on:  AADE Self-Care Behaviors:  Healthy Eating: weight reduction and behavior modification  Taking Medication: adding insulin or additional medication when necessary  Reducing Risks: prevention, early diagnostic measures and treatment of complications and A1C - goals, relating to blood glucose levels, how often to check  Healthy Coping: benefits of making appropriate lifestyle changes and utilize support systems    Opportunities for ongoing education and support in diabetes-self management were discussed.    Pt verbalized understanding of concepts discussed and recommendations provided today.       Education Materials Provided:  No new materials provided today      ASSESSMENT:  Pt reports PCP is considering insulin if unable to get A1c to target. Per pt report, fasting BG values are improving since stopped regular soda, and reducing fast food intake. Has join [employer], wt decreased from 375# --> 365#. Reinforced principles of DM self mgmt,  weight mgmt, and goals of treatment, etc. Pt admits \"feels fine when blood sugars are high\", thus has had difficulty accepting the risk of long term health complications. Pt does appear motivated to continue healthier lifestyle efforts. Has A1c repeat next month, will contact CDE via [employer] with update and/or any needs for additional support and education.     Patient's most recent   Lab Results   Component Value Date    A1C 8.8 12/29/2020    is not meeting goal of <7.0    PLAN  See Patient Instructions for co-developed, patient-stated behavior change goals.  AVS printed and provided to patient today. See Follow-Up section for recommended follow-up.    Jackie Santos RD, " [facility]  Diabetes     Time Spent: 30 minutes  Encounter Type: Individual    Any diabetes medication dose changes were made via the CDE Protocol and Collaborative Practice Agreement with the patient's primary care provider. A copy of this encounter was shared with the provider.

## 2021-02-05 NOTE — LETTER
"    2/5/2021         RE: Jeffry Younger  [address]  [place] [address]        Dear Colleague,    Thank you for referring your patient, Jeffry Younger, to the [facility]. Please see a copy of my visit note below.    Diabetes Self-Management Education & Support    Presents for: Individual review    Type of Service: Telephone Visit    How would patient like to obtain AVS? MyChart    SUBJECTIVE/OBJECTIVE:  Presents for: Individual review  Accompanied by: Self  Diabetes education in the past 24mo: No  Diabetes type: Type 2  Disease course: Stable  Transportation concerns: No  Other concerns:: Glasses  Cultural Influences/Ethnic Background:  American    Diabetes Symptoms & Complications:  Fatigue: No  Polydipsia: No  Polyphagia: No  Polyuria: No  Visual change: No  Slow healing wounds: No  Weight trend: Decreasing  Complications assessed today?: Yes  Autonomic neuropathy: No  CVA: No  Heart disease: No  Nephropathy: No  Peripheral neuropathy: No  Foot ulcerations: No  Peripheral Vascular Disease: No  Retinopathy: No  Sexual dysfunction: No    Patient Problem List and Family Medical History reviewed for relevant medical history, current medical status, and diabetes risk factors.    Vitals:  There were no vitals taken for this visit.  Estimated body mass index is 49.3 kg/m  as calculated from the following:    Height as of 3/23/20: 1.88 m (6' 2\").    Weight as of 6/8/20: 174.2 kg (384 lb).   Last 3 BP:   BP Readings from Last 3 Encounters:   06/08/20 134/76   01/22/20 130/70   06/11/19 120/78       History   Smoking Status     Never Smoker   Smokeless Tobacco     Never Used       Labs:  Lab Results   Component Value Date    A1C 8.8 12/29/2020     Lab Results   Component Value Date     08/24/2020     Lab Results   Component Value Date    LDL 56 01/22/2020     HDL Cholesterol   Date Value Ref Range Status   01/22/2020 35 (L) >39 mg/dL Final   ]  GFR Estimate   Date Value Ref " Range Status   08/24/2020 >90 >60 mL/min/[1.73_m2] Final     Comment:     Non  GFR Calc  Starting 12/18/2018, serum creatinine based estimated GFR (eGFR) will be   calculated using the Chronic Kidney Disease Epidemiology Collaboration   (CKD-EPI) equation.       GFR Estimate If Black   Date Value Ref Range Status   08/24/2020 >90 >60 mL/min/[1.73_m2] Final     Comment:      GFR Calc  Starting 12/18/2018, serum creatinine based estimated GFR (eGFR) will be   calculated using the Chronic Kidney Disease Epidemiology Collaboration   (CKD-EPI) equation.       Lab Results   Component Value Date    CR 0.90 08/24/2020     No results found for: MICROALBUMIN    Healthy Eating:  Healthy Eating Assessed Today: Yes  Cultural/Restoration diet restrictions?: No  Meal planning/habits: Smaller portions, Other, Plate planning method  How many times a week on average do you eat food made away from home (restaurant/take-out)?: 2  Meals include: Lunch, Dinner  Breakfast: skips- has earlier lunch  Lunch: turkey sandwich, soup, fruit  Dinner: chicken, roasted broccoli or carrots, air fried potatoes  Other: has reduced fast food to 1-2 times/ week - recently joined [employer], had started drinking regular soda this past summer but has since staopped  Beverages: Water, Diet soda, Tea  Has patient met with a dietitian in the past?: Yes    Being Active:  Being Active Assessed Today: Yes  How intense was your typical exercise? : Light (like stretching or slow walking)(has a treadmill and stair stepper)  Barrier to exercise: Access    Monitoring:  Monitoring Assessed Today: Yes  Times checking blood sugar at home (number): 5+  Times checking blood sugar at home (per): Week  Blood glucose trend: Decreasing    Fasting glucose ~ 145 mg/dl (decreased from 175-185 mg/dl)    Taking Medications:  Diabetes Medication(s)     Biguanides       metFORMIN (GLUCOPHAGE-XR) 500 MG 24 hr tablet    Take 2 tablets (1,000 mg) by mouth 2  times daily (with meals)    Sodium-Glucose Co-Transporter 2 (SGLT2) Inhibitors       canagliflozin (INVOKANA) 100 MG tablet    Take 2 tablets (200 mg) by mouth every morning (before breakfast)    Incretin Mimetic Agents (GLP-1 Receptor Agonists)       liraglutide (VICTOZA) 18 MG/3ML solution    Inject 1.8 mg Subcutaneous daily          Taking Medication Assessed Today: Yes  Current Treatments: Oral Medication (taken by mouth), Non-insulin Injectables  Problems taking diabetes medications regularly?: No  Diabetes medication side effects?: No    Problem Solving:  Problem Solving Assessed Today: Yes  Is the patient at risk for hypoglycemia?: No  Hypoglycemia Frequency: Never  Medical ID: No  Does patient have DKA prevention plan?: No  Does patient have severe weather/disaster plan for diabetes management?: Yes  Does patient have sick day plan for diabetes management?: No    Hypoglycemia symptoms  Confusion: No  Dizziness or Light-Headedness: No  Headaches: No  Hunger: No  Mood changes: No  Nervousness/Anxiety: No  Sleepiness: No  Speech difficulty: No  Sweats: No  Feeling shaky: No    Hypoglycemia Complications  Blackouts: No  Hospitalization: No  Nocturnal hypoglycemia: No  Required assistance: No  Required glucagon injection: No  Seizures: No    Reducing Risks:  Reducing Risks Assessed Today: No  CAD Risks: Obesity, Sedentary lifestyle, Stress, Male sex, Diabetes Mellitus  Has dilated eye exam at least once a year?: Yes  Sees dentist every 6 months?: Yes  Feet checked by healthcare provider in the last year?: No    Healthy Coping:  Healthy Coping Assessed Today: Yes  Emotional response to diabetes: Concern for health and well-being  Informal Support system:: Family, Spouse  Stage of change: ACTION (Actively working towards change)  Support resources: Weight Management  Patient Activation Measure Survey Score:  DILAN Score (Last Two) 6/10/2019 1/20/2020   DILAN Raw Score 30 34   Activation Score 56 68.9   DILAN Level 3 3  "      Diabetes knowledge and skills assessment:   Patient is knowledgeable in diabetes management concepts related to: Healthy Eating, Being Active, Monitoring, Taking Medication, Problem Solving, Reducing Risks and Healthy Coping    Patient needs further education on the following diabetes management concepts: Healthy Eating, Taking Medication, Reducing Risks and Healthy Coping    Based on learning assessment above, most appropriate setting for further diabetes education would be: Group class or Individual setting.      INTERVENTION:  Education provided today on:  AADE Self-Care Behaviors:  Healthy Eating: weight reduction and behavior modification  Taking Medication: adding insulin or additional medication when necessary  Reducing Risks: prevention, early diagnostic measures and treatment of complications and A1C - goals, relating to blood glucose levels, how often to check  Healthy Coping: benefits of making appropriate lifestyle changes and utilize support systems    Opportunities for ongoing education and support in diabetes-self management were discussed.    Pt verbalized understanding of concepts discussed and recommendations provided today.       Education Materials Provided:  No new materials provided today      ASSESSMENT:  Pt reports PCP is considering insulin if unable to get A1c to target. Per pt report, fasting BG values are improving since stopped regular soda, and reducing fast food intake. Has join [employer], wt decreased from 375# --> 365#. Reinforced principles of DM self mgmt,  weight mgmt, and goals of treatment, etc. Pt admits \"feels fine when blood sugars are high\", thus has had difficulty accepting the risk of long term health complications. Pt does appear motivated to continue healthier lifestyle efforts. Has A1c repeat next month, will contact CDE via [employer] with update and/or any needs for additional support and education.     Patient's most recent   Lab Results   Component Value Date    A1C " 8.8 12/29/2020    is not meeting goal of <7.0    PLAN  See Patient Instructions for co-developed, patient-stated behavior change goals.  AVS printed and provided to patient today. See Follow-Up section for recommended follow-up.    Jackie Santos RD, [facility]  Diabetes     Time Spent: 30 minutes  Encounter Type: Individual    Any diabetes medication dose changes were made via the CDE Protocol and Collaborative Practice Agreement with the patient's primary care provider. A copy of this encounter was shared with the provider.

## 2021-02-06 NOTE — PATIENT INSTRUCTIONS
It was a pleasure talking with you today. Glad to hear you are making positive lifestyle changes to improve your diabetes and manage your weight!  I would recommend continue with your current [employer] program.     Please send a MyChart update in 4 weeks after your next A1c.    Jackie Santos RD, [facility]  Diabetes

## 2021-03-01 DIAGNOSIS — E11.65 TYPE 2 DIABETES MELLITUS WITH HYPERGLYCEMIA, WITHOUT LONG-TERM CURRENT USE OF INSULIN (H): ICD-10-CM

## 2021-03-01 LAB
CHOLEST SERPL-MCNC: 113 MG/DL
CREAT UR-MCNC: 182 MG/DL
HBA1C MFR BLD: 7.4 % (ref 0–5.6)
HDLC SERPL-MCNC: 35 MG/DL
LDLC SERPL CALC-MCNC: 57 MG/DL
MICROALBUMIN UR-MCNC: 34 MG/L
MICROALBUMIN/CREAT UR: 18.63 MG/G CR (ref 0–17)
NONHDLC SERPL-MCNC: 78 MG/DL
TRIGL SERPL-MCNC: 106 MG/DL

## 2021-03-01 PROCEDURE — 36415 COLL VENOUS BLD VENIPUNCTURE: CPT | Performed by: INTERNAL MEDICINE

## 2021-03-01 PROCEDURE — 83036 HEMOGLOBIN GLYCOSYLATED A1C: CPT | Performed by: INTERNAL MEDICINE

## 2021-03-01 PROCEDURE — 82043 UR ALBUMIN QUANTITATIVE: CPT | Performed by: INTERNAL MEDICINE

## 2021-03-01 PROCEDURE — 80061 LIPID PANEL: CPT | Performed by: INTERNAL MEDICINE

## 2021-03-04 ENCOUNTER — VIRTUAL VISIT (OUTPATIENT)
Dept: PEDIATRICS | Facility: CLINIC | Age: 41
End: 2021-03-04
Payer: COMMERCIAL

## 2021-03-04 DIAGNOSIS — E66.01 MORBID OBESITY WITH BMI OF 50.0-59.9, ADULT (H): ICD-10-CM

## 2021-03-04 DIAGNOSIS — E11.65 TYPE 2 DIABETES MELLITUS WITH HYPERGLYCEMIA, WITHOUT LONG-TERM CURRENT USE OF INSULIN (H): Primary | ICD-10-CM

## 2021-03-04 DIAGNOSIS — E78.5 HYPERLIPIDEMIA LDL GOAL <100: ICD-10-CM

## 2021-03-04 PROCEDURE — 99214 OFFICE O/P EST MOD 30 MIN: CPT | Mod: GT | Performed by: INTERNAL MEDICINE

## 2021-03-04 RX ORDER — METFORMIN HCL 500 MG
1000 TABLET, EXTENDED RELEASE 24 HR ORAL 2 TIMES DAILY WITH MEALS
Qty: 360 TABLET | Refills: 1 | Status: SHIPPED | OUTPATIENT
Start: 2021-03-04 | End: 2021-11-15

## 2021-03-04 RX ORDER — INSULIN GLARGINE 100 [IU]/ML
INJECTION, SOLUTION SUBCUTANEOUS
COMMUNITY
Start: 2021-01-04 | End: 2021-03-04

## 2021-03-04 RX ORDER — LIRAGLUTIDE 6 MG/ML
1.8 INJECTION SUBCUTANEOUS DAILY
Qty: 27 ML | Refills: 1 | Status: SHIPPED | OUTPATIENT
Start: 2021-03-04 | End: 2021-11-15

## 2021-03-04 NOTE — PROGRESS NOTES
"Vladimir is a 40 year old who is being evaluated via a billable video visit.      How would you like to obtain your AVS? MyChart  If the video visit is dropped, the invitation should be resent by: Text to cell phone: [phone]  Will anyone else be joining your video visit? No      Video Start Time: 8:52 AM    Assessment & Plan     Type 2 diabetes mellitus with hyperglycemia, without long-term current use of insulin (H)  Poorly controlled with improvement since last check--a1c closer to goal.   Doing well with weight loss program.  Denies medication side effects.  Did not end up starting insulin      Continue current rx, RTC 3 months   - liraglutide (VICTOZA) 18 MG/3ML solution; Inject 1.8 mg Subcutaneous daily  - metFORMIN (GLUCOPHAGE-XR) 500 MG 24 hr tablet; Take 2 tablets (1,000 mg) by mouth 2 times daily (with meals)  - canagliflozin (INVOKANA) 100 MG tablet; Take 2 tablets (200 mg) by mouth every morning (before breakfast)    Hyperlipidemia LDL goal <100    Adequate control.  Continue current medication.     Obesity.   Successful weight loss using NUM program with reduced portion sizes, increasing activity       BMI:   Estimated body mass index is 49.3 kg/m  as calculated from the following:    Height as of 3/23/20: 1.88 m (6' 2\").    Weight as of 6/8/20: 174.2 kg (384 lb).   Weight management plan: Specific weight management program called NUM discussed    Return in about 3 months (around 6/4/2021) for Diabetes follow-up.    Russel Hsieh MD  [facility] [place]   Vladimir is a 40 year old who presents for the following health issues:    HPI     Diabetes Follow-up    How often are you checking your blood sugar? Two times daily  Blood sugar testing frequency justification:  Uncontrolled diabetes  What time of day are you checking your blood sugars (select all that apply)?  Before and after meals  Have you had any blood sugars above 200?  No  Have you had any blood sugars below 70?  No    What " symptoms do you notice when your blood sugar is low?  Shaky and Lethargy    What concerns do you have today about your diabetes? None     Do you have any of these symptoms? (Select all that apply)  No numbness or tingling in feet.  No redness, sores or blisters on feet.  No complaints of excessive thirst.  No reports of blurry vision.  No significant changes to weight.    BP Readings from Last 2 Encounters:   06/08/20 134/76   01/22/20 130/70     Hemoglobin A1C (%)   Date Value   03/01/2021 7.4 (H)   12/29/2020 8.8 (H)     LDL Cholesterol Calculated (mg/dL)   Date Value   03/01/2021 57   01/22/2020 56       Hyperlipidemia Follow-Up      Are you regularly taking any medication or supplement to lower your cholesterol?   Yes- statin    Are you having muscle aches or other side effects that you think could be caused by your cholesterol lowering medication?  No    Chronic problems general questions HPI Form  How many servings of fruits and vegetables do you eat daily?: 2-3  On average, how many sweetened beverages do you drink each day (Examples: soda, juice, sweet tea, etc.  Do NOT count diet or artificially sweetened beverages)?: 2  How many minutes a day do you exercise enough to make your heart beat faster?: 10 to 19  How many days a week do you exercise enough to make your heart beat faster?: 4  How many days per week do you miss taking your medication?: 0    Patient Active Problem List   Diagnosis     Morbid obesity with BMI of 50.0-59.9, adult (H)     PAOLA (obstructive sleep apnea)     Type 2 diabetes mellitus with hyperglycemia, without long-term current use of insulin (H)     Hyperlipidemia LDL goal <100     ADAMA (generalized anxiety disorder)     Current Outpatient Medications   Medication Sig Dispense Refill     ACCU-CHEK GUIDE test strip TEST TWICE DAILY AS DIRECTED 200 strip 1     alcohol swab prep pads Use to swab area of injection/kayleigh as directed. 100 each 3     atorvastatin (LIPITOR) 10 MG tablet Take 1  tablet (10 mg) by mouth daily 90 tablet 3     benzocaine (AMERICAINE) 20 % rectal ointment Place rectally every 3 hours as needed for moderate pain 28 g 0     blood glucose monitoring (ACCU-CHEK FASTCLIX) lancets 1 each daily 102 each 3     canagliflozin (INVOKANA) 100 MG tablet Take 2 tablets (200 mg) by mouth every morning (before breakfast) 180 tablet 1     hydrocortisone, Perianal, (HYDROCORTISONE) 2.5 % cream Place rectally 2 times daily as needed for hemorrhoids 28 g 0     liraglutide (VICTOZA) 18 MG/3ML solution Inject 1.8 mg Subcutaneous daily 27 mL 1     metFORMIN (GLUCOPHAGE-XR) 500 MG 24 hr tablet Take 2 tablets (1,000 mg) by mouth 2 times daily (with meals) 360 tablet 1      Review of Systems   Constitutional, HEENT, cardiovascular, pulmonary, gi and gu systems are negative, except as otherwise noted.      Objective         Vitals:  No vitals were obtained today due to virtual visit.    Physical Exam   GENERAL: Healthy, alert and no distress  EYES: Eyes grossly normal to inspection.  No discharge or erythema, or obvious scleral/conjunctival abnormalities.  RESP: No audible wheeze, cough, or visible cyanosis.  No visible retractions or increased work of breathing.    SKIN: Visible skin clear. No significant rash, abnormal pigmentation or lesions.  NEURO: Cranial nerves grossly intact.  Mentation and speech appropriate for age.  PSYCH: Mentation appears normal, affect normal/bright, judgement and insight intact, normal speech and appearance well-groomed.        Video-Visit Details    Type of service:  Video Visit    Video End Time:9:04 AM    Originating Location (pt. Location): Home    Distant Location (provider location):  [facility]     Platform used for Video Visit: Betty

## 2021-04-03 DIAGNOSIS — E66.01 MORBID OBESITY WITH BMI OF 50.0-59.9, ADULT (H): ICD-10-CM

## 2021-04-03 DIAGNOSIS — E11.65 TYPE 2 DIABETES MELLITUS WITH HYPERGLYCEMIA, WITHOUT LONG-TERM CURRENT USE OF INSULIN (H): ICD-10-CM

## 2021-04-04 ENCOUNTER — HEALTH MAINTENANCE LETTER (OUTPATIENT)
Age: 41
End: 2021-04-04

## 2021-04-07 RX ORDER — PEN NEEDLE, DIABETIC 29 G X1/2"
NEEDLE, DISPOSABLE MISCELLANEOUS
Qty: 100 EACH | Refills: 1 | Status: SHIPPED | OUTPATIENT
Start: 2021-04-07 | End: 2021-12-08

## 2021-04-07 NOTE — TELEPHONE ENCOUNTER
Prescription approved per [facility] Refill Protocol.  Jackie Nicole RN, BSN  Message handled by CLINIC NURSE.

## 2021-04-30 DIAGNOSIS — G47.33 OBSTRUCTIVE SLEEP APNEA (ADULT) (PEDIATRIC): Primary | ICD-10-CM

## 2021-07-04 ENCOUNTER — E-VISIT (OUTPATIENT)
Dept: URGENT CARE | Facility: CLINIC | Age: 41
End: 2021-07-04
Payer: COMMERCIAL

## 2021-07-04 DIAGNOSIS — B00.1 COLD SORE: Primary | ICD-10-CM

## 2021-07-04 PROCEDURE — 99421 OL DIG E/M SVC 5-10 MIN: CPT | Performed by: INTERNAL MEDICINE

## 2021-07-04 RX ORDER — PENCICLOVIR 10 MG/G
CREAM TOPICAL
Qty: 1.5 G | Refills: 0 | Status: SHIPPED | OUTPATIENT
Start: 2021-07-04 | End: 2021-12-08

## 2021-07-04 NOTE — PATIENT INSTRUCTIONS
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    Based on your responses, I have prescribed Denavir cream to treat this. Please follow the instructions on the medication. If you experience irritation of your skin, new rash, or any other new symptoms, you should stop using this medication and contact your primary care provider.    If this treatment does not work for you or your sores are worsening instead of improving or do not resolve in 7 days, please plan to follow-up with your primary care provider. They may be able to offer refills for you for future outbreaks as well.    Thanks for choosing?us?as your health care partner,?   ?   Sharon Peña MD?

## 2021-07-06 ENCOUNTER — MYC MEDICAL ADVICE (OUTPATIENT)
Dept: PEDIATRICS | Facility: CLINIC | Age: 41
End: 2021-07-06

## 2021-07-12 ENCOUNTER — LAB (OUTPATIENT)
Dept: LAB | Facility: CLINIC | Age: 41
End: 2021-07-12
Payer: COMMERCIAL

## 2021-07-12 DIAGNOSIS — E11.65 TYPE 2 DIABETES MELLITUS WITH HYPERGLYCEMIA, WITHOUT LONG-TERM CURRENT USE OF INSULIN (H): ICD-10-CM

## 2021-07-12 LAB
ALBUMIN SERPL-MCNC: 3.6 G/DL (ref 3.4–5)
ALP SERPL-CCNC: 93 U/L (ref 40–150)
ALT SERPL W P-5'-P-CCNC: 41 U/L (ref 0–70)
ANION GAP SERPL CALCULATED.3IONS-SCNC: 3 MMOL/L (ref 3–14)
AST SERPL W P-5'-P-CCNC: 16 U/L (ref 0–45)
BILIRUB SERPL-MCNC: 0.9 MG/DL (ref 0.2–1.3)
BUN SERPL-MCNC: 15 MG/DL (ref 7–30)
CALCIUM SERPL-MCNC: 9 MG/DL (ref 8.5–10.1)
CHLORIDE BLD-SCNC: 106 MMOL/L (ref 94–109)
CO2 SERPL-SCNC: 28 MMOL/L (ref 20–32)
CREAT SERPL-MCNC: 0.98 MG/DL (ref 0.66–1.25)
GFR SERPL CREATININE-BSD FRML MDRD: >90 ML/MIN/1.73M2
GLUCOSE BLD-MCNC: 147 MG/DL (ref 70–99)
HBA1C MFR BLD: 7.8 % (ref 0–5.6)
POTASSIUM BLD-SCNC: 4.2 MMOL/L (ref 3.4–5.3)
PROT SERPL-MCNC: 6.9 G/DL (ref 6.8–8.8)
SODIUM SERPL-SCNC: 137 MMOL/L (ref 133–144)

## 2021-07-12 PROCEDURE — 83036 HEMOGLOBIN GLYCOSYLATED A1C: CPT

## 2021-07-12 PROCEDURE — 80053 COMPREHEN METABOLIC PANEL: CPT

## 2021-07-12 PROCEDURE — 36415 COLL VENOUS BLD VENIPUNCTURE: CPT

## 2021-07-15 ENCOUNTER — VIRTUAL VISIT (OUTPATIENT)
Dept: PEDIATRICS | Facility: CLINIC | Age: 41
End: 2021-07-15
Payer: COMMERCIAL

## 2021-07-15 DIAGNOSIS — E11.65 TYPE 2 DIABETES MELLITUS WITH HYPERGLYCEMIA, WITHOUT LONG-TERM CURRENT USE OF INSULIN (H): Primary | ICD-10-CM

## 2021-07-15 DIAGNOSIS — E66.01 MORBID OBESITY WITH BMI OF 50.0-59.9, ADULT (H): ICD-10-CM

## 2021-07-15 DIAGNOSIS — E78.5 HYPERLIPIDEMIA LDL GOAL <100: ICD-10-CM

## 2021-07-15 PROCEDURE — 99214 OFFICE O/P EST MOD 30 MIN: CPT | Mod: 95 | Performed by: INTERNAL MEDICINE

## 2021-07-15 RX ORDER — FLURBIPROFEN SODIUM 0.3 MG/ML
SOLUTION/ DROPS OPHTHALMIC
COMMUNITY
Start: 2021-04-08 | End: 2024-03-12

## 2021-07-15 NOTE — PROGRESS NOTES
Vladimir is a 41 year old who is being evaluated via a billable video visit.      How would you like to obtain your AVS? MyChart  If the video visit is dropped, the invitation should be resent by: Text to cell phone: [phone]  Will anyone else be joining your video visit? No     Video Start Time: 11:18 AM    Assessment & Plan     (E11.65) Type 2 diabetes mellitus with hyperglycemia, without long-term current use of insulin (H)  (primary encounter diagnosis)  Comment:   Plan: poorly controlled.   Diet: continues up to 3 regular sodas daily for stress mgmt.  Discussed.   Recommended adding glargine insulin.  Pt declines, plans to eliminate sugar sweetened beverages and rtc 3months for follow-up  Plan to add insulin if a1c fails to improve    (E78.5) Hyperlipidemia LDL goal <100  Comment:   Plan: Adequate control.  Continue current medication.     (E66.01,  Z68.43) Morbid obesity with BMI of 50.0-59.9, adult (H)  Comment:   Plan: discussed dietary changes and increasing activity    Russel Hsieh MD  [facility]   Vladimir is a 41 year old who presents for the following health issues:     HPI     Diabetes Follow-up    How often are you checking your blood sugar? Two times daily  What time of day are you checking your blood sugars (select all that apply)?  Before and after meals  Have you had any blood sugars above 200?  Yes   Have you had any blood sugars below 70?  No    What symptoms do you notice when your blood sugar is low?  Shaky    What concerns do you have today about your diabetes? None     Do you have any of these symptoms? (Select all that apply)  No numbness or tingling in feet.  No redness, sores or blisters on feet.  No complaints of excessive thirst.  No reports of blurry vision.  No significant changes to weight.    Have you had a diabetic eye exam in the last 12 months? Yes - coming up needing another eye exam next month.       BP Readings from Last 2 Encounters:   06/08/20  134/76   01/22/20 130/70     Hemoglobin A1C (%)   Date Value   07/12/2021 7.8 (H)   03/01/2021 7.4 (H)   12/29/2020 8.8 (H)     LDL Cholesterol Calculated (mg/dL)   Date Value   03/01/2021 57   01/22/2020 56     Patient Active Problem List   Diagnosis     Morbid obesity with BMI of 50.0-59.9, adult (H)     PAOLA (obstructive sleep apnea)     Type 2 diabetes mellitus with hyperglycemia, without long-term current use of insulin (H)     Hyperlipidemia LDL goal <100     ADAMA (generalized anxiety disorder)     Current Outpatient Medications   Medication Sig Dispense Refill     ACCU-CHEK GUIDE test strip TEST TWICE DAILY AS DIRECTED 200 strip 1     alcohol swab prep pads Use to swab area of injection/kayleigh as directed. 100 each 3     atorvastatin (LIPITOR) 10 MG tablet Take 1 tablet (10 mg) by mouth daily 90 tablet 3     B-D U/F insulin pen needle USE 1 NEEDLE DAILY OR AS DIRECTED       benzocaine (AMERICAINE) 20 % rectal ointment Place rectally every 3 hours as needed for moderate pain 28 g 0     blood glucose monitoring (ACCU-CHEK FASTCLIX) lancets 1 each daily 102 each 3     canagliflozin (INVOKANA) 100 MG tablet Take 2 tablets (200 mg) by mouth every morning (before breakfast) 180 tablet 1     hydrocortisone, Perianal, (HYDROCORTISONE) 2.5 % cream Place rectally 2 times daily as needed for hemorrhoids 28 g 0     insulin pen needle (BD ULTRA-FINE) 29G X 12.7MM miscellaneous USE ONCE DAILY OR AS NEEDED 100 each 1     liraglutide (VICTOZA) 18 MG/3ML solution Inject 1.8 mg Subcutaneous daily 27 mL 1     metFORMIN (GLUCOPHAGE-XR) 500 MG 24 hr tablet Take 2 tablets (1,000 mg) by mouth 2 times daily (with meals) 360 tablet 1     penciclovir (DENAVIR) 1 % external cream Apply topically every 2 hours 1.5 g 0        Review of Systems   Constitutional, HEENT, cardiovascular, pulmonary, gi and gu systems are negative, except as otherwise noted.      Objective         Vitals:  No vitals were obtained today due to virtual  visit.    Physical Exam   GENERAL: Healthy, alert and no distress  EYES: Eyes grossly normal to inspection.  No discharge or erythema, or obvious scleral/conjunctival abnormalities.  RESP: No audible wheeze, cough, or visible cyanosis.  No visible retractions or increased work of breathing.    SKIN: Visible skin clear. No significant rash, abnormal pigmentation or lesions.  NEURO: Cranial nerves grossly intact.  Mentation and speech appropriate for age.  PSYCH: Mentation appears normal, affect normal/bright, judgement and insight intact, normal speech and appearance well-groomed.          Video-Visit Details    Type of service:  Video Visit    Video End Time:11:29 AM    Originating Location (pt. Location): Home    Distant Location (provider location):  [facility]     Platform used for Video Visit: [employer]

## 2021-09-19 ENCOUNTER — HEALTH MAINTENANCE LETTER (OUTPATIENT)
Age: 41
End: 2021-09-19

## 2021-09-28 ENCOUNTER — TRANSFERRED RECORDS (OUTPATIENT)
Dept: HEALTH INFORMATION MANAGEMENT | Facility: CLINIC | Age: 41
End: 2021-09-28

## 2021-09-28 LAB — RETINOPATHY: NEGATIVE

## 2021-10-11 ENCOUNTER — MYC MEDICAL ADVICE (OUTPATIENT)
Dept: PEDIATRICS | Facility: CLINIC | Age: 41
End: 2021-10-11

## 2021-10-12 DIAGNOSIS — E11.65 TYPE 2 DIABETES MELLITUS WITH HYPERGLYCEMIA, WITHOUT LONG-TERM CURRENT USE OF INSULIN (H): ICD-10-CM

## 2021-10-12 NOTE — TELEPHONE ENCOUNTER
Prescription approved per [facility] Refill Protocol.    Mary Palacios, RN   [facility]  -- Triage Nurse

## 2021-10-18 ENCOUNTER — MYC MEDICAL ADVICE (OUTPATIENT)
Dept: PEDIATRICS | Facility: CLINIC | Age: 41
End: 2021-10-18

## 2021-10-18 ENCOUNTER — MYC MEDICAL ADVICE (OUTPATIENT)
Dept: EDUCATION SERVICES | Facility: CLINIC | Age: 41
End: 2021-10-18

## 2021-10-18 DIAGNOSIS — E11.65 TYPE 2 DIABETES MELLITUS WITH HYPERGLYCEMIA, WITHOUT LONG-TERM CURRENT USE OF INSULIN (H): Primary | ICD-10-CM

## 2021-10-18 DIAGNOSIS — E78.5 HYPERLIPIDEMIA LDL GOAL <100: ICD-10-CM

## 2021-10-18 NOTE — TELEPHONE ENCOUNTER
Responded to [employer] message. Recommended that Vladimir make an appt with an educator to discuss sensors more and place an order if he would like to move forward.    Georgia Mcleod RN, [facility]

## 2021-11-01 ENCOUNTER — MYC MEDICAL ADVICE (OUTPATIENT)
Dept: PEDIATRICS | Facility: CLINIC | Age: 41
End: 2021-11-01

## 2021-11-01 DIAGNOSIS — E78.5 HYPERLIPIDEMIA LDL GOAL <100: ICD-10-CM

## 2021-11-03 RX ORDER — ATORVASTATIN CALCIUM 10 MG/1
10 TABLET, FILM COATED ORAL DAILY
Qty: 90 TABLET | Refills: 0 | Status: SHIPPED | OUTPATIENT
Start: 2021-11-03 | End: 2021-11-15

## 2021-11-03 NOTE — TELEPHONE ENCOUNTER
Pt has appointment scheduled  Prescription approved per [facility] Refill Protocol.  Jackie Nicole RN, BSN  Message handled by CLINIC NURSE.

## 2021-11-12 SDOH — HEALTH STABILITY: PHYSICAL HEALTH: ON AVERAGE, HOW MANY DAYS PER WEEK DO YOU ENGAGE IN MODERATE TO STRENUOUS EXERCISE (LIKE A BRISK WALK)?: 2 DAYS

## 2021-11-12 SDOH — ECONOMIC STABILITY: INCOME INSECURITY: HOW HARD IS IT FOR YOU TO PAY FOR THE VERY BASICS LIKE FOOD, HOUSING, MEDICAL CARE, AND HEATING?: NOT HARD AT ALL

## 2021-11-12 SDOH — ECONOMIC STABILITY: INCOME INSECURITY: IN THE LAST 12 MONTHS, WAS THERE A TIME WHEN YOU WERE NOT ABLE TO PAY THE MORTGAGE OR RENT ON TIME?: NO

## 2021-11-12 SDOH — ECONOMIC STABILITY: FOOD INSECURITY: WITHIN THE PAST 12 MONTHS, YOU WORRIED THAT YOUR FOOD WOULD RUN OUT BEFORE YOU GOT MONEY TO BUY MORE.: NEVER TRUE

## 2021-11-12 SDOH — ECONOMIC STABILITY: TRANSPORTATION INSECURITY
IN THE PAST 12 MONTHS, HAS LACK OF TRANSPORTATION KEPT YOU FROM MEETINGS, WORK, OR FROM GETTING THINGS NEEDED FOR DAILY LIVING?: NO

## 2021-11-12 SDOH — ECONOMIC STABILITY: FOOD INSECURITY: WITHIN THE PAST 12 MONTHS, THE FOOD YOU BOUGHT JUST DIDN'T LAST AND YOU DIDN'T HAVE MONEY TO GET MORE.: NEVER TRUE

## 2021-11-12 SDOH — ECONOMIC STABILITY: TRANSPORTATION INSECURITY
IN THE PAST 12 MONTHS, HAS THE LACK OF TRANSPORTATION KEPT YOU FROM MEDICAL APPOINTMENTS OR FROM GETTING MEDICATIONS?: NO

## 2021-11-12 SDOH — HEALTH STABILITY: PHYSICAL HEALTH: ON AVERAGE, HOW MANY MINUTES DO YOU ENGAGE IN EXERCISE AT THIS LEVEL?: 20 MIN

## 2021-11-12 ASSESSMENT — SOCIAL DETERMINANTS OF HEALTH (SDOH)
HOW OFTEN DO YOU ATTEND CHURCH OR RELIGIOUS SERVICES?: 1 TO 4 TIMES PER YEAR
DO YOU BELONG TO ANY CLUBS OR ORGANIZATIONS SUCH AS CHURCH GROUPS UNIONS, FRATERNAL OR ATHLETIC GROUPS, OR SCHOOL GROUPS?: YES
IN A TYPICAL WEEK, HOW MANY TIMES DO YOU TALK ON THE PHONE WITH FAMILY, FRIENDS, OR NEIGHBORS?: THREE TIMES A WEEK
HOW OFTEN DO YOU GET TOGETHER WITH FRIENDS OR RELATIVES?: TWICE A WEEK

## 2021-11-12 ASSESSMENT — ENCOUNTER SYMPTOMS
CONSTIPATION: 0
NERVOUS/ANXIOUS: 0
NAUSEA: 0
HEARTBURN: 0
SORE THROAT: 0
WEAKNESS: 0
HEMATURIA: 0
DYSURIA: 0
DIARRHEA: 0
HEMATOCHEZIA: 0
COUGH: 0
ABDOMINAL PAIN: 0
FREQUENCY: 0
JOINT SWELLING: 0
PALPITATIONS: 0
DIZZINESS: 0
ARTHRALGIAS: 0
FEVER: 0
MYALGIAS: 0
SHORTNESS OF BREATH: 0
EYE PAIN: 0
CHILLS: 0
HEADACHES: 0
PARESTHESIAS: 0

## 2021-11-12 ASSESSMENT — LIFESTYLE VARIABLES
HOW MANY STANDARD DRINKS CONTAINING ALCOHOL DO YOU HAVE ON A TYPICAL DAY: 1 OR 2
HOW OFTEN DO YOU HAVE SIX OR MORE DRINKS ON ONE OCCASION: NEVER
HOW OFTEN DO YOU HAVE A DRINK CONTAINING ALCOHOL: MONTHLY OR LESS

## 2021-11-12 NOTE — PATIENT INSTRUCTIONS
Stop Victoza  Script for Freestyle judy  Will contact you with results, likely add insulin  Diabetes follow-up 3 months      Preventive Health Recommendations  Male Ages 26 - 39    Yearly exam:             See your health care provider every year in order to  o   Review health changes.   o   Discuss preventive care.    o   Review your medicines if your doctor has prescribed any.    You should be tested each year for STDs (sexually transmitted diseases), if you re at risk.     After age 35, talk to your provider about cholesterol testing. If you are at risk for heart disease, have your cholesterol tested at least every 5 years.     If you are at risk for diabetes, you should have a diabetes test (fasting glucose).  Shots: Get a flu shot each year. Get a tetanus shot every 10 years.     Nutrition:    Eat at least 5 servings of fruits and vegetables daily.     Eat whole-grain bread, whole-wheat pasta and brown rice instead of white grains and rice.     Get adequate Calcium and Vitamin D.     Lifestyle    Exercise for at least 150 minutes a week (30 minutes a day, 5 days a week). This will help you control your weight and prevent disease.     Limit alcohol to one drink per day.     No smoking.     Wear sunscreen to prevent skin cancer.     See your dentist every six months for an exam and cleaning.

## 2021-11-12 NOTE — PROGRESS NOTES
SUBJECTIVE:   CC: Jeffry Younger is an 41 year old male who presents for preventative health visit. Patient has been advised of split billing requirements and indicates understanding: Yes     Healthy Habits:     Getting at least 3 servings of Calcium per day:  Yes    Bi-annual eye exam:  Yes    Dental care twice a year:  Yes    Sleep apnea or symptoms of sleep apnea:  Sleep apnea    Diet:  Diabetic    Frequency of exercise:  2-3 days/week    Duration of exercise:  15-30 minutes    Taking medications regularly:  Yes    Medication side effects:  Other    PHQ-2 Total Score: 0    Additional concerns today:  Yes    Today's PHQ-2 Score:   PHQ-2 ( 1999 [employer]) 11/12/2021   Q1: Little interest or pleasure in doing things 0   Q2: Feeling down, depressed or hopeless 0   PHQ-2 Score 0   PHQ-2 Total Score (12-17 Years)- Positive if 3 or more points; Administer PHQ-A if positive -   Q1: Little interest or pleasure in doing things Not at all   Q2: Feeling down, depressed or hopeless Not at all   PHQ-2 Score 0     Abuse: Current or Past(Physical, Sexual or Emotional)- No  Do you feel safe in your environment? Yes    Have you ever done Advance Care Planning? (For example, a Health Directive, POLST, or a discussion with a medical provider or your loved ones about your wishes): No, advance care planning information given to patient to review.  Patient plans to discuss their wishes with loved ones or provider.      Social History     Tobacco Use     Smoking status: Never Smoker     Smokeless tobacco: Never Used   Substance Use Topics     Alcohol use: Yes     Alcohol/week: 0.0 standard drinks     Comment: 1 per month maybe     Alcohol Use 11/12/2021   Prescreen: >3 drinks/day or >7 drinks/week? No   Prescreen: >3 drinks/day or >7 drinks/week? -     Last PSA: No results found for: PSA    Reviewed orders with patient. Reviewed health maintenance and updated orders accordingly - Yes  Patient Active Problem List   Diagnosis     Morbid  obesity with BMI of 50.0-59.9, adult (H)     PAOLA (obstructive sleep apnea)     Type 2 diabetes mellitus with hyperglycemia, without long-term current use of insulin (H)     Hyperlipidemia LDL goal <100     ADAMA (generalized anxiety disorder)     Family history of thyroid cancer     Past Surgical History:   Procedure Laterality Date     AS ESOPHAGOSCOPY, DIAGNOSTIC      EGD     BIOPSY  2008    egd normal       Social History     Tobacco Use     Smoking status: Never Smoker     Smokeless tobacco: Never Used   Substance Use Topics     Alcohol use: Yes     Alcohol/week: 0.0 standard drinks     Comment: 1 per month maybe     Family History   Problem Relation Age of Onset     Diabetes Father      Hypertension Father      Other Cancer Father         Kidney & Thyroid     Cerebrovascular Disease Paternal Grandfather      Other Cancer Brother         Lukemia     Other Cancer Maternal Grandmother         Pancreatic     Other Cancer Maternal Grandfather         Lung - smoker     Colon Cancer No family hx of      Prostate Cancer No family hx of      Cerebrovascular Disease No family hx of      Coronary Artery Disease No family hx of          Current Outpatient Medications   Medication Sig Dispense Refill     ACCU-CHEK GUIDE test strip TEST TWICE DAILY AS DIRECTED 200 strip 1     alcohol swab prep pads Use to swab area of injection/kayleigh as directed. 100 each 3     atorvastatin (LIPITOR) 10 MG tablet Take 1 tablet (10 mg) by mouth daily 90 tablet 3     B-D U/F insulin pen needle USE 1 NEEDLE DAILY OR AS DIRECTED       benzocaine (AMERICAINE) 20 % rectal ointment Place rectally every 3 hours as needed for moderate pain 28 g 0     blood glucose monitoring (ACCU-CHEK FASTCLIX) lancets 1 each daily 102 each 3     canagliflozin (INVOKANA) 100 MG tablet Take 2 tablets (200 mg) by mouth every morning (before breakfast) 180 tablet 1     Continuous Blood Gluc Sensor (FREESTYLE KULWANT 2 SENSOR) MISC 1 each every 14 days 1 each every 14 days.  Change every 14 days. 2 each 5     hydrocortisone, Perianal, (HYDROCORTISONE) 2.5 % cream Place rectally 2 times daily as needed for hemorrhoids 28 g 0     insulin pen needle (BD ULTRA-FINE) 29G X 12.7MM miscellaneous USE ONCE DAILY OR AS NEEDED 100 each 1     metFORMIN (GLUCOPHAGE-XR) 500 MG 24 hr tablet Take 2 tablets (1,000 mg) by mouth 2 times daily (with meals) 360 tablet 1     penciclovir (DENAVIR) 1 % external cream Apply topically every 2 hours 1.5 g 0     alcohol swab prep pads Use to swab area of injection/kayleigh as directed. 100 each 3     insulin glargine (LANTUS SOLOSTAR) 100 UNIT/ML pen Inject 15 Units Subcutaneous At Bedtime And titrate 30 mL 1     insulin pen needle (ULTICARE MINI) 31G X 6 MM miscellaneous Use 1 pen needles daily or as directed. 100 each 3       Reviewed and updated as needed this visit by clinical staff  Tobacco  Allergies  Meds   Med Hx  Surg Hx  Fam Hx  Soc Hx     Reviewed and updated as needed this visit by Provider                 Review of Systems   Constitutional: Negative for chills and fever.   HENT: Negative for congestion, ear pain, hearing loss and sore throat.    Eyes: Negative for pain and visual disturbance.   Respiratory: Negative for cough and shortness of breath.    Cardiovascular: Negative for chest pain, palpitations and peripheral edema.   Gastrointestinal: Negative for abdominal pain, constipation, diarrhea, heartburn, hematochezia and nausea.   Genitourinary: Negative for dysuria, frequency, genital sores, hematuria, impotence, penile discharge and urgency.   Musculoskeletal: Negative for arthralgias, joint swelling and myalgias.   Skin: Negative for rash.   Neurological: Negative for dizziness, weakness, headaches and paresthesias.   Psychiatric/Behavioral: Negative for mood changes. The patient is not nervous/anxious.        OBJECTIVE:   /80 (BP Location: Right arm, Patient Position: Sitting, Cuff Size: Adult Large)   Pulse 89   Temp 97.9  F  (36.6  C) (Tympanic)   Resp 18   Ht 1.829 m (6')   Wt (!) 163 kg (359 lb 6.4 oz)   SpO2 98%   BMI 48.74 kg/m      Physical Exam  GENERAL: healthy, alert and no distress  EYES: Eyes grossly normal to inspection, PERRL and conjunctivae and sclerae normal  HENT: ear canals and TM's normal, nose and mouth without ulcers or lesions  NECK: no adenopathy, no asymmetry, masses, or scars and thyroid normal to palpation  RESP: lungs clear to auscultation - no rales, rhonchi or wheezes  CV: regular rate and rhythm, normal S1 S2, no S3 or S4, no murmur, click or rub, no peripheral edema and peripheral pulses strong  ABDOMEN: soft, nontender, no hepatosplenomegaly, no masses and bowel sounds normal  MS: no gross musculoskeletal defects noted, no edema  SKIN: no suspicious lesions or rashes  NEURO: Normal strength and tone, mentation intact and speech normal  PSYCH: mentation appears normal, affect normal/bright        ASSESSMENT/PLAN:   (Z00.00) Routine general medical examination at a health care facility  (primary encounter diagnosis)    (E11.65) Type 2 diabetes mellitus with hyperglycemia, without long-term current use of insulin (H)  Comment:       Lab Results   Component Value Date          A1C 7.8 07/12/2021       Poorly controlled type 2 diabetes.  Since our most recent visit, patient's father was diagnosed with medullary thyroid cancer, and therefore GLP-1 agent is contraindicated.  Discontinue Ozempic .  diet: Patient admits to ongoing Mountain Dew intake 3-4 cans of soda daily.  He has tried for several years to switch to diet soda or otherwise eliminate Mountain Dew but has been unable to.  With regard to worsening blood sugar control, discussed adding once daily glargine insulin depending on A1c result today.  Plan: metFORMIN (GLUCOPHAGE-XR) 500 MG 24 hr tablet,         Continuous Blood Gluc  (FREESTYLE KULWANT        2 READER) SAMIA, Continuous Blood Gluc Sensor         (FREESTYLE KULWANT 2 SENSOR) [facility],  Hemoglobin         A1c, TSH with free T4 reflex          (E78.5) Hyperlipidemia LDL goal <100  Comment:   Lab Results   Component Value Date    LDL 57 03/01/2021   Plan: atorvastatin (LIPITOR) 10 MG tablet        Well controlled    (Z80.8) Family history of thyroid cancer  Comment:   Plan: Father recently diagnosed.  GLP-1 Ag discontinued      COUNSELING:   Reviewed preventive health counseling, as reflected in patient instructions       Regular exercise       Healthy diet/nutrition       Colon cancer screening       Prostate cancer screening    Estimated body mass index is 48.74 kg/m  as calculated from the following:    Height as of this encounter: 1.829 m (6').    Weight as of this encounter: 163 kg (359 lb 6.4 oz).     Weight management plan: Discussed healthy diet and exercise guidelines    He reports that he has never smoked. He has never used smokeless tobacco.    Counseling Resources:  ATP IV Guidelines  Pooled Cohorts Equation Calculator  FRAX Risk Assessment  ICSI Preventive Guidelines  Dietary Guidelines for Americans, 2010  USDA's MyPlate  ASA Prophylaxis  Lung CA Screening    Russel Hsieh MD  [facility]

## 2021-11-15 ENCOUNTER — OFFICE VISIT (OUTPATIENT)
Dept: PEDIATRICS | Facility: CLINIC | Age: 41
End: 2021-11-15
Payer: COMMERCIAL

## 2021-11-15 VITALS
RESPIRATION RATE: 18 BRPM | BODY MASS INDEX: 42.66 KG/M2 | HEIGHT: 72 IN | SYSTOLIC BLOOD PRESSURE: 130 MMHG | TEMPERATURE: 97.9 F | DIASTOLIC BLOOD PRESSURE: 80 MMHG | OXYGEN SATURATION: 98 % | HEART RATE: 89 BPM | WEIGHT: 315 LBS

## 2021-11-15 DIAGNOSIS — Z00.00 ROUTINE GENERAL MEDICAL EXAMINATION AT A HEALTH CARE FACILITY: Primary | ICD-10-CM

## 2021-11-15 DIAGNOSIS — E11.65 TYPE 2 DIABETES MELLITUS WITH HYPERGLYCEMIA, WITHOUT LONG-TERM CURRENT USE OF INSULIN (H): ICD-10-CM

## 2021-11-15 DIAGNOSIS — Z80.8 FAMILY HISTORY OF THYROID CANCER: ICD-10-CM

## 2021-11-15 DIAGNOSIS — E78.5 HYPERLIPIDEMIA LDL GOAL <100: ICD-10-CM

## 2021-11-15 LAB — HBA1C MFR BLD: 9.3 % (ref 0–5.6)

## 2021-11-15 PROCEDURE — 99213 OFFICE O/P EST LOW 20 MIN: CPT | Mod: 25 | Performed by: INTERNAL MEDICINE

## 2021-11-15 PROCEDURE — 99395 PREV VISIT EST AGE 18-39: CPT | Performed by: INTERNAL MEDICINE

## 2021-11-15 PROCEDURE — 84443 ASSAY THYROID STIM HORMONE: CPT | Performed by: INTERNAL MEDICINE

## 2021-11-15 PROCEDURE — 83036 HEMOGLOBIN GLYCOSYLATED A1C: CPT | Performed by: INTERNAL MEDICINE

## 2021-11-15 PROCEDURE — 36415 COLL VENOUS BLD VENIPUNCTURE: CPT | Performed by: INTERNAL MEDICINE

## 2021-11-15 RX ORDER — ATORVASTATIN CALCIUM 10 MG/1
10 TABLET, FILM COATED ORAL DAILY
Qty: 90 TABLET | Refills: 3 | Status: SHIPPED | OUTPATIENT
Start: 2021-11-15 | End: 2022-08-24

## 2021-11-15 RX ORDER — METFORMIN HCL 500 MG
1000 TABLET, EXTENDED RELEASE 24 HR ORAL 2 TIMES DAILY WITH MEALS
Qty: 360 TABLET | Refills: 1 | Status: SHIPPED | OUTPATIENT
Start: 2021-11-15 | End: 2022-04-20

## 2021-11-15 ASSESSMENT — MIFFLIN-ST. JEOR: SCORE: 2583.23

## 2021-11-16 LAB — TSH SERPL DL<=0.005 MIU/L-ACNC: 0.95 MU/L (ref 0.4–4)

## 2021-11-18 ENCOUNTER — TELEPHONE (OUTPATIENT)
Dept: PEDIATRICS | Facility: CLINIC | Age: 41
End: 2021-11-18
Payer: COMMERCIAL

## 2021-11-18 DIAGNOSIS — E11.65 TYPE 2 DIABETES MELLITUS WITH HYPERGLYCEMIA, WITHOUT LONG-TERM CURRENT USE OF INSULIN (H): Primary | ICD-10-CM

## 2021-11-18 RX ORDER — INSULIN GLARGINE 100 [IU]/ML
15 INJECTION, SOLUTION SUBCUTANEOUS AT BEDTIME
Qty: 30 ML | Refills: 1 | Status: SHIPPED | OUTPATIENT
Start: 2021-11-18 | End: 2021-12-08

## 2021-11-18 RX ORDER — FLURBIPROFEN SODIUM 0.3 MG/ML
SOLUTION/ DROPS OPHTHALMIC
Qty: 100 EACH | Refills: 3 | Status: SHIPPED | OUTPATIENT
Start: 2021-11-18 | End: 2022-12-23

## 2021-11-18 RX ORDER — GLUCOSAMINE HCL/CHONDROITIN SU 500-400 MG
CAPSULE ORAL
Qty: 100 EACH | Refills: 3 | Status: SHIPPED | OUTPATIENT
Start: 2021-11-18 | End: 2024-08-26

## 2021-11-18 NOTE — TELEPHONE ENCOUNTER
Lab Results   Component Value Date    A1C 9.3 11/15/2021    A1C 7.4 03/01/2021     Please call Vladimir.  Recent hemoglobin A1c has increased.  Recommend starting once daily Lantus insulin.  Prescription sent.  Patient should start 15 units each evening and then titrate the dose as follows:    Increase insulin dose by 2 units every 3 days until his morning fasting blood sugar is less than 150.  At that point have patient contact clinic or send a MyChart regarding his insulin dose.    He may need assistance with starting the injections, please set up a visit with MTM or diabetes education if patient needs this assistance (he may not, as he was comfortable giving Ozempic injections.)    Needs a diabetes follow-up visit with lab work in 3 months.

## 2021-11-18 NOTE — TELEPHONE ENCOUNTER
Updated the patient on below.   He agrees with plan.   He is familiar with insulin and feels confident starting Lantus.   Patient will schedule on MyChart.   Will follow up prn.

## 2021-12-06 ENCOUNTER — MYC MEDICAL ADVICE (OUTPATIENT)
Dept: PEDIATRICS | Facility: CLINIC | Age: 41
End: 2021-12-06
Payer: COMMERCIAL

## 2021-12-06 DIAGNOSIS — E11.65 TYPE 2 DIABETES MELLITUS WITH HYPERGLYCEMIA, WITHOUT LONG-TERM CURRENT USE OF INSULIN (H): Primary | ICD-10-CM

## 2021-12-08 ENCOUNTER — OFFICE VISIT (OUTPATIENT)
Dept: PHARMACY | Facility: CLINIC | Age: 41
End: 2021-12-08
Payer: COMMERCIAL

## 2021-12-08 VITALS
DIASTOLIC BLOOD PRESSURE: 78 MMHG | WEIGHT: 315 LBS | SYSTOLIC BLOOD PRESSURE: 124 MMHG | HEART RATE: 72 BPM | BODY MASS INDEX: 50.26 KG/M2

## 2021-12-08 DIAGNOSIS — Z71.85 VACCINE COUNSELING: ICD-10-CM

## 2021-12-08 DIAGNOSIS — E11.65 TYPE 2 DIABETES MELLITUS WITH HYPERGLYCEMIA, WITHOUT LONG-TERM CURRENT USE OF INSULIN (H): ICD-10-CM

## 2021-12-08 DIAGNOSIS — E78.5 HYPERLIPIDEMIA LDL GOAL <100: Primary | ICD-10-CM

## 2021-12-08 PROCEDURE — 99607 MTMS BY PHARM ADDL 15 MIN: CPT | Performed by: PHARMACIST

## 2021-12-08 PROCEDURE — 99605 MTMS BY PHARM NP 15 MIN: CPT | Performed by: PHARMACIST

## 2021-12-08 RX ORDER — INSULIN GLARGINE 100 [IU]/ML
18 INJECTION, SOLUTION SUBCUTANEOUS AT BEDTIME
Qty: 30 ML | Refills: 1
Start: 2021-12-08 | End: 2022-01-15

## 2021-12-08 NOTE — Clinical Note
Fyi - patient reports his dad completed genetic testing and was negative for genetic mutation (medullary thyroid).

## 2021-12-08 NOTE — PATIENT INSTRUCTIONS
Recommendations from today's MTM visit:                                                        1. Call the [employer] company to get a new sensor    2. You can download the phone elizabeth called [employer] to use instead of the meter. Let me know the email so I can send you a request.    3. Increase Invokana to 300 mg daily     4. Increase Lantus to 18 units daily     5. New goal is fasting is  mg/dL      Follow-up:     It was great to speak with you today.  I value your experience and would be very thankful for your time with providing feedback on our clinic survey. You may receive a survey via email or text message in the next few days.     To schedule another MTM appointment, please call the clinic directly or you may call the MTM scheduling line at [phone] or toll-free at [phone].     My Clinical Pharmacist's contact information:                                                      Please feel free to contact me with any questions or concerns you have.      Serene Hamm, PharmD  Medication Therapy Management Pharmacist

## 2021-12-08 NOTE — PROGRESS NOTES
Medication Therapy Management (MTM) Encounter    ASSESSMENT:                            Medication Adherence/Access: See below for considerations    Type 2 Diabetes: Patient is meeting A1c goal of < 7%. Self monitoring of blood glucose is not at goal of fasting  mg/dL and post prandial < 180 mg/dL. Patient would benefit from Basal Insulin (Lantus) :  increase dose to 18 units daily and SGLT2 inhibitor (Invokana) :  increase dose to 300 mg daily.  Increasing both for more rapid blood sugar control and educated patient to monitor closely with judy and to reduce basal if hypoglycemia over time.    Hyperlipidemia: Patient is on moderate intensity statin which is indicated based on 2019 ACC/AHA guidelines for lipid management.      Vaccine: up to date       PLAN:                            1. Increase Invokana to 300 mg daily  2. Increase Lantus to 18 units daily. If lows < 80 ok to reduce by 10-20%.  3. Encouraged patient to continue weaning down on Mtn Dew. Recommend he have a glass of the non-sugary drink first with meals. Increase protein intake.  4. Reviewed hypoglycemia treatment, patient to ensure recheck in 15 minutes and have a protein-carb snack - peanut butter ok.   5. Bmp next month.  6. A1c, microalbumin and fasting lipid panel in March   7. Patient to call [employer] about issue with [employer].  8. Patient may outreach to me or consider alternate pharmacy if has more issues with delay in fills.     Follow-up: No follow-ups on file.      SUBJECTIVE/OBJECTIVE:                          Jeffry Younger is a 41 year old male coming in for an initial visit. He was referred to me from Dr. Hsieh.      Reason for visit: diabetes, unable to take glp1 due to family hx    Allergies/ADRs: Reviewed in chart  Past Medical History: Reviewed in chart - family Hx of medullary thyroid cancer (non-genetic form)  Tobacco: He reports that he has never smoked. He has never used smokeless tobacco.  Alcohol: not currently using  reguarly    Medication Adherence/Access: fills with [facility]. Sometimes the pharmacy takes a while to fill but no gaps in therapy so far from delays.    Type 2 Diabetes/Elevated BMI:  Currently taking Invokana 200 mg in the morning, Lantus 15 units daily, metformin XR 1000 mg twice daily.   Hx if GI upset with starting medications but then resolved with time. No current side effects.   GLP-1 was discontinue on 11/15 due to family hx.   Blood sugar monitoring: Continuous Glucose Monitor. Recently put on a new sensor stuck on 60 min warm-up message needs to call [employer] company.    Symptoms of low blood sugar? None, but able to verbalize symptoms to monitor for. He has small juice box to treat. I reminded him the regular mountain can be used.  Symptoms of high blood sugar? Polyuria has been improving.  Eye exam: up to date  Foot exam: due, he does look at his feet  Diet/Exercise: he has noticed soda and Greek food (taqueria and soda) spiked his blood sugar recently. Noticed protein helps stabilize. Patient admits to ongoing Mountain Dew  And has tried for years now to wean off but unsuccessfully. Most recently he has purchased the 8oz smaller cans. He works at home usually and did change his habit of having water or tea when thirsty. His Mountain dew is with meals that he is working to correct. He is trying different drinks to look for a better alternative.  Aspirin: Not taking due to age  Statin: Yes: atorvastatin   ACEi/ARB: No.   Urine Albumin:   Lab Results   Component Value Date    UMALCR 18.63 (H) 03/01/2021      Lab Results   Component Value Date    A1C 9.3 11/15/2021    A1C 7.8 07/12/2021    A1C 7.4 03/01/2021    A1C 8.8 12/29/2020    A1C 8.8 08/24/2020    A1C 10.4 01/22/2020    A1C 7.4 06/11/2019     ecrcl ~ 231.654 mL/min (Scr 0.98)   GFR Estimate   Date Value Ref Range Status   07/12/2021 >90 >60 mL/min/1.73m2 Final     Comment:     As of July 11, 2021, eGFR is calculated by the CKD-EPI creatinine  equation, without race adjustment. eGFR can be influenced by muscle mass, exercise, and diet. The reported eGFR is an estimation only and is only applicable if the renal function is stable.   08/24/2020 >90 >60 mL/min/[1.73_m2] Final     Comment:     Non  GFR Calc  Starting 12/18/2018, serum creatinine based estimated GFR (eGFR) will be   calculated using the Chronic Kidney Disease Epidemiology Collaboration   (CKD-EPI) equation.       BP Readings from Last 3 Encounters:   12/08/21 124/78   11/15/21 130/80   06/08/20 134/76     Estimated body mass index is 50.26 kg/m  as calculated from the following:    Height as of 11/15/21: 6' (1.829 m).    Weight as of this encounter: 370 lb 9.6 oz (168.1 kg).    TSH   Date Value Ref Range Status   11/15/2021 0.95 0.40 - 4.00 mU/L Final   06/11/2019 1.42 0.40 - 4.00 mU/L Final         Hyperlipidemia: Current therapy includes atorvastatin 10 mg daily.  Patient reports no significant myalgias or other side effects.  The ASCVD Risk score (Humberto DC Jr., et al., 2013) failed to calculate for the following reasons:    The 2013 ASCVD risk score is only valid for ages 40 to 79  Recent Labs   Lab Test 03/01/21  0849 01/22/20  0940 06/26/18  0957 09/29/15  1119   CHOL 113 120   < > 158   HDL 35* 35*   < > 40*   LDL 57 56   < > 87   TRIG 106 147   < > 156*   CHOLHDLRATIO  --   --   --  4.0    < > = values in this interval not displayed.       Vaccines:  Most Recent Immunizations   Administered Date(s) Administered     COVID-19,PF,Moderna 11/08/2021     FLU 6-35 months 11/30/2011     Flu, Unspecified 09/15/2021     HepA-Adult 02/22/2017     HepB-Adult 08/11/2004     Influenza (H1N1) 12/31/2009     Influenza (IIV3) PF 10/24/2010     Influenza Vaccine IM > 6 months Valent IIV4 (Alfuria,Fluzone) 09/11/2020     Influenza,INJ,MDCK,PF,Quad >4yrs 10/10/2017     Meningococcal (Menactra ) 02/22/2017     Meningococcal (Menomune ) 08/11/2004     Pneumococcal 23 valent 09/07/2018      TDAP Vaccine (Adacel) 02/01/2015     Tdap (Adacel,Boostrix) 02/01/2015     Twinrix A/B 09/29/2015     Typhoid Oral 03/01/2015     Yellow Fever 02/22/2017           Today's Vitals: /78   Pulse 72   Wt (!) 370 lb 9.6 oz (168.1 kg)   BMI 50.26 kg/m    ----------------      I spent 45 minutes with this patient today. All changes were made via collaborative practice agreement with Russel Hsieh MD. A copy of the visit note was provided to the patient's primary care provider.    The patient was given a summary of these recommendations.     Serene Hamm, PharmD  Medication Therapy Management Pharmacist  Pager#: [phone]       Medication Therapy Recommendations  Type 2 diabetes mellitus with hyperglycemia, without long-term current use of insulin (H)    Current Medication: canagliflozin (INVOKANA) 300 MG tablet   Rationale: Dose too low - Dosage too low - Effectiveness   Recommendation: Increase Dose   Status: Accepted per CPA          Current Medication: insulin glargine (LANTUS SOLOSTAR) 100 UNIT/ML pen   Rationale: Dose too low - Dosage too low - Effectiveness   Recommendation: Increase Dose   Status: Accepted per CPA

## 2022-01-10 ENCOUNTER — TELEPHONE (OUTPATIENT)
Dept: PEDIATRICS | Facility: CLINIC | Age: 42
End: 2022-01-10

## 2022-01-10 ENCOUNTER — LAB (OUTPATIENT)
Dept: LAB | Facility: CLINIC | Age: 42
End: 2022-01-10
Payer: COMMERCIAL

## 2022-01-10 DIAGNOSIS — E78.5 HYPERLIPIDEMIA LDL GOAL <100: ICD-10-CM

## 2022-01-10 DIAGNOSIS — E11.65 TYPE 2 DIABETES MELLITUS WITH HYPERGLYCEMIA, WITHOUT LONG-TERM CURRENT USE OF INSULIN (H): ICD-10-CM

## 2022-01-10 LAB
ANION GAP SERPL CALCULATED.3IONS-SCNC: 4 MMOL/L (ref 3–14)
BUN SERPL-MCNC: 10 MG/DL (ref 7–30)
CALCIUM SERPL-MCNC: 8.8 MG/DL (ref 8.5–10.1)
CHLORIDE BLD-SCNC: 108 MMOL/L (ref 94–109)
CHOLEST SERPL-MCNC: 111 MG/DL
CO2 SERPL-SCNC: 26 MMOL/L (ref 20–32)
CREAT SERPL-MCNC: 1.01 MG/DL (ref 0.66–1.25)
FASTING STATUS PATIENT QL REPORTED: YES
GFR SERPL CREATININE-BSD FRML MDRD: >90 ML/MIN/1.73M2
GLUCOSE BLD-MCNC: 140 MG/DL (ref 70–99)
HBA1C MFR BLD: 8.9 % (ref 0–5.6)
HDLC SERPL-MCNC: 38 MG/DL
LDLC SERPL CALC-MCNC: 57 MG/DL
NONHDLC SERPL-MCNC: 73 MG/DL
POTASSIUM BLD-SCNC: 3.8 MMOL/L (ref 3.4–5.3)
SODIUM SERPL-SCNC: 138 MMOL/L (ref 133–144)
TRIGL SERPL-MCNC: 78 MG/DL

## 2022-01-10 PROCEDURE — 83036 HEMOGLOBIN GLYCOSYLATED A1C: CPT

## 2022-01-10 PROCEDURE — 80048 BASIC METABOLIC PNL TOTAL CA: CPT

## 2022-01-10 PROCEDURE — 80061 LIPID PANEL: CPT

## 2022-01-10 PROCEDURE — 36415 COLL VENOUS BLD VENIPUNCTURE: CPT

## 2022-01-10 NOTE — TELEPHONE ENCOUNTER
Prior Authorization Retail Medication Request    Medication/Dose: insulin glargine (LANTUS SOLOSTAR) 100 UNIT/ML pen  ICD code (if different than what is on RX): Type 2 diabetes mellitus with hyperglycemia, without long-term current use of insulin (H) [E11.65]   Previously Tried and Failed: NA  Rationale: NA    Insurance Name: [employer]   Insurance ID: [id number]     Pharmacy Information (if different than what is on RX)  Name: [place], [place]  Phone: [phone]

## 2022-01-11 NOTE — TELEPHONE ENCOUNTER
PRIOR AUTHORIZATION DENIED    Medication: insulin glargine (LANTUS SOLOSTAR) 100 UNIT/ML pen- DENIED     Denial Date: 1/11/2022    Denial Rational: This medication is excluded from coverage.      Appeal Information: If provider would like to appeal please provide a letter of medical necessity.

## 2022-01-11 NOTE — ORAL ONC MGMT
PRIOR AUTHORIZATION DENIED    Medication: insulin glargine (LANTUS SOLOSTAR) 100 UNIT/ML pen- DENIED     Denial Date: 1/11/2022    Denial Rational: ***    Appeal Information:

## 2022-01-11 NOTE — TELEPHONE ENCOUNTER
Central Prior Authorization Team  Phone: [phone]    PA Initiation    Medication: insulin glargine (LANTUS SOLOSTAR) 100 UNIT/ML pen  Insurance Company: [employer] - Phone [phone] Fax [phone]  Pharmacy Filling the Rx: [id number] [place], [place] - [address]  Filling Pharmacy Phone: [phone]  Filling Pharmacy Fax:    Start Date: 1/11/2022

## 2022-01-12 ENCOUNTER — VIRTUAL VISIT (OUTPATIENT)
Dept: PHARMACY | Facility: CLINIC | Age: 42
End: 2022-01-12
Payer: COMMERCIAL

## 2022-01-12 DIAGNOSIS — E78.5 HYPERLIPIDEMIA LDL GOAL <100: ICD-10-CM

## 2022-01-12 DIAGNOSIS — E11.65 TYPE 2 DIABETES MELLITUS WITH HYPERGLYCEMIA, WITHOUT LONG-TERM CURRENT USE OF INSULIN (H): Primary | ICD-10-CM

## 2022-01-12 PROCEDURE — 99607 MTMS BY PHARM ADDL 15 MIN: CPT | Performed by: PHARMACIST

## 2022-01-12 PROCEDURE — 99606 MTMS BY PHARM EST 15 MIN: CPT | Performed by: PHARMACIST

## 2022-01-12 RX ORDER — GLIPIZIDE 5 MG/1
5 TABLET ORAL DAILY
Qty: 30 TABLET | Refills: 0 | Status: SHIPPED | OUTPATIENT
Start: 2022-01-12 | End: 2022-01-15

## 2022-01-12 NOTE — PROGRESS NOTES
Medication Therapy Management (MTM) Encounter    ASSESSMENT:                            Medication Adherence/Access: See below for considerations    Type 2 Diabetes: Patient is not meeting A1c goal of < 7%. Self monitoring of blood glucose is not at goal of fasting  mg/dL and post prandial < 180 mg/dL. Patient would benefit from additional of glipizide.    Hyperlipidemia: Patient is on moderate intensity statin which is indicated based on 2019 ACC/AHA guidelines for lipid management.      PLAN:                            1.Start glipizide 5 mg daily   2. a1c 3 months    Follow-up: No follow-ups on file.      SUBJECTIVE/OBJECTIVE:                          Jeffry Younger is a 41 year old male coming in for an initial visit. He was referred to me from Dr. Hsieh.      Reason for visit: diabetes, unable to take glp1 due to family hx    Allergies/ADRs: Reviewed in chart  Past Medical History: Reviewed in chart - family Hx of medullary thyroid cancer (non-genetic form)  Tobacco: He reports that he has never smoked. He has never used smokeless tobacco.  Alcohol: not currently using reguarly    Medication Adherence/Access: fills with [facility]. Sometimes the pharmacy takes a while to fill but no gaps in therapy so far from delays.    Type 2 Diabetes/Elevated BMI:  Currently taking Invokana 300 mg in the morning, Lantus 24 units daily (last dose increase was about 10-14 days ago), metformin XR 1000 mg twice daily.   Had one dizzy spell but things related to inner ear from allergies.  Hx if GI upset with starting medications but then resolved with time. No current side effects.   GLP-1 was discontinue on 11/15 due to family hx medullary thyroid CA.   Blood sugar monitoring: Continuous Glucose Monitor.      Symptoms of low blood sugar? None, he reports the low was false he thinks as it was after replacing and he did do a fingerstick and it was > 80mg/dL  Symptoms of high blood sugar? Polyuria has been improving but  drinks a lot of water and tea. Denies nocturia.   Eye exam: up to date  Foot exam: due, he does look at his feet  Diet/Exercise: he is still monitoring his blood sugar and food choices. He continue to work on cutting down on Mountain Dew.He is trying different drinks to look for a better alternative but has also purchased smaller cans to help with moderating portion.  Aspirin: Not taking due to age  Statin: Yes: atorvastatin   ACEi/ARB: No.   Urine Albumin:   Lab Results   Component Value Date    UMALCR 18.63 (H) 03/01/2021      Lab Results   Component Value Date    A1C 9.3 11/15/2021    A1C 7.8 07/12/2021    A1C 7.4 03/01/2021    A1C 8.8 12/29/2020    A1C 8.8 08/24/2020    A1C 10.4 01/22/2020    A1C 7.4 06/11/2019     BP Readings from Last 3 Encounters:   12/08/21 124/78   11/15/21 130/80   06/08/20 134/76     Estimated body mass index is 50.26 kg/m  as calculated from the following:    Height as of 11/15/21: 6' (1.829 m).    Weight as of 12/8/21: 370 lb 9.6 oz (168.1 kg).    TSH   Date Value Ref Range Status   11/15/2021 0.95 0.40 - 4.00 mU/L Final   06/11/2019 1.42 0.40 - 4.00 mU/L Final       Hyperlipidemia: Current therapy includes atorvastatin 10 mg daily.  Patient reports no significant myalgias or other side effects.  The ASCVD Risk score (Humberto GRAY Jr., et al., 2013) failed to calculate for the following reasons:    The 2013 ASCVD risk score is only valid for ages 40 to 79  Recent Labs   Lab Test 03/01/21  0849 01/22/20  0940 06/26/18  0957 09/29/15  1119   CHOL 113 120   < > 158   HDL 35* 35*   < > 40*   LDL 57 56   < > 87   TRIG 106 147   < > 156*   CHOLHDLRATIO  --   --   --  4.0    < > = values in this interval not displayed.         Today's Vitals: There were no vitals taken for this visit.  ----------------      I spent 20 minutes with this patient today. All changes were made via collaborative practice agreement with Russel Hsieh MD. A copy of the visit note was provided to the patient's  provider(s).    The patient was sent via [employer] a summary of these recommendations.     Serene Hamm PharmD  Medication Therapy Management Pharmacist      Telemedicine Visit Details  Type of service:  Telephone visit  Start Time: 10:00 AM  End Time: 10:20 AM  Originating Location (patient location): [place]  Distant Location (provider location):  [facility]       Medication Therapy Recommendations  Type 2 diabetes mellitus with hyperglycemia, without long-term current use of insulin (H)    Current Medication: glipiZIDE (GLUCOTROL) 5 MG tablet   Rationale: Synergistic therapy - Needs additional medication therapy - Indication   Recommendation: Start Medication   Status: Accepted per CPA

## 2022-01-15 RX ORDER — INSULIN GLARGINE 100 [IU]/ML
24 INJECTION, SOLUTION SUBCUTANEOUS AT BEDTIME
Qty: 45 ML | Refills: 0 | Status: SHIPPED | OUTPATIENT
Start: 2022-01-15 | End: 2022-02-17

## 2022-01-15 RX ORDER — GLIPIZIDE 5 MG/1
5 TABLET ORAL DAILY
Qty: 90 TABLET | Refills: 0 | Status: SHIPPED | OUTPATIENT
Start: 2022-01-15 | End: 2022-02-17

## 2022-02-15 ENCOUNTER — MYC MEDICAL ADVICE (OUTPATIENT)
Dept: PHARMACY | Facility: CLINIC | Age: 42
End: 2022-02-15
Payer: COMMERCIAL

## 2022-02-15 DIAGNOSIS — E11.65 TYPE 2 DIABETES MELLITUS WITH HYPERGLYCEMIA, WITHOUT LONG-TERM CURRENT USE OF INSULIN (H): Primary | ICD-10-CM

## 2022-02-17 RX ORDER — GLIPIZIDE 5 MG/1
5 TABLET, FILM COATED, EXTENDED RELEASE ORAL DAILY
Qty: 90 TABLET | Refills: 0 | Status: SHIPPED | OUTPATIENT
Start: 2022-02-17 | End: 2022-04-20

## 2022-02-17 NOTE — TELEPHONE ENCOUNTER
Patient reports formulary change will send toujeo per cpa. Also MTM rec change glipizide IR to XL, rx sent per CPA.    Serene Hamm, PharmD  Medication Therapy Management Pharmacist  Pager#: [phone]

## 2022-03-14 ENCOUNTER — MYC MEDICAL ADVICE (OUTPATIENT)
Dept: PHARMACY | Facility: CLINIC | Age: 42
End: 2022-03-14
Payer: COMMERCIAL

## 2022-03-14 DIAGNOSIS — E11.65 TYPE 2 DIABETES MELLITUS WITH HYPERGLYCEMIA, WITHOUT LONG-TERM CURRENT USE OF INSULIN (H): Primary | ICD-10-CM

## 2022-03-15 NOTE — TELEPHONE ENCOUNTER
Patient sent mychart invokana tier 3, Jardiance tier 1. rx changed and added bmp (and microalbumin per HM) per CPA with primary care provider.    Serene Hamm, PharmD  Medication Therapy Management Pharmacist  Pager#: [phone]

## 2022-04-17 ASSESSMENT — SLEEP AND FATIGUE QUESTIONNAIRES
HOW LIKELY ARE YOU TO NOD OFF OR FALL ASLEEP WHILE SITTING AND TALKING TO SOMEONE: WOULD NEVER DOZE
HOW LIKELY ARE YOU TO NOD OFF OR FALL ASLEEP WHILE SITTING QUIETLY AFTER LUNCH WITHOUT ALCOHOL: WOULD NEVER DOZE
HOW LIKELY ARE YOU TO NOD OFF OR FALL ASLEEP IN A CAR, WHILE STOPPED FOR A FEW MINUTES IN TRAFFIC: WOULD NEVER DOZE
HOW LIKELY ARE YOU TO NOD OFF OR FALL ASLEEP WHILE SITTING INACTIVE IN A PUBLIC PLACE: WOULD NEVER DOZE
HOW LIKELY ARE YOU TO NOD OFF OR FALL ASLEEP WHEN YOU ARE A PASSENGER IN A CAR FOR AN HOUR WITHOUT A BREAK: SLIGHT CHANCE OF DOZING
HOW LIKELY ARE YOU TO NOD OFF OR FALL ASLEEP WHILE LYING DOWN TO REST IN THE AFTERNOON WHEN CIRCUMSTANCES PERMIT: MODERATE CHANCE OF DOZING
HOW LIKELY ARE YOU TO NOD OFF OR FALL ASLEEP WHILE WATCHING TV: SLIGHT CHANCE OF DOZING
HOW LIKELY ARE YOU TO NOD OFF OR FALL ASLEEP WHILE SITTING AND READING: WOULD NEVER DOZE

## 2022-04-18 ENCOUNTER — VIRTUAL VISIT (OUTPATIENT)
Dept: SLEEP MEDICINE | Facility: CLINIC | Age: 42
End: 2022-04-18
Payer: COMMERCIAL

## 2022-04-18 ENCOUNTER — LAB (OUTPATIENT)
Dept: LAB | Facility: CLINIC | Age: 42
End: 2022-04-18
Payer: COMMERCIAL

## 2022-04-18 VITALS — BODY MASS INDEX: 40.43 KG/M2 | HEIGHT: 74 IN | WEIGHT: 315 LBS

## 2022-04-18 DIAGNOSIS — Z11.4 SCREENING FOR HIV (HUMAN IMMUNODEFICIENCY VIRUS): ICD-10-CM

## 2022-04-18 DIAGNOSIS — E11.65 TYPE 2 DIABETES MELLITUS WITH HYPERGLYCEMIA, WITHOUT LONG-TERM CURRENT USE OF INSULIN (H): ICD-10-CM

## 2022-04-18 DIAGNOSIS — G47.33 OSA (OBSTRUCTIVE SLEEP APNEA): Primary | ICD-10-CM

## 2022-04-18 DIAGNOSIS — Z11.59 NEED FOR HEPATITIS C SCREENING TEST: ICD-10-CM

## 2022-04-18 LAB
ANION GAP SERPL CALCULATED.3IONS-SCNC: 8 MMOL/L (ref 3–14)
BUN SERPL-MCNC: 16 MG/DL (ref 7–30)
CALCIUM SERPL-MCNC: 8.9 MG/DL (ref 8.5–10.1)
CHLORIDE BLD-SCNC: 106 MMOL/L (ref 94–109)
CO2 SERPL-SCNC: 24 MMOL/L (ref 20–32)
CREAT SERPL-MCNC: 0.96 MG/DL (ref 0.66–1.25)
CREAT UR-MCNC: 139 MG/DL
GFR SERPL CREATININE-BSD FRML MDRD: >90 ML/MIN/1.73M2
GLUCOSE BLD-MCNC: 152 MG/DL (ref 70–99)
HBA1C MFR BLD: 7.5 % (ref 0–5.6)
MICROALBUMIN UR-MCNC: 9 MG/L
MICROALBUMIN/CREAT UR: 6.47 MG/G CR (ref 0–17)
POTASSIUM BLD-SCNC: 4.3 MMOL/L (ref 3.4–5.3)
SODIUM SERPL-SCNC: 138 MMOL/L (ref 133–144)

## 2022-04-18 PROCEDURE — 82043 UR ALBUMIN QUANTITATIVE: CPT

## 2022-04-18 PROCEDURE — 99213 OFFICE O/P EST LOW 20 MIN: CPT | Mod: 95 | Performed by: PHYSICIAN ASSISTANT

## 2022-04-18 PROCEDURE — 86803 HEPATITIS C AB TEST: CPT

## 2022-04-18 PROCEDURE — 36415 COLL VENOUS BLD VENIPUNCTURE: CPT

## 2022-04-18 PROCEDURE — 87389 HIV-1 AG W/HIV-1&-2 AB AG IA: CPT

## 2022-04-18 PROCEDURE — 83036 HEMOGLOBIN GLYCOSYLATED A1C: CPT

## 2022-04-18 PROCEDURE — 80048 BASIC METABOLIC PNL TOTAL CA: CPT

## 2022-04-18 NOTE — PROGRESS NOTES
CPAP Follow-Up Visit:    Date on this visit: 4/18/2022    Jeffry Younger has a follow-up of his CPAP use for mild PAOLA. He was initially seen for poor sleep quality, loud snoring, observed apnea and sleep paralysis.      PREVIOUS SLEEP STUDIES:  Date: 3/5/17. AHI: 12.2/hr (REM 32.5/hr and supine 45/hr). Lowest O2 sat: 85%. Weight: 400 pounds.    He thinks everything is going well.    PAP machine: ResMed (3/13/2017). Pressure settings: 8-16 cm    The compliance data shows that the patient used the CPAP for 100% of nights for >4 hours.  The 95th% pressure is 11.6 cm.  The 95th% leak is 15 lpm.  The average nightly usage is 438 min.  The average AHI is 0.7/hr.       Interface:  Mask: Dreawear nasal mask. Replaces mask about every 6 months. Filters monthly. Cleans things about once a month.   Chin strap: No  Leak: Not much, maybe a little when he rolls  Using Humidifier: Yes, does not run out.  Condensation in hose or mask: Yes, rare, usually related to it being colder than usual     Difficulties with therapy:    [-] Snoring with CPAP: none that his wife notices  [-] Difficulty tolerating the pressure:  [-] Epistaxis/dry nose:   [-] Nasal congestion:  [-] Dry mouth: rare   [-] Mouth breathing: rare  [-] Pain/skin breakdown:      Improvements noted with CPAP: absolutely, night and day  [+] waking up more refreshed  [+] sleeping better with less arousals  [+] nocturia improved   [+] improved energy level during the day    Weight change since sleep study: 372 lbs. He was 376# last year    Bedtime: 12 AM.  Wake time: 7:30-8 AM. Falls asleep in 10-15 minutes at most. Wakes 0-1 times per night for 5 minutes. Reason for waking: restroom  Naps: maybe once a month.       Past medical/surgical history, family history, social history, medications and allergies were reviewed.      Problem List:  Patient Active Problem List    Diagnosis Date Noted     Family history of thyroid cancer 11/15/2021     Priority: Medium     Type 2  diabetes mellitus with hyperglycemia, without long-term current use of insulin (H) 07/01/2018     Priority: Medium     Hyperlipidemia LDL goal <100 07/01/2018     Priority: Medium     ADAMA (generalized anxiety disorder) 06/26/2018     Priority: Medium     PAOLA (obstructive sleep apnea) 03/07/2017     Priority: Medium     Diagnostic Study  Sleep Study 3/5/2017 - (400.0 lbs) AHI 12.2, RDI 13.7, Supine AHI 45.0, REM AHI 32.5, Low O2 85.0%, Time Spent ?88% 1.0 minutes / Time Spent ?89% 2.0 minutes       Morbid obesity with BMI of 50.0-59.9, adult (H) 09/29/2015     Priority: Medium        Impression/Plan:    (G47.33) PAOLA (obstructive sleep apnea)  (primary encounter diagnosis)  Comment: Vladimir is doing very well with CPAP. He uses it very regularly and does not sleep well without it. He has no concerns. His machine is 5 years old, but still working well.  Plan: Comprehensive DME        Continue auto CPAP 8-16 cm. A prescription was written for new supplies. We reviewed recommendations for cleaning and replacing supplies.        He will follow up with me in about 2 year(s).     Phone visit duration: 12 min     Bennett Goltz, PA-C    CC: No ref. provider found

## 2022-04-18 NOTE — PROGRESS NOTES
"Jeffry Younger is a 42 year old male being evaluated via a billable telephone visit.     \"This telephone visit will be conducted via a call between you and your physician/provider. We have found that certain health care needs can be provided without the need for an in-person visit or physical exam.  This service lets us provide the care you need with a telephone conversation.  If a prescription is necessary we can send it directly to your pharmacy.  If lab work is needed we can place an order for that and you can then stop by our lab to have the test done at a later time.\"    Telephone visits are billed at different rates depending on your insurance coverage.  Please reach out to your insurance provider with any questions.    Patient has given verbal consent for  a Telephone visit? Yes    What telephone number would you like your provider to contact at at:  [phone]    How would you like to obtain your AVS? Noreen Hernandez    Telephone Visit Details:     Telephone Visit Start Time: 3:06 PM    Telephone Visit End Time:3:18 PM      "

## 2022-04-19 LAB
HCV AB SERPL QL IA: NONREACTIVE
HIV 1+2 AB+HIV1 P24 AG SERPL QL IA: NONREACTIVE

## 2022-04-20 ENCOUNTER — VIRTUAL VISIT (OUTPATIENT)
Dept: PHARMACY | Facility: CLINIC | Age: 42
End: 2022-04-20
Payer: COMMERCIAL

## 2022-04-20 DIAGNOSIS — E11.65 TYPE 2 DIABETES MELLITUS WITH HYPERGLYCEMIA, WITHOUT LONG-TERM CURRENT USE OF INSULIN (H): ICD-10-CM

## 2022-04-20 DIAGNOSIS — Z71.85 VACCINE COUNSELING: ICD-10-CM

## 2022-04-20 DIAGNOSIS — E78.5 HYPERLIPIDEMIA LDL GOAL <100: Primary | ICD-10-CM

## 2022-04-20 PROCEDURE — 99606 MTMS BY PHARM EST 15 MIN: CPT | Performed by: PHARMACIST

## 2022-04-20 RX ORDER — METFORMIN HCL 500 MG
1000 TABLET, EXTENDED RELEASE 24 HR ORAL 2 TIMES DAILY WITH MEALS
Qty: 360 TABLET | Refills: 1 | Status: SHIPPED | OUTPATIENT
Start: 2022-04-20 | End: 2022-08-24

## 2022-04-20 RX ORDER — GLIPIZIDE 5 MG/1
5 TABLET, FILM COATED, EXTENDED RELEASE ORAL DAILY
Qty: 90 TABLET | Refills: 0 | Status: SHIPPED | OUTPATIENT
Start: 2022-04-20 | End: 2022-04-20

## 2022-04-20 RX ORDER — GLIPIZIDE 10 MG/1
10 TABLET, FILM COATED, EXTENDED RELEASE ORAL DAILY
Qty: 90 TABLET | Refills: 1 | Status: SHIPPED | OUTPATIENT
Start: 2022-04-20 | End: 2022-08-17

## 2022-04-20 NOTE — PATIENT INSTRUCTIONS
"Recommendations from today's MTM visit:                                                      1. Increase glipizide XL 10 mg daily. May use up 5mg tablets. If lows send Serene message to review jud[employer].  2. a1c in 3 months if at goal then push out follow-up with me another 3 months    Follow-up: 3 months a1c scheduled, Return in about 6 months (around 10/20/2022) for Medication Therapy Telephone Visit.    It was great speaking with you today.  I value your experience and would be very thankful for your time in providing feedback in our clinic survey. In the next few days, you may receive an email or text message from [employer] with a link to a survey related to your  clinical pharmacist.\"     To schedule another MTM appointment, please call the clinic directly or you may call the MTM scheduling line at [phone] or toll-free at [phone].     My Clinical Pharmacist's contact information:                                                      Please feel free to contact me with any questions or concerns you have.      Serene Hamm, PharmD  Medication Therapy Management Pharmacist     "

## 2022-04-20 NOTE — PROGRESS NOTES
Medication Therapy Management (MTM) Encounter    ASSESSMENT:                            Medication Adherence/Access: See below for considerations    Type 2 Diabetes: Patient is not meeting A1c goal of < 7%. But has been improving. Suggest we increase glipizide. Monitor for lows and monitor weight.    Hyperlipidemia: Patient is on moderate intensity statin which is indicated based on 2019 ACC/AHA guidelines for lipid management.      Vaccines: up to date     PLAN:                            1. Increase glipizide XL 10 mg daily. May use up 5mg tablets. If lows send Serene message to review judy.  2. a1c in 3 months if at goal then push out follow-up with me another 3 months    Follow-up: Return in about 6 months (around 10/20/2022) for Medication Therapy Telephone Visit.      SUBJECTIVE/OBJECTIVE:                          Jeffry Younger is a 41 year old male called for a follow-up visit. He was referred to me from Dr. Hsieh. today is a follow-up from 1/12/22.     Reason for visit: diabetes and lab follow-up     Allergies/ADRs: Reviewed in chart  Past Medical History: Reviewed in chart - family Hx of medullary thyroid cancer (non-genetic form)  Tobacco: He reports that he has never smoked. He has never used smokeless tobacco.  Alcohol: not currently using reguarly    Medication Adherence/Access: fills with [facility].    Type 2 Diabetes/Elevated BMI:  Currently taking Jardiance 25 mg in the morning, Toujeo 25 units daily, metformin XR 1000 mg twice daily, glipizide XL 5 mg in the morning.  No current side effects.   GLP-1 was discontinue on 11/15 due to family hx medullary thyroid CA.   Blood sugar monitoring: Continuous Glucose Monitor.      Symptoms of low blood sugar? He did have lower reading in the 70's and had a lifesaver that he keeps in his pocket.   Symptoms of high blood sugar?no new symptoms.  Eye exam: up to date  Foot exam: due, he does look at his feet  Diet/Exercise: he reports increasing late  "night eating due to work schedule lately. Did not review his deit mountain dew intake today. But he reports weight is staying stable.  Aspirin: Not taking due to age  Statin: Yes: atorvastatin   ACEi/ARB: No.   Urine Albumin:   Lab Results   Component Value Date    UMALCR 6.47 04/18/2022      Lab Results   Component Value Date    A1C 7.5 04/18/2022    A1C 8.9 01/10/2022    A1C 9.3 11/15/2021    A1C 7.8 07/12/2021    A1C 7.4 03/01/2021    A1C 8.8 12/29/2020    A1C 8.8 08/24/2020    A1C 10.4 01/22/2020    A1C 7.4 06/11/2019     BP Readings from Last 3 Encounters:   12/08/21 124/78   11/15/21 130/80   06/08/20 134/76     Estimated body mass index is 47.76 kg/m  as calculated from the following:    Height as of 4/18/22: 6' 2\" (1.88 m).    Weight as of 4/18/22: 372 lb (168.7 kg).    TSH   Date Value Ref Range Status   11/15/2021 0.95 0.40 - 4.00 mU/L Final   06/11/2019 1.42 0.40 - 4.00 mU/L Final       Hyperlipidemia: Current therapy includes atorvastatin 10 mg daily.  Patient reports no significant myalgias or other side effects.  The ASCVD Risk score (Dowagiac DC Jr., et al., 2013) failed to calculate for the following reasons:    The systolic blood pressure is missing    The valid total cholesterol range is 130 to 320 mg/dL   Recent Labs   Lab Test 01/10/22  1014 03/01/21  0849 06/26/18  0957 09/29/15  1119   CHOL 111 113   < > 158   HDL 38* 35*   < > 40*   LDL 57 57   < > 87   TRIG 78 106   < > 156*   CHOLHDLRATIO  --   --   --  4.0    < > = values in this interval not displayed.     Vaccines:  Most Recent Immunizations   Administered Date(s) Administered     COVID-19,PF,Moderna 11/08/2021     FLU 6-35 months 11/30/2011     Flu, Unspecified 09/15/2021     HepA-Adult 02/22/2017     HepB-Adult 08/11/2004     Influenza (H1N1) 12/31/2009     Influenza (IIV3) PF 09/14/2021     Influenza Vaccine IM > 6 months Valent IIV4 (Alfuria,Fluzone) 09/11/2020     Influenza,INJ,MDCK,PF,Quad >4yrs 10/10/2017     Meningococcal (Menactra ) " 02/22/2017     Meningococcal (Menomune ) 08/11/2004     Pneumococcal 23 valent 09/07/2018     TDAP Vaccine (Adacel) 02/01/2015     Tdap (Adacel,Boostrix) 02/01/2015     Twinrix A/B 09/29/2015     Typhoid Oral 03/01/2015     Yellow Fever 02/22/2017         Today's Vitals: There were no vitals taken for this visit.  ----------------      I spent 14 minutes with this patient today. All changes were made via collaborative practice agreement with Russel Hsieh MD. A copy of the visit note was provided to the patient's provider(s).    The patient was sent via Eigenta a summary of these recommendations.     Serene Hamm, PharmD  Medication Therapy Management Pharmacist      Telemedicine Visit Details  Type of service:  Telephone visit  Start Time: 10:30am  End Time: 10:44am  Originating Location (patient location): [place]  Distant Location (provider location):  [facility]       Medication Therapy Recommendations  Type 2 diabetes mellitus with hyperglycemia, without long-term current use of insulin (H)    Current Medication: glipiZIDE (GLUCOTROL XL) 10 MG 24 hr tablet   Rationale: Dose too low - Dosage too low - Effectiveness   Recommendation: Increase Dose   Status: Accepted per CPA

## 2022-05-05 DIAGNOSIS — E11.65 TYPE 2 DIABETES MELLITUS WITH HYPERGLYCEMIA, WITHOUT LONG-TERM CURRENT USE OF INSULIN (H): ICD-10-CM

## 2022-05-06 NOTE — TELEPHONE ENCOUNTER
Routing refill request to provider for review/approval because:  Drug not on the [facility] refill protocol     Maritza Flores RN on 5/6/2022 at 7:22 AM

## 2022-06-21 DIAGNOSIS — E11.65 TYPE 2 DIABETES MELLITUS WITH HYPERGLYCEMIA, WITHOUT LONG-TERM CURRENT USE OF INSULIN (H): ICD-10-CM

## 2022-06-23 RX ORDER — GLIPIZIDE 5 MG/1
TABLET, FILM COATED, EXTENDED RELEASE ORAL
Qty: 90 TABLET | Refills: 0 | OUTPATIENT
Start: 2022-06-23

## 2022-07-26 ENCOUNTER — VIRTUAL VISIT (OUTPATIENT)
Dept: FAMILY MEDICINE | Facility: CLINIC | Age: 42
End: 2022-07-26
Payer: COMMERCIAL

## 2022-07-26 DIAGNOSIS — E11.65 TYPE 2 DIABETES MELLITUS WITH HYPERGLYCEMIA, WITHOUT LONG-TERM CURRENT USE OF INSULIN (H): ICD-10-CM

## 2022-07-26 DIAGNOSIS — E78.5 HYPERLIPIDEMIA LDL GOAL <100: ICD-10-CM

## 2022-07-26 DIAGNOSIS — U07.1 INFECTION DUE TO 2019 NOVEL CORONAVIRUS: Primary | ICD-10-CM

## 2022-07-26 PROCEDURE — 99213 OFFICE O/P EST LOW 20 MIN: CPT | Mod: 95 | Performed by: PHYSICIAN ASSISTANT

## 2022-07-26 NOTE — PROGRESS NOTES
"Vladimir is a 42 year old who is being evaluated via a billable video visit.      How would you like to obtain your AVS? MyChart  If the video visit is dropped, the invitation should be resent by: Text to cell phone: [phone]  Will anyone else be joining your video visit? No        Assessment & Plan     Infection due to 2019 novel coronavirus  Discussed potential benefits and side effects of med, he does wish to take.  - nirmatrelvir and ritonavir (PAXLOVID) therapy pack; Take 3 tablets by mouth 2 times daily for 5 days Take 2 Nirmatrelvir tablets and 1 Ritonavir tablet twice daily for 5 days.    Type 2 diabetes mellitus with hyperglycemia, without long-term current use of insulin (H)      Hyperlipidemia LDL goal <100  Will hold his atorvastatin while he is on               BMI:   Estimated body mass index is 47.76 kg/m  as calculated from the following:    Height as of 4/18/22: 1.88 m (6' 2\").    Weight as of 4/18/22: 168.7 kg (372 lb).           No follow-ups on file.    Jeffry Elliott PA-C  [facility]    Subjective   Vladimir is a 42 year old, presenting for the following health issues:  No chief complaint on file.      HPI       COVID-19 Symptom Review  How many days ago did these symptoms start? Midnight last night (7/25/22)    Are any of the following symptoms significant for you?    New or worsening difficulty breathing? No    Worsening cough? No    Fever or chills? Yes, the highest temperature was 99.5    Headache: YES    Sore throat: YES    Chest pain: No    Diarrhea: No    Body aches? YES    What treatments has patient tried? Nonsteroidals   Does patient live in a nursing home, group home, or shelter? No  Does patient have a way to get food/medications during quarantined? Yes, I have a friend or family member who can help me.                  Review of Systems   Constitutional, HEENT, cardiovascular, pulmonary, gi and gu systems are negative, except as otherwise noted.    "   Objective           Vitals:  No vitals were obtained today due to virtual visit.    Physical Exam   GENERAL: Healthy, alert and no distress  EYES: Eyes grossly normal to inspection.  No discharge or erythema, or obvious scleral/conjunctival abnormalities.  RESP: No audible wheeze, cough, or visible cyanosis.  No visible retractions or increased work of breathing.    SKIN: Visible skin clear. No significant rash, abnormal pigmentation or lesions.  NEURO: Cranial nerves grossly intact.  Mentation and speech appropriate for age.  PSYCH: Mentation appears normal, affect normal/bright, judgement and insight intact, normal speech and appearance well-groomed.                Video-Visit Details    Video Start Time: 5:22 PM    Type of service:  Video Visit    Video End Time:5:29 PM    Originating Location (pt. Location): Home    Distant Location (provider location):  [facility]     Platform used for Video Visit: clickTRUE  Nedra.

## 2022-08-15 ENCOUNTER — LAB (OUTPATIENT)
Dept: LAB | Facility: CLINIC | Age: 42
End: 2022-08-15
Payer: COMMERCIAL

## 2022-08-15 DIAGNOSIS — E11.65 TYPE 2 DIABETES MELLITUS WITH HYPERGLYCEMIA, WITHOUT LONG-TERM CURRENT USE OF INSULIN (H): ICD-10-CM

## 2022-08-15 LAB — HBA1C MFR BLD: 8.2 % (ref 0–5.6)

## 2022-08-15 PROCEDURE — 36415 COLL VENOUS BLD VENIPUNCTURE: CPT

## 2022-08-15 PROCEDURE — 83036 HEMOGLOBIN GLYCOSYLATED A1C: CPT

## 2022-08-17 ENCOUNTER — VIRTUAL VISIT (OUTPATIENT)
Dept: PHARMACY | Facility: CLINIC | Age: 42
End: 2022-08-17
Payer: COMMERCIAL

## 2022-08-17 DIAGNOSIS — Z71.85 VACCINE COUNSELING: ICD-10-CM

## 2022-08-17 DIAGNOSIS — E78.5 HYPERLIPIDEMIA LDL GOAL <100: ICD-10-CM

## 2022-08-17 DIAGNOSIS — E66.01 MORBID OBESITY WITH BMI OF 50.0-59.9, ADULT (H): ICD-10-CM

## 2022-08-17 DIAGNOSIS — E11.65 TYPE 2 DIABETES MELLITUS WITH HYPERGLYCEMIA, WITHOUT LONG-TERM CURRENT USE OF INSULIN (H): Primary | ICD-10-CM

## 2022-08-17 PROCEDURE — 99607 MTMS BY PHARM ADDL 15 MIN: CPT | Performed by: PHARMACIST

## 2022-08-17 PROCEDURE — 99606 MTMS BY PHARM EST 15 MIN: CPT | Performed by: PHARMACIST

## 2022-08-17 RX ORDER — GLIPIZIDE 10 MG/1
20 TABLET, FILM COATED, EXTENDED RELEASE ORAL DAILY
Qty: 180 TABLET | Refills: 1 | Status: SHIPPED | OUTPATIENT
Start: 2022-08-17 | End: 2022-12-22

## 2022-08-17 NOTE — PROGRESS NOTES
Medication Therapy Management (MTM) Encounter    ASSESSMENT:                            Medication Adherence/Access: No issues identified    Type 2 Diabetes/Elevated BMI: a1c not at goal < 7%. Recommend we titrate glipizide for further improve PPBG control.   Also discussed consideration for weight loss medication, patient decline at this time.    Hyperlipidemia: Stable.    Vaccines: candidate for PCV20.    PLAN:                            Patient not interested in weight loss medications.    Patient to:  Increase glipizide XL 10 mg tablet, take 2 tablets in the morning.     Stay on other agents as discussed.  IF you get a low blood sugar then reduce Toujeo to 28 units daily and if low again drop again.      A1c in 3 months, follow-up with primary care provider for your physical at that time.     You should get the [id number] vaccine. Ok to get with flu shot.    Follow-up: A1c in 3 months, follow-up with primary care provider for your physical at that time. MTM as needed based on a1c results. a1c q3 months.    SUBJECTIVE/OBJECTIVE:                          Jeffry Younger is a 42 year old male contacted via secure video for a follow-up visit.  Today's visit is a follow-up MTM visit from 4/20/22.     Reason for visit: follow-up on a1c from 8/15/22.    Allergies/ADRs: Reviewed in chart  Past Medical History: Reviewed in chart - family Hx of medullary thyroid cancer (non-genetic form)  Tobacco: He reports that he has never smoked. He has never used smokeless tobacco.  Alcohol: not currently using reguarly    Medication Adherence/Access: fills with [facility].    Type 2 Diabetes/Elevated BMI:  Currently taking Jardiance 25 mg in the morning, Toujeo 30 units daily, metformin XR 1000 mg twice daily, glipizide XL 10 mg in the morning.  No current side effects.   GLP-1 was discontinue on 11/15 due to family hx medullary thyroid CA.   Blood sugar monitoring: Continuous Glucose Monitor.      Symptoms of low blood sugar?  "He did have lower reading in the 70's and had a lifesaver that he keeps in his pocket.   Symptoms of high blood sugar?no new symptoms.  Eye exam: up to date  Foot exam: due, he does look at his feet  Diet/Exercise: he reports increasing late night eating due to work schedule lately. Did not review his deit mountain dew intake today. But he reports weight is staying stable.  Aspirin: Not taking due to age  Statin: Yes: atorvastatin   ACEi/ARB: No.   Urine Albumin:   Lab Results   Component Value Date    UMALCR 6.47 04/18/2022      Lab Results   Component Value Date    A1C 8.2 08/15/2022    A1C 7.5 04/18/2022    A1C 8.9 01/10/2022       BP Readings from Last 3 Encounters:   12/08/21 124/78   11/15/21 130/80   06/08/20 134/76     Estimated body mass index is 47.76 kg/m  as calculated from the following:    Height as of 4/18/22: 6' 2\" (1.88 m).    Weight as of 4/18/22: 372 lb (168.7 kg).    TSH   Date Value Ref Range Status   11/15/2021 0.95 0.40 - 4.00 mU/L Final   06/11/2019 1.42 0.40 - 4.00 mU/L Final       Hyperlipidemia: Current therapy includes atorvastatin 10 mg daily.  Patient reports no significant myalgias or other side effects.  The ASCVD Risk score (Humberto GRAY Jr., et al., 2013) failed to calculate for the following reasons:    The systolic blood pressure is missing    The valid total cholesterol range is 130 to 320 mg/dL   Recent Labs   Lab Test 01/10/22  1014 03/01/21  0849 06/26/18  0957 09/29/15  1119   CHOL 111 113   < > 158   HDL 38* 35*   < > 40*   LDL 57 57   < > 87   TRIG 78 106   < > 156*   CHOLHDLRATIO  --   --   --  4.0    < > = values in this interval not displayed.     Vaccines:  Most Recent Immunizations   Administered Date(s) Administered     COVID-19,PF,Moderna 11/08/2021     FLU 6-35 months 11/30/2011     Flu, Unspecified 09/15/2021     HepA-Adult 02/22/2017     HepB-Adult 08/11/2004     Influenza (H1N1) 12/31/2009     Influenza (IIV3) PF 09/14/2021     Influenza Vaccine IM > 6 months Valent " IIV4 (Alfuria,Fluzone) 09/11/2020     Influenza,INJ,MDCK,PF,Quad >4yrs 10/10/2017     Meningococcal (Menactra ) 02/22/2017     Meningococcal (Menomune ) 08/11/2004     Pneumococcal 23 valent 09/07/2018     TDAP Vaccine (Adacel) 02/01/2015     Tdap (Adacel,Boostrix) 02/01/2015     Twinrix A/B 09/29/2015     Typhoid Oral 03/01/2015     Yellow Fever 02/22/2017       Today's Vitals: There were no vitals taken for this visit.  ----------------      I spent 23 minutes with this patient today. All changes were made via collaborative practice agreement with Russel Hsieh MD. A copy of the visit note was provided to the patient's provider(s).    The patient was sent via [employer] a summary of these recommendations.     Serene Hamm, PharmD  Medication Therapy Management Pharmacist    Telemedicine Visit Details  Type of service:  Video Conference via [employer]  Start Time: 1p  End Time: 123p  Originating Location (patient location): [place]  Distant Location (provider location):  [facility]     Medication Therapy Recommendations  Type 2 diabetes mellitus with hyperglycemia, without long-term current use of insulin (H)    Current Medication: glipiZIDE (GLUCOTROL XL) 10 MG 24 hr tablet   Rationale: Dose too low - Dosage too low - Effectiveness   Recommendation: Increase Dose   Status: Accepted per CPA         Vaccine counseling    Rationale: Preventive therapy - Needs additional medication therapy - Indication   Recommendation: Provide Education - PREVNAR 20 IM   Status: Patient Agreed - Adherence/Education

## 2022-08-17 NOTE — PATIENT INSTRUCTIONS
"Recommendations from today's MTM visit:                                                      Increase glipizide XL 10 mg tablet, take 2 tablets in the morning.    Stay on other agents as discussed.  IF you get a low blood sugar then reduce Toujeo to 28 units daily and if low again drop again.     A1c in 3 months, follow-up with primary care provider for your physical at that time.    You should get the [id number] vaccine. Ok to get with flu shot.    Follow-up: based on labs    It was great speaking with you today.  I value your experience and would be very thankful for your time in providing feedback in our clinic survey. In the next few days, you may receive an email or text message from [employer] with a link to a survey related to your  clinical pharmacist.\"     To schedule another MTM appointment, please call the clinic directly or you may call the MTM scheduling line at [phone] or toll-free at [phone].     My Clinical Pharmacist's contact information:                                                      Please feel free to contact me with any questions or concerns you have.      Serene Hamm, PharmD  Medication Therapy Management Pharmacist     "

## 2022-08-24 RX ORDER — ATORVASTATIN CALCIUM 10 MG/1
10 TABLET, FILM COATED ORAL DAILY
Qty: 90 TABLET | Refills: 1 | Status: SHIPPED | OUTPATIENT
Start: 2022-08-24 | End: 2022-12-22

## 2022-08-24 RX ORDER — METFORMIN HCL 500 MG
1000 TABLET, EXTENDED RELEASE 24 HR ORAL 2 TIMES DAILY WITH MEALS
Qty: 360 TABLET | Refills: 1 | Status: SHIPPED | OUTPATIENT
Start: 2022-08-24 | End: 2022-12-22

## 2022-10-17 DIAGNOSIS — E11.65 TYPE 2 DIABETES MELLITUS WITH HYPERGLYCEMIA, WITHOUT LONG-TERM CURRENT USE OF INSULIN (H): ICD-10-CM

## 2022-10-20 RX ORDER — METFORMIN HCL 500 MG
TABLET, EXTENDED RELEASE 24 HR ORAL
Qty: 360 TABLET | Refills: 1 | OUTPATIENT
Start: 2022-10-20

## 2022-10-20 NOTE — TELEPHONE ENCOUNTER
Patient given a 6 month supply of medication on 8/24/22. Refusing refill request and closing encounter.     Maritza Flores RN on 10/20/2022 at 11:28 AM

## 2022-12-20 SDOH — ECONOMIC STABILITY: INCOME INSECURITY: IN THE LAST 12 MONTHS, WAS THERE A TIME WHEN YOU WERE NOT ABLE TO PAY THE MORTGAGE OR RENT ON TIME?: NO

## 2022-12-20 SDOH — HEALTH STABILITY: PHYSICAL HEALTH: ON AVERAGE, HOW MANY MINUTES DO YOU ENGAGE IN EXERCISE AT THIS LEVEL?: 20 MIN

## 2022-12-20 SDOH — ECONOMIC STABILITY: INCOME INSECURITY: HOW HARD IS IT FOR YOU TO PAY FOR THE VERY BASICS LIKE FOOD, HOUSING, MEDICAL CARE, AND HEATING?: NOT HARD AT ALL

## 2022-12-20 SDOH — ECONOMIC STABILITY: FOOD INSECURITY: WITHIN THE PAST 12 MONTHS, YOU WORRIED THAT YOUR FOOD WOULD RUN OUT BEFORE YOU GOT MONEY TO BUY MORE.: NEVER TRUE

## 2022-12-20 SDOH — ECONOMIC STABILITY: FOOD INSECURITY: WITHIN THE PAST 12 MONTHS, THE FOOD YOU BOUGHT JUST DIDN'T LAST AND YOU DIDN'T HAVE MONEY TO GET MORE.: NEVER TRUE

## 2022-12-20 SDOH — HEALTH STABILITY: PHYSICAL HEALTH: ON AVERAGE, HOW MANY DAYS PER WEEK DO YOU ENGAGE IN MODERATE TO STRENUOUS EXERCISE (LIKE A BRISK WALK)?: 2 DAYS

## 2022-12-20 ASSESSMENT — LIFESTYLE VARIABLES
HOW OFTEN DO YOU HAVE SIX OR MORE DRINKS ON ONE OCCASION: NEVER
HOW MANY STANDARD DRINKS CONTAINING ALCOHOL DO YOU HAVE ON A TYPICAL DAY: 1 OR 2
AUDIT-C TOTAL SCORE: 1
SKIP TO QUESTIONS 9-10: 1
HOW OFTEN DO YOU HAVE A DRINK CONTAINING ALCOHOL: MONTHLY OR LESS

## 2022-12-20 ASSESSMENT — SOCIAL DETERMINANTS OF HEALTH (SDOH)
DO YOU BELONG TO ANY CLUBS OR ORGANIZATIONS SUCH AS CHURCH GROUPS UNIONS, FRATERNAL OR ATHLETIC GROUPS, OR SCHOOL GROUPS?: YES
HOW OFTEN DO YOU ATTEND CHURCH OR RELIGIOUS SERVICES?: 1 TO 4 TIMES PER YEAR
IN A TYPICAL WEEK, HOW MANY TIMES DO YOU TALK ON THE PHONE WITH FAMILY, FRIENDS, OR NEIGHBORS?: THREE TIMES A WEEK
HOW OFTEN DO YOU GET TOGETHER WITH FRIENDS OR RELATIVES?: TWICE A WEEK

## 2022-12-20 ASSESSMENT — ENCOUNTER SYMPTOMS
HEMATOCHEZIA: 0
DIZZINESS: 0
DYSURIA: 0
CHILLS: 0
JOINT SWELLING: 0
FREQUENCY: 0
CONSTIPATION: 0
ABDOMINAL PAIN: 0
WEAKNESS: 0
MYALGIAS: 0
ARTHRALGIAS: 0
NERVOUS/ANXIOUS: 0
NAUSEA: 0
SORE THROAT: 0
HEMATURIA: 0
FEVER: 0
SHORTNESS OF BREATH: 0
PARESTHESIAS: 0
PALPITATIONS: 0
DIARRHEA: 0
HEARTBURN: 0
COUGH: 0
EYE PAIN: 0
HEADACHES: 0

## 2022-12-22 ENCOUNTER — OFFICE VISIT (OUTPATIENT)
Dept: PEDIATRICS | Facility: CLINIC | Age: 42
End: 2022-12-22
Payer: COMMERCIAL

## 2022-12-22 VITALS
BODY MASS INDEX: 41.75 KG/M2 | DIASTOLIC BLOOD PRESSURE: 80 MMHG | SYSTOLIC BLOOD PRESSURE: 122 MMHG | OXYGEN SATURATION: 97 % | HEART RATE: 89 BPM | TEMPERATURE: 97.9 F | WEIGHT: 315 LBS | HEIGHT: 73 IN | RESPIRATION RATE: 16 BRPM

## 2022-12-22 DIAGNOSIS — Z00.00 ROUTINE GENERAL MEDICAL EXAMINATION AT A HEALTH CARE FACILITY: Primary | ICD-10-CM

## 2022-12-22 DIAGNOSIS — E78.5 HYPERLIPIDEMIA LDL GOAL <100: ICD-10-CM

## 2022-12-22 DIAGNOSIS — G47.33 OSA (OBSTRUCTIVE SLEEP APNEA): ICD-10-CM

## 2022-12-22 DIAGNOSIS — E11.65 TYPE 2 DIABETES MELLITUS WITH HYPERGLYCEMIA, WITHOUT LONG-TERM CURRENT USE OF INSULIN (H): ICD-10-CM

## 2022-12-22 LAB
ALBUMIN SERPL BCG-MCNC: 4.2 G/DL (ref 3.5–5.2)
ALP SERPL-CCNC: 102 U/L (ref 40–129)
ALT SERPL W P-5'-P-CCNC: 27 U/L (ref 10–50)
ANION GAP SERPL CALCULATED.3IONS-SCNC: 14 MMOL/L (ref 7–15)
AST SERPL W P-5'-P-CCNC: 20 U/L (ref 10–50)
BILIRUB SERPL-MCNC: 0.7 MG/DL
BUN SERPL-MCNC: 16.8 MG/DL (ref 6–20)
CALCIUM SERPL-MCNC: 9.2 MG/DL (ref 8.6–10)
CHLORIDE SERPL-SCNC: 104 MMOL/L (ref 98–107)
CHOLEST SERPL-MCNC: 135 MG/DL
CREAT SERPL-MCNC: 1.04 MG/DL (ref 0.67–1.17)
DEPRECATED HCO3 PLAS-SCNC: 21 MMOL/L (ref 22–29)
GFR SERPL CREATININE-BSD FRML MDRD: >90 ML/MIN/1.73M2
GLUCOSE SERPL-MCNC: 161 MG/DL (ref 70–99)
HBA1C MFR BLD: 8 % (ref 0–5.6)
HDLC SERPL-MCNC: 36 MG/DL
LDLC SERPL CALC-MCNC: 79 MG/DL
NONHDLC SERPL-MCNC: 99 MG/DL
POTASSIUM SERPL-SCNC: 4.9 MMOL/L (ref 3.4–5.3)
PROT SERPL-MCNC: 6.8 G/DL (ref 6.4–8.3)
SODIUM SERPL-SCNC: 139 MMOL/L (ref 136–145)
TRIGL SERPL-MCNC: 102 MG/DL

## 2022-12-22 PROCEDURE — 80061 LIPID PANEL: CPT | Performed by: INTERNAL MEDICINE

## 2022-12-22 PROCEDURE — 90471 IMMUNIZATION ADMIN: CPT | Performed by: INTERNAL MEDICINE

## 2022-12-22 PROCEDURE — 80053 COMPREHEN METABOLIC PANEL: CPT | Performed by: INTERNAL MEDICINE

## 2022-12-22 PROCEDURE — 99396 PREV VISIT EST AGE 40-64: CPT | Mod: 25 | Performed by: INTERNAL MEDICINE

## 2022-12-22 PROCEDURE — 90677 PCV20 VACCINE IM: CPT | Performed by: INTERNAL MEDICINE

## 2022-12-22 PROCEDURE — 36415 COLL VENOUS BLD VENIPUNCTURE: CPT | Performed by: INTERNAL MEDICINE

## 2022-12-22 PROCEDURE — 99213 OFFICE O/P EST LOW 20 MIN: CPT | Mod: 25 | Performed by: INTERNAL MEDICINE

## 2022-12-22 PROCEDURE — 83036 HEMOGLOBIN GLYCOSYLATED A1C: CPT | Performed by: INTERNAL MEDICINE

## 2022-12-22 RX ORDER — METFORMIN HCL 500 MG
1000 TABLET, EXTENDED RELEASE 24 HR ORAL 2 TIMES DAILY WITH MEALS
Qty: 360 TABLET | Refills: 1 | Status: SHIPPED | OUTPATIENT
Start: 2022-12-22 | End: 2023-03-01

## 2022-12-22 RX ORDER — GLIPIZIDE 10 MG/1
20 TABLET, FILM COATED, EXTENDED RELEASE ORAL DAILY
Qty: 180 TABLET | Refills: 1 | Status: SHIPPED | OUTPATIENT
Start: 2022-12-22 | End: 2022-12-23

## 2022-12-22 RX ORDER — ATORVASTATIN CALCIUM 10 MG/1
10 TABLET, FILM COATED ORAL DAILY
Qty: 90 TABLET | Refills: 3 | Status: SHIPPED | OUTPATIENT
Start: 2022-12-22 | End: 2023-03-01

## 2022-12-22 ASSESSMENT — ENCOUNTER SYMPTOMS
SORE THROAT: 0
FEVER: 0
ABDOMINAL PAIN: 0
DIARRHEA: 0
CONSTIPATION: 0
PARESTHESIAS: 0
COUGH: 0
PALPITATIONS: 0
SHORTNESS OF BREATH: 0
HEADACHES: 0
HEMATURIA: 0
WEAKNESS: 0
FREQUENCY: 0
NERVOUS/ANXIOUS: 0
HEMATOCHEZIA: 0
EYE PAIN: 0
DYSURIA: 0
MYALGIAS: 0
CHILLS: 0
HEARTBURN: 0
DIZZINESS: 0
ARTHRALGIAS: 0
JOINT SWELLING: 0
NAUSEA: 0

## 2022-12-22 ASSESSMENT — PAIN SCALES - GENERAL: PAINLEVEL: NO PAIN (0)

## 2022-12-22 NOTE — PROGRESS NOTES
SUBJECTIVE:   CC: Vladimir is an 42 year old who presents for preventative health visit.     Patient has been advised of split billing requirements and indicates understanding: Yes  Healthy Habits:     Getting at least 3 servings of Calcium per day:  Yes    Bi-annual eye exam:  Yes    Dental care twice a year:  Yes    Sleep apnea or symptoms of sleep apnea:  Sleep apnea    Diet:  Diabetic and Breakfast skipped    Frequency of exercise:  2-3 days/week    Duration of exercise:  15-30 minutes    Taking medications regularly:  Yes    Medication side effects:  None    PHQ-2 Total Score: 0    Additional concerns today:  No        Today's PHQ-2 Score:   PHQ-2 ( 1999 Pfizer) 12/20/2022   Q1: Little interest or pleasure in doing things 0   Q2: Feeling down, depressed or hopeless 0   PHQ-2 Score 0   PHQ-2 Total Score (12-17 Years)- Positive if 3 or more points; Administer PHQ-A if positive -   Q1: Little interest or pleasure in doing things Not at all   Q2: Feeling down, depressed or hopeless Not at all   PHQ-2 Score 0           Social History     Tobacco Use     Smoking status: Never     Smokeless tobacco: Never   Substance Use Topics     Alcohol use: Yes     Comment: 1 per month maybe         Alcohol Use 12/20/2022   Prescreen: >3 drinks/day or >7 drinks/week? No   Prescreen: >3 drinks/day or >7 drinks/week? -       Last PSA: No results found for: PSA    Reviewed orders with patient. Reviewed health maintenance and updated orders accordingly - Yes  Patient Active Problem List   Diagnosis     Morbid obesity with BMI of 50.0-59.9, adult (H)     PAOLA (obstructive sleep apnea)     Type 2 diabetes mellitus with hyperglycemia, without long-term current use of insulin (H)     Hyperlipidemia LDL goal <100     ADAMA (generalized anxiety disorder)     Family history of thyroid cancer     Past Surgical History:   Procedure Laterality Date     AS ESOPHAGOSCOPY, DIAGNOSTIC      EGD     BIOPSY  2008    egd normal       Social History      Tobacco Use     Smoking status: Never     Smokeless tobacco: Never   Substance Use Topics     Alcohol use: Yes     Comment: 1 per month maybe     Family History   Problem Relation Age of Onset     Diabetes Father      Hypertension Father      Other Cancer Father         Kidney & Thyroid     Cerebrovascular Disease Paternal Grandfather      Other Cancer Brother         Lukemia     Other Cancer Maternal Grandmother         Pancreatic     Other Cancer Maternal Grandfather         Lung - smoker     Colon Cancer No family hx of      Prostate Cancer No family hx of      Cerebrovascular Disease No family hx of      Coronary Artery Disease No family hx of          Current Outpatient Medications   Medication Sig Dispense Refill     ACCU-CHEK GUIDE test strip TEST TWICE DAILY AS DIRECTED 200 strip 1     alcohol swab prep pads Use to swab area of injection/kayleigh as directed. 100 each 3     atorvastatin (LIPITOR) 10 MG tablet Take 1 tablet (10 mg) by mouth daily 90 tablet 3     B-D U/F insulin pen needle USE 1 NEEDLE DAILY OR AS DIRECTED       benzocaine (AMERICAINE) 20 % rectal ointment Place rectally every 3 hours as needed for moderate pain 28 g 0     blood glucose monitoring (ACCU-CHEK FASTCLIX) lancets 1 each daily 102 each 3     Continuous Blood Gluc Sensor (FREESTYLE KULWANT 2 SENSOR) MISC USE AS DIRECTED. CHANGE EVERY 14 DAYS 2 each 11     empagliflozin (JARDIANCE) 25 MG TABS tablet Take 1 tablet (25 mg) by mouth daily 90 tablet 1     glipiZIDE (GLUCOTROL XL) 10 MG 24 hr tablet Take 2 tablets (20 mg) by mouth daily 180 tablet 1     hydrocortisone, Perianal, (HYDROCORTISONE) 2.5 % cream Place rectally 2 times daily as needed for hemorrhoids 28 g 0     insulin glargine U-300 (TOUJEO) 300 UNIT/ML (1 units dial) pen Inject 30 Units Subcutaneous daily 15 mL 1     insulin pen needle (ULTICARE MINI) 31G X 6 MM miscellaneous Use 1 pen needles daily or as directed. 100 each 3     metFORMIN (GLUCOPHAGE XR) 500 MG 24 hr tablet  "Take 2 tablets (1,000 mg) by mouth 2 times daily (with meals) 360 tablet 1       Reviewed and updated as needed this visit by clinical staff   Tobacco  Allergies  Meds              Reviewed and updated as needed this visit by Provider                     Review of Systems   Constitutional: Negative for chills and fever.   HENT: Negative for congestion, ear pain, hearing loss and sore throat.    Eyes: Negative for pain and visual disturbance.   Respiratory: Negative for cough and shortness of breath.    Cardiovascular: Negative for chest pain, palpitations and peripheral edema.   Gastrointestinal: Negative for abdominal pain, constipation, diarrhea, heartburn, hematochezia and nausea.   Genitourinary: Negative for dysuria, frequency, genital sores, hematuria, impotence, penile discharge and urgency.   Musculoskeletal: Negative for arthralgias, joint swelling and myalgias.   Skin: Negative for rash.   Neurological: Negative for dizziness, weakness, headaches and paresthesias.   Psychiatric/Behavioral: Negative for mood changes. The patient is not nervous/anxious.        OBJECTIVE:   /80   Pulse 89   Temp 97.9  F (36.6  C) (Tympanic)   Resp 16   Ht 1.854 m (6' 1\")   Wt (!) 179.6 kg (396 lb)   SpO2 97%   BMI 52.25 kg/m      Physical Exam  GENERAL: healthy, alert and no distress  EYES: Eyes grossly normal to inspection, PERRL and conjunctivae and sclerae normal  HENT: ear canals and TM's normal, nose and mouth without ulcers or lesions  NECK: no adenopathy, no asymmetry, masses, or scars and thyroid normal to palpation  RESP: lungs clear to auscultation - no rales, rhonchi or wheezes  CV: regular rate and rhythm, normal S1 S2, no S3 or S4, no murmur, click or rub, no peripheral edema and peripheral pulses strong  ABDOMEN: soft, nontender, no hepatosplenomegaly, no masses and bowel sounds normal  MS: no gross musculoskeletal defects noted, no edema  SKIN: no suspicious lesions or rashes  NEURO: Normal " "strength and tone, mentation intact and speech normal  PSYCH: mentation appears normal, affect normal/bright        ASSESSMENT/PLAN:   (Z00.00) Routine general medical examination at a health care facility  (primary encounter diagnosis)    (E11.65) Type 2 diabetes mellitus with hyperglycemia, without long-term current use of insulin (H)  Comment:    Diabetes follow-up--poorly controlled.   Current rx: metformin 2000mg daily, glipizide 20mg, jardiance 25mg, tuojeo 30units qd.  Has CGM       Diet: soda is biggest challenge for Vladimir, generally regular soda--drinks 1-2 on \"good days\", 4-5 on \"bad days\".  Next step consider adding mealtime novolog. ongoing follow-up with MTM.  BP     122/80  12/22/2022    Lab Results   Component Value Date     04/18/2022     08/24/2020     Lab Results   Component Value Date    A1C 8.0 12/22/2022    A1C 7.4 03/01/2021     Lab Results   Component Value Date    LDL 57 01/10/2022    LDL 57 03/01/2021   Plan: empagliflozin (JARDIANCE) 25 MG TABS tablet,         glipiZIDE (GLUCOTROL XL) 10 MG 24 hr tablet,         metFORMIN (GLUCOPHAGE XR) 500 MG 24 hr tablet,         insulin glargine U-300 (TOUJEO) 300 UNIT/ML (1         units dial) pen, Hemoglobin A1c, Comprehensive         metabolic panel (BMP + Alb, Alk Phos, ALT, AST,        Total. Bili, TP)          (E78.5) Hyperlipidemia LDL goal <100  Comment:    Adequate control.  Continue current medication.   Plan: Lipid panel reflex to direct LDL Non-fasting,         atorvastatin (LIPITOR) 10 MG tablet          (G47.33) PAOLA (obstructive sleep apnea)  Comment:   Plan: continue CPAP          COUNSELING:   Reviewed preventive health counseling, as reflected in patient instructions       Regular exercise       Healthy diet/nutrition       Colorectal cancer screening       Prostate cancer screening      BMI:   Estimated body mass index is 52.25 kg/m  as calculated from the following:    Height as of this encounter: 1.854 m (6' 1\").    " Weight as of this encounter: 179.6 kg (396 lb).   Weight management plan: Discussed healthy diet and exercise guidelines      He reports that he has never smoked. He has never used smokeless tobacco.            Russel Hsieh MD  [facility]

## 2022-12-23 ENCOUNTER — VIRTUAL VISIT (OUTPATIENT)
Dept: PHARMACY | Facility: CLINIC | Age: 42
End: 2022-12-23
Payer: COMMERCIAL

## 2022-12-23 DIAGNOSIS — E66.01 MORBID OBESITY WITH BMI OF 50.0-59.9, ADULT (H): Primary | ICD-10-CM

## 2022-12-23 DIAGNOSIS — E11.65 TYPE 2 DIABETES MELLITUS WITH HYPERGLYCEMIA, WITHOUT LONG-TERM CURRENT USE OF INSULIN (H): ICD-10-CM

## 2022-12-23 PROCEDURE — 99607 MTMS BY PHARM ADDL 15 MIN: CPT | Performed by: PHARMACIST

## 2022-12-23 PROCEDURE — 99606 MTMS BY PHARM EST 15 MIN: CPT | Performed by: PHARMACIST

## 2022-12-23 RX ORDER — FLURBIPROFEN SODIUM 0.3 MG/ML
SOLUTION/ DROPS OPHTHALMIC
Qty: 100 EACH | Refills: 3 | Status: SHIPPED | OUTPATIENT
Start: 2022-12-23 | End: 2023-01-17

## 2022-12-23 RX ORDER — INSULIN ASPART 100 [IU]/ML
INJECTION, SOLUTION INTRAVENOUS; SUBCUTANEOUS
Qty: 15 ML | Refills: 0 | Status: SHIPPED | OUTPATIENT
Start: 2022-12-23 | End: 2023-01-17

## 2022-12-23 RX ORDER — BLOOD-GLUCOSE SENSOR
1 EACH MISCELLANEOUS
Qty: 6 EACH | Refills: 1 | Status: SHIPPED | OUTPATIENT
Start: 2022-12-23 | End: 2023-03-01

## 2022-12-23 RX ORDER — GLIPIZIDE 10 MG/1
10 TABLET, FILM COATED, EXTENDED RELEASE ORAL DAILY
Qty: 180 TABLET | Refills: 1 | Status: SHIPPED | OUTPATIENT
Start: 2022-12-23 | End: 2023-08-07

## 2022-12-23 NOTE — PROGRESS NOTES
Medication Therapy Management (MTM) Encounter    ASSESSMENT:                            Medication Adherence/Access: No issues identified    Type 2 Diabetes/elevated BMI: although a1c improved, continues to not meet goal of < 7%. For this reason will advise patient start mealtime insulin to replace glipizide but also needs to improve blood sugar monitoring frequency. For this reason advise he be switched to judy 3 to help with alerts and alarms that connects automatically.   Not a candidate for GLP-1 due to family thyroid CA risk.   Patient does have difficulty with controlled soda/pop intake but not interested in pharmacotherapy.      PLAN:                            1. Start Novolog 4 units before dinner and before after dinner snacks. (Rx says up to 3 times a day, start with twice a day as mentioned).  2. Monitor for low blood sugars.   3. Decrease glipizide to XL 10 mg every morning  4. Change judy 2 to judy 3. Check to make sure your phone is compatible.   - [facility] ID [id number]  - IF you cannot switch to the judy 3 then be sure to scan the judy 2 at least every 8 hours.     Cravings:  Let me know next visit if you want to discussed medications to help with cravings     Follow-up: 1 month call    SUBJECTIVE/OBJECTIVE:                          Jeffry Younger is a 42 year old male called for a follow-up visit.  Today's visit is a follow-up MTM visit from 8/17/2022.     Reason for visit: blood sugar follow-up.    Allergies/ADRs: Reviewed in chart  Past Medical History: Reviewed in chart - family Hx of medullary thyroid cancer (non-genetic form)  Tobacco: He reports that he has never smoked. He has never used smokeless tobacco.  Alcohol: not currently using reguarly    Medication Adherence/Access: fills with [facility].    Type 2 Diabetes/Elevated BMI:  Currently taking Jardiance 25 mg in the morning, Toujeo 30 units daily, metformin XR 1000 mg twice daily, glipizide XL 10 mg in the morning.  No  "current side effects.   GLP-1 was discontinue on 11/15 due to family hx medullary thyroid CA.   Blood sugar monitoring: Continuous Glucose Monitor judy 2     He carries lifesavers to treat lows. He reports was switching sensors so not a true low.   Eye exam: due  Foot exam: up to date   Diet/Exercise: he reports increasing late night eating due to work schedule lately. It is hard to give up mountain dew.   Aspirin: Not taking due to age  Statin: Yes: atorvastatin   ACEi/ARB: No.   Urine Albumin:   Lab Results   Component Value Date    UMALCR 6.47 04/18/2022      Lab Results   Component Value Date    A1C 8.0 12/22/2022    A1C 8.2 08/15/2022    A1C 7.5 04/18/2022    A1C 8.9 01/10/2022     BP Readings from Last 3 Encounters:   12/22/22 122/80   12/08/21 124/78   11/15/21 130/80     Estimated body mass index is 52.25 kg/m  as calculated from the following:    Height as of 12/22/22: 6' 1\" (1.854 m).    Weight as of 12/22/22: 396 lb (179.6 kg).    TSH   Date Value Ref Range Status   11/15/2021 0.95 0.40 - 4.00 mU/L Final   06/11/2019 1.42 0.40 - 4.00 mU/L Final        Wt Readings from Last 4 Encounters:   12/22/22 (!) 396 lb (179.6 kg)   04/18/22 (!) 372 lb (168.7 kg)   12/08/21 (!) 370 lb 9.6 oz (168.1 kg)   11/15/21 (!) 359 lb 6.4 oz (163 kg)         Today's Vitals: There were no vitals taken for this visit.  ----------------      I spent 17 minutes with this patient today. All changes were made via collaborative practice agreement with Russel Hsieh MD. A copy of the visit note was provided to the patient's provider(s).    A summary of these recommendations was sent via [employer].    Serene Hamm PharmD  Medication Therapy Management Pharmacist    Telemedicine Visit Details  Type of service:  Telephone visit  Start Time: 1:01p  End Time: 1:18pm  Originating Location (pt. Location): Other work  Distant Location (provider location):  On-site  Provider has received verbal consent for a visit from the patient? Yes     " Medication Therapy Recommendations  Type 2 diabetes mellitus with hyperglycemia, without long-term current use of insulin (H)    Current Medication: Continuous Blood Gluc Sensor (FREESTYLE KULWANT 3 SENSOR) MISC   Rationale: More effective medication available - Ineffective medication - Effectiveness   Recommendation: Change Medication   Status: Accepted per CPA          Current Medication: insulin aspart (NOVOLOG FLEXPEN) 100 UNIT/ML pen   Rationale: Synergistic therapy - Needs additional medication therapy - Indication   Recommendation: Start Medication   Status: Accepted per CPA

## 2022-12-23 NOTE — PATIENT INSTRUCTIONS
"Recommendations from today's MTM visit:                                                      Blood sugars:  Start Novolog 4 units before dinner and before after dinner snacks.   Monitor for low blood sugars.   Decrease glipizide to XL 10 mg every morning  4. Change judy 2 to judy 3. Check to make sure your phone is compatible.   - [facility] ID [id number]  - IF you cannot switch to the judy 3 then be sure to scan the judy 2 at least every 8 hours.    Cravings:  Let me know next visit if you want to discussed medications to help with cravings    Follow-up: 1 month call    It was great speaking with you today.  I value your experience and would be very thankful for your time in providing feedback in our clinic survey. In the next few days, you may receive an email or text message from [employer] with a link to a survey related to your  clinical pharmacist.\"     To schedule another MTM appointment, please call the clinic directly or you may call the MTM scheduling line at [phone] or toll-free at [phone].     My Clinical Pharmacist's contact information:                                                      Please feel free to contact me with any questions or concerns you have.      Serene Hamm, PharmD  Medication Therapy Management Pharmacist     "

## 2023-01-17 ENCOUNTER — VIRTUAL VISIT (OUTPATIENT)
Dept: PHARMACY | Facility: CLINIC | Age: 43
End: 2023-01-17
Payer: COMMERCIAL

## 2023-01-17 DIAGNOSIS — E11.65 TYPE 2 DIABETES MELLITUS WITH HYPERGLYCEMIA, WITHOUT LONG-TERM CURRENT USE OF INSULIN (H): ICD-10-CM

## 2023-01-17 PROCEDURE — 99605 MTMS BY PHARM NP 15 MIN: CPT | Performed by: PHARMACIST

## 2023-01-17 RX ORDER — FLURBIPROFEN SODIUM 0.3 MG/ML
SOLUTION/ DROPS OPHTHALMIC
Qty: 300 EACH | Refills: 3 | Status: SHIPPED | OUTPATIENT
Start: 2023-01-17 | End: 2024-01-26

## 2023-01-17 RX ORDER — INSULIN ASPART 100 [IU]/ML
6 INJECTION, SOLUTION INTRAVENOUS; SUBCUTANEOUS
Qty: 15 ML | Refills: 0 | Status: SHIPPED | OUTPATIENT
Start: 2023-01-17 | End: 2023-03-01

## 2023-01-17 NOTE — PROGRESS NOTES
Medication Therapy Management (MTM) Encounter    ASSESSMENT:                            Medication Adherence/Access: No issues identified    Type 2 Diabetes: a1c is NOT at goal < 7%. CGM time in target not at goal > 70%. Patient would benefit from increasing Novolog and to given before meals.   Not a candidate for GLP-1 due to family thyroid CA risk.   Patient may be a good candidate for insulin pump to reduce injection burden or insulin patch.     PLAN:                            Give Novolog 6 units before meals - before lunch and dinner.     a1c in 3 months     Follow-up: 6 weeks Serene French chart check. 3 month clinic visit with a1c prior.     SUBJECTIVE/OBJECTIVE:                          Jeffry Younger is a 42 year old male called for a follow-up visit.  Today's visit is a follow-up MTM visit from 12/23/2022.     Reason for visit: blood sugar follow-up.    Allergies/ADRs: Reviewed in chart  Past Medical History: Reviewed in chart - family Hx of medullary thyroid cancer (non-genetic form)  Tobacco: He reports that he has never smoked. He has never used smokeless tobacco.  Alcohol: not currently using reguarly    Medication Adherence/Access: fills with [facility].    Type 2 Diabetes:    Jardiance 25 mg in the morning,   Toujeo 30 units daily  Novolog 4 units before dinner and before after dinner snacks   metformin XR 1000 mg twice daily  glipizide XL 10 mg in the morning  No current side effects.   GLP-1 was discontinue on 11/15 due to family hx medullary thyroid CA.   Blood sugar monitoring: Continuous Glucose Monitor judy 3, he likes this better than the 2. He also reports needs to work on giving Novolog before he eats. On Monday he did this and post prandial blood glucose were not in the 300's compared to days he forgets and givens after.          He carries lifesavers to treat lows. He reports was switching sensors so not a true low.   Eye exam: due  Foot exam: up to date   Diet/Exercise: he reports  "increasing late night eating due to work schedule lately. It is hard to give up mountain dew.   Aspirin: Not taking due to age  Statin: Yes: atorvastatin   ACEi/ARB: No.   Urine Albumin:   Lab Results   Component Value Date    UMALCR 6.47 04/18/2022      Lab Results   Component Value Date    A1C 8.0 12/22/2022    A1C 8.2 08/15/2022    A1C 7.5 04/18/2022    A1C 8.9 01/10/2022     BP Readings from Last 3 Encounters:   12/22/22 122/80   12/08/21 124/78   11/15/21 130/80     Estimated body mass index is 52.25 kg/m  as calculated from the following:    Height as of 12/22/22: 6' 1\" (1.854 m).    Weight as of 12/22/22: 396 lb (179.6 kg).    TSH   Date Value Ref Range Status   11/15/2021 0.95 0.40 - 4.00 mU/L Final   06/11/2019 1.42 0.40 - 4.00 mU/L Final        Wt Readings from Last 4 Encounters:   12/22/22 (!) 396 lb (179.6 kg)   04/18/22 (!) 372 lb (168.7 kg)   12/08/21 (!) 370 lb 9.6 oz (168.1 kg)   11/15/21 (!) 359 lb 6.4 oz (163 kg)         Today's Vitals: There were no vitals taken for this visit.  ----------------      I spent 13 minutes with this patient today. All changes were made via collaborative practice agreement with Russel Hsieh MD. A copy of the visit note was provided to the patient's provider(s).    A summary of these recommendations was sent via [employer].    Serene Hamm, PharmD  Medication Therapy Management Pharmacist    Telemedicine Visit Details  Type of service:  Telephone visit  Start Time: 11AM  End Time: 11:13am       Medication Therapy Recommendations  Type 2 diabetes mellitus with hyperglycemia, without long-term current use of insulin (H)    Current Medication: insulin aspart (NOVOLOG FLEXPEN) 100 UNIT/ML pen   Rationale: Dose too low - Dosage too low - Effectiveness   Recommendation: Increase Dose   Status: Accepted per CPA            "

## 2023-01-17 NOTE — PATIENT INSTRUCTIONS
"Recommendations from today's MTM visit:                                                      Please give Novolog before meals. Give 6 units instead or 4 unit.    Continue other medications unchanged.    Future you can consider devices such as a pump or insulin patch to help decrease burden of injection to administer insulin. I do think you would be a good candidate for this.     Follow-up: Return in about 3 months (around 4/17/2023) for Medication Therapy Management Visit.    It was great speaking with you today.  I value your experience and would be very thankful for your time in providing feedback in our clinic survey. In the next few days, you may receive an email or text message from [employer] with a link to a survey related to your  clinical pharmacist.\"     To schedule another MTM appointment, please call the clinic directly or you may call the MTM scheduling line at [phone] or toll-free at [phone].     My Clinical Pharmacist's contact information:                                                      Please feel free to contact me with any questions or concerns you have.      Serene Hamm, PharmD  Medication Therapy Management Pharmacist     "

## 2023-03-08 ENCOUNTER — VIRTUAL VISIT (OUTPATIENT)
Dept: PHARMACY | Facility: CLINIC | Age: 43
End: 2023-03-08
Payer: COMMERCIAL

## 2023-03-08 DIAGNOSIS — E11.65 TYPE 2 DIABETES MELLITUS WITH HYPERGLYCEMIA, WITHOUT LONG-TERM CURRENT USE OF INSULIN (H): Primary | ICD-10-CM

## 2023-03-08 PROCEDURE — 99606 MTMS BY PHARM EST 15 MIN: CPT | Performed by: PHARMACIST

## 2023-03-08 NOTE — PROGRESS NOTES
Medication Therapy Management (MTM) Encounter    ASSESSMENT:                            Medication Adherence/Access: patient to work on adherence with Novolog. We discussed next steps if unimproved to consider CeQur mealtime insulin patch.    Type 2 Diabetes: Patient is not meeting A1c goal of < 7%. Discussed importance of Novolog before meals and would also cover the late night meal unless patient is eating low carb based on his continuous glucose monitor readings. Given first meal is the largest and ppbg is still up to 350-400's suggest further titration.      PLAN:                            Novolog 14 units before first meal/lunch meal. Dose increase.  Novolog 10 units before dinner and late night meal. Added late night meal coverage.    Patient to work on adherence of Novolog.   Consider CeQur mealtime insulin delivery patch if adherence unimproved.     Follow-up: Return in about 6 weeks (around 4/17/2023) for Medication Therapy Management Visit.    SUBJECTIVE/OBJECTIVE:                          Jeffry Younger is a 42 year old male called for a follow-up visit.  Today's visit is a follow-up MTM visit from 1/17/2023.     Reason for visit: diabetes.    Allergies/ADRs: Reviewed in chart  Past Medical History: Reviewed in chart - family Hx of medullary thyroid cancer (non-genetic form)  Tobacco: He reports that he has never smoked. He has never used smokeless tobacco.  Alcohol: not currently using reguarly    Medication Adherence/Access: [facility].    Type 2 Diabetes:    Jardiance 25 mg in the morning,   Toujeo 30 units daily  Novolog 10 units before meals (increased last week by me via [employer] message)  metformin XR 1000 mg twice daily  glipizide XL 10 mg in the morning    No current side effects.   GLP-1 was discontinue on 11/15 due to family hx medullary thyroid CA.   Blood sugar monitoring: Continuous Glucose Monitor judy 3, he likes this better than the 2.   Meals:   First meal if the day is  "lunch 12 p - he does miss Novolog and forgets. Largest meal of the day.  Dinner 6-7 p - does not forget always gives Novolog before  Late night meal 9-930 p - not covering with Novolog. He reports last night he had a low carbohydrate snack instead.      Eye exam: due  Foot exam: up to date   Diet/Exercise: he reports increasing late night eating due to work schedule lately. It is hard to give up mountain dew.   Aspirin: Not taking due to age  Statin: Yes: atorvastatin   ACEi/ARB: No.   Urine Albumin:   Lab Results   Component Value Date    UMALCR 6.47 04/18/2022      Lab Results   Component Value Date    A1C 8.0 12/22/2022    A1C 8.2 08/15/2022    A1C 7.5 04/18/2022    A1C 8.9 01/10/2022     BP Readings from Last 3 Encounters:   12/22/22 122/80   12/08/21 124/78   11/15/21 130/80     Estimated body mass index is 52.25 kg/m  as calculated from the following:    Height as of 12/22/22: 6' 1\" (1.854 m).    Weight as of 12/22/22: 396 lb (179.6 kg).    TSH   Date Value Ref Range Status   11/15/2021 0.95 0.40 - 4.00 mU/L Final   06/11/2019 1.42 0.40 - 4.00 mU/L Final        Wt Readings from Last 4 Encounters:   12/22/22 (!) 396 lb (179.6 kg)   04/18/22 (!) 372 lb (168.7 kg)   12/08/21 (!) 370 lb 9.6 oz (168.1 kg)   11/15/21 (!) 359 lb 6.4 oz (163 kg)       Today's Vitals: There were no vitals taken for this visit.  ----------------      I spent 13 minutes with this patient today. All changes were made via collaborative practice agreement with Russel Hsieh MD. A copy of the visit note was provided to the patient's provider(s).    A summary of these recommendations was sent via [employer].    Serene Hamm PharmD  Medication Therapy Management Pharmacist    Telemedicine Visit Details  Type of service:  Telephone visit  Start Time: 11am  End Time: 11:13 AM     Medication Therapy Recommendations  Type 2 diabetes mellitus with hyperglycemia, without long-term current use of insulin (H)    Current Medication: insulin aspart " (NOVOLOG FLEXPEN) 100 UNIT/ML pen   Rationale: Dose too low - Dosage too low - Effectiveness   Recommendation: Increase Frequency   Status: Patient Agreed - Adherence/Education

## 2023-03-08 NOTE — PATIENT INSTRUCTIONS
"Recommendations from today's MTM visit:                                                      Novolog 14 units before first meal/lunch meal. Dose increase.  Novolog 10 units before dinner and late night meal. Added late night meal coverage.    Please continue to work on adherence of Novolog with meals.    Consider CeQur mealtime insulin delivery patch if adherence unimproved next appointment.    Follow-up: Return in about 6 weeks (around 4/17/2023) for Medication Therapy Management Visit.    It was great speaking with you today.  I value your experience and would be very thankful for your time in providing feedback in our clinic survey. In the next few days, you may receive an email or text message from [employer] with a link to a survey related to your  clinical pharmacist.\"     To schedule another MTM appointment, please call the clinic directly or you may call the MTM scheduling line at [phone] or toll-free at [phone].     My Clinical Pharmacist's contact information:                                                      Please feel free to contact me with any questions or concerns you have.      Serene Hamm, PharmD  Medication Therapy Management Pharmacist     "

## 2023-04-13 NOTE — PROGRESS NOTES
Medication Therapy Management (MTM) Encounter    ASSESSMENT:                            Medication Adherence/Access: No issues identified    Type 2 Diabetes: A1c is not at goal < 7%. However, CGM time in target at goal < 70%. Patient has been experiencing hyperglycemia after his first meal of the day and would benefit from increasing his Novolog prior to this meal. Blood pressure is not at goal <130/80 mmHg, however, first elevated reading will recommend monitoring at this time. No changes today. Recommend assessing blood pressure at next visit.    PLAN:                            Increase Novolog 18 units before first meal/lunch meal  Continue Novolog 10 units before dinner and late night meal    Message Serene in [employer] if noticing blood sugar >180 mg/dL consistently after dinner or late night meal    Schedule diabetes eye exam and preventative care visit    Follow-up: Return in 3 months (on 7/17/2023) for Follow up, in person.    SUBJECTIVE/OBJECTIVE:                          Jeffry Younger is a 43 year old male coming in for a follow-up visit.  Today's visit is a follow-up MTM visit from 3/8/2023.     Reason for visit: diabetes follow-up, a1c prior to visit.    Allergies/ADRs: Reviewed in chart  Past Medical History: Reviewed in chart  Tobacco: He reports that he has never smoked. He has never used smokeless tobacco.  Alcohol: 1 beer a month at most  Caffeine: 1-2 cans of regular/diet pop and 64 fl oz Green tea    Medication Adherence/Access: The patient fills medications at [place]: NO, fills medications at [facility].    Type 2 Diabetes:      Jardiance 25 mg daily  Novolog: ~34 units per day   14 units before first meal/lunch meal   Novolog 10 units before dinner and late night meal  Toujeo U-300 30 units daily  Metformin 1000 mg twice daily  Glipizide XL 10 mg daily    Patient is experiencing the following side effects: none   GLP-1 was discontinue on 11/15 due to family hx medullary thyroid CA.  Blood  "sugar monitoring: Continuous Glucose Monitor:        Eye exam: due  Foot exam: due  Aspirin: Not taking due to age  Statin: Yes: Atorvastatin 10 mg daily   ACEi/ARB: No.   Urine Albumin:   Lab Results   Component Value Date    UMALCR 6.47 04/18/2022      Lab Results   Component Value Date    A1C 7.7 04/17/2023    A1C 8.0 12/22/2022    A1C 8.2 08/15/2022    A1C 7.5 04/18/2022     BP Readings from Last 3 Encounters:   04/17/23 (!) 128/96   12/22/22 122/80   12/08/21 124/78     Estimated body mass index is 53.78 kg/m  as calculated from the following:    Height as of 12/22/22: 6' 1\" (1.854 m).    Weight as of this encounter: 407 lb 9.6 oz (184.9 kg).    TSH   Date Value Ref Range Status   11/15/2021 0.95 0.40 - 4.00 mU/L Final   06/11/2019 1.42 0.40 - 4.00 mU/L Final     Wt Readings from Last 5 Encounters:   04/17/23 (!) 407 lb 9.6 oz (184.9 kg)   12/22/22 (!) 396 lb (179.6 kg)   04/18/22 (!) 372 lb (168.7 kg)   12/08/21 (!) 370 lb 9.6 oz (168.1 kg)   11/15/21 (!) 359 lb 6.4 oz (163 kg)     Today's Vitals: BP (!) 128/96   Pulse 86   Wt (!) 407 lb 9.6 oz (184.9 kg)   SpO2 96%   BMI 53.78 kg/m    ----------------    I spent 20 minutes with this patient today. All changes were made via collaborative practice agreement with Russel Hsieh MD. A copy of the visit note was provided to the patient's provider(s).    A summary of these recommendations was given to the patient.    Serene Hamm PharmD  Medication Therapy Management Pharmacist    Sophia Walker  Pharm.D. Student      Medication Therapy Recommendations  Type 2 diabetes mellitus with hyperglycemia, without long-term current use of insulin (H)    Current Medication: insulin aspart (NOVOLOG FLEXPEN) 100 UNIT/ML pen   Rationale: Dose too low - Dosage too low - Effectiveness   Recommendation: Increase Dose - NovoLOG FLEXPEN 100 UNIT/ML soln - Novolog 18 units before first meal/lunch meal daily   Status: Accepted per CPA                  "

## 2023-04-17 ENCOUNTER — OFFICE VISIT (OUTPATIENT)
Dept: PHARMACY | Facility: CLINIC | Age: 43
End: 2023-04-17
Payer: COMMERCIAL

## 2023-04-17 ENCOUNTER — LAB (OUTPATIENT)
Dept: LAB | Facility: CLINIC | Age: 43
End: 2023-04-17
Payer: COMMERCIAL

## 2023-04-17 VITALS
OXYGEN SATURATION: 96 % | SYSTOLIC BLOOD PRESSURE: 128 MMHG | WEIGHT: 315 LBS | BODY MASS INDEX: 53.78 KG/M2 | DIASTOLIC BLOOD PRESSURE: 96 MMHG | HEART RATE: 86 BPM

## 2023-04-17 DIAGNOSIS — E11.65 TYPE 2 DIABETES MELLITUS WITH HYPERGLYCEMIA, WITHOUT LONG-TERM CURRENT USE OF INSULIN (H): ICD-10-CM

## 2023-04-17 LAB — HBA1C MFR BLD: 7.7 % (ref 0–5.6)

## 2023-04-17 PROCEDURE — 99607 MTMS BY PHARM ADDL 15 MIN: CPT | Performed by: PHARMACIST

## 2023-04-17 PROCEDURE — 83036 HEMOGLOBIN GLYCOSYLATED A1C: CPT

## 2023-04-17 PROCEDURE — 36415 COLL VENOUS BLD VENIPUNCTURE: CPT

## 2023-04-17 PROCEDURE — 99606 MTMS BY PHARM EST 15 MIN: CPT | Performed by: PHARMACIST

## 2023-04-17 RX ORDER — INSULIN ASPART 100 [IU]/ML
10-18 INJECTION, SOLUTION INTRAVENOUS; SUBCUTANEOUS
Qty: 45 ML | Refills: 0 | Status: SHIPPED | OUTPATIENT
Start: 2023-04-17 | End: 2023-08-07

## 2023-04-17 NOTE — PATIENT INSTRUCTIONS
"Recommendations from today's MTM visit:                                                      Novolo units before first meal/lunch meal   10 units before dinner and late night meal    Schedule your diabetes eye exam.     Your next preventative visit in December    Follow-up: 3 months - a1c first and then visit --> 2023 945am lab, then 10am with Serene in clinic    It was great speaking with you today.  I value your experience and would be very thankful for your time in providing feedback in our clinic survey. In the next few days, you may receive an email or text message from [employer] with a link to a survey related to your  clinical pharmacist.\"     To schedule another MTM appointment, please call the clinic directly or you may call the MTM scheduling line at [phone] or toll-free at [phone].     My Clinical Pharmacist's contact information:                                                      Please feel free to contact me with any questions or concerns you have.      Serene Hamm, PharmD  Medication Therapy Management Pharmacist    "

## 2023-04-20 ENCOUNTER — OFFICE VISIT (OUTPATIENT)
Dept: PEDIATRICS | Facility: CLINIC | Age: 43
End: 2023-04-20
Payer: COMMERCIAL

## 2023-04-20 VITALS
TEMPERATURE: 98.3 F | HEIGHT: 74 IN | WEIGHT: 315 LBS | OXYGEN SATURATION: 98 % | DIASTOLIC BLOOD PRESSURE: 84 MMHG | HEART RATE: 95 BPM | BODY MASS INDEX: 40.43 KG/M2 | RESPIRATION RATE: 16 BRPM | SYSTOLIC BLOOD PRESSURE: 138 MMHG

## 2023-04-20 DIAGNOSIS — N48.1 BALANITIS: Primary | ICD-10-CM

## 2023-04-20 DIAGNOSIS — E11.65 TYPE 2 DIABETES MELLITUS WITH HYPERGLYCEMIA, WITHOUT LONG-TERM CURRENT USE OF INSULIN (H): ICD-10-CM

## 2023-04-20 DIAGNOSIS — L30.4 INTERTRIGO: ICD-10-CM

## 2023-04-20 PROCEDURE — 99214 OFFICE O/P EST MOD 30 MIN: CPT | Performed by: INTERNAL MEDICINE

## 2023-04-20 RX ORDER — CLOTRIMAZOLE 1 %
CREAM (GRAM) TOPICAL 3 TIMES DAILY
Qty: 85 G | Refills: 3 | Status: SHIPPED | OUTPATIENT
Start: 2023-04-20 | End: 2024-03-11

## 2023-04-20 RX ORDER — FLUCONAZOLE 150 MG/1
150 TABLET ORAL ONCE
Qty: 2 TABLET | Refills: 0 | Status: SHIPPED | OUTPATIENT
Start: 2023-04-20 | End: 2023-04-20

## 2023-04-20 ASSESSMENT — PAIN SCALES - GENERAL: PAINLEVEL: SEVERE PAIN (7)

## 2023-04-20 NOTE — PATIENT INSTRUCTIONS
Start clotrimazole -apply to rash on penis and glans as well as crease of leg #x daily until resolves  Take 2 doses of an antifungal 2nd dose 3 days after the first  Keep the region as dry as possible (noncotton underwear)  If needed for irritated skin can apply 1% hydrocortisone cream as needed (over the counter)  If the rash worsens in the next 3 days or if scrotum becomes painful --contact clinic   Stop Jardiance for now until the infection resolves  Review handouts

## 2023-04-20 NOTE — PROGRESS NOTES
Assessment & Plan     (N48.1) Balanitis  (primary encounter diagnosis)  Comment:   Tinea/Candida balanitis.  Exam without evidence of scrotal involvement or discoloration.  Recommended clotrimazole 3 times daily, keep the region as dry as possible, fluconazole x2 doses.   Recommended holding Jardiance for the next several days until symptoms resolve entirely.  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   Plan: clotrimazole (LOTRIMIN) 1 % external cream,         fluconazole (DIFLUCAN) 150 MG tablet          (L30.4) Intertrigo  Comment: Antifungal regimen as above until rash resolves  Plan: clotrimazole (LOTRIMIN) 1 % external cream,         fluconazole (DIFLUCAN) 150 MG tablet          (E11.65) Type 2 diabetes mellitus with hyperglycemia, without long-term current use of insulin (H)  Comment:   Plan: As above, hold Jardiance until symptoms resolve.  Most recent A1c showed improvement/following up with MTM.   freestyle judy 3 has been helpful and more recent readings have improved with increase in insulin dose.      Russel Hsieh MD  [facility] SYEDA Gilbert is a 43 year old, presenting for the following health issues:  Penis/Scrotum Problem (Along the penis, redness, pain, swelling, all the time )        4/20/2023    11:12 AM   Additional Questions   Roomed by Regina   Accompanied by Self     History of Present Illness       Reason for visit:  Pain, swelling in genital area  Symptom onset:  3-7 days ago  Symptoms include:  Pain in foreskin, swelling, redness  Symptom intensity:  Moderate  Symptom progression:  Staying the same  Had these symptoms before:  No  What makes it worse:  Contact  What makes it better:  No    He eats 2-3 servings of fruits and vegetables daily.He consumes 3 sweetened beverage(s) daily.He exercises with enough effort to increase his  heart rate 20 to 29 minutes per day.  He exercises with enough effort to increase his heart rate 5 days per week.   He is taking medications regularly.     Patient Active Problem List   Diagnosis     Morbid obesity with BMI of 50.0-59.9, adult (H)     PAOLA (obstructive sleep apnea)     Type 2 diabetes mellitus with hyperglycemia, without long-term current use of insulin (H)     Hyperlipidemia LDL goal <100     ADAMA (generalized anxiety disorder)     Family history of thyroid cancer     Current Outpatient Medications   Medication Sig Dispense Refill     ACCU-CHEK GUIDE test strip TEST TWICE DAILY AS DIRECTED 200 strip 1     alcohol swab prep pads Use to swab area of injection/kayleigh as directed. 100 each 3     atorvastatin (LIPITOR) 10 MG tablet Take 1 tablet (10 mg) by mouth daily 90 tablet 3     B-D U/F insulin pen needle USE 1 NEEDLE DAILY OR AS DIRECTED       benzocaine (AMERICAINE) 20 % rectal ointment Place rectally every 3 hours as needed for moderate pain 28 g 0     blood glucose monitoring (ACCU-CHEK FASTCLIX) lancets 1 each daily 102 each 3     clotrimazole (LOTRIMIN) 1 % external cream Apply topically 3 times daily 85 g 3     Continuous Blood Gluc Sensor (FREESTYLE KULWANT 3 SENSOR) MISC 1 each every 14 days 6 each 1     empagliflozin (JARDIANCE) 25 MG TABS tablet Take 1 tablet (25 mg) by mouth daily 90 tablet 1     fluconazole (DIFLUCAN) 150 MG tablet Take 1 tablet (150 mg) by mouth once for 1 dose And repeat dose in 3 days 2 tablet 0     glipiZIDE (GLUCOTROL XL) 10 MG 24 hr tablet Take 1 tablet (10 mg) by mouth daily 180 tablet 1     hydrocortisone, Perianal, (HYDROCORTISONE) 2.5 % cream Place rectally 2 times daily as needed for hemorrhoids 28 g 0     insulin aspart (NOVOLOG FLEXPEN) 100 UNIT/ML pen Inject 10-18 Units Subcutaneous 3 times daily (before meals) Hold until requested by patient 45 mL 0     insulin glargine U-300 (TOUJEO) 300 UNIT/ML (1 units dial) pen Inject 30 Units Subcutaneous daily 15 mL 1  "    insulin pen needle (ULTICARE MINI) 31G X 6 MM miscellaneous Use 1-4 pen needles daily or as directed. 300 each 3     metFORMIN (GLUCOPHAGE XR) 500 MG 24 hr tablet Take 2 tablets (1,000 mg) by mouth 2 times daily (with meals) 360 tablet 1          Review of Systems         Objective    /84 (BP Location: Right arm, Patient Position: Sitting, Cuff Size: Adult Large)   Pulse 95   Temp 98.3  F (36.8  C) (Tympanic)   Resp 16   Ht 1.867 m (6' 1.5\")   Wt (!) 181.4 kg (400 lb)   SpO2 98%   BMI 52.06 kg/m    Body mass index is 52.06 kg/m .  Physical Exam   GENERAL:  alert and no distress  MS: no gross musculoskeletal defects noted, no edema  SKIN: Erythematous macular dermatitis inguinal creases bilaterally left greater than right with small satellite lesions  Genitourinary: Significant perineal adipose tissue covering shaft and glans of the penis.  Penile shaft is erythematous, tender to palpation extending to glans with 3 or 4 shallow linear open areas proximal to the glans, without discharge or bleeding.  Scrotum nontender, without testicular tenderness  PSYCH: mentation appears normal, affect normal/bright                    "

## 2023-06-21 ENCOUNTER — TRANSFERRED RECORDS (OUTPATIENT)
Dept: HEALTH INFORMATION MANAGEMENT | Facility: CLINIC | Age: 43
End: 2023-06-21
Payer: COMMERCIAL

## 2023-06-21 LAB — RETINOPATHY: NEGATIVE

## 2023-08-07 ENCOUNTER — LAB (OUTPATIENT)
Dept: LAB | Facility: CLINIC | Age: 43
End: 2023-08-07
Payer: COMMERCIAL

## 2023-08-07 ENCOUNTER — OFFICE VISIT (OUTPATIENT)
Dept: PHARMACY | Facility: CLINIC | Age: 43
End: 2023-08-07
Payer: COMMERCIAL

## 2023-08-07 VITALS
SYSTOLIC BLOOD PRESSURE: 128 MMHG | OXYGEN SATURATION: 96 % | DIASTOLIC BLOOD PRESSURE: 78 MMHG | HEART RATE: 87 BPM | BODY MASS INDEX: 52.98 KG/M2 | WEIGHT: 315 LBS

## 2023-08-07 DIAGNOSIS — E11.65 TYPE 2 DIABETES MELLITUS WITH HYPERGLYCEMIA, WITHOUT LONG-TERM CURRENT USE OF INSULIN (H): ICD-10-CM

## 2023-08-07 DIAGNOSIS — E78.5 HYPERLIPIDEMIA LDL GOAL <100: Primary | ICD-10-CM

## 2023-08-07 LAB — HBA1C MFR BLD: 7.1 % (ref 0–5.6)

## 2023-08-07 PROCEDURE — 36415 COLL VENOUS BLD VENIPUNCTURE: CPT

## 2023-08-07 PROCEDURE — 99607 MTMS BY PHARM ADDL 15 MIN: CPT | Performed by: PHARMACIST

## 2023-08-07 PROCEDURE — 83036 HEMOGLOBIN GLYCOSYLATED A1C: CPT

## 2023-08-07 PROCEDURE — 99606 MTMS BY PHARM EST 15 MIN: CPT | Performed by: PHARMACIST

## 2023-08-07 RX ORDER — INSULIN ASPART 100 [IU]/ML
10-22 INJECTION, SOLUTION INTRAVENOUS; SUBCUTANEOUS
Qty: 60 ML | Refills: 1 | Status: SHIPPED | OUTPATIENT
Start: 2023-08-07 | End: 2023-09-12

## 2023-08-07 RX ORDER — GLIPIZIDE 10 MG/1
10 TABLET, FILM COATED, EXTENDED RELEASE ORAL DAILY
Qty: 180 TABLET | Refills: 1 | Status: SHIPPED | OUTPATIENT
Start: 2023-08-07 | End: 2023-12-28

## 2023-08-07 NOTE — PROGRESS NOTES
Medication Therapy Management (MTM) Encounter    ASSESSMENT:                            Medication Adherence/Access: No issues identified    Type 2 Diabetes:   Patient is not meeting A1c goal of < 7% but is improving. Based on continuous glucose monitor readings, recommend increasing his mealtime dose due to post prandial blood glucose are not consistently below 180 range.     Hyperlipidemia:   Stable.    PLAN:                            Increase Novolog from 18 units to 20 units before first meal of the name.     Future labs ordered and refilled medications.    Follow-up: Return in about 7 months (around 3/7/2024) for Medication Therapy Management Visit.    SUBJECTIVE/OBJECTIVE:                          Vladimir Younger is a 43 year old male coming in for a follow-up visit from 4/17/2023.       Reason for visit:  diabetes follow-up.    Allergies/ADRs: Reviewed in chart  Past Medical History: Reviewed in chart  Tobacco: He reports that he has never smoked. He has never used smokeless tobacco.  Alcohol: 1-2 drinks per week    Medication Adherence/Access: no issues reported    Type 2 Diabetes:    Jardiance 25 mg daily  Glipizide XL 10 mg daily   Metformin XR 1000 mg twice daily   Toujeo 30 units daily in the evening  Novolog 10-18 units 3 times daily before meals (10-30 minutes) - usually 18 in the am and     Patient is not experiencing side effects. History of balanitis - 4/20/23 office visit with primary care provider with topical treatment advised. Blood sugar monitoring: Continuous Glucose Monitor Free Style Manolo 3:    Diet/Exercise: he reports still working on cutting down the Mountain Dew 2 cans. Drinking unsweetened ice tea.   Eye exam: up to date  Foot exam: due  Urine Albumin:   Lab Results   Component Value Date    UMALCR 6.47 04/18/2022      Lab Results   Component Value Date    A1C 7.1 (H) 08/07/2023     Hyperlipidemia:   atorvastatin 10 mg daily    Patient reports no significant myalgias or other side  effects.  The 10-year ASCVD risk score (Shreya SANDOVAL, et al., 2019) is: 1.8%    Values used to calculate the score:      Age: 43 years      Sex: Male      Is Non- : No      Diabetic: Yes      Tobacco smoker: No      Systolic Blood Pressure: 128 mmHg      Is BP treated: No      HDL Cholesterol: 36 mg/dL      Total Cholesterol: 135 mg/dL  Recent Labs   Lab Test 12/22/22  1128 01/10/22  1014 06/26/18  0957 09/29/15  1119   CHOL 135 111   < > 158   HDL 36* 38*   < > 40*   LDL 79 57   < > 87   TRIG 102 78   < > 156*   CHOLHDLRATIO  --   --   --  4.0    < > = values in this interval not displayed.       Today's Vitals: /78   Pulse 87   Wt (!) 407 lb 1.6 oz (184.7 kg)   SpO2 96%   BMI 52.98 kg/m    ----------------      I spent 30 minutes with this patient today. All changes were made via collaborative practice agreement with Russel Hsieh MD. A copy of the visit note was provided to the patient's provider(s).    A summary of these recommendations was given to the patient.    Serene Hamm, PharmD  Medication Therapy Management Pharmacist  [phone]       Medication Therapy Recommendations  Type 2 diabetes mellitus with hyperglycemia, without long-term current use of insulin (H)    Current Medication: insulin aspart (NOVOLOG FLEXPEN) 100 UNIT/ML pen   Rationale: Dose too low - Dosage too low - Effectiveness   Recommendation: Increase Dose   Status: Accepted per CPA

## 2023-08-07 NOTE — PATIENT INSTRUCTIONS
"Recommendations from today's MTM visit:                                                        Keep up with cutting down that soda pop! Good work!    Increase Novolog morning dose to 20 units instead of 18 units.     Follow-up:     It was great speaking with you today.  I value your experience and would be very thankful for your time in providing feedback in our clinic survey. In the next few days, you may receive an email or text message from [facility] [employer] with a link to a survey related to your  clinical pharmacist.\"     To schedule another MTM appointment, please call the clinic directly or you may call the MTM scheduling line at [phone] or toll-free at [phone].     My Clinical Pharmacist's contact information:                                                      Please feel free to contact me with any questions or concerns you have.     Serene Hamm, PharmD  Medication Therapy Management Pharmacist  [phone]    "

## 2023-08-10 DIAGNOSIS — G47.33 OBSTRUCTIVE SLEEP APNEA (ADULT) (PEDIATRIC): Primary | ICD-10-CM

## 2023-09-07 DIAGNOSIS — E11.65 TYPE 2 DIABETES MELLITUS WITH HYPERGLYCEMIA, WITHOUT LONG-TERM CURRENT USE OF INSULIN (H): ICD-10-CM

## 2023-09-08 RX ORDER — BLOOD-GLUCOSE SENSOR
1 EACH MISCELLANEOUS
Qty: 6 EACH | Refills: 11 | Status: SHIPPED | OUTPATIENT
Start: 2023-09-08 | End: 2024-08-26

## 2023-09-12 DIAGNOSIS — E11.65 TYPE 2 DIABETES MELLITUS WITH HYPERGLYCEMIA, WITHOUT LONG-TERM CURRENT USE OF INSULIN (H): ICD-10-CM

## 2023-09-13 RX ORDER — INSULIN ASPART 100 [IU]/ML
10-22 INJECTION, SOLUTION INTRAVENOUS; SUBCUTANEOUS
Qty: 60 ML | Refills: 1 | Status: SHIPPED | OUTPATIENT
Start: 2023-09-13 | End: 2023-12-28

## 2023-11-06 DIAGNOSIS — E11.65 TYPE 2 DIABETES MELLITUS WITH HYPERGLYCEMIA, WITHOUT LONG-TERM CURRENT USE OF INSULIN (H): ICD-10-CM

## 2023-11-16 NOTE — NURSING NOTE
History Of Present Illness  Garcia Durmmond is a 67 y.o. male presenting with pre renal transplant work up . Here today for [facility]     Past Medical History  He has a past medical history of Body mass index (BMI) 34.0-34.9, adult (02/15/2022), Body mass index (BMI) 36.0-36.9, adult (11/01/2022), Chronic diarrhea (9/2/23), Chronic kidney disease (1990), Diverticulosis (9/15/23), Obesity, unspecified (08/17/2022), Other conditions influencing health status (07/06/2022), Other conditions influencing health status (10/25/2022), Other conditions influencing health status (10/25/2022), Other specified abnormal findings of blood chemistry (08/11/2021), Personal history of other diseases of the respiratory system (01/19/2022), and Personal history of other specified conditions (01/12/2021).    Surgical History  He has a past surgical history that includes Other surgical history (07/24/2019); Other surgical history (07/24/2019); US guided percutaneous peritoneal or retroperitoneal fluid collection drainage (07/05/2019); and Kidney transplant (2018).     Social History  He reports that he quit smoking about a year ago. His smoking use included cigarettes. He has a 30.00 pack-year smoking history. He has never used smokeless tobacco. He reports that he does not currently use alcohol. He reports that he does not use drugs.    Family History  No family history on file.     Allergies  Corticotropin, Empagliflozin, and Iodinated contrast media    Home Medications  No current facility-administered medications on file prior to encounter.     Current Outpatient Medications on File Prior to Encounter   Medication Sig Dispense Refill    albuterol 90 mcg/actuation inhaler Inhale every 6 hours if needed.      aspirin 81 mg chewable tablet Chew.      atorvastatin (Lipitor) 40 mg tablet Take 1 tablet (40 mg) by mouth once daily.      blood sugar diagnostic (Accu-Chek SmartView Test Strip) strip USE 3 TIMES DAILY 300 strip 2    [employer]  Screening Questionnaire for Adult Immunization    Are you sick today?   No   Do you have allergies to medications, food, a vaccine component or latex?   No   Have you ever had a serious reaction after receiving a vaccination?   No   Do you have a long-term health problem with heart disease, lung disease, asthma, kidney disease, metabolic disease (e.g. diabetes), anemia, or other blood disorder?   No   Do you have cancer, leukemia, HIV/AIDS, or any other immune system problem?   No   In the past 3 months, have you taken medications that affect  your immune system, such as prednisone, other steroids, or anticancer drugs; drugs for the treatment of rheumatoid arthritis, Crohn s disease, or psoriasis; or have you had radiation treatments?   No   Have you had a seizure, or a brain or other nervous system problem?   No   During the past year, have you received a transfusion of blood or blood     products, or been given immune (gamma) globulin or antiviral drug?   No   For women: Are you pregnant or is there a chance you could become        pregnant during the next month?   No   Have you received any vaccinations in the past 4 weeks?   No     Immunization questionnaire answers were all negative.      MNVFC doesn't apply on this patient    Per orders of CHRISTOPHER Thompson, injection of YF, Menactra, and Hep A given by Karen Pena. Patient instructed to remain in clinic for 20 minutes afterwards, and to report any adverse reaction to me immediately.  Patient given YF card and informed if lost their is a $20.00 replacement fee.      Screening performed by Karen Pena on 2/22/2017 at 9:59 AM.     160-9-4.8 mcg/actuation HFA aerosol inhaler       bumetanide (Bumex) 0.5 mg tablet Take 2 tablets (1 mg) by mouth once daily. 180 tablet 3    calcium 500 mg calcium (1,250 mg) tablet Take 1 tablet (1,250 mg) by mouth once daily.      carvedilol (Coreg) 12.5 mg tablet TAKE 1 TABLET BY MOUTH TWICE  DAILY 180 tablet 3    cyanocobalamin (Vitamin B-12) 1,000 mcg tablet Take 1 tablet (1,000 mcg) by mouth once daily. as directed      Eliquis 5 mg tablet Take 1 tablet (5 mg) by mouth 2 times a day.      HumaLOG KwikPen Insulin 100 unit/mL injection INJECT SUBCUTANEOUSLY 20  UNITS BEFORE MEALS 60 mL 3    insulin degludec (Tresiba FlexTouch U-200) 200 unit/mL (3 mL) injection Inject 84 Units under the skin once daily at bedtime. 10 mL 12    levothyroxine (Synthroid, Levoxyl) 150 mcg tablet Take 1 tablet (150 mcg) by mouth once daily. 90 tablet 1    magnesium gluconate (Magonate) 27.5 mg magne- sium (500 mg) tablet Take 1 tablet (27.5 mg) by mouth once daily.      multivitamin tablet Take 1 tablet by mouth once daily.      NIFEdipine CC 30 mg 24 hr tablet Take 1 tablet (30 mg) by mouth once daily.      predniSONE (Deltasone) 5 mg tablet Take 1 tablet (5 mg) by mouth once daily.      tacrolimus (Prograf) 1 mg capsule Take 2 capsules (2 mg) by mouth every 12 hours.      tamsulosin (Flomax) 0.4 mg 24 hr capsule TAKE 1 CAPSULE BY MOUTH  DAILY 90 capsule 3    allopurinol (Zyloprim) 100 mg tablet Take 1 half tablet by mouth once daily.      ALPRAZolam (Xanax) 0.5 mg tablet Take by mouth.      fish oil concentrate (Omega-3) 120-180 mg capsule Take by mouth.      levocetirizine (Xyzal) 5 mg tablet Take 1 tablet (5 mg) by mouth once daily in the evening.      mometasone (Nasonex) 50 mcg/actuation nasal spray Administer 1 spray into affected nostril(s) 2 times a day.      omeprazole (PriLOSEC) 20 mg DR capsule Take 1 capsule (20 mg) by mouth once daily.      [DISCONTINUED] mycophenolate (Cellcept) 250 mg capsule Take by mouth.       "[DISCONTINUED] tacrolimus (Prograf) 0.5 mg capsule Take by mouth.          Review of Systems   Constitutional: Negative.    HENT: Negative.     Eyes: Negative.    Respiratory:  Positive for shortness of breath.    Cardiovascular: Negative.    Gastrointestinal: Negative.    Endocrine: Negative.    Genitourinary: Negative.    Musculoskeletal: Negative.    Skin: Negative.    Allergic/Immunologic: Negative.    Neurological: Negative.    Hematological: Negative.    Psychiatric/Behavioral: Negative.            Physical Exam  Constitutional:       General: He is not in acute distress.  HENT:      Head: Normocephalic.      Mouth/Throat:      Mouth: Mucous membranes are moist.   Eyes:      Extraocular Movements: Extraocular movements intact.      Conjunctiva/sclera: Conjunctivae normal.   Cardiovascular:      Rate and Rhythm: Normal rate and regular rhythm.      Pulses:           Radial pulses are 2+ on the right side and 2+ on the left side.        Dorsalis pedis pulses are 2+ on the right side and 2+ on the left side.      Heart sounds: Normal heart sounds. No murmur heard.  Pulmonary:      Effort: Pulmonary effort is normal.      Breath sounds: Normal breath sounds.   Abdominal:      General: Bowel sounds are normal.      Palpations: Abdomen is soft.   Musculoskeletal:         General: Normal range of motion.      Cervical back: Neck supple.      Right lower leg: No edema.      Left lower leg: No edema.   Skin:     General: Skin is warm and dry.      Coloration: Skin is pale.   Neurological:      General: No focal deficit present.      Mental Status: He is alert and oriented to person, place, and time.   Psychiatric:         Mood and Affect: Mood and affect normal.        Sedation Plan    ASA 3     Mallampati class: III.         Last Recorded Vitals  Blood pressure 134/66, pulse 83, temperature 36.8 °C (98.2 °F), temperature source Temporal, resp. rate 18, height 1.829 m (6' 0.01\"), weight 132 kg (290 lb), SpO2 97 " %.    Relevant Results    Labs    CBC:   Recent Labs     11/15/23  0608 10/30/23  1038 10/10/23  1042 09/21/23  0742 09/16/23  1054 09/08/23  0607   WBC 9.9  9.9 11.9* 10.3 7.3 12.7* 8.6   HGB 7.7*  7.7* 8.6* 8.8* 8.6* 8.8* 9.3*   HCT 26.1*  26.1* 28.7* 29.1* 29.0* 28.7* 31.1*     291 297 267 267 211 224   MCV 96  96 94 92 95 93 95     BMP/CMP:   Recent Labs     11/15/23  0608 10/30/23  1034 10/10/23  1042 09/21/23  0742 09/16/23  1054 09/08/23  0607 08/23/23  0610 08/02/23  0730 02/01/23  0844 01/25/23  0730   * 144  --  142 140 144 143 144  CANCELED   < > 142  142   K 5.1 5.1  --  4.8 5.5* 4.9 4.7 4.4  CANCELED   < > 5.0  4.5   * 112*  --  116* 116* 116* 112* 112*  CANCELED   < > 109*  108*   BUN 67* 79* 49* 71* 57* 67* 61* 54*  CANCELED   < > 54*  53*   CREATININE 2.69* 2.95* 2.96* 4.43* 3.39* 3.30* 2.88* 2.55*  CANCELED   < > 2.13*  1.98*   CO2 23 22  --  16* 16* 19* 22 24  CANCELED   < > 27  28   CALCIUM 8.4* 8.8  --  8.6 8.6 8.8 8.9 8.4*  CANCELED   < > 9.2  9.2   PROT 5.6* 5.9* 5.8*  --  6.0*  --   --  5.8*  CANCELED  --  6.3*   BILITOT 0.2 0.2 0.3  --  0.3  --   --  0.2  CANCELED  --  0.4   ALKPHOS 62 70 60  --  62  --   --  75  CANCELED  --  76   ALT 8* 12 10  --  7*  --   --  10  CANCELED  --  14   AST 10 12 15  --  9  --   --  13  CANCELED  --  14   GLUCOSE 98 158*  --  138* 139* 114* 191* 146*  CANCELED   < > 171*  167*    < > = values in this interval not displayed.      Lipid Panel:   Recent Labs     10/19/22  0600 06/21/21  0707 05/29/21  0823 04/29/21  0907 04/02/21  0740 03/05/21  0835 02/03/21  0728 01/12/21  0945 12/03/20  0919 11/04/20  0720 10/09/20  0715 09/03/20  0750 08/01/20  0956 07/09/20  1000 06/02/20  0720 05/01/20  0708   CHOL 130 132 118 101 108 101 133 126 119 134 152 140 137 125 118 120   HDL 36.0* 33.0* 31.0* 29.0* 28.0* 28.0* 31.0* 24.0* 28.0* 27.0* 30.0* 29.0* 29.0* 34.0* 26.0* 32.0*   CHHDL 3.6 4.0 3.8 3.5 3.9 3.6 4.3 5.3* 4.3 5.0 5.1*  "4.8 4.7 3.7 4.5 3.8   VLDL 61* 79* 70* 37 41* 33 45* 40 62* 37 68* 63* 55* 47* 75* 47*   TRIG 306* 396* 349* 187* 206* 163* 223* 199* 310* 184* 338* 317* 275* 236* 377* 236*   NHDL 94 99 87  --  80  --  102  --  91  --  122 111 108 91 92 88     Cardiac       No lab exists for component: \"CK\", \"CKMBP\"   Hemoglobin A1C:   Recent Labs     09/08/23  0607 05/10/23  0610 11/15/22  0745 06/29/22  0615 02/21/22  0722 10/11/21  0648 04/29/21  0907   HGBA1C 7.7* 8.2* 8.1* 8.7* 8.4* 7.8* 7.7     TSH/ Free T4:   Recent Labs     10/04/23  1214   TSH 31.12*   FREET4 0.85     Iron:   Recent Labs     10/04/23  1214 04/17/23  1033 04/11/23  1449   FERRITIN 150 321*  --    TIBC 238* 283  --    IRONSAT 29 35  --    BNP  --   --  108*     Coag:     ABO: No results found for: \"ABO\"    Past Cardiology Tests (Last 3 Years):  EKG:  Encounter Date: 10/25/23   ECG 12 lead (Clinic Performed)   Result Value    Ventricular Rate 80    Atrial Rate 80    WA Interval 164    QRS Duration 78    QT Interval 380    QTC Calculation(Bazett) 438    P Axis 62    R Axis 49    T Axis 80    QRS Count 13    Q Onset 222    P Onset 140    P Offset 190    T Offset 412    QTC Fredericia 418    Narrative    Normal sinus rhythm  Normal ECG  When compared with ECG of 05-JAN-2021 00:50,  No significant change since  Confirmed by Tyler Holt ([id number]) on 10/26/2023 9:23:39 PM     Echo:  No results found for this or any previous visit.    Ejection Fractions:  No results found for: \"EF\"  Cath:  No results found for this or any previous visit.    Stress Test:  No results found for this or any previous visit.    Cardiac Imaging:  No results found for this or any previous visit.        === 11/03/23 ===    CT CHEST WO IV CONTRAST    - Impression -  1.  4 mm too small to characterize right lobe nodule. Mild vascular  calcification. Fatty infiltration of the liver. This examination is  performed in anticipation of upcoming liver transplant. No evidence  of acute " pneumonia      Signed by: Antione Gregory 11/4/2023 10:15 AM  Dictation workstation:   [id number]     Assessment/Plan  Assessment/Plan   Principal Problem:    Pre-op evaluation      Pre renal transplant work up, CKD  -Plan for [facility] with Dr. Davison on 11/16/2023  -Premedicate for contrast allergy        I spent 30 minutes in the professional and overall care of this patient.      Virginie Pettit, APRN-CNP

## 2023-12-27 ASSESSMENT — ENCOUNTER SYMPTOMS
FREQUENCY: 0
ABDOMINAL PAIN: 0
EYE PAIN: 0
PARESTHESIAS: 0
PALPITATIONS: 0
ARTHRALGIAS: 0
WEAKNESS: 0
SORE THROAT: 0
SHORTNESS OF BREATH: 0
HEMATURIA: 0
FEVER: 0
DYSURIA: 0
DIZZINESS: 0
CHILLS: 0
COUGH: 0
MYALGIAS: 0
CONSTIPATION: 0
NERVOUS/ANXIOUS: 0
HEARTBURN: 0
JOINT SWELLING: 0
HEMATOCHEZIA: 0
DIARRHEA: 0
HEADACHES: 0
NAUSEA: 0

## 2023-12-28 ENCOUNTER — OFFICE VISIT (OUTPATIENT)
Dept: PEDIATRICS | Facility: CLINIC | Age: 43
End: 2023-12-28
Payer: COMMERCIAL

## 2023-12-28 VITALS
BODY MASS INDEX: 41.75 KG/M2 | HEIGHT: 73 IN | OXYGEN SATURATION: 98 % | TEMPERATURE: 97.6 F | HEART RATE: 83 BPM | SYSTOLIC BLOOD PRESSURE: 132 MMHG | WEIGHT: 315 LBS | RESPIRATION RATE: 16 BRPM | DIASTOLIC BLOOD PRESSURE: 88 MMHG

## 2023-12-28 DIAGNOSIS — E66.01 MORBID OBESITY WITH BMI OF 50.0-59.9, ADULT (H): ICD-10-CM

## 2023-12-28 DIAGNOSIS — E11.65 TYPE 2 DIABETES MELLITUS WITH HYPERGLYCEMIA, WITHOUT LONG-TERM CURRENT USE OF INSULIN (H): ICD-10-CM

## 2023-12-28 DIAGNOSIS — E78.5 HYPERLIPIDEMIA LDL GOAL <100: ICD-10-CM

## 2023-12-28 DIAGNOSIS — G47.33 OSA (OBSTRUCTIVE SLEEP APNEA): ICD-10-CM

## 2023-12-28 DIAGNOSIS — Z00.00 ROUTINE GENERAL MEDICAL EXAMINATION AT A HEALTH CARE FACILITY: Primary | ICD-10-CM

## 2023-12-28 DIAGNOSIS — F41.1 GAD (GENERALIZED ANXIETY DISORDER): ICD-10-CM

## 2023-12-28 LAB
ALBUMIN SERPL BCG-MCNC: 4 G/DL (ref 3.5–5.2)
ALP SERPL-CCNC: 101 U/L (ref 40–150)
ALT SERPL W P-5'-P-CCNC: 28 U/L (ref 0–70)
ANION GAP SERPL CALCULATED.3IONS-SCNC: 13 MMOL/L (ref 7–15)
AST SERPL W P-5'-P-CCNC: 19 U/L (ref 0–45)
BILIRUB SERPL-MCNC: 0.5 MG/DL
BUN SERPL-MCNC: 23.4 MG/DL (ref 6–20)
CALCIUM SERPL-MCNC: 9 MG/DL (ref 8.6–10)
CHLORIDE SERPL-SCNC: 106 MMOL/L (ref 98–107)
CHOLEST SERPL-MCNC: 125 MG/DL
CREAT SERPL-MCNC: 0.93 MG/DL (ref 0.67–1.17)
DEPRECATED HCO3 PLAS-SCNC: 19 MMOL/L (ref 22–29)
EGFRCR SERPLBLD CKD-EPI 2021: >90 ML/MIN/1.73M2
FASTING STATUS PATIENT QL REPORTED: ABNORMAL
GLUCOSE SERPL-MCNC: 97 MG/DL (ref 70–99)
HBA1C MFR BLD: 6.9 % (ref 0–5.6)
HDLC SERPL-MCNC: 36 MG/DL
LDLC SERPL CALC-MCNC: 70 MG/DL
NONHDLC SERPL-MCNC: 89 MG/DL
POTASSIUM SERPL-SCNC: 3.9 MMOL/L (ref 3.4–5.3)
PROT SERPL-MCNC: 6.9 G/DL (ref 6.4–8.3)
SODIUM SERPL-SCNC: 138 MMOL/L (ref 135–145)
TRIGL SERPL-MCNC: 93 MG/DL

## 2023-12-28 PROCEDURE — 36415 COLL VENOUS BLD VENIPUNCTURE: CPT | Performed by: INTERNAL MEDICINE

## 2023-12-28 PROCEDURE — 99396 PREV VISIT EST AGE 40-64: CPT | Performed by: INTERNAL MEDICINE

## 2023-12-28 PROCEDURE — 80061 LIPID PANEL: CPT | Performed by: INTERNAL MEDICINE

## 2023-12-28 PROCEDURE — 83036 HEMOGLOBIN GLYCOSYLATED A1C: CPT | Performed by: INTERNAL MEDICINE

## 2023-12-28 PROCEDURE — 80053 COMPREHEN METABOLIC PANEL: CPT | Performed by: INTERNAL MEDICINE

## 2023-12-28 PROCEDURE — 99213 OFFICE O/P EST LOW 20 MIN: CPT | Mod: 25 | Performed by: INTERNAL MEDICINE

## 2023-12-28 RX ORDER — ATORVASTATIN CALCIUM 10 MG/1
10 TABLET, FILM COATED ORAL DAILY
Qty: 90 TABLET | Refills: 3 | Status: SHIPPED | OUTPATIENT
Start: 2023-12-28

## 2023-12-28 RX ORDER — METFORMIN HCL 500 MG
1000 TABLET, EXTENDED RELEASE 24 HR ORAL 2 TIMES DAILY WITH MEALS
Qty: 360 TABLET | Refills: 3 | Status: SHIPPED | OUTPATIENT
Start: 2023-12-28

## 2023-12-28 RX ORDER — INSULIN ASPART 100 [IU]/ML
10-22 INJECTION, SOLUTION INTRAVENOUS; SUBCUTANEOUS
Qty: 60 ML | Refills: 2 | Status: SHIPPED | OUTPATIENT
Start: 2023-12-28 | End: 2024-03-12

## 2023-12-28 ASSESSMENT — ENCOUNTER SYMPTOMS
HEMATOCHEZIA: 0
PALPITATIONS: 0
HEADACHES: 0
MYALGIAS: 0
FREQUENCY: 0
JOINT SWELLING: 0
EYE PAIN: 0
WEAKNESS: 0
SORE THROAT: 0
CONSTIPATION: 0
NAUSEA: 0
FEVER: 0
ABDOMINAL PAIN: 0
DIZZINESS: 0
ARTHRALGIAS: 0
CHILLS: 0
NERVOUS/ANXIOUS: 0
DIARRHEA: 0
HEMATURIA: 0
HEARTBURN: 0
COUGH: 0
DYSURIA: 0
SHORTNESS OF BREATH: 0
PARESTHESIAS: 0

## 2023-12-28 ASSESSMENT — PAIN SCALES - GENERAL: PAINLEVEL: NO PAIN (1)

## 2023-12-28 NOTE — PROGRESS NOTES
"SUBJECTIVE:   Vladimir is a 43 year old, presenting for the following:  Physical        12/28/2023    10:06 AM   Additional Questions   Roomed by Yani Cortes CMA   Accompanied by CARLYLE       Healthy Habits:     Getting at least 3 servings of Calcium per day:  Yes    Bi-annual eye exam:  Yes    Dental care twice a year:  Yes    Sleep apnea or symptoms of sleep apnea:  Sleep apnea    Diet:  Regular (no restrictions), Diabetic and Breakfast skipped    Frequency of exercise:  2-3 days/week    Duration of exercise:  15-30 minutes    Taking medications regularly:  Yes    Medication side effects:  None      Today's PHQ-2 Score:       12/27/2023    12:06 AM   PHQ-2 ( 1999 Pfizer)   Q1: Little interest or pleasure in doing things 0   Q2: Feeling down, depressed or hopeless 0   PHQ-2 Score 0   Q1: Little interest or pleasure in doing things Not at all   Q2: Feeling down, depressed or hopeless Not at all   PHQ-2 Score 0         Social History     Tobacco Use    Smoking status: Never    Smokeless tobacco: Never   Substance Use Topics    Alcohol use: Yes     Comment: 1 per month maybe             12/27/2023    12:06 AM   Alcohol Use   Prescreen: >3 drinks/day or >7 drinks/week? No       Last PSA: No results found for: \"PSA\"    Reviewed orders with patient. Reviewed health maintenance and updated orders accordingly - Yes  Patient Active Problem List   Diagnosis    Morbid obesity with BMI of 50.0-59.9, adult (H)    PAOLA (obstructive sleep apnea)    Type 2 diabetes mellitus with hyperglycemia, without long-term current use of insulin (H)    Hyperlipidemia LDL goal <100    ADAMA (generalized anxiety disorder)    Family history of thyroid cancer     Past Surgical History:   Procedure Laterality Date    AS ESOPHAGOSCOPY, DIAGNOSTIC      EGD    BIOPSY  2008    egd normal       Social History     Tobacco Use    Smoking status: Never    Smokeless tobacco: Never   Substance Use Topics    Alcohol use: Yes     Comment: 1 per month maybe     Family " History   Problem Relation Age of Onset    Diabetes Father     Hypertension Father     Other Cancer Father         Kidney & Thyroid    Cerebrovascular Disease Paternal Grandfather     Other Cancer Brother         Lukemia    Other Cancer Maternal Grandmother         Pancreatic    Other Cancer Maternal Grandfather         Lung - smoker    Colon Cancer No family hx of     Prostate Cancer No family hx of     Cerebrovascular Disease No family hx of     Coronary Artery Disease No family hx of          Current Outpatient Medications   Medication Sig Dispense Refill    ACCU-CHEK GUIDE test strip TEST TWICE DAILY AS DIRECTED 200 strip 1    alcohol swab prep pads Use to swab area of injection/kayleigh as directed. 100 each 3    atorvastatin (LIPITOR) 10 MG tablet Take 1 tablet (10 mg) by mouth daily 90 tablet 3    B-D U/F insulin pen needle USE 1 NEEDLE DAILY OR AS DIRECTED      benzocaine (AMERICAINE) 20 % rectal ointment Place rectally every 3 hours as needed for moderate pain 28 g 0    blood glucose monitoring (ACCU-CHEK FASTCLIX) lancets 1 each daily 102 each 3    clotrimazole (LOTRIMIN) 1 % external cream Apply topically 3 times daily 85 g 3    Continuous Blood Gluc Sensor (FREESTYLE KULWANT 3 SENSOR) MISC 1 each every 14 days 6 each 11    empagliflozin (JARDIANCE) 25 MG TABS tablet Take 1 tablet (25 mg) by mouth daily 90 tablet 3    hydrocortisone, Perianal, (HYDROCORTISONE) 2.5 % cream Place rectally 2 times daily as needed for hemorrhoids 28 g 0    insulin aspart (NOVOLOG FLEXPEN) 100 UNIT/ML pen Inject 10-22 Units Subcutaneous 3 times daily (before meals) Hold until requested 60 mL 2    insulin glargine U-300 (TOUJEO) 300 UNIT/ML (1 units dial) pen Inject 30 Units Subcutaneous daily 15 mL 2    insulin pen needle (ULTICARE MINI) 31G X 6 MM miscellaneous Use 1-4 pen needles daily or as directed. 300 each 3    metFORMIN (GLUCOPHAGE XR) 500 MG 24 hr tablet Take 2 tablets (1,000 mg) by mouth 2 times daily (with meals) 360  "tablet 3       Reviewed and updated as needed this visit by clinical staff   Tobacco  Allergies  Meds              Reviewed and updated as needed this visit by Provider                     Review of Systems   Constitutional:  Negative for chills and fever.   HENT:  Negative for congestion, ear pain, hearing loss and sore throat.    Eyes:  Negative for pain and visual disturbance.   Respiratory:  Negative for cough and shortness of breath.    Cardiovascular:  Negative for chest pain, palpitations and peripheral edema.   Gastrointestinal:  Negative for abdominal pain, constipation, diarrhea, heartburn, hematochezia and nausea.   Genitourinary:  Positive for impotence. Negative for dysuria, frequency, genital sores, hematuria, penile discharge and urgency.   Musculoskeletal:  Negative for arthralgias, joint swelling and myalgias.   Skin:  Negative for rash.   Neurological:  Negative for dizziness, weakness, headaches and paresthesias.   Psychiatric/Behavioral:  Negative for mood changes. The patient is not nervous/anxious.        OBJECTIVE:   /88 (BP Location: Right arm, Patient Position: Sitting, Cuff Size: Adult Large)   Pulse 83   Temp 97.6  F (36.4  C) (Tympanic)   Resp 16   Ht 1.851 m (6' 0.87\")   Wt (!) 185.1 kg (408 lb 1.6 oz)   SpO2 98%   BMI 54.03 kg/m      Physical Exam  GENERAL:  alert and no distress  EYES: Eyes grossly normal to inspection, PERRL and conjunctivae and sclerae normal  HENT: ear canals and TM's normal, nose and mouth without ulcers or lesions  NECK: no adenopathy, no asymmetry, masses, or scars and thyroid normal to palpation  RESP: lungs clear to auscultation - no rales, rhonchi or wheezes  CV: regular rate and rhythm, normal S1 S2, no S3 or S4, no murmur, click or rub, no peripheral edema and peripheral pulses strong  ABDOMEN: soft, nontender, no hepatosplenomegaly, no masses and bowel sounds normal  MS: no gross musculoskeletal defects noted, no edema  SKIN: no suspicious " lesions or rashes  NEURO: Normal strength and tone, mentation intact and speech normal  PSYCH: mentation appears normal, affect normal/bright    ASSESSMENT/PLAN:     (Z00.00) Routine general medical examination at a health care facility  (primary encounter diagnosis)    (E11.65) Type 2 diabetes mellitus with hyperglycemia, without long-term current use of insulin (H)  Comment:    well controlled: continue metformin 2000mg daily, jardiance 25, toujeo 30units, novolog with meals.   Recommended stopping glipizide.      (Did not continue glp1ag following father's thyroid cancer dx.  However, pt felt it was very effective for weight loss-discussed risk/benefits of glp1ag and cancer risk.  Agreed to see endocrinology for 2nd opinion)    Lab Results   Component Value Date    A1C 6.9 12/28/2023    A1C 7.4 03/01/2021    Plan: empagliflozin (JARDIANCE) 25 MG TABS tablet,         insulin glargine U-300 (TOUJEO) 300 UNIT/ML (1         units dial) pen, insulin aspart (NOVOLOG         FLEXPEN) 100 UNIT/ML pen, metFORMIN (GLUCOPHAGE        XR) 500 MG 24 hr tablet, Adult Endocrinology          Referral, Hemoglobin A1c,         Comprehensive metabolic panel (BMP + Alb, Alk         Phos, ALT, AST, Total. Bili, TP)          (E78.5) Hyperlipidemia LDL goal <100  Comment:     Lab Results   Component Value Date    LDL 79 12/22/2022    LDL 57 03/01/2021    Plan: atorvastatin (LIPITOR) 10 MG tablet, Lipid         panel reflex to direct LDL Fasting          (G47.33) PAOLA (obstructive sleep apnea)  Comment:   Plan:   continue cpap    (F41.1) ADAMA (generalized anxiety disorder)  Comment:   Plan: improved, no current rx    (E66.01,  Z68.43) BMI 54  Comment:   Plan: hx of severe obesity affecting underlying diabetes, hyperlipidemia and sleep apnea        COUNSELING:   Reviewed preventive health counseling, as reflected in patient instructions       Regular exercise       Healthy diet/nutrition       Colorectal cancer screening        "Prostate cancer screening      BMI:   Estimated body mass index is 54.03 kg/m  as calculated from the following:    Height as of this encounter: 1.851 m (6' 0.87\").    Weight as of this encounter: 185.1 kg (408 lb 1.6 oz).   Weight management plan: Patient referred to endocrine and/or weight management specialty Discussed healthy diet and exercise guidelines      He reports that he has never smoked. He has never used smokeless tobacco.            Russel Hsieh MD  [facility]  "

## 2024-01-03 ENCOUNTER — OFFICE VISIT (OUTPATIENT)
Dept: FAMILY MEDICINE | Facility: CLINIC | Age: 44
End: 2024-01-03
Payer: COMMERCIAL

## 2024-01-03 VITALS
HEIGHT: 74 IN | DIASTOLIC BLOOD PRESSURE: 95 MMHG | SYSTOLIC BLOOD PRESSURE: 147 MMHG | TEMPERATURE: 99.1 F | OXYGEN SATURATION: 98 % | HEART RATE: 90 BPM | WEIGHT: 315 LBS | BODY MASS INDEX: 40.43 KG/M2

## 2024-01-03 DIAGNOSIS — Z71.84 TRAVEL ADVICE ENCOUNTER: Primary | ICD-10-CM

## 2024-01-03 PROCEDURE — 99402 PREV MED CNSL INDIV APPRX 30: CPT | Mod: 25 | Performed by: NURSE PRACTITIONER

## 2024-01-03 PROCEDURE — 90471 IMMUNIZATION ADMIN: CPT | Mod: GA | Performed by: NURSE PRACTITIONER

## 2024-01-03 PROCEDURE — 90715 TDAP VACCINE 7 YRS/> IM: CPT | Mod: GA | Performed by: NURSE PRACTITIONER

## 2024-01-03 RX ORDER — ACETAZOLAMIDE 125 MG/1
TABLET ORAL
Qty: 6 TABLET | Refills: 0 | Status: SHIPPED | OUTPATIENT
Start: 2024-01-03 | End: 2024-06-12

## 2024-01-03 RX ORDER — ONDANSETRON 4 MG/1
4 TABLET, ORALLY DISINTEGRATING ORAL EVERY 8 HOURS PRN
Qty: 10 TABLET | Refills: 0 | Status: SHIPPED | OUTPATIENT
Start: 2024-01-03 | End: 2024-08-26

## 2024-01-03 RX ORDER — AZITHROMYCIN 500 MG/1
500 TABLET, FILM COATED ORAL DAILY
Qty: 3 TABLET | Refills: 0 | Status: SHIPPED | OUTPATIENT
Start: 2024-01-03 | End: 2024-01-06

## 2024-01-03 ASSESSMENT — PAIN SCALES - GENERAL: PAINLEVEL: NO PAIN (0)

## 2024-01-03 NOTE — PATIENT INSTRUCTIONS
Thank you for visiting the [facility] : [phone]  Today January 3, 2024 you received the    Tetanus (Tdap) Vaccine    Oral Typhoid    Follow up vaccine appointments can be made as a NURSE ONLY visit at the Travel Clinic, (BE PREPARED TO WAIT, ) or at designated [facility].    If you are receiving the Rabies vaccines series, it is important that you follow the exact schedule ordered.     Pre-travel     We recommend that you purchase Medical Evacuation Insurance prior to your departure.  Https://wwwnc.cdc.gov/travel/page/insurance    [place] your travel plans with the  Department of PrePlay through STEP ( Smart Traveler Enrollment Program ) https://step.state.gov.  STEP is a free service to allow U[place] citizens and nationals traveling and living abroad to enroll their trip with the nearest U.S. Embassy or Consulate.    Animal Exposure: Avoid all mammals even if they look healthy.  If there is a bite, scratch or even a lick, wash area immediately with soap and water for 15 minutes and seek medical care within 24 hours for evaluation of Rabies post exposure treatment.  Contact your Medical Evacuation Insurance.    Repiratory illness prevention strategies ( Covid and Influenza ) CDC recommendations:  Consider wearing a mask in crowded or poorly ventilated indoor areas, including on public transportation and in transportation hubs.  Hand washing: frequent, thorough handwashing with soap and water for 20 seconds. Use an alcohol-based hand  with at least 60% alcohol if soap and water are not readily available. Avoid touching face, nose, eyes, mouth unless you have done appropriate hand washing as above.  VACCINES: Staying up to date on COVID-19 vaccines significantly lowers the risk of getting very sick, being hospitalized, or dying from COVID-19. CDC recommends that everyone stay up to date on their COVID-19 vaccines, especially people with weakened immune  systems.    Travel Covid 19 Testing:  updated 12/06/2021  International travelers: Pre-travel:  See country specific Embassy websites or airline websites.    Post travel: CDC recommends getting tested 3-5 days after your trip     Post-travel illness:  Contact your provider or [facility] if you develop a fever, rash, cough, diarrhea or other symptoms for up to 1 year after travel.  Inform your healthcare provider when and where you traveled to.    Please call the [facility] with any questions [phone]  Or send your provider a 'My Chart' note.

## 2024-01-03 NOTE — PROGRESS NOTES
"Nurse Note ( Pre-Travel Consult)    Itinerary:  [facility]    Departure Date: 4/6/24    Return Date: 4/17/24    Length of Trip 11 days    Reason for Travel: Tourism         Urban or rural: both    Accommodations: Hotel        IMMUNIZATION HISTORY  Have you received any immunizations within the past 4 weeks?  No  Have you ever fainted from having your blood drawn or from an injection?  No  Have you ever had a fever reaction to vaccination?  No  Have you ever had any bad reaction or side effect from any vaccination?  No  Have you ever had hepatitis A or B vaccine?  Yes  Do you live (or work closely) with anyone who has AIDS, an AIDS-like condition, any other immune disorder or who is on chemotherapy for cancer?  No  Do you have a family history of immunodeficiency?  No  Have you received any injection of immune globulin or any blood products during the past 12 months?  No    Patient roomed by Nik Nova  Jeffry Younger is a 43 year old male seen today with spouse for counsultation for international travel.   Patient will be departing in  3 month(s) and  traveling with spouse.      Patient itinerary :  [place] in the [facility] [place] [facility]  [place] which risk for food borne illnesses. exposure.      Patient's activities will include sightseeing, hiking, and Boating .    Patient's country of birth is [place]    Special medical concerns: diabetes and and Obesity  Pre-travel questionnaire was completed by patient and reviewed by provider.     Vitals: BP (!) 147/95   Pulse 90   Temp 99.1  F (37.3  C) (Temporal)   Ht 1.867 m (6' 1.5\")   Wt (!) 185.1 kg (408 lb)   SpO2 98%   BMI 53.10 kg/m    BMI= Body mass index is 53.1 kg/m .    EXAM:  General:  Well-nourished, well-developed in no acute distress.  Appears to be stated age, interacts appropriately and expresses understanding of information given to patient.    Current Outpatient Medications   Medication Sig Dispense Refill    " ACCU-CHEK GUIDE test strip TEST TWICE DAILY AS DIRECTED 200 strip 1    alcohol swab prep pads Use to swab area of injection/kayeligh as directed. 100 each 3    atorvastatin (LIPITOR) 10 MG tablet Take 1 tablet (10 mg) by mouth daily 90 tablet 3    B-D U/F insulin pen needle USE 1 NEEDLE DAILY OR AS DIRECTED      benzocaine (AMERICAINE) 20 % rectal ointment Place rectally every 3 hours as needed for moderate pain 28 g 0    blood glucose monitoring (ACCU-CHEK FASTCLIX) lancets 1 each daily 102 each 3    clotrimazole (LOTRIMIN) 1 % external cream Apply topically 3 times daily 85 g 3    Continuous Blood Gluc Sensor (FREESTYLE KULWANT 3 SENSOR) MISC 1 each every 14 days 6 each 11    empagliflozin (JARDIANCE) 25 MG TABS tablet Take 1 tablet (25 mg) by mouth daily 90 tablet 3    hydrocortisone, Perianal, (HYDROCORTISONE) 2.5 % cream Place rectally 2 times daily as needed for hemorrhoids 28 g 0    insulin aspart (NOVOLOG FLEXPEN) 100 UNIT/ML pen Inject 10-22 Units Subcutaneous 3 times daily (before meals) Hold until requested 60 mL 2    insulin glargine U-300 (TOUJEO) 300 UNIT/ML (1 units dial) pen Inject 30 Units Subcutaneous daily 15 mL 2    insulin pen needle (ULTICARE MINI) 31G X 6 MM miscellaneous Use 1-4 pen needles daily or as directed. 300 each 3    metFORMIN (GLUCOPHAGE XR) 500 MG 24 hr tablet Take 2 tablets (1,000 mg) by mouth 2 times daily (with meals) 360 tablet 3     Patient Active Problem List   Diagnosis    Morbid obesity with BMI of 50.0-59.9, adult (H)    PAOLA (obstructive sleep apnea)    Type 2 diabetes mellitus with hyperglycemia, without long-term current use of insulin (H)    Hyperlipidemia LDL goal <100    ADAMA (generalized anxiety disorder)    Family history of thyroid cancer     No Known Allergies      Immunizations discussed include:   Covid 19: Up to date  Hepatitis A:  Up to date  Hepatitis B: Up to date  Influenza: Up to date  Typhoid: Oral Typhoid (Vivotiff) Rx sent to pharmacy  Rabies: Declined   reviewed managment of a animal bite or scratch (washing wound, seek medical care within 24 hours for post exposure prophylaxis )  Yellow Fever: Not indicated  Japanese Encephalitis: Not indicated  Meningococcus: Not indicated  Tetanus/Diphtheria: Ordered/given today, risks, benefits and side effects reviewed  Measles/Mumps/Rubella: Up to date  Cholera: Not needed  Polio: Not indicated  Pneumococcal: Up to date  Varicella: Immune by disease history per patient report  Shingrix: Not indicated  HPV:  Not indicated     TB: low risk     Altitude Exposure on this trip: [facility] at close to 9[place]  Past tolerance to Altitude: na    ASSESSMENT/PLAN:  Vladimir was seen today for travel clinic.    Diagnoses and all orders for this visit:    Travel advice encounter  -     typhoid (VIVOTIF) CR capsule; Take 1 capsule by mouth every other day  -     azithromycin (ZITHROMAX) 500 MG tablet; Take 1 tablet (500 mg) by mouth daily for 3 doses Take 1 tablet a day for up to 3 days for severe diarrhea  -     ondansetron (ZOFRAN ODT) 4 MG ODT tab; Take 1 tablet (4 mg) by mouth every 8 hours as needed  -     acetaZOLAMIDE (DIAMOX) 125 MG tablet; Take one tab every 12 hours starting 24 hours prior to ascent and continue for 24 hours while at the highest altitude    Other orders  -     TDAP 7+ (ADACEL,BOOSTRIX)      I have reviewed general recommendations for safe travel   including: food/water precautions, insect precautions, roadway safety. Educational materials and [place] report provided.    Malaraia prophylaxis recommended: none  Symptomatic treatment for traveler's diarrhea: azithromycin  Altitude illness prevention and treatment: Diamox prescription given with instructions on use and education provided on Acute altitude illness recognition and prevention.     Compresssion stockings     Evacuation insurance advised and resources were provided to patient.    Total visit time 30 minutes  with over 50% of time spent counseling patient  and shared decision making as detailed above.    Aruna Thompson CNP  Certificate in Travel Health

## 2024-01-25 DIAGNOSIS — E11.65 TYPE 2 DIABETES MELLITUS WITH HYPERGLYCEMIA, WITHOUT LONG-TERM CURRENT USE OF INSULIN (H): ICD-10-CM

## 2024-01-26 RX ORDER — FLURBIPROFEN SODIUM 0.3 MG/ML
SOLUTION/ DROPS OPHTHALMIC
Qty: 300 EACH | Refills: 1 | Status: SHIPPED | OUTPATIENT
Start: 2024-01-26 | End: 2024-08-26

## 2024-02-24 ENCOUNTER — OFFICE VISIT (OUTPATIENT)
Dept: URGENT CARE | Facility: URGENT CARE | Age: 44
End: 2024-02-24
Payer: COMMERCIAL

## 2024-02-24 VITALS
TEMPERATURE: 98.3 F | OXYGEN SATURATION: 100 % | HEART RATE: 101 BPM | RESPIRATION RATE: 16 BRPM | BODY MASS INDEX: 52.58 KG/M2 | DIASTOLIC BLOOD PRESSURE: 95 MMHG | WEIGHT: 315 LBS | SYSTOLIC BLOOD PRESSURE: 159 MMHG

## 2024-02-24 DIAGNOSIS — L03.039 INFECTION OF TOENAIL: Primary | ICD-10-CM

## 2024-02-24 DIAGNOSIS — E11.65 TYPE 2 DIABETES MELLITUS WITH HYPERGLYCEMIA, WITH LONG-TERM CURRENT USE OF INSULIN (H): ICD-10-CM

## 2024-02-24 DIAGNOSIS — Z79.4 TYPE 2 DIABETES MELLITUS WITH HYPERGLYCEMIA, WITH LONG-TERM CURRENT USE OF INSULIN (H): ICD-10-CM

## 2024-02-24 PROCEDURE — 99213 OFFICE O/P EST LOW 20 MIN: CPT | Performed by: PHYSICIAN ASSISTANT

## 2024-02-24 RX ORDER — CEPHALEXIN 500 MG/1
500 CAPSULE ORAL 3 TIMES DAILY
Qty: 30 CAPSULE | Refills: 0 | Status: SHIPPED | OUTPATIENT
Start: 2024-02-24 | End: 2024-02-24

## 2024-02-24 RX ORDER — CEPHALEXIN 500 MG/1
500 CAPSULE ORAL 3 TIMES DAILY
Qty: 30 CAPSULE | Refills: 0 | Status: SHIPPED | OUTPATIENT
Start: 2024-02-24 | End: 2024-03-05

## 2024-02-24 NOTE — PROGRESS NOTES
ASSESSMENT/PLAN:      (L03.039) Infection of toenail  (primary encounter diagnosis)  MDM: Infected toenail (after toenail injury several weeks ago)  in a 43-year-old male with coexisting insulin-dependent diabetes.  He is afebrile and without any other acute systemic symptoms.  If no improvement by Monday (2 days from now), patient has been advised to follow-up with podiatrist or primary care team.  Criteria for urgent and emergent follow-up were reviewed.  Please see below discharge summary/plan (which I reviewed with him verbally today and provided in printed form for home review).  Of note-patient will start with Keflex 3 times a day and tell redness, pain and swelling decrease--and then may drop down to Keflex 2 times a day for remainder of 10 days treatment.     Plan: Orthopedic  Referral, cephALEXin         (KEFLEX) 500 MG capsule, DISCONTINUED:         cephALEXin (KEFLEX) 500 MG capsule                February 24, 2024 [facility] Plan:     1. Warm, soapy, soaks three times daily until infection is resolved     2. Start the three times  daily antibiotic I prescribed for you today (Keflex/Cephalexin)     3.  Follow-up with primary care provider or foot specialist (podiatrist) on Monday if your symptoms are not improving.     4. Watch closely for any spreading or worsening. Follow-up immediately if any sudden worsening after treatment provided here today.      5. Continue all diabetes medications and watch your blood sugars carefully when you have an infection (infections can cause high or low swings in your blood sugars)    6. Follow-up immediatly if you develop any of the below:    Redness, pain, or swelling of left great toe gets worse  Red streaks in the skin leading away from the wound  Pus or fluid draining from the nail area  Fever of 100.4 F (38 C) or higher, or as directed by your provider        (E11.65,  Z79.4) Type 2 diabetes mellitus with hyperglycemia, with long-term current use  "of insulin (H)  Plan: Orthopedic  Referral, cephALEXin         (KEFLEX) 500 MG capsule, DISCONTINUED:         cephALEXin (KEFLEX) 500 MG capsule            This progress note has been dictated, with use of voice recognition software. Any grammatical, typographical, or context errors are unintentional and inherent to use of voice recognition software.  ------------        Chief Complaint   Patient presents with    Toe Pain     Left big toe infection. Pt had toe injury a month ago. Very painful touch          Jeffry Younger is a very pleasant 43-year-old male (with medical history that includes insulin-dependent diabetes) presenting to urgent care today for evaluation of suspected left great toenail infection.    HPI: Several weeks ago patient states he had an injury to left great toenail (states the nail \"folded\" and pulled on edge of toenail).  4 days ago he reportedly developed redness and pain along the edge of the toenail.  Redness and pain have progressively worsened around both sides of toenail over the past 2 days. He has tried home Epsom salt baths without any relief of symptoms, so presents to urgent care today for further evaluation.        ROS:   CONSTITUTIONAL: No acute onset fever,chills or severe weakenss  CARDIAC: No acute onset chest pain or shortness of breath   RESP: No acute onset cough, chest pain or shortness of breath   GI: No acute onset abdominal pain. No acute onset nausea, vomiting or diarrhea   SKIN: positive as per HPI.   NEURO: No acute onset headaches, neck stiffness or lethargy.   RHEUM: No associated acute onset red, warm or swollen joints  ENDOCRINE: Patient denies any unexplained high or low swings in his home blood sugar since onset of toenail infection symptoms.  Patient has freestyle judy for blood glucose monitoring (states his glucose now is 234--after eating a meal--and states that is normal for him).    Past Medical History:   Diagnosis Date    Diabetes (H) July 2018 "    Treating metformin    Obesity      Patient Active Problem List   Diagnosis    Morbid obesity with BMI of 50.0-59.9, adult (H)    PAOLA (obstructive sleep apnea)    Type 2 diabetes mellitus with hyperglycemia, without long-term current use of insulin (H)    Hyperlipidemia LDL goal <100    ADAMA (generalized anxiety disorder)    Family history of thyroid cancer       Current Outpatient Medications   Medication    ACCU-CHEK GUIDE test strip    acetaZOLAMIDE (DIAMOX) 125 MG tablet    alcohol swab prep pads    atorvastatin (LIPITOR) 10 MG tablet    B-D U/F insulin pen needle    benzocaine (AMERICAINE) 20 % rectal ointment    blood glucose monitoring (ACCU-CHEK FASTCLIX) lancets    clotrimazole (LOTRIMIN) 1 % external cream    Continuous Blood Gluc Sensor (FREESTYLE KULWANT 3 SENSOR) MISC    empagliflozin (JARDIANCE) 25 MG TABS tablet    hydrocortisone, Perianal, (HYDROCORTISONE) 2.5 % cream    insulin aspart (NOVOLOG FLEXPEN) 100 UNIT/ML pen    insulin glargine U-300 (TOUJEO) 300 UNIT/ML (1 units dial) pen    insulin pen needle (ULTICARE MINI) 31G X 6 MM miscellaneous    metFORMIN (GLUCOPHAGE XR) 500 MG 24 hr tablet    ondansetron (ZOFRAN ODT) 4 MG ODT tab     No current facility-administered medications for this visit.           No Known Allergies       OBJECTIVE   BP (!) 159/95   Pulse 101   Temp 98.3  F (36.8  C) (Tympanic)   Resp 16   Wt (!) 183.3 kg (404 lb)   SpO2 100%   BMI 52.58 kg/m            GENERAL:  Very pleasant, comfortable and generally well appearing.  CARDIAC:NORMAL - regular rate and rhythm without murmur., normal s1/s2 and without extra heart sounds  RESP: Normal - CTA without rales, rhonchi, or wheezing.  LEFT FOOT: Positive for erythema, swelling and pain around nail fold  and on dorsum of toe.  No fluctuant pockets.  No purulent drainage.  No bloody drainage.  This does not extend proximal to DIP crease. There is No involvement of plantar surface of affected toe. There is No interdigital  involvement. Circulation and sensation intact. Remainder of foot exam unremarkable.   SKIN: Positive as per above. Remainder of skin unremarkable   NEURO: Alert and oriented.  Normal speech and mentation.  CN II/XII grossly intact.  Gait within normal limits.

## 2024-02-24 NOTE — PATIENT INSTRUCTIONS
February 24, 2024 [facility] Plan:     1. Warm, soapy, soaks three times daily until infection is resolved     2. Start the three times  daily antibiotic I prescribed for you today (Keflex/Cephalexin)     3.  Follow-up with primary care provider or foot specialist (podiatrist) on Monday if your symptoms are not improving.     4. Watch closely for any spreading or worsening. Follow-up immediately if any sudden worsening after treatment provided here today.      5. Continue all diabetes medications and watch your blood sugars carefully when you have an infection (infections can cause high or low swings in your blood sugars)    6. Follow-up immediatly if you develop any of the below:    Redness, pain, or swelling of left great toe gets worse  Red streaks in the skin leading away from the wound  Pus or fluid draining from the nail area  Fever of 100.4 F (38 C) or higher, or as directed by your provider

## 2024-03-11 ENCOUNTER — OFFICE VISIT (OUTPATIENT)
Dept: PHARMACY | Facility: CLINIC | Age: 44
End: 2024-03-11
Payer: COMMERCIAL

## 2024-03-11 ENCOUNTER — LAB (OUTPATIENT)
Dept: LAB | Facility: CLINIC | Age: 44
End: 2024-03-11
Payer: COMMERCIAL

## 2024-03-11 VITALS
BODY MASS INDEX: 52.4 KG/M2 | WEIGHT: 315 LBS | SYSTOLIC BLOOD PRESSURE: 150 MMHG | HEART RATE: 89 BPM | OXYGEN SATURATION: 100 % | DIASTOLIC BLOOD PRESSURE: 82 MMHG

## 2024-03-11 DIAGNOSIS — R03.0 ELEVATED BP WITHOUT DIAGNOSIS OF HYPERTENSION: ICD-10-CM

## 2024-03-11 DIAGNOSIS — E78.5 HYPERLIPIDEMIA LDL GOAL <100: ICD-10-CM

## 2024-03-11 DIAGNOSIS — E11.65 TYPE 2 DIABETES MELLITUS WITH HYPERGLYCEMIA, WITHOUT LONG-TERM CURRENT USE OF INSULIN (H): ICD-10-CM

## 2024-03-11 DIAGNOSIS — Z71.84 TRAVEL ADVICE ENCOUNTER: ICD-10-CM

## 2024-03-11 DIAGNOSIS — E11.65 TYPE 2 DIABETES MELLITUS WITH HYPERGLYCEMIA, WITHOUT LONG-TERM CURRENT USE OF INSULIN (H): Primary | ICD-10-CM

## 2024-03-11 LAB
ANION GAP SERPL CALCULATED.3IONS-SCNC: 9 MMOL/L (ref 7–15)
BUN SERPL-MCNC: 12.8 MG/DL (ref 6–20)
CALCIUM SERPL-MCNC: 9.3 MG/DL (ref 8.6–10)
CHLORIDE SERPL-SCNC: 105 MMOL/L (ref 98–107)
CHOLEST SERPL-MCNC: 123 MG/DL
CREAT SERPL-MCNC: 1.01 MG/DL (ref 0.67–1.17)
CREAT UR-MCNC: 124 MG/DL
DEPRECATED HCO3 PLAS-SCNC: 25 MMOL/L (ref 22–29)
EGFRCR SERPLBLD CKD-EPI 2021: >90 ML/MIN/1.73M2
FASTING STATUS PATIENT QL REPORTED: YES
GLUCOSE SERPL-MCNC: 183 MG/DL (ref 70–99)
HBA1C MFR BLD: 7.6 % (ref 0–5.6)
HDLC SERPL-MCNC: 37 MG/DL
LDLC SERPL CALC-MCNC: 66 MG/DL
MICROALBUMIN UR-MCNC: 14 MG/L
MICROALBUMIN/CREAT UR: 11.29 MG/G CR (ref 0–17)
NONHDLC SERPL-MCNC: 86 MG/DL
POTASSIUM SERPL-SCNC: 4.9 MMOL/L (ref 3.4–5.3)
SODIUM SERPL-SCNC: 139 MMOL/L (ref 135–145)
TRIGL SERPL-MCNC: 99 MG/DL

## 2024-03-11 PROCEDURE — 80048 BASIC METABOLIC PNL TOTAL CA: CPT

## 2024-03-11 PROCEDURE — 36415 COLL VENOUS BLD VENIPUNCTURE: CPT

## 2024-03-11 PROCEDURE — 83036 HEMOGLOBIN GLYCOSYLATED A1C: CPT

## 2024-03-11 PROCEDURE — 80061 LIPID PANEL: CPT

## 2024-03-11 PROCEDURE — 99607 MTMS BY PHARM ADDL 15 MIN: CPT | Performed by: PHARMACIST

## 2024-03-11 PROCEDURE — 82043 UR ALBUMIN QUANTITATIVE: CPT

## 2024-03-11 PROCEDURE — 99605 MTMS BY PHARM NP 15 MIN: CPT | Performed by: PHARMACIST

## 2024-03-11 PROCEDURE — 82570 ASSAY OF URINE CREATININE: CPT

## 2024-03-11 RX ORDER — GLIPIZIDE 5 MG/1
5 TABLET, FILM COATED, EXTENDED RELEASE ORAL DAILY
Qty: 90 TABLET | Refills: 0 | Status: SHIPPED | OUTPATIENT
Start: 2024-03-11 | End: 2024-06-19

## 2024-03-11 NOTE — PROGRESS NOTES
Medication Therapy Management (MTM) Encounter    ASSESSMENT:                            Medication Adherence/Access: No issues identified    Diabetes:   Patient is not currently meeting A1C goal of <7%, but was previously. Blood sugar have worsened since off glipizide, would suggest restarting. I also reinforced meeting with endocrinology and clarify father's thyroid cancer. If patient is truly not a candidate for due to MTC concern then I would suggest discussing other weight loss options which he has declined in the past.    Hyperlipidemia:   Stable    Elevated Blood Pressure without diagnosis:  Patient reports having elevated blood pressures due to stress in office. Patients microalbumin labs are pending. Patient may benefit from hypertension therapy if continues to be > 130/80 mm Hg goal.    Travel:   No changes.    PLAN:                            Re-start glipizide XL 5mg daily. Patient to monitor for lows and consider decrease Novolog by 2-4 units as needed if experiencing low blood sugar with glipizide addition.    Repeat a1c in ~ 3 months, has endo follow-up.     Start measuring and documenting at home blood pressure.   Pending microalbumin labs and at home BP readings, consider starting ACE or ARB therapy if elevated.     Follow-up: [facility] and blood pressure [facility]. If patient is following Endo regularly then medication therapy management follow-up as needed.    SUBJECTIVE/OBJECTIVE:                          Vladimir Younger is a 43 year old male seen for a follow-up visit from 8/7/2023.       Reason for visit: Diabetes management.    Allergies/ADRs: Reviewed in chart  Past Medical History: Reviewed in chart  Tobacco: He reports that he has never smoked. He has never used smokeless tobacco.  Alcohol: Less than 1 beverages / week  Caffeine: Mountain Dew x2-3 cans per day  - trying to switch to unsweetened tea. Does not drink coffee regularly.   Medication  Adherence/Access: no issues reported    Diabetes   Jardiance 25 mg daily  Metformin XR 1000 mg twice daily   Toujeo 30 units daily in the evening  Novolog 24-28 units 3 times daily before meals   Stopped glipizide 10mg at last PCP visit 12/28/23 due to good diabets control and to eliminate polypharmacy. Since stopping glipizide, patient has noticed more inconsistent blood glucose control and higher numbers. He has also had to increase his Novolog by ~4 units to correct his elevated readings. He is interested in re-starting glipizide.  Patient plans to follow up with endocrinology for second opinion on GLP RA. It was previously believed that his father was diagnosed with MTC, therefore Victoza therapy was discontinued in this patient. Patient today stated that his father had a lump on his thyroid that to his knowledge is uncertain if medullary. Victoza worked well for him in the past, and he gained weight after its discontinuation. Patient plans to retrieve his fathers medical records to confirm diagnosis. He would like to re-start GLP-1 therapy if possible after discussion with endocrinologist.   Current diabetes symptoms: none  Blood sugar monitoring: Continuous Glucose Monitor: Freestyle 3         Eye exam is up to date  Foot exam: due  Urine Albumin:   Lab Results   Component Value Date    UMALCR 11.29 03/11/2024      Lab Results   Component Value Date    A1C 7.6 (H) 03/11/2024       Hyperlipidemia   atorvastatin 10 mg daily  Patient reports no significant myalgias or other side effects.  The ASCVD Risk score (Shreya SANDOVAL, et al., 2019) failed to calculate for the following reasons:    The valid total cholesterol range is 130 to 320 mg/dL     Recent Labs   Lab Test 12/28/23  1057 12/22/22  1128   CHOL 125 135   HDL 36* 36*   LDL 70 79   TRIG 93 102     Elevated Blood pressure without diagnosis:   Not on any pharmacotherapy. He thinks he may have white coat syndrome.  BP Readings from Last 6 Encounters:   03/11/24  (!) 150/82   02/24/24 (!) 159/95   01/03/24 (!) 147/95   12/28/23 132/88   08/07/23 128/78   04/20/23 138/84     Travel:   Ondansetron 4mg ODTevery 8 hours prn   Acetazolamide 125mg 12 hours prior to travel  Patient and his wife are planning to travel to [place] and he was prescribed medications for antinausea.  No questions for me today regarding regimen.      Today's Vitals: BP (!) 150/82   Pulse 89   Wt (!) 402 lb 9.6 oz (182.6 kg)   SpO2 100%   BMI 52.40 kg/m    ----------------    I spent 30 minutes with this patient today. All changes were made via collaborative practice agreement with Dr. Russel Hsieh. A copy of the visit note was provided to the patient's provider(s).    A summary of these recommendations was given to the patient.    Sima Gentile, PharmD  PGY-1 Pharmacy Resident     Serene Hamm, PharmD  Medication Therapy Management Pharmacist         Medication Therapy Recommendations  Type 2 diabetes mellitus with hyperglycemia, without long-term current use of insulin (H)    Current Medication: glipiZIDE (GLUCOTROL XL) 5 MG 24 hr tablet   Rationale: Synergistic therapy - Needs additional medication therapy - Indication   Recommendation: Start Medication   Status: Accepted per CPA

## 2024-03-11 NOTE — PATIENT INSTRUCTIONS
"Recommendations from today's MTM visit:                                                      Re-start glipizide XL 5mg daily.     Decrease Novolog by 2-4 units as needed to maintain blood glucose.      Start measuring and documenting at home blood pressure.     Follow-up: [facility] management and blood pressure check. May follow-up sooner if starting blood pressure medication. Otherwise next diabetes follow-up with Serene in August.     It was great speaking with you today.  I value your experience and would be very thankful for your time in providing feedback in our clinic survey. In the next few days, you may receive an email or text message from [employer] with a link to a survey related to your  clinical pharmacist.\"     To schedule another MTM appointment, please call the clinic directly or you may call the MTM scheduling line at [phone] or toll-free at [phone].     My Clinical Pharmacist's contact information:                                                      Please feel free to contact me with any questions or concerns you have.      Serene Hamm, PharmD  Medication Therapy Management Pharmacist       "

## 2024-03-12 ENCOUNTER — OFFICE VISIT (OUTPATIENT)
Dept: PODIATRY | Facility: CLINIC | Age: 44
End: 2024-03-12
Attending: PHYSICIAN ASSISTANT
Payer: COMMERCIAL

## 2024-03-12 VITALS
RESPIRATION RATE: 18 BRPM | OXYGEN SATURATION: 97 % | SYSTOLIC BLOOD PRESSURE: 159 MMHG | HEART RATE: 100 BPM | DIASTOLIC BLOOD PRESSURE: 99 MMHG

## 2024-03-12 DIAGNOSIS — L60.1 TRAUMATIC ONYCHOLYSIS: Primary | ICD-10-CM

## 2024-03-12 DIAGNOSIS — Z79.4 TYPE 2 DIABETES MELLITUS WITH HYPERGLYCEMIA, WITH LONG-TERM CURRENT USE OF INSULIN (H): ICD-10-CM

## 2024-03-12 DIAGNOSIS — E11.65 TYPE 2 DIABETES MELLITUS WITH HYPERGLYCEMIA, WITH LONG-TERM CURRENT USE OF INSULIN (H): ICD-10-CM

## 2024-03-12 PROCEDURE — 99243 OFF/OP CNSLTJ NEW/EST LOW 30: CPT | Performed by: PODIATRIST

## 2024-03-12 RX ORDER — INSULIN ASPART 100 [IU]/ML
20-28 INJECTION, SOLUTION INTRAVENOUS; SUBCUTANEOUS
Qty: 60 ML | Refills: 1 | Status: SHIPPED | OUTPATIENT
Start: 2024-03-12 | End: 2024-08-26

## 2024-03-12 ASSESSMENT — PAIN SCALES - GENERAL: PAINLEVEL: NO PAIN (0)

## 2024-03-12 NOTE — Clinical Note
3/12/2024         RE: Jeffry Younger  [address]  [place] [address]        Dear Colleague,    Thank you for referring your patient, Jeffry Younger, to the [facility]. Please see a copy of my visit note below.    FOOT AND ANKLE SURGERY/PODIATRY CONSULT NOTE         ASSESSMENT:   ***      TREATMENT:  ***        HPI: I was asked to see Jeffry Younger today  ***.  The patient was seen in consultation at the request of Tamir Lewis PA-C for ***.     Past Medical History:   Diagnosis Date     Diabetes (H) July 2018    Treating metformin     Obesity        Social History     Socioeconomic History     Marital status:      Spouse name: Not on file     Number of children: Not on file     Years of education: Not on file     Highest education level: Professional school degree (e.g., MD, DDS, DVM, SMITA)   Occupational History     Not on file   Tobacco Use     Smoking status: Never     Smokeless tobacco: Never   Vaping Use     Vaping Use: Never used   Substance and Sexual Activity     Alcohol use: Yes     Comment: 1 per month maybe     Drug use: No     Sexual activity: Yes     Partners: Female     Birth control/protection: Abstinence, Pull-out method, Condom   Other Topics Concern     Parent/sibling w/ CABG, MI or angioplasty before 65F 55M? No   Social History Narrative     Not on file     Social Determinants of Health     Financial Resource Strain: Low Risk  (12/27/2023)    Financial Resource Strain      Within the past 12 months, have you or your family members you live with been unable to get utilities (heat, electricity) when it was really needed?: No   Food Insecurity: Low Risk  (12/27/2023)    Food Insecurity      Within the past 12 months, did you worry that your food would run out before you got money to buy more?: No      Within the past 12 months, did the food you bought just not last and you didn t have money to get more?: No   Transportation Needs: Low Risk   (12/27/2023)    Transportation Needs      Within the past 12 months, has lack of transportation kept you from medical appointments, getting your medicines, non-medical meetings or appointments, work, or from getting things that you need?: No   Physical Activity: Insufficiently Active (12/20/2022)    Exercise Vital Sign      Days of Exercise per Week: 2 days      Minutes of Exercise per Session: 20 min   Stress: No Stress Concern Present (12/20/2022)    [employer] [place] [employer] - Occupational Stress Questionnaire      Feeling of Stress : Only a little   Social Connections: Socially Integrated (12/20/2022)    Social Connection and Isolation Panel [NHANES]      Frequency of Communication with Friends and Family: Three times a week      Frequency of Social Gatherings with Friends and Family: Twice a week      Attends Restorationist Services: 1 to 4 times per year      Active Member of Clubs or Organizations: Yes      Attends Club or Organization Meetings: Not on file      Marital Status:    Interpersonal Safety: Low Risk  (12/28/2023)    Interpersonal Safety      Do you feel physically and emotionally safe where you currently live?: Yes      Within the past 12 months, have you been hit, slapped, kicked or otherwise physically hurt by someone?: No      Within the past 12 months, have you been humiliated or emotionally abused in other ways by your partner or ex-partner?: No   Housing Stability: Low Risk  (12/27/2023)    Housing Stability      Do you have housing? : Yes      Are you worried about losing your housing?: No        No Known Allergies       Current Outpatient Medications:      ACCU-CHEK GUIDE test strip, TEST TWICE DAILY AS DIRECTED, Disp: 200 strip, Rfl: 1     alcohol swab prep pads, Use to swab area of injection/kayleigh as directed., Disp: 100 each, Rfl: 3     atorvastatin (LIPITOR) 10 MG tablet, Take 1 tablet (10 mg) by mouth daily, Disp: 90 tablet, Rfl: 3     blood glucose monitoring  (ACCU-CHEK FASTCLIX) lancets, 1 each daily, Disp: 102 each, Rfl: 3     Continuous Blood Gluc Sensor (FREESTYLE KULWANT 3 SENSOR) MISC, 1 each every 14 days, Disp: 6 each, Rfl: 11     empagliflozin (JARDIANCE) 25 MG TABS tablet, Take 1 tablet (25 mg) by mouth daily, Disp: 90 tablet, Rfl: 3     glipiZIDE (GLUCOTROL XL) 5 MG 24 hr tablet, Take 1 tablet (5 mg) by mouth daily, Disp: 90 tablet, Rfl: 0     insulin aspart (NOVOLOG FLEXPEN) 100 UNIT/ML pen, Inject 20-28 Units Subcutaneous 3 times daily (before meals) Max up to 84 units per day., Disp: 60 mL, Rfl: 1     insulin glargine U-300 (TOUJEO) 300 UNIT/ML (1 units dial) pen, Inject 30 Units Subcutaneous daily, Disp: 15 mL, Rfl: 2     insulin pen needle (ULTICARE MINI) 31G X 6 MM miscellaneous, USE 1-4 PEN NEEDLES DAILY OR AS DIRECTED, Disp: 300 each, Rfl: 1     metFORMIN (GLUCOPHAGE XR) 500 MG 24 hr tablet, Take 2 tablets (1,000 mg) by mouth 2 times daily (with meals), Disp: 360 tablet, Rfl: 3     acetaZOLAMIDE (DIAMOX) 125 MG tablet, Take one tab every 12 hours starting 24 hours prior to ascent and continue for 24 hours while at the highest altitude (Patient not taking: Reported on 3/12/2024), Disp: 6 tablet, Rfl: 0     ondansetron (ZOFRAN ODT) 4 MG ODT tab, Take 1 tablet (4 mg) by mouth every 8 hours as needed (Patient not taking: Reported on 3/12/2024), Disp: 10 tablet, Rfl: 0     Family History   Problem Relation Age of Onset     Diabetes Father      Hypertension Father      Other Cancer Father         Kidney & Thyroid     Cerebrovascular Disease Paternal Grandfather      Other Cancer Brother         Lukemia     Other Cancer Maternal Grandmother         Pancreatic     Other Cancer Maternal Grandfather         Lung - smoker     Colon Cancer No family hx of      Prostate Cancer No family hx of      Cerebrovascular Disease No family hx of      Coronary Artery Disease No family hx of         Social History     Socioeconomic History     Marital status:      Spouse  name: Not on file     Number of children: Not on file     Years of education: Not on file     Highest education level: Professional school degree (e.g., MD, DDS, DVM, SMITA)   Occupational History     Not on file   Tobacco Use     Smoking status: Never     Smokeless tobacco: Never   Vaping Use     Vaping Use: Never used   Substance and Sexual Activity     Alcohol use: Yes     Comment: 1 per month maybe     Drug use: No     Sexual activity: Yes     Partners: Female     Birth control/protection: Abstinence, Pull-out method, Condom   Other Topics Concern     Parent/sibling w/ CABG, MI or angioplasty before 65F 55M? No   Social History Narrative     Not on file     Social Determinants of Health     Financial Resource Strain: Low Risk  (12/27/2023)    Financial Resource Strain      Within the past 12 months, have you or your family members you live with been unable to get utilities (heat, electricity) when it was really needed?: No   Food Insecurity: Low Risk  (12/27/2023)    Food Insecurity      Within the past 12 months, did you worry that your food would run out before you got money to buy more?: No      Within the past 12 months, did the food you bought just not last and you didn t have money to get more?: No   Transportation Needs: Low Risk  (12/27/2023)    Transportation Needs      Within the past 12 months, has lack of transportation kept you from medical appointments, getting your medicines, non-medical meetings or appointments, work, or from getting things that you need?: No   Physical Activity: Insufficiently Active (12/20/2022)    Exercise Vital Sign      Days of Exercise per Week: 2 days      Minutes of Exercise per Session: 20 min   Stress: No Stress Concern Present (12/20/2022)    [employer] [place] [employer] - Occupational Stress Questionnaire      Feeling of Stress : Only a little   Social Connections: Socially Integrated (12/20/2022)    Social Connection and Isolation Panel [NHANES]       Frequency of Communication with Friends and Family: Three times a week      Frequency of Social Gatherings with Friends and Family: Twice a week      Attends Restorationist Services: 1 to 4 times per year      Active Member of Clubs or Organizations: Yes      Attends Club or Organization Meetings: Not on file      Marital Status:    Interpersonal Safety: Low Risk  (12/28/2023)    Interpersonal Safety      Do you feel physically and emotionally safe where you currently live?: Yes      Within the past 12 months, have you been hit, slapped, kicked or otherwise physically hurt by someone?: No      Within the past 12 months, have you been humiliated or emotionally abused in other ways by your partner or ex-partner?: No   Housing Stability: Low Risk  (12/27/2023)    Housing Stability      Do you have housing? : Yes      Are you worried about losing your housing?: No        Review of Systems - Patient denies fever, chills, rash, wound, stiffness, limping, numbness, weakness, heart burn, blood in stool, chest pain with activity, calf pain when walking, shortness of breath with activity, chronic cough, easy bleeding/bruising, swelling of ankles, excessive thirst, fatigue, depression, anxiety.  Patient admits to ***.      OBJECTIVE:  Appearance: alert, well appearing, and in no distress.    BP (!) 161/109   Pulse 100   Resp 18   SpO2 97%      There is no height or weight on file to calculate BMI.     General appearance: Patient is alert and fully cooperative with history & exam.  No sign of distress is noted during the visit.  Psychiatric: Affect is pleasant & appropriate.  Patient appears motivated to improve health.  Respiratory: Breathing is regular & unlabored while sitting.  HEENT: Hearing is intact to spoken word.  Speech is clear.  No gross evidence of visual impairment that would impact ambulation.    Vascular: Dorsalis pedis and posterior tibial pulses are palpable. There is pedal hair growth ***.  CFT < 3 sec  from anterior tibial surface to distal digits ***. There is no appreciable edema noted.  Dermatologic: Turgor and texture are within normal limits. No coloration or temperature changes. No primary or secondary lesions noted.  Neurologic: All epicritic and proprioceptive sensations are grossly intact ***.  Musculoskeletal: All active and passive ankle, subtalar, midtarsal, and 1st MPJ range of motion are grossly intact without pain or crepitus, with the exception of ***. Manual muscle strength is ***. All dorsiflexors, plantarflexors, invertors, evertors are intact ***. Tenderness present to *** on palpation. Tenderness to *** with range of motion. Calf is soft/non-tender with/without warmth/induration    Imaging:       No images are attached to the encounter or orders placed in the encounter.     No results found.   No results found.       Jae Perez DPM  [facility]         Again, thank you for allowing me to participate in the care of your patient.        Sincerely,        Jae Fernández DPM

## 2024-03-12 NOTE — PROGRESS NOTES
FOOT AND ANKLE SURGERY/PODIATRY CONSULT NOTE         ASSESSMENT:   Onycholysis left great toenail      TREATMENT:  Removed the left great toenail today without anesthesia.  Applied a sterile bandage.  I have instructed the patient to keep the area clean and apply bacitracin daily with a Band-Aid for the next 10 days.  The patient is to return to the clinic as needed.  The patient is aware that a new nail will regenerate over the next several months.        HPI: I was asked to see Jeffry Younger today to evaluate and treat a loosely attached left great toenail.  The patient stated that several days ago he had a mild mishap on a flight of stairs.  He injured the toe.  He had no bleeding.  He had minimal pain.  He noticed that the toenail was quite loose.  He has not had any swelling.  He has no pain.  He is able to wear shoes without discomfort.  He denies any other previous treatment..  The patient was seen in consultation at the request of Tamir Lewis PA-C for evaluation and treatment of a loosely attached left great toenail.     Past Medical History:   Diagnosis Date    Diabetes (H) July 2018    Treating metformin    Obesity        Social History     Socioeconomic History    Marital status:      Spouse name: Not on file    Number of children: Not on file    Years of education: Not on file    Highest education level: Professional school degree (e.g., MD, DDS, DVM, SMITA)   Occupational History    Not on file   Tobacco Use    Smoking status: Never    Smokeless tobacco: Never   Vaping Use    Vaping Use: Never used   Substance and Sexual Activity    Alcohol use: Yes     Comment: 1 per month maybe    Drug use: No    Sexual activity: Yes     Partners: Female     Birth control/protection: Abstinence, Pull-out method, Condom   Other Topics Concern    Parent/sibling w/ CABG, MI or angioplasty before 65F 55M? No   Social History Narrative    Not on file     Social Determinants of Health     Financial Resource Strain:  Low Risk  (12/27/2023)    Financial Resource Strain     Within the past 12 months, have you or your family members you live with been unable to get utilities (heat, electricity) when it was really needed?: No   Food Insecurity: Low Risk  (12/27/2023)    Food Insecurity     Within the past 12 months, did you worry that your food would run out before you got money to buy more?: No     Within the past 12 months, did the food you bought just not last and you didn t have money to get more?: No   Transportation Needs: Low Risk  (12/27/2023)    Transportation Needs     Within the past 12 months, has lack of transportation kept you from medical appointments, getting your medicines, non-medical meetings or appointments, work, or from getting things that you need?: No   Physical Activity: Insufficiently Active (12/20/2022)    Exercise Vital Sign     Days of Exercise per Week: 2 days     Minutes of Exercise per Session: 20 min   Stress: No Stress Concern Present (12/20/2022)    [place] [employer] [place] [employer] - Occupational Stress Questionnaire     Feeling of Stress : Only a little   Social Connections: Socially Integrated (12/20/2022)    Social Connection and Isolation Panel [NHANES]     Frequency of Communication with Friends and Family: Three times a week     Frequency of Social Gatherings with Friends and Family: Twice a week     Attends [employer] Services: 1 to 4 times per year     Active Member of Clubs or Organizations: Yes     Attends Club or Organization Meetings: Not on file     Marital Status:    Interpersonal Safety: Low Risk  (12/28/2023)    Interpersonal Safety     Do you feel physically and emotionally safe where you currently live?: Yes     Within the past 12 months, have you been hit, slapped, kicked or otherwise physically hurt by someone?: No     Within the past 12 months, have you been humiliated or emotionally abused in other ways by your partner or ex-partner?: No   Housing Stability: Low  Risk  (12/27/2023)    Housing Stability     Do you have housing? : Yes     Are you worried about losing your housing?: No        No Known Allergies       Current Outpatient Medications:     ACCU-CHEK GUIDE test strip, TEST TWICE DAILY AS DIRECTED, Disp: 200 strip, Rfl: 1    alcohol swab prep pads, Use to swab area of injection/kay[place] as directed., Disp: 100 each, Rfl: 3    atorvastatin (LIPITOR) 10 MG tablet, Take 1 tablet (10 mg) by mouth daily, Disp: 90 tablet, Rfl: 3    blood glucose monitoring (ACCU-CHEK FASTCLIX) lancets, 1 each daily, Disp: 102 each, Rfl: 3    Continuous Blood Gluc Sensor (FREESTYLE KULWANT 3 SENSOR) MISC, 1 each every 14 days, Disp: 6 each, Rfl: 11    empagliflozin (JARDIANCE) 25 MG TABS tablet, Take 1 tablet (25 mg) by mouth daily, Disp: 90 tablet, Rfl: 3    glipiZIDE (GLUCOTROL XL) 5 MG 24 hr tablet, Take 1 tablet (5 mg) by mouth daily, Disp: 90 tablet, Rfl: 0    insulin aspart (NOVOLOG FLEXPEN) 100 UNIT/ML pen, Inject 20-28 Units Subcutaneous 3 times daily (before meals) Max up to 84 units per day., Disp: 60 mL, Rfl: 1    insulin glargine U-300 (TOUJEO) 300 UNIT/ML (1 units dial) pen, Inject 30 Units Subcutaneous daily, Disp: 15 mL, Rfl: 2    insulin pen needle (ULTICARE MINI) 31G X 6 MM miscellaneous, USE 1-4 PEN NEEDLES DAILY OR AS DIRECTED, Disp: 300 each, Rfl: 1    metFORMIN (GLUCOPHAGE XR) 500 MG 24 hr tablet, Take 2 tablets (1,000 mg) by mouth 2 times daily (with meals), Disp: 360 tablet, Rfl: 3    acetaZOLAMIDE (DIAMOX) 125 MG tablet, Take one tab every 12 hours starting 24 hours prior to ascent and continue for 24 hours while at the highest altitude (Patient not taking: Reported on 3/12/2024), Disp: 6 tablet, Rfl: 0    ondansetron (ZOFRAN ODT) 4 MG ODT tab, Take 1 tablet (4 mg) by mouth every 8 hours as needed (Patient not taking: Reported on 3/12/2024), Disp: 10 tablet, Rfl: 0     Family History   Problem Relation Age of Onset    Diabetes Father     Hypertension Father     Other  Cancer Father         Kidney & Thyroid    Cerebrovascular Disease Paternal Grandfather     Other Cancer Brother         Lukemia    Other Cancer Maternal Grandmother         Pancreatic    Other Cancer Maternal Grandfather         Lung - smoker    Colon Cancer No family hx of     Prostate Cancer No family hx of     Cerebrovascular Disease No family hx of     Coronary Artery Disease No family hx of         Social History     Socioeconomic History    Marital status:      Spouse name: Not on file    Number of children: Not on file    Years of education: Not on file    Highest education level: Professional school degree (e.g., MD, DDS, DVM, SIMTA)   Occupational History    Not on file   Tobacco Use    Smoking status: Never    Smokeless tobacco: Never   Vaping Use    Vaping Use: Never used   Substance and Sexual Activity    Alcohol use: Yes     Comment: 1 per month maybe    Drug use: No    Sexual activity: Yes     Partners: Female     Birth control/protection: Abstinence, Pull-out method, Condom   Other Topics Concern    Parent/sibling w/ CABG, MI or angioplasty before 65F 55M? No   Social History Narrative    Not on file     Social Determinants of Health     Financial Resource Strain: Low Risk  (12/27/2023)    Financial Resource Strain     Within the past 12 months, have you or your family members you live with been unable to get utilities (heat, electricity) when it was really needed?: No   Food Insecurity: Low Risk  (12/27/2023)    Food Insecurity     Within the past 12 months, did you worry that your food would run out before you got money to buy more?: No     Within the past 12 months, did the food you bought just not last and you didn t have money to get more?: No   Transportation Needs: Low Risk  (12/27/2023)    Transportation Needs     Within the past 12 months, has lack of transportation kept you from medical appointments, getting your medicines, non-medical meetings or appointments, work, or from getting  things that you need?: No   Physical Activity: Insufficiently Active (12/20/2022)    Exercise Vital Sign     Days of Exercise per Week: 2 days     Minutes of Exercise per Session: 20 min   Stress: No Stress Concern Present (12/20/2022)    [place] [employer] Occupational Health - Occupational Stress Questionnaire     Feeling of Stress : Only a little   Social Connections: Socially Integrated (12/20/2022)    Social Connection and Isolation Panel [NHANES]     Frequency of Communication with Friends and Family: Three times a week     Frequency of Social Gatherings with Friends and Family: Twice a week     Attends [employer] Services: 1 to 4 times per year     Active Member of Clubs or Organizations: Yes     Attends Club or Organization Meetings: Not on file     Marital Status:    Interpersonal Safety: Low Risk  (12/28/2023)    Interpersonal Safety     Do you feel physically and emotionally safe where you currently live?: Yes     Within the past 12 months, have you been hit, slapped, kicked or otherwise physically hurt by someone?: No     Within the past 12 months, have you been humiliated or emotionally abused in other ways by your partner or ex-partner?: No   Housing Stability: Low Risk  (12/27/2023)    Housing Stability     Do you have housing? : Yes     Are you worried about losing your housing?: No        Review of Systems - Patient denies fever, chills, rash, wound, stiffness, limping, numbness, weakness, heart burn, blood in stool, chest pain with activity, calf pain when walking, shortness of breath with activity, chronic cough, easy bleeding/bruising, swelling of ankles, excessive thirst, fatigue, depression, anxiety.  Patient admits to loosely attached left great toenail without pain.      OBJECTIVE:  Appearance: alert, well appearing, and in no distress.    BP (!) 161/109   Pulse 100   Resp 18   SpO2 97%      There is no height or weight on file to calculate BMI.     General appearance: Patient is  alert and fully cooperative with history & exam.  No sign of distress is noted during the visit.  Psychiatric: Affect is pleasant & appropriate.  Patient appears motivated to improve health.  Respiratory: Breathing is regular & unlabored while sitting.  HEENT: Hearing is intact to spoken word.  Speech is clear.  No gross evidence of visual impairment that would impact ambulation.    Vascular: Dorsalis pedis and posterior tibial pulses are palpable. There is no pedal hair growth bilaterally.  CFT < 3 sec from anterior tibial surface to distal digits bilaterally.   Dermatologic: The left great toenail is loosely attached to the underlying nailbed.  There is no edema, erythema, cellulitis, drainage or bleeding noted.  Turgor and texture are within normal limits. No coloration or temperature changes. No primary or secondary lesions noted.  Neurologic: All epicritic and proprioceptive sensations are grossly intact bilaterally.  Musculoskeletal: All active and passive ankle, subtalar, midtarsal, and 1st MPJ range of motion are grossly intact without pain or crepitus, with the exception of none. Manual muscle strength is within normal limits bilaterally. All dorsiflexors, plantarflexors, invertors, evertors are intact bilaterally.  No tenderness present to the left great toe on palpation.  No tenderness to the left great toe with range of motion. Calf is soft/non-tender ithout warmth/induration    Imaging:       No images are attached to the encounter or orders placed in the encounter.     No results found.   No results found.       Jae Perez DPM  [facility] Foot & Ankle Surgery/Podiatry      normal

## 2024-03-12 NOTE — PATIENT INSTRUCTIONS
POSTOPERATIVE INSTRUCTIONS     What to expect     Expect some degree of soreness in your toe later today when the numbness wears off.  Rest, elevation and ice applied at the ankle will help ease the pain. Your bandage was wrapped snug to cut down on bleeding.    This may feel tight when the numbness wears off.  Please remove the bandage tomorrow morning and begin the foot soaks described below.  Warm water will feel good and helps to ease the pain  How to Care for Your Toe  One daily foot soak will be necessary to heal properly.    Soaking helps loosen the scab and dried drainage.  Failure to soak leads to a hard scab that blocks drainage.  Back up drainage increases the pain and the scab may need to be removed with another office .  You will heal much quicker and be more comfortable if you are consistent with local wound care and foot soaks.  Soak one time every day for 2 weeks.  Soaks should last 5 minutes.  Soak after taking a shower to get the germs out.  Soak in warm water with hydrogen peroxide 5 parts water to 1 part hydrogen peroxide.  A small amount of bleeding may be noted which is normal.   Clean the surgical site with a Q-tip during each soak.  Dip the Q-tip in the water and gently clean away any crusted drainage.  The area may be tender but you will not harm anything with the Q-tip.  Pat the area dry and allow a few minutes to air dry before applying any bandages.  Flexible fabric style band aids work best.  In general, the area will be wet from drainage.  A small dab of antibiotic ointment is okay but watch out for excessive moisture.  White and wrinkled skin is a sign of too much moisture and that the skin needs to be dried out. It is ok to allow the toe some air by removing the band aid as needed.  Activity  Feel free to do normal activities as tolerated on the following day.  Wear open toe sandals if regular shoes are not comfortable.  Avoid shoes that are tight on the toe.  Medications   Finish any  antibiotics if they have been prescribed.  Tylenol or ibuprofen may be used as needed for pain.  Icing and elevation also help with pain and swelling.  Risks  Watch for signs of infection.    Call the clinic at [phone] if you see red streaks spreading up the toe, foot, or leg.  Fever and chills are also concerning and you should notify the clinic if you are having these symptoms.  If these symptoms occur when the clinic is closed, please go to urgent care.    Please call [phone] with any additional questions.

## 2024-04-26 NOTE — PROGRESS NOTES
Outcome for 04/26/24 3:04 PM: Hello Chair message sent  Isha Rausch MA  Outcome for 05/03/24 11:25 AM: Manolo emailed to provider  Carmen Sesay LPN

## 2024-05-07 ENCOUNTER — VIRTUAL VISIT (OUTPATIENT)
Dept: ENDOCRINOLOGY | Facility: CLINIC | Age: 44
End: 2024-05-07
Payer: COMMERCIAL

## 2024-05-07 VITALS — BODY MASS INDEX: 50.76 KG/M2 | WEIGHT: 315 LBS

## 2024-05-07 DIAGNOSIS — Z80.8 FAMILY HISTORY OF THYROID CANCER: ICD-10-CM

## 2024-05-07 DIAGNOSIS — E11.65 TYPE 2 DIABETES MELLITUS WITH HYPERGLYCEMIA, WITHOUT LONG-TERM CURRENT USE OF INSULIN (H): Primary | ICD-10-CM

## 2024-05-07 PROCEDURE — G2211 COMPLEX E/M VISIT ADD ON: HCPCS | Mod: 95 | Performed by: INTERNAL MEDICINE

## 2024-05-07 PROCEDURE — 95251 CONT GLUC MNTR ANALYSIS I&R: CPT | Performed by: INTERNAL MEDICINE

## 2024-05-07 PROCEDURE — 99203 OFFICE O/P NEW LOW 30 MIN: CPT | Mod: 95 | Performed by: INTERNAL MEDICINE

## 2024-05-07 NOTE — LETTER
"    5/7/2024         RE: Jeffry Younger  [address]  [place] [address]        Dear Colleague,    Thank you for referring your patient, Jeffry Younger, to the [facility]. Please see a copy of my visit note below.    Outcome for 04/26/24 3:04 PM: [employer] message sent  Isha Rausch MA  Outcome for 05/03/24 11:25 AM: Manolo emailed to provider  Carmen Sesay LPN         THIS IS A VIDEO VISIT:    Phone call visit/virtual visit encounter:    Name of patient: Jeffry Younger    Date of encounter: 5/7/2024    Time of start of video visit: 3:30    Video started: 3:41    Video ended: 4:00    Provider location: working from home/ [facility]    Patient location: patients home.    Mode of transmission: [employer] video/ Gone!    Verbal consent: obtained before starting visit. Pt is agreeable.      The patient has been notified of following:      \"This VIDEO visit will be conducted via a call between you and your physician/provider. We have found that certain health care needs can be provided without the need for a physical exam.  This service lets us provide the care you need with a short phone conversation.  If a prescription is necessary we can send it directly to your pharmacy.  If lab work is needed we can place an order for that and you can then stop by our lab to have the test done at a later time.     With new updates with corona virus patient might be billed as clinic visit.     If during the course of the call the physician/provider feels a telephone visit is not appropriate, you will not be charged for this service.\"      Past medical history, social history, family history, allergy and medications were reviewed and updated as appropriate.  Reviewed pertinent labs, notes, imaging studies personally.    ENDOCRINOLOGY CLINIC NOTE:  Name: Jeffry Younger  Seen in consultation with Russel Hsieh for type 2 Diabetes.  HPI:  Jeffry Younger is a 44 year old male who presents " for the evaluation/management of Diabetes.   has a past medical history of Diabetes (H) (July 2018) and Obesity.    He was on Victoza and it was helping with BG.  His father was diagnosed with thyroid growth? and he stopped taking Victoza  out of caution.  He is not sure if it was thyroid cancer. Father had thyroid surgery.  He has never been on insulin pump. His wife is type 1 DM.  In December noted higher A1c and following that glipizide was restarted.  A1c improved after that      1. Type 2 DM:  Orginally diagnosed in 2015  Current Regimen:   Metformin XR 1000 mg twice a day  Glipizide XL 5 mg/day  Jardinace 25 mg/day  Toujeo 30 units/day  Novolog 18-24 before each meal TID  (Mostly estimate)    yes:     Diabetes Medication(s)       Biguanides       metFORMIN (GLUCOPHAGE XR) 500 MG 24 hr tablet Take 2 tablets (1,000 mg) by mouth 2 times daily (with meals)       Insulin       insulin aspart (NOVOLOG FLEXPEN) 100 UNIT/ML pen Inject 20-28 Units Subcutaneous 3 times daily (before meals) Max up to 84 units per day.     insulin glargine U-300 (TOUJEO) 300 UNIT/ML (1 units dial) pen Inject 30 Units Subcutaneous daily       Sodium-Glucose Co-Transporter 2 (SGLT2) Inhibitors       empagliflozin (JARDIANCE) 25 MG TABS tablet Take 1 tablet (25 mg) by mouth daily       Sulfonylureas       glipiZIDE (GLUCOTROL XL) 5 MG 24 hr tablet Take 1 tablet (5 mg) by mouth daily            BS checks: Using judy  Average Meter Download: 137  Symptoms of hypoglycemia (low blood sugar):  Gets symptoms of hypoglycemia.  Episodes of hypoglycemia:     DM Complications:   Complications:   Diabetes Complications  Description / Detail    Diabetic Retinopathy  No   CAD / PAD  No   Neuropathy  No   Nephropathy / Microalbuminuria  No  Lab Results   Component Value Date    UMALCR 11.29 03/11/2024    UMALCR 6.47 04/18/2022    UMALCR 18.63 03/01/2021         Gastroparesis  No   Hypoglycemia Unawarness  No         2. Hypertension:    on medications  3.  Hyperlipidemia: on medications    PMH/PSH:  Past Medical History:   Diagnosis Date     Diabetes (H) July 2018    Treating metformin     Obesity      Past Surgical History:   Procedure Laterality Date     AS ESOPHAGOSCOPY, DIAGNOSTIC      EGD     BIOPSY  2008    egd normal     Family Hx:  Family History   Problem Relation Age of Onset     Diabetes Father      Hypertension Father      Other Cancer Father         Kidney & Thyroid     Cerebrovascular Disease Paternal Grandfather      Other Cancer Brother         Lukemia     Other Cancer Maternal Grandmother         Pancreatic     Other Cancer Maternal Grandfather         Lung - smoker     Colon Cancer No family hx of      Prostate Cancer No family hx of      Cerebrovascular Disease No family hx of      Coronary Artery Disease No family hx of        Diabetes:    Social Hx:  Social History     Socioeconomic History     Marital status:      Spouse name: Not on file     Number of children: Not on file     Years of education: Not on file     Highest education level: Professional school degree (e.g., MD, DDS, DVM, SMITA)   Occupational History     Not on file   Tobacco Use     Smoking status: Never     Smokeless tobacco: Never   Vaping Use     Vaping status: Never Used   Substance and Sexual Activity     Alcohol use: Yes     Comment: 1 per month maybe     Drug use: No     Sexual activity: Yes     Partners: Female     Birth control/protection: Abstinence, Pull-out method, Condom   Other Topics Concern     Parent/sibling w/ CABG, MI or angioplasty before 65F 55M? No   Social History Narrative     Not on file     Social Determinants of Health     Financial Resource Strain: Low Risk  (12/27/2023)    Financial Resource Strain      Within the past 12 months, have you or your family members you live with been unable to get utilities (heat, electricity) when it was really needed?: No   Food Insecurity: Low Risk  (12/27/2023)    Food Insecurity      Within the past 12 months, did  you worry that your food would run out before you got money to buy more?: No      Within the past 12 months, did the food you bought just not last and you didn t have money to get more?: No   Transportation Needs: Low Risk  (12/27/2023)    Transportation Needs      Within the past 12 months, has lack of transportation kept you from medical appointments, getting your medicines, non-medical meetings or appointments, work, or from getting things that you need?: No   Physical Activity: Insufficiently Active (12/20/2022)    Exercise Vital Sign      Days of Exercise per Week: 2 days      Minutes of Exercise per Session: 20 min   Stress: No Stress Concern Present (12/20/2022)    [employer] [employer] - Occupational Stress Questionnaire      Feeling of Stress : Only a little   Social Connections: Socially Integrated (12/20/2022)    Social Connection and Isolation Panel [NHANES]      Frequency of Communication with Friends and Family: Three times a week      Frequency of Social Gatherings with Friends and Family: Twice a week      Attends Faith Services: 1 to 4 times per year      Active Member of Clubs or Organizations: Yes      Attends Club or Organization Meetings: Not on file      Marital Status:    Interpersonal Safety: Low Risk  (12/28/2023)    Interpersonal Safety      Do you feel physically and emotionally safe where you currently live?: Yes      Within the past 12 months, have you been hit, slapped, kicked or otherwise physically hurt by someone?: No      Within the past 12 months, have you been humiliated or emotionally abused in other ways by your partner or ex-partner?: No   Housing Stability: Low Risk  (12/27/2023)    Housing Stability      Do you have housing? : Yes      Are you worried about losing your housing?: No          MEDICATIONS:  has a current medication list which includes the following prescription(s): accu-chek guide, alcohol swab, atorvastatin, blood glucose  "monitoring, freestyle judy 3 sensor, empagliflozin, glipizide, novolog flexpen, insulin glargine u-300, ulticare mini, metformin, acetazolamide, and ondansetron.    ROS     ROS: 10 point ROS neg other than the symptoms noted above in the HPI.    Physical Exam   VS: Wt (!) 176.9 kg (390 lb)   BMI 50.76 kg/m    GENERAL: healthy, alert and no distress  EYES: Eyes grossly normal to inspection, conjunctivae and sclerae normal  ENT: no nose swelling, nasal discharge.  Thyroid: no apparent thyroid nodules  RESP: no audible wheeze, cough, or visible cyanosis.  No visible retractions or increased work of breathing.  Able to speak fully in complete sentences.  ABDO: not evaluated.  EXTREMITIES: no hand tremors.  NEURO: Cranial nerves grossly intact, mentation intact and speech normal  SKIN: No apparent skin lesions, rash or edema seen   PSYCH: mentation appears normal, affect normal/bright, judgement and insight intact, normal speech and appearance well-groomed      LABS:  A1c:  Lab Results   Component Value Date    A1C 7.6 03/11/2024    A1C 6.9 12/28/2023    A1C 7.1 08/07/2023    A1C 7.7 04/17/2023    A1C 8.0 12/22/2022    A1C 7.4 03/01/2021    A1C 8.8 12/29/2020    A1C 8.8 08/24/2020    A1C 10.4 01/22/2020    A1C 7.4 06/11/2019       Creatinine:  Creatinine   Date Value Ref Range Status   03/11/2024 1.01 0.67 - 1.17 mg/dL Final   08/24/2020 0.90 0.66 - 1.25 mg/dL Final   ]    Urine Micro:  Lab Results   Component Value Date    MICROL 14.0 03/11/2024    MICROL 9 04/18/2022    MICROL 34 03/01/2021     No results found for: \"MICROALBUMIN\"      LFTs/Lipids:  Recent Labs   Lab Test 03/11/24  1013 12/28/23  1057   CHOL 123 125   HDL 37* 36*   LDL 66 70   TRIG 99 93       TFTs:  TSH   Date Value Ref Range Status   11/15/2021 0.95 0.40 - 4.00 mU/L Final   06/11/2019 1.42 0.40 - 4.00 mU/L Final         All pertinent notes, labs, and images personally reviewed by me.     Glucometer/ insulin pump (if applicable)/ CGM data (if " applicable) downloaded, Personally reviewed and interpreted.  All Blood sugar data reviewed personally and discussed with pt.  See nursing note from 5/7/2024 for details of BG/CGM log.      A/P  Mr.Jeffrey NARA Younger is a 44 year old here for the evaluation/management of diabetes:    1. DM2 - Under Good control.  A1c 7.6%.  Blood sugar levels are improving since starting glipizide and mostly labs are in acceptable range based on judy data.  No major episodes of hypoglycemia noted or reported.  When he was on Victoza in the past, it was also helping with weight loss.  Plan:  Discussed diagnosis, pathophysiology, management and treatment options of condition with pt.  I also discussed importance of strict blood sugar control to prevent complications associated with uncontrolled diabetes.  Continue current Diabetes regimen.  Continue to use judy  Inform us about details of thyroid problem in your father.  Baseline thyroid ultrasound with Radiology.  Consider GLP-1 RA medications after that.  I discussed GLP-1 RA medications and risk of thyroid cancer.  Labs and follow up in 6 months.  Please make a lab appointment for blood work and follow up clinic appointment in 1 week after that to discuss results.     2. Hypertension -not on medication?    3. Hyperlipidemia - Under Good control.  On atorvastatin 10 mg/day.  LDL 66.    4. Prevention:  Opthalmology-recommend annually  ASA-no secondary to age  Smoking-no    Most Recent Immunizations   Administered Date(s) Administered     COVID-19 12+ (2023-24) (MODERNA) 09/15/2023     COVID-19 Bivalent 18+ (Moderna) 10/11/2022     COVID-19 Monovalent 18+ (Moderna) 11/08/2021     Flu, Unspecified 09/15/2021     Hepatitis A (ADULT 19+) 02/22/2017     Hepatitis B, Adult 08/11/2004     Influenza (H1N1) 12/31/2009     Influenza (IIV3) PF 09/14/2021     Influenza Vaccine >6 months,quad, PF 09/18/2023     Influenza, seasonal, injectable, PF 11/30/2011     Influenza,INJ,MDCK,PF,Quad  >6mo(Flucelvax) 10/11/2022     Meningococcal (Menomune ) 08/11/2004     Meningococcal ACWY (Menactra ) 02/22/2017     Pneumococcal 20 valent Conjugate (Prevnar 20) 12/22/2022     Pneumococcal 23 valent 09/07/2018     TDAP (Adacel,Boostrix) 01/03/2024     TDAP Vaccine (Adacel) 02/01/2015     Twinrix A/B 09/29/2015     Typhoid Oral 01/03/2024     Yellow Fever 02/22/2017         Recommend checking blood sugars before meals and at bedtime.    If Blood glucose are low more often-> 2-3 times/week- give us a call.  The patient is advised to Make better food choices: reduce carbs, Reduce portion size, weight loss and exercise 3-4 times a week.  Discussed hypoglycemia signs and symptoms as well as management in detail.    There is some variability among people, most will usually develop symptoms suggestive of hypoglycemia when blood glucose levels are lowered to the mid 60's. The first set of symptoms are called adrenergic. Patients may experience any of the following nervousness, sweating, intense hunger, trembling, weakness, palpitations, and difficulty speaking.   The acute management of hypoglycemia involves the rapid delivery of a source of easily absorbed sugar. Regular soda, juice, lifesavers, table sugar, are good options. 15 grams of glucose is the dose that is given, followed by an assessment of symptoms and a blood glucose check if possible. If after 10 minutes there is no improvement, another 10-15 grams should be given. This can be repeated up to three times. The equivalency of 10-15 grams of glucose (approximate servings) are: Four lifesavers, 4 teaspoons of sugar, or 1/2 can of regular soda or juice.     Discussed indications, risks and benefits of all medications prescribed, and answered questions to patient's satisfaction.  The longitudinal plan of care for the diagnosis(es)/condition(s) as documented were addressed during this visit. Due to the added complexity in care, I will continue to support Vladimir in  the subsequent management and with ongoing continuity of care.  All questions were answered.  The patient indicates understanding of the above issues and agrees with the plan set forth.     Follow-up:  As noted in AVS    Jocelynn Pickard M.D  [facility]  CC: Russel Hsieh    Disclaimer: This note consists of symbols derived from keyboarding, dictation and/or voice recognition software. As a result, there may be errors in the script that have gone undetected. Please consider this when interpreting information found in this chart.    Addendum to above note and clinic visit:    Labs reviewed.    See result note/telephone encounter.           Again, thank you for allowing me to participate in the care of your patient.        Sincerely,        Jocelynn Pickard MD

## 2024-05-07 NOTE — NURSING NOTE
Is the patient currently in the state of [place]? YES    Visit mode:VIDEO    If the visit is dropped, the patient can be reconnected by: VIDEO VISIT: Text to cell phone:   Telephone Information:   Mobile [phone]       Will anyone else be joining the visit? NO  (If patient encounters technical issues they should call [phone]    How would you like to obtain your AVS? MyChart    Are changes needed to the allergy or medication list? No    Are refills needed on medications prescribed by this physician? NO    Reason for visit: Video Visit and Consult    Jovana RODRIGUEZ

## 2024-05-07 NOTE — PROGRESS NOTES
"THIS IS A VIDEO VISIT:    Phone call visit/virtual visit encounter:    Name of patient: Jeffry Younger    Date of encounter: 5/7/2024    Time of start of video visit: 3:30    Video started: 3:41    Video ended: 4:00    Provider location: working from home/ [facility]    Patient location: patients home.    Mode of transmission: [employer] video/ [employer]    Verbal consent: obtained before starting visit. Pt is agreeable.      The patient has been notified of following:      \"This VIDEO visit will be conducted via a call between you and your physician/provider. We have found that certain health care needs can be provided without the need for a physical exam.  This service lets us provide the care you need with a short phone conversation.  If a prescription is necessary we can send it directly to your pharmacy.  If lab work is needed we can place an order for that and you can then stop by our lab to have the test done at a later time.     With new updates with corona virus patient might be billed as clinic visit.     If during the course of the call the physician/provider feels a telephone visit is not appropriate, you will not be charged for this service.\"      Past medical history, social history, family history, allergy and medications were reviewed and updated as appropriate.  Reviewed pertinent labs, notes, imaging studies personally.    ENDOCRINOLOGY CLINIC NOTE:  Name: Jeffry Younger  Seen in consultation with Russel Hsieh for type 2 Diabetes.  HPI:  Jeffry Younger is a 44 year old male who presents for the evaluation/management of Diabetes.   has a past medical history of Diabetes (H) (July 2018) and Obesity.    He was on Victoza and it was helping with BG.  His father was diagnosed with thyroid growth? and he stopped taking Victoza  out of caution.  He is not sure if it was thyroid cancer. Father had thyroid surgery.  He has never been on insulin pump. His wife is type 1 DM.  In December noted higher " A1c and following that glipizide was restarted.  A1c improved after that      1. Type 2 DM:  Orginally diagnosed in 2015  Current Regimen:   Metformin XR 1000 mg twice a day  Glipizide XL 5 mg/day  Jardinace 25 mg/day  Toujeo 30 units/day  Novolog 18-24 before each meal TID  (Mostly estimate)    yes:     Diabetes Medication(s)       Biguanides       metFORMIN (GLUCOPHAGE XR) 500 MG 24 hr tablet Take 2 tablets (1,000 mg) by mouth 2 times daily (with meals)       Insulin       insulin aspart (NOVOLOG FLEXPEN) 100 UNIT/ML pen Inject 20-28 Units Subcutaneous 3 times daily (before meals) Max up to 84 units per day.     insulin glargine U-300 (TOUJEO) 300 UNIT/ML (1 units dial) pen Inject 30 Units Subcutaneous daily       Sodium-Glucose Co-Transporter 2 (SGLT2) Inhibitors       empagliflozin (JARDIANCE) 25 MG TABS tablet Take 1 tablet (25 mg) by mouth daily       Sulfonylureas       glipiZIDE (GLUCOTROL XL) 5 MG 24 hr tablet Take 1 tablet (5 mg) by mouth daily            BS checks: Using judy  Average Meter Download: 137  Symptoms of hypoglycemia (low blood sugar):  Gets symptoms of hypoglycemia.  Episodes of hypoglycemia:     DM Complications:   Complications:   Diabetes Complications  Description / Detail    Diabetic Retinopathy  No   CAD / PAD  No   Neuropathy  No   Nephropathy / Microalbuminuria  No  Lab Results   Component Value Date    UMALCR 11.29 03/11/2024    UMALCR 6.47 04/18/2022    UMALCR 18.63 03/01/2021         Gastroparesis  No   Hypoglycemia [place]ar[place]  No         2. Hypertension:    on medications  3. Hyperlipidemia: on medications    PMH/PSH:  Past Medical History:   Diagnosis Date    Diabetes (H) July 2018    Treating metformin    Obesity      Past Surgical History:   Procedure Laterality Date    AS ESOPHAGOSCOPY, DIAGNOSTIC      EGD    BIOPSY  2008    egd normal     Family Hx:  Family History   Problem Relation Age of Onset    Diabetes Father     Hypertension Father     Other Cancer Father          Kidney & Thyroid    Cerebrovascular Disease Paternal Grandfather     Other Cancer Brother         Lukemia    Other Cancer Maternal Grandmother         Pancreatic    Other Cancer Maternal Grandfather         Lung - smoker    Colon Cancer No family hx of     Prostate Cancer No family hx of     Cerebrovascular Disease No family hx of     Coronary Artery Disease No family hx of        Diabetes:    Social Hx:  Social History     Socioeconomic History    Marital status:      Spouse name: Not on file    Number of children: Not on file    Years of education: Not on file    Highest education level: Professional school degree (e.g., MD, DDS, DVM, SMITA)   Occupational History    Not on file   Tobacco Use    Smoking status: Never    Smokeless tobacco: Never   Vaping Use    Vaping status: Never Used   Substance and Sexual Activity    Alcohol use: Yes     Comment: 1 per month maybe    Drug use: No    Sexual activity: Yes     Partners: Female     Birth control/protection: Abstinence, Pull-out method, Condom   Other Topics Concern    Parent/sibling w/ CABG, MI or angioplasty before 65F 55M? No   Social History Narrative    Not on file     Social Determinants of Health     Financial Resource Strain: Low Risk  (12/27/2023)    Financial Resource Strain     Within the past 12 months, have you or your family members you live with been unable to get utilities (heat, electricity) when it was really needed?: No   Food Insecurity: Low Risk  (12/27/2023)    Food Insecurity     Within the past 12 months, did you worry that your food would run out before you got money to buy more?: No     Within the past 12 months, did the food you bought just not last and you didn t have money to get more?: No   Transportation Needs: Low Risk  (12/27/2023)    Transportation Needs     Within the past 12 months, has lack of transportation kept you from medical appointments, getting your medicines, non-medical meetings or appointments, work, or from getting  things that you need?: No   Physical Activity: Insufficiently Active (12/20/2022)    Exercise Vital Sign     Days of Exercise per Week: 2 days     Minutes of Exercise per Session: 20 min   Stress: No Stress Concern Present (12/20/2022)    [place] [employer] - Occupational Stress Questionnaire     Feeling of Stress : Only a little   Social Connections: Socially Integrated (12/20/2022)    Social Connection and Isolation Panel [NHANES]     Frequency of Communication with Friends and Family: Three times a week     Frequency of Social Gatherings with Friends and Family: Twice a week     Attends Restorationist Services: 1 to 4 times per year     Active Member of Clubs or Organizations: Yes     Attends Club or Organization Meetings: Not on file     Marital Status:    Interpersonal Safety: Low Risk  (12/28/2023)    Interpersonal Safety     Do you feel physically and emotionally safe where you currently live?: Yes     Within the past 12 months, have you been hit, slapped, kicked or otherwise physically hurt by someone?: No     Within the past 12 months, have you been humiliated or emotionally abused in other ways by your partner or ex-partner?: No   Housing Stability: Low Risk  (12/27/2023)    Housing Stability     Do you have housing? : Yes     Are you worried about losing your housing?: No          MEDICATIONS:  has a current medication list which includes the following prescription(s): accu-chek guide, alcohol swab, atorvastatin, blood glucose monitoring, freestyle judy 3 sensor, empagliflozin, glipizide, novolog flexpen, insulin glargine u-300, ulticare mini, metformin, acetazolamide, and ondansetron.    ROS     ROS: 10 point ROS neg other than the symptoms noted above in the HPI.    Physical Exam   VS: Wt (!) 176.9 kg (390 lb)   BMI 50.76 kg/m    GENERAL: healthy, alert and no distress  EYES: Eyes grossly normal to inspection, conjunctivae and sclerae normal  ENT: no nose swelling, nasal  "discharge.  Thyroid: no apparent thyroid nodules  RESP: no audible wheeze, cough, or visible cyanosis.  No visible retractions or increased work of breathing.  Able to speak fully in complete sentences.  ABDO: not evaluated.  EXTREMITIES: no hand tremors.  NEURO: Cranial nerves grossly intact, mentation intact and speech normal  SKIN: No apparent skin lesions, rash or edema seen   PSYCH: mentation appears normal, affect normal/bright, judgement and insight intact, normal speech and appearance well-groomed      LABS:  A1c:  Lab Results   Component Value Date    A1C 7.6 03/11/2024    A1C 6.9 12/28/2023    A1C 7.1 08/07/2023    A1C 7.7 04/17/2023    A1C 8.0 12/22/2022    A1C 7.4 03/01/2021    A1C 8.8 12/29/2020    A1C 8.8 08/24/2020    A1C 10.4 01/22/2020    A1C 7.4 06/11/2019       Creatinine:  Creatinine   Date Value Ref Range Status   03/11/2024 1.01 0.67 - 1.17 mg/dL Final   08/24/2020 0.90 0.66 - 1.25 mg/dL Final   ]    Urine Micro:  Lab Results   Component Value Date    MICROL 14.0 03/11/2024    MICROL 9 04/18/2022    MICROL 34 03/01/2021     No results found for: \"MICROALBUMIN\"      LFTs/Lipids:  Recent Labs   Lab Test 03/11/24  1013 12/28/23  1057   CHOL 123 125   HDL 37* 36*   LDL 66 70   TRIG 99 93       TFTs:  TSH   Date Value Ref Range Status   11/15/2021 0.95 0.40 - 4.00 mU/L Final   06/11/2019 1.42 0.40 - 4.00 mU/L Final         All pertinent notes, labs, and images personally reviewed by me.     Glucometer/ insulin pump (if applicable)/ CGM data (if applicable) downloaded, Personally reviewed and interpreted.  All Blood sugar data reviewed personally and discussed with pt.  See nursing note from 5/7/2024 for details of BG/CGM log.      A/P  Mr.Jeffrey NARA Younger is a 44 year old here for the evaluation/management of diabetes:    1. DM2 - Under Good control.  A1c 7.6%.  Blood sugar levels are improving since starting glipizide and mostly labs are in acceptable range based on judy data.  No major episodes of " hypoglycemia noted or reported.  When he was on Victoza in the past, it was also helping with weight loss.  Plan:  Discussed diagnosis, pathophysiology, management and treatment options of condition with pt.  I also discussed importance of strict blood sugar control to prevent complications associated with uncontrolled diabetes.  Continue current Diabetes regimen.  Continue to use judy  Inform us about details of thyroid problem in your father.  Baseline thyroid ultrasound with Radiology.  Consider GLP-1 RA medications after that.  I discussed GLP-1 RA medications and risk of thyroid cancer.  Labs and follow up in 6 months.  Please make a lab appointment for blood work and follow up clinic appointment in 1 week after that to discuss results.     2. Hypertension -not on medication?    3. Hyperlipidemia - Under Good control.  On atorvastatin 10 mg/day.  LDL 66.    4. Prevention:  Opthalmology-recommend annually  ASA-no secondary to age  Smoking-no    Most Recent Immunizations   Administered Date(s) Administered    COVID-19 12+ (2023-24) (MODERNA) 09/15/2023    COVID-19 Bivalent 18+ (Moderna) 10/11/2022    COVID-19 Monovalent 18+ (Moderna) 11/08/2021    Flu, Unspecified 09/15/2021    Hepatitis A (ADULT 19+) 02/22/2017    Hepatitis B, Adult 08/11/2004    Influenza (H1N1) 12/31/2009    Influenza (IIV3) PF 09/14/2021    Influenza Vaccine >6 months,quad, PF 09/18/2023    Influenza, seasonal, injectable, PF 11/30/2011    Influenza,INJ,MDCK,PF,Quad >6mo(Flucelvax) 10/11/2022    Meningococcal (Menomune ) 08/11/2004    Meningococcal ACWY (Menactra ) 02/22/2017    Pneumococcal 20 valent Conjugate (Prevnar 20) 12/22/2022    Pneumococcal 23 valent 09/07/2018    TDAP (Adacel,Boostrix) 01/03/2024    TDAP Vaccine (Adacel) 02/01/2015    Twinrix A/B 09/29/2015    Typhoid Oral 01/03/2024    Yellow Fever 02/22/2017         Recommend checking blood sugars before meals and at bedtime.    If Blood glucose are low more often-> 2-3  times/week- give us a call.  The patient is advised to Make better food choices: reduce carbs, Reduce portion size, weight loss and exercise 3-4 times a week.  Discussed hypoglycemia signs and symptoms as well as management in detail.    There is some variability among people, most will usually develop symptoms suggestive of hypoglycemia when blood glucose levels are lowered to the mid 60's. The first set of symptoms are called adrenergic. Patients may experience any of the following nervousness, sweating, intense hunger, trembling, weakness, palpitations, and difficulty speaking.   The acute management of hypoglycemia involves the rapid delivery of a source of easily absorbed sugar. Regular soda, juice, lifesavers, table sugar, are good options. 15 grams of glucose is the dose that is given, followed by an assessment of symptoms and a blood glucose check if possible. If after 10 minutes there is no improvement, another 10-15 grams should be given. This can be repeated up to three times. The equivalency of 10-15 grams of glucose (approximate servings) are: Four lifesavers, 4 teaspoons of sugar, or 1/2 can of regular soda or juice.     Discussed indications, risks and benefits of all medications prescribed, and answered questions to patient's satisfaction.  The longitudinal plan of care for the diagnosis(es)/condition(s) as documented were addressed during this visit. Due to the added complexity in care, I will continue to support Vladimir in the subsequent management and with ongoing continuity of care.  All questions were answered.  The patient indicates understanding of the above issues and agrees with the plan set forth.     Follow-up:  As noted in AVS    Joeclynn Pickard M.D  Endocrinology  [facility]  CC: Russel Hsieh    Disclaimer: This note consists of symbols derived from keyboarding, dictation and/or voice recognition software. As a result, there may be errors in the script that have  gone undetected. Please consider this when interpreting information found in this chart.    Addendum to above note and clinic visit:    Labs reviewed.    See result note/telephone encounter.

## 2024-05-07 NOTE — PATIENT INSTRUCTIONS
[facility]  Dr Pickard, Endocrinology Department    [place] [address] [id number]  [place] [address]  Appointment [phone]  Fax: [phone]  [place]: Monday - Thursday      To provide the best diabetic care, please bring your blood glucose meter to each and every visit with your Endocrinologist.  Your blood glucose meter/insulin pump will be downloaded at every appointment.    Please arrive 15 minutes before your scheduled appointment.  This will allow for your blood glucose meter/insulin pump to be downloaded.  If you are wearing DEXCOM please bring  or sharing code so that it can be downloaded.  If you are using freestyle judy personal sensors please bring the reader.  If you are using TANDEM insulin pump please have your username and password to get info from [employer] website.    Continue current Diabetes regimen.  Continue to use judy  Inform us about details of thyroid problem in your father.  Thyroid ultrasound with Radiology.  Consider GLP-1 RA medications after that.  Labs and follow up in 6 months.  Please make a lab appointment for blood work and follow up clinic appointment in 1 week after that to discuss results.    Buydureon/Exenatide (once a week), Ozempic/Semaglutide (once a week), Trulicity/Dulaglutide (once a week), Victoza/Liraglutide (once a day), Adlyxin/Lixisenatide (once a day), These are GLP-1 Agonist. Please check cost with insurance.  Mounjaro (Tirzeparatide) is another medication. It is dual glucose-dependent insulinotropic polypeptide (GIP) and glucagon-like peptide-1 (GLP-1) receptor agonist.    Possible side effect profile of these class of medication includes: Nausea, diarrhea, gastrointestinal intolerance, abdominal pain, upper respiratory tract infection, headache, increased heart rate, drop in blood sugar, injection site reaction.  Rare side effects include pancreatitis, kidney problems.  It has been  associated with thyroid cancer in animal models.       [facility] radiology scheduleing   KAMILAH  [phone]   [facility] Radiology scheduling  KAROLINE  [phone]     Please call and schedule the recommended test as discussed in clinic visit. These are the numbers to call.    Recommend checking blood sugars before meals and at bedtime.    If Blood glucose are low more often-> 2-3 times/week- give us a call.  Make better food choices: reduce carbs, Reduce portion size, weight loss and exercise 3-4 times a week.    What is hypoglycemia:  Hypoglycemia is when blood sugar levels become too low - below 70 m/dl.      What causes hypoglycemia?  - using too much insulin  -taking too many diabetes pills  -not eating enough, or skipping meals or snacks  -not eating enough carbohydrate with meals  -changing your exercise routine  -drinking alcohol in excess    It is also possible to have hypoglycemia even when you are carefully managing your blood sugar levels.    What does it feel like when blood sugars get too low?  You may feel:  - anxious  -confused  -dizzy  -hungry  -light-headed  -nervous  -shaky  -sleepy  -sweaty    You may have  -blurred or cloudy vision  -heart palpitations (heart skips a beat or races)  -tingling or numbness around the mouth and tongue  -tremors    What to do if you have symptoms of hypoglycmemia:  If you think your blood sugar is too low, check it with a glucose meter.  If its below 70 mg/dl, consume one of the following:  Fruit juice (1/2 cup)  Glucose tablets (15 grams)  Hard candy (5 to 7 pieces)  Honey or sugar (2 teaspoons)  Milk (1/2 cup)  Soft drink (non-diet, 1/2 cup)    Wait 15 minutes and check your blood glucose again.  IF it is still below 70 mg/dl, have another food item listed above. Wait another 15 minutes and repeat the blood glucose test.  Have a small meal or snack that contains some carbohydrate after your blood glucose rises above 70 mg/dl.    If you are at  risk of hypoglycemia, always carry with you glucose tablets or one of the foods listed above.      To prevent Hypoglycemia:  Avoid situations that may cause hypoglycemia  Before making any change to your diet or exercise routine, discuss them with your healthcare provider  Keep a record of your blood glucose levels.  Include the time of day, diabetes medications, when you had your last meal or snack, and what you were doing at the time (e.g. Watching TV, gardening, jogging, etc).    Talk to your healthcare provider if your blood glucose levels are often low        Patient guide on hypoglycemia    http://[email or web address].org/Resources/upload/patient-guide-diagnosis-and-management-[email or web address]

## 2024-05-10 ENCOUNTER — HOSPITAL ENCOUNTER (OUTPATIENT)
Dept: ULTRASOUND IMAGING | Facility: HOSPITAL | Age: 44
Discharge: HOME | End: 2024-05-10
Attending: INTERNAL MEDICINE | Admitting: INTERNAL MEDICINE
Payer: COMMERCIAL

## 2024-05-10 DIAGNOSIS — E11.65 TYPE 2 DIABETES MELLITUS WITH HYPERGLYCEMIA, WITHOUT LONG-TERM CURRENT USE OF INSULIN (H): ICD-10-CM

## 2024-05-10 PROCEDURE — 76536 US EXAM OF HEAD AND NECK: CPT

## 2024-05-21 ENCOUNTER — TELEPHONE (OUTPATIENT)
Dept: ENDOCRINOLOGY | Facility: CLINIC | Age: 44
End: 2024-05-21
Payer: COMMERCIAL

## 2024-05-21 NOTE — TELEPHONE ENCOUNTER
PA Initiation Key: [id number]    Medication: OZEMPIC (0.25 OR 0.5 MG/DOSE) 2 MG/3ML SC SOPN  Insurance Company: [employer] - Phone [phone] Fax [phone]  Pharmacy Filling the Rx: [facility] (MAIL SERVICE) [facility] - [place] [address] AT [facility]  Filling Pharmacy Phone: [phone]  Filling Pharmacy Fax: [phone]  Start Date: 5/21/2024

## 2024-05-22 NOTE — TELEPHONE ENCOUNTER
Prior Authorization Approval    Medication: OZEMPIC (0.25 OR 0.5 MG/DOSE) 2 MG/3ML SC SOPN  Authorization Effective Date: 4/22/2024  Authorization Expiration Date: 5/22/2025  Approved Dose/Quantity:    Reference #: Key: [id number]   Insurance Company: [employer] - Phone [phone] Fax [phone]  Expected CoPay: $    CoPay Card Available: No    Financial Assistance Needed:    Which Pharmacy is filling the prescription: [facility] ([facility]IL [facility]ICE) [facility] - [place], [place] - [address] AT [facility] [place] Notified: Y  Patient Notified: KIRK

## 2024-06-09 ASSESSMENT — SLEEP AND FATIGUE QUESTIONNAIRES
HOW LIKELY ARE YOU TO NOD OFF OR FALL ASLEEP WHEN YOU ARE A PASSENGER IN A CAR FOR AN HOUR WITHOUT A BREAK: SLIGHT CHANCE OF DOZING
HOW LIKELY ARE YOU TO NOD OFF OR FALL ASLEEP WHILE SITTING AND TALKING TO SOMEONE: WOULD NEVER DOZE
HOW LIKELY ARE YOU TO NOD OFF OR FALL ASLEEP WHILE SITTING QUIETLY AFTER LUNCH WITHOUT ALCOHOL: SLIGHT CHANCE OF DOZING
HOW LIKELY ARE YOU TO NOD OFF OR FALL ASLEEP WHILE WATCHING TV: SLIGHT CHANCE OF DOZING
HOW LIKELY ARE YOU TO NOD OFF OR FALL ASLEEP IN A CAR, WHILE STOPPED FOR A FEW MINUTES IN TRAFFIC: WOULD NEVER DOZE
HOW LIKELY ARE YOU TO NOD OFF OR FALL ASLEEP WHILE SITTING INACTIVE IN A PUBLIC PLACE: WOULD NEVER DOZE
HOW LIKELY ARE YOU TO NOD OFF OR FALL ASLEEP WHILE LYING DOWN TO REST IN THE AFTERNOON WHEN CIRCUMSTANCES PERMIT: SLIGHT CHANCE OF DOZING
HOW LIKELY ARE YOU TO NOD OFF OR FALL ASLEEP WHILE SITTING AND READING: SLIGHT CHANCE OF DOZING

## 2024-06-12 ENCOUNTER — OFFICE VISIT (OUTPATIENT)
Dept: SLEEP MEDICINE | Facility: CLINIC | Age: 44
End: 2024-06-12
Payer: COMMERCIAL

## 2024-06-12 VITALS
OXYGEN SATURATION: 98 % | HEART RATE: 97 BPM | SYSTOLIC BLOOD PRESSURE: 159 MMHG | WEIGHT: 315 LBS | HEIGHT: 74 IN | DIASTOLIC BLOOD PRESSURE: 98 MMHG | BODY MASS INDEX: 40.43 KG/M2

## 2024-06-12 DIAGNOSIS — G47.33 OSA (OBSTRUCTIVE SLEEP APNEA): Primary | ICD-10-CM

## 2024-06-12 PROCEDURE — 99213 OFFICE O/P EST LOW 20 MIN: CPT | Performed by: PHYSICIAN ASSISTANT

## 2024-06-12 NOTE — NURSING NOTE
"Chief Complaint   Patient presents with    CPAP Follow Up     CPAP follow up       Initial BP (!) 159/98   Pulse 97   Ht 1.88 m (6' 2\")   Wt (!) 181.3 kg (399 lb 12.8 oz)   SpO2 98%   BMI 51.33 kg/m   Estimated body mass index is 51.33 kg/m  as calculated from the following:    Height as of this encounter: 1.88 m (6' 2\").    Weight as of this encounter: 181.3 kg (399 lb 12.8 oz).    Medication Reconciliation: complete  ESS 5  Troy Gonzalez MA         "

## 2024-06-12 NOTE — PROGRESS NOTES
CPAP Follow-Up Visit:    Date on this visit: 6/12/2024    Jeffry Younger has a follow-up of his CPAP use for mild PAOLA. He was initially seen for poor sleep quality, loud snoring, observed apnea and sleep paralysis.      PREVIOUS SLEEP STUDIES:  Date: 3/5/17. AHI: 12.2/hr (REM 32.5/hr and supine 45/hr). Lowest O2 sat: 85%. Weight: 400 pounds.       PAP machine: ResMed (3/13/2017). Pressure settings: 8-16 cm        ResMed 3/13/17  Auto-PAP 8 - 16 cmH2O 30 day usage data:    The compliance data shows that the patient used the CPAP for 88/90 nights, 98% of nights for >4 hours.  The 95th% pressure is 11.7 cm.  The 95th% leak is 11.9 lpm.  The average nightly usage is 7:40.  The average AHI is 0.7/hr.        He feels things are going reasonably well. He feels he is not sleeping quite as deeply in the last year as he had been, unclear why.   He sleeps on his sides    No specialty comments available.    Do you use a CPAP Machine at home: Yes  Overall, on a scale of 0-10 how would you rate your CPAP (0 poor, 10 great): 8    What type of mask do you use:  Dreamwear nasal  Is your mask comfortable: Yes  If not, why:    How often do you replace supplies: filters monthly, most other things q6 months. Not regular about cleaning most parts, cushion gets washed regularly.    Is your mask leaking: No  If yes, where do you feel it:    How many night per week does the mask leak (0-7):      Do you notice snoring with mask on: No  Do you notice gasping arousals with mask on: No  Are you having significant oral or nasal dryness: No  Are you using the humidifier: yes  Does the water chamber run out before the night is over:only if he does not fill it all the way  Do you get condensation in the mask or hose:rare if the window is open  Is the pressure setting comfortable: Yes  If not, why:      Typical bedtime: 1am  Sleep latency on PAP therapy: 10 min  Typical wake time: 830  Wakes 1-3 times per night for 5 minutes. Reason for waking:  restroom  How many hours on average per night are you using PAP therapy: All  How many hours are you sleeping per night: 7  Do you feel well rested in the morning: No    Naps: 1-2 times per month for 30 minutes.       Rare morning headaches.     Weight change since sleep study: 399 lbs      Past medical/surgical history, family history, social history, medications and allergies were reviewed.      Problem List:  Patient Active Problem List    Diagnosis Date Noted    Family history of thyroid cancer 11/15/2021     Priority: Medium    Type 2 diabetes mellitus with hyperglycemia, without long-term current use of insulin (H) 07/01/2018     Priority: Medium    Hyperlipidemia LDL goal <100 07/01/2018     Priority: Medium    ADAMA (generalized anxiety disorder) 06/26/2018     Priority: Medium    PAOLA (obstructive sleep apnea) 03/07/2017     Priority: Medium     Diagnostic Study  Sleep Study 3/5/2017 - (400.0 lbs) AHI 12.2, RDI 13.7, Supine AHI 45.0, REM AHI 32.5, Low O2 85.0%, Time Spent ?88% 1.0 minutes / Time Spent ?89% 2.0 minutes      Morbid obesity with BMI of 50.0-59.9, adult (H) 09/29/2015     Priority: Medium        Impression/Plan:    (G47.33) PAOLA (obstructive sleep apnea)  (primary encounter diagnosis)  Comment: Vladimir is using CPAP regularly and benefits from use. He feels his sleep quality is not as good as it was a year ago, but work has been keeping him up until 3 AM and sleep duration is variable. He wakes often for the restroom.   Plan: Comprehensive DME        Continue auto CPAP 8-16 cm. A prescription was written for new supplies. We reviewed recommendations for cleaning and replacing supplies. We talked about the importance of keeping a consistent schedule.        He will follow up with me in about 2 year(s).     17 minutes were spent on the date of the encounter doing chart review, history and exam, documentation and further activities as noted above.     Bennett Goltz, PA-C    CC: No ref. provider  found

## 2024-06-13 ENCOUNTER — TELEPHONE (OUTPATIENT)
Dept: EDUCATION SERVICES | Facility: CLINIC | Age: 44
End: 2024-06-13

## 2024-06-13 ENCOUNTER — VIRTUAL VISIT (OUTPATIENT)
Dept: EDUCATION SERVICES | Facility: CLINIC | Age: 44
End: 2024-06-13
Attending: INTERNAL MEDICINE
Payer: COMMERCIAL

## 2024-06-13 DIAGNOSIS — E11.65 TYPE 2 DIABETES MELLITUS WITH HYPERGLYCEMIA, WITHOUT LONG-TERM CURRENT USE OF INSULIN (H): ICD-10-CM

## 2024-06-13 PROBLEM — I10 ESSENTIAL HYPERTENSION: Status: ACTIVE | Noted: 2024-06-13

## 2024-06-13 PROCEDURE — G0108 DIAB MANAGE TRN  PER INDIV: HCPCS | Mod: 93 | Performed by: NUTRITIONIST

## 2024-06-13 NOTE — TELEPHONE ENCOUNTER
Met with Vladimir today.  He is tolerating the 0.25 mg once weekly Ozempic injections and will take the 4th injection on Sunday.  Recommend to then increase to 0.5 mg once weekly.  Refills needed.  Set up for your approval.    Aaliyah Bangura RD, LD, [facility]

## 2024-06-13 NOTE — LETTER
6/13/2024         RE: Jeffry Younger  [address]  [place] [address]        Dear Colleague,    Thank you for referring your patient, Jeffry Younger, to the [facility]. Please see a copy of my visit note below.    No notes on file

## 2024-06-13 NOTE — PATIENT INSTRUCTIONS
Chandrakant Gilbert,    Here are some things that we discussed today.      Goals for Diabetes Care:    1. Eat 3 balanced meals each day - Monitor carb intake and aim for 60 grams per meal  (4 carbohydrate choices) and 15 grams carbohydrate (1 carbohydrate choice) per snack (2/day)      Carbohydrate 1 choice = 15 grams      Aim to eat a balanced plate - half your plate fruits/veggies, 1/4 the plate protein and 1/4 plate starch (rice, potato, pasta)      Increase fruit intake to 3 servings per day and non-starchy vegetable intake to 5+ servings/day      Reduce or omit regular soda from diet      2. Check blood sugars multiple times each day at varying times   Blood Glucose Targets:   1. Fasting and before meal target is 80 - 130 mg/dl   2. 2 hours after a meal target is < 180 mg/dl  Always remember to bring meter and/or log book to all appointments.    3. Activity really helps improve blood sugars. Try to Incorporate 30 minutes activity into each day - does not need to be all at one time & walking counts!    4. Treating low BG. Rule of 15 = BG 50-70 mg/dL = 15 gram carb (4 oz juice, 4 glucose tabs, OR 4 oz pop), then recheck BG in 15 minutes. If still low repeat. (If BG <50 mg/dL = 8 oz pop/juice or 8 glucose tabs).    5. Take diabetes medications as prescribed   -Ozempic 0.25 mg x 4 injections then increase to 0.5 mg once weekly injections  -Glipizide ER 5 mg once daily AM  -Jardiance 25 mg once daily AM  -Toujeo 28 units once daily - if fasting blood sugar are going below 80 mg/dl twice weekly then decrease dose by 2 units  -Novolog 24-28 units at meals - if after meal readings are going below 80 mg/dL twice weekly then decrease dose by 2 units    Follow up with your Diabetic Educator to assess BG targets and need for modifications to medications and/or lifestyle.    Call with any questions.  Thank you!  Aaliyah Bangura, DULCE, LD, [facility]   Certified Diabetes Care &   [facility]  Triage [phone] or  [facility] [phone]

## 2024-06-13 NOTE — PROGRESS NOTES
Diabetes Self-Management Education & Support    Presents for: Individual review    Type of Service: Telephone Visit    Originating Location (Patient Location): Home  Distant Location (Provider Location): [facility]  Mode of Communication:  Telephone    Telephone Visit Start Time:  8:31 AM  Telephone Visit End Time (telephone visit stop time): 9:14 AM    How would patient like to obtain AVS? MyChart      ASSESSMENT:  Vladimir is wearing Manolo 3 sensors, see data below.  He reports that he has taken his 3rd injection of Ozempic at 0.25 mg weekly without any issues.  Recommend for him to increase to 0.5 mg once weekly after 4th injection on 0.25 mg.  Review of diet shows 2 meals and 1 large snack per day, he reports that he is eating smaller portions.  Has regular soda most day during his lunch meal.  He is agreeable to try and limit carbohydrate at meals to 60 grams and carbohydrate at snacks to 15-30 grams and will work on limiting regular soda in diet.      Patient's most recent   Lab Results   Component Value Date    A1C 7.6 03/11/2024    A1C 7.4 03/01/2021     is not meeting goal of <7.0    Diabetes knowledge and skills assessment:   Patient is knowledgeable in diabetes management concepts related to: Monitoring, Taking Medication, Problem Solving, and Healthy Coping    Continue education with the following diabetes management concepts: Healthy Eating, Being Active, and Reducing Risks    Based on learning assessment above, most appropriate setting for further diabetes education would be: Individual setting.      PLAN    1) After 4th Ozempic weekly injection increase to 0.5 mg once weekly injections  2) If fasting blood sugar are going below 80 mg/dl twice weekly then decrease Toujeo dose by 2 units - continue to do so as needed  3) If after meal readings are going below 80 mg/dL twice weekly then decrease Novolog dose by 2 units - continue to do so as needed  4) Reduce or omit regular  "soda from diet  5) Consume 60 grams carbohydrate at meals and 15-30 grams carbohydrate at snacks  6) Increase fruit intake to 3 servings per day and non-starchy vegetable intake to 5+ servings/day    Follow-up: 8/7/24    See Care Plan for co-developed, patient-state behavior change goals.  AVS provided for patient today.    Education Materials Provided:  My Plate Planner      SUBJECTIVE/OBJECTIVE:  Presents for: Individual review  Accompanied by: Self  Diabetes education in the past 24mo: No  Focus of Visit: Healthy Eating  Diabetes type: Type 2  Disease course: Stable  How confident are you filling out medical forms by yourself:: Extremely  Diabetes management related comments/concerns: No  Transportation concerns: No  Difficulty affording diabetes medication?: No  Difficulty affording diabetes testing supplies?: No  Other concerns:: None  Cultural Influences/Ethnic Background:  Not  or     Diabetes Symptoms & Complications:  Diabetes Related Symptoms: Fatigue  Weight trend: Stable  Symptom course: Stable  Disease course: Stable       Patient Problem List and Family Medical History reviewed for relevant medical history, current medical status, and diabetes risk factors.    Vitals:  There were no vitals taken for this visit.  Estimated body mass index is 51.33 kg/m  as calculated from the following:    Height as of 6/12/24: 1.88 m (6' 2\").    Weight as of 6/12/24: 181.3 kg (399 lb 12.8 oz).   Last 3 BP:   BP Readings from Last 3 Encounters:   06/12/24 (!) 159/98   03/12/24 (!) 159/99   03/11/24 (!) 150/82       History   Smoking Status    Never   Smokeless Tobacco    Never       Labs:  Lab Results   Component Value Date    A1C 7.6 03/11/2024    A1C 7.4 03/01/2021     Lab Results   Component Value Date     03/11/2024     04/18/2022     08/24/2020     Lab Results   Component Value Date    LDL 66 03/11/2024    LDL 57 03/01/2021     HDL Cholesterol   Date Value Ref Range Status " "  03/01/2021 35 (L) >39 mg/dL Final     Direct Measure HDL   Date Value Ref Range Status   03/11/2024 37 (L) >=40 mg/dL Final   ]  GFR Estimate   Date Value Ref Range Status   03/11/2024 >90 >60 mL/min/1.73m2 Final   08/24/2020 >90 >60 mL/min/[1.73_m2] Final     Comment:     Non  GFR Calc  Starting 12/18/2018, serum creatinine based estimated GFR (eGFR) will be   calculated using the Chronic Kidney Disease Epidemiology Collaboration   (CKD-EPI) equation.       GFR Estimate If Black   Date Value Ref Range Status   08/24/2020 >90 >60 mL/min/[1.73_m2] Final     Comment:      GFR Calc  Starting 12/18/2018, serum creatinine based estimated GFR (eGFR) will be   calculated using the Chronic Kidney Disease Epidemiology Collaboration   (CKD-EPI) equation.       Lab Results   Component Value Date    CR 1.01 03/11/2024    CR 0.90 08/24/2020     No results found for: \"MICROALBUMIN\"    Healthy Eating:  Healthy Eating Assessed Today: Yes  Cultural/Jehovah's witness diet restrictions?: No  Do you have any food allergies or intolerances?: No  Meal planning/habits: Low carb  Who cooks/prepares meals for you?: Spouse, Self  Who purchases food in  your home?: Self, Spouse  How many times a week on average do you eat food made away from home (restaurant/take-out)?: 4  Meals include: Lunch, Dinner, Evening Snack  Breakfast: skips  Lunch: 11 am- 12 pm:  Chili with pinch of cheese & regular soda or green tea  OR chicken breast with buffalo wild wing sauce with small side of rice OR Chicken Soup (no noodles just potatoes)  Dinner: 6 pm:  Spaghetti with meat sauce OR brats or hamburgers with bun with side salad with vinegarette and air fried french fries  Snacks: buffalo wings OR popcorn OR peanuts  Beverages: Water, Tea, Soda    Being Active:  Being Active Assessed Today: Yes  Exercise:: Yes (gym membership, rowing machine and walking)  Days per week of moderate to strenuous exercise (like a brisk walk): 4  On " average, minutes per day of exercise at this level: 30  How intense was your typical exercise? : Moderate (like brisk walking)  Exercise Minutes per Week: 120  Barrier to exercise: Time    Monitoring:  Monitoring Assessed Today: Yes  Did patient bring glucose meter to appointment? : Yes  Blood Glucose Meter: FreeStyle, CGM  Times checking blood sugar at home (number): 4  Times checking blood sugar at home (per): Day  Blood glucose trend: Fluctuating                  Taking Medications:  Diabetes Medication(s)       Biguanides       metFORMIN (GLUCOPHAGE XR) 500 MG 24 hr tablet Take 2 tablets (1,000 mg) by mouth 2 times daily (with meals)       Insulin       insulin aspart (NOVOLOG FLEXPEN) 100 UNIT/ML pen Inject 20-28 Units Subcutaneous 3 times daily (before meals) Max up to 84 units per day.     insulin glargine U-300 (TOUJEO) 300 UNIT/ML (1 units dial) pen Inject 30 Units Subcutaneous daily       Sodium-Glucose Co-Transporter 2 (SGLT2) Inhibitors       empagliflozin (JARDIANCE) 25 MG TABS tablet Take 1 tablet (25 mg) by mouth daily       Sulfonylureas       glipiZIDE (GLUCOTROL XL) 5 MG 24 hr tablet Take 1 tablet (5 mg) by mouth daily       Incretin Mimetic Agents       semaglutide (OZEMPIC) 2 MG/3ML pen Inject 0.25 mg Subcutaneous every 7 days For 4 weeks and follow up            Taking Medication Assessed Today: Yes  Current Treatments: Insulin Injections, Oral Medication (taken by mouth), Non-insulin Injectables  Dose schedule: Pre-lunch, Pre-dinner, At bedtime  Given by: Patient  Injection/Infusion sites: Abdomen  Problems taking diabetes medications regularly?: No  Diabetes medication side effects?: No    Problem Solving:  Problem Solving Assessed Today: Yes  Is the patient at risk for hypoglycemia?: Yes  Hypoglycemia Frequency: Never  Hypoglycemia Treatment: Juice  Medical ID: No  Does patient have glucagon emergency kit?: No  Is the patient at risk for DKA?: No  Does patient have severe weather/disaster  plan for diabetes management?: No  Does patient have sick day plan for diabetes management?: No    Hypoglycemia Symptoms  Hypoglycemia: Sweats, Feeling shaky    Hypoglycemia Complications  Hypoglycemia Complications: None    Reducing Risks:  Reducing Risks Assessed Today: Yes  Diabetes Risks: Hyperlipidemia  CAD Risks: Dyslipidemia, Obesity, Male sex, Diabetes Mellitus  Has dilated eye exam at least once a year?: Yes  Sees dentist every 6 months?: Yes  Feet checked by healthcare provider in the last year?: Yes    Healthy Coping:  Healthy Coping Assessed Today: Yes  Emotional response to diabetes: Ready to learn  Informal Support system:: Family, Friends, Spouse  Stage of change: ACTION (Actively working towards change)  Support resources: Websites  Patient Activation Measure Survey Score:      1/20/2020     1:29 AM 12/20/2022    10:32 AM   DILAN Score (Last Two)   DILAN Raw Score 34 28   Activation Score 68.9 50   DILAN Level 3 2         Care Plan and Education Provided:  Healthy Eating: Balanced meals, Carbohydrate Counting, Consistency in amount and timing of carbohydrate intake, Eating out, and Portion control and Taking Medication: Action of prescribed medication(s), Side effects of prescribed medication(s), and When to take medication(s)    Aaliyah Bangura, RD, LD, [facility]     Time Spent: 43 minutes  Encounter Type: Individual    Any diabetes medication dose changes were made via the CDE Protocol per the patient's referring provider and endocrinology provider. A copy of this encounter was shared with the provider.

## 2024-06-18 DIAGNOSIS — E11.65 TYPE 2 DIABETES MELLITUS WITH HYPERGLYCEMIA, WITHOUT LONG-TERM CURRENT USE OF INSULIN (H): ICD-10-CM

## 2024-06-19 ENCOUNTER — MYC REFILL (OUTPATIENT)
Dept: PHARMACY | Facility: CLINIC | Age: 44
End: 2024-06-19
Payer: COMMERCIAL

## 2024-06-19 ENCOUNTER — TRANSFERRED RECORDS (OUTPATIENT)
Dept: HEALTH INFORMATION MANAGEMENT | Facility: CLINIC | Age: 44
End: 2024-06-19
Payer: COMMERCIAL

## 2024-06-19 DIAGNOSIS — E11.65 TYPE 2 DIABETES MELLITUS WITH HYPERGLYCEMIA, WITHOUT LONG-TERM CURRENT USE OF INSULIN (H): ICD-10-CM

## 2024-06-19 LAB — RETINOPATHY: NEGATIVE

## 2024-06-19 RX ORDER — GLIPIZIDE 5 MG/1
5 TABLET, FILM COATED, EXTENDED RELEASE ORAL DAILY
Qty: 90 TABLET | Refills: 0 | OUTPATIENT
Start: 2024-06-19

## 2024-06-19 RX ORDER — GLIPIZIDE 5 MG/1
5 TABLET, FILM COATED, EXTENDED RELEASE ORAL DAILY
Qty: 90 TABLET | Refills: 1 | Status: SHIPPED | OUTPATIENT
Start: 2024-06-19

## 2024-06-19 NOTE — TELEPHONE ENCOUNTER
Mtm refilled today . Stalin Gamboa Rph.  Medication Therapy Management Provider  [phone]      Lab Results   Component Value Date    A1C 7.6 03/11/2024    A1C 6.9 12/28/2023    A1C 7.1 08/07/2023    A1C 7.7 04/17/2023    A1C 8.0 12/22/2022    A1C 7.4 03/01/2021    A1C 8.8 12/29/2020    A1C 8.8 08/24/2020    A1C 10.4 01/22/2020    A1C 7.4 06/11/2019

## 2024-07-15 ENCOUNTER — OFFICE VISIT (OUTPATIENT)
Dept: PHARMACY | Facility: CLINIC | Age: 44
End: 2024-07-15
Payer: COMMERCIAL

## 2024-07-15 ENCOUNTER — MYC MEDICAL ADVICE (OUTPATIENT)
Dept: SLEEP MEDICINE | Facility: CLINIC | Age: 44
End: 2024-07-15
Payer: COMMERCIAL

## 2024-07-15 VITALS
SYSTOLIC BLOOD PRESSURE: 140 MMHG | OXYGEN SATURATION: 96 % | WEIGHT: 315 LBS | BODY MASS INDEX: 51.65 KG/M2 | HEART RATE: 97 BPM | DIASTOLIC BLOOD PRESSURE: 90 MMHG

## 2024-07-15 DIAGNOSIS — I10 ESSENTIAL HYPERTENSION: Primary | ICD-10-CM

## 2024-07-15 DIAGNOSIS — E66.01 MORBID OBESITY WITH BMI OF 50.0-59.9, ADULT (H): ICD-10-CM

## 2024-07-15 DIAGNOSIS — E11.65 TYPE 2 DIABETES MELLITUS WITH HYPERGLYCEMIA, WITHOUT LONG-TERM CURRENT USE OF INSULIN (H): ICD-10-CM

## 2024-07-15 DIAGNOSIS — G47.33 OSA (OBSTRUCTIVE SLEEP APNEA): Primary | ICD-10-CM

## 2024-07-15 PROCEDURE — 99606 MTMS BY PHARM EST 15 MIN: CPT | Performed by: PHARMACIST

## 2024-07-15 PROCEDURE — 99607 MTMS BY PHARM ADDL 15 MIN: CPT | Performed by: PHARMACIST

## 2024-07-15 RX ORDER — AMLODIPINE AND BENAZEPRIL HYDROCHLORIDE 5; 10 MG/1; MG/1
1 CAPSULE ORAL DAILY
Qty: 90 CAPSULE | Refills: 0 | Status: SHIPPED | OUTPATIENT
Start: 2024-07-15

## 2024-07-15 RX ORDER — LORATADINE 10 MG/1
10 TABLET ORAL DAILY PRN
COMMUNITY
End: 2024-08-26

## 2024-07-15 NOTE — PROGRESS NOTES
Medication Therapy Management (MTM) Encounter    ASSESSMENT:                            Medication Adherence/Access: No issues identified    Diabetes and elevated BMI:   Patient is not meeting A1c goal of < 7%.  Patient is not meeting goal of > 70% time in target with continuous glucose monitoring.   Microalbumin is at goal < 30mg/g.  Patient would benefit from continue with Ozempic dose titration and working on diet which would likely improve his tolerability to Ozempic.     Hypertension:   Patient is not meeting blood pressure goal of < 130/80mmHg. Recommend additional of ACEI due to available combination tablet with amlodipine for greater blood pressure lowering propensity.     Hyperlipidemia:   Stable.    PLAN:                            Stop amlodipine.  Start amlodipine-benazepril 5-10mg daily.    Continue to monitor blood pressure at home a few in a week.     Lab scheduled Basic metabolic panel + a1c screening in 2 weeks.     Reduce caffeine intake - patient is going to look for caffeine Mountain Dew to help wean down use.    Follow-up: Return in about 6 weeks (around 8/26/2024) for Medication Therapy Management Visit.    SUBJECTIVE/OBJECTIVE:                          Vladimir Younger is a 44 year old male seen for a follow-up visit.       Reason for visit: blood pressure.    Allergies/ADRs: Reviewed in chart  Past Medical History: Reviewed in chart  Tobacco: He reports that he has never smoked. He has never used smokeless tobacco.  Alcohol: not currently using    Medication Adherence/Access: The patient fills medications at [facility]: NO, fills medications at [facility] mail order pharmacy.    Diabetes and elevated BMI:  Jardiance 25 mg daily  Metformin XR 1000 mg twice daily   Toujeo 18 units daily in the evening (Rx 30 units but reduce due to hypoglycemia during fasting)  Novolog 24-25 units 3 times daily before meals   Ozempic 0.5 mg weekly - 4th dose so far  Glipizide XL 5 mg in the morning   Side effects:  nausea with fullness since starting Ozempic.   Patient was previously on Victoza however, there was concern for family history of MTC however, then there was further question of whether his father had MTC cancer so was referred to endocrinology. Patient met with Dr. Pickard. Patient was started on Ozempic and referred to CDE for additional management as well.   Diet:   Patient reports still working on his diet. He has not been able to cut down the Mountain Dew as he is aware he should, 3-4 cans per day.   Exercise:  No specific exercise routine but has a fishing trip coming up next week.  Current diabetes symptoms: feels bad when blood sugar are high   Blood sugar monitoring: Continuous Glucose Monitor: Freestyle 3           Eye exam is up to date  Foot exam: due    Hypertension   Amlodipine 5 mg daily   Patient reports no current medication side effects  Patient self monitors blood pressure.  Home BP monitoring via wrist cuff  lowest, ~ 132/76's.  Patient consumes minimum 3-4 cans of Mountain Dew per day and then may have Ice tea as well. No coffee consumption.      Hyperlipidemia   atorvastatin 10 mg daily  Patient reports no significant myalgias or other side effects.       Today's Vitals: BP (!) 140/90   Pulse 97   Wt (!) 402 lb 4.8 oz (182.5 kg)   SpO2 96%   BMI 51.65 kg/m    ----------------      I spent 25 minutes with this patient today. All changes were made via collaborative practice agreement with Russel Hsieh MD. A copy of the visit note was provided to the patient's provider(s).    A summary of these recommendations was given to the patient.    Serene Hamm, PharmD  Medication Therapy Management Pharmacist       Medication Therapy Recommendations  Essential hypertension    Current Medication: amLODIPine-benazepril (LOTREL) 5-10 MG capsule   Rationale: Synergistic therapy - Needs additional medication therapy - Indication   Recommendation: Start Medication   Status: Accepted per CPA

## 2024-07-15 NOTE — PATIENT INSTRUCTIONS
"Recommendation(s) from today's visit:                                                      Change amlodipine 5 mg to amlodipine-benazepril 5-10mg daily.     Please continue to reduce your daily caffeine intake.     Keep home monitoring a few times a week.  Goal < 130/80 mm Hg    Follow-up:     My Clinical Pharmacist's contact information:                                                      Serene Hamm, PharmD  Medication therapy management clinical pharmacist    It was great speaking with you today.  I value your experience and would be very thankful for your time in providing feedback in our clinic survey. In the next few days, you may receive an email or text message from "[employer] with a link to a survey related to your  clinical pharmacist.\"     To schedule another MTM appointment, please call the clinic directly [phone] or you may call the MTM scheduling line at [phone].    "

## 2024-07-22 ENCOUNTER — DOCUMENTATION ONLY (OUTPATIENT)
Dept: SLEEP MEDICINE | Facility: CLINIC | Age: 44
End: 2024-07-22
Payer: COMMERCIAL

## 2024-07-22 DIAGNOSIS — G47.33 OBSTRUCTIVE SLEEP APNEA (ADULT) (PEDIATRIC): Primary | ICD-10-CM

## 2024-07-22 NOTE — PROGRESS NOTES
Patient was offered choice of vendor and chose [facility].  Patient Jeffry Younger was set up at [facility]  on July 22, 2024. Patient received a Resmed 10. Pressures were set at  8-16 cm H2O.   Patient s ramp is 5 cm H2O for Auto and FLEX/EPR is EPR, 2.  Patient received a [employer] Mask name: Dreamwear Nasal mask size Large, heated tubing and heated humidifier.  Patient has the following compliance requirements: none.  QAMAR HAHN

## 2024-08-05 ENCOUNTER — LAB (OUTPATIENT)
Dept: LAB | Facility: CLINIC | Age: 44
End: 2024-08-05
Payer: COMMERCIAL

## 2024-08-05 DIAGNOSIS — E11.65 TYPE 2 DIABETES MELLITUS WITH HYPERGLYCEMIA, WITHOUT LONG-TERM CURRENT USE OF INSULIN (H): ICD-10-CM

## 2024-08-05 DIAGNOSIS — I10 ESSENTIAL HYPERTENSION: ICD-10-CM

## 2024-08-05 LAB
ANION GAP SERPL CALCULATED.3IONS-SCNC: 10 MMOL/L (ref 7–15)
BUN SERPL-MCNC: 11.7 MG/DL (ref 6–20)
CALCIUM SERPL-MCNC: 9 MG/DL (ref 8.8–10.4)
CHLORIDE SERPL-SCNC: 106 MMOL/L (ref 98–107)
CREAT SERPL-MCNC: 0.93 MG/DL (ref 0.67–1.17)
EGFRCR SERPLBLD CKD-EPI 2021: >90 ML/MIN/1.73M2
GLUCOSE SERPL-MCNC: 135 MG/DL (ref 70–99)
HBA1C MFR BLD: 7.3 % (ref 0–5.6)
HCO3 SERPL-SCNC: 23 MMOL/L (ref 22–29)
POTASSIUM SERPL-SCNC: 4.4 MMOL/L (ref 3.4–5.3)
SODIUM SERPL-SCNC: 139 MMOL/L (ref 135–145)

## 2024-08-05 PROCEDURE — 36415 COLL VENOUS BLD VENIPUNCTURE: CPT

## 2024-08-05 PROCEDURE — 83036 HEMOGLOBIN GLYCOSYLATED A1C: CPT

## 2024-08-05 PROCEDURE — 80048 BASIC METABOLIC PNL TOTAL CA: CPT

## 2024-08-07 ENCOUNTER — VIRTUAL VISIT (OUTPATIENT)
Dept: EDUCATION SERVICES | Facility: CLINIC | Age: 44
End: 2024-08-07
Payer: COMMERCIAL

## 2024-08-07 DIAGNOSIS — E11.65 TYPE 2 DIABETES MELLITUS WITH HYPERGLYCEMIA, WITHOUT LONG-TERM CURRENT USE OF INSULIN (H): Primary | ICD-10-CM

## 2024-08-07 PROCEDURE — G0108 DIAB MANAGE TRN  PER INDIV: HCPCS | Mod: 93 | Performed by: NUTRITIONIST

## 2024-08-07 NOTE — LETTER
8/7/2024         RE: Jeffry Younger  [address]  [place] [address]        Dear Colleague,    Thank you for referring your patient, Jeffry Younger, to the [facility]. Please see a copy of my visit note below.    Diabetes Self-Management Education & Support    Presents for: Follow-up    Type of Service: Telephone Visit    Originating Location (Patient Location): Home  Distant Location (Provider Location): [facility]  Mode of Communication:  Telephone    Telephone Visit Start Time:  8:30 AM  Telephone Visit End Time (telephone visit stop time): 9:00 AM    How would patient like to obtain AVS? MyChart      ASSESSMENT:  Vladimir is wearing Manolo 3 sensors, see data below.  For the past 28 days he has been in target range 74% of the time, above target range 26% of the time.  Highest readings from 2pm to 5pm.  He reports this is when he is consuming his regular soda.  He has cut back to 2 cans of Mt. Dew per day and made goal to decrease to 1 can in the next month and eventually omit altogether.  He reports that his food portions have decreased significantly.  He met goal of increasing fruit and vegetable intake.  He states that he is still having some nausea with the current dose of Ozempic but would like to increase to 1.0 mg if possible. He has appointment with MTM at end of month, if nausea has resolved recommend to increase to next dose.       Patient's most recent   Lab Results   Component Value Date    A1C 7.3 08/05/2024    A1C 7.4 03/01/2021     is not meeting goal of <7.0    Diabetes knowledge and skills assessment:   Patient is knowledgeable in diabetes management concepts related to: Healthy Eating, Being Active, Monitoring, Taking Medication, Problem Solving, Reducing Risks, and Healthy Coping    Continue education with the following diabetes management concepts: Continuation of Lifestyle Changes     Based on learning assessment  "above, most appropriate setting for further diabetes education would be: Individual setting.      PLAN    1)   2) Work on consuming 60 grams carbohydrate at meals and 15 grams carbohydrate at snacks  3) Continue to maintain 2- 3 fruits and 3 non starchy vegetables per day  4) Increase aerobic activity to 150 minutes per week and add some resistance activity 2 days/week  4) For nausea:  choose lean meats for meals and snacks, keep nuts to 1/4 cup at snacks, use ginger tea, sugar free ginger ale, or ginger candies.        Follow-up: PRN    See Care Plan for co-developed, patient-state behavior change goals.  AVS provided for patient today.    Education Materials Provided:  No new materials provided today      SUBJECTIVE/OBJECTIVE:  Presents for: Follow-up  Accompanied by: Self  Diabetes education in the past 24mo: Yes  Diabetes type: Type 2  Disease course: Stable  How confident are you filling out medical forms by yourself:: Extremely  Diabetes management related comments/concerns: No  Transportation concerns: No  Difficulty affording diabetes medication?: No  Difficulty affording diabetes testing supplies?: No  Other concerns:: None  Cultural Influences/Ethnic Background:  Not  or     Diabetes Symptoms & Complications:  Diabetes Related Symptoms: Fatigue  Weight trend: Stable  Symptom course: Stable  Disease course: Stable       Patient Problem List and Family Medical History reviewed for relevant medical history, current medical status, and diabetes risk factors.    Vitals:  There were no vitals taken for this visit.  Estimated body mass index is 51.65 kg/m  as calculated from the following:    Height as of 6/12/24: 1.88 m (6' 2\").    Weight as of 7/15/24: 182.5 kg (402 lb 4.8 oz).   Last 3 BP:   BP Readings from Last 3 Encounters:   07/15/24 (!) 140/90   06/12/24 (!) 159/98   03/12/24 (!) 159/99       History   Smoking Status     Never   Smokeless Tobacco     Never       Labs:  Lab Results " "  Component Value Date    A1C 7.3 08/05/2024    A1C 7.4 03/01/2021     Lab Results   Component Value Date     08/05/2024     04/18/2022     08/24/2020     Lab Results   Component Value Date    LDL 66 03/11/2024    LDL 57 03/01/2021     HDL Cholesterol   Date Value Ref Range Status   03/01/2021 35 (L) >39 mg/dL Final     Direct Measure HDL   Date Value Ref Range Status   03/11/2024 37 (L) >=40 mg/dL Final   ]  GFR Estimate   Date Value Ref Range Status   08/05/2024 >90 >60 mL/min/1.73m2 Final     Comment:     eGFR calculated using 2021 CKD-EPI equation.   08/24/2020 >90 >60 mL/min/[1.73_m2] Final     Comment:     Non  GFR Calc  Starting 12/18/2018, serum creatinine based estimated GFR (eGFR) will be   calculated using the Chronic Kidney Disease Epidemiology Collaboration   (CKD-EPI) equation.       GFR Estimate If Black   Date Value Ref Range Status   08/24/2020 >90 >60 mL/min/[1.73_m2] Final     Comment:      GFR Calc  Starting 12/18/2018, serum creatinine based estimated GFR (eGFR) will be   calculated using the Chronic Kidney Disease Epidemiology Collaboration   (CKD-EPI) equation.       Lab Results   Component Value Date    CR 0.93 08/05/2024    CR 0.90 08/24/2020     No results found for: \"MICROALBUMIN\"    Healthy Eating:  Healthy Eating Assessed Today: Yes  Cultural/Nondenominational diet restrictions?: No  Do you have any food allergies or intolerances?: No  Meal planning/habits: Low carb, Smaller portions  Who cooks/prepares meals for you?: Spouse, Self  Who purchases food in  your home?: Self, Spouse  How many times a week on average do you eat food made away from home (restaurant/take-out)?: 4  Meals include: Lunch, Dinner, Evening Snack  Breakfast: skips  Lunch: 11 am- 12 pm:  Chili with pinch of cheese & regular soda or green tea  OR chicken breast with buffalo wild wing sauce with small side of rice OR Chicken Soup (no noodles just potatoes)  Dinner: 6 pm:  " Spaghetti with meat sauce OR brats or hamburgers with bun with side salad with vinegarette and air fried french fries  Snacks: buffalo wings OR popcorn OR peanuts  Other: two mt. dews per day  Beverages: Water, Tea, Soda  Has patient met with a dietitian in the past?: Yes    Being Active:  Being Active Assessed Today: Yes  Exercise:: Yes (gym membership, rowing machine and walking)  Days per week of moderate to strenuous exercise (like a brisk walk): 4  On average, minutes per day of exercise at this level: 30  How intense was your typical exercise? : Moderate (like brisk walking)  Exercise Minutes per Week: 120  Barrier to exercise: Time    Monitoring:  Monitoring Assessed Today: Yes  Did patient bring glucose meter to appointment? : Yes  Blood Glucose Meter: FreeStyle CGM  Times checking blood sugar at home (number): 4  Times checking blood sugar at home (per): Day  Blood glucose trend: Decreasing                Taking Medications:  Diabetes Medication(s)       Biguanides       metFORMIN (GLUCOPHAGE XR) 500 MG 24 hr tablet Take 2 tablets (1,000 mg) by mouth 2 times daily (with meals)       Insulin       insulin aspart (NOVOLOG FLEXPEN) 100 UNIT/ML pen Inject 20-28 Units Subcutaneous 3 times daily (before meals) Max up to 84 units per day.     insulin glargine U-300 (TOUJEO) 300 UNIT/ML (1 units dial) pen Inject 30 Units Subcutaneous daily     Patient taking differently: Inject 18 Units subcutaneously daily       Sodium-Glucose Co-Transporter 2 (SGLT2) Inhibitors       empagliflozin (JARDIANCE) 25 MG TABS tablet Take 1 tablet (25 mg) by mouth daily       Sulfonylureas       glipiZIDE (GLUCOTROL XL) 5 MG 24 hr tablet Take 1 tablet (5 mg) by mouth daily       Incretin Mimetic Agents       semaglutide (OZEMPIC) 2 MG/3ML pen Inject 0.5 mg Subcutaneous every 7 days For 4 weeks and follow up            Taking Medication Assessed Today: Yes  Current Treatments: Insulin Injections, Oral Medication (taken by mouth),  Non-insulin Injectables  Dose schedule: Pre-lunch, Pre-dinner, At bedtime  Given by: Patient  Injection/Infusion sites: Abdomen  Problems taking diabetes medications regularly?: No  Diabetes medication side effects?: No    Problem Solving:  Problem Solving Assessed Today: Yes  Is the patient at risk for hypoglycemia?: Yes  Hypoglycemia Frequency: Never  Hypoglycemia Treatment: Juice  Medical ID: No  Does patient have glucagon emergency kit?: No  Is the patient at risk for DKA?: No  Does patient have severe weather/disaster plan for diabetes management?: No  Does patient have sick day plan for diabetes management?: No    Hypoglycemia Symptoms  Hypoglycemia: Sweats, Feeling shaky    Hypoglycemia Complications  Hypoglycemia Complications: None    Reducing Risks:  Reducing Risks Assessed Today: Yes  Diabetes Risks: Hyperlipidemia  CAD Risks: Dyslipidemia, Obesity, Male sex, Diabetes Mellitus  Has dilated eye exam at least once a year?: Yes  Sees dentist every 6 months?: Yes  Feet checked by healthcare provider in the last year?: Yes    Healthy Coping:  Healthy Coping Assessed Today: Yes  Emotional response to diabetes: Ready to learn, Confidence diabetes can be controlled  Informal Support system:: Family, Friends, Spouse  Stage of change: ACTION (Actively working towards change)  Support resources: Websites  Patient Activation Measure Survey Score:      1/20/2020     1:29 AM 12/20/2022    10:32 AM   DILAN Score (Last Two)   DILAN Raw Score 34 28   Activation Score 68.9 50   DILAN Level 3 2         Care Plan and Education Provided:  Healthy Eating: Balanced meals, Carbohydrate Counting, Consistency in amount and timing of carbohydrate intake, and Weight Management, Being Active: Amount recommended (150 minutes moderate or 75 minutes vigorous activity and 2-3 days strength training per week), Reducing Risks: Complications of diabetes, Dental care, Eye care, Foot care, Goal for A1c, how it relates to glucose and how often to  check, and Preventing cardiovascular disease, including blood pressure goals, lipid goals, recommendations for cardioprotective medications, statins, and aspirin, and Healthy Coping: Benefits of making appropriate lifestyle changes    Aaliyah Bangura RD, LD, [facility]     Time Spent: 30 minutes  Encounter Type: Individual    Any diabetes medication dose changes were made via the CDE Protocol per the patient's referring provider. A copy of this encounter was shared with the provider.

## 2024-08-07 NOTE — PROGRESS NOTES
Diabetes Self-Management Education & Support    Presents for: Follow-up    Type of Service: Telephone Visit    Originating Location (Patient Location): Home  Distant Location (Provider Location): [facility]  Mode of Communication:  Telephone    Telephone Visit Start Time:  8:30 AM  Telephone Visit End Time (telephone visit stop time): 9:00 AM    How would patient like to obtain AVS? MyChart      ASSESSMENT:  Vladimir is wearing Manolo 3 sensors, see data below.  For the past 28 days he has been in target range 74% of the time, above target range 26% of the time.  Highest readings from 2pm to 5pm.  He reports this is when he is consuming his regular soda.  He has cut back to 2 cans of Mt. Dew per day and made goal to decrease to 1 can in the next month and eventually omit altogether.  He reports that his food portions have decreased significantly.  He met goal of increasing fruit and vegetable intake.  He states that he is still having some nausea with the current dose of Ozempic but would like to increase to 1.0 mg if possible. He has appointment with MTM at end of month, if nausea has resolved recommend to increase to next dose.       Patient's most recent   Lab Results   Component Value Date    A1C 7.3 08/05/2024    A1C 7.4 03/01/2021     is not meeting goal of <7.0    Diabetes knowledge and skills assessment:   Patient is knowledgeable in diabetes management concepts related to: Healthy Eating, Being Active, Monitoring, Taking Medication, Problem Solving, Reducing Risks, and Healthy Coping    Continue education with the following diabetes management concepts: Continuation of Lifestyle Changes     Based on learning assessment above, most appropriate setting for further diabetes education would be: Individual setting.      PLAN    1)   2) Work on consuming 60 grams carbohydrate at meals and 15 grams carbohydrate at snacks  3) Continue to maintain 2- 3 fruits and 3 non starchy vegetables  "per day  4) Increase aerobic activity to 150 minutes per week and add some resistance activity 2 days/week  4) For nausea:  choose lean meats for meals and snacks, keep nuts to 1/4 cup at snacks, use ginger tea, sugar free ginger ale, or ginger candies.        Follow-up: PRN    See Care Plan for co-developed, patient-state behavior change goals.  AVS provided for patient today.    Education Materials Provided:  No new materials provided today      SUBJECTIVE/OBJECTIVE:  Presents for: Follow-up  Accompanied by: Self  Diabetes education in the past 24mo: Yes  Diabetes type: Type 2  Disease course: Stable  How confident are you filling out medical forms by yourself:: Extremely  Diabetes management related comments/concerns: No  Transportation concerns: No  Difficulty affording diabetes medication?: No  Difficulty affording diabetes testing supplies?: No  Other concerns:: None  Cultural Influences/Ethnic Background:  Not  or     Diabetes Symptoms & Complications:  Diabetes Related Symptoms: Fatigue  Weight trend: Stable  Symptom course: Stable  Disease course: Stable       Patient Problem List and Family Medical History reviewed for relevant medical history, current medical status, and diabetes risk factors.    Vitals:  There were no vitals taken for this visit.  Estimated body mass index is 51.65 kg/m  as calculated from the following:    Height as of 6/12/24: 1.88 m (6' 2\").    Weight as of 7/15/24: 182.5 kg (402 lb 4.8 oz).   Last 3 BP:   BP Readings from Last 3 Encounters:   07/15/24 (!) 140/90   06/12/24 (!) 159/98   03/12/24 (!) 159/99       History   Smoking Status    Never   Smokeless Tobacco    Never       Labs:  Lab Results   Component Value Date    A1C 7.3 08/05/2024    A1C 7.4 03/01/2021     Lab Results   Component Value Date     08/05/2024     04/18/2022     08/24/2020     Lab Results   Component Value Date    LDL 66 03/11/2024    LDL 57 03/01/2021     HDL Cholesterol " "  Date Value Ref Range Status   03/01/2021 35 (L) >39 mg/dL Final     Direct Measure HDL   Date Value Ref Range Status   03/11/2024 37 (L) >=40 mg/dL Final   ]  GFR Estimate   Date Value Ref Range Status   08/05/2024 >90 >60 mL/min/1.73m2 Final     Comment:     eGFR calculated using 2021 CKD-EPI equation.   08/24/2020 >90 >60 mL/min/[1.73_m2] Final     Comment:     Non  GFR Calc  Starting 12/18/2018, serum creatinine based estimated GFR (eGFR) will be   calculated using the Chronic Kidney Disease Epidemiology Collaboration   (CKD-EPI) equation.       GFR Estimate If Black   Date Value Ref Range Status   08/24/2020 >90 >60 mL/min/[1.73_m2] Final     Comment:      GFR Calc  Starting 12/18/2018, serum creatinine based estimated GFR (eGFR) will be   calculated using the Chronic Kidney Disease Epidemiology Collaboration   (CKD-EPI) equation.       Lab Results   Component Value Date    CR 0.93 08/05/2024    CR 0.90 08/24/2020     No results found for: \"MICROALBUMIN\"    Healthy Eating:  Healthy Eating Assessed Today: Yes  Cultural/Voodoo diet restrictions?: No  Do you have any food allergies or intolerances?: No  Meal planning/habits: Low carb, Smaller portions  Who cooks/prepares meals for you?: Spouse, Self  Who purchases food in  your home?: Self, Spouse  How many times a week on average do you eat food made away from home (restaurant/take-out)?: 4  Meals include: Lunch, Dinner, Evening Snack  Breakfast: skips  Lunch: 11 am- 12 pm:  Chili with pinch of cheese & regular soda or green tea  OR chicken breast with buffalo wild wing sauce with small side of rice OR Chicken Soup (no noodles just potatoes)  Dinner: 6 pm:  Spaghetti with meat sauce OR brats or hamburgers with bun with side salad with vinegarette and air fried french fries  Snacks: buffalo wings OR popcorn OR peanuts  Other: two mt. dews per day  Beverages: Water, Tea, Soda  Has patient met with a dietitian in the past?: " Yes    Being Active:  Being Active Assessed Today: Yes  Exercise:: Yes (gym membership, rowing machine and walking)  Days per week of moderate to strenuous exercise (like a brisk walk): 4  On average, minutes per day of exercise at this level: 30  How intense was your typical exercise? : Moderate (like brisk walking)  Exercise Minutes per Week: 120  Barrier to exercise: Time    Monitoring:  Monitoring Assessed Today: Yes  Did patient bring glucose meter to appointment? : Yes  Blood Glucose Meter: FreeStyle, CGM  Times checking blood sugar at home (number): 4  Times checking blood sugar at home (per): Day  Blood glucose trend: Decreasing                Taking Medications:  Diabetes Medication(s)       Biguanides       metFORMIN (GLUCOPHAGE XR) 500 MG 24 hr tablet Take 2 tablets (1,000 mg) by mouth 2 times daily (with meals)       Insulin       insulin aspart (NOVOLOG FLEXPEN) 100 UNIT/ML pen Inject 20-28 Units Subcutaneous 3 times daily (before meals) Max up to 84 units per day.     insulin glargine U-300 (TOUJEO) 300 UNIT/ML (1 units dial) pen Inject 30 Units Subcutaneous daily     Patient taking differently: Inject 18 Units subcutaneously daily       Sodium-Glucose Co-Transporter 2 (SGLT2) Inhibitors       empagliflozin (JARDIANCE) 25 MG TABS tablet Take 1 tablet (25 mg) by mouth daily       Sulfonylureas       glipiZIDE (GLUCOTROL XL) 5 MG 24 hr tablet Take 1 tablet (5 mg) by mouth daily       Incretin Mimetic Agents       semaglutide (OZEMPIC) 2 MG/3ML pen Inject 0.5 mg Subcutaneous every 7 days For 4 weeks and follow up            Taking Medication Assessed Today: Yes  Current Treatments: Insulin Injections, Oral Medication (taken by mouth), Non-insulin Injectables  Dose schedule: Pre-lunch, Pre-dinner, At bedtime  Given by: Patient  Injection/Infusion sites: Abdomen  Problems taking diabetes medications regularly?: No  Diabetes medication side effects?: No    Problem Solving:  Problem Solving Assessed Today:  Yes  Is the patient at risk for hypoglycemia?: Yes  Hypoglycemia Frequency: Never  Hypoglycemia Treatment: Juice  Medical ID: No  Does patient have glucagon emergency kit?: No  Is the patient at risk for DKA?: No  Does patient have severe weather/disaster plan for diabetes management?: No  Does patient have sick day plan for diabetes management?: No    Hypoglycemia Symptoms  Hypoglycemia: Sweats, Feeling shaky    Hypoglycemia Complications  Hypoglycemia Complications: None    Reducing Risks:  Reducing Risks Assessed Today: Yes  Diabetes Risks: Hyperlipidemia  CAD Risks: Dyslipidemia, Obesity, Male sex, Diabetes Mellitus  Has dilated eye exam at least once a year?: Yes  Sees dentist every 6 months?: Yes  Feet checked by healthcare provider in the last year?: Yes    Healthy Coping:  Healthy Coping Assessed Today: Yes  Emotional response to diabetes: Ready to learn, Confidence diabetes can be controlled  Informal Support system:: Family, Friends, Spouse  Stage of change: ACTION (Actively working towards change)  Support resources: Websites  Patient Activation Measure Survey Score:      1/20/2020     1:29 AM 12/20/2022    10:32 AM   DILAN Score (Last Two)   DILAN Raw Score 34 28   Activation Score 68.9 50   DILAN Level 3 2         Care Plan and Education Provided:  Healthy Eating: Balanced meals, Carbohydrate Counting, Consistency in amount and timing of carbohydrate intake, and Weight Management, Being Active: Amount recommended (150 minutes moderate or 75 minutes vigorous activity and 2-3 days strength training per week), Reducing Risks: Complications of diabetes, Dental care, Eye care, Foot care, Goal for A1c, how it relates to glucose and how often to check, and Preventing cardiovascular disease, including blood pressure goals, lipid goals, recommendations for cardioprotective medications, statins, and aspirin, and Healthy Coping: Benefits of making appropriate lifestyle changes    Aaliyah Bangura RD, LD, [facility]     Time  Spent: 30 minutes  Encounter Type: Individual    Any diabetes medication dose changes were made via the CDE Protocol per the patient's referring provider. A copy of this encounter was shared with the provider.

## 2024-08-07 NOTE — PATIENT INSTRUCTIONS
Chandrakant Gilbert,    Here are some things that we discussed today.      Goals for Diabetes Care:    1. Eat 3 balanced meals each day - Monitor carb intake and aim for 45-60 grams per meal  (3-4 carbohydrate choices) and 15 grams carbohydrate (1 carbohydrate choice) per snack (2/day)      Carbohydrate 1 choice = 15 grams      Aim to eat a balanced plate - half your plate fruits/veggies, 1/4 the plate protein and 1/4 plate starch (rice, potato, pasta)      Decrease Mt. Dew to 1 can per day and once successful try to omit entirely from diet       Continue to maintain 2- 3 fruits and 3 non starchy vegetables per day      For nausea:  choose lean meats for meals and snacks, keep nuts to 1/4 cup at snacks, use ginger tea, sugar free ginger ale, or ginger candies.      2. Check blood sugars  multiple times each day at varying times   Blood Glucose Targets:   1. Fasting and before meal target is 80 - 130 mg/dl   2. 2 hours after a meal target is < 180 mg/dl  Always remember to bring meter and/or log book to all appointments.    3. Activity really helps improve blood sugars.   Increase aerobic activity to 150 minutes per week and add some resistance activity 2 days/week     4. Treating low BG. Rule of 15 = BG 50-70 mg/dL = 15 gram carb (4 oz juice, 4 glucose tabs, OR 4 oz pop), then recheck BG in 15 minutes. If still low repeat. (If BG <50 mg/dL = 8 oz pop/juice or 8 glucose tabs).    5. Take diabetes medications as prescribed   -Ozempic 0.5 mg once weekly  -Novolog 15-16 units at meals (try to take 15 minutes prior to eating)  -Toujeo 13 units once daily  -Glipizide ER 5 mg once daily AM  -Jardiance 25 mg once daily AM  -Metformin  mg x 2 twice daily with meals    Follow up with your Diabetic Educator to assess BG targets and need for modifications to medications and/or lifestyle.    Call with any questions.  Thank you!  Aaliyah Bangura, RD, LD, [facility]   Certified Diabetes Care &   [facility]  [phone] or [facility] [phone]

## 2024-08-26 ENCOUNTER — OFFICE VISIT (OUTPATIENT)
Dept: PHARMACY | Facility: CLINIC | Age: 44
End: 2024-08-26
Payer: COMMERCIAL

## 2024-08-26 VITALS
HEART RATE: 98 BPM | WEIGHT: 315 LBS | SYSTOLIC BLOOD PRESSURE: 132 MMHG | OXYGEN SATURATION: 97 % | DIASTOLIC BLOOD PRESSURE: 78 MMHG | BODY MASS INDEX: 51.19 KG/M2

## 2024-08-26 DIAGNOSIS — I10 ESSENTIAL HYPERTENSION: Primary | ICD-10-CM

## 2024-08-26 DIAGNOSIS — E66.01 MORBID OBESITY WITH BMI OF 50.0-59.9, ADULT (H): ICD-10-CM

## 2024-08-26 DIAGNOSIS — E11.65 TYPE 2 DIABETES MELLITUS WITH HYPERGLYCEMIA, WITHOUT LONG-TERM CURRENT USE OF INSULIN (H): ICD-10-CM

## 2024-08-26 PROCEDURE — 99607 MTMS BY PHARM ADDL 15 MIN: CPT | Performed by: PHARMACIST

## 2024-08-26 PROCEDURE — 99606 MTMS BY PHARM EST 15 MIN: CPT | Performed by: PHARMACIST

## 2024-08-26 RX ORDER — BLOOD-GLUCOSE SENSOR
1 EACH MISCELLANEOUS
Qty: 6 EACH | Refills: 11 | Status: SHIPPED | OUTPATIENT
Start: 2024-08-26

## 2024-08-26 RX ORDER — INSULIN ASPART 100 [IU]/ML
15-18 INJECTION, SOLUTION INTRAVENOUS; SUBCUTANEOUS
Qty: 45 ML | Refills: 0 | Status: SHIPPED | OUTPATIENT
Start: 2024-08-26

## 2024-08-26 RX ORDER — FLURBIPROFEN SODIUM 0.3 MG/ML
SOLUTION/ DROPS OPHTHALMIC
Qty: 300 EACH | Refills: 1 | Status: SHIPPED | OUTPATIENT
Start: 2024-08-26

## 2024-08-26 RX ORDER — ONDANSETRON 4 MG/1
4 TABLET, ORALLY DISINTEGRATING ORAL EVERY 8 HOURS PRN
Qty: 10 TABLET | Refills: 0 | Status: SHIPPED | OUTPATIENT
Start: 2024-08-26

## 2024-08-26 NOTE — PROGRESS NOTES
Medication Therapy Management (MTM) Encounter    ASSESSMENT:                            Medication Adherence/Access: No issues identified    Diabetes and elevated BMI   Time in Range not at goal, but improved. Recommend he may trial dose increase in semaglutide and would stress importance of changing diet to reduce side effects with dose increase. Patient to monitor for hypoglycemia and decrease insulin by 10-20% if any lows occur.   Patient should continue with CDE and endocrinology.    Hypertension  Blood pressure improving, nearly to goal < 130/80 mm Hg. Patient to work on reducing caffeine intake and weight loss (above with Ozempic and dietary changes).      PLAN:                            Refill Zofran as needed, only for severe nausea.     Increase Ozempic to 1 mg weekly after finishing up Ozempic 0.5 mg pen.  If over night low, then decrease Toujeo by 10-20%   If lows after a meal, then decrease the Novolog by 10-20%.      Continue to monitor blood pressure at home periodically.  Work on reducing to 4-6 caffeine drinks in the 1 month, then down 2-4 would be the next goal.      Follow-up: November we will see what your blood pressure is with Dr. Pickard if not at goal (<130/80) then recommend increase in benazepril dose and then follow-up with pharmacist visit 2 weeks after. Otherwise, follow-up in Feb with Serene    SUBJECTIVE/OBJECTIVE:                          Vladimir Younger is a 44 year old male seen for a follow-up visit.       Reason for visit: follow-up on blood pressure check and wondering about Ozempic dose.    Allergies/ADRs: Reviewed in chart  Past Medical History: Reviewed in chart  Tobacco: He reports that he has never smoked. He has never used smokeless tobacco.  Alcohol: Less than 1 beverage / month    Medication Adherence/Access: no issues reported    Diabetes and elevated BMI  Jardiance 25 mg daily  Metformin XR 1000 mg twice daily   Toujeo 18 units daily in the evening  Novolog 15-18 times  "daily before meals   Ozempic 0.5 mg weekly   Glipizide XL 5 mg in the morning   Side effects:  He reports nausea seldom only when really hot outside. He is wondering about refill of ondansetron. He experiences more nausea after Ozempic dose adjustment. Denies any emesis. He reports normal bowel movement.   Patient was previously on Victoza however, there was concern for family history of MTC however, then there was further question of whether his father had MTC cancer so was referred to endocrinology. Patient met with Dr. Pickard and was started on Ozempic and referred to CDE for additional management as well. Patient had follow-up on 8/7. Patient has follow-up with Dr. Pickard 11/11/2024.  Diet:   Patient reports still working on his diet. He is still working on cutting down his Mt Dew to 2 cans per day with goal to reduce to 1 can per day next month. Currently drinking about 3-4 per day still.   Advised by CDE -   1) 60 grams carbohydrate at meals and 15 grams carbohydrate at snacks   2) maintain 2- 3 fruits and 3 non starchy vegetables per day   Exercise: No specific exercise routine .  Current diabetes symptoms: feels bad when blood sugar are high   Blood sugar monitoring: Continuous Glucose Monitor: Freestyle 3         Eye exam is up to date  Foot exam: due, patient reports he does check his feet at home, no concerns currently.   Wt Readings from Last 4 Encounters:   08/26/24 (!) 398 lb 11.2 oz (180.8 kg)   07/15/24 (!) 402 lb 4.8 oz (182.5 kg)   06/12/24 (!) 399 lb 12.8 oz (181.3 kg)   05/07/24 (!) 390 lb (176.9 kg)     Estimated body mass index is 51.19 kg/m  as calculated from the following:    Height as of 6/12/24: 6' 2\" (1.88 m).    Weight as of this encounter: 398 lb 11.2 oz (180.8 kg).      Hypertension   Amlodipine - benazepril 5-10 mg daily   Patient reports current medication side effects: none  Last MTM visit added benazepril. He has noticed improvement in home blood pressure. Home BP monitoring " via wrist cuff ~128/81's.   Patient consumes minimum 3-4 cans of Mountain Dew per day and then may have 2-4 Ice teas as well (with caffeine). No coffee consumption.   Seldom takes oral NSAIDs.        Today's Vitals: /78   Pulse 98   Wt (!) 398 lb 11.2 oz (180.8 kg)   SpO2 97%   BMI 51.19 kg/m    ----------------    I spent 30 minutes with this patient today. All changes were made via collaborative practice agreement with Russel Hsieh MD. A copy of the visit note was provided to the patient's provider(s).    A summary of these recommendations was given to the patient.    Serene Hamm, PharmD  Medication Therapy Management Pharmacist       Medication Therapy Recommendations  Type 2 diabetes mellitus with hyperglycemia, without long-term current use of insulin (H)    Current Medication: Semaglutide, 1 MG/DOSE, (OZEMPIC) 4 MG/3ML pen   Rationale: Dose too low - Dosage too low - Effectiveness   Recommendation: Increase Dose   Status: Accepted per CPA

## 2024-08-26 NOTE — PATIENT INSTRUCTIONS
"Recommendation(s) from today's visit:                                                      If over night low, then decrease Toujeo by 10-20%     If lows after a meal, then decrease the Novolog by 10-20%.     Work on reducing to 4-6 caffeine drinks in the 1 month, then down 2-4 would be the next.     Follow-up: November we will see what your blood pressure is with Dr. Pickard if not at goal recommend increase in benazepril dose and then follow-up with pharmacist visit 2 weeks after. Otherwise, follow-up in Feb with Serene Caldwell Clinical Pharmacist's contact information:                                                      Serene Hamm, PharmD  Medication therapy management clinical pharmacist    It was great speaking with you today.  I value your experience and would be very thankful for your time in providing feedback in our clinic survey. In the next few days, you may receive an email or text message from [employer] with a link to a survey related to your  clinical pharmacist.\"     To schedule another MTM appointment, please call the clinic directly [phone] or you may call the MTM scheduling line at [phone].    "

## 2024-10-02 ENCOUNTER — MYC MEDICAL ADVICE (OUTPATIENT)
Dept: PHARMACY | Facility: CLINIC | Age: 44
End: 2024-10-02
Payer: COMMERCIAL

## 2024-10-02 DIAGNOSIS — I10 ESSENTIAL HYPERTENSION: ICD-10-CM

## 2024-10-16 ENCOUNTER — MYC REFILL (OUTPATIENT)
Dept: PHARMACY | Facility: CLINIC | Age: 44
End: 2024-10-16
Payer: COMMERCIAL

## 2024-10-16 DIAGNOSIS — I10 ESSENTIAL HYPERTENSION: ICD-10-CM

## 2024-10-16 DIAGNOSIS — E11.65 TYPE 2 DIABETES MELLITUS WITH HYPERGLYCEMIA, WITHOUT LONG-TERM CURRENT USE OF INSULIN (H): ICD-10-CM

## 2024-10-16 RX ORDER — FLURBIPROFEN SODIUM 0.3 MG/ML
SOLUTION/ DROPS OPHTHALMIC
Qty: 300 EACH | Refills: 3 | Status: SHIPPED | OUTPATIENT
Start: 2024-10-16

## 2024-10-16 RX ORDER — PEN NEEDLE, DIABETIC 31 GX3/16"
NEEDLE, DISPOSABLE MISCELLANEOUS
OUTPATIENT
Start: 2024-10-16

## 2024-10-17 RX ORDER — AMLODIPINE AND BENAZEPRIL HYDROCHLORIDE 5; 10 MG/1; MG/1
1 CAPSULE ORAL DAILY
Qty: 90 CAPSULE | Refills: 0 | Status: SHIPPED | OUTPATIENT
Start: 2024-10-17

## 2024-10-17 RX ORDER — AMLODIPINE AND BENAZEPRIL HYDROCHLORIDE 5; 10 MG/1; MG/1
1 CAPSULE ORAL DAILY
Qty: 90 CAPSULE | Refills: 3 | Status: SHIPPED | OUTPATIENT
Start: 2024-10-17

## 2024-10-18 ENCOUNTER — TELEPHONE (OUTPATIENT)
Dept: PEDIATRICS | Facility: CLINIC | Age: 44
End: 2024-10-18
Payer: COMMERCIAL

## 2024-10-18 NOTE — TELEPHONE ENCOUNTER
Prior Authorization Retail Medication Request    Medication/Dose: insulin aspart (NOVOLOG FLEXPEN) 100 UNIT/ML pen  Diagnosis and ICD code (if different than what is on RX):  E11.65  New/renewal/insurance change PA/secondary ins. PA:  Previously Tried and Failed:  NA  Rationale:  Patient needs for insulin     Insurance   Primary: [employer]  Insurance ID:    [id number]     Secondary (if applicable):NA  Insurance ID:  CARLYLE    Pharmacy Information (if different than what is on RX)  Name:  [facility]   Phone:  [phone]  Fax:[phone]    Clinic Information  Preferred routing pool for dept communication: [facility]

## 2024-10-22 NOTE — TELEPHONE ENCOUNTER
Prior Authorization Not Needed per Insurance    Medication: insulin aspart (NOVOLOG FLEXPEN) 100 UNIT/ML pen  Insurance Company: [employer] - Phone [phone] Fax [phone]  Expected CoPay:      Pharmacy Filling the Rx: [facility] - [place], [place] - [address] AT [facility]  Pharmacy Notified:  Yes  Patient Notified:  **Instructed pharmacy to notify patient when script is ready to /ship.**

## 2024-10-28 NOTE — PROGRESS NOTES
Outcome for 10/28/24 2:43 PM: Data uploaded on [employer]  Isha Rausch MA  Outcome for 11/07/24 12:55 PM: Data obtained via [employer] website  Bobbilynn Grossaint, VF  Outcome for 11/07/24 12:58 PM: [employer] emailed to provider  Bobbilynn Grossaint, VF

## 2024-11-11 ENCOUNTER — VIRTUAL VISIT (OUTPATIENT)
Dept: ENDOCRINOLOGY | Facility: CLINIC | Age: 44
End: 2024-11-11
Payer: COMMERCIAL

## 2024-11-11 DIAGNOSIS — E11.65 TYPE 2 DIABETES MELLITUS WITH HYPERGLYCEMIA, WITHOUT LONG-TERM CURRENT USE OF INSULIN (H): Primary | ICD-10-CM

## 2024-11-11 DIAGNOSIS — E78.5 HYPERLIPIDEMIA LDL GOAL <100: ICD-10-CM

## 2024-11-11 DIAGNOSIS — I10 ESSENTIAL HYPERTENSION: ICD-10-CM

## 2024-11-11 PROCEDURE — 99214 OFFICE O/P EST MOD 30 MIN: CPT | Mod: 95 | Performed by: INTERNAL MEDICINE

## 2024-11-11 PROCEDURE — G2211 COMPLEX E/M VISIT ADD ON: HCPCS | Mod: 95 | Performed by: INTERNAL MEDICINE

## 2024-11-11 PROCEDURE — 95251 CONT GLUC MNTR ANALYSIS I&R: CPT | Mod: 95 | Performed by: INTERNAL MEDICINE

## 2024-11-11 ASSESSMENT — PAIN SCALES - GENERAL: PAINLEVEL_OUTOF10: NO PAIN (0)

## 2024-11-11 NOTE — PATIENT INSTRUCTIONS
[facility]  Dr Pickard, Endocrinology Department    [facility]   [address] [facility]  [place] [address]  Appointment [phone]  Fax: [phone]  [place]: Monday - Thursday      To provide the best diabetic care, please bring your blood glucose meter to each and every visit with your Endocrinologist.  Your blood glucose meter/insulin pump will be downloaded at every appointment.    Please arrive 15 minutes before your scheduled appointment.  This will allow for your blood glucose meter/insulin pump to be downloaded.  If you are wearing DEXCOM please bring  or sharing code so that it can be downloaded.  If you are using freestyle judy personal sensors please bring the reader.  If you are using TANDEM insulin pump please have your username and password to get info from [employer] website.    Labs needed.  Continue metformin XR 1000 mg twice a day  Continue glipizide XL 5 mg/day  Continue Jardinace 25 mg/day  Increase Ozempic to 2.0 mg/week  Decrease Toujeo to 18 units/day.  If you are having low blood sugar episodes overnight and then continue to decrease by 2 units every few days.  Continue Novolog at current dose.  Continue to use judy 3.  Please check with your insurance if judy 3+ is covered.  If you need a prescription please inform us.    Follow up with me or Kamini GRAY in 3-4 months with labs prior.  Repeat labs and follow up with  in 4-5 month after that.  Please make a lab appointment for blood work and follow up clinic appointment in 1 week after that to discuss results.    Recommend checking blood sugars before meals and at bedtime.    If Blood glucose are low more often-> 2-3 times/week- give us a call.  Make better food choices: reduce carbs, Reduce portion size, weight loss and exercise 3-4 times a week.    What is hypoglycemia:  Hypoglycemia is when blood sugar levels become too low - below 70 m/dl.      What causes  hypoglycemia?  - using too much insulin  -taking too many diabetes pills  -not eating enough, or skipping meals or snacks  -not eating enough carbohydrate with meals  -changing your exercise routine  -drinking alcohol in excess    It is also possible to have hypoglycemia even when you are carefully managing your blood sugar levels.    What does it feel like when blood sugars get too low?  You may feel:  - anxious  -confused  -dizzy  -hungry  -light-headed  -nervous  -shaky  -sleepy  -sweaty    You may have  -blurred or cloudy vision  -heart palpitations (heart skips a beat or races)  -tingling or numbness around the mouth and tongue  -tremors    What to do if you have symptoms of hypoglycmemia:  If you think your blood sugar is too low, check it with a glucose meter.  If its below 70 mg/dl, consume one of the following:  Fruit juice (1/2 cup)  Glucose tablets (15 grams)  Hard candy (5 to 7 pieces)  Honey or sugar (2 teaspoons)  Milk (1/2 cup)  Soft drink (non-diet, 1/2 cup)    Wait 15 minutes and check your blood glucose again.  IF it is still below 70 mg/dl, have another food item listed above. Wait another 15 minutes and repeat the blood glucose test.  Have a small meal or snack that contains some carbohydrate after your blood glucose rises above 70 mg/dl.    If you are at risk of hypoglycemia, always carry with you glucose tablets or one of the foods listed above.      To prevent Hypoglycemia:  Avoid situations that may cause hypoglycemia  Before making any change to your diet or exercise routine, discuss them with your healthcare provider  Keep a record of your blood glucose levels.  Include the time of day, diabetes medications, when you had your last meal or snack, and what you were doing at the time (e.g. Watching TV, gardening, jogging, etc).    Talk to your healthcare provider if your blood glucose levels are often low        Patient guide on  hypoglycemia    [email or web address]/[email or web address]-diagnosis-[email or web address]-[email or web address]

## 2024-11-11 NOTE — PROGRESS NOTES
"THIS IS A VIDEO VISIT:    Phone call visit/virtual visit encounter:    Name of patient: Jeffry Younger    Date of encounter: 11/11/2024    Time of start of video visit: 4:00    Video started: 4:09    Video ended: 4:20    Provider location: working from home/ [facility]    Patient location: patients home.    Mode of transmission: Extreme [employer] video/ [employer]    Verbal consent: obtained before starting visit. Pt is agreeable.      The patient has been notified of following:      \"This VIDEO visit will be conducted via a call between you and your physician/provider. We have found that certain health care needs can be provided without the need for a physical exam.  This service lets us provide the care you need with a short phone conversation.  If a prescription is necessary we can send it directly to your pharmacy.  If lab work is needed we can place an order for that and you can then stop by our lab to have the test done at a later time.     With new updates with corona virus patient might be billed as clinic visit.     If during the course of the call the physician/provider feels a telephone visit is not appropriate, you will not be charged for this service.\"      Past medical history, social history, family history, allergy and medications were reviewed and updated as appropriate.  Reviewed pertinent labs, notes, imaging studies personally.    ENDOCRINOLOGY CLINIC NOTE:  Name: Jeffry Younger  Seen in consultation with Russel Hsieh for type 2 Diabetes.  HPI:  Jeffry Younger is a 44 year old male who presents for the evaluation/management of Diabetes.   has a past medical history of Diabetes (H) (July 2018), Essential hypertension (6/13/2024), and Obesity.    He was on Victoza and it was helping with BG.  His father was diagnosed with thyroid growth? and he stopped taking Victoza  out of caution.  He has never been on insulin pump. His wife has type 1 DM.  In December 2023 noted higher A1c and following that " glipizide was restarted.  A1c improved after that    Ozempic: It was started in early 2024 and he is tolerating okay.  Father was diagnosed with thyroid cancer.  He had thyroid ultrasound done which showed 3 subcentimeter thyroid nodule.    Using judy 3.    + lost 15 lbs since starting Ozempic.  Wt Readings from Last 2 Encounters:   08/26/24 (!) 180.8 kg (398 lb 11.2 oz)   07/15/24 (!) 182.5 kg (402 lb 4.8 oz)       1. Type 2 DM:  Orginally diagnosed in 2015  Current Regimen:   Metformin XR 1000 mg twice a day  Glipizide XL 5 mg/day  Jardinace 25 mg/day  Ozempic 1.0 mg/week  Toujeo 20 units/day  Novolog 24/24 before brunch and supper  (Mostly estimate)    yes:     Diabetes Medication(s)       Biguanides       metFORMIN (GLUCOPHAGE XR) 500 MG 24 hr tablet Take 2 tablets (1,000 mg) by mouth 2 times daily (with meals)       Insulin       insulin aspart (NOVOLOG FLEXPEN) 100 UNIT/ML pen Inject 15-18 Units subcutaneously 3 times daily (before meals). Max 50 units per day. Hold until patient requests.     insulin glargine U-300 (TOUJEO) 300 UNIT/ML (1 units dial) pen Inject 18 Units subcutaneously daily. Hold until patient request       Sodium-Glucose Co-Transporter 2 (SGLT2) Inhibitors       empagliflozin (JARDIANCE) 25 MG TABS tablet Take 1 tablet (25 mg) by mouth daily       Sulfonylureas       glipiZIDE (GLUCOTROL XL) 5 MG 24 hr tablet Take 1 tablet (5 mg) by mouth daily       Incretin Mimetic Agents       Semaglutide, 1 MG/DOSE, (OZEMPIC) 4 MG/3ML pen Inject 1 mg subcutaneously every 7 days.            BS checks: Using judy  Average Meter Download: 143  Symptoms of hypoglycemia (low blood sugar):  Gets symptoms of hypoglycemia.  Episodes of hypoglycemia:     DM Complications:   Complications:   Diabetes Complications  Description / Detail    Diabetic Retinopathy  No   CAD / PAD  No   Neuropathy  No   Nephropathy / Microalbuminuria  No  Lab Results   Component Value Date    UMALCR 11.29 03/11/2024    UMALCR 6.47  04/18/2022    UMALCR 18.63 03/01/2021         Gastroparesis  No   Hypoglycemia [place]  No       PMH/PSH:  Past Medical History:   Diagnosis Date    Diabetes (H) July 2018    Treating metformin    Essential hypertension 6/13/2024    Obesity      Past Surgical History:   Procedure Laterality Date    AS ESOPHAGOSCOPY, DIAGNOSTIC      EGD    BIOPSY  2008    egd normal     Family Hx:  Family History   Problem Relation Age of Onset    Diabetes Father     Hypertension Father     Other Cancer Father         Kidney & Thyroid    Cerebrovascular Disease Paternal Grandfather     Other Cancer Brother         Lukemia    Other Cancer Maternal Grandmother         Pancreatic    Other Cancer Maternal Grandfather         Lung - smoker    Colon Cancer No family hx of     Prostate Cancer No family hx of     Cerebrovascular Disease No family hx of     Coronary Artery Disease No family hx of        Diabetes:    Social Hx:  Social History     Socioeconomic History    Marital status:      Spouse name: Not on file    Number of children: Not on file    Years of education: Not on file    Highest education level: Professional school degree (e.g., MD, DDS, DVM, SMITA)   Occupational History    Not on file   Tobacco Use    Smoking status: Never    Smokeless tobacco: Never   Vaping Use    Vaping status: Never Used   Substance and Sexual Activity    Alcohol use: Not Currently    Drug use: No    Sexual activity: Yes     Partners: Female     Birth control/protection: Abstinence, Pull-out method, Condom   Other Topics Concern    Parent/sibling w/ CABG, MI or angioplasty before 65F 55M? No   Social History Narrative    Not on file     Social Drivers of Health     Financial Resource Strain: Low Risk  (12/27/2023)    Financial Resource Strain     Within the past 12 months, have you or your family members you live with been unable to get utilities (heat, electricity) when it was really needed?: No   Food Insecurity: Low Risk  (12/27/2023)    Food  Insecurity     Within the past 12 months, did you worry that your food would run out before you got money to buy more?: No     Within the past 12 months, did the food you bought just not last and you didn t have money to get more?: No   Transportation Needs: Low Risk  (12/27/2023)    Transportation Needs     Within the past 12 months, has lack of transportation kept you from medical appointments, getting your medicines, non-medical meetings or appointments, work, or from getting things that you need?: No   Physical Activity: Insufficiently Active (12/20/2022)    Exercise Vital Sign     Days of Exercise per Week: 2 days     Minutes of Exercise per Session: 20 min   Stress: No Stress Concern Present (12/20/2022)    [employer] [place] [employer] - Occupational Stress Questionnaire     Feeling of Stress : Only a little   Social Connections: Socially Integrated (12/20/2022)    Social Connection and Isolation Panel [NHANES]     Frequency of Communication with Friends and Family: Three times a week     Frequency of Social Gatherings with Friends and Family: Twice a week     Attends Buddhism Services: 1 to 4 times per year     Active Member of Clubs or Organizations: Yes     Attends Club or Organization Meetings: Not on file     Marital Status:    Interpersonal Safety: Low Risk  (12/28/2023)    Interpersonal Safety     Do you feel physically and emotionally safe where you currently live?: Yes     Within the past 12 months, have you been hit, slapped, kicked or otherwise physically hurt by someone?: No     Within the past 12 months, have you been humiliated or emotionally abused in other ways by your partner or ex-partner?: No   Housing Stability: Low Risk  (12/27/2023)    Housing Stability     Do you have housing? : Yes     Are you worried about losing your housing?: No          MEDICATIONS:  has a current medication list which includes the following prescription(s): accu-chek guide,  amlodipine-benazepril, amlodipine-benazepril, atorvastatin, blood glucose monitoring, freestyle judy 3 sensor, empagliflozin, glipizide, novolog flexpen, insulin glargine u-300, ulticare mini, metformin, ondansetron, and semaglutide (1 mg/dose).    ROS     ROS: 10 point ROS neg other than the symptoms noted above in the HPI.    Physical Exam   VS: There were no vitals taken for this visit.  GENERAL: healthy, alert and no distress  EYES: Eyes grossly normal to inspection, conjunctivae and sclerae normal  ENT: no nose swelling, nasal discharge.  Thyroid: no apparent thyroid nodules  RESP: no audible wheeze, cough, or visible cyanosis.  No visible retractions or increased work of breathing.  Able to speak fully in complete sentences.  ABDO: not evaluated.  EXTREMITIES: no hand tremors.  NEURO: Cranial nerves grossly intact, mentation intact and speech normal  SKIN: No apparent skin lesions, rash or edema seen   PSYCH: mentation appears normal, affect normal/bright, judgement and insight intact, normal speech and appearance well-groomed      LABS:  A1c:  Lab Results   Component Value Date    A1C 7.3 08/05/2024    A1C 7.6 03/11/2024    A1C 6.9 12/28/2023    A1C 7.1 08/07/2023    A1C 7.7 04/17/2023    A1C 7.4 03/01/2021    A1C 8.8 12/29/2020    A1C 8.8 08/24/2020    A1C 10.4 01/22/2020    A1C 7.4 06/11/2019     Creatinine:  Creatinine   Date Value Ref Range Status   08/05/2024 0.93 0.67 - 1.17 mg/dL Final   08/24/2020 0.90 0.66 - 1.25 mg/dL Final     Urine Micro:  Lab Results   Component Value Date    UMALCR 11.29 03/11/2024    UMALCR 6.47 04/18/2022    UMALCR 18.63 03/01/2021      LFTs/Lipids:  Recent Labs   Lab Test 03/11/24  1013 12/28/23  1057   CHOL 123 125   HDL 37* 36*   LDL 66 70   TRIG 99 93     TFTs:  TSH   Date Value Ref Range Status   11/15/2021 0.95 0.40 - 4.00 mU/L Final   06/11/2019 1.42 0.40 - 4.00 mU/L Final         All pertinent notes, labs, and images personally reviewed by me.     Glucometer/ insulin  pump (if applicable)/ CGM data (if applicable) downloaded, Personally reviewed and interpreted.  All Blood sugar data reviewed personally and discussed with pt.  See nursing note from 5/7/2024 for details of BG/CGM log.      A/P  Mr.Jeffrey NARA Younger is a 44 year old here for the evaluation/management of diabetes:    1. DM2 - Under Good control.  A1c 7.3%.  Blood sugar levels are improving since starting glipizide and mostly labs are in acceptable range based on judy data.  No major episodes of hypoglycemia noted or reported.  When he was on Victoza in the past, it was also helping with weight loss. (Stopped it out of caution as his father was diagnosed with thyroid cancer)  Of note, FH of thyroid cancer in father. Thyroid US showed 3 subcm nodules.  Currently is on Ozempic and tolerating well.  He is using judy 3.  Average blood sugar is 143 with TIR 79%.  No major episodes of hypoglycemia noted or reported.  Plan:  Discussed diagnosis, pathophysiology, management and treatment options of condition with pt.  I also discussed importance of strict blood sugar control to prevent complications associated with uncontrolled diabetes.  Labs needed.  Continue metformin XR 1000 mg twice a day  Continue glipizide XL 5 mg/day  Continue Jardinace 25 mg/day  Increase Ozempic to 2.0 mg/week  Decrease Toujeo to 18 units/day.  If you are having low blood sugar episodes overnight and then continue to decrease by 2 units every few days. (In the setting of strictly controlled blood sugar overnight)  Continue Novolog at current dose.  Continue to use judy 3.  Please check with your insurance if judy 3+ is covered.  If you need a prescription please inform us.    Follow up with me or Kamini GRAY in 3-4 months with labs prior.  Repeat labs and follow up with  in 4-5 month after that.  Please make a lab appointment for blood work and follow up clinic appointment in 1 week after that to discuss results.     2. Hypertension  -not on medication?    3. Hyperlipidemia - Under Good control.  On atorvastatin 10 mg/day.  LDL 66.    4. Prevention:  Opthalmology-recommend annually  ASA-no secondary to age  Smoking-no    Most Recent Immunizations   Administered Date(s) Administered    COVID-19 12+ (MODERNA) 09/10/2024    COVID-19 Bivalent 18+ (Moderna) 10/11/2022    COVID-19 Monovalent 18+ (Moderna) 11/08/2021    Flu, Unspecified 09/15/2021    Hepatitis A (ADULT 19+) 02/22/2017    Hepatitis B, Adult 08/11/2004    Influenza (H1N1) 12/31/2009    Influenza (IIV3) PF 09/14/2021    Influenza Vaccine >6 months,quad, PF 09/18/2023    Influenza, seasonal, injectable, PF 11/30/2011    Influenza,INJ,MDCK,PF,Quad >6mo(Flucelvax) 10/11/2022    Meningococcal (Menomune ) 08/11/2004    Meningococcal ACWY (Menactra ) 02/22/2017    Pneumococcal 20 valent Conjugate (Prevnar 20) 12/22/2022    Pneumococcal 23 valent 09/07/2018    TDAP (Adacel,Boostrix) 01/03/2024    TDAP Vaccine (Adacel) 02/01/2015    Twinrix A/B 09/29/2015    Typhoid Oral 01/03/2024    Yellow Fever 02/22/2017     Plan: Semaglutide, 2 MG/DOSE, (OZEMPIC) 8 MG/3ML pen,        Hemoglobin A1c, GLUCOSE MONITOR, 72 HOUR, PHYS         INTERP, Albumin Random Urine Quantitative with         Creat Ratio, Creatinine, Hemoglobin A1c       Recommend checking blood sugars before meals and at bedtime.    If Blood glucose are low more often-> 2-3 times/week- give us a call.  The patient is advised to Make better food choices: reduce carbs, Reduce portion size, weight loss and exercise 3-4 times a week.  Discussed hypoglycemia signs and symptoms as well as management in detail.    There is some variability among people, most will usually develop symptoms suggestive of hypoglycemia when blood glucose levels are lowered to the mid 60's. The first set of symptoms are called adrenergic. Patients may experience any of the following nervousness, sweating, intense hunger, trembling, weakness, palpitations, and difficulty  speaking.   The acute management of hypoglycemia involves the rapid delivery of a source of easily absorbed sugar. Regular soda, juice, lifesavers, table sugar, are good options. 15 grams of glucose is the dose that is given, followed by an assessment of symptoms and a blood glucose check if possible. If after 10 minutes there is no improvement, another 10-15 grams should be given. This can be repeated up to three times. The equivalency of 10-15 grams of glucose (approximate servings) are: Four lifesavers, 4 teaspoons of sugar, or 1/2 can of regular soda or juice.     Discussed indications, risks and benefits of all medications prescribed, and answered questions to patient's satisfaction.  The longitudinal plan of care for the diagnosis(es)/condition(s) as documented were addressed during this visit. Due to the added complexity in care, I will continue to support Vladimir in the subsequent management and with ongoing continuity of care.  All questions were answered.  The patient indicates understanding of the above issues and agrees with the plan set forth.     Follow-up:  As noted in AVS    Jocelynn Pickard M.D  Endocrinology  [facility]  CC: Russel Hsieh    Disclaimer: This note consists of symbols derived from keyboarding, dictation and/or voice recognition software. As a result, there may be errors in the script that have gone undetected. Please consider this when interpreting information found in this chart.    Addendum to above note and clinic visit:    Labs reviewed.    See result note/telephone encounter.

## 2024-11-11 NOTE — LETTER
"11/11/2024      Jeffry Younger  [address]  [place] [address]      Dear Colleague,    Thank you for referring your patient, Jeffry Younger, to the [facility]. Please see a copy of my visit note below.    Outcome for 10/28/24 2:43 PM: Data uploaded on [employer]  Isha Rausch MA  Outcome for 11/07/24 12:55 PM: Data obtained via [employer] website  Bobbilynn Grossaint, VF  Outcome for 11/07/24 12:58 PM: Manolo emailed to provider  Bobbilynn Grossaint, VF        THIS IS A VIDEO VISIT:    Phone call visit/virtual visit encounter:    Name of patient: Jeffry Younger    Date of encounter: 11/11/2024    Time of start of video visit: 4:00    Video started: 4:09    Video ended: 4:20    Provider location: working from home/ [facility]    Patient location: patients home.    Mode of transmission: [employer] video/ Scaled Inference    Verbal consent: obtained before starting visit. Pt is agreeable.      The patient has been notified of following:      \"This VIDEO visit will be conducted via a call between you and your physician/provider. We have found that certain health care needs can be provided without the need for a physical exam.  This service lets us provide the care you need with a short phone conversation.  If a prescription is necessary we can send it directly to your pharmacy.  If lab work is needed we can place an order for that and you can then stop by our lab to have the test done at a later time.     With new updates with corona virus patient might be billed as clinic visit.     If during the course of the call the physician/provider feels a telephone visit is not appropriate, you will not be charged for this service.\"      Past medical history, social history, family history, allergy and medications were reviewed and updated as appropriate.  Reviewed pertinent labs, notes, imaging studies personally.    ENDOCRINOLOGY CLINIC NOTE:  Name: Jeffry Younger  Seen in consultation with Russel Hsieh " Konrad for type 2 Diabetes.  HPI:  Jeffry Younger is a 44 year old male who presents for the evaluation/management of Diabetes.   has a past medical history of Diabetes (H) (July 2018), Essential hypertension (6/13/2024), and Obesity.    He was on Victoza and it was helping with BG.  His father was diagnosed with thyroid growth? and he stopped taking Victoza  out of caution.  He has never been on insulin pump. His wife has type 1 DM.  In December 2023 noted higher A1c and following that glipizide was restarted.  A1c improved after that    Ozempic: It was started in early 2024 and he is tolerating okay.  Father was diagnosed with thyroid cancer.  He had thyroid ultrasound done which showed 3 subcentimeter thyroid nodule.    Using judy 3.    + lost 15 lbs since starting Ozempic.  Wt Readings from Last 2 Encounters:   08/26/24 (!) 180.8 kg (398 lb 11.2 oz)   07/15/24 (!) 182.5 kg (402 lb 4.8 oz)       1. Type 2 DM:  Orginally diagnosed in 2015  Current Regimen:   Metformin XR 1000 mg twice a day  Glipizide XL 5 mg/day  Jardinace 25 mg/day  Ozempic 1.0 mg/week  Toujeo 20 units/day  Novolog 24/24 before fadi and lyric  (Mostly estimate)    yes:     Diabetes Medication(s)       Biguanides       metFORMIN (GLUCOPHAGE XR) 500 MG 24 hr tablet Take 2 tablets (1,000 mg) by mouth 2 times daily (with meals)       Insulin       insulin aspart (NOVOLOG FLEXPEN) 100 UNIT/ML pen Inject 15-18 Units subcutaneously 3 times daily (before meals). Max 50 units per day. Hold until patient requests.     insulin glargine U-300 (TOUJEO) 300 UNIT/ML (1 units dial) pen Inject 18 Units subcutaneously daily. Hold until patient request       Sodium-Glucose Co-Transporter 2 (SGLT2) Inhibitors       empagliflozin (JARDIANCE) 25 MG TABS tablet Take 1 tablet (25 mg) by mouth daily       Sulfonylureas       glipiZIDE (GLUCOTROL XL) 5 MG 24 hr tablet Take 1 tablet (5 mg) by mouth daily       Incretin Mimetic Agents       Semaglutide, 1 MG/DOSE,  (OZEMPIC) 4 MG/3ML pen Inject 1 mg subcutaneously every 7 days.            BS checks: Using judy  Average Meter Download: 143  Symptoms of hypoglycemia (low blood sugar):  Gets symptoms of hypoglycemia.  Episodes of hypoglycemia:     DM Complications:   Complications:   Diabetes Complications  Description / Detail    Diabetic Retinopathy  No   CAD / PAD  No   Neuropathy  No   Nephropathy / Microalbuminuria  No  Lab Results   Component Value Date    UMALCR 11.29 03/11/2024    UMALCR 6.47 04/18/2022    UMALCR 18.63 03/01/2021         Gastroparesis  No   Hypoglycemia Unawarness  No       PMH/PSH:  Past Medical History:   Diagnosis Date     Diabetes (H) July 2018    Treating metformin     Essential hypertension 6/13/2024     Obesity      Past Surgical History:   Procedure Laterality Date     AS ESOPHAGOSCOPY, DIAGNOSTIC      EGD     BIOPSY  2008    egd normal     Family Hx:  Family History   Problem Relation Age of Onset     Diabetes Father      Hypertension Father      Other Cancer Father         Kidney & Thyroid     Cerebrovascular Disease Paternal Grandfather      Other Cancer Brother         Lukemia     Other Cancer Maternal Grandmother         Pancreatic     Other Cancer Maternal Grandfather         Lung - smoker     Colon Cancer No family hx of      Prostate Cancer No family hx of      Cerebrovascular Disease No family hx of      Coronary Artery Disease No family hx of        Diabetes:    Social Hx:  Social History     Socioeconomic History     Marital status:      Spouse name: Not on file     Number of children: Not on file     Years of education: Not on file     Highest education level: Professional school degree (e.g., MD, DDS, DVM, SMITA)   Occupational History     Not on file   Tobacco Use     Smoking status: Never     Smokeless tobacco: Never   Vaping Use     Vaping status: Never Used   Substance and Sexual Activity     Alcohol use: Not Currently     Drug use: No     Sexual activity: Yes     Partners:  Female     Birth control/protection: Abstinence, Pull-out method, Condom   Other Topics Concern     Parent/sibling w/ CABG, MI or angioplasty before 65F 55M? No   Social History Narrative     Not on file     Social Drivers of Health     Financial Resource Strain: Low Risk  (12/27/2023)    Financial Resource Strain      Within the past 12 months, have you or your family members you live with been unable to get utilities (heat, electricity) when it was really needed?: No   Food Insecurity: Low Risk  (12/27/2023)    Food Insecurity      Within the past 12 months, did you worry that your food would run out before you got money to buy more?: No      Within the past 12 months, did the food you bought just not last and you didn t have money to get more?: No   Transportation Needs: Low Risk  (12/27/2023)    Transportation Needs      Within the past 12 months, has lack of transportation kept you from medical appointments, getting your medicines, non-medical meetings or appointments, work, or from getting things that you need?: No   Physical Activity: Insufficiently Active (12/20/2022)    Exercise Vital Sign      Days of Exercise per Week: 2 days      Minutes of Exercise per Session: 20 min   Stress: No Stress Concern Present (12/20/2022)    [employer] [place] [employer] - Occupational Stress Questionnaire      Feeling of Stress : Only a little   Social Connections: Socially Integrated (12/20/2022)    Social Connection and Isolation Panel [NHANES]      Frequency of Communication with Friends and Family: Three times a week      Frequency of Social Gatherings with Friends and Family: Twice a week      Attends [employer] Services: 1 to 4 times per year      Active Member of Clubs or Organizations: Yes      Attends Club or Organization Meetings: Not on file      Marital Status:    Interpersonal Safety: Low Risk  (12/28/2023)    Interpersonal Safety      Do you feel physically and emotionally safe where you  currently live?: Yes      Within the past 12 months, have you been hit, slapped, kicked or otherwise physically hurt by someone?: No      Within the past 12 months, have you been humiliated or emotionally abused in other ways by your partner or ex-partner?: No   Housing Stability: Low Risk  (12/27/2023)    Housing Stability      Do you have housing? : Yes      Are you worried about losing your housing?: No          MEDICATIONS:  has a current medication list which includes the following prescription(s): accu-chek guide, amlodipine-benazepril, amlodipine-benazepril, atorvastatin, blood glucose monitoring, freestyle judy 3 sensor, empagliflozin, glipizide, novolog flexpen, insulin glargine u-300, ulticare mini, metformin, ondansetron, and semaglutide (1 mg/dose).    ROS     ROS: 10 point ROS neg other than the symptoms noted above in the HPI.    Physical Exam   VS: There were no vitals taken for this visit.  GENERAL: healthy, alert and no distress  EYES: Eyes grossly normal to inspection, conjunctivae and sclerae normal  ENT: no nose swelling, nasal discharge.  Thyroid: no apparent thyroid nodules  RESP: no audible wheeze, cough, or visible cyanosis.  No visible retractions or increased work of breathing.  Able to speak fully in complete sentences.  ABDO: not evaluated.  EXTREMITIES: no hand tremors.  NEURO: Cranial nerves grossly intact, mentation intact and speech normal  SKIN: No apparent skin lesions, rash or edema seen   PSYCH: mentation appears normal, affect normal/bright, judgement and insight intact, normal speech and appearance well-groomed      LABS:  A1c:  Lab Results   Component Value Date    A1C 7.3 08/05/2024    A1C 7.6 03/11/2024    A1C 6.9 12/28/2023    A1C 7.1 08/07/2023    A1C 7.7 04/17/2023    A1C 7.4 03/01/2021    A1C 8.8 12/29/2020    A1C 8.8 08/24/2020    A1C 10.4 01/22/2020    A1C 7.4 06/11/2019     Creatinine:  Creatinine   Date Value Ref Range Status   08/05/2024 0.93 0.67 - 1.17 mg/dL Final    08/24/2020 0.90 0.66 - 1.25 mg/dL Final     Urine Micro:  Lab Results   Component Value Date    UMALCR 11.29 03/11/2024    UMALCR 6.47 04/18/2022    UMALCR 18.63 03/01/2021      LFTs/Lipids:  Recent Labs   Lab Test 03/11/24  1013 12/28/23  1057   CHOL 123 125   HDL 37* 36*   LDL 66 70   TRIG 99 93     TFTs:  TSH   Date Value Ref Range Status   11/15/2021 0.95 0.40 - 4.00 mU/L Final   06/11/2019 1.42 0.40 - 4.00 mU/L Final         All pertinent notes, labs, and images personally reviewed by me.     Glucometer/ insulin pump (if applicable)/ CGM data (if applicable) downloaded, Personally reviewed and interpreted.  All Blood sugar data reviewed personally and discussed with pt.  See nursing note from 5/7/2024 for details of BG/CGM log.      A/P  Mr.Jeffrey NARA Younger is a 44 year old here for the evaluation/management of diabetes:    1. DM2 - Under Good control.  A1c 7.3%.  Blood sugar levels are improving since starting glipizide and mostly labs are in acceptable range based on judy data.  No major episodes of hypoglycemia noted or reported.  When he was on Victoza in the past, it was also helping with weight loss. (Stopped it out of caution as his father was diagnosed with thyroid cancer)  Of note, FH of thyroid cancer in father. Thyroid US showed 3 subcm nodules.  Currently is on Ozempic and tolerating well.  He is using judy 3.  Average blood sugar is 143 with TIR 79%.  No major episodes of hypoglycemia noted or reported.  Plan:  Discussed diagnosis, pathophysiology, management and treatment options of condition with pt.  I also discussed importance of strict blood sugar control to prevent complications associated with uncontrolled diabetes.  Labs needed.  Continue metformin XR 1000 mg twice a day  Continue glipizide XL 5 mg/day  Continue Jardinace 25 mg/day  Increase Ozempic to 2.0 mg/week  Decrease Toujeo to 18 units/day.  If you are having low blood sugar episodes overnight and then continue to decrease by 2  units every few days. (In the setting of strictly controlled blood sugar overnight)  Continue Novolog at current dose.  Continue to use judy 3.  Please check with your insurance if judy 3+ is covered.  If you need a prescription please inform us.    Follow up with me or Kamini GRAY in 3-4 months with labs prior.  Repeat labs and follow up with  in 4-5 month after that.  Please make a lab appointment for blood work and follow up clinic appointment in 1 week after that to discuss results.     2. Hypertension -not on medication?    3. Hyperlipidemia - Under Good control.  On atorvastatin 10 mg/day.  LDL 66.    4. Prevention:  Opthalmology-recommend annually  ASA-no secondary to age  Smoking-no    Most Recent Immunizations   Administered Date(s) Administered     COVID-19 12+ (MODERNA) 09/10/2024     COVID-19 Bivalent 18+ (Moderna) 10/11/2022     COVID-19 Monovalent 18+ (Moderna) 11/08/2021     Flu, Unspecified 09/15/2021     Hepatitis A (ADULT 19+) 02/22/2017     Hepatitis B, Adult 08/11/2004     Influenza (H1N1) 12/31/2009     Influenza (IIV3) PF 09/14/2021     Influenza Vaccine >6 months,quad, PF 09/18/2023     Influenza, seasonal, injectable, PF 11/30/2011     Influenza,INJ,MDCK,PF,Quad >6mo(Flucelvax) 10/11/2022     Meningococcal (Menomune ) 08/11/2004     Meningococcal ACWY (Menactra ) 02/22/2017     Pneumococcal 20 valent Conjugate (Prevnar 20) 12/22/2022     Pneumococcal 23 valent 09/07/2018     TDAP (Adacel,Boostrix) 01/03/2024     TDAP Vaccine (Adacel) 02/01/2015     Twinrix A/B 09/29/2015     Typhoid Oral 01/03/2024     Yellow Fever 02/22/2017     Plan: Semaglutide, 2 MG/DOSE, (OZEMPIC) 8 MG/3ML pen,        Hemoglobin A1c, GLUCOSE MONITOR, 72 HOUR, PHYS         INTERP, Albumin Random Urine Quantitative with         Creat Ratio, Creatinine, Hemoglobin A1c       Recommend checking blood sugars before meals and at bedtime.    If Blood glucose are low more often-> 2-3 times/week- give us a  call.  The patient is advised to Make better food choices: reduce carbs, Reduce portion size, weight loss and exercise 3-4 times a week.  Discussed hypoglycemia signs and symptoms as well as management in detail.    There is some variability among people, most will usually develop symptoms suggestive of hypoglycemia when blood glucose levels are lowered to the mid 60's. The first set of symptoms are called adrenergic. Patients may experience any of the following nervousness, sweating, intense hunger, trembling, weakness, palpitations, and difficulty speaking.   The acute management of hypoglycemia involves the rapid delivery of a source of easily absorbed sugar. Regular soda, juice, lifesavers, table sugar, are good options. 15 grams of glucose is the dose that is given, followed by an assessment of symptoms and a blood glucose check if possible. If after 10 minutes there is no improvement, another 10-15 grams should be given. This can be repeated up to three times. The equivalency of 10-15 grams of glucose (approximate servings) are: Four lifesavers, 4 teaspoons of sugar, or 1/2 can of regular soda or juice.     Discussed indications, risks and benefits of all medications prescribed, and answered questions to patient's satisfaction.  The longitudinal plan of care for the diagnosis(es)/condition(s) as documented were addressed during this visit. Due to the added complexity in care, I will continue to support Vladimir in the subsequent management and with ongoing continuity of care.  All questions were answered.  The patient indicates understanding of the above issues and agrees with the plan set forth.     Follow-up:  As noted in AVS    Jocelynn Pickard M.D  Endocrinology  [facility]  CC: Russel Hsieh    Disclaimer: This note consists of symbols derived from keyboarding, dictation and/or voice recognition software. As a result, there may be errors in the script that have gone undetected. Please  consider this when interpreting information found in this chart.    Addendum to above note and clinic visit:    Labs reviewed.    See result note/telephone encounter.           Again, thank you for allowing me to participate in the care of your patient.        Sincerely,        Jocelynn Pickard MD

## 2024-11-11 NOTE — NURSING NOTE
Current patient location: [address] [place] [address]    Is the patient currently in the state of [place]? YES    Visit mode:VIDEO    If the visit is dropped, the patient can be reconnected by:VIDEO VISIT: Text to cell phone:   Telephone Information:   Mobile [phone]       Will anyone else be joining the visit? NO  (If patient encounters technical issues they should call [phone]    Are changes needed to the allergy or medication list? No    Are refills needed on medications prescribed by this physician? NO    Rooming Documentation:  Questionnaire(s) not done per department protocol    Reason for visit: RECHECK Shelby Kocher VVF

## 2024-11-18 ENCOUNTER — MYC REFILL (OUTPATIENT)
Dept: PHARMACY | Facility: CLINIC | Age: 44
End: 2024-11-18

## 2024-11-18 DIAGNOSIS — E11.65 TYPE 2 DIABETES MELLITUS WITH HYPERGLYCEMIA, WITHOUT LONG-TERM CURRENT USE OF INSULIN (H): ICD-10-CM

## 2024-12-15 ENCOUNTER — MYC REFILL (OUTPATIENT)
Dept: PEDIATRICS | Facility: CLINIC | Age: 44
End: 2024-12-15
Payer: COMMERCIAL

## 2024-12-15 ENCOUNTER — MYC REFILL (OUTPATIENT)
Dept: PHARMACY | Facility: CLINIC | Age: 44
End: 2024-12-15
Payer: COMMERCIAL

## 2024-12-15 DIAGNOSIS — E11.65 TYPE 2 DIABETES MELLITUS WITH HYPERGLYCEMIA, WITHOUT LONG-TERM CURRENT USE OF INSULIN (H): ICD-10-CM

## 2024-12-15 DIAGNOSIS — E78.5 HYPERLIPIDEMIA LDL GOAL <100: ICD-10-CM

## 2024-12-16 RX ORDER — ONDANSETRON 4 MG/1
4 TABLET, ORALLY DISINTEGRATING ORAL EVERY 8 HOURS PRN
Qty: 10 TABLET | Refills: 0 | Status: SHIPPED | OUTPATIENT
Start: 2024-12-16

## 2024-12-16 RX ORDER — ATORVASTATIN CALCIUM 10 MG/1
10 TABLET, FILM COATED ORAL DAILY
Qty: 90 TABLET | Refills: 0 | Status: SHIPPED | OUTPATIENT
Start: 2024-12-16

## 2024-12-16 RX ORDER — GLIPIZIDE 5 MG/1
5 TABLET, FILM COATED, EXTENDED RELEASE ORAL DAILY
Qty: 90 TABLET | Refills: 0 | Status: SHIPPED | OUTPATIENT
Start: 2024-12-16

## 2024-12-28 SDOH — HEALTH STABILITY: PHYSICAL HEALTH: ON AVERAGE, HOW MANY MINUTES DO YOU ENGAGE IN EXERCISE AT THIS LEVEL?: 20 MIN

## 2024-12-28 SDOH — HEALTH STABILITY: PHYSICAL HEALTH: ON AVERAGE, HOW MANY DAYS PER WEEK DO YOU ENGAGE IN MODERATE TO STRENUOUS EXERCISE (LIKE A BRISK WALK)?: 3 DAYS

## 2024-12-28 ASSESSMENT — SOCIAL DETERMINANTS OF HEALTH (SDOH): HOW OFTEN DO YOU GET TOGETHER WITH FRIENDS OR RELATIVES?: TWICE A WEEK

## 2024-12-30 ENCOUNTER — OFFICE VISIT (OUTPATIENT)
Dept: PEDIATRICS | Facility: CLINIC | Age: 44
End: 2024-12-30
Attending: INTERNAL MEDICINE
Payer: COMMERCIAL

## 2024-12-30 VITALS
OXYGEN SATURATION: 96 % | BODY MASS INDEX: 40.43 KG/M2 | WEIGHT: 315 LBS | DIASTOLIC BLOOD PRESSURE: 80 MMHG | HEIGHT: 74 IN | HEART RATE: 99 BPM | TEMPERATURE: 98 F | RESPIRATION RATE: 16 BRPM | SYSTOLIC BLOOD PRESSURE: 122 MMHG

## 2024-12-30 DIAGNOSIS — E66.01 MORBID OBESITY WITH BMI OF 50.0-59.9, ADULT (H): ICD-10-CM

## 2024-12-30 DIAGNOSIS — Z00.00 ROUTINE GENERAL MEDICAL EXAMINATION AT A HEALTH CARE FACILITY: Primary | ICD-10-CM

## 2024-12-30 DIAGNOSIS — F41.1 GAD (GENERALIZED ANXIETY DISORDER): ICD-10-CM

## 2024-12-30 DIAGNOSIS — E78.5 HYPERLIPIDEMIA LDL GOAL <100: ICD-10-CM

## 2024-12-30 DIAGNOSIS — G47.33 OSA (OBSTRUCTIVE SLEEP APNEA): ICD-10-CM

## 2024-12-30 DIAGNOSIS — I10 ESSENTIAL HYPERTENSION: ICD-10-CM

## 2024-12-30 DIAGNOSIS — E11.65 TYPE 2 DIABETES MELLITUS WITH HYPERGLYCEMIA, WITHOUT LONG-TERM CURRENT USE OF INSULIN (H): ICD-10-CM

## 2024-12-30 LAB
ALBUMIN SERPL BCG-MCNC: 4.2 G/DL (ref 3.5–5.2)
ALP SERPL-CCNC: 108 U/L (ref 40–150)
ALT SERPL W P-5'-P-CCNC: 29 U/L (ref 0–70)
ANION GAP SERPL CALCULATED.3IONS-SCNC: 11 MMOL/L (ref 7–15)
AST SERPL W P-5'-P-CCNC: 27 U/L (ref 0–45)
BILIRUB SERPL-MCNC: 0.7 MG/DL
BUN SERPL-MCNC: 15.1 MG/DL (ref 6–20)
CALCIUM SERPL-MCNC: 9.5 MG/DL (ref 8.8–10.4)
CHLORIDE SERPL-SCNC: 106 MMOL/L (ref 98–107)
CHOLEST SERPL-MCNC: 121 MG/DL
CREAT SERPL-MCNC: 0.9 MG/DL (ref 0.67–1.17)
EGFRCR SERPLBLD CKD-EPI 2021: >90 ML/MIN/1.73M2
EST. AVERAGE GLUCOSE BLD GHB EST-MCNC: 137 MG/DL
FASTING STATUS PATIENT QL REPORTED: ABNORMAL
FASTING STATUS PATIENT QL REPORTED: ABNORMAL
GLUCOSE SERPL-MCNC: 126 MG/DL (ref 70–99)
HBA1C MFR BLD: 6.4 % (ref 0–5.6)
HCO3 SERPL-SCNC: 22 MMOL/L (ref 22–29)
HDLC SERPL-MCNC: 38 MG/DL
LDLC SERPL CALC-MCNC: 59 MG/DL
NONHDLC SERPL-MCNC: 83 MG/DL
POTASSIUM SERPL-SCNC: 4.5 MMOL/L (ref 3.4–5.3)
PROT SERPL-MCNC: 7.4 G/DL (ref 6.4–8.3)
SODIUM SERPL-SCNC: 139 MMOL/L (ref 135–145)
TRIGL SERPL-MCNC: 120 MG/DL

## 2024-12-30 PROCEDURE — 99214 OFFICE O/P EST MOD 30 MIN: CPT | Mod: 25 | Performed by: INTERNAL MEDICINE

## 2024-12-30 PROCEDURE — 80053 COMPREHEN METABOLIC PANEL: CPT | Performed by: INTERNAL MEDICINE

## 2024-12-30 PROCEDURE — 83036 HEMOGLOBIN GLYCOSYLATED A1C: CPT | Performed by: INTERNAL MEDICINE

## 2024-12-30 PROCEDURE — 36415 COLL VENOUS BLD VENIPUNCTURE: CPT | Performed by: INTERNAL MEDICINE

## 2024-12-30 PROCEDURE — 80061 LIPID PANEL: CPT | Performed by: INTERNAL MEDICINE

## 2024-12-30 PROCEDURE — 99396 PREV VISIT EST AGE 40-64: CPT | Performed by: INTERNAL MEDICINE

## 2024-12-30 RX ORDER — METFORMIN HYDROCHLORIDE 500 MG/1
1000 TABLET, EXTENDED RELEASE ORAL 2 TIMES DAILY WITH MEALS
Qty: 360 TABLET | Refills: 3 | Status: SHIPPED | OUTPATIENT
Start: 2024-12-30

## 2024-12-30 RX ORDER — AMLODIPINE BESYLATE 10 MG/1
10 TABLET ORAL DAILY
Qty: 90 TABLET | Refills: 3 | Status: SHIPPED | OUTPATIENT
Start: 2024-12-30

## 2024-12-30 RX ORDER — ATORVASTATIN CALCIUM 10 MG/1
10 TABLET, FILM COATED ORAL DAILY
Qty: 90 TABLET | Refills: 3 | Status: SHIPPED | OUTPATIENT
Start: 2024-12-30

## 2024-12-30 RX ORDER — GLIPIZIDE 5 MG/1
5 TABLET, FILM COATED, EXTENDED RELEASE ORAL DAILY
Qty: 90 TABLET | Refills: 0 | Status: CANCELLED | OUTPATIENT
Start: 2024-12-30

## 2024-12-30 RX ORDER — INSULIN ASPART 100 [IU]/ML
18-20 INJECTION, SOLUTION INTRAVENOUS; SUBCUTANEOUS 2 TIMES DAILY WITH MEALS
Qty: 45 ML | Refills: 3 | Status: SHIPPED | OUTPATIENT
Start: 2024-12-30

## 2024-12-30 ASSESSMENT — PAIN SCALES - GENERAL: PAINLEVEL_OUTOF10: NO PAIN (0)

## 2024-12-30 NOTE — PROGRESS NOTES
"Preventive Care Visit  [facility] SYEDA Hsieh MD, Internal Medicine - Pediatrics  Dec 30, 2024      Assessment & Plan     (Z00.00) Routine general medical examination at a health care facility  (primary encounter diagnosis)    (E11.65) Type 2 diabetes mellitus with hyperglycemia, without long-term current use of insulin (H)  Comment:   Plan: empagliflozin (JARDIANCE) 25 MG TABS tablet,         insulin aspart (NOVOLOG FLEXPEN) 100 UNIT/ML         pen, insulin glargine U-300 (TOUJEO) 300         UNIT/ML (1 units dial) pen, metFORMIN         (GLUCOPHAGE XR) 500 MG 24 hr tablet,         Semaglutide, 2 MG/DOSE, (OZEMPIC) 8 MG/3ML pen,        Hemoglobin A1c, Lipid panel reflex to direct         LDL Fasting, Comprehensive metabolic panel (BMP        + Alb, Alk Phos, ALT, AST, Total. Bili, TP)             Continued weight loss on semaglutide/ has been able to reduce mountain dew intake from 4-5 cans to 1-2 cans daily.  Morning blood sugars:100-110, evening blood sugars 110-120.  Discontinue glipizide.  Reduce toujeo to 15units daily.   Following up with MTM    (I10) Essential hypertension  Comment: daytime drowsiness since benazepril added/ symptoms resolved for 2 days when he stopped it.  Discontinue benazepril, continue amlodipine.  Pt will return for rn bp check 2 weeks/ if bp above goal likely add ARB  Plan: amLODIPine (NORVASC) 10 MG tablet          (E78.5) Hyperlipidemia LDL goal <100  Comment: well controlled-- Continue current medication.   Plan: atorvastatin (LIPITOR) 10 MG tablet          (E66.01,  Z68.43) obesity, severe  Comment:   Plan: continue semaglutide    (G47.33) PAOLA (obstructive sleep apnea)  Comment:   Plan: continue cpap      BMI  Estimated body mass index is 49.17 kg/m  as calculated from the following:    Height as of this encounter: 1.88 m (6' 2\").    Weight as of this encounter: 173.7 kg (383 lb).       Counseling  Appropriate preventive services were addressed with this " patient via screening, questionnaire, or discussion as appropriate for fall prevention, nutrition, physical activity, Tobacco-use cessation, social engagement, weight loss and cognition.  Checklist reviewing preventive services available has been given to the patient.  Reviewed patient's diet, addressing concerns and/or questions.   He is at risk for lack of exercise and has been provided with information to increase physical activity for the benefit of his well-being.           Rohini Gilbert is a 44 year old, presenting for the following:  Physical        12/30/2024    10:10 AM   Additional Questions   Roomed by Anna OLIIVA CMA   Accompanied by self         12/30/2024    10:10 AM   Patient Reported Additional Medications   Patient reports taking the following new medications n/a          HPI      Health Care Directive  Patient does not have a Health Care Directive: Discussed advance care planning with patient; however, patient declined at this time.      12/28/2024   General Health   How would you rate your overall physical health? Good   Feel stress (tense, anxious, or unable to sleep) Only a little   (!) STRESS CONCERN      12/28/2024   Nutrition   Three or more servings of calcium each day? Yes   Diet: Diabetic    Breakfast skipped   How many servings of fruit and vegetables per day? (!) 2-3   How many sweetened beverages each day? (!) 3       Multiple values from one day are sorted in reverse-chronological order         12/28/2024   Exercise   Days per week of moderate/strenous exercise 3 days   Average minutes spent exercising at this level 20 min         12/28/2024   Social Factors   Frequency of gathering with friends or relatives Twice a week   Worry food won't last until get money to buy more No   Food not last or not have enough money for food? No   Do you have housing? (Housing is defined as stable permanent housing and does not include staying ouside in a car, in a tent, in an abandoned building, in  an overnight shelter, or couch-surfing.) Yes   Are you worried about losing your housing? No   Lack of transportation? No   Unable to get utilities (heat,electricity)? No         12/28/2024   Dental   Dentist two times every year? Yes         12/28/2024   TB Screening   Were you born outside of the [place]? No           Today's PHQ-2 Score:       11/11/2024     3:39 PM   PHQ-2 ( 1999 [employer])   Q1: Little interest or pleasure in doing things 0   Q2: Feeling down, depressed or hopeless 0   PHQ-2 Score 0         12/28/2024   Substance Use   Alcohol more than 3/day or more than 7/wk No   Do you use any other substances recreationally? No     Social History     Tobacco Use    Smoking status: Never     Passive exposure: Never    Smokeless tobacco: Never   Vaping Use    Vaping status: Never Used   Substance Use Topics    Alcohol use: Yes     Comment: 1 per month maybe    Drug use: No           12/28/2024   STI Screening   New sexual partner(s) since last STI/HIV test? No   ASCVD Risk   The ASCVD Risk score (Shreya SANDOVAL, et al., 2019) failed to calculate for the following reasons:    The valid total cholesterol range is 130 to 320 mg/dL        12/28/2024   Contraception/Family Planning   Questions about contraception or family planning No        Reviewed and updated as needed this visit by Provider                    Patient Active Problem List   Diagnosis    Morbid obesity with BMI of 50.0-59.9, adult (H)    PAOLA (obstructive sleep apnea)    Type 2 diabetes mellitus with hyperglycemia, without long-term current use of insulin (H)    Hyperlipidemia LDL goal <100    ADAMA (generalized anxiety disorder)    Family history of thyroid cancer    Essential hypertension     Past Surgical History:   Procedure Laterality Date    AS ESOPHAGOSCOPY, DIAGNOSTIC      EGD    BIOPSY  2008    egd normal       Social History     Tobacco Use    Smoking status: Never     Passive exposure: Never    Smokeless tobacco: Never   Substance Use Topics     Alcohol use: Yes     Comment: 1 per month maybe     Family History   Problem Relation Age of Onset    Diabetes Father     Hypertension Father     Other Cancer Father         Kidney & Thyroid    Cerebrovascular Disease Paternal Grandfather     Other Cancer Brother         Lukemia    Other Cancer Maternal Grandmother         Pancreatic    Other Cancer Maternal Grandfather         Lung - smoker    Colon Cancer No family hx of     Prostate Cancer No family hx of     Cerebrovascular Disease No family hx of     Coronary Artery Disease No family hx of          Current Outpatient Medications   Medication Sig Dispense Refill    ACCU-CHEK GUIDE test strip TEST TWICE DAILY AS DIRECTED 200 strip 1    amLODIPine (NORVASC) 10 MG tablet Take 1 tablet (10 mg) by mouth daily. 90 tablet 3    atorvastatin (LIPITOR) 10 MG tablet Take 1 tablet (10 mg) by mouth daily. 90 tablet 3    blood glucose monitoring (ACCU-CHEK FASTCLIX) lancets 1 each daily 102 each 3    Continuous Glucose Sensor (FREESTYLE KULWANT 3 SENSOR) MISC 1 each every 14 days. 6 each 11    empagliflozin (JARDIANCE) 25 MG TABS tablet Take 1 tablet (25 mg) by mouth daily. 90 tablet 3    insulin aspart (NOVOLOG FLEXPEN) 100 UNIT/ML pen Inject 18-20 Units subcutaneously 2 times daily (with meals). Max 50 units per day. Hold until patient requests. 45 mL 3    insulin glargine U-300 (TOUJEO) 300 UNIT/ML (1 units dial) pen Inject 15 Units subcutaneously daily. 15 mL 3    insulin pen needle (ULTICARE MINI) 31G X 6 MM miscellaneous Use 3-4 pen needles daily or as directed. 300 each 3    metFORMIN (GLUCOPHAGE XR) 500 MG 24 hr tablet Take 2 tablets (1,000 mg) by mouth 2 times daily (with meals). 360 tablet 3    ondansetron (ZOFRAN ODT) 4 MG ODT tab Take 1 tablet (4 mg) by mouth every 8 hours as needed for nausea or vomiting. 10 tablet 0    Semaglutide, 2 MG/DOSE, (OZEMPIC) 8 MG/3ML pen Inject 2 mg subcutaneously every 7 days. 9 mL 3         Review of Systems  Constitutional, neuro,  "ENT, endocrine, pulmonary, cardiac, gastrointestinal, genitourinary, musculoskeletal, integument and psychiatric systems are negative, except as otherwise noted.     Objective    Exam  /80   Pulse 99   Temp 98  F (36.7  C) (Temporal)   Resp 16   Ht 1.88 m (6' 2\")   Wt (!) 173.7 kg (383 lb)   SpO2 96%   BMI 49.17 kg/m     Estimated body mass index is 49.17 kg/m  as calculated from the following:    Height as of this encounter: 1.88 m (6' 2\").    Weight as of this encounter: 173.7 kg (383 lb).    Physical Exam  GENERAL: alert and no distress  EYES: Eyes grossly normal to inspection, PERRL and conjunctivae and sclerae normal  HENT: ear canals and TM's normal, nose and mouth without ulcers or lesions  NECK: no adenopathy, no asymmetry, masses, or scars  RESP: lungs clear to auscultation - no rales, rhonchi or wheezes  CV: regular rate and rhythm, normal S1 S2, no S3 or S4, no murmur, click or rub, no peripheral edema  ABDOMEN: soft, nontender, no hepatosplenomegaly, no masses and bowel sounds normal  MS: no gross musculoskeletal defects noted, no edema  SKIN: no suspicious lesions or rashes  NEURO: Normal strength and tone, mentation intact and speech normal  PSYCH: mentation appears normal, affect normal/bright      Signed Electronically by: Russel Hsieh MD    "

## 2024-12-30 NOTE — PATIENT INSTRUCTIONS
1) stop glipizide.   Reduce Toujeo to 15units daily.   Followup with Serene  2) stop Lotrel.   Start amlodipine 10mg daily.   Nurse BP check 2 weeks  3) follow-up visit 6 months  Preventive Care Advice   This is general advice given by our system to help you stay healthy. However, your care team may have specific advice just for you. Please talk to your care team about your preventive care needs.  Nutrition  Eat 5 or more servings of fruits and vegetables each day.  Try wheat bread, brown rice and whole grain pasta (instead of white bread, rice, and pasta).  Get enough calcium and vitamin D. Check the label on foods and aim for 100% of the RDA (recommended daily allowance).  Lifestyle  Exercise at least 150 minutes each week  (30 minutes a day, 5 days a week).  Do muscle strengthening activities 2 days a week. These help control your weight and prevent disease.  No smoking.  Wear sunscreen to prevent skin cancer.  Have a dental exam and cleaning every 6 months.  Yearly exams  See your health care team every year to talk about:  Any changes in your health.  Any medicines your care team has prescribed.  Preventive care, family planning, and ways to prevent chronic diseases.  Shots (vaccines)   HPV shots (up to age 26), if you've never had them before.  Hepatitis B shots (up to age 59), if you've never had them before.  COVID-19 shot: Get this shot when it's due.  Flu shot: Get a flu shot every year.  Tetanus shot: Get a tetanus shot every 10 years.  Pneumococcal, hepatitis A, and RSV shots: Ask your care team if you need these based on your risk.  Shingles shot (for age 50 and up)  General health tests  Diabetes screening:  Starting at age 35, Get screened for diabetes at least every 3 years.  If you are younger than age 35, ask your care team if you should be screened for diabetes.  Cholesterol test: At age 39, start having a cholesterol test every 5 years, or more often if advised.  Bone density scan (DEXA): At age  50, ask your care team if you should have this scan for osteoporosis (brittle bones).  Hepatitis C: Get tested at least once in your life.  STIs (sexually transmitted infections)  Before age 24: Ask your care team if you should be screened for STIs.  After age 24: Get screened for STIs if you're at risk. You are at risk for STIs (including HIV) if:  You are sexually active with more than one person.  You don't use condoms every time.  You or a partner was diagnosed with a sexually transmitted infection.  If you are at risk for HIV, ask about PrEP medicine to prevent HIV.  Get tested for HIV at least once in your life, whether you are at risk for HIV or not.  Cancer screening tests  Cervical cancer screening: If you have a cervix, begin getting regular cervical cancer screening tests starting at age 21.  Breast cancer scan (mammogram): If you've ever had breasts, begin having regular mammograms starting at age 40. This is a scan to check for breast cancer.  Colon cancer screening: It is important to start screening for colon cancer at age 45.  Have a colonoscopy test every 10 years (or more often if you're at risk) Or, ask your provider about stool tests like a FIT test every year or Cologuard test every 3 years.  To learn more about your testing options, visit:   .  For help making a decision, visit:   https://bit.ly/po20922.  Prostate cancer screening test: If you have a prostate, ask your care team if a prostate cancer screening test (PSA) at age 55 is right for you.  Lung cancer screening: If you are a current or former smoker ages 50 to 80, ask your care team if ongoing lung cancer screenings are right for you.  For informational purposes only. Not to replace the advice of your health care provider. Copyright   2023 [employer]. All rights reserved. Clinically reviewed by the [facility]. [facility] 404331 - REV 01/24.  Eating Healthy Foods: Care Instructions  With every  "meal, you can make healthy food choices. Try to eat a variety of fruits, vegetables, whole grains, lean proteins, and low-fat dairy products. This can help you get the right balance of nutrients, including vitamins and minerals. Small changes add up over time. You can start by adding one healthy food to your meals each day.    Try to make half your plate fruits and vegetables, one-fourth whole grains, and one-fourth lean proteins. Try including dairy with your meals.   Eat more fruits and vegetables. Try to have them with most meals and snacks.   Foods for healthy eating        Fruits   These can be fresh, frozen, canned, or dried.  Try to choose whole fruit rather than fruit juice.  Eat a variety of colors.        Vegetables   These can be fresh, frozen, canned, or dried.  Beans, peas, and lentils count too.        Whole grains   Choose whole-grain breads, cereals, and noodles.  Try brown rice.        Lean proteins   These can include lean meat, poultry, fish, and eggs.  You can also have tofu, beans, peas, lentils, nuts, and seeds.        Dairy   Try milk, yogurt, and cheese.  Choose low-fat or fat-free when you can.  If you need to, use lactose-free milk or fortified plant-based milk products, such as soy milk.        Water   Drink water when you're thirsty.  Limit sugar-sweetened drinks, including soda, fruit drinks, and sports drinks.  Where can you learn more?  Go to https://www.ePrep.net/patiented  Enter T756 in the search box to learn more about \"Eating Healthy Foods: Care Instructions.\"  Current as of: September 20, 2023  Content Version: 14.3    2024 [employer].   Care instructions adapted under license by your healthcare professional. If you have questions about a medical condition or this instruction, always ask your healthcare professional. [employer] disclaims any warranty or liability for your use of this information.       "

## 2025-01-27 ENCOUNTER — MYC MEDICAL ADVICE (OUTPATIENT)
Dept: PEDIATRICS | Facility: CLINIC | Age: 45
End: 2025-01-27
Payer: COMMERCIAL

## 2025-01-28 NOTE — TELEPHONE ENCOUNTER
"See patient's MyChart message   - Patient states that the amLODIPine (NORVASC) 10 MG tablet medication has been effective in managing his blood pressure, however, the patient reports drowsiness and fatigue   - Patient wondering if there is an alternative medication that he could try to reduce this side effect     BP Readings from Last 6 Encounters:   12/30/24 122/80   08/26/24 132/78   07/15/24 (!) 140/90   06/12/24 (!) 159/98   03/12/24 (!) 159/99   03/11/24 (!) 150/82     amLODIPine (NORVASC) 10 MG tablet 90 tablet 3 12/30/2024 -- No   Sig - Route: Take 1 tablet (10 mg) by mouth daily. - Oral     Upon chart review:   - Per [employer]: \"Neurologic: Somnolence (1.4%). Other: Fatigue (4.5%)\"  - 12/30/2025 office visit with Dr. Hsieh states: \"(I10) Essential hypertension  Comment: daytime drowsiness since benazepril added/ symptoms resolved for 2 days when he stopped it.  Discontinue benazepril, continue amlodipine.  Pt will return for rn bp check 2 weeks/ if bp above goal likely add ARB  Plan: amLODIPine (NORVASC) 10 MG tablet\"    Dr. Hsieh, please review and advise.     Roseanna CASTILLO RN   [facility] [place]   "

## 2025-01-29 NOTE — TELEPHONE ENCOUNTER
Most recent visit, stop benazepril as patient's was concerned it was causing fatigue.  Not clear that amlodipine accounts for persistent fatigue-would consider other causes.  Please schedule a virtual visit follow-up to discuss further.  Continue amlodipine at current dose for now/BP well controlled

## 2025-02-03 ENCOUNTER — VIRTUAL VISIT (OUTPATIENT)
Dept: PEDIATRICS | Facility: CLINIC | Age: 45
End: 2025-02-03
Payer: COMMERCIAL

## 2025-02-03 DIAGNOSIS — I10 ESSENTIAL HYPERTENSION: Primary | ICD-10-CM

## 2025-02-03 PROCEDURE — 98005 SYNCH AUDIO-VIDEO EST LOW 20: CPT | Performed by: INTERNAL MEDICINE

## 2025-02-03 RX ORDER — LOSARTAN POTASSIUM 50 MG/1
50 TABLET ORAL DAILY
Qty: 30 TABLET | Refills: 2 | Status: SHIPPED | OUTPATIENT
Start: 2025-02-03

## 2025-02-03 NOTE — PROGRESS NOTES
"Vladimir is a 44 year old who is being evaluated via a billable video visit.          Assessment & Plan     (I10) Essential hypertension  (primary encounter diagnosis)  Comment:   Plan: losartan (COZAAR) 50 MG tablet        Hypertension follow-up--?amlodipine side effect (fatigue).  Trial off amlodipine, start losartan 50 mg daily.  Patient will track home blood pressures and [facility].    BMI  Estimated body mass index is 49.17 kg/m  as calculated from the following:    Height as of 12/30/24: 1.88 m (6' 2\").    Weight as of 12/30/24: 173.7 kg (383 lb).             Subjective   Vladimir is a 44 year old, presenting for the following health issues:  No chief complaint on file.    HPI     Medication side effect-follow-up.  Recently discontinued benazepril/amlodipine secondary to concerns regarding fatigue.  Noted that fatigue resolved entirely after 3 days off the combination.  Benazepril was discontinued, amlodipine 10 mg was discontinued.  Patient contacted clinic recently with concerns regarding persistent fatigue.  Describes fatigue as generalized.  Has questions about stopping amlodipine.    Patient Active Problem List   Diagnosis    Morbid obesity with BMI of 50.0-59.9, adult (H)    PAOLA (obstructive sleep apnea)    Type 2 diabetes mellitus with hyperglycemia, without long-term current use of insulin (H)    Hyperlipidemia LDL goal <100    ADAMA (generalized anxiety disorder)    Family history of thyroid cancer    Essential hypertension     Current Outpatient Medications   Medication Sig Dispense Refill           ACCU-CHEK GUIDE test strip TEST TWICE DAILY AS DIRECTED 200 strip 1    amLODIPine (NORVASC) 10 MG tablet Take 1 tablet (10 mg) by mouth daily. 90 tablet 3    atorvastatin (LIPITOR) 10 MG tablet Take 1 tablet (10 mg) by mouth daily. 90 tablet 3    blood glucose monitoring (ACCU-CHEK FASTCLIX) lancets 1 each daily 102 each 3    Continuous Glucose Sensor (FREESTYLE KULWANT 3 SENSOR) MISC 1 " each every 14 days. 6 each 11    empagliflozin (JARDIANCE) 25 MG TABS tablet Take 1 tablet (25 mg) by mouth daily. 90 tablet 3    insulin aspart (NOVOLOG FLEXPEN) 100 UNIT/ML pen Inject 18-20 Units subcutaneously 2 times daily (with meals). Max 50 units per day. Hold until patient requests. 45 mL 3    insulin glargine U-300 (TOUJEO) 300 UNIT/ML (1 units dial) pen Inject 15 Units subcutaneously daily. 15 mL 3    insulin pen needle (ULTICARE MINI) 31G X 6 MM miscellaneous Use 3-4 pen needles daily or as directed. 300 each 3    metFORMIN (GLUCOPHAGE XR) 500 MG 24 hr tablet Take 2 tablets (1,000 mg) by mouth 2 times daily (with meals). 360 tablet 3    ondansetron (ZOFRAN ODT) 4 MG ODT tab Take 1 tablet (4 mg) by mouth every 8 hours as needed for nausea or vomiting. 10 tablet 0    Semaglutide, 2 MG/DOSE, (OZEMPIC) 8 MG/3ML pen Inject 2 mg subcutaneously every 7 days. 9 mL 3              Objective           Vitals:  No vitals were obtained today due to virtual visit.    Physical Exam   GENERAL: alert and no distress  EYES: Eyes grossly normal to inspection.  No discharge or erythema, or obvious scleral/conjunctival abnormalities.  RESP: No audible wheeze, cough, or visible cyanosis.    SKIN: Visible skin clear. No significant rash, abnormal pigmentation or lesions.  NEURO: Cranial nerves grossly intact.  Mentation and speech appropriate for age.  PSYCH: Appropriate affect, tone, and pace of words          Video-Visit Details    Type of service:  Video Visit   Video start:5:24pm  Video end: 5:34pm  Originating Location (pt. Location): Home    Distant Location (provider location):  On-site  Platform used for Video Visit: Raj  Signed Electronically by: Russel Hsieh MD

## 2025-02-11 ENCOUNTER — MYC MEDICAL ADVICE (OUTPATIENT)
Dept: PEDIATRICS | Facility: CLINIC | Age: 45
End: 2025-02-11
Payer: COMMERCIAL

## 2025-02-11 DIAGNOSIS — I10 ESSENTIAL HYPERTENSION: ICD-10-CM

## 2025-02-11 NOTE — TELEPHONE ENCOUNTER
"Routing to Dr. Hsieh-  please review and advise. Thanks!    See patient MyChart message for update on blood pressures.     Per chart review, 2/3/25 virtual visit with Dr. Hsieh:  \"(I10) Essential hypertension  (primary encounter diagnosis)  Plan: losartan (COZAAR) 50 MG tablet  Hypertension follow-up--?amlodipine side effect (fatigue).  Trial off amlodipine, start losartan 50 mg daily.  Patient will track home blood pressures and follow-up by [facility] later this week.\"    Laura OLIVIA RN  [facility]   "

## 2025-02-13 RX ORDER — LOSARTAN POTASSIUM 50 MG/1
50 TABLET ORAL DAILY
Qty: 90 TABLET | Refills: 3 | Status: SHIPPED | OUTPATIENT
Start: 2025-02-13

## 2025-02-24 ENCOUNTER — TRANSFERRED RECORDS (OUTPATIENT)
Dept: HEALTH INFORMATION MANAGEMENT | Facility: CLINIC | Age: 45
End: 2025-02-24

## 2025-02-24 ENCOUNTER — LAB (OUTPATIENT)
Dept: LAB | Facility: CLINIC | Age: 45
End: 2025-02-24
Payer: COMMERCIAL

## 2025-02-24 ENCOUNTER — OFFICE VISIT (OUTPATIENT)
Dept: OPHTHALMOLOGY | Facility: CLINIC | Age: 45
End: 2025-02-24
Attending: OPHTHALMOLOGY
Payer: COMMERCIAL

## 2025-02-24 DIAGNOSIS — H46.9 OPTIC NEUROPATHY: ICD-10-CM

## 2025-02-24 DIAGNOSIS — H53.40 VISUAL FIELD DEFECT: Primary | ICD-10-CM

## 2025-02-24 DIAGNOSIS — H46.9 OPTIC NEUROPATHY: Primary | ICD-10-CM

## 2025-02-24 LAB — RETINOPATHY: NEGATIVE

## 2025-02-24 PROCEDURE — 92133 CPTRZD OPH DX IMG PST SGM ON: CPT | Performed by: OPHTHALMOLOGY

## 2025-02-24 PROCEDURE — 92083 EXTENDED VISUAL FIELD XM: CPT | Performed by: OPHTHALMOLOGY

## 2025-02-24 PROCEDURE — 86363 MOG-IGG1 ANTB FLO CYTMTRY EA: CPT | Mod: 90 | Performed by: PATHOLOGY

## 2025-02-24 PROCEDURE — 86053 AQAPRN-4 ANTB FLO CYTMTRY EA: CPT | Mod: 90 | Performed by: PATHOLOGY

## 2025-02-24 PROCEDURE — 99213 OFFICE O/P EST LOW 20 MIN: CPT | Performed by: OPHTHALMOLOGY

## 2025-02-24 PROCEDURE — 99000 SPECIMEN HANDLING OFFICE-LAB: CPT | Performed by: PATHOLOGY

## 2025-02-24 PROCEDURE — 99205 OFFICE O/P NEW HI 60 MIN: CPT | Performed by: OPHTHALMOLOGY

## 2025-02-24 PROCEDURE — 36415 COLL VENOUS BLD VENIPUNCTURE: CPT | Performed by: PATHOLOGY

## 2025-02-24 ASSESSMENT — TONOMETRY
OS_IOP_MMHG: 23
OD_IOP_MMHG: 23
IOP_METHOD: ICARE
IOP_METHOD: TONOPEN 5%
OD_IOP_MMHG: 23
OS_IOP_MMHG: 25

## 2025-02-24 ASSESSMENT — CONF VISUAL FIELD
OD_SUPERIOR_TEMPORAL_RESTRICTION: 0
OD_SUPERIOR_NASAL_RESTRICTION: 0
METHOD: COUNTING FINGERS
OS_INFERIOR_NASAL_RESTRICTION: 0
OS_SUPERIOR_TEMPORAL_RESTRICTION: 0
OS_NORMAL: 1
OD_INFERIOR_TEMPORAL_RESTRICTION: 0
OD_INFERIOR_NASAL_RESTRICTION: 0
OD_NORMAL: 1
OS_SUPERIOR_NASAL_RESTRICTION: 0
OS_INFERIOR_TEMPORAL_RESTRICTION: 0

## 2025-02-24 ASSESSMENT — VISUAL ACUITY
OS_CC: 20/20
OS_CC+: -1
METHOD: SNELLEN - LINEAR
CORRECTION_TYPE: GLASSES
OD_CC: 20/20

## 2025-02-24 ASSESSMENT — REFRACTION_WEARINGRX
OS_CYLINDER: +0.50
OD_SPHERE: -1.50
OS_SPHERE: -1.00
OS_AXIS: 055
OD_AXIS: 084
OD_CYLINDER: +0.50

## 2025-02-24 ASSESSMENT — CUP TO DISC RATIO: OD_RATIO: 0.3

## 2025-02-24 ASSESSMENT — SLIT LAMP EXAM - LIDS
COMMENTS: NORMAL
COMMENTS: NORMAL

## 2025-02-24 NOTE — PROGRESS NOTES
Jeffry Younger is a 44 year old male with the following diagnoses:   1. Optic neuropathy         Patient was sent for consultation by Dr. Gaxiola for swollen optic nerve.    HPI:    Patient has history of HTN, T2DM, PAOLA, and morbid obesity who presents as referral from Dr. Gaxiola at [facility] for optic nerve swelling and visual field defect.     Per chart review, patient was seen at [facility] by optometrist Dr. Concepcion, who noted no evidence of diabetic retinopathy or any clinic evidence of glaucoma on their exam 6/19/24.    On Saturday evening (two days ago), he developed a gray blur in the bottom right of his left eye. It has gotten more gray and mildly increased in size since then. He additionally developed a distortion in the left midline of his left eye that looks like a funhouse mirror. His central vision seems clear. No associated eye pain, headache, trauma, double vision.     Has a history of type 2 diabetes. Uses insulin, metformin, and Ozempic. Most recent A1c was 6.4% on 12/30/2024. Has lost 20 lb since starting ozempic 8 months ago. Uses a CPAP for sleep apnea since 2015. Denies use of PDE-5 medications.     Denies symptoms of giant cell arteritis: headache, jaw claudication, scalp tenderness, muscle aches.     Independent historians:  Patient    Review of outside clinical notes:  Visit with Dr. Gaxiola on 2/24/25:   Disc edema of left eye     Past medical history:  Patient Active Problem List   Diagnosis    Morbid obesity with BMI of 50.0-59.9, adult (H)    PALOA (obstructive sleep apnea)    Type 2 diabetes mellitus with hyperglycemia, without long-term current use of insulin (H)    Hyperlipidemia LDL goal <100    ADAMA (generalized anxiety disorder)    Family history of thyroid cancer    Essential hypertension     Medications:   Current Outpatient Medications   Medication Sig Dispense Refill    ACCU-CHEK GUIDE test strip TEST TWICE DAILY AS DIRECTED 200 strip 1    atorvastatin (LIPITOR)  10 MG tablet Take 1 tablet (10 mg) by mouth daily. 90 tablet 3    blood glucose monitoring (ACCU-CHEK FASTCLIX) lancets 1 each daily 102 each 3    Continuous Glucose Sensor (FREESTYLE KULWANT 3 SENSOR) MISC 1 each every 14 days. 6 each 11    empagliflozin (JARDIANCE) 25 MG TABS tablet Take 1 tablet (25 mg) by mouth daily. 90 tablet 3    insulin aspart (NOVOLOG FLEXPEN) 100 UNIT/ML pen Inject 18-20 Units subcutaneously 2 times daily (with meals). Max 50 units per day. Hold until patient requests. 45 mL 3    insulin glargine U-300 (TOUJEO) 300 UNIT/ML (1 units dial) pen Inject 15 Units subcutaneously daily. 15 mL 3    insulin pen needle (ULTICARE MINI) 31G X 6 MM miscellaneous Use 3-4 pen needles daily or as directed. 300 each 3    losartan (COZAAR) 50 MG tablet Take 1 tablet (50 mg) by mouth daily. 90 tablet 3    metFORMIN (GLUCOPHAGE XR) 500 MG 24 hr tablet Take 2 tablets (1,000 mg) by mouth 2 times daily (with meals). 360 tablet 3    ondansetron (ZOFRAN ODT) 4 MG ODT tab Take 1 tablet (4 mg) by mouth every 8 hours as needed for nausea or vomiting. 10 tablet 0    Semaglutide, 2 MG/DOSE, (OZEMPIC) 8 MG/3ML pen Inject 2 mg subcutaneously every 7 days. 9 mL 3     Jardiance, insulin, ozempic, amlodipine, lipitor    Family history:  Patient's family history includes Cerebrovascular Disease in his paternal grandfather; Diabetes in his father; Hypertension in his father; Other Cancer in his brother, father, maternal grandfather, and maternal grandmother.     Social history:  Patient  reports that he has never smoked. He has never been exposed to tobacco smoke. He has never used smokeless tobacco. He reports current alcohol use. He reports that he does not use drugs.     Occupation:     Exam:  Visual acuity 20/20 right eye 20/20-1 left eye.  Color vision 11/11 right eye and 11/11 left eye.  Pupils partially dilated due to eye exam earlier today, no APD. Intraocular pressure 23 right eye and 25 left eye.  Anterior  segment exam normal.  Fundus exam shows optic disc edema LEFT eye with a cup-to-disc ratio of 0.3 in the RIGHT eye.     Tests ordered and interpreted today:  HVF 24-2 JOSE MANUEL FAST OU          Performed by: JUN Russell COT   .   Fixation good both eyes Not dilated. Reliability of the test: Good   . Interval: Initial   .     Notes  Normal RIGHT eye   Inferior altitudinal LEFT eye          OCT Optic Nerve RNFL Spectralis OU (both eyes)          Performed by: rp   .     Right Eye  Reliability of the test: Good   . Test Findings: Normal   . Interpretation: Normal   . Plan: Monitor   . Interval: Initial   .     Left Eye  Reliability of the test: Good   . Test Findings: Abnormal   . Test Findings Free Text: thick rnfl   . Plan: Monitor   . Interval: Initial   .        VF 24-2:  Right eye: normal  Left eye: dense inferior altitudinal defect    OCT RNFL:  Right eye: mean thickness 105  Left eye: mean thickness 249, significant superior swelling    Discussion of management / interpretation with another provider:   None    Assessment/Plan:   It is my impression that patient experienced an acute optic neuropathy in the left eye with optic disc edema.  We discussed that the differential diagnosis includes atypical optic neuritis versus anterior ischemic optic neuropathy.  We discussed that an MRI could help sort out these 2 entities.  To that end, I will obtain a stat MRI to evaluate the optic nerve.  If there is optic nerve enhancement or compression, then I will address that with him.  If the MRI shows a normal retrobulbar optic nerve, then I would recommend follow-up in 6 to 8 weeks sooner as needed for worsening symptoms.  I will also obtain laboratory testing for MOG IgG and aquaporin 4 IgG.           Attending Physician Attestation:  Complete documentation of historical and exam elements from today's encounter can be found in the full encounter summary report (not reduplicated in this progress note).  I personally obtained the  chief complaint(s) and history of present illness.  I confirmed and edited as necessary the review of systems, past medical/surgical history, family history, social history, and examination findings as documented by others; and I examined the patient myself.  I personally reviewed the relevant tests, images, and reports as documented above.  I formulated and edited as necessary the assessment and plan and discussed the findings and management plan with the patient and family. I was present with the medical student who participated in the service and in the documentation of this note. - MD Irasema Hernandez, MS3  [employer]    Precharting:  Martin Medina MD

## 2025-02-24 NOTE — LETTER
2025     RE:  :  MRN: Jeffry Younger  1980  [id number]     Dear Dr. Gaxiola:     Thank you for asking me to see your very pleasant patient,Jeffry Younger, in neuro-ophthalmic consultation.  I would like to thank you for sending your records and I have summarized them in the history of present illness.   My assessment and plan are below.  For further details, please see my attached clinic note.      Jeffry Younger is a 44 year old male with the following diagnoses:   1. Optic neuropathy         Patient was sent for consultation by Dr. Gaxiola for swollen optic nerve.    HPI:    Patient has history of HTN, T2DM, PAOLA, and morbid obesity who presents as referral from Dr. Gaxiola at [facility] for optic nerve swelling and visual field defect.     Per chart review, patient was seen at [facility] by optometrist Dr. Concepcion, who noted no evidence of diabetic retinopathy or any clinic evidence of glaucoma on their exam 24.    On Saturday evening (two days ago), he developed a gray blur in the bottom right of his left eye. It has gotten more gray and mildly increased in size since then. He additionally developed a distortion in the left midline of his left eye that looks like a funhouse mirror. His central vision seems clear. No associated eye pain, headache, trauma, double vision.     Has a history of type 2 diabetes. Uses insulin, metformin, and Ozempic. Most recent A1c was 6.4% on 2024. Has lost 20 lb since starting ozempic 8 months ago. Uses a CPAP for sleep apnea since . Denies use of PDE-5 medications.     Denies symptoms of giant cell arteritis: headache, jaw claudication, scalp tenderness, muscle aches.     Independent historians:  Patient    Review of outside clinical notes:  Visit with Dr. Gaxiola on 25:   Disc edema of left eye     Past medical history:  Patient Active Problem List   Diagnosis    Morbid obesity with BMI of 50.0-59.9, adult (H)    PAOLA (obstructive  sleep apnea)    Type 2 diabetes mellitus with hyperglycemia, without long-term current use of insulin (H)    Hyperlipidemia LDL goal <100    ADAMA (generalized anxiety disorder)    Family history of thyroid cancer    Essential hypertension     Medications:   Current Outpatient Medications   Medication Sig Dispense Refill    ACCU-CHEK GUIDE test strip TEST TWICE DAILY AS DIRECTED 200 strip 1    atorvastatin (LIPITOR) 10 MG tablet Take 1 tablet (10 mg) by mouth daily. 90 tablet 3    blood glucose monitoring (ACCU-CHEK FASTCLIX) lancets 1 each daily 102 each 3    Continuous Glucose Sensor (FREESTYLE KULWANT 3 SENSOR) MISC 1 each every 14 days. 6 each 11    empagliflozin (JARDIANCE) 25 MG TABS tablet Take 1 tablet (25 mg) by mouth daily. 90 tablet 3    insulin aspart (NOVOLOG FLEXPEN) 100 UNIT/ML pen Inject 18-20 Units subcutaneously 2 times daily (with meals). Max 50 units per day. Hold until patient requests. 45 mL 3    insulin glargine U-300 (TOUJEO) 300 UNIT/ML (1 units dial) pen Inject 15 Units subcutaneously daily. 15 mL 3    insulin pen needle (ULTICARE MINI) 31G X 6 MM miscellaneous Use 3-4 pen needles daily or as directed. 300 each 3    losartan (COZAAR) 50 MG tablet Take 1 tablet (50 mg) by mouth daily. 90 tablet 3    metFORMIN (GLUCOPHAGE XR) 500 MG 24 hr tablet Take 2 tablets (1,000 mg) by mouth 2 times daily (with meals). 360 tablet 3    ondansetron (ZOFRAN ODT) 4 MG ODT tab Take 1 tablet (4 mg) by mouth every 8 hours as needed for nausea or vomiting. 10 tablet 0    Semaglutide, 2 MG/DOSE, (OZEMPIC) 8 MG/3ML pen Inject 2 mg subcutaneously every 7 days. 9 mL 3     Jardiance, insulin, ozempic, amlodipine, lipitor    Family history:  Patient's family history includes Cerebrovascular Disease in his paternal grandfather; Diabetes in his father; Hypertension in his father; Other Cancer in his brother, father, maternal grandfather, and maternal grandmother.     Social history:  Patient  reports that he has never  smoked. He has never been exposed to tobacco smoke. He has never used smokeless tobacco. He reports current alcohol use. He reports that he does not use drugs.     Occupation:     Exam:  Visual acuity 20/20 right eye 20/20-1 left eye.  Color vision 11/11 right eye and 11/11 left eye.  Pupils partially dilated due to eye exam earlier today, no APD. Intraocular pressure 23 right eye and 25 left eye.  Anterior segment exam normal.  Fundus exam shows optic disc edema LEFT eye with a cup-to-disc ratio of 0.3 in the RIGHT eye.     Tests ordered and interpreted today:  HVF 24-2 JOSE MANUEL FAST OU          Performed by: JUN Russell COT   .   Fixation good both eyes Not dilated. Reliability of the test: Good   . Interval: Initial   .     Notes  Normal RIGHT eye   Inferior altitudinal LEFT eye          OCT Optic Nerve RNFL Spectralis OU (both eyes)          Performed by: katie   .     Right Eye  Reliability of the test: Good   . Test Findings: Normal   . Interpretation: Normal   . Plan: Monitor   . Interval: Initial   .     Left Eye  Reliability of the test: Good   . Test Findings: Abnormal   . Test Findings Free Text: thick rnfl   . Plan: Monitor   . Interval: Initial   .        VF 24-2:  Right eye: normal  Left eye: dense inferior altitudinal defect    OCT RNFL:  Right eye: mean thickness 105  Left eye: mean thickness 249, significant superior swelling    Discussion of management / interpretation with another provider:   None    Assessment/Plan:   It is my impression that patient experienced an acute optic neuropathy in the left eye with optic disc edema.  We discussed that the differential diagnosis includes atypical optic neuritis versus anterior ischemic optic neuropathy.  We discussed that an MRI could help sort out these 2 entities.  To that end, I will obtain a stat MRI to evaluate the optic nerve.  If there is optic nerve enhancement or compression, then I will address that with him.  If the MRI shows a normal retrobulbar  optic nerve, then I would recommend follow-up in 6 to 8 weeks sooner as needed for worsening symptoms.  I will also obtain laboratory testing for MOG IgG and aquaporin 4 IgG.          Again, thank you for allowing me to participate in the care of your patient.      Sincerely,    Azeem Arteaga MD  Professor  Director of Neuro-Ophthalmology  [employer] [facility] [employer] Endowed Chair  Departments of Ophthalmology, Neurology, and Neurosurgery  [facility]   [address] [place] [address]  [address]  T - [phone]  F - [phone]  [email or web address]      CC: Hayden Gaxiola MD  [facility]  [address]  [facility] [address]  [facility] [id number]  Via Fax: [phone]    DX = nonarteritic anterior ischemic optic neuropathy vs. Atypical optic neuritis

## 2025-02-25 ENCOUNTER — APPOINTMENT (OUTPATIENT)
Dept: CT IMAGING | Facility: CLINIC | Age: 45
DRG: 070 | End: 2025-02-25
Payer: COMMERCIAL

## 2025-02-25 ENCOUNTER — HOSPITAL ENCOUNTER (INPATIENT)
Facility: CLINIC | Age: 45
End: 2025-02-25
Attending: EMERGENCY MEDICINE | Admitting: PSYCHIATRY & NEUROLOGY
Payer: COMMERCIAL

## 2025-02-25 ENCOUNTER — HOSPITAL ENCOUNTER (OUTPATIENT)
Dept: MRI IMAGING | Facility: HOSPITAL | Age: 45
Discharge: HOME | End: 2025-02-25
Attending: OPHTHALMOLOGY
Payer: COMMERCIAL

## 2025-02-25 ENCOUNTER — APPOINTMENT (OUTPATIENT)
Dept: CT IMAGING | Facility: CLINIC | Age: 45
End: 2025-02-25
Payer: COMMERCIAL

## 2025-02-25 ENCOUNTER — TELEPHONE (OUTPATIENT)
Dept: OPHTHALMOLOGY | Facility: CLINIC | Age: 45
End: 2025-02-25
Payer: COMMERCIAL

## 2025-02-25 ENCOUNTER — APPOINTMENT (OUTPATIENT)
Dept: GENERAL RADIOLOGY | Facility: CLINIC | Age: 45
DRG: 070 | End: 2025-02-25
Attending: NURSE PRACTITIONER
Payer: COMMERCIAL

## 2025-02-25 DIAGNOSIS — H53.8 BLURRED VISION: Primary | ICD-10-CM

## 2025-02-25 DIAGNOSIS — H46.9 OPTIC NEUROPATHY: ICD-10-CM

## 2025-02-25 DIAGNOSIS — G93.89 LEFT FRONTAL LOBE MASS: ICD-10-CM

## 2025-02-25 LAB
ALBUMIN SERPL BCG-MCNC: 4.3 G/DL (ref 3.5–5.2)
ALP SERPL-CCNC: 108 U/L (ref 40–150)
ALT SERPL W P-5'-P-CCNC: 41 U/L (ref 0–70)
ANION GAP SERPL CALCULATED.3IONS-SCNC: 14 MMOL/L (ref 7–15)
AST SERPL W P-5'-P-CCNC: 24 U/L (ref 0–45)
BASOPHILS # BLD AUTO: 0.1 10E3/UL (ref 0–0.2)
BASOPHILS NFR BLD AUTO: 1 %
BILIRUB SERPL-MCNC: 0.7 MG/DL
BUN SERPL-MCNC: 17.5 MG/DL (ref 6–20)
CALCIUM SERPL-MCNC: 9.3 MG/DL (ref 8.8–10.4)
CHLORIDE SERPL-SCNC: 102 MMOL/L (ref 98–107)
CREAT SERPL-MCNC: 0.95 MG/DL (ref 0.67–1.17)
CRP SERPL-MCNC: 6.56 MG/L
EGFRCR SERPLBLD CKD-EPI 2021: >90 ML/MIN/1.73M2
EOSINOPHIL # BLD AUTO: 0.2 10E3/UL (ref 0–0.7)
EOSINOPHIL NFR BLD AUTO: 2 %
ERYTHROCYTE [DISTWIDTH] IN BLOOD BY AUTOMATED COUNT: 13.3 % (ref 10–15)
GLUCOSE BLDC GLUCOMTR-MCNC: 125 MG/DL (ref 70–99)
GLUCOSE BLDC GLUCOMTR-MCNC: 143 MG/DL (ref 70–99)
GLUCOSE BLDC GLUCOMTR-MCNC: 155 MG/DL (ref 70–99)
GLUCOSE BLDC GLUCOMTR-MCNC: 179 MG/DL (ref 70–99)
GLUCOSE SERPL-MCNC: 176 MG/DL (ref 70–99)
HCO3 SERPL-SCNC: 22 MMOL/L (ref 22–29)
HCT VFR BLD AUTO: 51.4 % (ref 40–53)
HGB BLD-MCNC: 16.6 G/DL (ref 13.3–17.7)
HOLD SPECIMEN: NORMAL
IMM GRANULOCYTES # BLD: 0.1 10E3/UL
IMM GRANULOCYTES NFR BLD: 1 %
INR PPP: 1.02 (ref 0.85–1.15)
LYMPHOCYTES # BLD AUTO: 2 10E3/UL (ref 0.8–5.3)
LYMPHOCYTES NFR BLD AUTO: 21 %
MCH RBC QN AUTO: 28.6 PG (ref 26.5–33)
MCHC RBC AUTO-ENTMCNC: 32.3 G/DL (ref 31.5–36.5)
MCV RBC AUTO: 89 FL (ref 78–100)
MONOCYTES # BLD AUTO: 0.7 10E3/UL (ref 0–1.3)
MONOCYTES NFR BLD AUTO: 7 %
NEUTROPHILS # BLD AUTO: 6.9 10E3/UL (ref 1.6–8.3)
NEUTROPHILS NFR BLD AUTO: 70 %
NRBC # BLD AUTO: 0 10E3/UL
NRBC BLD AUTO-RTO: 0 /100
PLATELET # BLD AUTO: 224 10E3/UL (ref 150–450)
POTASSIUM SERPL-SCNC: 4.8 MMOL/L (ref 3.4–5.3)
PROT SERPL-MCNC: 7.3 G/DL (ref 6.4–8.3)
RBC # BLD AUTO: 5.8 10E6/UL (ref 4.4–5.9)
SODIUM SERPL-SCNC: 138 MMOL/L (ref 135–145)
WBC # BLD AUTO: 9.9 10E3/UL (ref 4–11)

## 2025-02-25 PROCEDURE — 82947 ASSAY GLUCOSE BLOOD QUANT: CPT | Performed by: EMERGENCY MEDICINE

## 2025-02-25 PROCEDURE — 36415 COLL VENOUS BLD VENIPUNCTURE: CPT

## 2025-02-25 PROCEDURE — A9585 GADOBUTROL INJECTION: HCPCS | Performed by: OPHTHALMOLOGY

## 2025-02-25 PROCEDURE — 85610 PROTHROMBIN TIME: CPT | Performed by: EMERGENCY MEDICINE

## 2025-02-25 PROCEDURE — 99285 EMERGENCY DEPT VISIT HI MDM: CPT | Performed by: EMERGENCY MEDICINE

## 2025-02-25 PROCEDURE — 250N000011 HC RX IP 250 OP 636

## 2025-02-25 PROCEDURE — 999N000122 MR MHEALTH OVERREAD

## 2025-02-25 PROCEDURE — 82962 GLUCOSE BLOOD TEST: CPT

## 2025-02-25 PROCEDURE — 85004 AUTOMATED DIFF WBC COUNT: CPT | Performed by: EMERGENCY MEDICINE

## 2025-02-25 PROCEDURE — 71260 CT THORAX DX C+: CPT | Mod: 26 | Performed by: RADIOLOGY

## 2025-02-25 PROCEDURE — 99223 1ST HOSP IP/OBS HIGH 75: CPT | Mod: GC | Performed by: PSYCHIATRY & NEUROLOGY

## 2025-02-25 PROCEDURE — 71260 CT THORAX DX C+: CPT

## 2025-02-25 PROCEDURE — 86140 C-REACTIVE PROTEIN: CPT | Performed by: PSYCHIATRY & NEUROLOGY

## 2025-02-25 PROCEDURE — 250N000013 HC RX MED GY IP 250 OP 250 PS 637

## 2025-02-25 PROCEDURE — 70498 CT ANGIOGRAPHY NECK: CPT | Mod: 26 | Performed by: RADIOLOGY

## 2025-02-25 PROCEDURE — 70551 MRI BRAIN STEM W/O DYE: CPT | Mod: GC | Performed by: RADIOLOGY

## 2025-02-25 PROCEDURE — 70496 CT ANGIOGRAPHY HEAD: CPT | Mod: XS

## 2025-02-25 PROCEDURE — 74177 CT ABD & PELVIS W/CONTRAST: CPT | Mod: 26 | Performed by: RADIOLOGY

## 2025-02-25 PROCEDURE — 120N000002 HC R&B MED SURG/OB UMMC

## 2025-02-25 PROCEDURE — 82310 ASSAY OF CALCIUM: CPT | Performed by: EMERGENCY MEDICINE

## 2025-02-25 PROCEDURE — 70496 CT ANGIOGRAPHY HEAD: CPT | Mod: 26 | Performed by: RADIOLOGY

## 2025-02-25 PROCEDURE — 36415 COLL VENOUS BLD VENIPUNCTURE: CPT | Performed by: EMERGENCY MEDICINE

## 2025-02-25 PROCEDURE — 255N000002 HC RX 255 OP 636: Performed by: OPHTHALMOLOGY

## 2025-02-25 PROCEDURE — 70553 MRI BRAIN STEM W/O & W/DYE: CPT

## 2025-02-25 PROCEDURE — 70496 CT ANGIOGRAPHY HEAD: CPT

## 2025-02-25 RX ORDER — IOPAMIDOL 755 MG/ML
135 INJECTION, SOLUTION INTRAVASCULAR ONCE
Status: COMPLETED | OUTPATIENT
Start: 2025-02-25 | End: 2025-02-25

## 2025-02-25 RX ORDER — ATORVASTATIN CALCIUM 10 MG/1
10 TABLET, FILM COATED ORAL DAILY
Status: DISPENSED | OUTPATIENT
Start: 2025-02-26

## 2025-02-25 RX ORDER — AMOXICILLIN 250 MG
1 CAPSULE ORAL 2 TIMES DAILY PRN
Status: ACTIVE | OUTPATIENT
Start: 2025-02-25

## 2025-02-25 RX ORDER — GADOBUTROL 604.72 MG/ML
0.1 INJECTION INTRAVENOUS ONCE
Status: COMPLETED | OUTPATIENT
Start: 2025-02-25 | End: 2025-02-25

## 2025-02-25 RX ORDER — BISACODYL 10 MG
10 SUPPOSITORY, RECTAL RECTAL DAILY PRN
Status: ACTIVE | OUTPATIENT
Start: 2025-02-25

## 2025-02-25 RX ORDER — AMOXICILLIN 250 MG
2 CAPSULE ORAL 2 TIMES DAILY PRN
Status: ACTIVE | OUTPATIENT
Start: 2025-02-25

## 2025-02-25 RX ORDER — ONDANSETRON 4 MG/1
4 TABLET, ORALLY DISINTEGRATING ORAL EVERY 6 HOURS PRN
Status: DISPENSED | OUTPATIENT
Start: 2025-02-25

## 2025-02-25 RX ORDER — LIDOCAINE 40 MG/G
CREAM TOPICAL
Status: ACTIVE | OUTPATIENT
Start: 2025-02-25

## 2025-02-25 RX ORDER — PROCHLORPERAZINE MALEATE 10 MG
10 TABLET ORAL EVERY 6 HOURS PRN
Status: ACTIVE | OUTPATIENT
Start: 2025-02-25

## 2025-02-25 RX ORDER — POLYETHYLENE GLYCOL 3350 17 G/17G
17 POWDER, FOR SOLUTION ORAL 2 TIMES DAILY PRN
Status: ACTIVE | OUTPATIENT
Start: 2025-02-25

## 2025-02-25 RX ORDER — LOSARTAN POTASSIUM 50 MG/1
50 TABLET ORAL DAILY
Status: DISPENSED | OUTPATIENT
Start: 2025-02-26

## 2025-02-25 RX ORDER — ONDANSETRON 2 MG/ML
4 INJECTION INTRAMUSCULAR; INTRAVENOUS EVERY 6 HOURS PRN
Status: ACTIVE | OUTPATIENT
Start: 2025-02-25

## 2025-02-25 RX ORDER — NICOTINE POLACRILEX 4 MG
15-30 LOZENGE BUCCAL
Status: ACTIVE | OUTPATIENT
Start: 2025-02-25

## 2025-02-25 RX ORDER — DEXTROSE MONOHYDRATE 25 G/50ML
25-50 INJECTION, SOLUTION INTRAVENOUS
Status: ACTIVE | OUTPATIENT
Start: 2025-02-25

## 2025-02-25 RX ORDER — ACETAMINOPHEN 325 MG/1
650 TABLET ORAL EVERY 4 HOURS PRN
Status: DISPENSED | OUTPATIENT
Start: 2025-02-25

## 2025-02-25 RX ORDER — CALCIUM CARBONATE 500 MG/1
1000 TABLET, CHEWABLE ORAL 4 TIMES DAILY PRN
Status: ACTIVE | OUTPATIENT
Start: 2025-02-25

## 2025-02-25 RX ORDER — ACETAMINOPHEN 650 MG/1
650 SUPPOSITORY RECTAL EVERY 4 HOURS PRN
Status: ACTIVE | OUTPATIENT
Start: 2025-02-25

## 2025-02-25 RX ORDER — METFORMIN HYDROCHLORIDE 500 MG/1
1000 TABLET, EXTENDED RELEASE ORAL 2 TIMES DAILY WITH MEALS
Status: DISPENSED | OUTPATIENT
Start: 2025-02-25

## 2025-02-25 RX ADMIN — METFORMIN ER 500 MG 1000 MG: 500 TABLET ORAL at 18:12

## 2025-02-25 RX ADMIN — GADOBUTROL 17 ML: 604.72 INJECTION INTRAVENOUS at 07:33

## 2025-02-25 RX ADMIN — IOPAMIDOL 135 ML: 755 INJECTION, SOLUTION INTRAVENOUS at 17:30

## 2025-02-25 ASSESSMENT — ACTIVITIES OF DAILY LIVING (ADL)
ADLS_ACUITY_SCORE: 41
ADLS_ACUITY_SCORE: 41
ADLS_ACUITY_SCORE: 34
ADLS_ACUITY_SCORE: 41
ADLS_ACUITY_SCORE: 41
ADLS_ACUITY_SCORE: 34
ADLS_ACUITY_SCORE: 41
ADLS_ACUITY_SCORE: 34

## 2025-02-25 ASSESSMENT — COLUMBIA-SUICIDE SEVERITY RATING SCALE - C-SSRS
6. HAVE YOU EVER DONE ANYTHING, STARTED TO DO ANYTHING, OR PREPARED TO DO ANYTHING TO END YOUR LIFE?: NO
1. IN THE PAST MONTH, HAVE YOU WISHED YOU WERE DEAD OR WISHED YOU COULD GO TO SLEEP AND NOT WAKE UP?: NO
2. HAVE YOU ACTUALLY HAD ANY THOUGHTS OF KILLING YOURSELF IN THE PAST MONTH?: NO

## 2025-02-25 NOTE — ED TRIAGE NOTES
Pt arrives ambulatory to ED   Reports that he had an MRI this morning and was told that he has a tumor and needs to go to ED   Says that this originally started as an eye issue with blurry left vision  Otherwise reports feeling fine

## 2025-02-25 NOTE — MEDICATION SCRIBE - ADMISSION MEDICATION HISTORY
Medication Scribe Admission Medication History    Admission medication history is complete. The information provided in this note is only as accurate as the sources available at the time of the update.    Information Source(s): Patient via in-person    Pertinent Information: Vladimir is taking all medications as prescribed but only uses the glucometer, test strips and lancets when he wants an immediate reading or when he does not have the freestyle kulwant sensors which is rare. Patient denies taking any other medications. Dispense report and outside medication reconciliation list have been reviewed.      Changes made to PTA medication list:  Added:   Blood Glucose Monitoring Suppl w/Device KIT  Deleted: None  Changed: None    Allergies reviewed with patient and updates made in EHR: yes    Medication History Completed By: Jyothi Ridley 2/25/2025 3:56 PM    PTA Med List   Medication Sig Last Dose/Taking    ACCU-CHEK GUIDE test strip TEST TWICE DAILY AS DIRECTED Taking    atorvastatin (LIPITOR) 10 MG tablet Take 1 tablet (10 mg) by mouth daily. 2/24/2025 Bedtime    blood glucose monitoring (ACCU-CHEK FASTCLIX) lancets 1 each daily Taking    Blood Glucose Monitoring Suppl w/Device KIT  Taking    Continuous Glucose Sensor (FREESTYLE KULWANT 3 SENSOR) MISC 1 each every 14 days. 2/24/2025    empagliflozin (JARDIANCE) 25 MG TABS tablet Take 1 tablet (25 mg) by mouth daily. 2/24/2025 Morning    insulin aspart (NOVOLOG FLEXPEN) 100 UNIT/ML pen Inject 18-20 Units subcutaneously 2 times daily (with meals). Max 50 units per day. Hold until patient requests. 2/24/2025 Evening    insulin glargine U-300 (TOUJEO) 300 UNIT/ML (1 units dial) pen Inject 15 Units subcutaneously daily. 2/24/2025 Evening    insulin pen needle (ULTICARE MINI) 31G X 6 MM miscellaneous Use 3-4 pen needles daily or as directed. 2/24/2025 Evening    losartan (COZAAR) 50 MG tablet Take 1 tablet (50 mg) by mouth daily. 2/25/2025 Morning    metFORMIN (GLUCOPHAGE XR)  500 MG 24 hr tablet Take 2 tablets (1,000 mg) by mouth 2 times daily (with meals). 2/24/2025 Evening    ondansetron (ZOFRAN ODT) 4 MG ODT tab Take 1 tablet (4 mg) by mouth every 8 hours as needed for nausea or vomiting. Taking As Needed    Semaglutide, 2 MG/DOSE, (OZEMPIC) 8 MG/3ML pen Inject 2 mg subcutaneously every 7 days. 2/17/2025 Evening

## 2025-02-25 NOTE — H&P
"[facility]: [place]  Neurology History and Physical    Patient Name:  Jeffry Younger  MRN:  [id number]    :  1980  Date of Admission:  2025  Date of Service:  2025  Primary care provider:  Russel Hsieh      Chief Complaint:  vision changes, L brain lesion    History of Present Illness:   Jeffry Younger is a 44 year old male with history of hypertension and T2DM who presents with vision changes first noticed on 25 which he describes as \"muted color\" in the left inferior visual field, along with distortion of objects in the left periphery that he compares to a \"fun house mirror.\" In ophthalmology clinic on 25, left optic disc edema was noted. MRI brain revealed a left frontal lobe lesion and mild cupping of the left optic disc and the patient was directed to the ED for further evaluation.     He states his visual abnormalities have not worsened or changed since onset, though per ophthalmology note, he has reported the grayness may have increased in size. No pain with eye movements, headache, weakness, or numbness. He has never experienced anything similar in the past.      Family history includes medullary thyroid cancer and renal cell carcinoma in his father and a brother who  from leukemia when he was young.     ROS: A comprehensive ROS was performed and pertinent findings were included in HPI.     PMH:  Past Medical History:   Diagnosis Date    Diabetes (H) 2018    Treating metformin    Essential hypertension 2024    Obesity      Past Surgical History:   Procedure Laterality Date    AS ESOPHAGOSCOPY, DIAGNOSTIC      EGD    BIOPSY      egd normal       Medications   I have personally reviewed the patient's medication list.     Allergies  I have personally reviewed the patient's allergy list.     Social History:  Denies tobacco, alcohol, and recreational drug use   Lives at home with wife and dog. His wife is a peds GI doc. He " works as an .     Family History:    Reviewed, and notably negative for neurologic diseases.    Physical Examination:   Constitutional   - Vitals: BP (!) 156/89   Pulse 80   Temp 98.5  F (36.9  C) (Oral)   Resp 18   SpO2 98%    - General: Lying in bed, NAD  Head: NC/AT  Eyes: no icterus, op pink and moist  Cardiac: Extremities warm, no edema.   Respiratory: non-labored on RA  GI: S/NT/ND  Skin: No rash or lesion on exposed skin  Psych: Mood pleasant, affect congruent  Neuro:  Mental status: Awake, alert, attentive, oriented to self, time, place, and circumstance. Language is fluent and coherent with intact comprehension of complex commands, naming and repetition.  Cranial nerves: Left eye inferior temporal quadrantanopia, PERRL, conjugate gaze, EOMI, facial sensation intact, face symmetric, shoulder shrug strong, tongue/uvula midline, no dysarthria.   Motor: Normal bulk and tone. No abnormal movements. 5/5 strength bilaterally in deltoids, biceps, triceps, hip flexors, hip extensors, knee flexion, knee extension, plantarflexion, dorsiflexion.   Reflexes: Brisk and symmetric patellar reflex. 2+ and symmetric biceps, brachioradialis, triceps, and achilles. Negative Johnson, no clonus, toes down-going.  Sensory: Intact to light touch.   Coordination: FNF and HS without ataxia or dysmetria.   Gait: Not assessed.    Investigations:    I have personally reviewed pertinent labs, tests, and radiology images. Discussion of notable findings is included under Impression.     MR BRAIN AND ORBITS W/O and W CONTRAST   2/25/25  IMPRESSION:  1.  There is an expansile T2-weighted/FLAIR hyperintense lesion involving the left frontal lobe measuring approximately 3.6 x 4.0 x 4.1 cm. There is patchy amorphous enhancement along the medial and posterior margins of the mass. There is also additional   T2-weighted/FLAIR hyperintensity along the margins of the mass. 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     2.  There is mild mass effect upon the left lateral ventricle and 0.2 cm rightward shift of midline structures.     3.  There is mild cupping of the left optic disc which can be seen in the setting of papilledema.       Did the patient transfer from an outside hospital?   No    Assessment:  44-year-old male with a history of HTN and T2DM presenting with washed out appearance and distortion of left inferior visual field since 2/22/24. He was found to have left optic disc edema in ophthalmology clinic 2/23/24 with a differential including acute optic neuropathy and atypical optic neuritis. After imaging obtained and discussion with neuro-ophthalmology, feel likely that vision changes are likely due to acute ischemic optic neuropathy. Will continue to work up cause; ordered CT imaging of vessels, both arterial and venous system and updated LDL. No echocardiogram indicated at this time. Ophthalmology consulted. MRI brain also revealed left frontal lobe lesion with mild mass effect concerning for CNS lymphoma versus demyelinating disease. Will obtain LP with neuro-IR to further differentiate. Neurosurgery consulted.      Plan:  #Left frontal lobe lesion   - Admit to neurology for further workup  - IR consulted for lumbar puncture, planned for 2/26  - LP orders: oligoclonal bands, cytology, cytometry, meningitis/encephalitis panel, cell count with diff, protein, glucose  - MOG, IgG, and aquaporin 4 IgG ordered by outpatient ophthalmology  - Holding steroids until LP obtained, will discuss plan for steroids with NSGY   - Neurosurgery following, recs appreciated  - Q4H neuro exams   - Maintain SBP < 140  - Glucose < 180   - Platelets > 100,000  - INR < 1.5  - Hemoglobin > 8  - CT CAP to r/o metastatic disease     #Vision changes  #Acute Ischemic Optic Neuropathy  Acute vision changes beginning on 2/22/25 iso HTN, HLD, T2DM. After discussion with neuro-ophthalmologist,  vision change likely due to acute ischemic optic neuropathy and most likely unrelated to the incidental L frontal lobe lesion found on MRI.   - CTA, CTV  - CT CAP as recommended by NSGY to r/o metastatic disease  - updated LDL  - last A1c in 12/30/24 was 6.4%   - Cont. PTA atorvastatin   - plan for ASA post-LP   - Ophthalmology consult, recs appreciated    #T2DM on insulin  Last A1c 6.4 on 12/30/24.    - Medium dose sliding scale insulin  - PTA empagliflozin 25 mg QD, metformin 1000 mg BID  - Hold PTA semaglutide    #Hypertension  - PTA losartan 50 mg every day  - lipid panel ordered    #Hyperlipidemia  - PTA atorvastatin 10 mg every day    #PAOLA  - CPAP at night    FEN: Regular diet  Malnutrition: Patient does not meet two of the above criteria necessary for diagnosing malnutrition  PPx: Ambulation  Code: Full    Patient was seen and discussed with Dr. Hatfield.    Vianca Dc   MS4    Sallie Simons DO, MS   PGY-1 Neurology Resident   [facility]

## 2025-02-25 NOTE — CONSULTS
"[facility]       NEUROSURGERY CONSULTATION NOTE    This consultation was requested by Dr. Frank from the Emergency medicine service.    Reason for Consultation: New left frontal lesion    HPI: Jeffry Younger is a right handed 44 year old male with past medical history of hypertension and diabetes mellitus type 2. Patient was seen by Ophthalmology on 2/24/2025 for acute left vision changes. 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 MRI brain was ordered and revealed left frontal lobe lesion.  Patient presents to [facility] Emergency department for further evaluation of new finding on MRI. Neurosurgery was consulted for further management. Patient non-smoker and does not take antiplatelet or anticoagulant medications.     Upon exam patient reports his central vision is intact but is surrounded by \"blurred spots\" or blurriness. Patient's vision becomes more blurry when he attempts to look left. Left temporal field cut present. His peripheral vision is decreased on the left when compared to his right. Patient denies any headaches, dizziness, lightheadedness, nausea or weakness. Patient denies recent fevers, chills, nausea, vomiting, chest pain, shortness of breath, numbness/weakness/paresthesias in extremities, changes in sensation, taste, smell, nor trouble speaking or other neurologic symptoms.     Differential diagnosis would include but not limited to demyelinating diease such as tumefactive multiple sclerosis, atypical optic neuritis, lymphoma or glioma        PAST MEDICAL HISTORY:   Past Medical History:   Diagnosis Date    Diabetes (H) July 2018    Treating metformin    Essential hypertension 6/13/2024    Obesity        PAST SURGICAL HISTORY:   Past Surgical History:   Procedure Laterality Date    AS ESOPHAGOSCOPY, DIAGNOSTIC      EGD    BIOPSY  2008    egd normal       FAMILY HISTORY:   Family History "   Problem Relation Age of Onset    Diabetes Father     Hypertension Father     Other Cancer Father         Kidney & Thyroid    Cerebrovascular Disease Paternal Grandfather     Other Cancer Brother         Lukemia    Other Cancer Maternal Grandmother         Pancreatic    Other Cancer Maternal Grandfather         Lung - smoker    Colon Cancer No family hx of     Prostate Cancer No family hx of     Cerebrovascular Disease No family hx of     Coronary Artery Disease No family hx of        SOCIAL HISTORY:   Social History     Tobacco Use    Smoking status: Never     Passive exposure: Never    Smokeless tobacco: Never   Substance Use Topics    Alcohol use: Yes     Comment: 1 per month maybe       MEDICATIONS:  (Not in a hospital admission)      Allergies:  No Known Allergies    ROS: 10 point ROS of systems including Constitutional, Eyes, Respiratory, Cardiovascular, Gastroenterology, Genitourinary, Integumentary, Muscularskeletal, Psychiatric were all negative except for pertinent positives noted in my HPI.    Physical exam:   Blood pressure (!) 156/89, pulse 80, temperature 98.5  F (36.9  C), temperature source Oral, resp. rate 18, SpO2 98%.  CV: RRR  PULM: breathing comfortably on room   ABD: soft, non-distended  NEUROLOGIC:  -- Awake; Alert; oriented x 3  -- Follows commands briskly  -- Speech fluent, spontaneous. No aphasia or dysarthria.  -- no gaze preference. No apparent hemineglect.  Cranial Nerves:  -- visual fields full to confrontation, PERRL 3-2mm bilat and brisk, extraocular movements intact  -- Left sided hemianopia/temporal field cut present  -- face symmetrical, tongue midline  -- sensory V1-V3 intact bilaterally  -- palate elevates symmetrically, uvula midline  -- hearing grossly intact bilat  -- Trapezii 5/5 strength bilat symmetric    Motor:  Normal bulk / tone; no tremor, rigidity, or bradykinesia.  No muscle wasting or fasciculations  No Pronator Drift     Delt Bi Tri Hand Flexion/  Extension  Iliopsoas Quadriceps Hamstrings Tibialis Anterior Gastroc    C5 C6 C7 C8/T1 L2 L3 L4-S1 L4 S1   R 5 5 5 5 5 5 5 5 5   L 5 5 5 5 5 5 5 5 5   Sensory:  intact to LT x 4 extremities     Reflexes:     Bi Tri BR Dae Pat Ach Bab    C5-6 C7-8 C6 UMN L2-4 S1 UMN   R 2+ 2+ 2+ Norm 2+ 2+ Norm   L 2+ 2+ 2+ Norm 2+ 2+ Norm     Gait: Normal.       LABS:  Last Comprehensive Metabolic Panel:  Sodium   Date Value Ref Range Status   02/25/2025 138 135 - 145 mmol/L Final   08/24/2020 135 133 - 144 mmol/L Final     Potassium   Date Value Ref Range Status   02/25/2025 4.8 3.4 - 5.3 mmol/L Final   04/18/2022 4.3 3.4 - 5.3 mmol/L Final   08/24/2020 4.1 3.4 - 5.3 mmol/L Final     Chloride   Date Value Ref Range Status   02/25/2025 102 98 - 107 mmol/L Final   04/18/2022 106 94 - 109 mmol/L Final   08/24/2020 103 94 - 109 mmol/L Final     Carbon Dioxide   Date Value Ref Range Status   08/24/2020 22 20 - 32 mmol/L Final     Carbon Dioxide (CO2)   Date Value Ref Range Status   02/25/2025 22 22 - 29 mmol/L Final   04/18/2022 24 20 - 32 mmol/L Final     Anion Gap   Date Value Ref Range Status   02/25/2025 14 7 - 15 mmol/L Final   04/18/2022 8 3 - 14 mmol/L Final   08/24/2020 10 3 - 14 mmol/L Final     Glucose   Date Value Ref Range Status   02/25/2025 176 (H) 70 - 99 mg/dL Final   04/18/2022 152 (H) 70 - 99 mg/dL Final   08/24/2020 191 (H) 70 - 99 mg/dL Final     Comment:     Fasting specimen     Urea Nitrogen   Date Value Ref Range Status   02/25/2025 17.5 6.0 - 20.0 mg/dL Final   04/18/2022 16 7 - 30 mg/dL Final   08/24/2020 15 7 - 30 mg/dL Final     Creatinine   Date Value Ref Range Status   02/25/2025 0.95 0.67 - 1.17 mg/dL Final   08/24/2020 0.90 0.66 - 1.25 mg/dL Final     GFR Estimate   Date Value Ref Range Status   02/25/2025 >90 >60 mL/min/1.73m2 Final     Comment:     eGFR calculated using 2021 CKD-EPI equation.   08/24/2020 >90 >60 mL/min/[1.73_m2] Final     Comment:     Non  GFR Calc  Starting 12/18/2018, serum  creatinine based estimated GFR (eGFR) will be   calculated using the Chronic Kidney Disease Epidemiology Collaboration   (CKD-EPI) equation.       Calcium   Date Value Ref Range Status   02/25/2025 9.3 8.8 - 10.4 mg/dL Final   08/24/2020 9.9 8.5 - 10.1 mg/dL Final     Lab Results   Component Value Date    WBC 9.9 02/25/2025    WBC 9.3 03/22/2018     Lab Results   Component Value Date    RBC 5.80 02/25/2025     Lab Results   Component Value Date    HGB 16.6 02/25/2025     Lab Results   Component Value Date    HCT 51.4 02/25/2025     Lab Results   Component Value Date    MCV 89 02/25/2025     Lab Results   Component Value Date    MCH 28.6 02/25/2025     Lab Results   Component Value Date    MCHC 32.3 02/25/2025     Lab Results   Component Value Date    RDW 13.3 02/25/2025     Lab Results   Component Value Date     02/25/2025         IMAGING:  EXAM: MR BRAIN AND ORBITS W/O and W CONTRAST  LOCATION: [facility]  DATE: 2/25/2025     INDICATION: optic neuropathy left eye  COMPARISON: None available  CONTRAST: Gadavist 17mL  TECHNIQUE:  1) Routine multiplanar multisequence head MRI without and with intravenous contrast.  2) Dedicated high-resolution multiplanar multisequence MRI of the orbits without and with intravenous contrast.     FINDINGS:  INTRACRANIAL CONTENTS: There is an expansile T2-weighted/FLAIR hyperintense lesion involving the left frontal lobe overall area measuring approximately 3.6 x 4.0 x 4.1 cm (series 7, image 20 and series 105, image 7). There is patchy amorphous enhancement   along the medial and posterior margins. There is also additional T2-weighted/FLAIR hyperintensity along the margins of the mass. This results in 0.2 cm rightward shift of the midline structures. There is mild mass effect upon the left lateral ventricle.   No acute or subacute infarct. Normal position of the cerebellar tonsils.     SELLA: No abnormality accounting for technique.     OSSEOUS  STRUCTURES/SOFT TISSUES: Normal marrow signal. The major intracranial vascular flow voids are maintained.      ORBITS: There is mild cupping and enhancement of the optic disc on the left (series 4, image 13).      SINUSES/MASTOIDS: No paranasal sinus mucosal disease. No middle ear or mastoid effusion.                                                                       IMPRESSION:  1.  There is an expansile T2-weighted/FLAIR hyperintense lesion involving the left frontal lobe measuring approximately 3.6 x 4.0 x 4.1 cm. There is patchy amorphous enhancement along the medial and posterior margins of the mass. There is also additional   T2-weighted/FLAIR hyperintensity along the margins of the mass. 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  2.  There is mild mass effect upon the left lateral ventricle and 0.2 cm rightward shift of midline structures.  3.  There is mild cupping of the left optic disc which can be seen in the setting of papilledema.       ASSESSMENT:  Jeffry Younger is a 44 year old male with past medical history of hypertension and diabetes mellitus type 2. Patient was seen by Ophthalmology on 2/24/2025 for approximately 1 week of left vision changes. 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 MRI brain was ordered and revealed left frontal lobe lesion.      Differential diagnosis would include but not limited to demyelinating diease such as tumefactive multiple sclerosis, atypical optic neuritis, lymphoma or glioma    Clinically Significant Risk Factors Present on Admission                   # Hypertension: Noted on problem list    # Diabetes Mellitus   # Compression of brain and # Cerebral edema         RECOMMENDATIONS:  Admit to Medicine or Neurology  No urgent neurosurgical intervention indicated at this time   Every 4 hour neuro exams   Pain control per primary  team  Maintain SBP < 140  Glucose < 180   Platelets > 100,000  INR < 1.5  Hemoglobin > 8  CT CAP to r/o metastatic disease   Neurology consult for concerns of atypical optic neuritis (ordered for you)  Would defer steroids to Neurology  No contraindication from Neurosurgery perspective for lumbar puncture       The patient was discussed with Dr. Aguirre, neurosurgery chief resident, and Dr. Ulrich, neurosurgery staff, and they agree with the above.      Haris Izaguirre, APRN, CNP  Neurosurgery  Pager [phone]

## 2025-02-25 NOTE — TELEPHONE ENCOUNTER
[facility] Call Center    Phone Message    May a detailed message be left on voicemail: yes     Reason for Call: Other: Dr. Hernandez [facility] would like to speak with Dr. Arteaga regarding critical MRI results.  Please call [phone].  Thank you.     Action Taken: Message routed to:  [facility] ([facility] Ophthalmology    Travel Screening: Not Applicable     Date of Service:               Spoke to Dr. 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  He had already told the tech to send the patient to the ED. I asked if the patient could come to the [place] ED. He will let the tech know.

## 2025-02-26 ENCOUNTER — APPOINTMENT (OUTPATIENT)
Dept: GENERAL RADIOLOGY | Facility: CLINIC | Age: 45
DRG: 070 | End: 2025-02-26
Payer: COMMERCIAL

## 2025-02-26 ENCOUNTER — APPOINTMENT (OUTPATIENT)
Dept: MRI IMAGING | Facility: CLINIC | Age: 45
DRG: 070 | End: 2025-02-26
Payer: COMMERCIAL

## 2025-02-26 ENCOUNTER — APPOINTMENT (OUTPATIENT)
Dept: OCCUPATIONAL THERAPY | Facility: CLINIC | Age: 45
DRG: 070 | End: 2025-02-26
Payer: COMMERCIAL

## 2025-02-26 ENCOUNTER — APPOINTMENT (OUTPATIENT)
Dept: ULTRASOUND IMAGING | Facility: CLINIC | Age: 45
DRG: 070 | End: 2025-02-26
Payer: COMMERCIAL

## 2025-02-26 LAB
ANION GAP SERPL CALCULATED.3IONS-SCNC: 14 MMOL/L (ref 7–15)
APPEARANCE CSF: CLEAR
BUN SERPL-MCNC: 17.2 MG/DL (ref 6–20)
C GATTII+NEOFOR DNA CSF QL NAA+NON-PROBE: NEGATIVE
CALCIUM SERPL-MCNC: 9.1 MG/DL (ref 8.8–10.4)
CHLORIDE SERPL-SCNC: 104 MMOL/L (ref 98–107)
CHOLEST SERPL-MCNC: 119 MG/DL
CMV DNA CSF QL NAA+NON-PROBE: NEGATIVE
COLOR CSF: COLORLESS
CREAT SERPL-MCNC: 0.84 MG/DL (ref 0.67–1.17)
E COLI K1 AG CSF QL: NEGATIVE
EGFRCR SERPLBLD CKD-EPI 2021: >90 ML/MIN/1.73M2
ERYTHROCYTE [DISTWIDTH] IN BLOOD BY AUTOMATED COUNT: 13.2 % (ref 10–15)
ERYTHROCYTE [SEDIMENTATION RATE] IN BLOOD BY WESTERGREN METHOD: 7 MM/HR (ref 0–15)
EV RNA SPEC QL NAA+PROBE: NEGATIVE
GLUCOSE BLDC GLUCOMTR-MCNC: 130 MG/DL (ref 70–99)
GLUCOSE BLDC GLUCOMTR-MCNC: 133 MG/DL (ref 70–99)
GLUCOSE BLDC GLUCOMTR-MCNC: 161 MG/DL (ref 70–99)
GLUCOSE CSF-MCNC: 95 MG/DL (ref 40–70)
GLUCOSE SERPL-MCNC: 164 MG/DL (ref 70–99)
GP B STREP DNA CSF QL NAA+NON-PROBE: NEGATIVE
HAEM INFLU DNA CSF QL NAA+NON-PROBE: NEGATIVE
HCO3 SERPL-SCNC: 20 MMOL/L (ref 22–29)
HCT VFR BLD AUTO: 47 % (ref 40–53)
HDLC SERPL-MCNC: 34 MG/DL
HGB BLD-MCNC: 16.3 G/DL (ref 13.3–17.7)
HHV6 DNA CSF QL NAA+NON-PROBE: NEGATIVE
HSV1 DNA CSF QL NAA+NON-PROBE: NEGATIVE
HSV2 DNA CSF QL NAA+NON-PROBE: NEGATIVE
L MONOCYTOG DNA CSF QL NAA+NON-PROBE: NEGATIVE
LDLC SERPL CALC-MCNC: 67 MG/DL
MAGNESIUM SERPL-MCNC: 2.1 MG/DL (ref 1.7–2.3)
MCH RBC QN AUTO: 29.1 PG (ref 26.5–33)
MCHC RBC AUTO-ENTMCNC: 34.7 G/DL (ref 31.5–36.5)
MCV RBC AUTO: 84 FL (ref 78–100)
N MEN DNA CSF QL NAA+NON-PROBE: NEGATIVE
NONHDLC SERPL-MCNC: 85 MG/DL
PARECHOVIRUS A RNA CSF QL NAA+NON-PROBE: NEGATIVE
PATH REPORT.COMMENTS IMP SPEC: NORMAL
PATH REPORT.COMMENTS IMP SPEC: NORMAL
PATH REPORT.FINAL DX SPEC: NORMAL
PATH REPORT.GROSS SPEC: NORMAL
PATH REPORT.MICROSCOPIC SPEC OTHER STN: NORMAL
PATH REPORT.RELEVANT HX SPEC: NORMAL
PHOSPHATE SERPL-MCNC: 4 MG/DL (ref 2.5–4.5)
PLATELET # BLD AUTO: 208 10E3/UL (ref 150–450)
POTASSIUM SERPL-SCNC: 4.4 MMOL/L (ref 3.4–5.3)
PROT CSF-MCNC: 116 MG/DL (ref 15–45)
RBC # BLD AUTO: 5.6 10E6/UL (ref 4.4–5.9)
RBC # CSF MANUAL: 0 /UL (ref 0–2)
S PNEUM DNA CSF QL NAA+NON-PROBE: NEGATIVE
SODIUM SERPL-SCNC: 138 MMOL/L (ref 135–145)
TRIGL SERPL-MCNC: 92 MG/DL
TUBE # CSF: 4
VZV DNA CSF QL NAA+NON-PROBE: NEGATIVE
WBC # BLD AUTO: 9.7 10E3/UL (ref 4–11)
WBC # CSF MANUAL: 0 /UL (ref 0–5)

## 2025-02-26 PROCEDURE — 76770 US EXAM ABDO BACK WALL COMP: CPT | Mod: 26 | Performed by: RADIOLOGY

## 2025-02-26 PROCEDURE — 99233 SBSQ HOSP IP/OBS HIGH 50: CPT | Mod: GC | Performed by: STUDENT IN AN ORGANIZED HEALTH CARE EDUCATION/TRAINING PROGRAM

## 2025-02-26 PROCEDURE — 83735 ASSAY OF MAGNESIUM: CPT | Performed by: STUDENT IN AN ORGANIZED HEALTH CARE EDUCATION/TRAINING PROGRAM

## 2025-02-26 PROCEDURE — 85027 COMPLETE CBC AUTOMATED: CPT

## 2025-02-26 PROCEDURE — 120N000002 HC R&B MED SURG/OB UMMC

## 2025-02-26 PROCEDURE — 88185 FLOWCYTOMETRY/TC ADD-ON: CPT

## 2025-02-26 PROCEDURE — 82945 GLUCOSE OTHER FLUID: CPT

## 2025-02-26 PROCEDURE — 250N000012 HC RX MED GY IP 250 OP 636 PS 637

## 2025-02-26 PROCEDURE — 82784 ASSAY IGA/IGD/IGG/IGM EACH: CPT

## 2025-02-26 PROCEDURE — 62328 DX LMBR SPI PNXR W/FLUOR/CT: CPT | Mod: GC | Performed by: STUDENT IN AN ORGANIZED HEALTH CARE EDUCATION/TRAINING PROGRAM

## 2025-02-26 PROCEDURE — 80048 BASIC METABOLIC PNL TOTAL CA: CPT

## 2025-02-26 PROCEDURE — 250N000009 HC RX 250: Performed by: STUDENT IN AN ORGANIZED HEALTH CARE EDUCATION/TRAINING PROGRAM

## 2025-02-26 PROCEDURE — 88108 CYTOPATH CONCENTRATE TECH: CPT | Mod: 26 | Performed by: PATHOLOGY

## 2025-02-26 PROCEDURE — 250N000011 HC RX IP 250 OP 636

## 2025-02-26 PROCEDURE — A9585 GADOBUTROL INJECTION: HCPCS

## 2025-02-26 PROCEDURE — 62328 DX LMBR SPI PNXR W/FLUOR/CT: CPT

## 2025-02-26 PROCEDURE — 87483 CNS DNA AMP PROBE TYPE 12-25: CPT

## 2025-02-26 PROCEDURE — 89050 BODY FLUID CELL COUNT: CPT

## 2025-02-26 PROCEDURE — 97165 OT EVAL LOW COMPLEX 30 MIN: CPT | Mod: GO

## 2025-02-26 PROCEDURE — 97530 THERAPEUTIC ACTIVITIES: CPT | Mod: GO

## 2025-02-26 PROCEDURE — 88108 CYTOPATH CONCENTRATE TECH: CPT | Mod: TC

## 2025-02-26 PROCEDURE — 76770 US EXAM ABDO BACK WALL COMP: CPT

## 2025-02-26 PROCEDURE — 70553 MRI BRAIN STEM W/O & W/DYE: CPT | Mod: 26 | Performed by: RADIOLOGY

## 2025-02-26 PROCEDURE — 255N000002 HC RX 255 OP 636

## 2025-02-26 PROCEDURE — 85652 RBC SED RATE AUTOMATED: CPT | Performed by: PSYCHIATRY & NEUROLOGY

## 2025-02-26 PROCEDURE — 80061 LIPID PANEL: CPT

## 2025-02-26 PROCEDURE — 36415 COLL VENOUS BLD VENIPUNCTURE: CPT

## 2025-02-26 PROCEDURE — 88188 FLOWCYTOMETRY/READ 9-15: CPT | Performed by: STUDENT IN AN ORGANIZED HEALTH CARE EDUCATION/TRAINING PROGRAM

## 2025-02-26 PROCEDURE — 70553 MRI BRAIN STEM W/O & W/DYE: CPT

## 2025-02-26 PROCEDURE — 250N000013 HC RX MED GY IP 250 OP 250 PS 637

## 2025-02-26 PROCEDURE — 84157 ASSAY OF PROTEIN OTHER: CPT

## 2025-02-26 PROCEDURE — 84100 ASSAY OF PHOSPHORUS: CPT | Performed by: STUDENT IN AN ORGANIZED HEALTH CARE EDUCATION/TRAINING PROGRAM

## 2025-02-26 PROCEDURE — 009U3ZX DRAINAGE OF SPINAL CANAL, PERCUTANEOUS APPROACH, DIAGNOSTIC: ICD-10-PCS | Performed by: STUDENT IN AN ORGANIZED HEALTH CARE EDUCATION/TRAINING PROGRAM

## 2025-02-26 RX ORDER — LIDOCAINE HYDROCHLORIDE 10 MG/ML
30 INJECTION, SOLUTION EPIDURAL; INFILTRATION; INTRACAUDAL; PERINEURAL ONCE
Status: COMPLETED | OUTPATIENT
Start: 2025-02-26 | End: 2025-02-26

## 2025-02-26 RX ORDER — ASPIRIN 81 MG/1
81 TABLET, CHEWABLE ORAL DAILY
Status: DISPENSED | OUTPATIENT
Start: 2025-02-26

## 2025-02-26 RX ORDER — GADOBUTROL 604.72 MG/ML
10 INJECTION INTRAVENOUS ONCE
Status: COMPLETED | OUTPATIENT
Start: 2025-02-26 | End: 2025-02-26

## 2025-02-26 RX ADMIN — ONDANSETRON 4 MG: 4 TABLET, ORALLY DISINTEGRATING ORAL at 08:02

## 2025-02-26 RX ADMIN — LIDOCAINE HYDROCHLORIDE 6 ML: 10 INJECTION, SOLUTION EPIDURAL; INFILTRATION; INTRACAUDAL; PERINEURAL at 08:55

## 2025-02-26 RX ADMIN — METFORMIN ER 500 MG 1000 MG: 500 TABLET ORAL at 19:11

## 2025-02-26 RX ADMIN — ASPIRIN 81 MG CHEWABLE TABLET 81 MG: 81 TABLET CHEWABLE at 14:38

## 2025-02-26 RX ADMIN — INSULIN ASPART 1 UNITS: 100 INJECTION, SOLUTION INTRAVENOUS; SUBCUTANEOUS at 19:11

## 2025-02-26 RX ADMIN — LOSARTAN POTASSIUM 50 MG: 50 TABLET, FILM COATED ORAL at 10:50

## 2025-02-26 RX ADMIN — GADOBUTROL 10 ML: 604.72 INJECTION INTRAVENOUS at 02:09

## 2025-02-26 RX ADMIN — EMPAGLIFLOZIN 25 MG: 25 TABLET, FILM COATED ORAL at 10:51

## 2025-02-26 RX ADMIN — METFORMIN ER 500 MG 1000 MG: 500 TABLET ORAL at 10:50

## 2025-02-26 RX ADMIN — INSULIN ASPART 1 UNITS: 100 INJECTION, SOLUTION INTRAVENOUS; SUBCUTANEOUS at 10:08

## 2025-02-26 RX ADMIN — ATORVASTATIN CALCIUM 10 MG: 10 TABLET, FILM COATED ORAL at 10:50

## 2025-02-26 ASSESSMENT — ACTIVITIES OF DAILY LIVING (ADL)
ADLS_ACUITY_SCORE: 34
ADLS_ACUITY_SCORE: 33
ADLS_ACUITY_SCORE: 34
ADLS_ACUITY_SCORE: 33
ADLS_ACUITY_SCORE: 34
ADLS_ACUITY_SCORE: 34
ADLS_ACUITY_SCORE: 33
ADLS_ACUITY_SCORE: 33
ADLS_ACUITY_SCORE: 34
ADLS_ACUITY_SCORE: 33
ADLS_ACUITY_SCORE: 34
ADLS_ACUITY_SCORE: 34
ADLS_ACUITY_SCORE: 33
ADLS_ACUITY_SCORE: 34
ADLS_ACUITY_SCORE: 33
ADLS_ACUITY_SCORE: 34
PREVIOUS_RESPONSIBILITIES: MEAL PREP;HOUSEKEEPING;LAUNDRY;SHOPPING;YARDWORK;DRIVING;WORK
ADLS_ACUITY_SCORE: 34

## 2025-02-26 NOTE — PROCEDURES
[facility] Procedure Note          Lumbar Puncture:      Time: 9:32 AM  Performed by: Shine Cook MD  Authorized by: Shine Cook MD    Indications: CNS neuplasm vs tumefactive demyelination    Consent given by: Patient who states understanding of the procedure being performed after discussing the risks, benefits and alternatives.    Prior to the start of the procedure and with procedural staff participation, I verbally confirmed the patient s identity using two indicators, relevant allergies, that the procedure was appropriate and matched the consent or emergent situation, and that the correct equipment/implants were available. Immediately prior to starting the procedure I conducted the Time Out with the procedural staff and re-confirmed the patient s name, procedure, and site/side. (The Joint Commission universal protocol was followed.) Yes    Under sterile conditions the patient was positioned . Betadine solution and sterile drapes were utilized.  Local anesthetic at the site: 2 ml of lidocaine 1% without epinephrine from the LP tray  A 22 G 5 inch spinal needle was inserted at the L 3-4 interspace.  Opening Pressure 15 cm H2O pressure.  A total of 14 mL of clear and colorless spinal fluid was obtained and sent to the laboratory.   After the needle was removed, a bandaid and pressure were applied and the patient was instructed to stay horizontal until the results were back.    Complications:  None    Patient tolerance: Patient tolerated the procedure well with no immediate complications.

## 2025-02-26 NOTE — PROGRESS NOTES
[facility]  General Neurology - Progress Note    Patient Name:  Jeffry Younger  Date of Service:  February 26, 2025    Subjective:    No acute overnight events. Some soreness from post-lumbar puncture supine positioning but no pain from the LP itself. Discussed current differential and plan with patient and spouse at length. All current questions answered.     Objective:    Vitals: BP (!) 141/92 (BP Location: Left arm)   Pulse 98   Temp 98  F (36.7  C) (Oral)   Resp 16   SpO2 98%   General: Lying in bed, NAD  Head: Atraumatic, normocephalic   Cardiac: no lower extremity edema  Neurologic:  Mental Status: Fully alert, attentive and oriented. Speech clear and fluent.   Cranial Nerves: EOM intact, without nystagmus. Facial movements symmetric at rest and with activation. Hearing intact to voice. No dysarthria.    Motor: No abnormal movements.  Moving all 4 extremities antigravity.   Reflexes: 2+ and symmetric biceps, brachioradialis, patellar, and achilles. Negative Johnson, no clonus, toes down-going.  Sensory: Intact to light touch x 4 extremities. No sensory neglect noted.    Station/Gait: Not assessed    Pertinent Investigations:    I have personally reviewed most recent and pertinent labs, tests, and radiological images.     Notable labs:  LDL 67  Sed rate 7    CSF:  Protein 116.0  Glucose 95  Opening pressure 15 cm    Meningitis/encephalitis panel negative    2/26 MR Brain Perfusion w/wo  IMPRESSION:  1. 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  2. Perfusion imaging demonstrates no elevated relative cerebral blood  volume.    2/25  Impression:    1. Head CTA demonstrates no aneurysm or stenosis of the major  intracranial arteries.   2. Neck CTA demonstrates no stenosis of the major cervical arteries.  3. Head CTV demonstrates no significant stenosis or filling defects  within the dural venous sinus  system.    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  Recommend MR tumor protocol with perfusion. Agree with minimal lateral  ventricle mass effect and 2 mm of rightward midline shift. Left  papilledema.    2/25 CT CAP  1.  A 2.2 cm intermediate density lesion in the right kidney  interpolar region could represent a neoplasm or hemorrhagic cyst.  Recommend renal ultrasound for further evaluation and if unable to  differentiate on ultrasound, MRI may be done.    2.  No evidence of metastasis in the chest, abdomen or pelvis.    Assessment:  44-year-old male with a history of HTN and T2DM presenting with washed out appearance and distortion of left inferior visual field since 2/22/25. He was found to have left optic disc edema in ophthalmology clinic 2/23/25 with a differential including acute optic neuropathy and atypical optic neuritis. After imaging obtained and discussion with neuro-ophthalmology, feel likely that vision changes are likely due to acute ischemic optic neuropathy. Work up for cause has been unremarkable, with LDL 67, no aneurysm or stenosis of the major intracranial or cervical arteries seen on CTA, and no stenosis/filling defects of dural venous sinuses noted on CTV. However, has had longstanding risk factors for small vessel ischemic disease including HTN, HLD, and T2DM, for which A1c was most recently 6.4%. No echocardiogram indicated at this time.     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 Neurosurgery consulted. LP obtained with neuro-IR 2/26/25 to further differentiate. MR with perfusion completed per Neurosurgery recommendation showing no elevated relative cerebral blood volume. Possible etiology for brain lesion pending LP results and may require biopsy  in future, differential includes aforementioned neoplastic primary vs. Secondary, vs. Demyelinating disease. Unlikely to be subacute stroke given lack of negative symptoms and do not feel it is of venous etiology given normal CTV.       Plan:  #Left frontal lobe lesion   - Admit to neurology for further workup  - IR consulted for lumbar puncture, completed 2/26  - LP orders: oligoclonal bands, cytology, cytometry, meningitis/encephalitis panel, cell count with diff, protein, glucose  - Protein elevated at 116 mg/dl, negative meningitis/encephalitis panel  - Serum MOG, IgG, and aquaporin 4 IgG ordered by outpatient ophthalmology  - Holding steroids until LP obtained, will discuss plan for steroids with NSGY   - Neurosurgery following, recs appreciated  - Q4H neuro exams   - Maintain SBP < 140  - Glucose < 180   - Platelets > 100,000  - INR < 1.5  - Hemoglobin > 8      #Vision changes  #Acute Ischemic Optic Neuropathy  Acute vision changes beginning on 2/22/25 iso HTN, HLD, T2DM. After discussion with neuro-ophthalmologist, vision change likely due to acute ischemic optic neuropathy and most likely unrelated to the incidental L frontal lobe lesion found on MRI, though not ruled out completely.   - CTA, CTV negative for vessel aneurysm or stenosis  - updated LDL 67  - last A1c in 12/30/24 was 6.4%   - Cont. PTA atorvastatin   - ASA 81 mg QD  - Ophthalmology consult, recs appreciated  - Holding PTA semaglutide given potential association with increased risk of acute ischemic optic neuropathy    #Right kidney lesion  CT CAP obtained to assess for metastases revealed 2.2 cm lesion in the right kidney. Father with a history of renal cell carcinoma. Per radiology report, differential includes hemorrhagic cyst versus neoplasm.  - Renal ultrasound ordered     #T2DM on insulin  Last A1c 6.4 on 12/30/24.    - Medium dose sliding scale insulin  - PTA empagliflozin 25 mg QD, metformin 1000 mg BID  - Hold PTA semaglutide      #Hypertension  Systolic BP ranging between 140-160s most regularly in last 24 hours.   - PTA losartan 50 mg every day     #Hyperlipidemia  LDL 67.  - PTA atorvastatin 10 mg every day     #PAOLA  - CPAP at night    FEN: Regular diet  Malnutrition: Patient does not meet two of the above criteria necessary for diagnosing malnutrition  PPx:Ambulation, SCDs  Code: Full    Patient was seen and discussed with Dr. Abreu.    Vianca Dc   MS4    Sallie Simons DO, MS  PGY-1 Neurology Resident   [facility]

## 2025-02-26 NOTE — PROGRESS NOTES
02/26/25 1400   Appointment Info   Signing Clinician's Name / Credentials (OT) CARISSA Patel   Student Supervision On-site supervision provided   Rehab Comments (OT) L eye blurred vision   Living Environment   People in Home spouse   Current Living Arrangements house   Home Accessibility stairs to enter home;stairs within home   Number of Stairs, Main Entrance 2   Stair Railings, Main Entrance railing on left side (ascending)   Number of Stairs, Within Home, Primary ten   Stair Railings, Within Home, Primary railing on right side (ascending)   Transportation Anticipated family or friend will provide   Living Environment Comments Pt lives in 2 story house with SO. Pt reports not needing to navigate stairs within home to get to [facility], however enjoys spending time in his basement and would like to navigate stairs upon dc. Pt reports having a walk-in shower.   Self-Care   Usual Activity Tolerance good   Current Activity Tolerance moderate   Regular Exercise No   Equipment Currently Used at Home none   Fall history within last six months no   Activity/Exercise/Self-Care Comment Pt reports being IND in ADL tasks, no AD for functional mobility prior to hospitalization.   Instrumental Activities of Daily Living (IADL)   Previous Responsibilities meal prep;housekeeping;laundry;shopping;yardwork;driving;work   IADL Comments Pt reports being IND in IADLs prior to hospitalization. Pt is working full-time as an , was driving self, and managed all other household responsibilities with SO. Reports SO is an MD, will be able to assist at time of discharge.   General Information   Onset of Illness/Injury or Date of Surgery 02/25/25   Referring Physician Sallie Simons,    Patient/Family Therapy Goal Statement (OT) Return to home   Additional Occupational Profile Info/Pertinent History of Current Problem Per chart, Jeffry Younger is a 44 year old male with past medical history of hypertension and diabetes  "mellitus type 2. Patient was seen by Ophthalmology on 2/24/2025 for approximately 1 week of left vision changes. 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 MRI brain was ordered and revealed left frontal lobe lesion.   Existing Precautions/Restrictions fall   Limitations/Impairments visual;other (see comments)  (L eye blurred vision)   Left Upper Extremity (Weight-bearing Status) full weight-bearing (FWB)   Right Upper Extremity (Weight-bearing Status) full weight-bearing (FWB)   Left Lower Extremity (Weight-bearing Status) full weight-bearing (FWB)   Right Lower Extremity (Weight-bearing Status) full weight-bearing (FWB)   General Observations and Info Activity: Up ad maggie   Cognitive Status Examination   Orientation Status orientation to person, place and time   Visual Perception   Visual Impairment/Limitations corrective lenses for distance;corrective lenses for reading;visual/perceptual impairments present   Impact of Vision Impairment on Function (Vision) Pt reports having L eye blurriness and \"figures\" occluding peripheral (L temporal) visual field. Pt reports it has not yet impacted his ability to get around as it is something he \"is learning to live with\".   Sensory   Sensory Quick Adds sensation intact   Pain Assessment   Patient Currently in Pain No   Posture   Posture not impaired   Posture Comments Sitting EOB   Range of Motion Comprehensive   Comment, General Range of Motion B UE AROM appears WFL during functional reaching, Not formally assessed.   Strength Comprehensive (MMT)   Comment, General Manual Muscle Testing (MMT) Assessment B UE at risk for deconditioning due to hospitalization   Muscle Tone Assessment   Muscle Tone Quick Adds No deficits were identified   Coordination   Upper Extremity Coordination No deficits were identified   Bed Mobility   Bed Mobility supine-sit   Supine-Sit [place] (Bed Mobility) supervision   Comment (Bed " Mobility) Per clinical judgement   Transfers   Transfers sit-stand transfer;toilet transfer;bed-chair transfer;shower transfer   Transfer Skill: Bed to Chair/Chair to Bed   Bed-Chair [place] (Transfers) supervision   Transfer Comments Per clinical judgement   Sit-Stand Transfer   Sit-Stand [place] (Transfers) supervision   Sit/Stand Transfer Comments from EOB   Shower Transfer   Type (Shower Transfer) lateral   [place] Level (Shower Transfer) supervision   Shower Transfer Comments Per clinical judgement   Toilet Transfer   Type (Toilet Transfer) sit-stand   [place] Level (Toilet Transfer) supervision   Toilet Transfer Comments Per clinical judgement.   Balance   Balance Assessment no deficits identified   Activities of Daily Living   BADL Assessment/Intervention bathing;upper body dressing;lower body dressing;grooming;toileting   Bathing Assessment/Intervention   Position (Bathing) unsupported standing   [place] Level (Bathing) supervision   Comment, (Bathing) Per clinical judgement   Upper Body Dressing Assessment/Training   Position (Upper Body Dressing) unsupported standing   Comment, (Upper Body Dressing) Per clinical judgement.   [place] Level (Upper Body Dressing) supervision   Lower Body Dressing Assessment/Training   Position (Lower Body Dressing) unsupported standing   Comment, (Lower Body Dressing) Per clinical judgement.   [place] Level (Lower Body Dressing) supervision   Grooming Assessment/Training   Position (Grooming) unsupported standing   [place] Level (Grooming) supervision   Comment, (Grooming) Per clinical judgement.   Toileting   Comment, (Toileting) Per clinical judgement.   [place] Level (Toileting) supervision   Clinical Impression   Criteria for Skilled Therapeutic Interventions Met (OT) Yes, treatment indicated   OT Diagnosis Decreased IND in ADL/safety, IADL tasks, functional mobility, visual deficits, and overall activity tolerance   OT Problem  List-Impairments impacting ADL problems related to;activity tolerance impaired;balance;strength;vision   Assessment of Occupational Performance 3-5 Performance Deficits   Identified Performance Deficits Environmental navigation, standing tolerance, g/h, item retrieval, visual deficits   Planned Therapy Interventions (OT) ADL retraining;IADL retraining;strengthening;progressive activity/exercise;visual perception;balance training;cognition;neuromuscular re-education;ROM   Clinical Decision Making Complexity (OT) problem focused assessment/low complexity   Risk & Benefits of therapy have been explained evaluation/treatment results reviewed;care plan/treatment goals reviewed;participants voiced agreement with care plan;patient;participants included   Clinical Impression Comments Pt will benefit from skilled OT services during IP stay to continue progressing IND in ADL/safety.   OT Total Evaluation Time   OT Eval, Low Complexity Minutes (15977) 5   OT Goals   Therapy Frequency (OT) 6 times/week   OT Predicted Duration/Target Date for Goal Attainment 03/26/25   OT Goals Hygiene/Grooming;Lower Body Dressing;Transfers;Toilet Transfer/Toileting;OT Goal 1;OT Goal 2   OT: Hygiene/Grooming independent   OT: Lower Body Dressing Independent   OT: Transfer Independent   OT: Toilet Transfer/Toileting Independent   OT: Goal 1 Pt will be able to demo visual compensatory strategies during functional mobility and in relation to ADL/IADL tasks prior to dc.   OT: Goal 2 Pt will be able to demo safe stair navigation (~10 stairs total) prior to dc home.   OT Discharge Planning   OT Plan OT: visual scanning/visual compensatory strategies, stairs, standing ADLs, dc when appropriate   OT Discharge Recommendation (DC Rec) home with assist;other (see comments)  (May benefit from OP neuro opthamology follow-up)   OT Rationale for DC Rec Pt slightly below baseline due to L side visual changes and overall deconditioning and decreased activity  tolerance. Pt has good support at home. Anticipate once medically ready, pt will be able to dc home with assist from spouse prn to progress IND/safety. If visual deficits persist, may benefit from OP neuro opthamology follow-up.   OT Brief overview of current status SBA, no AD in-hallway   OT Total Distance Amb During Session (feet) 160   Total Session Time   Timed Code Treatment Minutes 15   Total Session Time (sum of timed and untimed services) 20

## 2025-02-26 NOTE — PLAN OF CARE
Goal Outcome Evaluation:      Plan of Care Reviewed With: patient    Overall Patient Progress: no changeOverall Patient Progress: no change           Status: Admitted for Left vision changes and MRI brain revealed a L frontal lesion. Now, plan for LP with neuro IR for diagnosis this AM 2/26/2025. PMHx of HTN, T2DM, hyperlipemia and PAOLA.   Activity: Independent  Neuro: AnO x4, 5/5 throughout, blurry vision in (L) eye with minor field cut. Pupils 2mm bilaterally.  Cardiac: WDL  Respiratory: WDL on RA  GI/: Voiding spontaneously, last BM PTA, passing gas   Diet: NPO as of 0000  Skin: Intact  Lines/Drains: PIV saline locked  Pain/Nausea: Denies  Changes:  LP this AM, continue to monitor neuros.

## 2025-02-26 NOTE — PLAN OF CARE
Vitals: VSS on RA except HTN and tachycardia within parameters.  Neuros: Alert and oriented x4. 5/5 throughout. Blurry vision/muted colors in left eye with left field cut.   IV: PIV SL'd  Resp: WNL on RA, uses CPAP at HS.  Diet: Regular diet with glucose checks before meals and at bedtime.   : Voiding.  GI: BS+x4, last BM 2/25. Zofran given this am for nausea.  Skin: LP site to lower back with bandaid.   Pain: C/o minimal HA this am-declined intervention.  Activity: Up independently.  Social: Wife at bedside and supportive.   Plan: Continue to monitor and await results.                Goal Outcome Evaluation:      Plan of Care Reviewed With: patient, spouse          Outcome Evaluation: LP completed today.

## 2025-02-26 NOTE — PLAN OF CARE
Status:Admitted for Left vision changes and  MRI brain revealed a L frontal lesion. Now, plan for LP with neuro IR for diagnosis tomorrow 2/26/2025. PMHx of HTN, T2DM, hyperlipemia and PAOLA.   Vitals: VSS exc hypertensive within MD parameter of SBP< 180.   Neuros: AOX4. Alexsandra N/T. L eye blurry vision at baseline. L eye field cut. 5/5 strengthen t/o.   IV: PIV SL.  Labs/Electrolytes: GB changes ACH with insulin coverage.   Resp: RA. Alexsandra SOB. Home CPAP at [facility].    Diet:Regular and good po. NPO at midnight.   : LBM PTA. Passing gas.   GI: VDSP.   Skin: Bilateral shin swollen and redness.   Pain: Alexsandra.   Activity:Up ad maggie. Steady.   Social: Pt's wife Kailee at bedside.   Plan: CT done. Schedule OR for LP spinal tap diagnosis tomorrow morning. Continue monitor and follow cares.     Arrived from: ED  Belongings/meds: Clothes, Eye glasses, CPAP.   2 RN Skin Assessment Completed by:   Skin Findings: Óscar BENITES RN and Isaak LOCKETT RN.   Non-intact findings documented (yes/no/NA): Bilateral lower shins swollen and redness.

## 2025-02-26 NOTE — PROGRESS NOTES
[facility], [place]   Neurosurgery Progress Note:    Date of service: 2/26/2025    Assessment: Jeffry Younger is a 44 year old male with past medical history of hypertension and diabetes mellitus type 2. Patient was seen by Ophthalmology on 2/24/2025 for approximately 1 week of left vision changes. 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 MRI brain was ordered and revealed left frontal lobe lesion.         Clinically Significant Risk Factors Present on Admission                  # Hypertension: Noted on problem list   # Diabetes Mellitus   # Compression of brain  # Cerebral edema      RECOMMENDATIONS:  No urgent neurosurgical intervention indicated at this time   Every 4 hour neuro exams   Pain control per primary team  Maintain SBP < 140  Glucose < 180   Platelets > 100,000  INR < 1.5  Hemoglobin > 8  Will defer steroids to Primary team  Follow LP results    Interval History:  Patient denies further worsening of left eye vision.  Patient report mild headache.  Lumbar puncture completed this AM.  Opening pressure 15. CT CAP revealed renal mass, Plan for US renal today      Objective:   Temp:  [97.9  F (36.6  C)-99.1  F (37.3  C)] 97.9  F (36.6  C)  Pulse:  [86-89] 89  Resp:  [16-18] 16  BP: (139-154)/() 153/101  SpO2:  [97 %-98 %] 98 %  I/O last 3 completed shifts:  In: 690 [P.O.:680; I.V.:10]  Out: -     Gen: Appears comfortable, NAD  Neurologic:  - Alert & Oriented to person, place, time, and situation  - Follows commands briskly  - Speech fluent, spontaneous. No aphasia or dysarthria.  - No gaze preference. No apparent hemineglect.  - PERRL, EOMI  - Left eye inferior temporal quadrantanopia   - Strong eye closure, jaw clench, and cheek puff  - Face symmetric with sensation intact to light touch  - Palate elevates symmetrically, uvula midline, tongue protrudes midline  - Trapezii and sternocleidomastoid muscles 5/5  bilaterally  - No pronator drift     Del Tr Bi WE WF Gr   R 5 5 5 5 5 5   L 5 5 5 5 5 5    HF KE KF DF PF EHL   R 5 5 5 5 5 5   L 5 5 5 5 5 5     Reflexes 2+ throughout    Sensation intact and symmetric to light touch throughout    LABS  Recent Labs   Lab Test 02/26/25  0558 02/25/25  0950 03/22/18  0951   WBC 9.7 9.9 9.3   HGB 16.3 16.6  --    MCV 84 89  --     224  --        Recent Labs   Lab Test 02/26/25  0558 02/25/25  2133 02/25/25  1859 02/25/25  1257 02/25/25  0950 12/30/24  1116     --   --   --  138 139   POTASSIUM 4.4  --   --   --  4.8 4.5   CHLORIDE 104  --   --   --  102 106   CO2 20*  --   --   --  22 22   BUN 17.2  --   --   --  17.5 15.1   CR 0.84  --   --   --  0.95 0.90   ANIONGAP 14  --   --   --  14 11   CLALY 9.1  --   --   --  9.3 9.5   * 155* 179*   < > 176* 126*    < > = values in this interval not displayed.       IMAGING    Recent Results (from the past 24 hours)   MR [facility] Overread    Narrative    Outside MRI    Date outside exam performed: 2/25/2025    History: Left optic neuropathy. Overread requested by the clinical  team to evaluate for repeat brain MR tumor protocol with perfusion.    Comparison: None available.    Technique: Multisequence, multiplanar MRI performed at outside  institution, and request by ordering physician for purposes of  overread. Examination review limited to submitted images.    Findings:   Expansile T2 hyperintense mass measuring 3.3 x 4.1 x 3.9 cm centered  in the posterolateral left frontal lobe with internal enhancement  predominantly along its inferior aspect (111/19-21), foci of  hypoenhancement (110/19), and diffusion restriction (2/56). FLAIR  hyperintense signal surrounding consistent with edema. There is mild  mass effect with partial effacement of the left lateral ventricle.  Borderline rightward midline shift of approximately 2 mm.     No other abnormal diffusion restriction, particularly in the orbits.  No abnormal enhancement of  optic nerves. Mild left optic disc  elevation with associated enhancement.    Bilateral maxillary sinus mucous retention cysts.      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  Recommend MR tumor protocol with perfusion. Agree with minimal lateral  ventricle mass effect and 2 mm of rightward midline shift. Left  papilledema.    I have personally reviewed the examination and initial interpretation  and I agree with the findings.    JOSS WHITE MD         SYSTEM ID:  [id number]   CT Chest/Abdomen/Pelvis w Contrast    Narrative    EXAMINATION: CT CHEST/ABDOMEN/PELVIS W CONTRAST, 2/25/2025 6:05 PM    TECHNIQUE: Helical CT images from the thoracic inlet through the  symphysis pubis were obtained with intravenous contrast. Contrast  dose: iopamidol (ISOVUE-370) solution 135 mL    COMPARISON: None    HISTORY: L lobe brain lesion, r/o metastatic disease    FINDINGS:  Devices: none.    Lower neck: Visualized portions of the lower neck and thyroid gland  are unremarkable.    Chest:   Mediastinum:Esophagus appears normal. No lymphadenopathy.  Heart is  within normal limits. No evidence of central pulmonary embolism.   Lungs/pleura: The central tracheobronchial tree is patent.  No focal  mass or consolidation.  No suspicious nodules. No pneumothorax or  pleural effusion.   Chest wall/axilla: No lymphadenopathy..    Abdomen and pelvis:  Hepatobiliary: Subcentimeter hypodensity in segment 6 is too small to  characterize but favored to be a cyst. No enhancing mass..    Gall bladder: No cholelithiasis or pericholecystic fluid.    Pancreas: No evidence of pancreatic mass or peripancreatic fluid.    Spleen: Normal size. No focal lesions.    Adrenals: Normal.    Kidneys: There are multiple simple cortical cysts in both kidneys. A  2.2 cm intermediate density lesion in the right interpolar region  (series 4, image  210). This is not well delineated on the virtual  unenhanced images.     Urinary bladder: Unremarkable.  Reproductive organs: Prostate and seminal vesicles are unremarkable.    Gastrointestinal: The stomach and duodenum are unremarkable. Small and  large bowel are normal in caliber and without abnormal wall  thickening.  Mesentery/Peritoneum: No ascites or pneumoperitoneum.    Lymph nodes: No lymphadenopathy.  Vasculature: No aortic aneurysm.     Bones and soft tissues: No aggressive lytic or sclerotic lesions.      Impression    IMPRESSION:  1.  A 2.2 cm intermediate density lesion in the right kidney  interpolar region could represent a neoplasm or hemorrhagic cyst.  Recommend renal ultrasound for further evaluation and if unable to  differentiate on ultrasound, MRI may be done.    2.  No evidence of metastasis in the chest, abdomen or pelvis.    CHRISTIANO CHAPIN MD         SYSTEM ID:  [id number]   CTA Head Neck with Contrast    Narrative    CTA HEAD NECK W CONTRAST, CTV HEAD WITH CONTRAST 2/25/2025 6:08 PM    CT angiogram of the neck   CT angiogram of the base of the brain with contrast  CT venogram of the brain  Reconstruction on the 3D workstation    Provided History:  L visual field changes, c/f acute ischemic optic  neuropathy    Comparison: MRI brain 2/25/2025    Technique:  HEAD and NECK CTA: During rapid bolus intravenous injection of  nonionic contrast material, axial images were obtained using thin  collimation multidetector helical technique from the base of the upper  aortic arch through the cranial vertex. This CT angiogram data was  reconstructed at thin intervals with mild overlap. Images were sent to  the 3D workstation, and 3D reconstructions were obtained. The axial  source images, multiplanar reformations, 3D reconstructions in both  maximum intensity projection display and volume rendered models were  reviewed, with reconstructions performed by the technologist.    Head CTV: Helically acquired thin  section axial CT images were  obtained with 1 mm collimation through the brain after intravenous  bolus injection of iodinated contrast medium with an approximately  20-25 second delay between administration of contrast and scanning.  Image data were sent to the 3D workstation and postprocessed by the  radiologist using maximum intensity pixel (MIP), multiplanar and  volume rendered 3D reconstruction programs.     Contrast: iopamidol (ISOVUE-370) solution 135 mL    Findings:    Head CTA demonstrates no aneurysm or stenosis of the major  intracranial arteries. Posterior communicating arteries are not well  visualized.    Neck CTA demonstrates no stenosis of the major cervical arteries. The  origins of the great vessels from the aortic arch are patent. The  normal distal right internal carotid artery measures 5 mm. The normal  distal left internal carotid artery measures 5 mm.     Head CTV demonstrates normal opacification of the dural venous sinus  system without focal narrowing or conspicuous filling defects.    No mass within the visualized portions of the cervical soft tissues or  lung apices.       Impression    Impression:    1. Head CTA demonstrates no aneurysm or stenosis of the major  intracranial arteries.   2. Neck CTA demonstrates no stenosis of the major cervical arteries.  3. Head CTV demonstrates no significant stenosis or filling defects  within the dural venous sinus system.    I have personally reviewed the examination and initial interpretation  and I agree with the findings.    JOSS WHITE MD         SYSTEM ID:  [id number]   CTV Head with Contrast    Narrative    CTA HEAD NECK W CONTRAST, CTV HEAD WITH CONTRAST 2/25/2025 6:08 PM    CT angiogram of the neck   CT angiogram of the base of the brain with contrast  CT venogram of the brain  Reconstruction on the 3D workstation    Provided History:  L visual field changes, c/f acute ischemic optic  neuropathy    Comparison: MRI brain  2/25/2025    Technique:  HEAD and NECK CTA: During rapid bolus intravenous injection of  nonionic contrast material, axial images were obtained using thin  collimation multidetector helical technique from the base of the upper  aortic arch through the cranial vertex. This CT angiogram data was  reconstructed at thin intervals with mild overlap. Images were sent to  the 3D workstation, and 3D reconstructions were obtained. The axial  source images, multiplanar reformations, 3D reconstructions in both  maximum intensity projection display and volume rendered models were  reviewed, with reconstructions performed by the technologist.    Head CTV: Helically acquired thin section axial CT images were  obtained with 1 mm collimation through the brain after intravenous  bolus injection of iodinated contrast medium with an approximately  20-25 second delay between administration of contrast and scanning.  Image data were sent to the 3D workstation and postprocessed by the  radiologist using maximum intensity pixel (MIP), multiplanar and  volume rendered 3D reconstruction programs.     Contrast: iopamidol (ISOVUE-370) solution 135 mL    Findings:    Head CTA demonstrates no aneurysm or stenosis of the major  intracranial arteries. Posterior communicating arteries are not well  visualized.    Neck CTA demonstrates no stenosis of the major cervical arteries. The  origins of the great vessels from the aortic arch are patent. The  normal distal right internal carotid artery measures 5 mm. The normal  distal left internal carotid artery measures 5 mm.     Head CTV demonstrates normal opacification of the dural venous sinus  system without focal narrowing or conspicuous filling defects.    No mass within the visualized portions of the cervical soft tissues or  lung apices.       Impression    Impression:    1. Head CTA demonstrates no aneurysm or stenosis of the major  intracranial arteries.   2. Neck CTA demonstrates no stenosis  of the major cervical arteries.  3. Head CTV demonstrates no significant stenosis or filling defects  within the dural venous sinus system.    I have personally reviewed the examination and initial interpretation  and I agree with the findings.    JOSS WHITE MD         SYSTEM ID:  [id number]

## 2025-02-27 ENCOUNTER — APPOINTMENT (OUTPATIENT)
Dept: OCCUPATIONAL THERAPY | Facility: CLINIC | Age: 45
DRG: 070 | End: 2025-02-27
Payer: COMMERCIAL

## 2025-02-27 VITALS
BODY MASS INDEX: 40.43 KG/M2 | DIASTOLIC BLOOD PRESSURE: 82 MMHG | SYSTOLIC BLOOD PRESSURE: 146 MMHG | RESPIRATION RATE: 18 BRPM | WEIGHT: 315 LBS | TEMPERATURE: 98.5 F | HEART RATE: 108 BPM | HEIGHT: 74 IN | OXYGEN SATURATION: 93 %

## 2025-02-27 LAB
GLUCOSE BLDC GLUCOMTR-MCNC: 125 MG/DL (ref 70–99)
GLUCOSE BLDC GLUCOMTR-MCNC: 132 MG/DL (ref 70–99)
GLUCOSE BLDC GLUCOMTR-MCNC: 139 MG/DL (ref 70–99)
GLUCOSE BLDC GLUCOMTR-MCNC: 142 MG/DL (ref 70–99)
GLUCOSE BLDC GLUCOMTR-MCNC: 154 MG/DL (ref 70–99)
GLUCOSE BLDC GLUCOMTR-MCNC: 174 MG/DL (ref 70–99)
GLUCOSE BLDC GLUCOMTR-MCNC: 175 MG/DL (ref 70–99)
HOLD SPECIMEN: NORMAL
MAGNESIUM SERPL-MCNC: 2.1 MG/DL (ref 1.7–2.3)
PATH REPORT.COMMENTS IMP SPEC: NORMAL
PATH REPORT.FINAL DX SPEC: NORMAL
PATH REPORT.MICROSCOPIC SPEC OTHER STN: NORMAL
PATH REPORT.RELEVANT HX SPEC: NORMAL
PHOSPHATE SERPL-MCNC: 4.4 MG/DL (ref 2.5–4.5)
POTASSIUM SERPL-SCNC: 4.3 MMOL/L (ref 3.4–5.3)

## 2025-02-27 PROCEDURE — 120N000002 HC R&B MED SURG/OB UMMC

## 2025-02-27 PROCEDURE — 83735 ASSAY OF MAGNESIUM: CPT | Performed by: STUDENT IN AN ORGANIZED HEALTH CARE EDUCATION/TRAINING PROGRAM

## 2025-02-27 PROCEDURE — 97530 THERAPEUTIC ACTIVITIES: CPT | Mod: GO

## 2025-02-27 PROCEDURE — 36415 COLL VENOUS BLD VENIPUNCTURE: CPT | Performed by: STUDENT IN AN ORGANIZED HEALTH CARE EDUCATION/TRAINING PROGRAM

## 2025-02-27 PROCEDURE — 250N000011 HC RX IP 250 OP 636

## 2025-02-27 PROCEDURE — 84132 ASSAY OF SERUM POTASSIUM: CPT | Performed by: STUDENT IN AN ORGANIZED HEALTH CARE EDUCATION/TRAINING PROGRAM

## 2025-02-27 PROCEDURE — 250N000013 HC RX MED GY IP 250 OP 250 PS 637

## 2025-02-27 PROCEDURE — 84100 ASSAY OF PHOSPHORUS: CPT | Performed by: STUDENT IN AN ORGANIZED HEALTH CARE EDUCATION/TRAINING PROGRAM

## 2025-02-27 PROCEDURE — 99233 SBSQ HOSP IP/OBS HIGH 50: CPT | Mod: GC | Performed by: STUDENT IN AN ORGANIZED HEALTH CARE EDUCATION/TRAINING PROGRAM

## 2025-02-27 RX ORDER — ASPIRIN 81 MG/1
81 TABLET, CHEWABLE ORAL DAILY
Status: CANCELLED | OUTPATIENT
Start: 2025-02-28

## 2025-02-27 RX ADMIN — INSULIN ASPART 1 UNITS: 100 INJECTION, SOLUTION INTRAVENOUS; SUBCUTANEOUS at 08:37

## 2025-02-27 RX ADMIN — ONDANSETRON 4 MG: 4 TABLET, ORALLY DISINTEGRATING ORAL at 09:27

## 2025-02-27 RX ADMIN — LOSARTAN POTASSIUM 50 MG: 50 TABLET, FILM COATED ORAL at 09:27

## 2025-02-27 RX ADMIN — ASPIRIN 81 MG CHEWABLE TABLET 81 MG: 81 TABLET CHEWABLE at 09:26

## 2025-02-27 RX ADMIN — METFORMIN ER 500 MG 1000 MG: 500 TABLET ORAL at 09:27

## 2025-02-27 RX ADMIN — ACETAMINOPHEN 650 MG: 325 TABLET, FILM COATED ORAL at 09:27

## 2025-02-27 RX ADMIN — ATORVASTATIN CALCIUM 10 MG: 10 TABLET, FILM COATED ORAL at 09:28

## 2025-02-27 RX ADMIN — EMPAGLIFLOZIN 25 MG: 25 TABLET, FILM COATED ORAL at 09:34

## 2025-02-27 RX ADMIN — METFORMIN ER 500 MG 1000 MG: 500 TABLET ORAL at 18:27

## 2025-02-27 ASSESSMENT — ACTIVITIES OF DAILY LIVING (ADL)
ADLS_ACUITY_SCORE: 33

## 2025-02-27 NOTE — PROGRESS NOTES
[facility], [place]   Neurosurgery Progress Note:    Date of service: 2/27/2025    Assessment: Jeffry Younger is a 44 year old male with past medical history of hypertension and diabetes mellitus type 2. Patient was seen by Ophthalmology on 2/24/2025 for approximately 1 week of left vision changes. 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 MRI brain was ordered and revealed left frontal lobe lesion.         Clinically Significant Risk Factors Present on Admission                  # Hypertension: Noted on problem list   # Diabetes Mellitus   # Compression of brain  # Cerebral edema      RECOMMENDATIONS:  No urgent neurosurgical intervention indicated at this time   Every 4 hour neuro exams   Pain control per primary team  Maintain SBP < 140  Glucose < 180   Platelets > 100,000  INR < 1.5  Hemoglobin > 8  Will defer steroids to Primary team  Follow LP results    Interval History:  Patient reports left eye vision unchanged.      Objective:   Temp:  [98  F (36.7  C)-98.8  F (37.1  C)] 98  F (36.7  C)  Pulse:  [87-99] 87  Resp:  [16-18] 18  BP: (138-161)/() 138/94  SpO2:  [95 %-100 %] 95 %  No intake/output data recorded.    Gen: Appears comfortable, NAD  Neurologic:  - Alert & Oriented to person, place, time, and situation  - Follows commands briskly  - Speech fluent, spontaneous. No aphasia or dysarthria.  - No gaze preference. No apparent hemineglect.  - PERRL, EOMI  - Left eye inferior temporal quadrantanopia   - Strong eye closure, jaw clench, and cheek puff  - Face symmetric with sensation intact to light touch  - Palate elevates symmetrically, uvula midline, tongue protrudes midline  - Trapezii and sternocleidomastoid muscles 5/5 bilaterally  - No pronator drift     Del Tr Bi WE WF Gr   R 5 5 5 5 5 5   L 5 5 5 5 5 5    HF KE KF DF PF EHL   R 5 5 5 5 5 5   L 5 5 5 5 5 5     Reflexes 2+ throughout    Sensation intact and  symmetric to light touch throughout    LABS  Recent Labs   Lab Test 02/26/25  0558 02/25/25  0950 03/22/18  0951   WBC 9.7 9.9 9.3   HGB 16.3 16.6  --    MCV 84 89  --     224  --        Recent Labs   Lab Test 02/27/25  0702 02/27/25  0155 02/26/25  2153 02/26/25  1904 02/26/25  1435 02/26/25  0558 02/25/25  1257 02/25/25  0950 12/30/24  1116   NA  --   --   --   --   --  138  --  138 139   POTASSIUM 4.3  --   --   --   --  4.4  --  4.8 4.5   CHLORIDE  --   --   --   --   --  104  --  102 106   CO2  --   --   --   --   --  20*  --  22 22   BUN  --   --   --   --   --  17.2  --  17.5 15.1   CR  --   --   --   --   --  0.84  --  0.95 0.90   ANIONGAP  --   --   --   --   --  14  --  14 11   CALLY  --   --   --   --   --  9.1  --  9.3 9.5   GLC  --  132* 130* 161*   < > 164*   < > 176* 126*    < > = values in this interval not displayed.       IMAGING    Recent Results (from the past 24 hours)   MR [facility] Overread    Narrative    Outside MRI    Date outside exam performed: 2/25/2025    History: Left optic neuropathy. Overread requested by the clinical  team to evaluate for repeat brain MR tumor protocol with perfusion.    Comparison: None available.    Technique: Multisequence, multiplanar MRI performed at outside  institution, and request by ordering physician for purposes of  overread. Examination review limited to submitted images.    Findings:   Expansile T2 hyperintense mass measuring 3.3 x 4.1 x 3.9 cm centered  in the posterolateral left frontal lobe with internal enhancement  predominantly along its inferior aspect (111/19-21), foci of  hypoenhancement (110/19), and diffusion restriction (2/56). FLAIR  hyperintense signal surrounding consistent with edema. There is mild  mass effect with partial effacement of the left lateral ventricle.  Borderline rightward midline shift of approximately 2 mm.     No other abnormal diffusion restriction, particularly in the orbits.  No abnormal enhancement of optic  nerves. Mild left optic disc  elevation with associated enhancement.    Bilateral maxillary sinus mucous retention cysts.      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  Recommend MR tumor protocol with perfusion. Agree with minimal lateral  ventricle mass effect and 2 mm of rightward midline shift. Left  papilledema.    I have personally reviewed the examination and initial interpretation  and I agree with the findings.    JOSS WHITE MD         SYSTEM ID:  [id number]   CT Chest/Abdomen/Pelvis w Contrast    Narrative    EXAMINATION: CT CHEST/ABDOMEN/PELVIS W CONTRAST, 2/25/2025 6:05 PM    TECHNIQUE: Helical CT images from the thoracic inlet through the  symphysis pubis were obtained with intravenous contrast. Contrast  dose: iopamidol (ISOVUE-370) solution 135 mL    COMPARISON: None    HISTORY: L lobe brain lesion, r/o metastatic disease    FINDINGS:  Devices: none.    Lower neck: Visualized portions of the lower neck and thyroid gland  are unremarkable.    Chest:   Mediastinum:Esophagus appears normal. No lymphadenopathy.  Heart is  within normal limits. No evidence of central pulmonary embolism.   Lungs/pleura: The central tracheobronchial tree is patent.  No focal  mass or consolidation.  No suspicious nodules. No pneumothorax or  pleural effusion.   Chest wall/axilla: No lymphadenopathy..    Abdomen and pelvis:  Hepatobiliary: Subcentimeter hypodensity in segment 6 is too small to  characterize but favored to be a cyst. No enhancing mass..    Gall bladder: No cholelithiasis or pericholecystic fluid.    Pancreas: No evidence of pancreatic mass or peripancreatic fluid.    Spleen: Normal size. No focal lesions.    Adrenals: Normal.    Kidneys: There are multiple simple cortical cysts in both kidneys. A  2.2 cm intermediate density lesion in the right interpolar region  (series 4, image 210).  This is not well delineated on the virtual  unenhanced images.     Urinary bladder: Unremarkable.  Reproductive organs: Prostate and seminal vesicles are unremarkable.    Gastrointestinal: The stomach and duodenum are unremarkable. Small and  large bowel are normal in caliber and without abnormal wall  thickening.  Mesentery/Peritoneum: No ascites or pneumoperitoneum.    Lymph nodes: No lymphadenopathy.  Vasculature: No aortic aneurysm.     Bones and soft tissues: No aggressive lytic or sclerotic lesions.      Impression    IMPRESSION:  1.  A 2.2 cm intermediate density lesion in the right kidney  interpolar region could represent a neoplasm or hemorrhagic cyst.  Recommend renal ultrasound for further evaluation and if unable to  differentiate on ultrasound, MRI may be done.    2.  No evidence of metastasis in the chest, abdomen or pelvis.    CHRISTIANO CHAPIN MD         SYSTEM ID:  [id number]   CTA Head Neck with Contrast    Narrative    CTA HEAD NECK W CONTRAST, CTV HEAD WITH CONTRAST 2/25/2025 6:08 PM    CT angiogram of the neck   CT angiogram of the base of the brain with contrast  CT venogram of the brain  Reconstruction on the 3D workstation    Provided History:  L visual field changes, c/f acute ischemic optic  neuropathy    Comparison: MRI brain 2/25/2025    Technique:  HEAD and NECK CTA: During rapid bolus intravenous injection of  nonionic contrast material, axial images were obtained using thin  collimation multidetector helical technique from the base of the upper  aortic arch through the cranial vertex. This CT angiogram data was  reconstructed at thin intervals with mild overlap. Images were sent to  the 3D workstation, and 3D reconstructions were obtained. The axial  source images, multiplanar reformations, 3D reconstructions in both  maximum intensity projection display and volume rendered models were  reviewed, with reconstructions performed by the technologist.    Head CTV: Helically acquired thin  section axial CT images were  obtained with 1 mm collimation through the brain after intravenous  bolus injection of iodinated contrast medium with an approximately  20-25 second delay between administration of contrast and scanning.  Image data were sent to the 3D workstation and postprocessed by the  radiologist using maximum intensity pixel (MIP), multiplanar and  volume rendered 3D reconstruction programs.     Contrast: iopamidol (ISOVUE-370) solution 135 mL    Findings:    Head CTA demonstrates no aneurysm or stenosis of the major  intracranial arteries. Posterior communicating arteries are not well  visualized.    Neck CTA demonstrates no stenosis of the major cervical arteries. The  origins of the great vessels from the aortic arch are patent. The  normal distal right internal carotid artery measures 5 mm. The normal  distal left internal carotid artery measures 5 mm.     Head CTV demonstrates normal opacification of the dural venous sinus  system without focal narrowing or conspicuous filling defects.    No mass within the visualized portions of the cervical soft tissues or  lung apices.       Impression    Impression:    1. Head CTA demonstrates no aneurysm or stenosis of the major  intracranial arteries.   2. Neck CTA demonstrates no stenosis of the major cervical arteries.  3. Head CTV demonstrates no significant stenosis or filling defects  within the dural venous sinus system.    I have personally reviewed the examination and initial interpretation  and I agree with the findings.    JOSS WHITE MD         SYSTEM ID:  [id number]   CTV Head with Contrast    Narrative    CTA HEAD NECK W CONTRAST, CTV HEAD WITH CONTRAST 2/25/2025 6:08 PM    CT angiogram of the neck   CT angiogram of the base of the brain with contrast  CT venogram of the brain  Reconstruction on the 3D workstation    Provided History:  L visual field changes, c/f acute ischemic optic  neuropathy    Comparison: MRI brain  2/25/2025    Technique:  HEAD and NECK CTA: During rapid bolus intravenous injection of  nonionic contrast material, axial images were obtained using thin  collimation multidetector helical technique from the base of the upper  aortic arch through the cranial vertex. This CT angiogram data was  reconstructed at thin intervals with mild overlap. Images were sent to  the 3D workstation, and 3D reconstructions were obtained. The axial  source images, multiplanar reformations, 3D reconstructions in both  maximum intensity projection display and volume rendered models were  reviewed, with reconstructions performed by the technologist.    Head CTV: Helically acquired thin section axial CT images were  obtained with 1 mm collimation through the brain after intravenous  bolus injection of iodinated contrast medium with an approximately  20-25 second delay between administration of contrast and scanning.  Image data were sent to the 3D workstation and postprocessed by the  radiologist using maximum intensity pixel (MIP), multiplanar and  volume rendered 3D reconstruction programs.     Contrast: iopamidol (ISOVUE-370) solution 135 mL    Findings:    Head CTA demonstrates no aneurysm or stenosis of the major  intracranial arteries. Posterior communicating arteries are not well  visualized.    Neck CTA demonstrates no stenosis of the major cervical arteries. The  origins of the great vessels from the aortic arch are patent. The  normal distal right internal carotid artery measures 5 mm. The normal  distal left internal carotid artery measures 5 mm.     Head CTV demonstrates normal opacification of the dural venous sinus  system without focal narrowing or conspicuous filling defects.    No mass within the visualized portions of the cervical soft tissues or  lung apices.       Impression    Impression:    1. Head CTA demonstrates no aneurysm or stenosis of the major  intracranial arteries.   2. Neck CTA demonstrates no stenosis  of the major cervical arteries.  3. Head CTV demonstrates no significant stenosis or filling defects  within the dural venous sinus system.    I have personally reviewed the examination and initial interpretation  and I agree with the findings.    JOSS WHITE MD         SYSTEM ID:  [id number]

## 2025-02-27 NOTE — PLAN OF CARE
"Status: Admitted 2/25 w/ 1 wk of L vision changes, found to have L frontal lobe lesion, R kidney lesion  Vitals: VSS on RA  Neuros: A&Ox4, 5/5 throughout, L eye w/ \"muted colors,\" abnormal spots in L peripheral  IV: PIV SL  Labs/Electrolytes: BG checks ACHS  Diet: Regular, good PO  : Voiding  GI: LBM  last evening  Skin: LP site WNL  Pain: Denies  Activity: Independent  Social: Wife at bedside this afternoon/evening, helpful/supportive  Plan/updates: Renal US done this afternoon to assess R kidney lesion found on CT 2/25. LP done in IR this AM, awaiting results. Continue  POC      "

## 2025-02-27 NOTE — PLAN OF CARE
Goal Outcome Evaluation:      Plan of Care Reviewed With: patient    Overall Patient Progress: no changeOverall Patient Progress: no change    Outcome Evaluation: LP completed 2/26    Status: Admitted 2/25 w/ 1 week of L vision changes, found to have L frontal lobe lesion, R kidney lesion  Vitals: VSS on RA  Neuros: PIV SL  IV: PIV SL  Labs/Electrolytes: BG checks, ACHS  Resp: WDL  Diet: regular diet  : voiding spont  GI: LBM 2/25  Skin: LP site WNL, bandaid in place  Pain: denies  Activity: independent  Plan: renal US done this afternoon to assess R kidney lesion found on CT 2/25.  LP done in IR on 2/27.

## 2025-02-27 NOTE — PLAN OF CARE
"Goal Outcome Evaluation:      Plan of Care Reviewed With: patient, spouse    Overall Patient Progress: no change    Reason for Admission: Left frontal lobe mass      RN assumed cares at [id number]    Vitals: BP (!) 138/94 (BP Location: Right arm)   Pulse 87   Temp 98  F (36.7  C) (Oral)   Resp 18   Ht 1.88 m (6' 2\")   Wt (!) 170.6 kg (376 lb)   SpO2 95%   BMI 48.28 kg/m     Pain: 1/10 pain/soreness in hips bilat, moderate relief with PRN tylenol x1  Neuro: AO x4, pt complains of colors muted from L eye, blurred within perephrial with L eye   Cardiac: VSS, denies chest pain, higher running BP within parameters   Respiratory: VSS, room air, denies SOB  GI/: Voids spontaneously in room, last BM 2/26  IV/Drains: R PIV saline locked  Activity: UAL      Plan of Care: Tentative plan for MRI this evening             "

## 2025-02-27 NOTE — PROGRESS NOTES
"[facility]  General Neurology - Progress Note    Patient Name:  Jeffry Younger  Date of Service:  February 27, 2025    Subjective:    No acute overnight events. Hip soreness from yesterday is improving with walking. No further changes to his vision or new neurologic symptoms. Appetite is a bit low.     Objective:    Vitals: BP (!) 138/94 (BP Location: Right arm)   Pulse 87   Temp 98  F (36.7  C) (Oral)   Resp 18   Ht 1.88 m (6' 2\")   Wt (!) 170.6 kg (376 lb)   SpO2 95%   BMI 48.28 kg/m    General: Sitting up in a chair, NAD  Head: Atraumatic, normocephalic   Neurologic:  Mental Status: Fully alert, attentive and oriented. Speech clear and fluent.   Cranial Nerves: EOM intact, without nystagmus. Facial movements symmetric at rest and with activation. Hearing intact to voice. No dysarthria.    Motor: No abnormal movements.  Moving all 4 extremities antigravity.   Sensory: Intact to light touch x 4 extremities. No sensory neglect noted.    Station/Gait: Not assessed    Pertinent Investigations:    I have personally reviewed most recent and pertinent labs, tests, and radiological images.     CSF cytology: No morphologic evidence of malignancy     2/26 Renal US  IMPRESSION:  Small hypoechoic lesion in the midpole of the right kidney measuring  up to 2.4 cm, and several hypoechoic lesions in the left kidney, the  largest measuring up to 2.7 cm. Findings correlate with  hyperattenuating lesion seen on prior CT on 2/25/2025. Lesion is not  well characterized on ultrasound, not clearly cystic. Recommend  elective follow-up CT/MRI renal mass for further evaluation.    Assessment:  44-year-old male with a history of HTN and T2DM presenting with washed out appearance and distortion of left inferior visual field since 2/22/25. He was found to have left optic disc edema in ophthalmology clinic 2/23/25 with a differential including acute optic neuropathy and atypical optic neuritis. " After imaging obtained and discussion with neuro-ophthalmology, feel likely that vision changes are likely due to acute ischemic optic neuropathy. Work up for cause has been unremarkable, with LDL 67, no aneurysm or stenosis of the major intracranial or cervical arteries seen on CTA, and no stenosis/filling defects of dural venous sinuses noted on CTV. However, has had longstanding risk factors for small vessel ischemic disease including HTN, HLD, and T2DM, for which A1c was most recently 6.4%. No echocardiogram indicated at this time.     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 Neurosurgery consulted. LP obtained with neuro-IR 2/26/25 to further differentiate. MR with perfusion completed per Neurosurgery recommendation showing no elevated relative cerebral blood volume. Possible etiology for brain lesion pending LP results and may require biopsy in future, differential includes aforementioned neoplastic primary vs. Secondary, vs. Demyelinating disease. Unlikely to be subacute stroke given lack of negative symptoms and do not feel it is of venous etiology given normal CTV.       Plan:  #Left frontal lobe lesion   - Admit to neurology for further workup  - IR consulted for lumbar puncture, completed 2/26  - LP orders: oligoclonal bands, cytology, cytometry, meningitis/encephalitis panel, cell count with diff, protein, glucose  - Protein elevated at 116 mg/dl, negative meningitis/encephalitis panel, 0 RBCs, 0 nucleated cells, no morphologic evidence of malignancy   - Serum MOG, IgG, and aquaporin 4 IgG ordered by outpatient ophthalmology  - Holding steroids until LP obtained, will discuss plan for steroids with NSGY   - Neurosurgery following, recs appreciated  - Q4H neuro exams   - Maintain SBP < 140  - Glucose < 180   - Platelets > 100,000  - INR < 1.5  - Hemoglobin > 8   - Holding aspirin for potential biopsy       #Vision changes  #Acute Ischemic Optic Neuropathy  Acute vision changes beginning on 2/22/25 iso HTN, HLD, T2DM. After discussion with neuro-ophthalmologist, vision change likely due to acute ischemic optic neuropathy and most likely unrelated to the incidental L frontal lobe lesion found on MRI, though not ruled out completely.   - CTA, CTV negative for vessel aneurysm or stenosis  - updated LDL 67  - last A1c in 12/30/24 was 6.4%   - Cont. PTA atorvastatin   - Holding aspirin 81 mg as above  - Ophthalmology consult, recs appreciated  - Holding PTA semaglutide given potential association with increased risk of acute ischemic optic neuropathy    #Bilateral kidney lesions  CT CAP obtained to assess for metastases revealed 2.2 cm lesion in the right kidney. Per radiology report, differential includes hemorrhagic cyst versus neoplasm.  On renal US, the right kidney lesion was not clearly cystic in appearance, and multiple hypoechoic lesions were also seen in the left kidney. Father with a history of renal cell carcinoma.   - MRI Kidney w wo ordered.      #T2DM on insulin  Last A1c 6.4 on 12/30/24.    - Medium dose sliding scale insulin  - PTA empagliflozin 25 mg QD, metformin 1000 mg BID  - Hold PTA semaglutide     #Hypertension  Systolic BP ranging between 130-160s most regularly in last 24 hours.   - PTA losartan 50 mg every day     #Hyperlipidemia  LDL 67.  - PTA atorvastatin 10 mg every day     #PAOLA  - CPAP at night    FEN: Regular diet  Malnutrition: Patient does not meet two of the above criteria necessary for diagnosing malnutrition  PPx:Ambulation, SCDs  Code: Full    Patient was seen and discussed with Dr. Abreu.    Vianca Dc   MS4      Resident/Fellow Attestation   I, Petr Gentile MD, was present with the medical/KARMEN student who participated in the service and in the documentation of the note.  I have verified the history and personally performed the physical exam and medical decision making.   I agree with the assessment and plan of care as documented in the note.        Petr Gentile MD  PGY4  Date of Service (when I saw the patient): 02/27/25

## 2025-02-28 ENCOUNTER — APPOINTMENT (OUTPATIENT)
Dept: MRI IMAGING | Facility: CLINIC | Age: 45
DRG: 070 | End: 2025-02-28
Payer: COMMERCIAL

## 2025-02-28 VITALS
DIASTOLIC BLOOD PRESSURE: 98 MMHG | OXYGEN SATURATION: 95 % | WEIGHT: 315 LBS | HEIGHT: 74 IN | SYSTOLIC BLOOD PRESSURE: 134 MMHG | HEART RATE: 98 BPM | RESPIRATION RATE: 16 BRPM | TEMPERATURE: 98.3 F | BODY MASS INDEX: 40.43 KG/M2

## 2025-02-28 LAB
ALB CSF/SERPL: 15.9 RATIO
ALBUMIN CSF-MCNC: 67 MG/DL
ALBUMIN SERPL-MCNC: 4214 MG/DL
GLUCOSE BLDC GLUCOMTR-MCNC: 126 MG/DL (ref 70–99)
GLUCOSE BLDC GLUCOMTR-MCNC: 150 MG/DL (ref 70–99)
IGG CSF-MCNC: 8.5 MG/DL
IGG SERPL-MCNC: 866 MG/DL
IGG SYNTH RATE SER+CSF CALC-MRATE: 9.3 MG/D
IGG/ALB CLEAR SER+CSF-RTO: 0.62 RATIO
IGG/ALB CSF: 0.13 RATIO
MAGNESIUM SERPL-MCNC: 2.1 MG/DL (ref 1.7–2.3)
OLIGOCLONAL BANDS CSF ELPH-IMP: ABNORMAL
OLIGOCLONAL BANDS CSF ELPH-IMP: NEGATIVE
OLIGOCLONAL BANDS CSF IEF: 0 BANDS
PHOSPHATE SERPL-MCNC: 4.3 MG/DL (ref 2.5–4.5)
POTASSIUM SERPL-SCNC: 4.1 MMOL/L (ref 3.4–5.3)

## 2025-02-28 PROCEDURE — 99239 HOSP IP/OBS DSCHRG MGMT >30: CPT | Mod: GC | Performed by: STUDENT IN AN ORGANIZED HEALTH CARE EDUCATION/TRAINING PROGRAM

## 2025-02-28 PROCEDURE — 84132 ASSAY OF SERUM POTASSIUM: CPT | Performed by: STUDENT IN AN ORGANIZED HEALTH CARE EDUCATION/TRAINING PROGRAM

## 2025-02-28 PROCEDURE — 250N000011 HC RX IP 250 OP 636

## 2025-02-28 PROCEDURE — A9585 GADOBUTROL INJECTION: HCPCS

## 2025-02-28 PROCEDURE — 83735 ASSAY OF MAGNESIUM: CPT | Performed by: STUDENT IN AN ORGANIZED HEALTH CARE EDUCATION/TRAINING PROGRAM

## 2025-02-28 PROCEDURE — 250N000013 HC RX MED GY IP 250 OP 250 PS 637

## 2025-02-28 PROCEDURE — 255N000002 HC RX 255 OP 636

## 2025-02-28 PROCEDURE — 74183 MRI ABD W/O CNTR FLWD CNTR: CPT

## 2025-02-28 PROCEDURE — 74183 MRI ABD W/O CNTR FLWD CNTR: CPT | Mod: 26 | Performed by: RADIOLOGY

## 2025-02-28 PROCEDURE — 36415 COLL VENOUS BLD VENIPUNCTURE: CPT | Performed by: STUDENT IN AN ORGANIZED HEALTH CARE EDUCATION/TRAINING PROGRAM

## 2025-02-28 PROCEDURE — 84100 ASSAY OF PHOSPHORUS: CPT | Performed by: STUDENT IN AN ORGANIZED HEALTH CARE EDUCATION/TRAINING PROGRAM

## 2025-02-28 RX ORDER — GADOBUTROL 604.72 MG/ML
15 INJECTION INTRAVENOUS ONCE
Status: COMPLETED | OUTPATIENT
Start: 2025-02-28 | End: 2025-02-28

## 2025-02-28 RX ADMIN — METFORMIN ER 500 MG 1000 MG: 500 TABLET ORAL at 08:44

## 2025-02-28 RX ADMIN — GADOBUTROL 15 ML: 604.72 INJECTION INTRAVENOUS at 02:37

## 2025-02-28 RX ADMIN — ONDANSETRON 4 MG: 4 TABLET, ORALLY DISINTEGRATING ORAL at 08:59

## 2025-02-28 RX ADMIN — LOSARTAN POTASSIUM 50 MG: 50 TABLET, FILM COATED ORAL at 08:44

## 2025-02-28 RX ADMIN — INSULIN ASPART 1 UNITS: 100 INJECTION, SOLUTION INTRAVENOUS; SUBCUTANEOUS at 08:50

## 2025-02-28 RX ADMIN — ATORVASTATIN CALCIUM 10 MG: 10 TABLET, FILM COATED ORAL at 08:44

## 2025-02-28 RX ADMIN — EMPAGLIFLOZIN 25 MG: 25 TABLET, FILM COATED ORAL at 08:51

## 2025-02-28 ASSESSMENT — VISUAL ACUITY
OU: GLASSES
OU: GLASSES

## 2025-02-28 ASSESSMENT — ACTIVITIES OF DAILY LIVING (ADL)
ADLS_ACUITY_SCORE: 31
ADLS_ACUITY_SCORE: 33
ADLS_ACUITY_SCORE: 31
ADLS_ACUITY_SCORE: 31
ADLS_ACUITY_SCORE: 32
ADLS_ACUITY_SCORE: 33
ADLS_ACUITY_SCORE: 31
ADLS_ACUITY_SCORE: 31
ADLS_ACUITY_SCORE: 33
ADLS_ACUITY_SCORE: 31

## 2025-02-28 NOTE — PROCEDURES
Surgical planning    Plan for stealth assisted left frontal brain biopsy, possible resection with Dr Carver on 3/7/2025  CT head with stealth and fiducials on the AM of surgery  Hold ASA  P2Y assay on the AM of surgery  No steroids      Ok to discharge from neurosurgery standpoint    Raciel Bynum MD  Neurosurgery Resident  Pager: [phone]

## 2025-02-28 NOTE — PLAN OF CARE
"Status: Admitted 2/25 w/ 1 wk of L vision changes, found to have L frontal lobe lesion, R kidney lesion  Vitals: VSS on RA. CPAP on at HS  Neuros: A&Ox4, 5/5 throughout, L eye w/ \"muted colors,\" abnormal spots in L peripheral  IV: PIV SL  Labs/Electrolytes: BG checks ACHS  Diet: Regular, good PO  : Voiding  GI: LBM  today  Skin: LP site WNL  Pain: Denies  Activity: Independent  Social: Wife at bedside this afternoon/evening, helpful/supportive  Plan/updates: MRI ordered, may happen overnight. Continue POC    "

## 2025-02-28 NOTE — CARE PLAN
Occupational Therapy Discharge Summary    Reason for therapy discharge:    Discharged to home.    Progress towards therapy goal(s). See goals on Care Plan in [facility] electronic health record for goal details.  Goals met    Therapy recommendation(s):    Continue home exercise program.

## 2025-02-28 NOTE — PLAN OF CARE
"Status: Admitted 2/25 w/ 1 wk of L vision changes, found to have L frontal lobe lesion, R kidney lesion   Vitals: VSS. CPAP overnight  Neuros: A/O x4. 5/5 t/o. \"Muted colors\" to L eye RLQ. Distorted shapes/squiggles to L peripheral  IV: PIV SL  Labs/Electrolytes: BG ACHS  Resp: LSC t/o  Diet: Regular  : Voiding spontaneously to bathroom  GI: LBM 2/27. Passing gas  Skin: LP site CDI  Pain: No pain reported this shift  Activity: Independent  Social: wife present at bedside  Plan: Continue to monitor per POC  Updates this shift: MRI completed     Goal Outcome Evaluation:      Plan of Care Reviewed With: patient    Overall Patient Progress: no change    Outcome Evaluation: MRI completed. No pain reported. Resting between cares      "

## 2025-02-28 NOTE — DISCHARGE SUMMARY
"[facility]  NEUROLOGY DISCHARGE SUMMARY    Patient Name:  Jeffry Younger  MRN:  [id number]      :  1980      Date of Admission:  2025  Date of Discharge:  2025  Admitting Physician:  Karin Hatfield MD  Discharge Physician:  BREANN ORTIZ MD  Primary Care Provider:   Russel Hsieh  Discharge Disposition:   Discharged to home    Admission Diagnoses:  #Left frontal lobe lesion   #Vision changes  #Acute Ischemic Optic Neuropathy.  #T2DM on insulin   #Hypertension   #Hyperlipidemia   #PAOLA     Discharge Diagnoses:    #Left frontal lobe lesion   #Vision changes  #Acute Ischemic Optic Neuropathy.  #T2DM on insulin   #Hypertension   #Hyperlipidemia   #PAOLA   #Hemorrhagic/proteinaceous cysts and simple cortical and sinus cysts in bilateral kidneys     Brief History of Illness:   Jeffry Younger is a 44 year old male with history of hypertension and T2DM who presents with vision changes first noticed on 25 which he describes as \"muted color\" in the left inferior visual field, along with distortion of objects in the left periphery that he compares to a \"fun house mirror.\" In ophthalmology clinic on 25, left optic disc edema was noted. MRI brain revealed a left frontal lobe lesion and mild cupping of the left optic disc and the patient was directed to the ED for further evaluation.      He states his visual abnormalities have not worsened or changed since onset, though per ophthalmology note, he has reported the grayness may have increased in size. No pain with eye movements, headache, weakness, or numbness. He has never experienced anything similar in the past.       Family history includes medullary thyroid cancer and renal cell carcinoma in his father and a brother who  from leukemia when he was young.     Hospital Course:  44-year-old male with a history of HTN and T2DM presenting with washed out appearance and distortion of left " inferior visual field since 2/22/25. He was found to have left optic disc edema in ophthalmology clinic 2/23/25 with a differential including acute optic neuropathy and atypical optic neuritis. After imaging obtained and discussion with neuro-ophthalmology, feel likely that vision changes are likely due to acute ischemic optic neuropathy. Work up for cause has been unremarkable, with LDL 67, no aneurysm or stenosis of the major intracranial or cervical arteries seen on CTA, and no stenosis/filling defects of dural venous sinuses noted on CTV. However, has had longstanding risk factors for small vessel ischemic disease including HTN, HLD, and T2DM, for which A1c was most recently 6.4%. No echocardiogram indicated at this time.      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 Neurosurgery consulted. LP obtained with neuro-IR 2/26/25 to further differentiate. MR with perfusion completed per Neurosurgery recommendation showing no elevated relative cerebral blood volume.  LP was completed which showed a bland CSF picture except for the fact that the patient had elevated protein of 116 and elevated glucose of 95 with 0 cells and with 0 oligoclonal bands.  In addition cytology was unable to be performed due to 0 cells and flow cytometry did not show any evidence of cancer.  The differentials at the moment aforementioned neoplastic primary vs. Secondary, vs. Demyelinating disease.     As part of workup patient underwent CT chest abdomen pelvis which showed lesions in the patient's kidneys and this was further evaluated with ultrasound of the kidneys which was nondiagnostic and for further evaluation and MRI of the abdomen was done which showed No evidence of renal mass or neoplasm lesions questioned on Ct on 2/25/2025 represented hemorrhagic/proteinaceous cysts. There were also additional simple cortical and sinus cysts.       After discussion with neurosurgery the decision was taken to discharge the patient home and have planned admission on 3/7/2025 for biopsy in the OR.  The patient is being discharged to his house in stable condition.        Pertinent Investigations:  MRI brain with orbits:  1.  There is an expansile T2-weighted/FLAIR hyperintense lesion involving the left frontal lobe measuring approximately 3.6 x 4.0 x 4.1 cm. There is patchy amorphous enhancement along the medial and posterior margins of the mass. There is also additional   T2-weighted/FLAIR hyperintensity along the margins of the mass. 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     2.  There is mild mass effect upon the left lateral ventricle and 0.2 cm rightward shift of midline structures.     3.  There is mild cupping of the left optic disc which can be seen in the setting of papilledema.    CT chest abdomen pelvis  1.  A 2.2 cm intermediate density lesion in the right kidney  interpolar region could represent a neoplasm or hemorrhagic cyst.  Recommend renal ultrasound for further evaluation and if unable to  differentiate on ultrasound, MRI may be done.    2.  No evidence of metastasis in the chest, abdomen or pelvis.    Head CTA: demonstrates no aneurysm or stenosis of the major  intracranial arteries.   Neck CTA: demonstrates no stenosis of the major cervical arteries.  Head CTV: demonstrates no significant stenosis or filling defects  within the dural venous sinus system.    MR BRAIN PERFUSION W/O & W CONTRAST :  1. 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  2. Perfusion imaging demonstrates no elevated relative cerebral blood  volume.    US RENAL COMPLETE NON-VASCULAR   Small hypoechoic lesion in the midpole of the right kidney measuring  up to 2.4 cm, and several hypoechoic lesions in the left kidney, the  largest  "measuring up to 2.7 cm. Findings correlate with  hyperattenuating lesion seen on prior CT on 2/25/2025. Lesion is not  well characterized on ultrasound, not clearly cystic. Recommend  elective follow-up CT/MRI renal mass for further evaluation.    Renal MRI without and with contrast:  1.  No evidence of renal mass or neoplasm. Lesions questioned on CT  2/25/2025 represent hemorrhagic/proteinaceous cysts. There are also  additional simple cortical and sinus cysts.  2.  Mild hepatic steatosis. Lesion in segment 6 of the liver  consistent with a benign cyst.    CSF glucose 95  CSF protein 116  CSF cell count and differential-0 cells.  Oligoclonal bands-0  Flow cytometry- Rare to absent B-cells  Cytology- No morphologic evidence of malignancy    Consultations:    Neurosurgery    Recommendations and Follow-up:  -Follow-up with ophthalmology 1 week after biopsy. To follow up MOG, IgG, and aquaporin 4 IgG ordered by outpatient ophthalmology.   -Follow-up with neurology clinic in 4 weeks.  -Planned admission with neurosurgery on 3/7/2025 for brain biopsy.  CT head with Stealth and fiducials and platelet function assay needs to be completed prior to admission.  Ordered for you by neurosurgery on discharge.    Discharge physical examination:   /84 (BP Location: Left arm, Patient Position: Supine, Cuff Size: Adult Large)   Pulse 95   Temp 98.7  F (37.1  C) (Oral)   Resp 16   Ht 1.88 m (6' 2\")   Wt (!) 170.6 kg (376 lb)   SpO2 97%   BMI 48.28 kg/m    General: Sitting up in a chair, NAD  Head: Atraumatic, normocephalic   Neurologic:  Mental Status: Fully alert, attentive and oriented. Speech clear and fluent.   Cranial Nerves: EOM intact, without nystagmus. Facial movements symmetric at rest and with activation. Hearing intact to voice. No dysarthria.  Decreased coloration in the inferior quarter (altitudinal) of left eye.  But visual fields are fully intact.  Motor: No abnormal movements.  Moving all 4 extremities " antigravity.  5 out of 5 strength throughout  Sensory: Intact to light touch x 4 extremities. No sensory neglect noted.    Station/Gait: Not assessed normal station, base, arm swing.    Discharge Medications:  Current Discharge Medication List        CONTINUE these medications which have NOT CHANGED    Details   ACCU-CHEK GUIDE test strip TEST TWICE DAILY AS DIRECTED  Qty: 200 strip, Refills: 1    Associated Diagnoses: Type 2 diabetes mellitus with hyperglycemia, without long-term current use of insulin (H)      atorvastatin (LIPITOR) 10 MG tablet Take 1 tablet (10 mg) by mouth daily.  Qty: 90 tablet, Refills: 3    Associated Diagnoses: Hyperlipidemia LDL goal <100      blood glucose monitoring (ACCU-CHEK FASTCLIX) lancets 1 each daily  Qty: 102 each, Refills: 3    Associated Diagnoses: Type 2 diabetes mellitus with hyperglycemia, without long-term current use of insulin (H)      Blood Glucose Monitoring Suppl w/Device KIT       Continuous Glucose Sensor (FREESTYLE KULWANT 3 SENSOR) MISC 1 each every 14 days.  Qty: 6 each, Refills: 11    Associated Diagnoses: Type 2 diabetes mellitus with hyperglycemia, without long-term current use of insulin (H)      empagliflozin (JARDIANCE) 25 MG TABS tablet Take 1 tablet (25 mg) by mouth daily.  Qty: 90 tablet, Refills: 3    Associated Diagnoses: Type 2 diabetes mellitus with hyperglycemia, without long-term current use of insulin (H)      insulin aspart (NOVOLOG FLEXPEN) 100 UNIT/ML pen Inject 18-20 Units subcutaneously 2 times daily (with meals). Max 50 units per day. Hold until patient requests.  Qty: 45 mL, Refills: 3    Associated Diagnoses: Type 2 diabetes mellitus with hyperglycemia, without long-term current use of insulin (H)      insulin glargine U-300 (TOUJEO) 300 UNIT/ML (1 units dial) pen Inject 15 Units subcutaneously daily.  Qty: 15 mL, Refills: 3    Associated Diagnoses: Type 2 diabetes mellitus with hyperglycemia, without long-term current use of insulin (H)       insulin pen needle (ULTICARE MINI) 31G X 6 MM miscellaneous Use 3-4 pen needles daily or as directed.  Qty: 300 each, Refills: 3    Associated Diagnoses: Type 2 diabetes mellitus with hyperglycemia, without long-term current use of insulin (H)      losartan (COZAAR) 50 MG tablet Take 1 tablet (50 mg) by mouth daily.  Qty: 90 tablet, Refills: 3    Associated Diagnoses: Essential hypertension      metFORMIN (GLUCOPHAGE XR) 500 MG 24 hr tablet Take 2 tablets (1,000 mg) by mouth 2 times daily (with meals).  Qty: 360 tablet, Refills: 3    Associated Diagnoses: Type 2 diabetes mellitus with hyperglycemia, without long-term current use of insulin (H)      ondansetron (ZOFRAN ODT) 4 MG ODT tab Take 1 tablet (4 mg) by mouth every 8 hours as needed for nausea or vomiting.  Qty: 10 tablet, Refills: 0    Associated Diagnoses: Type 2 diabetes mellitus with hyperglycemia, without long-term current use of insulin (H)      Semaglutide, 2 MG/DOSE, (OZEMPIC) 8 MG/3ML pen Inject 2 mg subcutaneously every 7 days.  Qty: 9 mL, Refills: 3    Associated Diagnoses: Type 2 diabetes mellitus with hyperglycemia, without long-term current use of insulin (H)             Discharge follow up and instructions:     Primary Care - Care Coordination Referral      Reason for your hospital stay    You are admitted in the hospital for vision changes in your left eye for which during workup you were found to have a lesion in the left side of your brain which was worked up with lumbar puncture and further MRI imaging.  You are being discharged in stable condition with plans to return for planned admission on 3/7/2025 for brain biopsy under neurosurgery.     Activity    Your activity upon discharge: activity as tolerated     ADULT [facility] Specialty Follow-up and recommended labs and tests    Follow up:   Follow up with neurology in 4 weeks, for hospital follow- up. No follow up labs or test are needed.  Follow-up with ophthalmology 1 week after biopsy is  completed.  Planned admission on 3/7/2025 under neurosurgery for biopsy in the operating room.  CT head with Stealth with fiducials and platelet function assay needs to be completed prior to admission.  (Ordered for you by neurosurgery department on discharge).  Kindly Hold off from taking aspirin prior to which has been stopped on 2/27/2025 until biopsy is completed and further information regarding the same was given to you by neurosurgery.    Appointments on [place] and/or [facility] (with [facility] or [facility] provider or service). Call [phone] if you haven't heard regarding these appointments within 7 days of discharge.     CPAP - Continue Home CPAP    Continue Home CPAP per home equipment settings.     Diet    Follow this diet upon discharge: Current Diet:Orders Placed This Encounter      Regular Diet Adult       Patient seen and discussed with BREANN Buchanan MD Abhiram Parameswaran Pillai, MD  Neurology Resident PGY- 4  [facility]   Anesthesia Type: 1% lidocaine with epinephrine

## 2025-02-28 NOTE — PROGRESS NOTES
[facility] [place]   Neurosurgery Progress Note:    Date of service: 2/28/2025    Assessment: Jeffry Younger is a 44 year old male with past medical history of hypertension and diabetes mellitus type 2. Patient was seen by Ophthalmology on 2/24/2025 for approximately 1 week of left vision changes. 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 MRI brain was ordered and revealed left frontal lobe lesion.         Clinically Significant Risk Factors Present on Admission                  # Hypertension: Noted on problem list   # Diabetes Mellitus   # Compression of brain  # Cerebral edema      RECOMMENDATIONS:  No urgent neurosurgical intervention indicated at this time   Every 4 hour neuro exams   Pain control per primary team  Maintain SBP < 140  Glucose < 180   Platelets > 100,000  INR < 1.5  Hemoglobin > 8  Follow LP results    Interval History:  Patient reports left eye vision unchanged.  Pain denies pain.     Objective:   Temp:  [98.5  F (36.9  C)-98.7  F (37.1  C)] 98.7  F (37.1  C)  Pulse:  [] 95  Resp:  [16-18] 16  BP: (131-146)/(82-84) 131/84  SpO2:  [93 %-97 %] 97 %  I/O last 3 completed shifts:  In: 1523 [P.O.:1520; I.V.:3]  Out: -     Gen: Appears comfortable, NAD  Neurologic:  - Alert & Oriented to person, place, time, and situation  - Follows commands briskly  - Speech fluent, spontaneous. No aphasia or dysarthria.  - No gaze preference. No apparent hemineglect.  - PERRL, EOMI  - Left eye inferior temporal quadrantanopia   - Strong eye closure, jaw clench, and cheek puff  - Face symmetric with sensation intact to light touch  - Palate elevates symmetrically, uvula midline, tongue protrudes midline  - Trapezii and sternocleidomastoid muscles 5/5 bilaterally  - No pronator drift     Del Tr Bi WE WF Gr   R 5 5 5 5 5 5   L 5 5 5 5 5 5    HF KE KF DF PF EHL   R 5 5 5 5 5 5   L 5 5 5 5 5 5     Reflexes 2+  throughout    Sensation intact and symmetric to light touch throughout    LABS  Recent Labs   Lab Test 02/26/25  0558 02/25/25  0950 03/22/18  0951   WBC 9.7 9.9 9.3   HGB 16.3 16.6  --    MCV 84 89  --     224  --        Recent Labs   Lab Test 02/28/25  0838 02/28/25  0654 02/28/25  0148 02/27/25  2206 02/27/25  0833 02/27/25  0702 02/26/25  0957 02/26/25  0558 02/25/25  1257 02/25/25  0950 12/30/24  1116   NA  --   --   --   --   --   --   --  138  --  138 139   POTASSIUM  --  4.1  --   --   --  4.3  --  4.4  --  4.8 4.5   CHLORIDE  --   --   --   --   --   --   --  104  --  102 106   CO2  --   --   --   --   --   --   --  20*  --  22 22   BUN  --   --   --   --   --   --   --  17.2  --  17.5 15.1   CR  --   --   --   --   --   --   --  0.84  --  0.95 0.90   ANIONGAP  --   --   --   --   --   --   --  14  --  14 11   CALLY  --   --   --   --   --   --   --  9.1  --  9.3 9.5   *  --  126* 142*   < >  --    < > 164*   < > 176* 126*    < > = values in this interval not displayed.

## 2025-03-03 ENCOUNTER — PATIENT OUTREACH (OUTPATIENT)
Dept: CARE COORDINATION | Facility: CLINIC | Age: 45
End: 2025-03-03
Payer: COMMERCIAL

## 2025-03-03 ENCOUNTER — PATIENT OUTREACH (OUTPATIENT)
Dept: PEDIATRICS | Facility: CLINIC | Age: 45
End: 2025-03-03
Payer: COMMERCIAL

## 2025-03-03 LAB
AQP4 H2O CHANNEL IGG SERPL QL: NEGATIVE
CNS DEMYELINATING DISEASE INTERPRETATION, SERUM: NORMAL
MOG FACS, S: NEGATIVE

## 2025-03-03 NOTE — PROGRESS NOTES
Clinic Care Coordination Contact  Care Coordination Clinician Chart Review    Situation: Patient chart reviewed by care coordinator.    Background: Clinic Care Coordination Referral received from inpatient care team for transition handoff communication following hospital admission.      Assessment: Upon chart review, outreach call not indicated to minimize duplicative efforts as clinic RN in process of completing Transition of Care Management outreach & no outstanding resource and/or unmet needs apparent. Patient appropriately following plan of care per provider recommendation as evidenced by future scheduled appointments and active communication with primary and specialty care team regarding post-hospital follow up plan.        Plan/Recommendations: Clinic Care Coordination Referral/order cancelled. RNCC will perform no further monitoring/outreaches at this time and will remain available as needed. If new needs arise, a new Care Coordination Referral may be placed.    Carmen Suazo RN Care Coordinator  [facility] [place]  Email: [email or web address]  Phone: [phone]

## 2025-03-03 NOTE — TELEPHONE ENCOUNTER
Called the patient at [phone] to complete the TCM Outreach and left a message requesting a call back   - Provided call back number   - Awaiting a call back at this time     Attempt #1  Left message to call back any triage nurse regarding   Most Recent Admission Date: 2/25/2025   Most Recent Admission Diagnosis: Left frontal lobe mass - G93.89     Most Recent Discharge Date: 2/28/2025   Most Recent Discharge Diagnosis: Left frontal lobe mass - G93.89  Blurred vision - H53.8     Med changes: none    Appointment needed within 30 days.    Follow up appointment has not been made.    Roseanna CASTILLO RN   [facility]

## 2025-03-03 NOTE — TELEPHONE ENCOUNTER
"Call patient for hospital follow up.  Most Recent Admission Date: 2/25/2025   Most Recent Admission Diagnosis: Left frontal lobe mass - G93.89     Most Recent Discharge Date: 2/28/2025   Most Recent Discharge Diagnosis: Left frontal lobe mass - G93.89  Blurred vision - H53.8     Med changes: none    After Visit Summary follow up recommendations: \"Follow-up with ophthalmology 1 week after biopsy. To follow up MOG, IgG, and aquaporin 4 IgG ordered by outpatient ophthalmology.   -Follow-up with neurology clinic in 4 weeks.\"\"After discussion with neurosurgery the decision was taken to discharge the patient home and have planned admission on 3/7/2025 for biopsy in the OR. \"    Primary care appointment needed within 30 days    Primary care hospital follow up appointment has not been made.        Ashley Portillo RN      "

## 2025-03-03 NOTE — TELEPHONE ENCOUNTER
"  Transitions of Care Outreach  Chief Complaint   Patient presents with    Hospital F/U       Most Recent Admission Date: 2/25/2025   Most Recent Admission Diagnosis: Left frontal lobe mass - G93.89     Most Recent Discharge Date: 2/28/2025   Most Recent Discharge Diagnosis: Left frontal lobe mass - G93.89  Blurred vision - H53.8     Transitions of Care Assessment    Discharge Assessment  How are you doing now that you are home?: \"I'm doing good\"  How are your symptoms? (Red Flag symptoms escalate to triage hotline per guidelines): Improved  Do you know how to contact your clinic care team if you have future questions or changes to your health status? : Yes  Does the patient have their discharge instructions? : Yes  Does the patient have questions regarding their discharge instructions? : No  Were you started on any new medications or were there changes to any of your previous medications? : No  Does the patient have all of their medications?: Yes  Do you have questions regarding any of your medications? : No    Follow up Plan     Discharge Follow-Up  Discharge follow up appointment scheduled in alignment with recommended follow up timeframe or Transitions of Risk Category? (Low = within 30 days; Moderate= within 14 days; High= within 7 days): No  Patient's follow up appointment not scheduled: Patient declined scheduling support. Education on the importance of transitions of care follow up. Provided scheduling phone number.    Future Appointments   Date Time Provider Department Center   3/4/2025  9:00 AM Genaro Carver MD CSNESG    4/8/2025 10:00 AM Azeem Arteaga MD UUEYE [facility] MSA CLIN   4/14/2025 10:30 AM Serene Hamm [facility] LUMTJOSHUA    6/30/2025  9:00 AM Jocelynn Pickard MD RIBRII [facility]   6/30/2025  1:30 PM Russel Hsieh MD EAFP EA   7/16/2025 12:30 PM Kamini Caceres PA-C MGENCR [place]   12/31/2025 11:00 AM Russel Hsieh MD EAFP EA       Outpatient Plan as outlined on AVS reviewed with " patient.    For any urgent concerns, please contact our 24 hour nurse triage line: [phone] ([phone])       Alyse Walker RN

## 2025-03-04 ENCOUNTER — VIRTUAL VISIT (OUTPATIENT)
Dept: NEUROSURGERY | Facility: CLINIC | Age: 45
End: 2025-03-04
Payer: COMMERCIAL

## 2025-03-04 ENCOUNTER — TELEPHONE (OUTPATIENT)
Dept: OPHTHALMOLOGY | Facility: CLINIC | Age: 45
End: 2025-03-04

## 2025-03-04 DIAGNOSIS — G93.89 LEFT FRONTAL LOBE MASS: Primary | ICD-10-CM

## 2025-03-04 NOTE — TELEPHONE ENCOUNTER
[facility] Call Center    Phone Message    May a detailed message be left on voicemail: yes     Reason for Call: Appointment Intake    Referring Provider Name: Petr Gentile MD     Diagnosis and/or Symptoms: Follow-up with ophthalmology 1 week after biopsy is completed.     Hospital follow up    Action Taken: Message routed to:  [facility]    Travel Screening: Not Applicable     Date of Service:

## 2025-03-04 NOTE — PROGRESS NOTES
Video-Visit Details     Type of service:  Video Visit   Time: 9:00 am to 9:15 am  Originating Location (pt. Location): Home with his wife Dr. Younger  Distant Location (provider location):  On-site  Platform used for Video Visit: [facility]      Neurosurgery Clinic Note     Mr. Jeffry Younger is a 44-year-old right-handed gentleman who was admitted at [facility] for symptoms of blurring of vision of approximately 1 week duration.  Patient underwent evaluation by neuro-ophthalmologist and was diagnosed to have left disc edema and MRI brain was ordered.  Patient denied any new onset neurological symptoms since his discharge including headaches with nausea, vomiting, worsening of blurring of vision, weakness, sensory deficits or new onset seizures.    MRI brain with and without contrast showed left posterior frontal lobe lesion measuring 4.6 x 4 x 4 cm T2 hyperintense component and 3.7 x 3.6 x 2.2 cm enhancing component.    Review of systems reported no new neurologic symptoms.     Video showed a non-focal motor and cranial nerve examination.     AP: Patient is a 44-year-old right-handed gentleman who was admitted for symptoms of approximately 1 week blurring of vision.  Patient denied any new onset neurological symptoms.    I discussed the differential diagnosis of the left posterior frontal lobe lesion with both enhancing and nonenhancing components including primary brain tumor, lymphoma, inflammatory pathologies, metastatic brain tumor and others. I discussed the management options including stereotactic needle biopsy, awake craniotomy with resection of the tumor, stereotactic needle biopsy with laser interstitial thermal therapy and alternatives. I explained to the patient and his spouse that based on the pattern of contrast-enhancement, I would recommend stereotactic needle biopsy for tissue diagnosis. I discussed the risk and benefits of surgery including but not limited to bleeding, infection, need for repeat surgery, need  "for adjuvant treatment, stroke, nondiagnostic biopsy, need for further awake craniotomy with resection of the tumor, DVT/PE, MI and others. After extensive discussion of the risk and benefits of surgery patient and his spouse agreed to proceed.     I explained to the patient that we will do stereotactic CT head and ASA response assess on the day of the procedure.  Patient and his spouse sounded understanding and all the questions were answered. He can contact us if there are any further questions or concerns or worsening neurological deficits.I spent more than 15 minutes in this apt, examining the pt, reviewing the scans, reviewing notes from chart, discussing treatment options with risks and benefits and coordinating care. This note was created in part by the use of Dragon voice recognition system. Inadvertent grammatical errors and typographical errors may have occurred due to inherent limitation of voice recognition software.  Reasonable attempts made to avoid errors, but this document may contain an error not identified before finalizing.  Please contact me for any clarification needed.        \"I spent 15 minutes on the date of encounter of which 10 minutes spent in medical discussion.\"       "

## 2025-03-04 NOTE — LETTER
3/4/2025      Jeffry Younger  [address] [facility] [address]      Dear Colleague,    Thank you for referring your patient, Jeffry Younger, to the [facility] NEUROLOGY [facility]. Please see a copy of my visit note below.    Video-Visit Details     Type of service:  Video Visit   Time: 9:00 am to 9:15 am  Originating Location (pt. Location): Home with his wife Dr. Younger  Distant Location (provider location):  On-site  Platform used for Video Visit: [facility]      Neurosurgery Clinic Note     Mr. Jeffry Younger is a 44-year-old right-handed gentleman who was admitted at [facility] for symptoms of blurring of vision of approximately 1 week duration.  Patient underwent evaluation by neuro-ophthalmologist and was diagnosed to have left disc edema and MRI brain was ordered.  Patient denied any new onset neurological symptoms since his discharge including headaches with nausea, vomiting, worsening of blurring of vision, weakness, sensory deficits or new onset seizures.    MRI brain with and without contrast showed left posterior frontal lobe lesion measuring 4.6 x 4 x 4 cm T2 hyperintense component and 3.7 x 3.6 x 2.2 cm enhancing component.    Review of systems reported no new neurologic symptoms.     Video showed a non-focal motor and cranial nerve examination.     AP: Patient is a 44-year-old right-handed gentleman who was admitted for symptoms of approximately 1 week blurring of vision.  Patient denied any new onset neurological symptoms.    I discussed the differential diagnosis of the left posterior frontal lobe lesion with both enhancing and nonenhancing components including primary brain tumor, lymphoma, inflammatory pathologies, metastatic brain tumor and others. I discussed the management options including stereotactic needle biopsy, awake craniotomy with resection of the tumor, stereotactic needle biopsy with laser interstitial thermal therapy and alternatives. I explained to the patient and his spouse  "that based on the pattern of contrast-enhancement, I would recommend stereotactic needle biopsy for tissue diagnosis. I discussed the risk and benefits of surgery including but not limited to bleeding, infection, need for repeat surgery, need for adjuvant treatment, stroke, nondiagnostic biopsy, need for further awake craniotomy with resection of the tumor, DVT/PE, MI and others. After extensive discussion of the risk and benefits of surgery patient and his spouse agreed to proceed.     I explained to the patient that we will do stereotactic CT head and ASA response assess on the day of the procedure.  Patient and his spouse sounded understanding and all the questions were answered. He can contact us if there are any further questions or concerns or worsening neurological deficits.I spent more than 15 minutes in this apt, examining the pt, reviewing the scans, reviewing notes from chart, discussing treatment options with risks and benefits and coordinating care. This note was created in part by the use of Dragon voice recognition system. Inadvertent grammatical errors and typographical errors may have occurred due to inherent limitation of voice recognition software.  Reasonable attempts made to avoid errors, but this document may contain an error not identified before finalizing.  Please contact me for any clarification needed.        \"I spent 15 minutes on the date of encounter of which 10 minutes spent in medical discussion.\"           Again, thank you for allowing me to participate in the care of your patient.        Sincerely,        Genaro Carver MD    Electronically signed"

## 2025-03-04 NOTE — TELEPHONE ENCOUNTER
Pt was to follow up in about 6 weeks per Dr. Arteaga following February 24th visit and has appt in April scheduled.    Note to Dr. Arteaga/team to review referral/information from today changes plan for scheduling    Ki Meza RN 3:43 PM 03/04/25

## 2025-03-07 ENCOUNTER — APPOINTMENT (OUTPATIENT)
Dept: CT IMAGING | Facility: CLINIC | Age: 45
DRG: 026 | End: 2025-03-07
Attending: STUDENT IN AN ORGANIZED HEALTH CARE EDUCATION/TRAINING PROGRAM
Payer: COMMERCIAL

## 2025-03-07 ENCOUNTER — HOSPITAL ENCOUNTER (INPATIENT)
Facility: CLINIC | Age: 45
LOS: 1 days | Discharge: HOME | DRG: 026 | End: 2025-03-08
Attending: NEUROLOGICAL SURGERY | Admitting: NEUROLOGICAL SURGERY
Payer: COMMERCIAL

## 2025-03-07 ENCOUNTER — APPOINTMENT (OUTPATIENT)
Dept: CT IMAGING | Facility: CLINIC | Age: 45
DRG: 026 | End: 2025-03-07
Payer: COMMERCIAL

## 2025-03-07 DIAGNOSIS — G93.89 LEFT FRONTAL LOBE MASS: Primary | ICD-10-CM

## 2025-03-07 DIAGNOSIS — Z98.890 S/P BIOPSY: ICD-10-CM

## 2025-03-07 PROBLEM — D49.6 BRAIN TUMOR (H): Status: ACTIVE | Noted: 2025-03-07

## 2025-03-07 LAB
ABO + RH BLD: NORMAL
BLD GP AB SCN SERPL QL: NEGATIVE
GLUCOSE BLDC GLUCOMTR-MCNC: 144 MG/DL (ref 70–99)
GLUCOSE BLDC GLUCOMTR-MCNC: 151 MG/DL (ref 70–99)
GLUCOSE BLDC GLUCOMTR-MCNC: 161 MG/DL (ref 70–99)
GLUCOSE BLDC GLUCOMTR-MCNC: 183 MG/DL (ref 70–99)
GLUCOSE BLDC GLUCOMTR-MCNC: 203 MG/DL (ref 70–99)
PA AA BLD-ACNC: 627 ARU
SPECIMEN EXP DATE BLD: NORMAL

## 2025-03-07 PROCEDURE — 88331 PATH CONSLTJ SURG 1 BLK 1SPC: CPT | Mod: TC | Performed by: NEUROLOGICAL SURGERY

## 2025-03-07 PROCEDURE — 85576 BLOOD PLATELET AGGREGATION: CPT

## 2025-03-07 PROCEDURE — 710N000011 HC RECOVERY PHASE 1, LEVEL 3, PER MIN: Performed by: NEUROLOGICAL SURGERY

## 2025-03-07 PROCEDURE — 00B70ZX EXCISION OF CEREBRAL HEMISPHERE, OPEN APPROACH, DIAGNOSTIC: ICD-10-PCS | Performed by: NEUROLOGICAL SURGERY

## 2025-03-07 PROCEDURE — 86900 BLOOD TYPING SEROLOGIC ABO: CPT | Performed by: STUDENT IN AN ORGANIZED HEALTH CARE EDUCATION/TRAINING PROGRAM

## 2025-03-07 PROCEDURE — C1713 ANCHOR/SCREW BN/BN,TIS/BN: HCPCS | Performed by: NEUROLOGICAL SURGERY

## 2025-03-07 PROCEDURE — 88342 IMHCHEM/IMCYTCHM 1ST ANTB: CPT | Mod: TC | Performed by: NEUROLOGICAL SURGERY

## 2025-03-07 PROCEDURE — 70450 CT HEAD/BRAIN W/O DYE: CPT

## 2025-03-07 PROCEDURE — 88342 IMHCHEM/IMCYTCHM 1ST ANTB: CPT | Mod: 26 | Performed by: STUDENT IN AN ORGANIZED HEALTH CARE EDUCATION/TRAINING PROGRAM

## 2025-03-07 PROCEDURE — 250N000011 HC RX IP 250 OP 636: Performed by: NURSE PRACTITIONER

## 2025-03-07 PROCEDURE — 61750 INCISE SKULL/BRAIN BIOPSY: CPT | Mod: GC | Performed by: NEUROLOGICAL SURGERY

## 2025-03-07 PROCEDURE — 8E09XBZ COMPUTER ASSISTED PROCEDURE OF HEAD AND NECK REGION: ICD-10-PCS | Performed by: NEUROLOGICAL SURGERY

## 2025-03-07 PROCEDURE — 88307 TISSUE EXAM BY PATHOLOGIST: CPT | Mod: 26 | Performed by: STUDENT IN AN ORGANIZED HEALTH CARE EDUCATION/TRAINING PROGRAM

## 2025-03-07 PROCEDURE — 120N000002 HC R&B MED SURG/OB UMMC

## 2025-03-07 PROCEDURE — 70450 CT HEAD/BRAIN W/O DYE: CPT | Mod: 26 | Performed by: RADIOLOGY

## 2025-03-07 PROCEDURE — 88341 IMHCHEM/IMCYTCHM EA ADD ANTB: CPT | Mod: 26 | Performed by: STUDENT IN AN ORGANIZED HEALTH CARE EDUCATION/TRAINING PROGRAM

## 2025-03-07 PROCEDURE — 272N000001 HC OR GENERAL SUPPLY STERILE: Performed by: NEUROLOGICAL SURGERY

## 2025-03-07 PROCEDURE — 250N000011 HC RX IP 250 OP 636: Performed by: STUDENT IN AN ORGANIZED HEALTH CARE EDUCATION/TRAINING PROGRAM

## 2025-03-07 PROCEDURE — 250N000013 HC RX MED GY IP 250 OP 250 PS 637

## 2025-03-07 PROCEDURE — 8E090CZ ROBOTIC ASSISTED PROCEDURE OF HEAD AND NECK REGION, OPEN APPROACH: ICD-10-PCS | Performed by: NEUROLOGICAL SURGERY

## 2025-03-07 PROCEDURE — 36415 COLL VENOUS BLD VENIPUNCTURE: CPT | Performed by: STUDENT IN AN ORGANIZED HEALTH CARE EDUCATION/TRAINING PROGRAM

## 2025-03-07 PROCEDURE — 370N000017 HC ANESTHESIA TECHNICAL FEE, PER MIN: Performed by: NEUROLOGICAL SURGERY

## 2025-03-07 PROCEDURE — 86850 RBC ANTIBODY SCREEN: CPT | Performed by: STUDENT IN AN ORGANIZED HEALTH CARE EDUCATION/TRAINING PROGRAM

## 2025-03-07 PROCEDURE — 250N000025 HC SEVOFLURANE, PER MIN: Performed by: NEUROLOGICAL SURGERY

## 2025-03-07 PROCEDURE — C1889 IMPLANT/INSERT DEVICE, NOC: HCPCS | Performed by: NEUROLOGICAL SURGERY

## 2025-03-07 PROCEDURE — 999N000141 HC STATISTIC PRE-PROCEDURE NURSING ASSESSMENT: Performed by: NEUROLOGICAL SURGERY

## 2025-03-07 PROCEDURE — 258N000003 HC RX IP 258 OP 636

## 2025-03-07 PROCEDURE — 272N000002 HC OR SUPPLY OTHER OPNP: Performed by: NEUROLOGICAL SURGERY

## 2025-03-07 PROCEDURE — 250N000009 HC RX 250: Performed by: NEUROLOGICAL SURGERY

## 2025-03-07 PROCEDURE — 70450 CT HEAD/BRAIN W/O DYE: CPT | Mod: 77

## 2025-03-07 PROCEDURE — 272N000480 CT HEAD W/O CONTRAST

## 2025-03-07 PROCEDURE — 360N000079 HC SURGERY LEVEL 6, PER MIN: Performed by: NEUROLOGICAL SURGERY

## 2025-03-07 PROCEDURE — 250N000012 HC RX MED GY IP 250 OP 636 PS 637

## 2025-03-07 PROCEDURE — 88331 PATH CONSLTJ SURG 1 BLK 1SPC: CPT | Mod: 26 | Performed by: SPECIALIST

## 2025-03-07 PROCEDURE — 120N000003 HC R&B IMCU UMMC

## 2025-03-07 DEVICE — SCREW BONE NEURO 3 AXIS 4X1.5MM 56-15934: Type: IMPLANTABLE DEVICE | Site: CRANIAL | Status: FUNCTIONAL

## 2025-03-07 DEVICE — IMPLANTABLE DEVICE: Type: IMPLANTABLE DEVICE | Site: CRANIAL | Status: FUNCTIONAL

## 2025-03-07 RX ORDER — NALOXONE HYDROCHLORIDE 0.4 MG/ML
0.2 INJECTION, SOLUTION INTRAMUSCULAR; INTRAVENOUS; SUBCUTANEOUS
Status: DISCONTINUED | OUTPATIENT
Start: 2025-03-07 | End: 2025-03-08 | Stop reason: HOSPADM

## 2025-03-07 RX ORDER — HYDROMORPHONE HCL IN WATER/PF 6 MG/30 ML
0.4 PATIENT CONTROLLED ANALGESIA SYRINGE INTRAVENOUS EVERY 5 MIN PRN
Status: DISCONTINUED | OUTPATIENT
Start: 2025-03-07 | End: 2025-03-07 | Stop reason: HOSPADM

## 2025-03-07 RX ORDER — BISACODYL 10 MG
10 SUPPOSITORY, RECTAL RECTAL DAILY PRN
Status: DISCONTINUED | OUTPATIENT
Start: 2025-03-10 | End: 2025-03-08 | Stop reason: HOSPADM

## 2025-03-07 RX ORDER — NALOXONE HYDROCHLORIDE 0.4 MG/ML
0.4 INJECTION, SOLUTION INTRAMUSCULAR; INTRAVENOUS; SUBCUTANEOUS
Status: DISCONTINUED | OUTPATIENT
Start: 2025-03-07 | End: 2025-03-08 | Stop reason: HOSPADM

## 2025-03-07 RX ORDER — SODIUM CHLORIDE 9 MG/ML
INJECTION, SOLUTION INTRAVENOUS CONTINUOUS
Status: ACTIVE | OUTPATIENT
Start: 2025-03-07 | End: 2025-03-07

## 2025-03-07 RX ORDER — ONDANSETRON 2 MG/ML
4 INJECTION INTRAMUSCULAR; INTRAVENOUS EVERY 30 MIN PRN
Status: DISCONTINUED | OUTPATIENT
Start: 2025-03-07 | End: 2025-03-07 | Stop reason: HOSPADM

## 2025-03-07 RX ORDER — PROCHLORPERAZINE MALEATE 5 MG/1
10 TABLET ORAL EVERY 6 HOURS PRN
Status: DISCONTINUED | OUTPATIENT
Start: 2025-03-07 | End: 2025-03-07

## 2025-03-07 RX ORDER — POLYETHYLENE GLYCOL 3350 17 G/17G
17 POWDER, FOR SOLUTION ORAL DAILY
Status: DISCONTINUED | OUTPATIENT
Start: 2025-03-08 | End: 2025-03-07

## 2025-03-07 RX ORDER — ONDANSETRON 4 MG/1
4 TABLET, ORALLY DISINTEGRATING ORAL EVERY 6 HOURS PRN
Status: DISCONTINUED | OUTPATIENT
Start: 2025-03-07 | End: 2025-03-07

## 2025-03-07 RX ORDER — AMOXICILLIN 250 MG
1 CAPSULE ORAL 2 TIMES DAILY
Status: DISCONTINUED | OUTPATIENT
Start: 2025-03-07 | End: 2025-03-08 | Stop reason: HOSPADM

## 2025-03-07 RX ORDER — NALOXONE HYDROCHLORIDE 0.4 MG/ML
0.1 INJECTION, SOLUTION INTRAMUSCULAR; INTRAVENOUS; SUBCUTANEOUS
Status: DISCONTINUED | OUTPATIENT
Start: 2025-03-07 | End: 2025-03-07 | Stop reason: HOSPADM

## 2025-03-07 RX ORDER — OXYCODONE HYDROCHLORIDE 10 MG/1
10 TABLET ORAL
Status: DISCONTINUED | OUTPATIENT
Start: 2025-03-07 | End: 2025-03-07 | Stop reason: HOSPADM

## 2025-03-07 RX ORDER — BISACODYL 10 MG
10 SUPPOSITORY, RECTAL RECTAL DAILY PRN
Status: DISCONTINUED | OUTPATIENT
Start: 2025-03-10 | End: 2025-03-07

## 2025-03-07 RX ORDER — ACETAMINOPHEN 325 MG/1
975 TABLET ORAL EVERY 8 HOURS
Status: DISCONTINUED | OUTPATIENT
Start: 2025-03-07 | End: 2025-03-08 | Stop reason: HOSPADM

## 2025-03-07 RX ORDER — OXYCODONE HYDROCHLORIDE 5 MG/1
5 TABLET ORAL
Status: DISCONTINUED | OUTPATIENT
Start: 2025-03-07 | End: 2025-03-07 | Stop reason: HOSPADM

## 2025-03-07 RX ORDER — ACETAMINOPHEN 325 MG/1
975 TABLET ORAL ONCE
Status: DISCONTINUED | OUTPATIENT
Start: 2025-03-07 | End: 2025-03-07 | Stop reason: HOSPADM

## 2025-03-07 RX ORDER — ONDANSETRON 4 MG/1
4 TABLET, ORALLY DISINTEGRATING ORAL EVERY 30 MIN PRN
Status: DISCONTINUED | OUTPATIENT
Start: 2025-03-07 | End: 2025-03-07 | Stop reason: HOSPADM

## 2025-03-07 RX ORDER — PROCHLORPERAZINE MALEATE 5 MG/1
10 TABLET ORAL EVERY 6 HOURS PRN
Status: DISCONTINUED | OUTPATIENT
Start: 2025-03-07 | End: 2025-03-08 | Stop reason: HOSPADM

## 2025-03-07 RX ORDER — NICOTINE POLACRILEX 4 MG
15-30 LOZENGE BUCCAL
Status: DISCONTINUED | OUTPATIENT
Start: 2025-03-07 | End: 2025-03-08 | Stop reason: HOSPADM

## 2025-03-07 RX ORDER — SODIUM CHLORIDE, SODIUM LACTATE, POTASSIUM CHLORIDE, CALCIUM CHLORIDE 600; 310; 30; 20 MG/100ML; MG/100ML; MG/100ML; MG/100ML
INJECTION, SOLUTION INTRAVENOUS CONTINUOUS
Status: DISCONTINUED | OUTPATIENT
Start: 2025-03-07 | End: 2025-03-07 | Stop reason: HOSPADM

## 2025-03-07 RX ORDER — LEVETIRACETAM 10 MG/ML
1000 INJECTION INTRAVASCULAR ONCE
Status: COMPLETED | OUTPATIENT
Start: 2025-03-07 | End: 2025-03-07

## 2025-03-07 RX ORDER — CEFAZOLIN SODIUM/WATER 3 G/30 ML
3 SYRINGE (ML) INTRAVENOUS
Status: DISCONTINUED | OUTPATIENT
Start: 2025-03-07 | End: 2025-03-07 | Stop reason: HOSPADM

## 2025-03-07 RX ORDER — LIDOCAINE 40 MG/G
CREAM TOPICAL
Status: DISCONTINUED | OUTPATIENT
Start: 2025-03-07 | End: 2025-03-07 | Stop reason: HOSPADM

## 2025-03-07 RX ORDER — DEXAMETHASONE SODIUM PHOSPHATE 4 MG/ML
4 INJECTION, SOLUTION INTRA-ARTICULAR; INTRALESIONAL; INTRAMUSCULAR; INTRAVENOUS; SOFT TISSUE
Status: DISCONTINUED | OUTPATIENT
Start: 2025-03-07 | End: 2025-03-07 | Stop reason: HOSPADM

## 2025-03-07 RX ORDER — AMOXICILLIN 250 MG
1 CAPSULE ORAL 2 TIMES DAILY
Status: DISCONTINUED | OUTPATIENT
Start: 2025-03-07 | End: 2025-03-07

## 2025-03-07 RX ORDER — HYDROMORPHONE HCL IN WATER/PF 6 MG/30 ML
0.2 PATIENT CONTROLLED ANALGESIA SYRINGE INTRAVENOUS
Status: DISCONTINUED | OUTPATIENT
Start: 2025-03-07 | End: 2025-03-08 | Stop reason: HOSPADM

## 2025-03-07 RX ORDER — DEXAMETHASONE 2 MG/1
2 TABLET ORAL EVERY 12 HOURS SCHEDULED
Status: DISCONTINUED | OUTPATIENT
Start: 2025-03-09 | End: 2025-03-08 | Stop reason: HOSPADM

## 2025-03-07 RX ORDER — DEXAMETHASONE 1 MG
1 TABLET ORAL DAILY
Status: DISCONTINUED | OUTPATIENT
Start: 2025-03-14 | End: 2025-03-08 | Stop reason: HOSPADM

## 2025-03-07 RX ORDER — LIDOCAINE 40 MG/G
CREAM TOPICAL
Status: DISCONTINUED | OUTPATIENT
Start: 2025-03-07 | End: 2025-03-08 | Stop reason: HOSPADM

## 2025-03-07 RX ORDER — FENTANYL CITRATE 50 UG/ML
25 INJECTION, SOLUTION INTRAMUSCULAR; INTRAVENOUS EVERY 5 MIN PRN
Status: DISCONTINUED | OUTPATIENT
Start: 2025-03-07 | End: 2025-03-07 | Stop reason: HOSPADM

## 2025-03-07 RX ORDER — HYDROMORPHONE HCL IN WATER/PF 6 MG/30 ML
0.4 PATIENT CONTROLLED ANALGESIA SYRINGE INTRAVENOUS
Status: DISCONTINUED | OUTPATIENT
Start: 2025-03-07 | End: 2025-03-08 | Stop reason: HOSPADM

## 2025-03-07 RX ORDER — CEFAZOLIN SODIUM/WATER 3 G/30 ML
3 SYRINGE (ML) INTRAVENOUS SEE ADMIN INSTRUCTIONS
Status: DISCONTINUED | OUTPATIENT
Start: 2025-03-07 | End: 2025-03-07 | Stop reason: HOSPADM

## 2025-03-07 RX ORDER — DEXTROSE MONOHYDRATE 25 G/50ML
25-50 INJECTION, SOLUTION INTRAVENOUS
Status: DISCONTINUED | OUTPATIENT
Start: 2025-03-07 | End: 2025-03-08 | Stop reason: HOSPADM

## 2025-03-07 RX ORDER — BUPIVACAINE HYDROCHLORIDE AND EPINEPHRINE 2.5; 5 MG/ML; UG/ML
INJECTION, SOLUTION INFILTRATION; PERINEURAL PRN
Status: DISCONTINUED | OUTPATIENT
Start: 2025-03-07 | End: 2025-03-07 | Stop reason: HOSPADM

## 2025-03-07 RX ORDER — HYDROMORPHONE HCL IN WATER/PF 6 MG/30 ML
0.2 PATIENT CONTROLLED ANALGESIA SYRINGE INTRAVENOUS EVERY 5 MIN PRN
Status: DISCONTINUED | OUTPATIENT
Start: 2025-03-07 | End: 2025-03-07 | Stop reason: HOSPADM

## 2025-03-07 RX ORDER — LEVETIRACETAM 500 MG/1
500 TABLET ORAL 2 TIMES DAILY
Status: DISCONTINUED | OUTPATIENT
Start: 2025-03-07 | End: 2025-03-08 | Stop reason: HOSPADM

## 2025-03-07 RX ORDER — ONDANSETRON 4 MG/1
4 TABLET, ORALLY DISINTEGRATING ORAL EVERY 6 HOURS PRN
Status: DISCONTINUED | OUTPATIENT
Start: 2025-03-07 | End: 2025-03-08 | Stop reason: HOSPADM

## 2025-03-07 RX ORDER — ONDANSETRON 2 MG/ML
4 INJECTION INTRAMUSCULAR; INTRAVENOUS EVERY 6 HOURS PRN
Status: DISCONTINUED | OUTPATIENT
Start: 2025-03-07 | End: 2025-03-07

## 2025-03-07 RX ORDER — DEXAMETHASONE 1 MG
1 TABLET ORAL EVERY 12 HOURS SCHEDULED
Status: DISCONTINUED | OUTPATIENT
Start: 2025-03-11 | End: 2025-03-08 | Stop reason: HOSPADM

## 2025-03-07 RX ORDER — POLYETHYLENE GLYCOL 3350 17 G/17G
17 POWDER, FOR SOLUTION ORAL DAILY
Status: DISCONTINUED | OUTPATIENT
Start: 2025-03-08 | End: 2025-03-08 | Stop reason: HOSPADM

## 2025-03-07 RX ORDER — ONDANSETRON 2 MG/ML
4 INJECTION INTRAMUSCULAR; INTRAVENOUS EVERY 6 HOURS PRN
Status: DISCONTINUED | OUTPATIENT
Start: 2025-03-07 | End: 2025-03-08 | Stop reason: HOSPADM

## 2025-03-07 RX ORDER — HYDRALAZINE HYDROCHLORIDE 20 MG/ML
10-20 INJECTION INTRAMUSCULAR; INTRAVENOUS EVERY 30 MIN PRN
Status: DISCONTINUED | OUTPATIENT
Start: 2025-03-07 | End: 2025-03-08 | Stop reason: HOSPADM

## 2025-03-07 RX ORDER — DEXAMETHASONE 4 MG/1
4 TABLET ORAL EVERY 12 HOURS SCHEDULED
Status: DISCONTINUED | OUTPATIENT
Start: 2025-03-07 | End: 2025-03-08 | Stop reason: HOSPADM

## 2025-03-07 RX ORDER — LABETALOL HYDROCHLORIDE 5 MG/ML
10-20 INJECTION, SOLUTION INTRAVENOUS EVERY 10 MIN PRN
Status: DISCONTINUED | OUTPATIENT
Start: 2025-03-07 | End: 2025-03-08 | Stop reason: HOSPADM

## 2025-03-07 RX ORDER — FENTANYL CITRATE 50 UG/ML
50 INJECTION, SOLUTION INTRAMUSCULAR; INTRAVENOUS EVERY 5 MIN PRN
Status: DISCONTINUED | OUTPATIENT
Start: 2025-03-07 | End: 2025-03-07 | Stop reason: HOSPADM

## 2025-03-07 RX ORDER — OXYCODONE HYDROCHLORIDE 10 MG/1
10 TABLET ORAL EVERY 4 HOURS PRN
Status: DISCONTINUED | OUTPATIENT
Start: 2025-03-07 | End: 2025-03-08 | Stop reason: HOSPADM

## 2025-03-07 RX ORDER — ATORVASTATIN CALCIUM 10 MG/1
10 TABLET, FILM COATED ORAL DAILY
Status: DISCONTINUED | OUTPATIENT
Start: 2025-03-08 | End: 2025-03-08 | Stop reason: HOSPADM

## 2025-03-07 RX ORDER — OXYCODONE HYDROCHLORIDE 5 MG/1
5 TABLET ORAL EVERY 4 HOURS PRN
Status: DISCONTINUED | OUTPATIENT
Start: 2025-03-07 | End: 2025-03-08 | Stop reason: HOSPADM

## 2025-03-07 RX ADMIN — ACETAMINOPHEN 975 MG: 325 TABLET, FILM COATED ORAL at 12:42

## 2025-03-07 RX ADMIN — ACETAMINOPHEN 975 MG: 325 TABLET, FILM COATED ORAL at 20:06

## 2025-03-07 RX ADMIN — DEXAMETHASONE 4 MG: 4 TABLET ORAL at 20:06

## 2025-03-07 RX ADMIN — LABETALOL HYDROCHLORIDE 20 MG: 5 INJECTION, SOLUTION INTRAVENOUS at 23:54

## 2025-03-07 RX ADMIN — LEVETIRACETAM 500 MG: 500 TABLET, FILM COATED ORAL at 20:06

## 2025-03-07 RX ADMIN — INSULIN ASPART 1 UNITS: 100 INJECTION, SOLUTION INTRAVENOUS; SUBCUTANEOUS at 17:48

## 2025-03-07 RX ADMIN — LABETALOL HYDROCHLORIDE 10 MG: 5 INJECTION, SOLUTION INTRAVENOUS at 23:31

## 2025-03-07 RX ADMIN — LABETALOL HYDROCHLORIDE 10 MG: 5 INJECTION, SOLUTION INTRAVENOUS at 13:16

## 2025-03-07 RX ADMIN — FENTANYL CITRATE 50 MCG: 50 INJECTION, SOLUTION INTRAMUSCULAR; INTRAVENOUS at 10:33

## 2025-03-07 RX ADMIN — OXYCODONE HYDROCHLORIDE 5 MG: 5 TABLET ORAL at 16:42

## 2025-03-07 RX ADMIN — SODIUM CHLORIDE: 0.9 INJECTION, SOLUTION INTRAVENOUS at 12:31

## 2025-03-07 RX ADMIN — LABETALOL HYDROCHLORIDE 20 MG: 5 INJECTION, SOLUTION INTRAVENOUS at 13:36

## 2025-03-07 ASSESSMENT — ACTIVITIES OF DAILY LIVING (ADL)
ADLS_ACUITY_SCORE: 32
ADLS_ACUITY_SCORE: 32
ADLS_ACUITY_SCORE: 31
ADLS_ACUITY_SCORE: 32
ADLS_ACUITY_SCORE: 31
ADLS_ACUITY_SCORE: 31
ADLS_ACUITY_SCORE: 32
ADLS_ACUITY_SCORE: 31
ADLS_ACUITY_SCORE: 32
ADLS_ACUITY_SCORE: 31
ADLS_ACUITY_SCORE: 32
ADLS_ACUITY_SCORE: 31
ADLS_ACUITY_SCORE: 31
ADLS_ACUITY_SCORE: 32
ADLS_ACUITY_SCORE: 31
ADLS_ACUITY_SCORE: 31
ADLS_ACUITY_SCORE: 32
ADLS_ACUITY_SCORE: 31

## 2025-03-07 ASSESSMENT — VISUAL ACUITY: OU: BLURRED VISION

## 2025-03-07 NOTE — OR NURSING
Spoke with MD Willis, patient okay to go to floor when floor is ready (pt is currently on hold). No BG treatment for BG of 183. MD Willis will place sign out.

## 2025-03-07 NOTE — OP NOTE
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     Estimated blood loss: 5 ml     Anesthesia: General     Procedures:  1. Stealth assisted Biopsy of left frontal lobe contrast-enhancing tumor using Auto guide robot.        Indications for procedure: Mr. Younger  is a 44-year-old right-handed gentleman who was admitted for symptoms of approximately 1 week blurring of vision.  Patient denied any new onset neurological symptoms.I discussed the differential diagnosis of the left posterior frontal lobe lesion with both enhancing and nonenhancing components including primary brain tumor, lymphoma, inflammatory pathologies, metastatic brain tumor and others. I discussed the management options including stereotactic needle biopsy, awake craniotomy with resection of the tumor, stereotactic needle biopsy with laser interstitial thermal therapy and alternatives. I explained to the patient and his spouse that based on the pattern of contrast-enhancement, I would recommend stereotactic needle biopsy for tissue diagnosis. I discussed the risk and benefits of surgery including but not limited to bleeding, infection, need for repeat surgery, need for adjuvant treatment, stroke, nondiagnostic biopsy, need for further awake craniotomy with resection of the tumor, DVT/PE, MI and others. After extensive discussion of the risk and benefits of surgery patient and his spouse agreed to proceed.        Description of procedure: Patient was brought to the operating room and was placed under general anesthesia and intubated. All pressure points were padded for the duration of the case.The head was then fixed in a Posada head smith and secured to the bed. His head was slightly  turned to the right.  He was then registered to the Stealth machine, and the trajectory was confirmed. We marked a linear incision which was made to conform to our trajectory.  After prepping and draping this area, 1% lidocaine with epinephrine was injected into the incision. Timeout was performed.     We then made an incision through skin down to the temporal fascia and muscle.  Temporalis fascia and muscle was excised using monopolar cautery and dissection was continued to the periosteum and bone.  Skin edges were retracted using self-retaining retractor.  We then used a high-speed drill to make a bur hole and used bone wax to stop all bony bleeding. We then coagulated the opened the underlying dura with a number 15-blade, then performed a corticectomy.  We then brought the auto guide robot to the field and aligned the trajectory to the target.  We then inserted the biopsy needle to the predetermined  depth under Stealth guidance aiming for the lesion.  We made sure that the patient's blood pressure was less than 140 systolic throughout. We then took a biopsy at the target and sent it for the frozen section.  We then took for additional biopsies at the target at 12, 9,3,  6 o clock positions.  We then advanced the biopsy needle to a depth of 5 mm plus target and took 4 additional biopsies at 12:00, 9, 3 and 6 o'clock position.  In total of 8 biopsies were taken.  On frozen examination, the biopsy sample was diagnostic and was suggestive of likely glioma.  Following satisfactory biopsy results and obtaining tissue for permanent pathological examination, we placed thrombin along the biopsy trajectory and copiously irrigate the wound for any bleeding.    Following satisfactory hemostasis, we placed a Gelfoam into the bur hole and cover the bur hole with a mini plate and screws.  We then irrigated the field with antibiotic solution and turned our attention to closure. We closed the galea with 2-0 vicryl stitch in an  inverted interrupted fashion, followed by 3-0 monocryl stitch for the skin in a running simple fashion.     The incision was cleaned with wet to dry sponges and sterile dressing was applied.  Patient was extubated after completion of the procedure and brought to the post-anesthesia care unit for recovery.  We will get a CT scan to rule out any bleeding.All sponge, needle, and instrument counts were correct x2 at the end of the procedure.     I was scrubbed throughout the entirety of the procedure.     I updated patient's spouse following the procedure.     Genaro Carver MD

## 2025-03-07 NOTE — PLAN OF CARE
Status: S/p L frontal brain biopsy 3/7  Vitals: HTN. SBP < 140. RA.   Neuros: Intact ex L eye blurry vision unchanged and slight lip numbness from intubation  IV: PIV SL x2  Labs/Electrolytes: BG ACHS  Resp: WNL. CPAP @ night  Diet: Regular diet  : Voiding spontaneously  GI: LBM 3/6  Skin: L head incision w/ marked primapore  Pain: Mild head pain managed with Tylenol and Oxycodone x1  Activity: SBA  Social: Wife at bedside. Supportive  Plan: Continue to monitor     Arrived from: PACU @1600  Belongings/meds: Clothes, duffel bag, shoes, CPAP, cell phone,   2 RN Skin Assessment Completed by: Kavita RN  Skin Findings: L head incision  Non-intact findings documented (yes/no/NA): Yes    Goal Outcome Evaluation:      Plan of Care Reviewed With: patient    Overall Patient Progress: no change    Outcome Evaluation: Transferred to

## 2025-03-07 NOTE — OR NURSING
"Neurosurg job code paged at 1305: \"PACU 20: Carisa Vladimir- Pt will be holding in PACU until 6A bed frees. -145, do you want a PRN ordered for control? Do you want steroid taper and Keppra started in PACU? Thanks! Aggie Block RN ([facility] or [phone])\"     Spoke with neurosurg. Follow floor orders, PRN orders for BP placed.   "

## 2025-03-07 NOTE — BRIEF OP NOTE
[facility]    Brief Operative Note    Pre-operative diagnosis: Left frontal lobe mass [G93.89]  Post-operative diagnosis Same as pre-operative diagnosis    Procedure: stealth assisted LEFT FRONTAL BIOPSY BRAIN, Left - Head  possible Stealth assisted craniotomy, excise tumor, N/A - Head    Surgeon: Surgeons and Role:     * Genaro Carver MD - Primary     * Erika Wilburn MD - Resident - Assisting  Anesthesia: General   Estimated Blood Loss: Less than 10 ml    Drains: None  Specimens:   ID Type Source Tests Collected by Time Destination   1 : left frontal biopsy Tissue Brain SURGICAL PATHOLOGY EXAM Genaro Carver MD 3/7/2025  9:05 AM    2 : left frontal biopsy Tissue Brain SURGICAL PATHOLOGY EXAM Genaro Carver MD 3/7/2025  9:32 AM    3 : left frontal biopsy-5mm below target Tissue Brain SURGICAL PATHOLOGY EXAM Genaro Carver MD 3/7/2025  9:33 AM      Findings:   Satisfactory stereotactic needle biopsy achieved.  Total of 8 biopsies taken. .  Complications: None.  Implants: * No implants in log *

## 2025-03-07 NOTE — OR NURSING
Seen by Antony Chavez MD, Anesthesiologist, no need to recheck blood sugar per Dr. Willis, no further order was made. Post op CT brain done paged/informed Dr. Wilburn.

## 2025-03-07 NOTE — OR NURSING
/97 mmhg and blood sugar 203 mg/dl informed Antony Chavez MD, Anesthesiologist, no further order was made, to recheck blood sugar after 1 hour per Dr. Willis and stated he is about to start a new case he will come see patient later. Monitored patient accordingly.

## 2025-03-07 NOTE — INTERIM SUMMARY
Neurosurgery Brief Note    Post-operative head CT looks stable with no concern for acute pathology our review. Patient is ok to transfer to intermediate care.     Plans discussed with Dr. Carver, neurosurgery attending.     Juan Guzman MD, PGY-1  Department of Neurosurgery  Pager: [phone]    Please contact neurosurgery resident on call with questions.    Dial * * *[phone], enter [phone] when prompted.

## 2025-03-08 VITALS
TEMPERATURE: 97.9 F | RESPIRATION RATE: 16 BRPM | DIASTOLIC BLOOD PRESSURE: 99 MMHG | WEIGHT: 315 LBS | HEIGHT: 74 IN | BODY MASS INDEX: 40.43 KG/M2 | SYSTOLIC BLOOD PRESSURE: 137 MMHG | HEART RATE: 94 BPM | OXYGEN SATURATION: 98 %

## 2025-03-08 LAB
ANION GAP SERPL CALCULATED.3IONS-SCNC: 13 MMOL/L (ref 7–15)
BUN SERPL-MCNC: 10.3 MG/DL (ref 6–20)
CALCIUM SERPL-MCNC: 9.3 MG/DL (ref 8.8–10.4)
CHLORIDE SERPL-SCNC: 105 MMOL/L (ref 98–107)
CREAT SERPL-MCNC: 0.72 MG/DL (ref 0.67–1.17)
EGFRCR SERPLBLD CKD-EPI 2021: >90 ML/MIN/1.73M2
ERYTHROCYTE [DISTWIDTH] IN BLOOD BY AUTOMATED COUNT: 13.2 % (ref 10–15)
GLUCOSE SERPL-MCNC: 210 MG/DL (ref 70–99)
HCO3 SERPL-SCNC: 21 MMOL/L (ref 22–29)
HCT VFR BLD AUTO: 44.1 % (ref 40–53)
HGB BLD-MCNC: 14.6 G/DL (ref 13.3–17.7)
MAGNESIUM SERPL-MCNC: 2 MG/DL (ref 1.7–2.3)
MCH RBC QN AUTO: 29 PG (ref 26.5–33)
MCHC RBC AUTO-ENTMCNC: 33.1 G/DL (ref 31.5–36.5)
MCV RBC AUTO: 88 FL (ref 78–100)
PHOSPHATE SERPL-MCNC: 3.3 MG/DL (ref 2.5–4.5)
PLATELET # BLD AUTO: 211 10E3/UL (ref 150–450)
POTASSIUM SERPL-SCNC: 4.1 MMOL/L (ref 3.4–5.3)
RBC # BLD AUTO: 5.03 10E6/UL (ref 4.4–5.9)
SODIUM SERPL-SCNC: 139 MMOL/L (ref 135–145)
WBC # BLD AUTO: 9.8 10E3/UL (ref 4–11)

## 2025-03-08 PROCEDURE — 82435 ASSAY OF BLOOD CHLORIDE: CPT

## 2025-03-08 PROCEDURE — 83735 ASSAY OF MAGNESIUM: CPT

## 2025-03-08 PROCEDURE — 999N000111 HC STATISTIC OT IP EVAL DEFER

## 2025-03-08 PROCEDURE — 84100 ASSAY OF PHOSPHORUS: CPT

## 2025-03-08 PROCEDURE — 250N000012 HC RX MED GY IP 250 OP 636 PS 637

## 2025-03-08 PROCEDURE — 85018 HEMOGLOBIN: CPT

## 2025-03-08 PROCEDURE — 250N000013 HC RX MED GY IP 250 OP 250 PS 637

## 2025-03-08 PROCEDURE — 250N000011 HC RX IP 250 OP 636: Mod: JZ | Performed by: NURSE PRACTITIONER

## 2025-03-08 PROCEDURE — 999N000147 HC STATISTIC PT IP EVAL DEFER

## 2025-03-08 PROCEDURE — 36415 COLL VENOUS BLD VENIPUNCTURE: CPT

## 2025-03-08 PROCEDURE — 80048 BASIC METABOLIC PNL TOTAL CA: CPT

## 2025-03-08 RX ORDER — LOSARTAN POTASSIUM 50 MG/1
50 TABLET ORAL AT BEDTIME
Status: DISCONTINUED | OUTPATIENT
Start: 2025-03-08 | End: 2025-03-08 | Stop reason: HOSPADM

## 2025-03-08 RX ORDER — OXYCODONE HYDROCHLORIDE 5 MG/1
5 TABLET ORAL EVERY 6 HOURS PRN
Qty: 12 TABLET | Refills: 0 | Status: SHIPPED | OUTPATIENT
Start: 2025-03-08 | End: 2025-03-11

## 2025-03-08 RX ORDER — DEXAMETHASONE 1 MG
TABLET ORAL
Qty: 30 TABLET | Refills: 0 | Status: SHIPPED | OUTPATIENT
Start: 2025-03-08 | End: 2025-03-16

## 2025-03-08 RX ORDER — ASPIRIN 81 MG/1
81 TABLET ORAL DAILY
COMMUNITY
Start: 2025-03-22

## 2025-03-08 RX ORDER — LEVETIRACETAM 500 MG/1
500 TABLET ORAL 2 TIMES DAILY
Qty: 14 TABLET | Refills: 0 | Status: SHIPPED | OUTPATIENT
Start: 2025-03-08 | End: 2025-03-15

## 2025-03-08 RX ADMIN — HYDRALAZINE HYDROCHLORIDE 10 MG: 20 INJECTION INTRAMUSCULAR; INTRAVENOUS at 00:31

## 2025-03-08 RX ADMIN — LOSARTAN POTASSIUM 50 MG: 50 TABLET, FILM COATED ORAL at 01:05

## 2025-03-08 RX ADMIN — DEXAMETHASONE 4 MG: 4 TABLET ORAL at 08:42

## 2025-03-08 RX ADMIN — ACETAMINOPHEN 975 MG: 325 TABLET, FILM COATED ORAL at 05:44

## 2025-03-08 RX ADMIN — LEVETIRACETAM 500 MG: 500 TABLET, FILM COATED ORAL at 08:42

## 2025-03-08 RX ADMIN — LABETALOL HYDROCHLORIDE 20 MG: 5 INJECTION, SOLUTION INTRAVENOUS at 00:14

## 2025-03-08 RX ADMIN — INSULIN ASPART 2 UNITS: 100 INJECTION, SOLUTION INTRAVENOUS; SUBCUTANEOUS at 08:45

## 2025-03-08 RX ADMIN — ATORVASTATIN CALCIUM 10 MG: 10 TABLET, FILM COATED ORAL at 08:43

## 2025-03-08 ASSESSMENT — ACTIVITIES OF DAILY LIVING (ADL)
ADLS_ACUITY_SCORE: 33
ADLS_ACUITY_SCORE: 31
ADLS_ACUITY_SCORE: 31
ADLS_ACUITY_SCORE: 33
ADLS_ACUITY_SCORE: 31
ADLS_ACUITY_SCORE: 31
ADLS_ACUITY_SCORE: 33
ADLS_ACUITY_SCORE: 31
ADLS_ACUITY_SCORE: 33
ADLS_ACUITY_SCORE: 31
ADLS_ACUITY_SCORE: 31

## 2025-03-08 NOTE — PLAN OF CARE
Discharge time/date: 3/8 1220  Walked or Wheelchair: Walked  PIV removed: Yes  Reviewed AVS with patient: Yes  Medication due times added to AVS in EPIC: Yes  Verbalized understanding of discharge with teachback: Yes  Medications retrieved from pharmacy: Yes  Supplies sent home: NA  Belongings from security with patient: NA

## 2025-03-08 NOTE — PLAN OF CARE
Status: admitted 3/7 after brain biopsy  Vitals: Hypertensive SBP >140- needed labetalol x3, hydralazine x1, gave AM losartan early  Neuros: A/Ox4, intact except L eye blurry vision unchanged  IV: PIV SL  Labs/Electrolytes: BG ACHS  Resp: RA  Diet: regular  : voiding spontaneously   GI: LBM 3/6  Skin: biopsy site covered with primapore  Pain: mild headache managed with scheduled tylenol  Activity: independent   Social: wife at bedside, supportive   Plan: Potential discharge today

## 2025-03-08 NOTE — MEDICATION SCRIBE - ADMISSION MEDICATION HISTORY
Pharmacy Intern Admission Medication History    Admission medication history is complete. The information provided in this note is only as accurate as the sources available at the time of the update.    Information Source(s): Patient and CareEverywhere/SureScripts via in-person    Pertinent Information:   Gathered medication information from patient who was a good historian of his medication   For Novolog dosing, patient reports to use his meal sizes to determine dosing and reports to mostly inject 18-units twice daily. Patient also has a CGM.  Semaglutide - patient hasn't taken this medication since 2/17/25 per ophthalmology. Patient says he has it at home but will most likely not continue this medication. Left this medication on the PTA med list.     Changes made to PTA medication list:  Added: None  Deleted: None  Changed: None    Allergies reviewed with patient and updates made in EHR: yes    Medication History Completed By: Bee John 3/7/2025 8:06 PM    PTA Med List   Medication Sig Note Last Dose/Taking    atorvastatin (LIPITOR) 10 MG tablet Take 1 tablet (10 mg) by mouth daily.  3/7/2025 Morning    empagliflozin (JARDIANCE) 25 MG TABS tablet Take 1 tablet (25 mg) by mouth daily.  3/4/2025    insulin aspart (NOVOLOG FLEXPEN) 100 UNIT/ML pen Inject 18-20 Units subcutaneously 2 times daily (with meals). Max 50 units per day. Hold until patient requests.  3/6/2025 Evening    insulin glargine U-300 (TOUJEO) 300 UNIT/ML (1 units dial) pen Inject 15 Units subcutaneously daily.  3/6/2025 Evening    LORazepam (ATIVAN) 0.5 MG tablet Take 1 tablet (0.5 mg) by mouth every 6 hours as needed for anxiety.  3/4/2025    losartan (COZAAR) 50 MG tablet Take 1 tablet (50 mg) by mouth daily. 3/6/2025: Hold DOS  3/6/2025 Evening    metFORMIN (GLUCOPHAGE XR) 500 MG 24 hr tablet Take 2 tablets (1,000 mg) by mouth 2 times daily (with meals). 3/6/2025: Hold DOS  3/6/2025 Evening    Semaglutide, 2 MG/DOSE, (OZEMPIC) 8 MG/3ML pen  Inject 2 mg subcutaneously every 7 days.  2/17/2025

## 2025-03-08 NOTE — PLAN OF CARE
Physical Therapy: Orders received. Chart reviewed and discussed with care team.? Physical Therapy not indicated due to IND with ambulation without use of AD, no concerns regarding mobility/balance. Pt does not demo needs for IP PT at this time.? Defer discharge recommendations to medical team.? Will complete orders.

## 2025-03-08 NOTE — DISCHARGE SUMMARY
"[facility] Discharge Summary and Instructions    Jeffry Younger MRN# [id number]   Age: 44 year old YOB: 1980     Date of Admission:  3/7/2025  Date of Discharge::  3/8/2025  Admitting Physician:  Genaro Carver MD  Discharge Physician:  Genaro Carver MD          Admission Diagnoses:   Left frontal lobe mass [G93.89]  Brain tumor (H) [D49.6]          Discharge Diagnosis:     Left frontal lobe mass [G93.89]  Brain tumor (H) [D49.6]     Clinically Significant Risk Factors Present on Admission                    # Hypertension: Noted on problem list                # Severe Obesity: Estimated body mass index is 49.68 kg/m  as calculated from the following:    Height as of this encounter: 1.88 m (6' 2\").    Weight as of this encounter: 175.5 kg (386 lb 14.5 oz).  , PRESENT ON ADMISSION              Procedures:   Stealth assisted Biopsy of left frontal lobe contrast-enhancing tumor using Auto guide robot            Brief History of Illness:   Mr. Younger  is a 44-year-old right-handed gentleman who was admitted for symptoms of approximately 1 week blurring of vision.  Patient denied any new onset neurological symptoms.I discussed the differential diagnosis of the left posterior frontal lobe lesion with both enhancing and nonenhancing components including primary brain tumor, lymphoma, inflammatory pathologies, metastatic brain tumor and others. I discussed the management options including stereotactic needle biopsy, awake craniotomy with resection of the tumor, stereotactic needle biopsy with laser interstitial thermal therapy and alternatives. I explained to the patient and his spouse that based on the pattern of contrast-enhancement, I would recommend stereotactic needle biopsy for tissue diagnosis. I discussed the risk and benefits of surgery including but not limited to bleeding, infection, need for repeat surgery, need for adjuvant treatment, stroke, nondiagnostic biopsy, need for further awake " craniotomy with resection of the tumor, DVT/PE, MI and others. After extensive discussion of the risk and benefits of surgery patient and his spouse agreed to proceed.  Patient has elected to undergo above-mentioned procedure.           Hospital Course:     Patient underwent above-mentioned procedure on 3/7/25.  Following the procedure the patient was transferred to the floor.  The patient's course was uncomplicated.  Patient underwent CT imaging revealing expected post-operative changes without acute bleeding.  Patient was started on both keppra and dexamethasone that will be continued for 1 week (dex to be tapered over 1 week).  On post operative day 1 he was ambulating, voiding without a yañez, eating a regular diet, pain was well controlled and therefore he was discharged home.          Discharge Medications:     Current Discharge Medication List        CONTINUE these medications which have NOT CHANGED    Details   atorvastatin (LIPITOR) 10 MG tablet Take 1 tablet (10 mg) by mouth daily.  Qty: 90 tablet, Refills: 3    Associated Diagnoses: Hyperlipidemia LDL goal <100      empagliflozin (JARDIANCE) 25 MG TABS tablet Take 1 tablet (25 mg) by mouth daily.  Qty: 90 tablet, Refills: 3    Associated Diagnoses: Type 2 diabetes mellitus with hyperglycemia, without long-term current use of insulin (H)      insulin aspart (NOVOLOG FLEXPEN) 100 UNIT/ML pen Inject 18-20 Units subcutaneously 2 times daily (with meals). Max 50 units per day. Hold until patient requests.  Qty: 45 mL, Refills: 3    Associated Diagnoses: Type 2 diabetes mellitus with hyperglycemia, without long-term current use of insulin (H)      insulin glargine U-300 (TOUJEO) 300 UNIT/ML (1 units dial) pen Inject 15 Units subcutaneously daily.  Qty: 15 mL, Refills: 3    Associated Diagnoses: Type 2 diabetes mellitus with hyperglycemia, without long-term current use of insulin (H)      LORazepam (ATIVAN) 0.5 MG tablet Take 1 tablet (0.5 mg) by mouth every 6  hours as needed for anxiety.  Qty: 20 tablet, Refills: 0    Associated Diagnoses: Anxiety      losartan (COZAAR) 50 MG tablet Take 1 tablet (50 mg) by mouth daily.  Qty: 90 tablet, Refills: 3    Associated Diagnoses: Essential hypertension      metFORMIN (GLUCOPHAGE XR) 500 MG 24 hr tablet Take 2 tablets (1,000 mg) by mouth 2 times daily (with meals).  Qty: 360 tablet, Refills: 3    Associated Diagnoses: Type 2 diabetes mellitus with hyperglycemia, without long-term current use of insulin (H)      Semaglutide, 2 MG/DOSE, (OZEMPIC) 8 MG/3ML pen Inject 2 mg subcutaneously every 7 days.  Qty: 9 mL, Refills: 3    Associated Diagnoses: Type 2 diabetes mellitus with hyperglycemia, without long-term current use of insulin (H)      ACCU-CHEK GUIDE test strip TEST TWICE DAILY AS DIRECTED  Qty: 200 strip, Refills: 1    Associated Diagnoses: Type 2 diabetes mellitus with hyperglycemia, without long-term current use of insulin (H)      blood glucose monitoring (ACCU-CHEK FASTCLIX) lancets 1 each daily  Qty: 102 each, Refills: 3    Associated Diagnoses: Type 2 diabetes mellitus with hyperglycemia, without long-term current use of insulin (H)      Blood Glucose Monitoring Suppl w/Device KIT       Continuous Glucose Sensor (FREESTYLE KULWANT 3 SENSOR) MISC 1 each every 14 days.  Qty: 6 each, Refills: 11    Associated Diagnoses: Type 2 diabetes mellitus with hyperglycemia, without long-term current use of insulin (H)      insulin pen needle (ULTICARE MINI) 31G X 6 MM miscellaneous Use 3-4 pen needles daily or as directed.  Qty: 300 each, Refills: 3    Associated Diagnoses: Type 2 diabetes mellitus with hyperglycemia, without long-term current use of insulin (H)      ondansetron (ZOFRAN ODT) 4 MG ODT tab Take 1 tablet (4 mg) by mouth every 8 hours as needed for nausea or vomiting.  Qty: 10 tablet, Refills: 0    Associated Diagnoses: Type 2 diabetes mellitus with hyperglycemia, without long-term current use of insulin (H)             "        Exam:   Physical Exam  BP (!) 137/99 (BP Location: Right arm)   Pulse 94   Temp 97.9  F (36.6  C) (Oral)   Resp 16   Ht 1.88 m (6' 2\")   Wt (!) 175.5 kg (386 lb 14.5 oz)   SpO2 98%   BMI 49.68 kg/m    Gen: Appears comfortable, NAD  Wound: clean, dry, intact   Neurologic:  - Alert & Oriented to person, place, time, and situation  - Follows commands briskly  - Speech fluent, spontaneous. No aphasia or dysarthria  - No gaze preference. No apparent hemineglect ; still with blurring of LEFT eye in inferior temporal quadrant and with LEFT temporal artifact  - PERRL, EOMI  - Face sensation intact to light touch, and activates symmetrically  - Hearing intact to finger rustle bilaterally  - Palate elevates symmetrically with uvula midline  - Tongue protrudes midline    - Trapezii muscles 5/5 bilaterally  - No pronator drift     Del Tr Bi Grp/FE   R 5 5 5 5   L 5 5 5 5    HF KE DF PF   R 5 5 5 5   L 5 5 5 5     Reflexes 2+ throughout   No Johnson's or Clonus, with down-going toes    Sensation intact and symmetric to light touch throughout             Discharge Instructions and Follow-Up:     Discharge diet: Regular   Discharge activity: You may advance activity as tolerated. No strenuous exercise or heay lifting greater than 10 lbs for 4 weeks or until seen and cleared in clinic.      Discharge follow-up: Follow-up with Neurosurgery KARMEN or Dr. Carver in 2 weeks for wound check/post-hospital evaluation.       Wound care: Ok to shower,however no scrubbing of the wound and no soaking of the wound, meaning no bathtubs or swimming pools. Pat dry only. Leave wound open to air.  Patient to have wound checked 2 weeks following surgery.    Wound location: LEFT scalp  Closure technique: Monocryl  Dressing needs: None  Post-op care at follow-up: Keep dry and clean         Please call if you have:  1. increased pain, redness, drainage, swelling at your incision  2. fevers > 101.5 F degrees  3. with any questions or " concerns.  You may reach the Neurosurgery clinic at [phone] during regular work hours. ER at [phone].    and ask for the Neurosurgery Resident on call at [phone], during off hours or weekends.         Discharge Disposition:     Discharged to home          Russel Camara MD  Neurosurgery PGY2  On call pager #[phone]

## 2025-03-08 NOTE — CARE PLAN
Pt is not appropriate for skilled OT services at this time due to at baseline with functional tasks & mobility. Recommend OP OT vision therapy for L eye blurry vision. Will defer to OT eval at this time but if pt's medical status changes, please reconsult. Will D/C current OT orders.Thank you.    3/8/2025 by Aaliyah Marsh, OT, OTR/L

## 2025-03-10 ENCOUNTER — PATIENT OUTREACH (OUTPATIENT)
Dept: CARE COORDINATION | Facility: CLINIC | Age: 45
End: 2025-03-10
Payer: COMMERCIAL

## 2025-03-10 NOTE — PROGRESS NOTES
[facility]: Transitions of Care Outreach  Chief Complaint   Patient presents with    Clinic Care Coordination - Post Hospital       Most Recent Admission Date: 3/7/2025   Most Recent Admission Diagnosis: Left frontal lobe mass - G93.89  Brain tumor (H) - D49.6     Most Recent Discharge Date: 3/8/2025   Most Recent Discharge Diagnosis: Left frontal lobe mass - G93.89  S/P biopsy - Z98.890     Transitions of Care Assessment    Discharge Assessment  How are you doing now that you are home?: Patient states he feels fine. No incision site pain anymore.  How are your symptoms? (Red Flag symptoms escalate to triage hotline per guidelines): Improved  Do you know how to contact your clinic care team if you have future questions or changes to your health status? : Yes  Does the patient have their discharge instructions? : Yes  Does the patient have questions regarding their discharge instructions? : No  Were you started on any new medications or were there changes to any of your previous medications? : Yes  Does the patient have all of their medications?: Yes  Do you have questions regarding any of your medications? : No  Do you have all of your needed medical supplies or equipment (DME)?  (i.e. oxygen tank, CPAP, cane, etc.): Yes         Post-op (Clinicians Only)  Did the patient have surgery or a procedure: Yes  Incision: closed  Drainage: No  Bleeding: none  Fever: No  Chills: No  Redness: No  Warmth: No  Swelling: No  Incision site pain: No  Eating & Drinking: eating and drinking without complaints/concerns  PO Intake: regular diet  Additional Symptoms:  (Denies)  Bowel Function: normal  Date of last BM: 03/09/25  Urinary Status: voiding without complaint/concerns    [facility] Explained and offered Care Coordination support to eligible patients: Yes    Patient accepted? No    Follow up Plan     Discharge Follow-Up  Discharge follow up appointment scheduled in alignment with recommended follow up timeframe  or Transitions of Risk Category? (Low = within 30 days; Moderate= within 14 days; High= within 7 days): Yes  Discharge Follow Up Appointment Date: 03/24/25  Discharge Follow Up Appointment Scheduled with?: Specialty Care Provider    Future Appointments   Date Time Provider Department [place]   3/20/2025  9:00 AM Azeem Arteaga MD UUEYE [facility]   3/24/2025 10:20 AM Genaro Carver MD SNNRSG [facility]   4/14/2025 10:30 AM Serene Hamm, [facility]    6/30/2025  9:00 AM [facility]   6/30/2025  1:30 PM Russel Hsieh MD EA[facility] EA   7/16/2025 12:30 PM [facility] [place]   12/31/2025 11:00 AM Russel Hsieh MD EAFP        Outpatient Plan as outlined on AVS reviewed with patient.    For any urgent concerns, please contact our 24 hour nurse triage line: [phone])       Malissa Carrillo RN

## 2025-03-11 LAB
PATH REPORT.COMMENTS IMP SPEC: ABNORMAL
PATH REPORT.COMMENTS IMP SPEC: YES
PATH REPORT.FINAL DX SPEC: ABNORMAL
PATH REPORT.GROSS SPEC: ABNORMAL
PATH REPORT.INTRAOP OBS SPEC DOC: ABNORMAL
PATH REPORT.MICROSCOPIC SPEC OTHER STN: ABNORMAL
PATH REPORT.RELEVANT HX SPEC: ABNORMAL
PHOTO IMAGE: ABNORMAL

## 2025-03-12 ENCOUNTER — LAB REQUISITION (OUTPATIENT)
Dept: LAB | Facility: CLINIC | Age: 45
End: 2025-03-12
Payer: COMMERCIAL

## 2025-03-12 LAB
Lab: NORMAL
PERFORMING LABORATORY: NORMAL
SPECIMEN STATUS: NORMAL
TEST NAME: NORMAL

## 2025-03-12 PROCEDURE — 81277 CYTOGENOMIC NEO MICRORA ALYS: CPT | Performed by: STUDENT IN AN ORGANIZED HEALTH CARE EDUCATION/TRAINING PROGRAM

## 2025-03-13 ENCOUNTER — PATIENT OUTREACH (OUTPATIENT)
Dept: ONCOLOGY | Facility: CLINIC | Age: 45
End: 2025-03-13
Payer: COMMERCIAL

## 2025-03-13 DIAGNOSIS — Z98.890 HISTORY OF BIOPSY: ICD-10-CM

## 2025-03-13 DIAGNOSIS — G93.89 LEFT FRONTAL LOBE MASS: Primary | ICD-10-CM

## 2025-03-13 NOTE — PROGRESS NOTES
New Patient Hematology / Oncology Nurse Navigator Note     Referral Date: 3/12/25    Referring provider: Dr. Carver    Referring Clinic/Organization: [facility]     Referred to: Neuro Oncology     Requested provider (if applicable): Dr. Jacinto    Evaluation for : Astrocytoma     Clinical History (per Nurse review of records provided):    3/7/25 Op Note/Path:-- BOOKMARKED  Final Diagnosis   A-C. 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 See comment.          Comments:   This is an appended report. These results have been appended to a previously preliminary verified report.      Brain imaging -- BOOKMARKED  3/4/25 Virtual Visit with Neurosurgery-- BOOKMARKED    Clinical Assessment / Barriers to Care (Per Nurse):  Pt lives in [place]    Records Location: [facility]     Records Needed:   N/A    Additional testing needed prior to consult:   N/A    Referral updates and Plan:   Referral received, chart reviewed by nursing, scheduling instructions updated, patient [place] and [place] [facility] [place]    Zuleyka Russell, OLGAN, RN, PHN, OCN  Hematology/Oncology Nurse Navigator  [facility]  [phone]

## 2025-03-13 NOTE — TELEPHONE ENCOUNTER
RECORDS STATUS - ALL OTHER DIAGNOSIS      RECORDS RECEIVED FROM: [facility]   NOTES STATUS DETAILS   OFFICE NOTE from referring provider Epic Dr. Genaro Carver   DISCHARGE SUMMARY from hospital [facility] 3/7/2025 - [facility]   MEDICATION LIST [facility]    LABS     PATHOLOGY REPORTS [facility] 3/7/2025 - [id number]    ANYTHING RELATED TO DIAGNOSIS Epic 3/12/2025   IMAGING (NEED IMAGES & REPORT)     CT SCANS PACS CT Head: 3/7/2025   MRI PACS MR Brain: 2/26/2025, 2/25/2025

## 2025-03-17 ENCOUNTER — TUMOR CONFERENCE (OUTPATIENT)
Dept: ONCOLOGY | Facility: CLINIC | Age: 45
End: 2025-03-17
Payer: COMMERCIAL

## 2025-03-17 ENCOUNTER — ONCOLOGY VISIT (OUTPATIENT)
Dept: ONCOLOGY | Facility: CLINIC | Age: 45
End: 2025-03-17
Attending: NEUROLOGICAL SURGERY
Payer: COMMERCIAL

## 2025-03-17 ENCOUNTER — PATIENT OUTREACH (OUTPATIENT)
Dept: ONCOLOGY | Facility: CLINIC | Age: 45
End: 2025-03-17

## 2025-03-17 ENCOUNTER — PRE VISIT (OUTPATIENT)
Dept: ONCOLOGY | Facility: CLINIC | Age: 45
End: 2025-03-17
Payer: COMMERCIAL

## 2025-03-17 VITALS
DIASTOLIC BLOOD PRESSURE: 89 MMHG | WEIGHT: 315 LBS | BODY MASS INDEX: 40.43 KG/M2 | SYSTOLIC BLOOD PRESSURE: 130 MMHG | TEMPERATURE: 97.2 F | HEIGHT: 74 IN | RESPIRATION RATE: 18 BRPM | OXYGEN SATURATION: 99 % | HEART RATE: 81 BPM

## 2025-03-17 DIAGNOSIS — H53.9 MONOCULAR VISUAL DISTURBANCE: ICD-10-CM

## 2025-03-17 DIAGNOSIS — C71.9 ASTROCYTOMA (H): Primary | ICD-10-CM

## 2025-03-17 PROBLEM — G93.89 LEFT FRONTAL LOBE MASS: Status: RESOLVED | Noted: 2025-02-25 | Resolved: 2025-03-17

## 2025-03-17 PROBLEM — D49.6 BRAIN TUMOR (H): Status: RESOLVED | Noted: 2025-03-07 | Resolved: 2025-03-17

## 2025-03-17 PROCEDURE — 99213 OFFICE O/P EST LOW 20 MIN: CPT | Performed by: PSYCHIATRY & NEUROLOGY

## 2025-03-17 ASSESSMENT — PAIN SCALES - GENERAL: PAINLEVEL_OUTOF10: NO PAIN (0)

## 2025-03-17 NOTE — LETTER
3/17/2025      [facility]  [address] [facility] [address]      Dear Colleague,    Thank you for referring your patient, Jeffry Younger, to the [facility]. Please see a copy of my visit note below.    NEURO-ONCOLOGY INITIAL VISIT  Mar 17, 2025    CHIEF COMPLAINT: Mr. 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 He is presenting to this initial clinic visit as referred by Dr. Carver for evaluation and recommendations on treatment.     Accompanying him to this visit is Kailee (wife).     HISTORY OF PRESENT ILLNESS  A summary of the patient s oncologic history is as follows;   -2/2025 PRESENTATION: Blurring of vision.   -2/25/2025 MR brain imaging with an expansile T2-weighted/FLAIR hyperintense lesion involving the left frontal lobe measuring approximately 3.6 x 4.0 x 4.1 cm. There is patchy amorphous enhancement along the medial and posterior margins of the mass. There is also additional T2-weighted/FLAIR hyperintensity along the margins of the mass. There is mild mass effect upon the left lateral ventricle and 0.2 cm rightward shift of midline structures. There is mild cupping of the left optic disc which can be seen in the setting of papilledema.  -2/25/2025 CTA and CTV Head/ Neck Head CTA demonstrates no aneurysm or stenosis of the major intracranial arteries. Neck CTA demonstrates no stenosis of the major cervical arteries. Head CTV demonstrates no significant stenosis or filling defects within the dural venous sinus system.  -2/25/2025 CT c/a/p with a 2.2 cm intermediate density lesion in the right kidney interpolar region could represent a neoplasm or hemorrhagic cyst. No evidence of metastasis in the chest, abdomen or pelvis.  -2/26/2025 MR renal imaging with no evidence  of renal mass or neoplasm. Lesions questioned on CT 2/25/2025 represent hemorrhagic/proteinaceous cysts. There are also additional simple cortical and sinus cysts. Mild hepatic steatosis. Lesion in segment 6 of the liver consistent with a benign cyst.  -2/26/2025 LP for CSF analysis; TNC 0, protein 116, glucose 95. Flow cytometry with rare to absent B-cells. Cytology with no morphologic evidence of malignancy.  -3/7/2025 SURGERY: Stealth assisted biopsy using auto-guide robot per Dr. Carver.   PATHOLOGY: Astrocytoma, IDH-mutant, histologically low-grade, pending molecular testing.  Histology with gemistocytic differentiation, positive for IDH1-R132H by immunohistochemistry. No necrosis or microvascular proliferation is identified, and mitotic activity is present but sparse (up to 1 mitosis per 10 high-power fields).   -3/17/2025 NEURO-ONC: Final integrated diagnosis and definitive grade pending completion of chromosomal microarray analysis. Consideration for additional surgery with a craniotomy for mass resection.     Today in clinic;   -Vladimir has recovered well from surgery.    Vision remains abnormal; left eye with inferior blurring and abnormality in vision in the peripheral vision.    No headaches.   No issues with word finding.    No issues with comprehension.    No periods of confusion.    Less fatigued.    Tapered off dexamethasone yesterday.   -Denies any concerning changes in mood/ behavior , weakness, or changes in sensation.  -Denies any episodes concerning for a seizure, including unexplained episodes of loss of consciousness, unexplained falls, unexplained loss of bowel/ bladder control or tongue biting, unexplained bruising/ myalgia, periods of loss of time, or witnessed shaking/ jerking movements.    Off post-operative Keppra.       MEDICATIONS   Current Outpatient Medications   Medication Sig Dispense Refill     ACCU-CHEK GUIDE test strip TEST TWICE DAILY AS DIRECTED 200 strip 1     [START ON  3/22/2025] aspirin 81 MG EC tablet Take 1 tablet (81 mg) by mouth daily.       atorvastatin (LIPITOR) 10 MG tablet Take 1 tablet (10 mg) by mouth daily. 90 tablet 3     blood glucose monitoring (ACCU-CHEK FASTCLIX) lancets 1 each daily 102 each 3     Blood Glucose Monitoring Suppl w/Device KIT        Continuous Glucose Sensor (FREESTYLE KULWANT 3 SENSOR) MISC 1 each every 14 days. 6 each 11     empagliflozin (JARDIANCE) 25 MG TABS tablet Take 1 tablet (25 mg) by mouth daily. 90 tablet 3     insulin aspart (NOVOLOG FLEXPEN) 100 UNIT/ML pen Inject 18-20 Units subcutaneously 2 times daily (with meals). Max 50 units per day. Hold until patient requests. 45 mL 3     insulin glargine U-300 (TOUJEO) 300 UNIT/ML (1 units dial) pen Inject 15 Units subcutaneously daily. 15 mL 3     insulin pen needle (ULTICARE MINI) 31G X 6 MM miscellaneous Use 3-4 pen needles daily or as directed. 300 each 3     LORazepam (ATIVAN) 0.5 MG tablet Take 1 tablet (0.5 mg) by mouth every 6 hours as needed for anxiety. 20 tablet 0     losartan (COZAAR) 50 MG tablet Take 1 tablet (50 mg) by mouth daily. 90 tablet 3     metFORMIN (GLUCOPHAGE XR) 500 MG 24 hr tablet Take 2 tablets (1,000 mg) by mouth 2 times daily (with meals). 360 tablet 3     ondansetron (ZOFRAN ODT) 4 MG ODT tab Take 1 tablet (4 mg) by mouth every 8 hours as needed for nausea or vomiting. 10 tablet 0     Semaglutide, 2 MG/DOSE, (OZEMPIC) 8 MG/3ML pen Inject 2 mg subcutaneously every 7 days. 9 mL 3     DRUG ALLERGIES No Known Allergies      IMMUNIZATIONS   Immunization History   Administered Date(s) Administered     COVID-19 12+ (MODERNA) 09/15/2023, 09/10/2024     COVID-19 Bivalent 18+ (Moderna) 10/11/2022     COVID-19 Monovalent 18+ (Moderna) 03/10/2021, 04/07/2021, 11/08/2021     Flu, Unspecified 09/16/2019, 09/15/2021     Hepatitis A (VAQTA)(ADULT 19+) 02/22/2017     Hepatitis B, Adult (Energix-B/Recombivax HB) 08/11/2004     Influenza (H1N1) 12/31/2009     Influenza (IIV3) PF  12/17/2008, 10/08/2009, 10/24/2010, 09/14/2021     Influenza (prior to 2024) 11/30/2011     Influenza Vaccine >6 months,quad, PF 09/29/2015, 12/13/2016, 09/07/2018, 10/16/2019, 09/11/2020, 09/18/2023     Influenza,INJ,MDCK,PF,Quad >6mo(Flucelvax) 10/10/2017, 10/11/2022     Meningococcal (Menomune ) 08/11/2004     Meningococcal ACWY (Menactra ) 02/22/2017     Pneumococcal 20 valent Conjugate (Prevnar 20) 12/22/2022     Pneumococcal 23 valent 09/07/2018     TDAP (Adacel,Boostrix) 05/29/2009, 02/01/2015, 01/03/2024     TDAP Vaccine (Adacel) 05/29/2009, 02/01/2015     Twinrix A/B 01/22/2015, 09/29/2015     Typhoid Oral 03/01/2015, 01/03/2024     Yellow Fever 02/22/2017     PAST MEDICAL HISTORY   Past Medical History:   Diagnosis Date     Diabetes (H) 07/2018    Treating metformin     Essential hypertension 06/13/2024     Obesity      Sleep apnea      PAST SURGICAL HISTORY   Past Surgical History:   Procedure Laterality Date     AS ESOPHAGOSCOPY, DIAGNOSTIC      EGD     BIOPSY  2008    egd normal     OPTICAL TRACKING SYSTEM BIOPSY BRAIN Left 3/7/2025    Procedure: stealth assisted LEFT FRONTAL BIOPSY BRAIN;  Surgeon: Genaro Carver MD;  Location:  OR     SOCIAL HISTORY   History   Smoking Status     Never   Smokeless Tobacco     Never    Social History    Substance and Sexual Activity      Alcohol use: Yes        Comment: 1 per month maybe     History   Drug Use No     FAMILY HISTORY   Family History   Problem Relation Age of Onset     Diabetes Father      Hypertension Father      Other Cancer Father         Kidney & Thyroid     Cerebrovascular Disease Paternal Grandfather      Other Cancer Brother         Lukemia     Other Cancer Maternal Grandmother         Pancreatic     Other Cancer Maternal Grandfather         Lung - smoker     Colon Cancer No family hx of      Prostate Cancer No family hx of      Cerebrovascular Disease No family hx of      Coronary Artery Disease No family hx of        PHYSICAL EXAMINATION  BP  "130/89   Pulse 81   Temp 97.2  F (36.2  C) (Tympanic)   Resp 18   Ht 1.88 m (6' 2\")   Wt (!) 177.4 kg (391 lb)   SpO2 99%   BMI 50.20 kg/m     Wt Readings from Last 2 Encounters:   03/17/25 (!) 177.4 kg (391 lb)   03/07/25 (!) 175.5 kg (386 lb 14.5 oz)      Ht Readings from Last 2 Encounters:   03/17/25 1.88 m (6' 2\")   03/07/25 1.88 m (6' 2\")     KPS:     -Generally well appearing.  -Respiratory: Normal breath sounds, no audible wheezing.   -Skin: Healing head incision.  -Psychiatric: Normal mood and affect. Pleasant, talkative.  -Neurologic:   MENTAL STATUS:     Alert, oriented to date.    Recall: Intact    Speech fluent.   Comprehension intact to multi-step commands.   Good right-left orientation.     CRANIAL NERVES:     Pupils are equal, round.     Extraocular movements full, denies diplopia.     Visual fields full on the right.      Left eye with right inferior field blurred, midline with a visual distortion.    Facial sensation intact to light touch.   Symmetric facial movements.   Hearing intact.   No dysarthria.   MOTOR:    No pronation or drift.   Able to rise from a chair without use of arms.   On toe/ heel walk, equal distance from floor to heels/ toes.   SENSATION: Intact to light touch throughout.  COORDINATION: Intact finger-nose with eyes open and closed.   GAIT:  Walks without assistance.   Good speed. Normal stride length and heel strike. Normal turns. Normal arm swing.   Able to toe, heel walk. Able to tandem walk.       MEDICAL RECORDS  Personally reviewed hospitalization records from admission to [facility] for work-up and surgery given new brain mass.    LABS  Personally reviewed all available lab results; pathology results and CSF results as detailed above.     IMAGING  Personally reviewed MR brain imaging as detailed above. Imaging was shown to and results were reviewed with Addis.       IMPRESSION  On date of service, 76 minutes was spent in clinic and 28 minutes was spent " preparing for the visit through extensive chart review and coordinating care for this high complexity visit. The following is in explanation for the recommendations used to define the plan.       Prior to Vladimir's appointment today, I reviewed his case, imaging, and pathology results. Moreover, I personally reviewed Vladimir's imaging and case at Brain Tumor Conference.     It was explained to Vladimir and Kailee that based on biopsy sampling his diagnosis is that of a WHO grade 2/ low grade astrocytoma, which is a type of primary brain cancer for which there is currently no cure. Therefore, cancer-directed treatment strives to slow further growth and increase the time interval to recurrence. With regard to cancer-directed therapy, a multimodal treatment approach first involves attempting a maximum safe surgical resection. To date, only a biopsy has been preformed.     On review of imaging from February, there are aspects that demonstrate a faint degree of contrast enhancement and this may represent a region that could be more aggressive nature. It is reassuring that the lesion is without any increase on perfusion/ rCBV. While histologic analysis did not identify any overt high grade characteristics, I explained to Vladimir and Kailee that a small sample may misrepresent the nature of the cancer as a whole. A final integrated diagnosis and definitive grade will be based on chromosomal microarray analysis and this testing is pending.     As a result, my recommendation is for Vladimir to see Dr. Carver back in consultation to review the risks and benefits of a craniotomy for cancer resection. The resection would look to serve 2 purposes; first diagnostic, to obtain a larger sample to confirm grade and second therapeutic, as literature suggests that the degree of post-operative residual cancer is a prognostic marker. Of note, Vladimir has already scheduled a second opinion with Dr. Card, neurosurgery at the [facility].     Finally, as it is  highly likely that either targeted therapy or cytotoxic chemotherapy will be indicated and both can negatively impact fertility, I discussed with Vladimir and Kailee the option of fertility preservation. Vladimir stated that he is not interested.     PROBLEM LIST  Astrocytoma  Left eye vision change    PLAN  -CANCER-DIRECTED THERAPY-  -As above.     -STEROIDS-  -Currently off dexamethasone.    -SEIZURE MANAGEMENT-  -While this patient is at increased risk of having seizures, given the lack of seizure history, there is no indication to prescribe an antiepileptic at this time.   -Off Keppra.     -VISION CHANGE, MONOCULAR, Left-  -Following with Dr. Arteaga, neuro-ophtho.     -Quality of life/ MOOD/ FATIGUE-  -Denies any mood issues.  -Continue to monitor mood as untreated/ undertreated depression can worsen fatigue, dysorexia, and quality of life.    Return to clinic pending discussion with neurosurgery.     In the meantime, Vladimir and Kailee know to call the clinic with any concerns and he can be seen sooner if needed.     The longitudinal plan of care for the diagnosis(es)/condition(s) as documented were addressed during this visit. Due to the added complexity in care, I will continue to support Vladimir in the subsequent management and with ongoing continuity of care.     Concha Jacinto MD  Neuro-oncology      Again, thank you for allowing me to participate in the care of your patient.        Sincerely,        Concha Jacinto MD    Electronically signed

## 2025-03-17 NOTE — PROGRESS NOTES
NEURO-ONCOLOGY INITIAL VISIT  Mar 17, 2025    CHIEF COMPLAINT: Mr. 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 He is presenting to this initial clinic visit as referred by Dr. Carver for evaluation and recommendations on treatment.     Accompanying him to this visit is Kailee (wife).     HISTORY OF PRESENT ILLNESS  A summary of the patient s oncologic history is as follows;   -2/2025 PRESENTATION: Blurring of vision.   -2/25/2025 MR brain imaging with an expansile T2-weighted/FLAIR hyperintense lesion involving the left frontal lobe measuring approximately 3.6 x 4.0 x 4.1 cm. There is patchy amorphous enhancement along the medial and posterior margins of the mass. There is also additional T2-weighted/FLAIR hyperintensity along the margins of the mass. There is mild mass effect upon the left lateral ventricle and 0.2 cm rightward shift of midline structures. There is mild cupping of the left optic disc which can be seen in the setting of papilledema.  -2/25/2025 CTA and CTV Head/ Neck Head CTA demonstrates no aneurysm or stenosis of the major intracranial arteries. Neck CTA demonstrates no stenosis of the major cervical arteries. Head CTV demonstrates no significant stenosis or filling defects within the dural venous sinus system.  -2/25/2025 CT c/a/p with a 2.2 cm intermediate density lesion in the right kidney interpolar region could represent a neoplasm or hemorrhagic cyst. No evidence of metastasis in the chest, abdomen or pelvis.  -2/26/2025 MR renal imaging with no evidence of renal mass or neoplasm. Lesions questioned on CT 2/25/2025 represent hemorrhagic/proteinaceous cysts. There are also additional simple cortical and sinus cysts. Mild hepatic steatosis. Lesion in segment 6 of the liver consistent with a  benign cyst.  -2/26/2025 LP for CSF analysis; TNC 0, protein 116, glucose 95. Flow cytometry with rare to absent B-cells. Cytology with no morphologic evidence of malignancy.  -3/7/2025 SURGERY: Stealth assisted biopsy using auto-guide robot per Dr. Carver.   PATHOLOGY: Astrocytoma, IDH-mutant, histologically low-grade, pending molecular testing.  Histology with gemistocytic differentiation, positive for IDH1-R132H by immunohistochemistry. No necrosis or microvascular proliferation is identified, and mitotic activity is present but sparse (up to 1 mitosis per 10 high-power fields).   -3/17/2025 NEURO-ONC: Final integrated diagnosis and definitive grade pending completion of chromosomal microarray analysis. Consideration for additional surgery with a craniotomy for mass resection.     Today in clinic;   -Vladimir has recovered well from surgery.    Vision remains abnormal; left eye with inferior blurring and abnormality in vision in the peripheral vision.    No headaches.   No issues with word finding.    No issues with comprehension.    No periods of confusion.    Less fatigued.    Tapered off dexamethasone yesterday.   -Denies any concerning changes in mood/ behavior , weakness, or changes in sensation.  -Denies any episodes concerning for a seizure, including unexplained episodes of loss of consciousness, unexplained falls, unexplained loss of bowel/ bladder control or tongue biting, unexplained bruising/ myalgia, periods of loss of time, or witnessed shaking/ jerking movements.    Off post-operative Keppra.       MEDICATIONS   Current Outpatient Medications   Medication Sig Dispense Refill    ACCU-CHEK GUIDE test strip TEST TWICE DAILY AS DIRECTED 200 strip 1    [START ON 3/22/2025] aspirin 81 MG EC tablet Take 1 tablet (81 mg) by mouth daily.      atorvastatin (LIPITOR) 10 MG tablet Take 1 tablet (10 mg) by mouth daily. 90 tablet 3    blood glucose monitoring (ACCU-CHEK FASTCLIX) lancets 1 each daily 102 each 3     Blood Glucose Monitoring Suppl w/Device KIT       Continuous Glucose Sensor (FREESTYLE KULWANT 3 SENSOR) [facility] 1 each every 14 days. 6 each 11    empagliflozin (JARDIANCE) 25 MG TABS tablet Take 1 tablet (25 mg) by mouth daily. 90 tablet 3    insulin aspart (NOVOLOG FLEXPEN) 100 UNIT/ML pen Inject 18-20 Units subcutaneously 2 times daily (with meals). Max 50 units per day. Hold until patient requests. 45 mL 3    insulin glargine U-300 (TOUJEO) 300 UNIT/ML (1 units dial) pen Inject 15 Units subcutaneously daily. 15 mL 3    insulin pen needle (ULTICARE MINI) 31G X 6 MM miscellaneous Use 3-4 pen needles daily or as directed. 300 each 3    LORazepam (ATIVAN) 0.5 MG tablet Take 1 tablet (0.5 mg) by mouth every 6 hours as needed for anxiety. 20 tablet 0    losartan (COZAAR) 50 MG tablet Take 1 tablet (50 mg) by mouth daily. 90 tablet 3    metFORMIN (GLUCOPHAGE XR) 500 MG 24 hr tablet Take 2 tablets (1,000 mg) by mouth 2 times daily (with meals). 360 tablet 3    ondansetron (ZOFRAN ODT) 4 MG ODT tab Take 1 tablet (4 mg) by mouth every 8 hours as needed for nausea or vomiting. 10 tablet 0    Semaglutide, 2 MG/DOSE, (OZEMPIC) 8 MG/3ML pen Inject 2 mg subcutaneously every 7 days. 9 mL 3     DRUG ALLERGIES No Known Allergies      IMMUNIZATIONS   Immunization History   Administered Date(s) Administered    COVID-19 12+ (MODERNA) 09/15/2023, 09/10/2024    COVID-19 Bivalent 18+ (Moderna) 10/11/2022    COVID-19 Monovalent 18+ (Moderna) 03/10/2021, 04/07/2021, 11/08/2021    Flu, Unspecified 09/16/2019, 09/15/2021    Hepatitis A (VAQTA)(ADULT 19+) 02/22/2017    Hepatitis B, Adult (Energix-B/Recombivax HB) 08/11/2004    Influenza (H1N1) 12/31/2009    Influenza (IIV3) PF 12/17/2008, 10/08/2009, 10/24/2010, 09/14/2021    Influenza (prior to 2024) 11/30/2011    Influenza Vaccine >6 months,quad, PF 09/29/2015, 12/13/2016, 09/07/2018, 10/16/2019, 09/11/2020, 09/18/2023    Influenza,INJ,MDCK,PF,Quad >6mo(Flucelvax) 10/10/2017, 10/11/2022     "Meningococcal (Menomune ) 08/11/2004    Meningococcal ACWY (Menactra ) 02/22/2017    Pneumococcal 20 valent Conjugate (Prevnar 20) 12/22/2022    Pneumococcal 23 valent 09/07/2018    TDAP (Adacel,Boostrix) 05/29/2009, 02/01/2015, 01/03/2024    TDAP Vaccine (Adacel) 05/29/2009, 02/01/2015    Twinrix A/B 01/22/2015, 09/29/2015    Typhoid Oral 03/01/2015, 01/03/2024    Yellow Fever 02/22/2017     PAST MEDICAL HISTORY   Past Medical History:   Diagnosis Date    Diabetes (H) 07/2018    Treating metformin    Essential hypertension 06/13/2024    Obesity     Sleep apnea      PAST SURGICAL HISTORY   Past Surgical History:   Procedure Laterality Date    AS ESOPHAGOSCOPY, DIAGNOSTIC      EGD    BIOPSY  2008    egd normal    OPTICAL TRACKING SYSTEM BIOPSY BRAIN Left 3/7/2025    Procedure: stealth assisted LEFT FRONTAL BIOPSY BRAIN;  Surgeon: Genaro Carver MD;  Location:  OR     SOCIAL HISTORY   History   Smoking Status    Never   Smokeless Tobacco    Never    Social History    Substance and Sexual Activity      Alcohol use: Yes        Comment: 1 per month maybe     History   Drug Use No     FAMILY HISTORY   Family History   Problem Relation Age of Onset    Diabetes Father     Hypertension Father     Other Cancer Father         Kidney & Thyroid    Cerebrovascular Disease Paternal Grandfather     Other Cancer Brother         Lukemia    Other Cancer Maternal Grandmother         Pancreatic    Other Cancer Maternal Grandfather         Lung - smoker    Colon Cancer No family hx of     Prostate Cancer No family hx of     Cerebrovascular Disease No family hx of     Coronary Artery Disease No family hx of        PHYSICAL EXAMINATION  /89   Pulse 81   Temp 97.2  F (36.2  C) (Tympanic)   Resp 18   Ht 1.88 m (6' 2\")   Wt (!) 177.4 kg (391 lb)   SpO2 99%   BMI 50.20 kg/m     Wt Readings from Last 2 Encounters:   03/17/25 (!) 177.4 kg (391 lb)   03/07/25 (!) 175.5 kg (386 lb 14.5 oz)      Ht Readings from Last 2 Encounters: " "  03/17/25 1.88 m (6' 2\")   03/07/25 1.88 m (6' 2\")     KPS:     -Generally well appearing.  -Respiratory: Normal breath sounds, no audible wheezing.   -Skin: Healing head incision.  -Psychiatric: Normal mood and affect. Pleasant, talkative.  -Neurologic:   MENTAL STATUS:     Alert, oriented to date.    Recall: Intact    Speech fluent.   Comprehension intact to multi-step commands.   Good right-left orientation.     CRANIAL NERVES:     Pupils are equal, round.     Extraocular movements full, denies diplopia.     Visual fields full on the right.      Left eye with right inferior field blurred, midline with a visual distortion.    Facial sensation intact to light touch.   Symmetric facial movements.   Hearing intact.   No dysarthria.   MOTOR:    No pronation or drift.   Able to rise from a chair without use of arms.   On toe/ heel walk, equal distance from floor to heels/ toes.   SENSATION: Intact to light touch throughout.  COORDINATION: Intact finger-nose with eyes open and closed.   GAIT:  Walks without assistance.   Good speed. Normal stride length and heel strike. Normal turns. Normal arm swing.   Able to toe, heel walk. Able to tandem walk.       MEDICAL RECORDS  Personally reviewed hospitalization records from admission to [facility] for work-up and surgery given new brain mass.    LABS  Personally reviewed all available lab results; pathology results and CSF results as detailed above.     IMAGING  Personally reviewed MR brain imaging as detailed above. Imaging was shown to and results were reviewed with Vladimir and Kailee.       IMPRESSION  On date of service, 76 minutes was spent in clinic and 28 minutes was spent preparing for the visit through extensive chart review and coordinating care for this high complexity visit. The following is in explanation for the recommendations used to define the plan.       Prior to Vladimir's appointment today, I reviewed his case, imaging, and pathology results. Moreover, I " personally reviewed Vladimir's imaging and case at Brain Tumor Conference.     It was explained to Vladimir and Kailee that based on biopsy sampling his diagnosis is that of a WHO grade 2/ low grade astrocytoma, which is a type of primary brain cancer for which there is currently no cure. Therefore, cancer-directed treatment strives to slow further growth and increase the time interval to recurrence. With regard to cancer-directed therapy, a multimodal treatment approach first involves attempting a maximum safe surgical resection. To date, only a biopsy has been preformed.     On review of imaging from February, there are aspects that demonstrate a faint degree of contrast enhancement and this may represent a region that could be more aggressive nature. It is reassuring that the lesion is without any increase on perfusion/ rCBV. While histologic analysis did not identify any overt high grade characteristics, I explained to Vladimir and Kailee that a small sample may misrepresent the nature of the cancer as a whole. A final integrated diagnosis and definitive grade will be based on chromosomal microarray analysis and this testing is pending.     As a result, my recommendation is for Vladimir to see Dr. Carver back in consultation to review the risks and benefits of a craniotomy for cancer resection. The resection would look to serve 2 purposes; first diagnostic, to obtain a larger sample to confirm grade and second therapeutic, as literature suggests that the degree of post-operative residual cancer is a prognostic marker. Of note, Vladimir has already scheduled a second opinion with Dr. Card, neurosurgery at the [facility].     Finally, as it is highly likely that either targeted therapy or cytotoxic chemotherapy will be indicated and both can negatively impact fertility, I discussed with Vladimir and Kailee the option of fertility preservation. Vladimir stated that he is not interested.     PROBLEM LIST  Astrocytoma  Left eye vision  change    PLAN  -CANCER-DIRECTED THERAPY-  -As above.     -STEROIDS-  -Currently off dexamethasone.    -SEIZURE MANAGEMENT-  -While this patient is at increased risk of having seizures, given the lack of seizure history, there is no indication to prescribe an antiepileptic at this time.   -Off Keppra.     -VISION CHANGE, MONOCULAR, Left-  -Following with Dr. Arteaga, neuro-ophtho.     -Quality of life/ MOOD/ FATIGUE-  -Denies any mood issues.  -Continue to monitor mood as untreated/ undertreated depression can worsen fatigue, dysorexia, and quality of life.    Return to clinic pending discussion with neurosurgery.     In the meantime, Vladimir and Kailee know to call the clinic with any concerns and he can be seen sooner if needed.     The longitudinal plan of care for the diagnosis(es)/condition(s) as documented were addressed during this visit. Due to the added complexity in care, I will continue to support Vladimir in the subsequent management and with ongoing continuity of care.     Concha Jacinto MD  Neuro-oncology

## 2025-03-17 NOTE — NURSING NOTE
"Oncology Rooming Note    March 17, 2025 8:46 AM   Jeffry Younger is a 44 year old male who presents for:    Chief Complaint   Patient presents with    Oncology Clinic Visit     New Oncology     Initial Vitals: /89   Pulse 81   Temp 97.2  F (36.2  C) (Tympanic)   Resp 18   Ht 1.88 m (6' 2\")   Wt (!) 177.4 kg (391 lb)   SpO2 99%   BMI 50.20 kg/m   Estimated body mass index is 50.2 kg/m  as calculated from the following:    Height as of this encounter: 1.88 m (6' 2\").    Weight as of this encounter: 177.4 kg (391 lb). Body surface area is 3.04 meters squared.  No Pain (0) Comment: Data Unavailable   No LMP for male patient.  Allergies reviewed: Yes  Medications reviewed: Yes    Medications: Medication refills not needed today.  Pharmacy name entered into EPIC:    [id number] - [place], [place] - [address]  [id number] - [place], [place] - [address] AT SEC OF [facility] - [facility] [address] AT [address]  WRITTEN PRESCRIPTION REQUESTED    Frailty Screening:   Is the patient here for a new oncology consult visit in cancer care? 2. No    PHQ9:  Did this patient require a PHQ9?: No      Clinical concerns: none      Chivo Landin LPN              "

## 2025-03-18 ENCOUNTER — VIRTUAL VISIT (OUTPATIENT)
Dept: NEUROSURGERY | Facility: CLINIC | Age: 45
End: 2025-03-18
Payer: COMMERCIAL

## 2025-03-18 ENCOUNTER — TELEPHONE (OUTPATIENT)
Dept: NEUROSURGERY | Facility: CLINIC | Age: 45
End: 2025-03-18

## 2025-03-18 DIAGNOSIS — C71.9 ASTROCYTOMA (H): Primary | ICD-10-CM

## 2025-03-18 NOTE — TELEPHONE ENCOUNTER
Left Voicemail (1st Attempt) for the patient to call back and schedule the following:    Location:  [facility] ADULT [facility]  Provider: Dr. Mccarthy  Appointment type: new adult  Appointment mode: in clinic  Return date: next available    Specialty phone number: [phone]    Is Imaging Needed: no  Imaging Phone Number to provide to patient: n/a    Additional Notes: patient to get scheduled for 2nd opinion with Dr. Mccarthy - can be seen in  NEUROSURGERY,  SPINE & BRAIN, or  ONC ADULT NEUROSURG.

## 2025-03-18 NOTE — PROGRESS NOTES
Video-Visit Details     Type of service:  Video Visit   Time: 9:38 AM to 9:52 AM   Originating Location (pt. Location): Patient with his wife Dr. Younger in his car in [place]   Distant Location (provider location):  On-site  Platform used for Video Visit: [facility]      Neurosurgery Clinic Note     Mr. Jeffry Younger is a 44-year-old right-handed gentleman who was admitted at [facility] for symptoms of blurring of vision of approximately 1 week duration.  Patient underwent evaluation by neuro-ophthalmologist and was diagnosed to have left disc edema and MRI brain was ordered.  Patient denied any new onset neurological symptoms since his discharge including headaches with nausea, vomiting, worsening of blurring of vision, weakness, sensory deficits or new onset seizures.     MRI brain with and without contrast showed left posterior frontal lobe lesion measuring 4.6 x 4 x 4 cm T2 hyperintense component and 3.7 x 3.6 x 2.2 cm enhancing component.    Patient underwent stereotactic needle biopsy of the left frontal lobe mass on 3/7/2025.  Final pathology was- Astrocytoma, IDH-mutant, histologically low-grade, pending molecular testing.    Patient is for follow-up to discuss pathology report and brain tumor conference recommendations.    Patient is overall doing well with no new neurological symptoms following the surgery.  He denies any issues with the incision which is healing well.    Review of systems reported no new neurologic symptoms.     Video showed a non-focal motor and cranial nerve examination.     AP: Patient is a 44-year-old right-handed gentleman who was admitted for symptoms of approximately 1 week blurring of vision.  Patient underwent stereotactic needle biopsy of the left frontal lobe on 3/7/2025.  Final pathology was IDH mutant astrocytoma histologically low-grade pending molecular analysis.      He is for virtual follow-up to discuss pathology report and brain tumor conference recommendations.  Patient is  doing well with no new neurological symptoms and incision is healing well.      I explained the pathology report of astrocytoma IDH mutant which is histologically low-grade and pending molecular analysis.  I also explained them the sampling error associated with needle biopsy.  The presence of contrast-enhancement and possible necrosis (anterior part of the tumor) may represent aggressive nature of the tumor.  However final chromosomal microarray analysis is pending.     I discussed with them the brain tumor conference recommendations of proceeding with craniotomy and resection of the tumor with a goal of achieving maximal safe resection.  I also discussed with them the possible need for adjuvant treatment following the maximal safe resection including chemotherapy and radiation therapy.     I briefly discussed the procedure of awake craniotomy with motor and language mapping and use of adjunct such as intraoperative MRI to achieve maximal safe resection.  I briefly discussed the risk and benefits of the surgery including but not limited to bleeding, infection, need for repeat surgery, need for adjuvant treatment, stroke, speech/language deficits, motor weakness, need for rehabilitation, DVT/PE, MI and others.    Patient is scheduled to have second opinion with Dr. Card at the [facility].     We will set up a physical visit following all the consults to finalize the management plan.    Patient and his spouse sounded understanding and all the questions were answered.He can contact us if there are any further questions or concerns or worsening neurological deficits.I spent more than 15 minutes in this apt, examining the pt, reviewing the scans, reviewing notes from chart, discussing treatment options with risks and benefits and coordinating care. This note was created in part by the use of Dragon voice recognition system. Inadvertent grammatical errors and typographical errors may have occurred due to inherent  "limitation of voice recognition software.  Reasonable attempts made to avoid errors, but this document may contain an error not identified before finalizing.  Please contact me for any clarification needed.         \"I spent 15 minutes on the date of encounter of which 10 minutes spent in medical discussion.\"       "

## 2025-03-18 NOTE — LETTER
3/18/2025      Jeffry Younger  [address] [facility] [address]      Dear Colleague,    Thank you for referring your patient, Jeffry Younger, to the [facility]UROLOG[facility]. Please see a copy of my visit note below.    Video-Visit Details     Type of service:  Video Visit   Time: 9:38 AM to 9:52 AM   Originating Location (pt. Location): Patient with his wife Dr. Younger in his car in [place]   Distant Location (provider location):  On-site  Platform used for Video Visit: [facility]      Neurosurgery Clinic Note     Mr. Jeffry Younger is a 44-year-old right-handed gentleman who was admitted at [facility] for symptoms of blurring of vision of approximately 1 week duration.  Patient underwent evaluation by neuro-ophthalmologist and was diagnosed to have left disc edema and MRI brain was ordered.  Patient denied any new onset neurological symptoms since his discharge including headaches with nausea, vomiting, worsening of blurring of vision, weakness, sensory deficits or new onset seizures.     MRI brain with and without contrast showed left posterior frontal lobe lesion measuring 4.6 x 4 x 4 cm T2 hyperintense component and 3.7 x 3.6 x 2.2 cm enhancing component.    Patient underwent stereotactic needle biopsy of the left frontal lobe mass on 3/7/2025.  Final pathology was- Astrocytoma, IDH-mutant, histologically low-grade, pending molecular testing.    Patient is for follow-up to discuss pathology report and brain tumor conference recommendations.    Patient is overall doing well with no new neurological symptoms following the surgery.  He denies any issues with the incision which is healing well.    Review of systems reported no new neurologic symptoms.     Video showed a non-focal motor and cranial nerve examination.     AP: Patient is a 44-year-old right-handed gentleman who was admitted for symptoms of approximately 1 week blurring of vision.  Patient underwent stereotactic needle biopsy of the left  frontal lobe on 3/7/2025.  Final pathology was IDH mutant astrocytoma histologically low-grade pending molecular analysis.      He is for virtual follow-up to discuss pathology report and brain tumor conference recommendations.  Patient is doing well with no new neurological symptoms and incision is healing well.      I explained the pathology report of astrocytoma IDH mutant which is histologically low-grade and pending molecular analysis.  I also explained them the sampling error associated with needle biopsy.  The presence of contrast-enhancement and possible necrosis (anterior part of the tumor) may represent aggressive nature of the tumor.  However final chromosomal microarray analysis is pending.     I discussed with them the brain tumor conference recommendations of proceeding with craniotomy and resection of the tumor with a goal of achieving maximal safe resection.  I also discussed with them the possible need for adjuvant treatment following the maximal safe resection including chemotherapy and radiation therapy.     I briefly discussed the procedure of awake craniotomy with motor and language mapping and use of adjunct such as intraoperative MRI to achieve maximal safe resection.  I briefly discussed the risk and benefits of the surgery including but not limited to bleeding, infection, need for repeat surgery, need for adjuvant treatment, stroke, speech/language deficits, motor weakness, need for rehabilitation, DVT/PE, MI and others.    Patient is scheduled to have second opinion with Dr. Card at the [facility].     We will set up a physical visit following all the consults to finalize the management plan.    Patient and his spouse sounded understanding and all the questions were answered.He can contact us if there are any further questions or concerns or worsening neurological deficits.I spent more than 15 minutes in this apt, examining the pt, reviewing the scans, reviewing notes from chart,  "discussing treatment options with risks and benefits and coordinating care. This note was created in part by the use of Dragon voice recognition system. Inadvertent grammatical errors and typographical errors may have occurred due to inherent limitation of voice recognition software.  Reasonable attempts made to avoid errors, but this document may contain an error not identified before finalizing.  Please contact me for any clarification needed.         \"I spent 15 minutes on the date of encounter of which 10 minutes spent in medical discussion.\"           Again, thank you for allowing me to participate in the care of your patient.        Sincerely,        Genaro Carver MD    Electronically signed"

## 2025-03-20 ENCOUNTER — OFFICE VISIT (OUTPATIENT)
Dept: OPHTHALMOLOGY | Facility: CLINIC | Age: 45
End: 2025-03-20
Attending: OPHTHALMOLOGY
Payer: COMMERCIAL

## 2025-03-20 DIAGNOSIS — H46.9 OPTIC NEUROPATHY: Primary | ICD-10-CM

## 2025-03-20 PROCEDURE — 92133 CPTRZD OPH DX IMG PST SGM ON: CPT | Performed by: OPHTHALMOLOGY

## 2025-03-20 PROCEDURE — 92083 EXTENDED VISUAL FIELD XM: CPT | Performed by: OPHTHALMOLOGY

## 2025-03-20 PROCEDURE — 99212 OFFICE O/P EST SF 10 MIN: CPT | Performed by: OPHTHALMOLOGY

## 2025-03-20 ASSESSMENT — CONF VISUAL FIELD
OD_SUPERIOR_TEMPORAL_RESTRICTION: 0
OS_SUPERIOR_TEMPORAL_RESTRICTION: 0
OS_SUPERIOR_NASAL_RESTRICTION: 0
OD_SUPERIOR_NASAL_RESTRICTION: 0
OS_INFERIOR_TEMPORAL_RESTRICTION: 0
OS_INFERIOR_NASAL_RESTRICTION: 0
OD_NORMAL: 1
OD_INFERIOR_TEMPORAL_RESTRICTION: 0
OD_INFERIOR_NASAL_RESTRICTION: 0
OS_NORMAL: 1
METHOD: COUNTING FINGERS

## 2025-03-20 ASSESSMENT — REFRACTION_WEARINGRX
OS_AXIS: 055
OS_CYLINDER: +0.50
OD_CYLINDER: +0.50
OD_SPHERE: -1.50
OD_AXIS: 084
OS_SPHERE: -1.00

## 2025-03-20 ASSESSMENT — TONOMETRY
IOP_METHOD: ICARE
OS_IOP_MMHG: 16
OD_IOP_MMHG: 19

## 2025-03-20 ASSESSMENT — VISUAL ACUITY
CORRECTION_TYPE: GLASSES
METHOD: SNELLEN - LINEAR
OD_CC: 20/20
OS_CC: 20/20

## 2025-03-20 ASSESSMENT — CUP TO DISC RATIO: OD_RATIO: 0.3

## 2025-03-20 ASSESSMENT — SLIT LAMP EXAM - LIDS
COMMENTS: NORMAL
COMMENTS: NORMAL

## 2025-03-20 NOTE — PROGRESS NOTES
Jeffry Younger is a 45 year old male with the following diagnoses:   1. 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  Last visit was 2/24/24.  At that time, we ordered an MRI.  Today, patient reports that his vision is the same to slightly worse in the LEFT eye.      Exam:  Visual acuity 20/20 right eye 20/20 left eye.  Color vision is 11/11 both eyes.  Pupils: left afferent pupillary defect.  Intraocular pressure 19 right eye and 16 left eye.      Tests ordered and interpreted today:    HVF 24-2 JOSE MANUEL FAST OU          Performed by: Irasema   . Patient cooperation: Reliable   .   Good fix. Dilated after vf. Fixation good both eyes Not dilated.     Notes  Normal RIGHT eye same   Inferior altitudinal LEFT eye same         OCT Optic Nerve RNFL Spectralis OU (both eyes)          Performed by: TENISHA   .     Right Eye  Reliability of the test: Good   . Test Findings: Normal   . Interpretation: Normal   . Plan: Monitor   . Interval: Same   .     Left Eye  Reliability of the test: Good   . Test Findings: Abnormal   . Test Findings Free Text: thick rnfl   . Plan: Monitor   . Interval: Better   .              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  He is still figuring out next steps for this lesion.  Overall, his visual function has remained the same but the optic disc edema has improved.  I will see him 1 more time to make sure that the optic disc edema resolves.  I will plan on seeing him in about 2 to 3 months sooner as needed for worsening symptoms.         Attending Physician Attestation:  Complete documentation of historical and exam elements from today's encounter can be found in the full encounter summary report (not reduplicated in this progress note).  I personally obtained the chief complaint(s) and history of present illness.  I  confirmed and edited as necessary the review of systems, past medical/surgical history, family history, social history, and examination findings as documented by others; and I examined the patient myself.  I personally reviewed the relevant tests, images, and reports as documented above.  I formulated and edited as necessary the assessment and plan and discussed the findings and management plan with the patient and family. - Azeem Arteaga MD

## 2025-03-20 NOTE — LETTER
2025     RE:  :  MRN: Jeffry Younger  1980  [id number]     Dear Dr. Gaxiola:     Your patient,Jeffry Younger, returned for neuro-ophthalmic follow up. My assessment and plan are below.  For further details, please see my attached clinic note.      Jeffry Younger is a 45 year old male with the following diagnoses:   1. 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  Last visit was 24.  At that time, we ordered an MRI.  Today, patient reports that his vision is the same to slightly worse in the LEFT eye.      Exam:  Visual acuity 20/20 right eye 20/20 left eye.  Color vision is 11/11 both eyes.  Pupils: left afferent pupillary defect.  Intraocular pressure 19 right eye and 16 left eye.      Tests ordered and interpreted today:    HVF 24-2 JOSE MANUEL FAST OU          Performed by: Irasema   . Patient cooperation: Reliable   .   Good fix. Dilated after vf. Fixation good both eyes Not dilated.     Notes  Normal RIGHT eye same   Inferior altitudinal LEFT eye same         OCT Optic Nerve RNFL Spectralis OU (both eyes)          Performed by: TLT   .     Right Eye  Reliability of the test: Good   . Test Findings: Normal   . Interpretation: Normal   . Plan: Monitor   . Interval: Same   .     Left Eye  Reliability of the test: Good   . Test Findings: Abnormal   . Test Findings Free Text: thick rnfl   . Plan: Monitor   . Interval: Better   .              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  He is still figuring out next steps for this lesion.  Overall, his visual function has remained the same but the optic disc edema has improved.  I will see him 1 more time to make sure that the optic disc edema resolves.  I will plan on seeing him in about 2 to 3 months sooner as needed for worsening symptoms.        Again, thank you for  allowing me to participate in the care of your patient.      Sincerely,      Azeem Arteaga MD  Professor  Director of Neuro-Ophthalmology  [facility] - [facility] Endowed Chair  Departments of Ophthalmology, Neurology, and Neurosurgery  [facility] [address] [place]  [address]  T - [phone]  F - [phone]  [email or web address]      CC: Hayden Gaxiola MD  [facility]  [address] [place] [address] [place] [id number]  Via Fax: [phone]

## 2025-03-21 ENCOUNTER — MYC MEDICAL ADVICE (OUTPATIENT)
Dept: PEDIATRICS | Facility: CLINIC | Age: 45
End: 2025-03-21

## 2025-03-21 ENCOUNTER — OFFICE VISIT (OUTPATIENT)
Dept: NEUROSURGERY | Facility: CLINIC | Age: 45
End: 2025-03-21
Attending: NEUROLOGICAL SURGERY
Payer: COMMERCIAL

## 2025-03-21 VITALS
HEART RATE: 98 BPM | HEIGHT: 74 IN | OXYGEN SATURATION: 98 % | DIASTOLIC BLOOD PRESSURE: 96 MMHG | SYSTOLIC BLOOD PRESSURE: 148 MMHG | TEMPERATURE: 97.7 F | RESPIRATION RATE: 18 BRPM | WEIGHT: 315 LBS | BODY MASS INDEX: 40.43 KG/M2

## 2025-03-21 DIAGNOSIS — C71.1 MALIGNANT NEOPLASM OF FRONTAL LOBE OF BRAIN (H): Primary | ICD-10-CM

## 2025-03-21 PROCEDURE — 99213 OFFICE O/P EST LOW 20 MIN: CPT | Mod: 27 | Performed by: NEUROLOGICAL SURGERY

## 2025-03-21 PROCEDURE — 99213 OFFICE O/P EST LOW 20 MIN: CPT | Performed by: NEUROLOGICAL SURGERY

## 2025-03-21 ASSESSMENT — PAIN SCALES - GENERAL: PAINLEVEL_OUTOF10: NO PAIN (0)

## 2025-03-21 NOTE — LETTER
3/21/2025      Jeffry Younger  [address] [facility] [address]      Dear Colleague,    Thank you for referring your patient, Jeffry Younger, to the [facility]. Please see a copy of my visit note below.    It was a pleasure to see Jeffry Younger today in Neurosurgery Clinic. He is a 45 year old male who initially presented with visual changes and was seen by ophthalmology.  This eventually led to an MRI of the brain which showed a left posterolateral frontal brain tumor.  He underwent a biopsy on March 7 which demonstrated an IDH mutant tumor with final pathology still pending.    He is here today to discuss further potential surgical care.  He is also seeing Dr. Jacinto with neuro-oncology today as well.    He really denies any other current symptoms.    He and his wife who is a physician here at the [facility] live in [place].  He is a .    Past Medical History:   Diagnosis Date     Diabetes (H) 07/2018    Treating metformin     Essential hypertension 06/13/2024     Obesity      Sleep apnea      Past Surgical History:   Procedure Laterality Date     AS ESOPHAGOSCOPY, DIAGNOSTIC      EGD     BIOPSY  2008    egd normal     OPTICAL TRACKING SYSTEM BIOPSY BRAIN Left 3/7/2025    Procedure: stealth assisted LEFT FRONTAL BIOPSY BRAIN;  Surgeon: Genaro Carver MD;  Location: [facility] OR      No Known Allergies    Current Outpatient Medications:      ACCU-CHEK GUIDE test strip, TEST TWICE DAILY AS DIRECTED, Disp: 200 strip, Rfl: 1     [START ON 3/22/2025] aspirin 81 MG EC tablet, Take 1 tablet (81 mg) by mouth daily., Disp: , Rfl:      atorvastatin (LIPITOR) 10 MG tablet, Take 1 tablet (10 mg) by mouth daily., Disp: 90 tablet, Rfl: 3     blood glucose monitoring (ACCU-CHEK FASTCLIX) lancets, 1 each daily, Disp: 102 each, Rfl: 3     Blood Glucose Monitoring Suppl w/Device KIT, , Disp: , Rfl:      Continuous Glucose Sensor (FREESTYLE KULWANT 3 SENSOR) [facility], 1 each every 14 days., Disp: 6  each, Rfl: 11     empagliflozin (JARDIANCE) 25 MG TABS tablet, Take 1 tablet (25 mg) by mouth daily., Disp: 90 tablet, Rfl: 3     insulin aspart (NOVOLOG FLEXPEN) 100 UNIT/ML pen, Inject 18-20 Units subcutaneously 2 times daily (with meals). Max 50 units per day. Hold until patient requests., Disp: 45 mL, Rfl: 3     insulin glargine U-300 (TOUJEO) 300 UNIT/ML (1 units dial) pen, Inject 15 Units subcutaneously daily., Disp: 15 mL, Rfl: 3     insulin pen needle (ULTICARE MINI) 31G X 6 MM miscellaneous, Use 3-4 pen needles daily or as directed., Disp: 300 each, Rfl: 3     LORazepam (ATIVAN) 0.5 MG tablet, Take 1 tablet (0.5 mg) by mouth every 6 hours as needed for anxiety., Disp: 20 tablet, Rfl: 0     losartan (COZAAR) 50 MG tablet, Take 1 tablet (50 mg) by mouth daily., Disp: 90 tablet, Rfl: 3     metFORMIN (GLUCOPHAGE XR) 500 MG 24 hr tablet, Take 2 tablets (1,000 mg) by mouth 2 times daily (with meals)., Disp: 360 tablet, Rfl: 3     ondansetron (ZOFRAN ODT) 4 MG ODT tab, Take 1 tablet (4 mg) by mouth every 8 hours as needed for nausea or vomiting., Disp: 10 tablet, Rfl: 0  Social History     Socioeconomic History     Marital status:      Spouse name: None     Number of children: None     Years of education: None     Highest education level: Professional school degree (e.g., MD, DDS, DVM, SMITA)   Tobacco Use     Smoking status: Never     Passive exposure: Never     Smokeless tobacco: Never   Vaping Use     Vaping status: Never Used   Substance and Sexual Activity     Alcohol use: Yes     Comment: 1 per month maybe     Drug use: No     Sexual activity: Yes     Partners: Female     Birth control/protection: Abstinence, Pull-out method, Condom   Other Topics Concern     Parent/sibling w/ CABG, MI or angioplasty before 65F 55M? No     Social Drivers of Health     Financial Resource Strain: Low Risk  (3/7/2025)    Financial Resource Strain      Within the past 12 months, have you or your family members you live with  been unable to get utilities (heat, electricity) when it was really needed?: No   Food Insecurity: Low Risk  (3/7/2025)    Food Insecurity      Within the past 12 months, did you worry that your food would run out before you got money to buy more?: No      Within the past 12 months, did the food you bought just not last and you didn t have money to get more?: No   Transportation Needs: Low Risk  (3/7/2025)    Transportation Needs      Within the past 12 months, has lack of transportation kept you from medical appointments, getting your medicines, non-medical meetings or appointments, work, or from getting things that you need?: No   Physical Activity: Insufficiently Active (12/28/2024)    Exercise Vital Sign      Days of Exercise per Week: 3 days      Minutes of Exercise per Session: 20 min   Stress: No Stress Concern Present (12/28/2024)    [employer] - Occupational Stress Questionnaire      Feeling of Stress : Only a little   Social Connections: Unknown (12/28/2024)    Social Connection and Isolation Panel [NHANES]      Frequency of Social Gatherings with Friends and Family: Twice a week   Interpersonal Safety: Low Risk  (3/7/2025)    Interpersonal Safety      Do you feel physically and emotionally safe where you currently live?: Yes      Within the past 12 months, have you been hit, slapped, kicked or otherwise physically hurt by someone?: No      Within the past 12 months, have you been humiliated or emotionally abused in other ways by your partner or ex-partner?: No   Recent Concern: Interpersonal Safety - High Risk (2/26/2025)    Interpersonal Safety      Do you feel physically and emotionally safe where you currently live?: No      Within the past 12 months, have you been hit, slapped, kicked or otherwise physically hurt by someone?: No      Within the past 12 months, have you been humiliated or emotionally abused in other ways by your partner or ex-partner?: No   Housing  "Stability: Low Risk  (3/7/2025)    Housing Stability      Do you have housing? : Yes      Are you worried about losing your housing?: No      Problem (# of Occurrences) Relation (Name,Age of Onset)    Diabetes (1) Father (Lester Younger)    Hypertension (1) Father (Lester Younger)    Cerebrovascular Disease (1) Paternal Grandfather (Prem Younger)    Other Cancer (4) Father (Lester Younger): Kidney & Thyroid, Brother (Chauncey Younger): Lukemia, Maternal Grandmother (Yandy Fitzpatrick): Pancreatic, Maternal Grandfather (Rigo Fitzpatrick): Lung - smoker           Negative family history of: Colon Cancer, Prostate Cancer, Cerebrovascular Disease, Coronary Artery Disease             ROS: 10 point ROS neg other than the symptoms noted above in the HPI.    Vitals:    03/21/25 0815   BP: (!) 148/96   BP Location: Right arm   Patient Position: Sitting   Cuff Size: Adult Large   Pulse: 98   Resp: 18   Temp: 97.7  F (36.5  C)   TempSrc: Tympanic   SpO2: 98%   Weight: (!) 177 kg (390 lb 4.8 oz)   Height: 1.88 m (6' 2\")     Body mass index is 50.11 kg/m .  No Pain (0)    Well-healed incision  Pupils equal round reactive to light  Facial movements full and symmetric  Palate elevates symmetrically  Tongue midline  Bilateral upper and lower extremity strength is 5 out of 5 in all muscle groups  Symmetric orbit of the hands and index fingers  No pronator drift.    Imaging: Review of his imaging shows a tumor in the left posterolateral frontal lobe.  This appears to potentially involve the face motor areas.  It does appear to be superior to the lateral frontal gyrus but there may be areas where the tumor is adjacent to or involved with this part of the brain.  The imaging was reviewed with the patient shown to the patient clinic today.    Assessment: IDH mutant glioma, NOS, pathology pending.    Plan: We discussed potential improved she is including IDH inhibition therapy but mainly discussed the role of further surgery for this tumor.  I think that " given its location this would likely require an awake craniotomy with speech and motor mapping.  Given this I think we should proceed with needed diagnostic studies such as functional MRI and neuropsych evaluation in preparation for potential surgery.  However any final planning is dependent on pathology results.  We briefly discussed that there might be a role for presurgical use of IDH inhibition and a grade 2 tumor.  However grade 3 or higher tumor would likely need to proceed to further surgery more expeditiously.  We will await his further pathology reports and proceed once we have more definitive answer this way.         Again, thank you for allowing me to participate in the care of your patient.        Sincerely,        Alon Mccarthy MD    Electronically signed

## 2025-03-21 NOTE — NURSING NOTE
"Oncology Rooming Note    March 21, 2025 8:17 AM   Jeffry Younger is a 45 year old male who presents for:    Chief Complaint   Patient presents with    Oncology Clinic Visit     Astrocytoma     Initial Vitals: BP (!) 148/96 (BP Location: Right arm, Patient Position: Sitting, Cuff Size: Adult Large)   Pulse 98   Temp 97.7  F (36.5  C) (Tympanic)   Resp 18   Ht 1.88 m (6' 2\")   Wt (!) 177 kg (390 lb 4.8 oz)   SpO2 98%   BMI 50.11 kg/m   Estimated body mass index is 50.11 kg/m  as calculated from the following:    Height as of this encounter: 1.88 m (6' 2\").    Weight as of this encounter: 177 kg (390 lb 4.8 oz). Body surface area is 3.04 meters squared.  No Pain (0) Comment: Data Unavailable   No LMP for male patient.  Allergies reviewed: Yes  Medications reviewed: Yes    Medications: Medication refills not needed today.  Pharmacy name entered into [facility]:    [id number] - [place], [place] - [address] AT [facility] [address]  [id number] - [place], [place] - [address] AT SEC OF [facility] - [facility] [address] AT [address]  WRITTEN PRESCRIPTION REQUESTED    Frailty Screening:   Is the patient here for a new oncology consult visit in cancer care? 2. No    PHQ9:  Did this patient require a PHQ9?: No      Clinical concerns: Patient is new to Dr Fabio Bowden, GONZALO  3/21/2025                "

## 2025-03-21 NOTE — PROGRESS NOTES
It was a pleasure to see Jeffry Younger today in Neurosurgery Clinic. He is a 45 year old male who initially presented with visual changes and was seen by ophthalmology.  This eventually led to an MRI of the brain which showed a left posterolateral frontal brain tumor.  He underwent a biopsy on March 7 which demonstrated an IDH mutant tumor with final pathology still pending.    He is here today to discuss further potential surgical care.  He is also seeing Dr. Jacinto with neuro-oncology today as well.    He really denies any other current symptoms.    He and his wife who is a physician here at the [facility] live in [place].  He is a .    Past Medical History:   Diagnosis Date    Diabetes (H) 07/2018    Treating metformin    Essential hypertension 06/13/2024    Obesity     Sleep apnea      Past Surgical History:   Procedure Laterality Date    AS ESOPHAGOSCOPY, DIAGNOSTIC      EGD    BIOPSY  2008    egd normal    OPTICAL TRACKING SYSTEM BIOPSY BRAIN Left 3/7/2025    Procedure: stealth assisted LEFT FRONTAL BIOPSY BRAIN;  Surgeon: Genaro Carver MD;  Location:  OR      No Known Allergies    Current Outpatient Medications:     ACCU-CHEK GUIDE test strip, TEST TWICE DAILY AS DIRECTED, Disp: 200 strip, Rfl: 1    [START ON 3/22/2025] aspirin 81 MG EC tablet, Take 1 tablet (81 mg) by mouth daily., Disp: , Rfl:     atorvastatin (LIPITOR) 10 MG tablet, Take 1 tablet (10 mg) by mouth daily., Disp: 90 tablet, Rfl: 3    blood glucose monitoring (ACCU-CHEK FASTCLIX) lancets, 1 each daily, Disp: 102 each, Rfl: 3    Blood Glucose Monitoring Suppl w/Device KIT, , Disp: , Rfl:     Continuous Glucose Sensor (FREESTYLE KULWANT 3 SENSOR) [facility], 1 each every 14 days., Disp: 6 each, Rfl: 11    empagliflozin (JARDIANCE) 25 MG TABS tablet, Take 1 tablet (25 mg) by mouth daily., Disp: 90 tablet, Rfl: 3    insulin aspart (NOVOLOG FLEXPEN) 100 UNIT/ML pen, Inject 18-20 Units subcutaneously 2 times daily (with meals). Max 50 units  per day. Hold until patient requests., Disp: 45 mL, Rfl: 3    insulin glargine U-300 (TOUJEO) 300 UNIT/ML (1 units dial) pen, Inject 15 Units subcutaneously daily., Disp: 15 mL, Rfl: 3    insulin pen needle (ULTICARE MINI) 31G X 6 MM miscellaneous, Use 3-4 pen needles daily or as directed., Disp: 300 each, Rfl: 3    LORazepam (ATIVAN) 0.5 MG tablet, Take 1 tablet (0.5 mg) by mouth every 6 hours as needed for anxiety., Disp: 20 tablet, Rfl: 0    losartan (COZAAR) 50 MG tablet, Take 1 tablet (50 mg) by mouth daily., Disp: 90 tablet, Rfl: 3    metFORMIN (GLUCOPHAGE XR) 500 MG 24 hr tablet, Take 2 tablets (1,000 mg) by mouth 2 times daily (with meals)., Disp: 360 tablet, Rfl: 3    ondansetron (ZOFRAN ODT) 4 MG ODT tab, Take 1 tablet (4 mg) by mouth every 8 hours as needed for nausea or vomiting., Disp: 10 tablet, Rfl: 0  Social History     Socioeconomic History    Marital status:      Spouse name: None    Number of children: None    Years of education: None    Highest education level: Professional school degree (e.g., MD, DDS, DVM, SMITA)   Tobacco Use    Smoking status: Never     Passive exposure: Never    Smokeless tobacco: Never   Vaping Use    Vaping status: Never Used   Substance and Sexual Activity    Alcohol use: Yes     Comment: 1 per month maybe    Drug use: No    Sexual activity: Yes     Partners: Female     Birth control/protection: Abstinence, Pull-out method, Condom   Other Topics Concern    Parent/sibling w/ CABG, MI or angioplasty before 65F 55M? No     Social Drivers of Health     Financial Resource Strain: Low Risk  (3/7/2025)    Financial Resource Strain     Within the past 12 months, have you or your family members you live with been unable to get utilities (heat, electricity) when it was really needed?: No   Food Insecurity: Low Risk  (3/7/2025)    Food Insecurity     Within the past 12 months, did you worry that your food would run out before you got money to buy more?: No     Within the past 12  months, did the food you bought just not last and you didn t have money to get more?: No   Transportation Needs: Low Risk  (3/7/2025)    Transportation Needs     Within the past 12 months, has lack of transportation kept you from medical appointments, getting your medicines, non-medical meetings or appointments, work, or from getting things that you need?: No   Physical Activity: Insufficiently Active (12/28/2024)    Exercise Vital Sign     Days of Exercise per Week: 3 days     Minutes of Exercise per Session: 20 min   Stress: No Stress Concern Present (12/28/2024)    [employer] - Occupational Stress Questionnaire     Feeling of Stress : Only a little   Social Connections: Unknown (12/28/2024)    Social Connection and Isolation Panel [NHANES]     Frequency of Social Gatherings with Friends and Family: Twice a week   Interpersonal Safety: Low Risk  (3/7/2025)    Interpersonal Safety     Do you feel physically and emotionally safe where you currently live?: Yes     Within the past 12 months, have you been hit, slapped, kicked or otherwise physically hurt by someone?: No     Within the past 12 months, have you been humiliated or emotionally abused in other ways by your partner or ex-partner?: No   Recent Concern: Interpersonal Safety - High Risk (2/26/2025)    Interpersonal Safety     Do you feel physically and emotionally safe where you currently live?: No     Within the past 12 months, have you been hit, slapped, kicked or otherwise physically hurt by someone?: No     Within the past 12 months, have you been humiliated or emotionally abused in other ways by your partner or ex-partner?: No   Housing Stability: Low Risk  (3/7/2025)    Housing Stability     Do you have housing? : Yes     Are you worried about losing your housing?: No      Problem (# of Occurrences) Relation (Name,Age of Onset)    Diabetes (1) Father (Lester Younger)    Hypertension (1) Father (Lester Younger)    Cerebrovascular  "Disease (1) Paternal Grandfather (Prem Younger)    Other Cancer (4) Father (Lester Younger): Kidney & Thyroid, Brother (Chauncey Younger): Lukemia, Maternal Grandmother (Yandy Fitzpatrick): Pancreatic, Maternal Grandfather (Rigo Fitzpatrick): Lung - smoker           Negative family history of: Colon Cancer, Prostate Cancer, Cerebrovascular Disease, Coronary Artery Disease             ROS: 10 point ROS neg other than the symptoms noted above in the HPI.    Vitals:    03/21/25 0815   BP: (!) 148/96   BP Location: Right arm   Patient Position: Sitting   Cuff Size: Adult Large   Pulse: 98   Resp: 18   Temp: 97.7  F (36.5  C)   TempSrc: Tympanic   SpO2: 98%   Weight: (!) 177 kg (390 lb 4.8 oz)   Height: 1.88 m (6' 2\")     Body mass index is 50.11 kg/m .  No Pain (0)    Well-healed incision  Pupils equal round reactive to light  Facial movements full and symmetric  Palate elevates symmetrically  Tongue midline  Bilateral upper and lower extremity strength is 5 out of 5 in all muscle groups  Symmetric orbit of the hands and index fingers  No pronator drift.    Imaging: Review of his imaging shows a tumor in the left posterolateral frontal lobe.  This appears to potentially involve the face motor areas.  It does appear to be superior to the lateral frontal gyrus but there may be areas where the tumor is adjacent to or involved with this part of the brain.  The imaging was reviewed with the patient shown to the patient clinic today.    Assessment: IDH mutant glioma, NOS, pathology pending.    Plan: We discussed potential improved she is including IDH inhibition therapy but mainly discussed the role of further surgery for this tumor.  I think that given its location this would likely require an awake craniotomy with speech and motor mapping.  Given this I think we should proceed with needed diagnostic studies such as functional MRI and neuropsych evaluation in preparation for potential surgery.  However any final planning is dependent on " pathology results.  We briefly discussed that there might be a role for presurgical use of IDH inhibition and a grade 2 tumor.  However grade 3 or higher tumor would likely need to proceed to further surgery more expeditiously.  We will await his further pathology reports and proceed once we have more definitive answer this way.

## 2025-03-24 NOTE — TELEPHONE ENCOUNTER
Please see patient MyChart message  -states after admission for left frontal lobe mask has questions about blood sugar   -was told to stop ozempic as may have contributed to eye problem  -thinking should check A1C after sees pharmacist in April  -notes was on steroids after biopsy    Dr. Hsieh please review and advise. Want updated A1C labs? Hospital follow up?    Roseanna Ramos RN

## 2025-03-25 ENCOUNTER — DOCUMENTATION ONLY (OUTPATIENT)
Dept: OTHER | Facility: CLINIC | Age: 45
End: 2025-03-25
Payer: COMMERCIAL

## 2025-03-25 LAB — MAYO MISC RESULT: NORMAL

## 2025-04-03 LAB
PATH REPORT.ADDENDUM SPEC: ABNORMAL
PATH REPORT.COMMENTS IMP SPEC: ABNORMAL
PATH REPORT.COMMENTS IMP SPEC: YES
PATH REPORT.FINAL DX SPEC: ABNORMAL
PATH REPORT.GROSS SPEC: ABNORMAL
PATH REPORT.INTRAOP OBS SPEC DOC: ABNORMAL
PATH REPORT.MICROSCOPIC SPEC OTHER STN: ABNORMAL
PATH REPORT.RELEVANT HX SPEC: ABNORMAL
PHOTO IMAGE: ABNORMAL

## 2025-04-07 ENCOUNTER — TELEPHONE (OUTPATIENT)
Dept: ONCOLOGY | Facility: CLINIC | Age: 45
End: 2025-04-07
Payer: COMMERCIAL

## 2025-04-07 NOTE — TELEPHONE ENCOUNTER
Called and spoke with Vladimir.     I discussed with him his pathology results that revealed no mutations (no evidence of CDKN2A/B deletion) consistent with a high grade glioma. 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     The plan is to continue to pursue a neurosurgical work-up as already scheduled per Dr. Mccarthy.     Concha Jacinto MD  Neuro-oncology  4/7/2025

## 2025-04-08 ENCOUNTER — TELEPHONE (OUTPATIENT)
Dept: NEUROSURGERY | Facility: CLINIC | Age: 45
End: 2025-04-08
Payer: COMMERCIAL

## 2025-04-08 NOTE — TELEPHONE ENCOUNTER
Left Voicemail (1st Attempt) for the patient to call back and schedule the following:    Location:  [facility]  Provider: Dr. Mccarthy  Appointment type: Return Adult Neurosurgery  Appointment mode In Clinic  Return date: First available    Specialty phone number: [phone]    Is Imaging Needed: N  Imaging Phone Number to provide to patient: NA    Additional Notes: Dr. Mccarthy sent a message to add this patient to his schedule in the next couple of weeks. 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

## 2025-04-13 NOTE — PROGRESS NOTES
Medication Therapy Management (MTM) Encounter    ASSESSMENT:                            Medication Adherence/Access: No issues identified.    Hypertension   Blood pressure not consistently at goal at home and not at goal in clinic. Will recommend increase in losartan and patient to work on tapering down caffeine intake.     Diabetes and elevated BMI  Time in target not at goal, recommend addition of glipizide back into regimen.    Hyperlipidemia   stable    Mental Health   Anxiety  stable    PLAN:                            start glipizide XL 5 mg in the morning. After a week if time in range on continuous glucose monitor still below 70% then increase glipizide to 10 mg.      If having dessert add extra 4 units to the Novolog dose before your meal.     Change Manolo 3 to Libre3+ sensors.    Increase losartan 100 mg daily   Recheck basic metabolic panel in 4 weeks.    Follow-up: Return in about 2 months (around 6/13/2025) for Medication Therapy Telephone Visit.    SUBJECTIVE/OBJECTIVE:                          Vladimir Younger is a 45 year old male seen for a follow-up visit.       Reason for visit: diabetes and hypertension.    Allergies/ADRs: Reviewed in chart  Past Medical History: Reviewed in chart - we also discussed his new diagnosis of astrocytoma. Likely planning for surgery but depending on pathology maybe started on IDH inhibition prior to surgery.   Tobacco: He reports that he has never smoked. He has never been exposed to tobacco smoke. He has never used smokeless tobacco.  Alcohol: Less than 1 beverages / week    Medication Adherence/Access: no issues reported.    Hypertension   Losartan 50 mg daily     Patient reports current medication side effects: none  Patient stopped combination amlodipine - benazepril due to felt lethargic.   Patient consumes excessive caffeine daily 0-1 regular mountain dew, 1-2 cans of diet Mountain Dew, 2 bottle Ice teas and then a pitcher of a loose leaf tea (oolong/white or green  tea) in the evening.  No coffee consumption.   Checks at home was twice daily now back to only daily 120/70 -140/90 range.        Diabetes and elevated BMI  Jardiance 25 mg daily  Metformin XR 1000 mg twice daily   Toujeo 20 units daily in the evening (last increase a month ago)  Novolog 20-24 units before meal, usually about 5-10 minutes  before meal (will give after if forgets but rarely)   - higher carb meal 24 units (if pop, side such as fries)   - lower carb meal 20 units (if chicken salad etc)     Neurology recommend to stop Ozempic. Has left some vision of the left eye.   Side effects:  none.   Following [facility], Dr. Pickard.     Blood sugar monitoring: Continuous Glucose Monitor Time in Range Last 14 Days - 59%, Above (>180mg/dl) 41%, Below (<70mg/dl) 0%  Eye exam is up to date  Foot exam: due    Hyperlipidemia   atorvastatin 10 mg daily    Patient reports no significant myalgias or other side effects.       Mental Health   Anxiety  Lorazepam 0.5 mg every 6 hour as needed   Zofran as needed - infrequently used, when more anxious feels nauseated     Lorazepam helps take the edge off the anxiety. He only wants to take when he needs.   Denies any side effects.  Despite new diagnosis feels he is coping as well given circumstance. He reports a good support system. He does admit after initial diagnosis did feel like he didn't want to care about diabetes management as much and would indulge in more foods he normally knows he should not but now feels more optimistic of his treatment plan.          Today's Vitals: BP (!) 140/90   Wt (!) 386 lb (175.1 kg)   BMI 49.56 kg/m    ----------------      I spent 30 minutes with this patient today. All changes were made via collaborative practice agreement with * No referring provider recorded for this case *.     A summary of these recommendations was given to the patient.    Serene Hamm, PharmD  Medication Therapy Management Pharmacist        Medication Therapy Recommendations  Essential hypertension   1 Current Medication: losartan (COZAAR) 100 MG tablet   Current Medication Sig: Take 1 tablet (100 mg) by mouth daily.   Rationale: Dose too low - Dosage too low - Effectiveness   Recommendation: Increase Dose   Status: Accepted per CPA   Identified Date: 4/14/2025 Completed Date: 4/14/2025         Type 2 diabetes mellitus with hyperglycemia, without long-term current use of insulin (H)   1 Current Medication: Continuous Glucose Sensor (FREESTYLE KULWANT 3 PLUS SENSOR) MISC   Current Medication Sig: Change every 15 days.   Rationale: More effective medication available - Ineffective medication - Effectiveness   Recommendation: Change Medication Formulation    Status: Accepted per CPA   Identified Date: 4/14/2025 Completed Date: 4/14/2025         2 Current Medication: glipiZIDE (GLUCOTROL XL) 5 MG 24 hr tablet   Current Medication Sig: Take 1 tablet (5 mg) by mouth daily. May increase to 10 mg dose if time in range is below 70%.   Rationale: Synergistic therapy - Needs additional medication therapy - Indication   Recommendation: Start Medication   Status: Accepted per CPA   Identified Date: 4/14/2025 Completed Date: 4/14/2025

## 2025-04-14 ENCOUNTER — OFFICE VISIT (OUTPATIENT)
Dept: PHARMACY | Facility: CLINIC | Age: 45
End: 2025-04-14
Payer: COMMERCIAL

## 2025-04-14 VITALS — SYSTOLIC BLOOD PRESSURE: 140 MMHG | WEIGHT: 315 LBS | DIASTOLIC BLOOD PRESSURE: 90 MMHG | BODY MASS INDEX: 49.56 KG/M2

## 2025-04-14 DIAGNOSIS — E11.65 TYPE 2 DIABETES MELLITUS WITH HYPERGLYCEMIA, WITHOUT LONG-TERM CURRENT USE OF INSULIN (H): Primary | ICD-10-CM

## 2025-04-14 DIAGNOSIS — E66.01 MORBID OBESITY WITH BMI OF 50.0-59.9, ADULT (H): ICD-10-CM

## 2025-04-14 DIAGNOSIS — F41.1 GAD (GENERALIZED ANXIETY DISORDER): ICD-10-CM

## 2025-04-14 DIAGNOSIS — E78.5 HYPERLIPIDEMIA LDL GOAL <100: ICD-10-CM

## 2025-04-14 DIAGNOSIS — I10 ESSENTIAL HYPERTENSION: ICD-10-CM

## 2025-04-14 PROCEDURE — 3080F DIAST BP >= 90 MM HG: CPT | Performed by: PHARMACIST

## 2025-04-14 PROCEDURE — 99605 MTMS BY PHARM NP 15 MIN: CPT | Performed by: PHARMACIST

## 2025-04-14 PROCEDURE — 3077F SYST BP >= 140 MM HG: CPT | Performed by: PHARMACIST

## 2025-04-14 PROCEDURE — 99607 MTMS BY PHARM ADDL 15 MIN: CPT | Performed by: PHARMACIST

## 2025-04-14 RX ORDER — GLIPIZIDE 5 MG/1
5 TABLET, FILM COATED, EXTENDED RELEASE ORAL DAILY
Qty: 90 TABLET | Refills: 0 | Status: SHIPPED | OUTPATIENT
Start: 2025-04-14

## 2025-04-14 RX ORDER — HYDROCHLOROTHIAZIDE 12.5 MG/1
CAPSULE ORAL
Qty: 6 EACH | Refills: 1 | Status: SHIPPED | OUTPATIENT
Start: 2025-04-14

## 2025-04-14 RX ORDER — INSULIN ASPART 100 [IU]/ML
20-28 INJECTION, SOLUTION INTRAVENOUS; SUBCUTANEOUS 2 TIMES DAILY WITH MEALS
Qty: 45 ML | Refills: 3 | Status: SHIPPED | OUTPATIENT
Start: 2025-04-14

## 2025-04-14 RX ORDER — LOSARTAN POTASSIUM 100 MG/1
100 TABLET ORAL DAILY
Qty: 90 TABLET | Refills: 0 | Status: SHIPPED | OUTPATIENT
Start: 2025-04-14

## 2025-04-14 NOTE — PATIENT INSTRUCTIONS
"Recommendation(s) from today's visit:                                                      Start glipizide XL 5 mg in the morning. After a week if time in range on continuous glucose monitor still below 70% then increase glipizide to 10 mg.     If having dessert add extra 4 units to the dose before your meal.    Increase losartan 100 mg daily   Recheck basic metabolic panel in 4 weeks.    Follow-up:     My Clinical Pharmacist's contact information:                                                      Serene Hamm, PharmD  Medication therapy management clinical pharmacist    It was great speaking with you today.  I value your experience and would be very thankful for your time in providing feedback in our clinic survey. In the next few days, you may receive an email or text message from [employer] with a link to a survey related to your  clinical pharmacist.\"     To schedule another MTM appointment, please call the clinic directly [phone] or you may call the MTM scheduling line at [phone].    "

## 2025-04-17 ENCOUNTER — TELEPHONE (OUTPATIENT)
Dept: PEDIATRICS | Facility: CLINIC | Age: 45
End: 2025-04-17
Payer: COMMERCIAL

## 2025-04-17 ENCOUNTER — OFFICE VISIT (OUTPATIENT)
Dept: NEUROPSYCHOLOGY | Facility: CLINIC | Age: 45
End: 2025-04-17
Attending: NEUROLOGICAL SURGERY
Payer: COMMERCIAL

## 2025-04-17 DIAGNOSIS — E11.65 TYPE 2 DIABETES MELLITUS WITH HYPERGLYCEMIA, WITHOUT LONG-TERM CURRENT USE OF INSULIN (H): Primary | ICD-10-CM

## 2025-04-17 DIAGNOSIS — C71.1 MALIGNANT NEOPLASM OF FRONTAL LOBE OF BRAIN (H): ICD-10-CM

## 2025-04-17 PROCEDURE — 96132 NRPSYC TST EVAL PHYS/QHP 1ST: CPT

## 2025-04-17 PROCEDURE — 96139 PSYCL/NRPSYC TST TECH EA: CPT

## 2025-04-17 PROCEDURE — 96138 PSYCL/NRPSYC TECH 1ST: CPT

## 2025-04-17 PROCEDURE — 96133 NRPSYC TST EVAL PHYS/QHP EA: CPT

## 2025-04-17 PROCEDURE — 90791 PSYCH DIAGNOSTIC EVALUATION: CPT

## 2025-04-17 PROCEDURE — 96136 PSYCL/NRPSYC TST PHY/QHP 1ST: CPT | Mod: 59

## 2025-04-17 RX ORDER — BLOOD-GLUCOSE METER
1 EACH MISCELLANEOUS DAILY
Qty: 1 EACH | Refills: 0 | Status: SHIPPED | OUTPATIENT
Start: 2025-04-17

## 2025-04-17 NOTE — TELEPHONE ENCOUNTER
Routing to Serene Hamm RPH to please review the pended Contour Next Glucose Meter, advise, and order as appropriate.     Roseanna CASTILLO RN   [facility] [place]

## 2025-04-17 NOTE — LETTER
2025      RE: Jeffry Younger  [address] [facility] [address]       Pt was seen for neuropsychological evaluation at the request of Dr. Alon Mccarthy for the purposes of diagnostic clarification and treatment planning. 181 minutes of test administration and scoring were provided by this writer. Please see Dr. Dr. Zuleyka Monk's report for a full interpretation of the findings.    Barbi Lopez  Psychometrist       NEUROPSYCHOLOGICAL EVALUATION  **CONFIDENTIAL**    **This is a medical document intended for communication with the referring provider. It is written in medical language and may contain abbreviations or verbiage that are unfamiliar. This report and the results within are not intended to be interpreted in isolation without consultation with your medical provider. **    Name: Jeffry Younger (Jeff) Education: 19 years (SMITA)    (age): 1980 (45 years) DOHERTY:  2025   Referral: Alon Mccarthy MD  Department of Neurosurgery Handedness:  MRN (Epic): Right  [id number]     IDENTIFYING INFORMATION AND REASON FOR REFERRAL   Mr. Younger is a 45-year-old, right-handed, White man with a history of left posterolateral frontal lobe astrocytoma. This evaluation was requested to provide a comprehensive assessment of his current neuropsychological status to inform treatment planning.     IMPRESSION  See below for relevant background information and detailed test results. See separate abstract for supporting documentation including a list of neuropsychological measures and test scores.    Mr. Younger's neuropsychological profile revealed subtle weaknesses in aspects of executive functioning (i.e., speeded inhibition and novel problem-solving). However, other aspects of executive functioning, including cognitive flexibility, were solidly average and cognitive strengths were noted on tests of verbal abstract reasoning and logical deductive reasoning. He also demonstrated effective use of executive  functioning strategies when learning verbal information. Other cognitive strengths included immediate auditory attention, verbal memory, and semantic verbal fluency. Performances were within expectation across other cognitive tests including working memory, cognitive and psychomotor processing speed, language (i.e., naming, reading, letter-cue verbal fluency, writing, repetition, comprehension), visuospatial processing, visual learning/memory, and bilateral fine motor dexterity. His cognitive and motor performances were not clearly lateralized. Assessment of current psychological and emotional status revealed minimal depressive or anxiety symptoms.     Overall, Mr. Younger is functioning quite well from a cognitive standpoint. Subtle weaknesses on tests of speeded inhibition and novel problem-solving are consistent with his known left frontal lobe astrocytoma with mass effect and midline shift, suggesting the possibility of very subtle frontal lobe dysfunction impacting executive functioning networks. Mr. Younger was administered confrontation naming (100% accuracy) and non-word repetition (100% accuracy) tasks on the [employer] platform for use during intraoperative mapping and monitoring. There are no significant emotional, behavioral, or cognitive concerns about his ability to participate in awake language and motor mapping. He described normative anxiety related to undergoing surgical resection of his lesion, but he has healthy coping skills, remains optimistic about his recovery, and has a strong social support network, all of which arleen well for post-operative recovery.     RECOMMENDATION  The current evaluation will serve as an objective cognitive baseline against which results of future evaluations may be compared. Mr. Younger may be referred for a follow-up neuropsychological evaluation postoperatively to objectively assess for neurocognitive change.     Thank you for the opportunity to participate in Mr. Younger's  care. These results and recommendations were discussed with the patient in a separate feedback session on 4/21/2025 and all his questions were answered. If you have any questions regarding this evaluation, please do not hesitate to contact us.       Samantha Romo, Ph.D., LP  Neuropsychology Fellow       Zuleyka Monk, Ph.D., LP, ABPP  Board Certified Clinical Neuropsychologist    RELEVANT BACKGROUND INFORMATION AND SUPPORTING DOCUMENTATION  Gathered from a clinical interview with the patient and reviews of electronic medical records.    History of Presenting Problem(s)  Mr. Younger established care with Dr. Azeem Arteaga in Neuro-ophthalmology on 2/24/2025 for concerns of a gray blur that developed in the bottom of his left eye 2 days prior to his appointment. He also described a distortion in the left midline of his left eye that reportedly looked like a  funhouse mirror.  Dr. Arteaga expressed concerns for acute optic neuropathy in the left eye with optic disc edema, and an MRI of Mr. Younger's brain and orbits was ordered. An MRI of the patient's brain on 2/25/2025 was read as showing a lesion involving the left frontal lobe, suspicious for a primary central nervous system (CNS) neoplasm. Since then, he established care with Dr. Concha Jacinto in Neuro-oncology. 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 He is currently being worked up for potential tumor resection under awake conditions with Dr. Alon Mccarthy.    On interview, Mr. Younger reported that he awoke one morning in mid-February of 2025 with blurry and  grayed out  vision in his left eye when looking toward his midline. He also described a warping/distorted effect in his left eye when looking away from his midline. 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 Mr. Younger is preparing for an awake craniotomy, and he has discussed the procedure with Dr. Mccarthy. He demonstrated a comprehensive understanding of the procedure and post-operative expectations. Mr. Younger indicated that he always feels a little nervous undergoing any kind of surgery, given his underlying medical conditions (e.g., hypertension). However, he also reported being intrigued by the idea of undergoing an awake craniotomy. He added that his oldest brother works as an RN in a neuro-recovery facility and has expressed some concerns about him undergoing neurosurgery, but he believes his brother has a skewed perspective on outcomes because of his brother's work setting. Mr. Younger noted that the remainder of his family is supportive of him pursuing surgery.    Regarding his cognition, he denied any noticeable changes beyond infrequent word-finding difficulties that he attributed to his age. He reported a longstanding weakness in remembering names of acquaintances. He denied any difficulties with memory, attention, paraphasic errors, reading, writing, planning, organizing, or multitasking. His family and friends have reportedly not noticed any cognitive changes. He is functionally independent with all activities of daily living and he denied any challenges completing his work as a .     In terms of physical concerns, he reported ongoing vision disturbances in his left eye. He denied any gait changes, balance problems, recent falls, numbness, tingling, or weakness.     Emotionally, he reported feeling  really stressed out.  He added that it is difficult to be aware of his tumor and not be actively treating it, though he acknowledged the need for a comprehensive work-up prior to any surgical intervention. He described a longstanding history of subclinical anxiety, and he reported heightened anxiety and irritability in the context of his newly diagnosed brain tumor. When he is feeling anxious, he reported  that it is helpful to find a quiet place in his home to relax. He reported that he enjoys playing video games with friends online, painting miniatures, fishing, and taking walks with his dog. He denied any history of depression or other mental health conditions. He also denied any personality or behavioral changes.     Neurodiagnostic Findings   Brain MR perfusion w/o and w contrast (02/26/2025): IMPRESSION: 1. Redemonstration of expansile enhancing mass of the left frontal lobe concerning for primary glial neoplasm. 2. Perfusion imaging demonstrates no elevated relative cerebral blood volume.  Brain and orbits MR w/o and w contrast (02/25/2025): IMPRESSION: 1. There is an expansile T2-weighted/FLAIR hyperintense lesion involving the left frontal lobe measuring approximately 3.6 x 4.0 x 4.1 cm. There is patchy amorphous enhancement along the medial and posterior margins of the mass. There is also additional T2-weighted FLAIR hyperintensity along the margins of the mass. 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 2. There is mild mass effect upon the left lateral ventricle and 0.2 cm rightward shift of midline structures. 3. There is mild cupping of the left optic disc which can be seen in the setting of papilledema.      Medical History (per EMR)  PAOLA (obstructive sleep apnea)  Type 2 diabetes mellitus with hyperglycemia (A1c=6.4 on 3/11/2025)  Hyperlipidemia  ADAMA (generalized anxiety disorder)  Essential hypertension  Astrocytoma  Monocular visual disturbance  Denied a history of stroke, seizure, or head injury with loss of consciousness; reported 3-4 past concussions with post-concussive symptoms (i.e., headache) that resolved without hospitalization or intervention    Health Behaviors   Sleep: He reported sleeping 6-7 hours on average per night. He denied difficulties with sleep initiation or maintenance. He reported a history of obstructive sleep  apnea, and he uses his CPAP every night. He denied any dream enactment behaviors.  Energy: He described his energy as  pretty good.    Exercise: He stated that he does not exercise as much as he should. He walks his dog regularly. He noted that he owns a rowing machine but does not frequently use it.   Pain: He reported a longstanding history of occasional headaches. He denied other chronic pain.       Psychiatric History  He described a longstanding history of subclinical anxiety, and he reported heightened anxiety and irritability in the context of his newly diagnosed brain tumor. However, he stated that his anxiety does not interfere with his ability to function.   He denied a history of other significant mental health symptoms or other indications of psychological distress.   Suicidality/Self-harm: He denied any current or historical suicidal ideation, plan, or intent.   Psychiatric treatment: He is prescribed lorazepam by his primary care provider, but he rarely takes it (e.g., he has only used 3 of the 20 pills since the medication was prescribed in March of 2025). He previously met with a psychotherapist once per week from 2018 through 2020. He is not currently engaged in psychotherapy.     Substance Use History  Alcohol: Drinks 1 cocktail every other week; no alcohol use since his lesion was discovered  Nicotine: None  Cannabis: None  Illicit: None  Caffeine: Drinks 2-3 cans of soda and approximately 6 cups of caffeinated tea per day  Problematic Substance Use: Denied    Current Medications (per EMR)  atorvastatin (LIPITOR) 10 MG tablet  continuous glucose sensor  empagliflozin (JARDIANCE) 25 MG TABS  glipizide (GLUCOTROL XL) 5 MG 24 hr tablet  insulin aspart (NOVOLOG FLEXPEN) 100 UNIT/ML pen  insulin glargine U-300 (TOUJEO) 300 UNIT/ML (1 units dial) pen  lorazepam (ATIVAN) 0.5 MG tablet  losartan (COZAAR) 100 MG tablet  metformin (GLUCOPHAGE XR) 500 MG 24 hr tablet  ondansetron (ZOFRAN ODT) 4 MG ODT  tab    Developmental, Educational, & Occupational History  Gestational: Full-term   complications: None reported  Developmental milestones: Met at expected ages  Childhood behavioral / emotional / academic problems: Denied  Native Language: English  Education: He described himself as a good student. He was in the gifted program in school and skipped a year of math. He earned his SMITA at [employer].   Occupation: He works as a . Since his lesion was discovered, he has closed all of his active files and has not taken on any new clients. He plans to return to working as a , barring any complications with surgery and/or other treatments. However, he stated that his wife works as a gastroenterologist and that he would not need to work if he was unable.     Psychosocial History  Born in [place], [place] and raised in [place], [place]  Marital:  to wife since   Children: None  Housing: Lives with his wife and pug  Psychosocial support: Strong social support from wife, family, and friends     Family History (per patient report and EMR)  Kidney cancer (father)  Thyroid cancer (father)  Hypertension (father)  Diabetes (father)  Leukemia (brother)  Pancreatic cancer (maternal grandmother)  Lung cancer (maternal grandfather)  Stroke (paternal grandfather)  He reported that his maternal uncle had a tumor in the posterior part of his brain that was resected.     RESULTS  See separate abstract for list of neuropsychological measures and test scores. Descriptive ranges are based on American Academy of Clinical Neuropsychology (2020) consensus guidelines, or test manuals where appropriate.     PRE-MORBID ABILITY: Premorbid intellectual abilities were estimated within at least the high average range based on single word reading ability and demographic factors including sex, ethnicity, education, occupation, and geographic region.  ATTENTION/EXECUTIVE FUNCTIONS: Immediate auditory attention performance  was above average. Working memory performances were high average. Verbal abstract reasoning and logical deductive reasoning performances were above average. Performance on a speeded test of set-shifting/cognitive flexibility with psychomotor and visual scanning demands was average and without error. Verbal set-shifting/mental flexibility was average, with 1 repetition. His performance on a task of speeded verbal inhibition was low average for both speed and accuracy (4 total errors). When the task increased in complexity and required speed inhibition and cognitive flexibility, his performance was in the average range for both speed and accuracy (1 total error). Performance on a test of problem solving and response to examiner feedback was generally within expectation with regard to number of categories completed, though he made more total errors and perseverative errors and fewer conceptual-level responses compared to his peers (below average performances). He did not lose mental set on the problem-solving task.   PROCESSING SPEED: Speeded word reading and color identification were average. Psychomotor processing speed performances ranged from average to high average.   LANGUAGE: Confrontation naming performance was average and without error. Letter-cue verbal fluency performance was average, with 1 set-loss error. Semantic verbal fluency performance was above average, with 1 repetition. Narrative writing was within normal limits and without error. Sentence reading was generally intact, with 2 minor omissions. Comprehension of sentences was performed without error.  Performance was within normal limits and without error on brief tests of auditory comprehension, sequential commands, and repetition of single words, short phrases, and long sentences. Mr. Younger was administered non-word repetition (100% accuracy) and confrontation naming (100% accuracy) tasks on the [employer] platform for use during intraoperative  mapping and monitoring.   VISUOSPATIAL PROCESSING: Copy of simple figures was within normal limits and without error. Judgment of variably oriented lines was performed within expected limits and without error.    LEARNING AND MEMORY: Initial encoding of a word list over multiple learning trials was high average, with above average use of semantic clustering during learning. He learned all list words by the final learning trial. Free and cued recalls of the list after both short and long delays were above average with all the list words recalled. He made no intrusion errors on word list recall trials (above average performance). Recognition of the word list was performed in the high average range and was without error. Encoding of a sheet of simple figures and their spatial locations was high average, with a positive learning curve. Subsequent delayed recall of the visual information was high average, with 100% retention. Recognition of the figures was performed without error.   MOTOR: Speeded fine-motor dexterity was high average in his dominant (right) hand and average in his non-dominant (left) hand, and not clearly lateralized.   PSYCHOLOGICAL AND BEHAVIORAL: He endorsed minimal symptoms of anxiety and depression on self-report questionnaires.    PERFORMANCE VALIDITY: Performance on neuropsychological tests is dependent upon a number of factors, including sufficient engagement and motivation, to reliably establish an examinee's level of cognitive functioning. Based upon observations made throughout the evaluation, the patient did not appear to deliberately perform in a suboptimal manner and demonstrated good frustration tolerance on cognitively challenging tasks. His score on an embedded index of performance validity was in the valid range. Overall, test results are believed to accurately represent the patient's current neurocognitive status.    BEHAVIORAL OBSERVATIONS  Presented early to his appointment and was  unaccompanied  Alert, oriented to self, time, place, and circumstance; attentive and focused while undergoing testing  Appearance: Well-groomed, casually dressed  Gait/Posture: No abnormalities noted  Sensorimotor: No abnormalities noted; he brought prescription glasses with him but did not wear them during the testing session  Behavior: Cooperative, pleasant  Speech: Fluent, articulate, and normal prosody and volume, with no conversational word-finding difficulties. No paraphasic errors were noted.  Thought process: Logical, linear, and goal-directed   Thought content: Logical, appropriate   Affect: Broad, responsive, consistent with reported mood; good eye contact  Mood: Mildly anxious  Insight and Judgment: Intact  Approach to testing: Efficient, deliberate; good frustration tolerance on cognitively demanding tasks  Rapport: Easily established and maintained      Activities included in this evaluation: CPT Code #Units Time   Psychiatric diagnostic interview 66375 [id number] --   Test evaluation services by professional; first hour. [id number]   Test evaluation services by professional, additional hour (+) [id number] 1    Test administration and scoring by professional, first 30 mins [id number] 1 17   Test administration and scoring by technician, first 30 mins [id number] 1 181   Test administration and scoring by technician, additional 30 mins (+) [id number]    C71.1    HARMEET DAVIDSON, PhD

## 2025-04-17 NOTE — TELEPHONE ENCOUNTER
Was unsure of where to send this. Apologies if this is not the correct pool.       Patient needs an RX for the Contour Next Glucose Meter  Qty - 1     The previous meter is no longer covered, they are able to change the test strips as that rx came across in generic form.     Please send Rx to [facility] mail service - it's on file for him.     Questions please call - Pharm line.     [phone]    Lelia Talamantes on 4/17/2025 at 9:38 AM

## 2025-04-17 NOTE — LETTER
2025      RE: Jeffry Younger  [address] [facility] [address]       Pt was seen for neuropsychological evaluation at the request of Dr. Alon Mccarthy for the purposes of diagnostic clarification and treatment planning. 181 minutes of test administration and scoring were provided by this writer. Please see Dr. Dr. Zuleyka Monk's report for a full interpretation of the findings.    Barbi Lopez  Psychometrist       NEUROPSYCHOLOGICAL EVALUATION  **CONFIDENTIAL**    **This is a medical document intended for communication with the referring provider. It is written in medical language and may contain abbreviations or verbiage that are unfamiliar. This report and the results within are not intended to be interpreted in isolation without consultation with your medical provider. **    Name: Jeffry Younger (Jeff) Education: 19 years (SMITA)    (age): 1980 (45 years) DOHERTY:  2025   Referral: Alon Mccarthy MD  Department of Neurosurgery Handedness:  MRN (Epic): Right  [id number]     IDENTIFYING INFORMATION AND REASON FOR REFERRAL   Mr. Younger is a 45-year-old, right-handed, White man with a history of left posterolateral frontal lobe astrocytoma. This evaluation was requested to provide a comprehensive assessment of his current neuropsychological status to inform treatment planning.     IMPRESSION  See below for relevant background information and detailed test results. See separate abstract for supporting documentation including a list of neuropsychological measures and test scores.    Mr. Younger's neuropsychological profile revealed subtle weaknesses in aspects of executive functioning (i.e., speeded inhibition and novel problem-solving). However, other aspects of executive functioning, including cognitive flexibility, were solidly average and cognitive strengths were noted on tests of verbal abstract reasoning and logical deductive reasoning. He also demonstrated effective use of executive  functioning strategies when learning verbal information. Other cognitive strengths included immediate auditory attention, verbal memory, and semantic verbal fluency. Performances were within expectation across other cognitive tests including working memory, cognitive and psychomotor processing speed, language (i.e., naming, reading, letter-cue verbal fluency, writing, repetition, comprehension), visuospatial processing, visual learning/memory, and bilateral fine motor dexterity. His cognitive and motor performances were not clearly lateralized. Assessment of current psychological and emotional status revealed minimal depressive or anxiety symptoms.     Overall, Mr. Younger is functioning quite well from a cognitive standpoint. Subtle weaknesses on tests of speeded inhibition and novel problem-solving are consistent with his known left frontal lobe astrocytoma with mass effect and midline shift, suggesting the possibility of very subtle frontal lobe dysfunction impacting executive functioning networks. Mr. Younger was administered confrontation naming (100% accuracy) and non-word repetition (100% accuracy) tasks on the [employer] platform for use during intraoperative mapping and monitoring. There are no significant emotional, behavioral, or cognitive concerns about his ability to participate in awake language and motor mapping. He described normative anxiety related to undergoing surgical resection of his lesion, but he has healthy coping skills, remains optimistic about his recovery, and has a strong social support network, all of which arleen well for post-operative recovery.     RECOMMENDATION  The current evaluation will serve as an objective cognitive baseline against which results of future evaluations may be compared. Mr. Younger may be referred for a follow-up neuropsychological evaluation postoperatively to objectively assess for neurocognitive change.     Thank you for the opportunity to participate in Mr. Younger's  care. These results and recommendations were discussed with the patient in a separate feedback session on 4/21/2025 and all his questions were answered. If you have any questions regarding this evaluation, please do not hesitate to contact us.       Samantha Romo, Ph.D., LP  Neuropsychology Fellow       Zuleyka Monk, Ph.D., LP, ABPP  Board Certified Clinical Neuropsychologist    RELEVANT BACKGROUND INFORMATION AND SUPPORTING DOCUMENTATION  Gathered from a clinical interview with the patient and reviews of electronic medical records.    History of Presenting Problem(s)  Mr. Younger established care with Dr. Azeem Arteaga in Neuro-ophthalmology on 2/24/2025 for concerns of a gray blur that developed in the bottom of his left eye 2 days prior to his appointment. He also described a distortion in the left midline of his left eye that reportedly looked like a  funhouse mirror.  Dr. Arteaga expressed concerns for acute optic neuropathy in the left eye with optic disc edema, and an MRI of Mr. Younger's brain and orbits was ordered. An MRI of the patient's brain on 2/25/2025 was read as showing a lesion involving the left frontal lobe, suspicious for a primary central nervous system (CNS) neoplasm. Since then, he established care with Dr. Concha Jacinto in Neuro-oncology. 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 He is currently being worked up for potential tumor resection under awake conditions with Dr. Alon Mccarthy.    On interview, Mr. Younger reported that he awoke one morning in mid-February of 2025 with blurry and  grayed out  vision in his left eye when looking toward his midline. He also described a warping/distorted effect in his left eye when looking away from his midline. 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 Mr. Younger is preparing for an awake craniotomy, and he has discussed the procedure with Dr. Mccarthy. He demonstrated a comprehensive understanding of the procedure and post-operative expectations. Mr. Younger indicated that he always feels a little nervous undergoing any kind of surgery, given his underlying medical conditions (e.g., hypertension). However, he also reported being intrigued by the idea of undergoing an awake craniotomy. He added that his oldest brother works as an RN in a neuro-recovery facility and has expressed some concerns about him undergoing neurosurgery, but he believes his brother has a skewed perspective on outcomes because of his brother's work setting. Mr. Younger noted that the remainder of his family is supportive of him pursuing surgery.    Regarding his cognition, he denied any noticeable changes beyond infrequent word-finding difficulties that he attributed to his age. He reported a longstanding weakness in remembering names of acquaintances. He denied any difficulties with memory, attention, paraphasic errors, reading, writing, planning, organizing, or multitasking. His family and friends have reportedly not noticed any cognitive changes. He is functionally independent with all activities of daily living and he denied any challenges completing his work as a .     In terms of physical concerns, he reported ongoing vision disturbances in his left eye. He denied any gait changes, balance problems, recent falls, numbness, tingling, or weakness.     Emotionally, he reported feeling  really stressed out.  He added that it is difficult to be aware of his tumor and not be actively treating it, though he acknowledged the need for a comprehensive work-up prior to any surgical intervention. He described a longstanding history of subclinical anxiety, and he reported heightened anxiety and irritability in the context of his newly diagnosed brain tumor. When he is feeling anxious, he reported  that it is helpful to find a quiet place in his home to relax. He reported that he enjoys playing video games with friends online, painting miniatures, fishing, and taking walks with his dog. He denied any history of depression or other mental health conditions. He also denied any personality or behavioral changes.     Neurodiagnostic Findings   Brain MR perfusion w/o and w contrast (02/26/2025): IMPRESSION: 1. Redemonstration of expansile enhancing mass of the left frontal lobe concerning for primary glial neoplasm. 2. Perfusion imaging demonstrates no elevated relative cerebral blood volume.  Brain and orbits MR w/o and w contrast (02/25/2025): IMPRESSION: 1. There is an expansile T2-weighted/FLAIR hyperintense lesion involving the left frontal lobe measuring approximately 3.6 x 4.0 x 4.1 cm. There is patchy amorphous enhancement along the medial and posterior margins of the mass. There is also additional T2-weighted FLAIR hyperintensity along the margins of the mass. 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 2. There is mild mass effect upon the left lateral ventricle and 0.2 cm rightward shift of midline structures. 3. There is mild cupping of the left optic disc which can be seen in the setting of papilledema.      Medical History (per EMR)  PAOLA (obstructive sleep apnea)  Type 2 diabetes mellitus with hyperglycemia (A1c=6.4 on 3/11/2025)  Hyperlipidemia  ADAMA (generalized anxiety disorder)  Essential hypertension  Astrocytoma  Monocular visual disturbance  Denied a history of stroke, seizure, or head injury with loss of consciousness; reported 3-4 past concussions with post-concussive symptoms (i.e., headache) that resolved without hospitalization or intervention    Health Behaviors   Sleep: He reported sleeping 6-7 hours on average per night. He denied difficulties with sleep initiation or maintenance. He reported a history of obstructive sleep  apnea, and he uses his CPAP every night. He denied any dream enactment behaviors.  Energy: He described his energy as  pretty good.    Exercise: He stated that he does not exercise as much as he should. He walks his dog regularly. He noted that he owns a rowing machine but does not frequently use it.   Pain: He reported a longstanding history of occasional headaches. He denied other chronic pain.       Psychiatric History  He described a longstanding history of subclinical anxiety, and he reported heightened anxiety and irritability in the context of his newly diagnosed brain tumor. However, he stated that his anxiety does not interfere with his ability to function.   He denied a history of other significant mental health symptoms or other indications of psychological distress.   Suicidality/Self-harm: He denied any current or historical suicidal ideation, plan, or intent.   Psychiatric treatment: He is prescribed lorazepam by his primary care provider, but he rarely takes it (e.g., he has only used 3 of the 20 pills since the medication was prescribed in March of 2025). He previously met with a psychotherapist once per week from 2018 through 2020. He is not currently engaged in psychotherapy.     Substance Use History  Alcohol: Drinks 1 cocktail every other week; no alcohol use since his lesion was discovered  Nicotine: None  Cannabis: None  Illicit: None  Caffeine: Drinks 2-3 cans of soda and approximately 6 cups of caffeinated tea per day  Problematic Substance Use: Denied    Current Medications (per EMR)  atorvastatin (LIPITOR) 10 MG tablet  continuous glucose sensor  empagliflozin (JARDIANCE) 25 MG TABS  glipizide (GLUCOTROL XL) 5 MG 24 hr tablet  insulin aspart (NOVOLOG FLEXPEN) 100 UNIT/ML pen  insulin glargine U-300 (TOUJEO) 300 UNIT/ML (1 units dial) pen  lorazepam (ATIVAN) 0.5 MG tablet  losartan (COZAAR) 100 MG tablet  metformin (GLUCOPHAGE XR) 500 MG 24 hr tablet  ondansetron (ZOFRAN ODT) 4 MG ODT  tab    Developmental, Educational, & Occupational History  Gestational: Full-term   complications: None reported  Developmental milestones: Met at expected ages  Childhood behavioral / emotional / academic problems: Denied  Native Language: English  Education: He described himself as a good student. He was in the gifted program in school and skipped a year of math. He earned his SMITA at [employer].   Occupation: He works as a . Since his lesion was discovered, he has closed all of his active files and has not taken on any new clients. He plans to return to working as a , barring any complications with surgery and/or other treatments. However, he stated that his wife works as a gastroenterologist and that he would not need to work if he was unable.     Psychosocial History  Born in [place], [place] and raised in [place], [place]  Marital:  to wife since   Children: None  Housing: Lives with his wife and pug  Psychosocial support: Strong social support from wife, family, and friends     Family History (per patient report and EMR)  Kidney cancer (father)  Thyroid cancer (father)  Hypertension (father)  Diabetes (father)  Leukemia (brother)  Pancreatic cancer (maternal grandmother)  Lung cancer (maternal grandfather)  Stroke (paternal grandfather)  He reported that his maternal uncle had a tumor in the posterior part of his brain that was resected.     RESULTS  See separate abstract for list of neuropsychological measures and test scores. Descriptive ranges are based on American Academy of Clinical Neuropsychology (2020) consensus guidelines, or test manuals where appropriate.     PRE-MORBID ABILITY: Premorbid intellectual abilities were estimated within at least the high average range based on single word reading ability and demographic factors including sex, ethnicity, education, occupation, and geographic region.  ATTENTION/EXECUTIVE FUNCTIONS: Immediate auditory attention performance  was above average. Working memory performances were high average. Verbal abstract reasoning and logical deductive reasoning performances were above average. Performance on a speeded test of set-shifting/cognitive flexibility with psychomotor and visual scanning demands was average and without error. Verbal set-shifting/mental flexibility was average, with 1 repetition. His performance on a task of speeded verbal inhibition was low average for both speed and accuracy (4 total errors). When the task increased in complexity and required speed inhibition and cognitive flexibility, his performance was in the average range for both speed and accuracy (1 total error). Performance on a test of problem solving and response to examiner feedback was generally within expectation with regard to number of categories completed, though he made more total errors and perseverative errors and fewer conceptual-level responses compared to his peers (below average performances). He did not lose mental set on the problem-solving task.   PROCESSING SPEED: Speeded word reading and color identification were average. Psychomotor processing speed performances ranged from average to high average.   LANGUAGE: Confrontation naming performance was average and without error. Letter-cue verbal fluency performance was average, with 1 set-loss error. Semantic verbal fluency performance was above average, with 1 repetition. Narrative writing was within normal limits and without error. Sentence reading was generally intact, with 2 minor omissions. Comprehension of sentences was performed without error.  Performance was within normal limits and without error on brief tests of auditory comprehension, sequential commands, and repetition of single words, short phrases, and long sentences. Mr. Younger was administered non-word repetition (100% accuracy) and confrontation naming (100% accuracy) tasks on the [employer] platform for use during intraoperative  mapping and monitoring.   VISUOSPATIAL PROCESSING: Copy of simple figures was within normal limits and without error. Judgment of variably oriented lines was performed within expected limits and without error.    LEARNING AND MEMORY: Initial encoding of a word list over multiple learning trials was high average, with above average use of semantic clustering during learning. He learned all list words by the final learning trial. Free and cued recalls of the list after both short and long delays were above average with all the list words recalled. He made no intrusion errors on word list recall trials (above average performance). Recognition of the word list was performed in the high average range and was without error. Encoding of a sheet of simple figures and their spatial locations was high average, with a positive learning curve. Subsequent delayed recall of the visual information was high average, with 100% retention. Recognition of the figures was performed without error.   MOTOR: Speeded fine-motor dexterity was high average in his dominant (right) hand and average in his non-dominant (left) hand, and not clearly lateralized.   PSYCHOLOGICAL AND BEHAVIORAL: He endorsed minimal symptoms of anxiety and depression on self-report questionnaires.    PERFORMANCE VALIDITY: Performance on neuropsychological tests is dependent upon a number of factors, including sufficient engagement and motivation, to reliably establish an examinee's level of cognitive functioning. Based upon observations made throughout the evaluation, the patient did not appear to deliberately perform in a suboptimal manner and demonstrated good frustration tolerance on cognitively challenging tasks. His score on an embedded index of performance validity was in the valid range. Overall, test results are believed to accurately represent the patient's current neurocognitive status.    BEHAVIORAL OBSERVATIONS  Presented early to his appointment and was  unaccompanied  Alert, oriented to self, time, place, and circumstance; attentive and focused while undergoing testing  Appearance: Well-groomed, casually dressed  Gait/Posture: No abnormalities noted  Sensorimotor: No abnormalities noted; he brought prescription glasses with him but did not wear them during the testing session  Behavior: Cooperative, pleasant  Speech: Fluent, articulate, and normal prosody and volume, with no conversational word-finding difficulties. No paraphasic errors were noted.  Thought process: Logical, linear, and goal-directed   Thought content: Logical, appropriate   Affect: Broad, responsive, consistent with reported mood; good eye contact  Mood: Mildly anxious  Insight and Judgment: Intact  Approach to testing: Efficient, deliberate; good frustration tolerance on cognitively demanding tasks  Rapport: Easily established and maintained      Activities included in this evaluation: CPT Code #Units Time   Psychiatric diagnostic interview 10062 1 --   Test evaluation services by professional; first hour. [id number] 1 137   Test evaluation services by professional, additional hour (+) [id number] 1    Test administration and scoring by professional, first 30 mins [id number] 1 17   Test administration and scoring by technician, first 30 mins [id number] 1 181   Test administration and scoring by technician, additional 30 mins (+) 00152 5    C71.1    HARMEET DAVIDSON, PhD

## 2025-04-17 NOTE — LETTER
2025       RE: Jeffry Younger  [address] [facility] [address]     Dear Colleague,    Thank you for referring your patient, Jeffry Younger, to the [facility] NEUROPSYCHOLOGY [place] at [facility]. Please see a copy of my visit note below.    Pt was seen for neuropsychological evaluation at the request of Dr. Alon Mccarthy for the purposes of diagnostic clarification and treatment planning. 181 minutes of test administration and scoring were provided by this writer. Please see Dr. Dr. Zuleyka Monk's report for a full interpretation of the findings.    Barbi Lopez  Psychometrist       NEUROPSYCHOLOGICAL EVALUATION  **CONFIDENTIAL**    **This is a medical document intended for communication with the referring provider. It is written in medical language and may contain abbreviations or verbiage that are unfamiliar. This report and the results within are not intended to be interpreted in isolation without consultation with your medical provider. **    Name: Jeffry Younger (Jeff) Education: 19 years (SMITA)    (age): 1980 (45 years) DOHERTY:  2025   Referral: Alon Mccarthy MD  Department of Neurosurgery Handedness:  MRN (Epic): Right  [id number]     IDENTIFYING INFORMATION AND REASON FOR REFERRAL   Mr. Younger is a 45-year-old, right-handed, White man with a history of left posterolateral frontal lobe astrocytoma. This evaluation was requested to provide a comprehensive assessment of his current neuropsychological status to inform treatment planning.     IMPRESSION  See below for relevant background information and detailed test results. See separate abstract for supporting documentation including a list of neuropsychological measures and test scores.    Mr. Younger's neuropsychological profile revealed subtle weaknesses in aspects of executive functioning (i.e., speeded inhibition and novel problem-solving). However, other aspects of  executive functioning, including cognitive flexibility, were solidly average and cognitive strengths were noted on tests of verbal abstract reasoning and logical deductive reasoning. He also demonstrated effective use of executive functioning strategies when learning verbal information. Other cognitive strengths included immediate auditory attention, verbal memory, and semantic verbal fluency. Performances were within expectation across other cognitive tests including working memory, cognitive and psychomotor processing speed, language (i.e., naming, reading, letter-cue verbal fluency, writing, repetition, comprehension), visuospatial processing, visual learning/memory, and bilateral fine motor dexterity. His cognitive and motor performances were not clearly lateralized. Assessment of current psychological and emotional status revealed minimal depressive or anxiety symptoms.     Overall, Mr. Younger is functioning quite well from a cognitive standpoint. Subtle weaknesses on tests of speeded inhibition and novel problem-solving are consistent with his known left frontal lobe astrocytoma with mass effect and midline shift, suggesting the possibility of very subtle frontal lobe dysfunction impacting executive functioning networks. Mr. Younger was administered confrontation naming (100% accuracy) and non-word repetition (100% accuracy) tasks on the [employer] platform for use during intraoperative mapping and monitoring. There are no significant emotional, behavioral, or cognitive concerns about his ability to participate in awake language and motor mapping. He described normative anxiety related to undergoing surgical resection of his lesion, but he has healthy coping skills, remains optimistic about his recovery, and has a strong social support network, all of which arleen well for post-operative recovery.     RECOMMENDATION  The current evaluation will serve as an objective cognitive baseline against which results of future  evaluations may be compared. Mr. Younger may be referred for a follow-up neuropsychological evaluation postoperatively to objectively assess for neurocognitive change.     Thank you for the opportunity to participate in Mr. Younger's care. These results and recommendations were discussed with the patient in a separate feedback session on 4/21/2025 and all his questions were answered. If you have any questions regarding this evaluation, please do not hesitate to contact us.       Samantha Romo, Ph.D., LP  Neuropsychology Fellow       Zuleyka Monk, Ph.D., , [facility]  Board Certified Clinical Neuropsychologist    RELEVANT BACKGROUND INFORMATION AND SUPPORTING DOCUMENTATION  Gathered from a clinical interview with the patient and reviews of electronic medical records.    History of Presenting Problem(s)  Mr. Younger established care with Dr. Azeem Arteaga in Neuro-ophthalmology on 2/24/2025 for concerns of a gray blur that developed in the bottom of his left eye 2 days prior to his appointment. He also described a distortion in the left midline of his left eye that reportedly looked like a  funhouse mirror.  Dr. Arteaga expressed concerns for acute optic neuropathy in the left eye with optic disc edema, and an MRI of Mr. Younger's brain and orbits was ordered. An MRI of the patient's brain on 2/25/2025 was read as showing a lesion involving the left frontal lobe, suspicious for a primary central nervous system (CNS) neoplasm. Since then, he established care with Dr. Concha Jacinto in Neuro-oncology. 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 He is currently being worked up for potential tumor resection under awake conditions with Dr. Alon Mccarthy.    On interview, Mr. Younger reported that he awoke one morning in mid-February of 2025 with blurry and  grayed out  vision in his left eye when looking toward his midline. He also described a warping/distorted effect in his left eye when looking  away from his midline. 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 Mr. Younger is preparing for an awake craniotomy, and he has discussed the procedure with Dr. Mccarthy. He demonstrated a comprehensive understanding of the procedure and post-operative expectations. Mr. Younger indicated that he always feels a little nervous undergoing any kind of surgery, given his underlying medical conditions (e.g., hypertension). However, he also reported being intrigued by the idea of undergoing an awake craniotomy. He added that his oldest brother works as an RN in a neuro-recovery facility and has expressed some concerns about him undergoing neurosurgery, but he believes his brother has a skewed perspective on outcomes because of his brother's work setting. Mr. Younger noted that the remainder of his family is supportive of him pursuing surgery.    Regarding his cognition, he denied any noticeable changes beyond infrequent word-finding difficulties that he attributed to his age. He reported a longstanding weakness in remembering names of acquaintances. He denied any difficulties with memory, attention, paraphasic errors, reading, writing, planning, organizing, or multitasking. His family and friends have reportedly not noticed any cognitive changes. He is functionally independent with all activities of daily living and he denied any challenges completing his work as a .     In terms of physical concerns, he reported ongoing vision disturbances in his left eye. He denied any gait changes, balance problems, recent falls, numbness, tingling, or weakness.     Emotionally, he reported feeling  really stressed out.  He added that it is difficult to be aware of his tumor and not be actively treating it, though he acknowledged the need for a comprehensive work-up prior to  any surgical intervention. He described a longstanding history of subclinical anxiety, and he reported heightened anxiety and irritability in the context of his newly diagnosed brain tumor. When he is feeling anxious, he reported that it is helpful to find a quiet place in his home to relax. He reported that he enjoys playing video games with friends online, painting miniatures, fishing, and taking walks with his dog. He denied any history of depression or other mental health conditions. He also denied any personality or behavioral changes.     Neurodiagnostic Findings   Brain MR perfusion w/o and w contrast (02/26/2025): IMPRESSION: 1. Redemonstration of expansile enhancing mass of the left frontal lobe concerning for primary glial neoplasm. 2. Perfusion imaging demonstrates no elevated relative cerebral blood volume.  Brain and orbits MR w/o and w contrast (02/25/2025): IMPRESSION: 1. There is an expansile T2-weighted/FLAIR hyperintense lesion involving the left frontal lobe measuring approximately 3.6 x 4.0 x 4.1 cm. There is patchy amorphous enhancement along the medial and posterior margins of the mass. There is also additional T2-weighted FLAIR hyperintensity along the margins of the mass. 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 2. There is mild mass effect upon the left lateral ventricle and 0.2 cm rightward shift of midline structures. 3. There is mild cupping of the left optic disc which can be seen in the setting of papilledema.      Medical History (per EMR)  PAOLA (obstructive sleep apnea)  Type 2 diabetes mellitus with hyperglycemia (A1c=6.4 on 3/11/2025)  Hyperlipidemia  ADAMA (generalized anxiety disorder)  Essential hypertension  Astrocytoma  Monocular visual disturbance  Denied a history of stroke, seizure, or head injury with loss of consciousness; reported 3-4 past concussions with post-concussive symptoms (i.e., headache) that  resolved without hospitalization or intervention    Health Behaviors   Sleep: He reported sleeping 6-7 hours on average per night. He denied difficulties with sleep initiation or maintenance. He reported a history of obstructive sleep apnea, and he uses his CPAP every night. He denied any dream enactment behaviors.  Energy: He described his energy as  pretty good.    Exercise: He stated that he does not exercise as much as he should. He walks his dog regularly. He noted that he owns a rowing machine but does not frequently use it.   Pain: He reported a longstanding history of occasional headaches. He denied other chronic pain.       Psychiatric History  He described a longstanding history of subclinical anxiety, and he reported heightened anxiety and irritability in the context of his newly diagnosed brain tumor. However, he stated that his anxiety does not interfere with his ability to function.   He denied a history of other significant mental health symptoms or other indications of psychological distress.   Suicidality/Self-harm: He denied any current or historical suicidal ideation, plan, or intent.   Psychiatric treatment: He is prescribed lorazepam by his primary care provider, but he rarely takes it (e.g., he has only used 3 of the 20 pills since the medication was prescribed in March of 2025). He previously met with a psychotherapist once per week from 2018 through 2020. He is not currently engaged in psychotherapy.     Substance Use History  Alcohol: Drinks 1 cocktail every other week; no alcohol use since his lesion was discovered  Nicotine: None  Cannabis: None  Illicit: None  Caffeine: Drinks 2-3 cans of soda and approximately 6 cups of caffeinated tea per day  Problematic Substance Use: Denied    Current Medications (per EMR)  atorvastatin (LIPITOR) 10 MG tablet  continuous glucose sensor  empagliflozin (JARDIANCE) 25 MG TABS  glipizide (GLUCOTROL XL) 5 MG 24 hr tablet  insulin aspart (NOVOLOG FLEXPEN)  100 UNIT/ML pen  insulin glargine U-300 (TOUJEO) 300 UNIT/ML (1 units dial) pen  lorazepam (ATIVAN) 0.5 MG tablet  losartan (COZAAR) 100 MG tablet  metformin (GLUCOPHAGE XR) 500 MG 24 hr tablet  ondansetron (ZOFRAN ODT) 4 MG ODT tab    Developmental, Educational, & Occupational History  Gestational: Full-term   complications: None reported  Developmental milestones: Met at expected ages  Childhood behavioral / emotional / academic problems: Denied  Native Language: English  Education: He described himself as a good student. He was in the gifted program in school and skipped a year of math. He earned his SMITA at [employer].   Occupation: He works as a . Since his lesion was discovered, he has closed all of his active files and has not taken on any new clients. He plans to return to working as a , barring any complications with surgery and/or other treatments. However, he stated that his wife works as a gastroenterologist and that he would not need to work if he was unable.     Psychosocial History  Born in [place], [place] and raised in [place], [place]  Marital:  to wife since   Children: None  Housing: Lives with his wife and pug  Psychosocial support: Strong social support from wife, family, and friends     Family History (per patient report and EMR)  Kidney cancer (father)  Thyroid cancer (father)  Hypertension (father)  Diabetes (father)  Leukemia (brother)  Pancreatic cancer (maternal grandmother)  Lung cancer (maternal grandfather)  Stroke (paternal grandfather)  He reported that his maternal uncle had a tumor in the posterior part of his brain that was resected.     RESULTS  See separate abstract for list of neuropsychological measures and test scores. Descriptive ranges are based on American Academy of Clinical Neuropsychology (2020) consensus guidelines, or test manuals where appropriate.     PRE-MORBID ABILITY: Premorbid intellectual abilities were estimated within at least  the high average range based on single word reading ability and demographic factors including sex, ethnicity, education, occupation, and geographic region.  ATTENTION/EXECUTIVE FUNCTIONS: Immediate auditory attention performance was above average. Working memory performances were high average. Verbal abstract reasoning and logical deductive reasoning performances were above average. Performance on a speeded test of set-shifting/cognitive flexibility with psychomotor and visual scanning demands was average and without error. Verbal set-shifting/mental flexibility was average, with 1 repetition. His performance on a task of speeded verbal inhibition was low average for both speed and accuracy (4 total errors). When the task increased in complexity and required speed inhibition and cognitive flexibility, his performance was in the average range for both speed and accuracy (1 total error). Performance on a test of problem solving and response to examiner feedback was generally within expectation with regard to number of categories completed, though he made more total errors and perseverative errors and fewer conceptual-level responses compared to his peers (below average performances). He did not lose mental set on the problem-solving task.   PROCESSING SPEED: Speeded word reading and color identification were average. Psychomotor processing speed performances ranged from average to high average.   LANGUAGE: Confrontation naming performance was average and without error. Letter-cue verbal fluency performance was average, with 1 set-loss error. Semantic verbal fluency performance was above average, with 1 repetition. Narrative writing was within normal limits and without error. Sentence reading was generally intact, with 2 minor omissions. Comprehension of sentences was performed without error.  Performance was within normal limits and without error on brief tests of auditory comprehension, sequential commands, and  repetition of single words, short phrases, and long sentences. Mr. Younger was administered non-word repetition (100% accuracy) and confrontation naming (100% accuracy) tasks on the [employer] platform for use during intraoperative mapping and monitoring.   VISUOSPATIAL PROCESSING: Copy of simple figures was within normal limits and without error. Judgment of variably oriented lines was performed within expected limits and without error.    LEARNING AND MEMORY: Initial encoding of a word list over multiple learning trials was high average, with above average use of semantic clustering during learning. He learned all list words by the final learning trial. Free and cued recalls of the list after both short and long delays were above average with all the list words recalled. He made no intrusion errors on word list recall trials (above average performance). Recognition of the word list was performed in the high average range and was without error. Encoding of a sheet of simple figures and their spatial locations was high average, with a positive learning curve. Subsequent delayed recall of the visual information was high average, with 100% retention. Recognition of the figures was performed without error.   MOTOR: Speeded fine-motor dexterity was high average in his dominant (right) hand and average in his non-dominant (left) hand, and not clearly lateralized.   PSYCHOLOGICAL AND BEHAVIORAL: He endorsed minimal symptoms of anxiety and depression on self-report questionnaires.    PERFORMANCE VALIDITY: Performance on neuropsychological tests is dependent upon a number of factors, including sufficient engagement and motivation, to reliably establish an examinee's level of cognitive functioning. Based upon observations made throughout the evaluation, the patient did not appear to deliberately perform in a suboptimal manner and demonstrated good frustration tolerance on cognitively challenging tasks. His score on an embedded  index of performance validity was in the valid range. Overall, test results are believed to accurately represent the patient's current neurocognitive status.    BEHAVIORAL OBSERVATIONS  Presented early to his appointment and was unaccompanied  Alert, oriented to self, time, place, and circumstance; attentive and focused while undergoing testing  Appearance: Well-groomed, casually dressed  Gait/Posture: No abnormalities noted  Sensorimotor: No abnormalities noted; he brought prescription glasses with him but did not wear them during the testing session  Behavior: Cooperative, pleasant  Speech: Fluent, articulate, and normal prosody and volume, with no conversational word-finding difficulties. No paraphasic errors were noted.  Thought process: Logical, linear, and goal-directed   Thought content: Logical, appropriate   Affect: Broad, responsive, consistent with reported mood; good eye contact  Mood: Mildly anxious  Insight and Judgment: Intact  Approach to testing: Efficient, deliberate; good frustration tolerance on cognitively demanding tasks  Rapport: Easily established and maintained      Activities included in this evaluation: CPT Code #Units Time   Psychiatric diagnostic interview [id number] 1 --   Test evaluation services by professional; first hour. 21330 1 137   Test evaluation services by professional, additional hour (+) 07631 1    Test administration and scoring by professional, first 30 mins 95311 1 17   Test administration and scoring by technician, first 30 mins 16416 1 181   Test administration and scoring by technician, additional 30 mins (+) 03161 5    C71.1    Again, thank you for allowing me to participate in the care of your patient.      Sincerely,    HARMEET DAVIDSON, PhD

## 2025-04-18 ENCOUNTER — TELEPHONE (OUTPATIENT)
Dept: PEDIATRICS | Facility: CLINIC | Age: 45
End: 2025-04-18
Payer: COMMERCIAL

## 2025-04-18 NOTE — TELEPHONE ENCOUNTER
Prior Authorization Retail Medication Request    Medication/Dose: blood glucose (ACCU-CHEK GUIDE) test strip  Diagnosis and ICD code (if different than what is on RX):  E11.65  New/renewal/insurance change PA/secondary ins. PA:  Previously Tried and Failed:  NA  Rationale:  Patient needs to check blood sugars for diabetic medications     Insurance   Primary: [employer]  Insurance ID:  [id number]      Secondary (if applicable):[facility]   Insurance ID:    [id number]     Pharmacy Information (if different than what is on RX)  Name:  [facility]   Phone:  [phone]  Fax:[phone]    Clinic Information  Preferred routing pool for dept communication: [facility]

## 2025-04-18 NOTE — PROGRESS NOTES
Pt was seen for neuropsychological evaluation at the request of Dr. Alon Mccarthy for the purposes of diagnostic clarification and treatment planning. 181 minutes of test administration and scoring were provided by this writer. Please see Dr. Dr. Zuleyka Monk's report for a full interpretation of the findings.    Barbi Lopez  Psychometrist

## 2025-04-21 NOTE — PROGRESS NOTES
Provider: CE      Tech: RAJESH      Patient Name: Vladimir Younger     : [birth year]      MRN: [id number]     DOHERTY: 25      Age: 45      Education: 19      Ethnicity: W      Handedness: Right      Station: OP             NEUROPSYCHOLOGICAL TESTS RAW SCORE STANDARDIZED SCORE* PERCENTILE   CVLT-3       FC 16 --     ACS Test of Premorbid Functioning (TOPF)      Total Score 58 Actual SS= 115 84%     Predicted SS= 115 84%   Wechsler Adult Intelligence Scale-V (WAIS-V)      Similarities 33 ScS= 15 95%   Digits Forward 17 ScS= 14 91%   Digits Sequencing 15 ScS= 13 84%   Digits Backward 15 ScS= 13 84%   Coding 83 ScS= 12 75%   Set Relations 27 ScS= 14 91%   Trail Making Test (Time/Errors)       Part A 26/0 T= 53 62%   Part B 46/0 T= 53 62%   Chen-Chance Executive Function System (D-KEFS) Color-Word Interference Test   Color Naming 29 ScS= 10 50%   Word Reading 21 ScS= 11 63%   Inhibition 67 ScS= 7 16%   Inhibition Total Errors 4 ScS= 6 9%   Inhibition/Switching 64 ScS= 10 50%   Inhibition/Switching Total Errors 1 ScS= 11 63%   Wisconsin Card Sorting Test (WCST-128; E)      Categories Completed 6 --  >16%   Total Errors 39 T= 35 6%   Perseverative Errors 28 T= 31 3%   Failure To Maintain Set 0 --  >16%   % Conceptual Level Responses 57% T= 35 7%   Learning to Learn -1.85 --  >16%   [place] Verbal Learning Test - 3rd Edition (CVLT-3; Standard)     Trial 1 8 ScS= 13 84%   Trial 2 10 ScS= 11 63%   Trial 3 13 ScS= 12 75%   Trial 4 15 ScS= 14 91%   Trial 5 16 ScS= 15 95%   List B 6 ScS= 11 63%   Learning [place] 2.1 ScS= 13 84%   SD Free Recall 16 T= 68    SD Cued Recall 16 T= 68    LD Free Recall 16 T= 68    LD Cued Recall 16 T= 67    Recognition Hits 16 T= 55    False Positives 0 T= 58    d' 4 T= 62    Brief Visual Memory Test - Revised (BVMT-R, Form 1)      Trial 1 8 --     Trial 2 10 --     Trial 3 11 --     Trials 1-3 29 T= 57 76%   Learning 3 T= 46 34%   Delayed Recall 11 T= 58 79%   Percent Retained 100% --  >16%   Recognition  Hits 6 --  >16%   Recognition False Alarms 0 --  >16%   Discrimination Index 6 --  >16%   Copy Trial 12 --     Chen-Chance Executive Function System (D-KEFS) Verbal Fluency Test (Form: Standard)   Letter Fluency 39 ScS= 10 50%   Total Letter Fluency Repetition Errors 0 --     Total Letter Fluency Set Loss Errors 1 --     Category Fluency 50 ScS= 14 91%   Total Category Fluency Repetition Errors 1 --     Total Category Fluency Set Loss Errors 0 --     Category Switching: Total Correct 14 ScS= 10 50%   Category Switching: Total Switching 12 ScS= 10 50%   Neuropsychological Assessment Battery (NAB) Language Module (Form 1)    Naming 31 T= 52 58%   Phonemic Cue (Correct/Administered) 0/0 --     [place] Diagnostic Aphasia Examination (BDAE)      Narrative Writing WNL --     Sentence Reading 8/10 --     Sentence Comprehension 5/5 --     Western Aphasia Battery (WAB) Bedside       Y/N Comprehension 10/10 --     Sequential Commands 10/10 --     Repetition   10/10 --     Judgment of Line Orientation (FERNIE, Form H)       Total 30 --  86%   Grooved Pegboard Test       Dominant RH (Time/Drops) 56/0 T= 59 82%   Non-dominant LH (Time/Drops) 66/1 T= 51 54%   Joyce Depression Inventory - 2nd Edition (BDI-II)      Total Score 4      Joyce Anxiety Inventory (CA)       Total Score 6      Abstract       WNL = within normal limits. DC = discontinued due to patient s inability to complete the test. (E) = Utilized age and education-corrected norms.   *Standardized scores: T= T-score; mean = 50, standard deviation =10; Z= z-score; mean = 0, standard deviation = 1; ScS = scaled score; mean = 10, standard deviation = 3; MAS = Gould Older Adult Normative Study age adjusted scaled score; mean = 10, standard deviation = 3; SS = standard score; mean = 100, standard deviation = 15.

## 2025-04-21 NOTE — PROGRESS NOTES
NEUROPSYCHOLOGICAL EVALUATION  **CONFIDENTIAL**    **This is a medical document intended for communication with the referring provider. It is written in medical language and may contain abbreviations or verbiage that are unfamiliar. This report and the results within are not intended to be interpreted in isolation without consultation with your medical provider. **    Name: Jeffry Younger (Jeff) Education: 19 years (SMITA)    (age): 1980 (45 years) DOHERTY:  2025   Referral: Alon Mccarthy MD  Department of Neurosurgery Handedness:  MRN (Epic): Right  [id number]     IDENTIFYING INFORMATION AND REASON FOR REFERRAL   Mr. Younger is a 45-year-old, right-handed, White man with a history of left posterolateral frontal lobe astrocytoma. This evaluation was requested to provide a comprehensive assessment of his current neuropsychological status to inform treatment planning.     IMPRESSION  See below for relevant background information and detailed test results. See separate abstract for supporting documentation including a list of neuropsychological measures and test scores.    Mr. Younger's neuropsychological profile revealed subtle weaknesses in aspects of executive functioning (i.e., speeded inhibition and novel problem-solving). However, other aspects of executive functioning, including cognitive flexibility, were solidly average and cognitive strengths were noted on tests of verbal abstract reasoning and logical deductive reasoning. He also demonstrated effective use of executive functioning strategies when learning verbal information. Other cognitive strengths included immediate auditory attention, verbal memory, and semantic verbal fluency. Performances were within expectation across other cognitive tests including working memory, cognitive and psychomotor processing speed, language (i.e., naming, reading, letter-cue verbal fluency, writing, repetition, comprehension), visuospatial processing, visual learning/memory,  and bilateral fine motor dexterity. His cognitive and motor performances were not clearly lateralized. Assessment of current psychological and emotional status revealed minimal depressive or anxiety symptoms.     Overall, Mr. Younger is functioning quite well from a cognitive standpoint. Subtle weaknesses on tests of speeded inhibition and novel problem-solving are consistent with his known left frontal lobe astrocytoma with mass effect and midline shift, suggesting the possibility of very subtle frontal lobe dysfunction impacting executive functioning networks. Mr. Younger was administered confrontation naming (100% accuracy) and non-word repetition (100% accuracy) tasks on the [employer] platform for use during intraoperative mapping and monitoring. There are no significant emotional, behavioral, or cognitive concerns about his ability to participate in awake language and motor mapping. He described normative anxiety related to undergoing surgical resection of his lesion, but he has healthy coping skills, remains optimistic about his recovery, and has a strong social support network, all of which arleen well for post-operative recovery.     RECOMMENDATION  The current evaluation will serve as an objective cognitive baseline against which results of future evaluations may be compared. Mr. Younger may be referred for a follow-up neuropsychological evaluation postoperatively to objectively assess for neurocognitive change.     Thank you for the opportunity to participate in Mr. Younger's care. These results and recommendations were discussed with the patient in a separate feedback session on 4/21/2025 and all his questions were answered. If you have any questions regarding this evaluation, please do not hesitate to contact us.       Samantha Romo, Ph.D., LP  Neuropsychology Fellow       Zuleyka Monk, Ph.D., LP, ABPP  Board Certified Clinical Neuropsychologist    RELEVANT BACKGROUND INFORMATION AND SUPPORTING  DOCUMENTATION  Gathered from a clinical interview with the patient and reviews of electronic medical records.    History of Presenting Problem(s)  Mr. Younger established care with Dr. Azeem Arteaga in Neuro-ophthalmology on 2/24/2025 for concerns of a gray blur that developed in the bottom of his left eye 2 days prior to his appointment. He also described a distortion in the left midline of his left eye that reportedly looked like a  funhouse mirror.  Dr. Arteaga expressed concerns for acute optic neuropathy in the left eye with optic disc edema, and an MRI of Mr. Younger's brain and orbits was ordered. An MRI of the patient's brain on 2/25/2025 was read as showing a lesion involving the left frontal lobe, suspicious for a primary central nervous system (CNS) neoplasm. Since then, he established care with Dr. Concha Jacinto in Neuro-oncology. 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 He is currently being worked up for potential tumor resection under awake conditions with Dr. Alon Mccarthy.    On interview, Mr. Younger reported that he awoke one morning in mid-February of 2025 with blurry and  grayed out  vision in his left eye when looking toward his midline. He also described a warping/distorted effect in his left eye when looking away from his midline. 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 Mr. Younger is preparing for an awake craniotomy, and he has discussed the procedure with Dr. Mccarthy. He demonstrated a comprehensive understanding of the procedure and post-operative expectations. Mr. Younger indicated that he always feels a little nervous undergoing any kind of surgery, given his underlying medical conditions (e.g., hypertension). However, he also reported being intrigued by the idea of undergoing an awake  craniotomy. He added that his oldest brother works as an RN in a neuro-recovery facility and has expressed some concerns about him undergoing neurosurgery, but he believes his brother has a skewed perspective on outcomes because of his brother's work setting. Mr. Younger noted that the remainder of his family is supportive of him pursuing surgery.    Regarding his cognition, he denied any noticeable changes beyond infrequent word-finding difficulties that he attributed to his age. He reported a longstanding weakness in remembering names of acquaintances. He denied any difficulties with memory, attention, paraphasic errors, reading, writing, planning, organizing, or multitasking. His family and friends have reportedly not noticed any cognitive changes. He is functionally independent with all activities of daily living and he denied any challenges completing his work as a .     In terms of physical concerns, he reported ongoing vision disturbances in his left eye. He denied any gait changes, balance problems, recent falls, numbness, tingling, or weakness.     Emotionally, he reported feeling  really stressed out.  He added that it is difficult to be aware of his tumor and not be actively treating it, though he acknowledged the need for a comprehensive work-up prior to any surgical intervention. He described a longstanding history of subclinical anxiety, and he reported heightened anxiety and irritability in the context of his newly diagnosed brain tumor. When he is feeling anxious, he reported that it is helpful to find a quiet place in his home to relax. He reported that he enjoys playing video games with friends online, painting miniatures, fishing, and taking walks with his dog. He denied any history of depression or other mental health conditions. He also denied any personality or behavioral changes.     Neurodiagnostic Findings   Brain MR perfusion w/o and w contrast (02/26/2025): IMPRESSION: 1.  Redemonstration of expansile enhancing mass of the left frontal lobe concerning for primary glial neoplasm. 2. Perfusion imaging demonstrates no elevated relative cerebral blood volume.  Brain and orbits MR w/o and w contrast (02/25/2025): IMPRESSION: 1. There is an expansile T2-weighted/FLAIR hyperintense lesion involving the left frontal lobe measuring approximately 3.6 x 4.0 x 4.1 cm. There is patchy amorphous enhancement along the medial and posterior margins of the mass. There is also additional T2-weighted FLAIR hyperintensity along the margins of the mass. 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 2. There is mild mass effect upon the left lateral ventricle and 0.2 cm rightward shift of midline structures. 3. There is mild cupping of the left optic disc which can be seen in the setting of papilledema.      Medical History (per EMR)  PAOLA (obstructive sleep apnea)  Type 2 diabetes mellitus with hyperglycemia (A1c=6.4 on 3/11/2025)  Hyperlipidemia  ADAMA (generalized anxiety disorder)  Essential hypertension  Astrocytoma  Monocular visual disturbance  Denied a history of stroke, seizure, or head injury with loss of consciousness; reported 3-4 past concussions with post-concussive symptoms (i.e., headache) that resolved without hospitalization or intervention    Health Behaviors   Sleep: He reported sleeping 6-7 hours on average per night. He denied difficulties with sleep initiation or maintenance. He reported a history of obstructive sleep apnea, and he uses his CPAP every night. He denied any dream enactment behaviors.  Energy: He described his energy as  pretty good.    Exercise: He stated that he does not exercise as much as he should. He walks his dog regularly. He noted that he owns a rowing machine but does not frequently use it.   Pain: He reported a longstanding history of occasional headaches. He denied other chronic pain.       Psychiatric  History  He described a longstanding history of subclinical anxiety, and he reported heightened anxiety and irritability in the context of his newly diagnosed brain tumor. However, he stated that his anxiety does not interfere with his ability to function.   He denied a history of other significant mental health symptoms or other indications of psychological distress.   Suicidality/Self-harm: He denied any current or historical suicidal ideation, plan, or intent.   Psychiatric treatment: He is prescribed lorazepam by his primary care provider, but he rarely takes it (e.g., he has only used 3 of the 20 pills since the medication was prescribed in 2025). He previously met with a psychotherapist once per week from  through . He is not currently engaged in psychotherapy.     Substance Use History  Alcohol: Drinks 1 cocktail every other week; no alcohol use since his lesion was discovered  Nicotine: None  Cannabis: None  Illicit: None  Caffeine: Drinks 2-3 cans of soda and approximately 6 cups of caffeinated tea per day  Problematic Substance Use: Denied    Current Medications (per EMR)  atorvastatin (LIPITOR) 10 MG tablet  continuous glucose sensor  empagliflozin (JARDIANCE) 25 MG TABS  glipizide (GLUCOTROL XL) 5 MG 24 hr tablet  insulin aspart (NOVOLOG FLEXPEN) 100 UNIT/ML pen  insulin glargine U-300 (TOUJEO) 300 UNIT/ML (1 units dial) pen  lorazepam (ATIVAN) 0.5 MG tablet  losartan (COZAAR) 100 MG tablet  metformin (GLUCOPHAGE XR) 500 MG 24 hr tablet  ondansetron (ZOFRAN ODT) 4 MG ODT tab    Developmental, Educational, & Occupational History  Gestational: Full-term   complications: None reported  Developmental milestones: Met at expected ages  Childhood behavioral / emotional / academic problems: Denied  Native Language: English  Education: He described himself as a good student. He was in the gifted program in school and skipped a year of math. He earned his SMITA at [employer].    Occupation: He works as a . Since his lesion was discovered, he has closed all of his active files and has not taken on any new clients. He plans to return to working as a , barring any complications with surgery and/or other treatments. However, he stated that his wife works as a gastroenterologist and that he would not need to work if he was unable.     Psychosocial History  Born in [place], [place] and raised in [place], [place]  Marital:  to wife since 2009  Children: None  Housing: Lives with his wife and pug  Psychosocial support: Strong social support from wife, family, and friends     Family History (per patient report and EMR)  Kidney cancer (father)  Thyroid cancer (father)  Hypertension (father)  Diabetes (father)  Leukemia (brother)  Pancreatic cancer (maternal grandmother)  Lung cancer (maternal grandfather)  Stroke (paternal grandfather)  He reported that his maternal uncle had a tumor in the posterior part of his brain that was resected.     RESULTS  See separate abstract for list of neuropsychological measures and test scores. Descriptive ranges are based on American Academy of Clinical Neuropsychology (2020) consensus guidelines, or test manuals where appropriate.     PRE-MORBID ABILITY: Premorbid intellectual abilities were estimated within at least the high average range based on single word reading ability and demographic factors including sex, ethnicity, education, occupation, and geographic region.  ATTENTION/EXECUTIVE FUNCTIONS: Immediate auditory attention performance was above average. Working memory performances were high average. Verbal abstract reasoning and logical deductive reasoning performances were above average. Performance on a speeded test of set-shifting/cognitive flexibility with psychomotor and visual scanning demands was average and without error. Verbal set-shifting/mental flexibility was average, with 1 repetition. His performance on a task of speeded verbal  inhibition was low average for both speed and accuracy (4 total errors). When the task increased in complexity and required speed inhibition and cognitive flexibility, his performance was in the average range for both speed and accuracy (1 total error). Performance on a test of problem solving and response to examiner feedback was generally within expectation with regard to number of categories completed, though he made more total errors and perseverative errors and fewer conceptual-level responses compared to his peers (below average performances). He did not lose mental set on the problem-solving task.   PROCESSING SPEED: Speeded word reading and color identification were average. Psychomotor processing speed performances ranged from average to high average.   LANGUAGE: Confrontation naming performance was average and without error. Letter-cue verbal fluency performance was average, with 1 set-loss error. Semantic verbal fluency performance was above average, with 1 repetition. Narrative writing was within normal limits and without error. Sentence reading was generally intact, with 2 minor omissions. Comprehension of sentences was performed without error.  Performance was within normal limits and without error on brief tests of auditory comprehension, sequential commands, and repetition of single words, short phrases, and long sentences. Mr. Younger was administered non-word repetition (100% accuracy) and confrontation naming (100% accuracy) tasks on the [employer] platform for use during intraoperative mapping and monitoring.   VISUOSPATIAL PROCESSING: Copy of simple figures was within normal limits and without error. Judgment of variably oriented lines was performed within expected limits and without error.    LEARNING AND MEMORY: Initial encoding of a word list over multiple learning trials was high average, with above average use of semantic clustering during learning. He learned all list words by the final  learning trial. Free and cued recalls of the list after both short and long delays were above average with all the list words recalled. He made no intrusion errors on word list recall trials (above average performance). Recognition of the word list was performed in the high average range and was without error. Encoding of a sheet of simple figures and their spatial locations was high average, with a positive learning curve. Subsequent delayed recall of the visual information was high average, with 100% retention. Recognition of the figures was performed without error.   MOTOR: Speeded fine-motor dexterity was high average in his dominant (right) hand and average in his non-dominant (left) hand, and not clearly lateralized.   PSYCHOLOGICAL AND BEHAVIORAL: He endorsed minimal symptoms of anxiety and depression on self-report questionnaires.    PERFORMANCE VALIDITY: Performance on neuropsychological tests is dependent upon a number of factors, including sufficient engagement and motivation, to reliably establish an examinee's level of cognitive functioning. Based upon observations made throughout the evaluation, the patient did not appear to deliberately perform in a suboptimal manner and demonstrated good frustration tolerance on cognitively challenging tasks. His score on an embedded index of performance validity was in the valid range. Overall, test results are believed to accurately represent the patient's current neurocognitive status.    BEHAVIORAL OBSERVATIONS  Presented early to his appointment and was unaccompanied  Alert, oriented to self, time, place, and circumstance; attentive and focused while undergoing testing  Appearance: Well-groomed, casually dressed  Gait/Posture: No abnormalities noted  Sensorimotor: No abnormalities noted; he brought prescription glasses with him but did not wear them during the testing session  Behavior: Cooperative, pleasant  Speech: Fluent, articulate, and normal prosody and  volume, with no conversational word-finding difficulties. No paraphasic errors were noted.  Thought process: Logical, linear, and goal-directed   Thought content: Logical, appropriate   Affect: Broad, responsive, consistent with reported mood; good eye contact  Mood: Mildly anxious  Insight and Judgment: Intact  Approach to testing: Efficient, deliberate; good frustration tolerance on cognitively demanding tasks  Rapport: Easily established and maintained      Activities included in this evaluation: CPT Code #Units Time   Psychiatric diagnostic interview [id number] --   Test evaluation services by professional; first hour. [id number]   Test evaluation services by professional, additional hour (+) [id number]    Test administration and scoring by professional, first 30 mins [id number] 1 17   Test administration and scoring by technician, first 30 mins [id number] [phone]   Test administration and scoring by technician, additional 30 mins (+) [id number]    C71.1

## 2025-04-22 ENCOUNTER — OFFICE VISIT (OUTPATIENT)
Dept: NEUROSURGERY | Facility: CLINIC | Age: 45
End: 2025-04-22
Attending: STUDENT IN AN ORGANIZED HEALTH CARE EDUCATION/TRAINING PROGRAM
Payer: COMMERCIAL

## 2025-04-22 VITALS — HEART RATE: 88 BPM | DIASTOLIC BLOOD PRESSURE: 93 MMHG | SYSTOLIC BLOOD PRESSURE: 143 MMHG | OXYGEN SATURATION: 96 %

## 2025-04-22 DIAGNOSIS — C71.1 OLIGODENDROGLIOMA OF FRONTAL LOBE (H): Primary | ICD-10-CM

## 2025-04-22 PROCEDURE — 3077F SYST BP >= 140 MM HG: CPT | Performed by: NEUROLOGICAL SURGERY

## 2025-04-22 PROCEDURE — 3080F DIAST BP >= 90 MM HG: CPT | Performed by: NEUROLOGICAL SURGERY

## 2025-04-22 PROCEDURE — 1126F AMNT PAIN NOTED NONE PRSNT: CPT | Performed by: NEUROLOGICAL SURGERY

## 2025-04-22 PROCEDURE — 99024 POSTOP FOLLOW-UP VISIT: CPT | Performed by: NEUROLOGICAL SURGERY

## 2025-04-22 PROCEDURE — 99213 OFFICE O/P EST LOW 20 MIN: CPT | Performed by: NEUROLOGICAL SURGERY

## 2025-04-22 ASSESSMENT — PAIN SCALES - GENERAL: PAINLEVEL_OUTOF10: NO PAIN (0)

## 2025-04-22 NOTE — PROGRESS NOTES
"It was a pleasure to see Jeffry Younger today in Neurosurgery Clinic. He is a 45 year old male with an oligodendroglioma that was biopsied by Dr. Carver earlier this year.  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    Vitals:    04/22/25 0855   BP: (!) 143/93   BP Location: Right arm   Patient Position: Sitting   Cuff Size: Adult Large   Pulse: 88   SpO2: 96%     Estimated body mass index is 49.56 kg/m  as calculated from the following:    Height as of 3/21/25: 1.88 m (6' 2\").    Weight as of 4/14/25: 175.1 kg (386 lb).  No Pain (0)    Neurologically intact.    Imaging: Review of his MRI from February demonstrates the mass in the left frontal lobe this may involve the facial area of the precentral gyrus and certainly seems to impinge on the left lateral frontal gyrus as well.    He has a functional MRI test set up for early in May.    Assessment: Oligodendroglioma, WHO grade 2    Plan: We discussed awake craniotomy with speech and motor mapping as the neck step for this.  We discussed the typical course of surgery and the risks and alternatives.  He and his family have a trip planned to [place] in early June and I think it would be okay for him to go on his trip.  I would be happy to provide a emergency supply of Decadron and Keppra for him to take on the trip.  We will work on coordinating surgery with Dr. Monk of neuropsychology and it looks like July 9 would be the best possible date.     "

## 2025-04-22 NOTE — NURSING NOTE
PRE-SURGERY EDUCATION  Reviewed pre- and post-operative instructions as outlined in the After Visit Summary/Patient Instructions with patient.   Surgery folder was given to patient    Patient Education Topic: Procedure with Dr. Mccarthy   Learner(s): Patient and Significant other/Spouse   Knowledge Level: Basic  Readiness to Learn: Ready  Method:  Verbal explanation and Written material   Outcome: Able to verbalize instructions  Barriers to Learning: No barrier      STD/FMLA: No, self employed  Job Description: Not applicable   Time Off: Not applicable      Patient had the opportunity for questions to be answered. Advised Patient and Significant other/Spouse  to call clinic with any questions/concerns. Gave patient antibacterial soap for pre-surgery skin preparation.       BRAIN PRE-SURGICAL CHECKLIST   Intervention/Diagnostic Meets Criteria Details   Nicotine Status  Never   Currently using: Cigarettes and None Yes Non-fusion: no action needed     Imaging  MRI or CT completed within 6 months Yes Records verified and located Internal   Chronic Pain or Pain contract  No Yes N/A

## 2025-04-22 NOTE — LETTER
"4/22/2025      Jeffry Younger  [address] [facility] [address]      Dear Colleague,    Thank you for referring your patient, Jeffry Younger, to the [facility] [place]. Please see a copy of my visit note below.    It was a pleasure to see Jeffry Younger today in Neurosurgery Clinic. He is a 45 year old male with an oligodendroglioma that was biopsied by Dr. Carver earlier this year.  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    Vitals:    04/22/25 0855   BP: (!) 143/93   BP Location: Right arm   Patient Position: Sitting   Cuff Size: Adult Large   Pulse: 88   SpO2: 96%     Estimated body mass index is 49.56 kg/m  as calculated from the following:    Height as of 3/21/25: 1.88 m (6' 2\").    Weight as of 4/14/25: 175.1 kg (386 lb).  No Pain (0)    Neurologically intact.    Imaging: Review of his MRI from February demonstrates the mass in the left frontal lobe this may involve the facial area of the precentral gyrus and certainly seems to impinge on the left lateral frontal gyrus as well.    He has a functional MRI test set up for early in May.    Assessment: Oligodendroglioma, WHO grade 2    Plan: We discussed awake craniotomy with speech and motor mapping as the neck step for this.  We discussed the typical course of surgery and the risks and alternatives.  He and his family have a trip planned to [place] in early June and I think it would be okay for him to go on his trip.  I would be happy to provide a emergency supply of Decadron and Keppra for him to take on the trip.  We will work on coordinating surgery with Dr. Monk of neuropsychology and it looks like July 9 would be the best possible date.       Again, thank you for allowing me to participate in the care of your patient.        Sincerely,        Alon Mccarthy MD    Electronically signed  "

## 2025-04-22 NOTE — NURSING NOTE
"Jeffry Younger is a 45 year old male who presents for:  Chief Complaint   Patient presents with    RECHECK        Initial Vitals:  BP (!) 143/93 (BP Location: Right arm, Patient Position: Sitting, Cuff Size: Adult Large)   Pulse 88   SpO2 96%  Estimated body mass index is 49.56 kg/m  as calculated from the following:    Height as of 3/21/25: 6' 2\" (1.88 m).    Weight as of 4/14/25: 386 lb (175.1 kg). BP completed using cuff size: regular  No Pain (0)    Chema Hooker    "

## 2025-04-22 NOTE — PATIENT INSTRUCTIONS
Patient Instructions    Surgery scheduled at  [facility] for Craniotomy with Alon Mccarthy MD     Pre-Operative   You will need a Stereotactic MRI 1-3 days before surgery. The Surgery Scheduler will help coordinate this appointment.      Surgical risks: blood clots in the leg or lung, problems urinating, nerve damage, drainage from the incision, infection,stiffness  Pre-operative physical with primary care physician within 30 days of surgical date.   You will spend 2-3 days in hospital   Shower  You will need to shower the night before and morning of surgery using the antimicrobial soap given to you  You can wash your hair using a gently shampoo such as Top[employer]  Eating/Drinking  Stop all solid foods 8 hours before surgery.  Keep drinking clear liquids until 4 hours before surgery. Clear liquids include water, clear juice, black coffee, or clear tea without milk, Gatorade, clear soda  Medications  Discontinue Aspirin, NSAIDs (Advil/Ibuprofen, Naproxen, Nuprin, Relafen/Nabumetone, Diclofenac, Meloxicam, Aleve, Celebrex) x 7 days prior to surgical date.  May try tylenol for pain 1000 mg three times per day for pain  If you are on chronic pain medication (oxycodone, Percocet, hydrocodone, Vicodin, Norco, Dilaudid, morphine, MS Contin, naltrexone, Suboxone, etc) or have a pain contract we will reach out to your pain clinic to gather your most recent records. Continue obtaining your pain medications from your current provider until surgery. Our team will manage your acute post-op pain in the hospital and during the recovery period. Your pain team will continue to manage your chronic pain. Please contact your provider who manages your pain medications to inform them of your upcoming surgery.      Post-Operative  Incision Care  You may get your incision wet in the shower. Use a gentle shampoo with no fragrance, such as baby shampoo, until incision is completely healed. Allow soap and water to run  over the incision, and gently pat it dry.   Do not soak in a bath, hot tub, or pool until the incision is completely healed (4-6 weeks after surgery)  Avoid coloring your hair or permanent styling until cleared by your surgeon  Pain Management  Dealing with pain  As your body heals, you might feel a stabbing, burning, or aching pain. You may also have some numbness.  Everyone feels pain differently, we may ask you to rate your pain using a pain scale. This will let us know how much pain you feel.   Keep in mind that medicine won't take away all of your pain. It helps to try other ways to relax and ease pain.   Things to help with pain  After surgery, we will give you medicine for your pain. These medications work well, but they can make you drowsy, itchy, or sick to your stomach. If we give you narcotics for pain, try to take the pills with food.   For mild to moderate pain, you can take medication such as Tylenol. These can be used with narcotics, but make sure that your narcotic does not contain Tylenol.   Do NOT drive while taking narcotic pain medication  Do NOT drink alcohol while using any pain medication  You can utilize ice as needed (no longer than 20 minutes at one time)  Refills: please call a few days before you run out.  Activity/Restrictions  We encourage short frequent walks, increasing as tolerated  No driving until you are seen in clinic and cleared by your neurosurgeon or while on narcotics.  If you have had a seizure, you may not drive for at least 3 months according to [place] law.  No strenuous activity  No lifting more than 10 pounds until you are seen in clinic (a gallon of milk weighs approximately 8 pounds)   Results/Steroids  If you have not received the results of the pathology (diagnosis) at the time of discharge, our office will call you with these results as soon as they become available, or we will discuss them with you during your clinic visit, whichever is first.   If you are on a  steroid medication (decadron or dexamethasone) please follow the detailed instructions with the prescription to slowly decrease the amount you are taking.     Post-Op Appointments  You will need to schedule an appointment with one of our nurses for suture/staple removal at one of our clinic locations 2 weeks after your surgery. If you live far away, you may see your primary care doctor for a wound check at 2 weeks.   Follow up in clinic at 6 weeks and again at 12 weeks (3 months).   Please call our clinic at [phone] to schedule an appointment with the Neurosurgery teams.     Resources    Go to the nearest Emergency Room for Evaluation if you develop:  Acute changes in the level of consciousness (increased confusion, memory loss, speech abnormalities)  Any change in hearing or vision  Increased headaches  New onset of seizures.  Please call if you have:  Increased pain, redness, drainage, swelling at your incision  Fevers > 101.5 F degrees  Please call the clinic at [phone] and ask for the NURSE LINE    If you are currently employed, you will need to be off work for 4 weeks for recovery and healing. If you are having a brain biopsy, you will likely be able to return to work sooner.  Please fax any FMLA/short term disability paperwork to [phone] prior to surgery  You may call our clinic when you'd like to return to work and we can provide a work letter  To allow staff adequate time to complete paperwork, please send as soon as possible       [facility]  Phone: [phone]   Fax: [phone]

## 2025-04-23 NOTE — TELEPHONE ENCOUNTER
Retail Pharmacy Prior Authorization Team   Phone: [phone]    Prior Authorization Not Needed per Insurance    Medication: ACCU-CHEK GUIDE TEST VI STRP  Insurance Company: [employer] - Phone [phone] Fax [phone]  Expected CoPay: $    Pharmacy Filling the Rx: [facility] - [facility]OR[facility] - [address] AT [facility] Notified: YES  Patient Notified: YES - faxed letter to pharmacy and pharmacy will deliver to patient when ready

## 2025-04-24 ENCOUNTER — MYC MEDICAL ADVICE (OUTPATIENT)
Dept: PEDIATRICS | Facility: CLINIC | Age: 45
End: 2025-04-24
Payer: COMMERCIAL

## 2025-05-08 ENCOUNTER — TELEPHONE (OUTPATIENT)
Dept: ENDOCRINOLOGY | Facility: CLINIC | Age: 45
End: 2025-05-08
Payer: COMMERCIAL

## 2025-05-08 NOTE — TELEPHONE ENCOUNTER
Per mauro communication below, patient has stopped taking Ozempic      If patient and provider wish to reinitiate therapy we can submit a new PA at that time    Alyse Cordero [facility]  Endo Clinic Liaison  [facility]  [email or web address]   [email or web address]  Phone: [phone]  Fax: [phone]

## 2025-05-09 ENCOUNTER — MYC MEDICAL ADVICE (OUTPATIENT)
Dept: NEUROSURGERY | Facility: CLINIC | Age: 45
End: 2025-05-09
Payer: COMMERCIAL

## 2025-05-09 DIAGNOSIS — G93.89 LEFT FRONTAL LOBE MASS: Primary | ICD-10-CM

## 2025-05-13 ENCOUNTER — LAB (OUTPATIENT)
Dept: LAB | Facility: CLINIC | Age: 45
End: 2025-05-13
Payer: COMMERCIAL

## 2025-05-13 DIAGNOSIS — E11.65 TYPE 2 DIABETES MELLITUS WITH HYPERGLYCEMIA, WITHOUT LONG-TERM CURRENT USE OF INSULIN (H): ICD-10-CM

## 2025-05-13 DIAGNOSIS — I10 ESSENTIAL HYPERTENSION: ICD-10-CM

## 2025-05-13 LAB
ANION GAP SERPL CALCULATED.3IONS-SCNC: 10 MMOL/L (ref 7–15)
BUN SERPL-MCNC: 14.7 MG/DL (ref 6–20)
CALCIUM SERPL-MCNC: 9.4 MG/DL (ref 8.8–10.4)
CHLORIDE SERPL-SCNC: 105 MMOL/L (ref 98–107)
CREAT SERPL-MCNC: 0.99 MG/DL (ref 0.67–1.17)
EGFRCR SERPLBLD CKD-EPI 2021: >90 ML/MIN/1.73M2
EST. AVERAGE GLUCOSE BLD GHB EST-MCNC: 180 MG/DL
GLUCOSE SERPL-MCNC: 139 MG/DL (ref 70–99)
HBA1C MFR BLD: 7.9 % (ref 0–5.6)
HCO3 SERPL-SCNC: 23 MMOL/L (ref 22–29)
POTASSIUM SERPL-SCNC: 4.6 MMOL/L (ref 3.4–5.3)
SODIUM SERPL-SCNC: 138 MMOL/L (ref 135–145)

## 2025-05-13 PROCEDURE — 82043 UR ALBUMIN QUANTITATIVE: CPT

## 2025-05-13 PROCEDURE — 83036 HEMOGLOBIN GLYCOSYLATED A1C: CPT

## 2025-05-13 PROCEDURE — 36415 COLL VENOUS BLD VENIPUNCTURE: CPT

## 2025-05-13 PROCEDURE — 82570 ASSAY OF URINE CREATININE: CPT

## 2025-05-13 PROCEDURE — 80048 BASIC METABOLIC PNL TOTAL CA: CPT

## 2025-05-13 NOTE — TELEPHONE ENCOUNTER
I talked to my colleague at the . The bore size of the magnet that they use for the study and what they use at the  is the same. If he didn't fit for the study, he is not likely to fit at the  either. It is not an absolute requirement for the surgery we are going to do.

## 2025-05-13 NOTE — PROGRESS NOTES
Met with patient after clinic visit/consultation appt with Dr Jacinto.    We reviewed Dr Jacinto's plan of care:      Introduced self and role of RN Care Coordinator at [facility]. Provided my contact information, [facility] phone number (which has options to talk with a Nurse available 24/7 - triage and RNCC via this option during business hours).     Reviewed appropriate use of MyChart, not to be used for symptom reporting.      Reviewed Auth to Discuss PHI form. Patient completed form.        Patient will meet with Neurosurgery to discuss surgical options.    Jessica Capps, RN, BSN  Oncology/Neurosurgery Care Coordinator  [facility]

## 2025-05-14 ENCOUNTER — RESULTS FOLLOW-UP (OUTPATIENT)
Dept: PHARMACY | Facility: CLINIC | Age: 45
End: 2025-05-14

## 2025-05-14 ENCOUNTER — MYC MEDICAL ADVICE (OUTPATIENT)
Dept: NEUROSURGERY | Facility: CLINIC | Age: 45
End: 2025-05-14
Payer: COMMERCIAL

## 2025-05-14 DIAGNOSIS — C71.1 MALIGNANT NEOPLASM OF FRONTAL LOBE OF BRAIN (H): Primary | ICD-10-CM

## 2025-05-14 LAB
CREAT UR-MCNC: 163.2 MG/DL
MICROALBUMIN UR-MCNC: 14.3 MG/L
MICROALBUMIN/CREAT UR: 8.76 MG/G CR (ref 0–17)

## 2025-05-14 NOTE — TELEPHONE ENCOUNTER
As per Dr Mccarthy, Functional MRI not needed for surgery.  Order placed for MRI Brain with and without to do now. Patient updated.

## 2025-05-14 NOTE — TELEPHONE ENCOUNTER
Ahh, got it. Lets just order a MRI brain with and without for now. He can then get a stealth MRI closer to surgery.    Steven

## 2025-05-15 RX ORDER — LEVETIRACETAM 750 MG/1
750 TABLET ORAL 2 TIMES DAILY PRN
Qty: 60 TABLET | Refills: 0 | Status: SHIPPED | OUTPATIENT
Start: 2025-05-15

## 2025-05-15 RX ORDER — DEXAMETHASONE 4 MG/1
4 TABLET ORAL 2 TIMES DAILY PRN
Qty: 30 TABLET | Refills: 0 | Status: SHIPPED | OUTPATIENT
Start: 2025-05-15

## 2025-05-21 ENCOUNTER — HOSPITAL ENCOUNTER (OUTPATIENT)
Dept: MRI IMAGING | Facility: HOSPITAL | Age: 45
Discharge: HOME | End: 2025-05-21
Attending: NEUROLOGICAL SURGERY
Payer: COMMERCIAL

## 2025-05-21 ENCOUNTER — MYC MEDICAL ADVICE (OUTPATIENT)
Dept: PEDIATRICS | Facility: CLINIC | Age: 45
End: 2025-05-21

## 2025-05-21 DIAGNOSIS — F41.9 ANXIETY: ICD-10-CM

## 2025-05-21 DIAGNOSIS — G93.89 LEFT FRONTAL LOBE MASS: ICD-10-CM

## 2025-05-21 PROCEDURE — A9585 GADOBUTROL INJECTION: HCPCS | Performed by: NEUROLOGICAL SURGERY

## 2025-05-21 PROCEDURE — 70553 MRI BRAIN STEM W/O & W/DYE: CPT

## 2025-05-21 PROCEDURE — 255N000002 HC RX 255 OP 636: Performed by: NEUROLOGICAL SURGERY

## 2025-05-21 RX ORDER — GADOBUTROL 604.72 MG/ML
0.1 INJECTION INTRAVENOUS ONCE
Status: COMPLETED | OUTPATIENT
Start: 2025-05-21 | End: 2025-05-21

## 2025-05-21 RX ADMIN — GADOBUTROL 17.51 ML: 604.72 INJECTION INTRAVENOUS at 09:17

## 2025-05-22 ENCOUNTER — OFFICE VISIT (OUTPATIENT)
Dept: OPHTHALMOLOGY | Facility: CLINIC | Age: 45
End: 2025-05-22
Attending: OPHTHALMOLOGY
Payer: COMMERCIAL

## 2025-05-22 DIAGNOSIS — H46.9 OPTIC NEUROPATHY: Primary | ICD-10-CM

## 2025-05-22 PROCEDURE — 92133 CPTRZD OPH DX IMG PST SGM ON: CPT | Performed by: OPHTHALMOLOGY

## 2025-05-22 PROCEDURE — 99214 OFFICE O/P EST MOD 30 MIN: CPT | Performed by: OPHTHALMOLOGY

## 2025-05-22 PROCEDURE — 92083 EXTENDED VISUAL FIELD XM: CPT | Performed by: OPHTHALMOLOGY

## 2025-05-22 RX ORDER — LORAZEPAM 0.5 MG/1
0.5 TABLET ORAL EVERY 6 HOURS PRN
Qty: 20 TABLET | Refills: 0 | Status: SHIPPED | OUTPATIENT
Start: 2025-05-22

## 2025-05-22 ASSESSMENT — CONF VISUAL FIELD
METHOD: COUNTING FINGERS
OS_SUPERIOR_TEMPORAL_RESTRICTION: 0
OS_SUPERIOR_NASAL_RESTRICTION: 0
OD_SUPERIOR_TEMPORAL_RESTRICTION: 0
OD_INFERIOR_NASAL_RESTRICTION: 0
OS_NORMAL: 1
OD_INFERIOR_TEMPORAL_RESTRICTION: 0
OS_INFERIOR_NASAL_RESTRICTION: 0
OS_INFERIOR_TEMPORAL_RESTRICTION: 0
OD_SUPERIOR_NASAL_RESTRICTION: 0
OD_NORMAL: 1

## 2025-05-22 ASSESSMENT — TONOMETRY
OS_IOP_MMHG: 21
OD_IOP_MMHG: 20
IOP_METHOD: ICARE

## 2025-05-22 ASSESSMENT — VISUAL ACUITY
CORRECTION_TYPE: GLASSES
METHOD: SNELLEN - LINEAR
OS_CC+: -2
OD_CC: 20/20
OS_CC: 20/20

## 2025-05-22 ASSESSMENT — REFRACTION_WEARINGRX
OD_AXIS: 084
OD_SPHERE: -1.50
OD_CYLINDER: +0.50
OS_CYLINDER: +0.50
OS_AXIS: 055
OS_SPHERE: -1.00

## 2025-05-22 ASSESSMENT — CUP TO DISC RATIO
OS_RATIO: 0.3
OD_RATIO: 0.3

## 2025-05-22 ASSESSMENT — SLIT LAMP EXAM - LIDS
COMMENTS: NORMAL
COMMENTS: NORMAL

## 2025-05-22 NOTE — TELEPHONE ENCOUNTER
Patient requesting a refill of Lorazepam. Pended. Please review.  Thanks!  Katerina TOLEDO RN, BSN  Clinic RN  [facility]

## 2025-05-22 NOTE — NURSING NOTE
Chief Complaint(s) and History of Present Illness(es)       Follow Up    Since onset it is stable.  Associated symptoms include headache.  Negative for eye pain, photophobia, flashes and floaters.  Pain was noted as 0/10. Additional comments: 2 month follow-up for optic neuropathy.  He thinks the blind spot over the left eye has somewhat improved.  There is improvement in his photophobia.  No eye pain but gets mild headaches.     JAIME Chacko 5/22/2025 11:58 AM

## 2025-05-22 NOTE — PROGRESS NOTES
Jeffry Younger is a 45 year old male with the following diagnoses:   1. 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  Last visit was 3/20/25.  At that time, his optic disc edema had improved. An incidental glioma was observed.  He had an MRI yesterday.  He is planning on surgery in July. Today, patient reports that his vision is the same.      Exam:  Visual acuity 20/20 right eye 20/20 left eye.  Color vision is 11/11 both eyes.  Pupils: afferent pupillary defect LEFT eye.  Intraocular pressure 20 right eye and 21 left eye.  Anterior segment exam was unremarkable.  Fundus exam showed no optic disc edema.     Tests ordered and interpreted today:    HVF 24-2 JOSE MANUEL FAST OU          Performed by: GEORGE   . Patient cooperation: Reliable   .   Patient fixated and completed testing with no issues Good fix. Dilated after vf. Fixation good both eyes Not dilated.     Notes  Normal RIGHT eye same   Inferior altitudinal LEFT eye same         OCT Optic Nerve RNFL Spectralis OU (both eyes)          Performed by: rp   .     Right Eye  Reliability of the test: Good   . Test Findings: Normal   . Interpretation: Normal   . Plan: Monitor   . Interval: Same   .     Left Eye  Reliability of the test: Good   . Test Findings: Normal   . Interpretation: Normal   . Plan: Monitor   . Interval: Better   .              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  His optic disc edema has resolved.  His visual field is stable.  I am always happy to see Jeffry back for new concerns but I did not make a follow up appointment for him today.           Attending Physician Attestation:  Complete documentation of historical and exam elements from today's encounter can be found in the full encounter summary report (not reduplicated in this progress note).  I personally obtained the chief complaint(s) and  history of present illness.  I confirmed and edited as necessary the review of systems, past medical/surgical history, family history, social history, and examination findings as documented by others; and I examined the patient myself.  I personally reviewed the relevant tests, images, and reports as documented above.  I formulated and edited as necessary the assessment and plan and discussed the findings and management plan with the patient and family. - Azeem Arteaga MD

## 2025-05-23 ENCOUNTER — RESULTS FOLLOW-UP (OUTPATIENT)
Dept: NEUROLOGY | Facility: CLINIC | Age: 45
End: 2025-05-23

## 2025-05-26 ENCOUNTER — APPOINTMENT (OUTPATIENT)
Dept: CT IMAGING | Facility: CLINIC | Age: 45
End: 2025-05-26
Attending: INTERNAL MEDICINE
Payer: COMMERCIAL

## 2025-05-26 ENCOUNTER — HOSPITAL ENCOUNTER (EMERGENCY)
Facility: CLINIC | Age: 45
Discharge: HOME | End: 2025-05-26
Attending: INTERNAL MEDICINE | Admitting: INTERNAL MEDICINE
Payer: COMMERCIAL

## 2025-05-26 VITALS
DIASTOLIC BLOOD PRESSURE: 116 MMHG | BODY MASS INDEX: 40.43 KG/M2 | RESPIRATION RATE: 18 BRPM | OXYGEN SATURATION: 96 % | TEMPERATURE: 97.7 F | HEIGHT: 74 IN | WEIGHT: 315 LBS | HEART RATE: 96 BPM | SYSTOLIC BLOOD PRESSURE: 178 MMHG

## 2025-05-26 DIAGNOSIS — C71.9 OLIGODENDROGLIOMA (H): ICD-10-CM

## 2025-05-26 DIAGNOSIS — R56.9 NEW ONSET SEIZURE (H): ICD-10-CM

## 2025-05-26 DIAGNOSIS — C71.9 ASTROCYTOMA (H): Primary | ICD-10-CM

## 2025-05-26 LAB
ALBUMIN SERPL BCG-MCNC: 4.1 G/DL (ref 3.5–5.2)
ALBUMIN UR-MCNC: NEGATIVE MG/DL
ALP SERPL-CCNC: 112 U/L (ref 40–150)
ALT SERPL W P-5'-P-CCNC: 24 U/L (ref 0–70)
ANION GAP SERPL CALCULATED.3IONS-SCNC: 22 MMOL/L (ref 7–15)
APPEARANCE UR: CLEAR
AST SERPL W P-5'-P-CCNC: 26 U/L (ref 0–45)
BASE EXCESS BLDV CALC-SCNC: -13 MMOL/L (ref -3–3)
BASOPHILS # BLD AUTO: 0.1 10E3/UL (ref 0–0.2)
BASOPHILS NFR BLD AUTO: 1 %
BILIRUB SERPL-MCNC: 0.7 MG/DL
BILIRUB UR QL STRIP: NEGATIVE
BUN SERPL-MCNC: 16 MG/DL (ref 6–20)
CALCIUM SERPL-MCNC: 9.2 MG/DL (ref 8.8–10.4)
CHLORIDE SERPL-SCNC: 106 MMOL/L (ref 98–107)
COLOR UR AUTO: ABNORMAL
CREAT SERPL-MCNC: 0.95 MG/DL (ref 0.67–1.17)
EGFRCR SERPLBLD CKD-EPI 2021: >90 ML/MIN/1.73M2
EOSINOPHIL # BLD AUTO: 0.1 10E3/UL (ref 0–0.7)
EOSINOPHIL NFR BLD AUTO: 2 %
ERYTHROCYTE [DISTWIDTH] IN BLOOD BY AUTOMATED COUNT: 13.2 % (ref 10–15)
GLUCOSE SERPL-MCNC: 224 MG/DL (ref 70–99)
GLUCOSE UR STRIP-MCNC: >=1000 MG/DL
HCO3 BLDV-SCNC: 12 MMOL/L (ref 21–28)
HCO3 SERPL-SCNC: 12 MMOL/L (ref 22–29)
HCT VFR BLD AUTO: 47.5 % (ref 40–53)
HGB BLD-MCNC: 15.8 G/DL (ref 13.3–17.7)
HGB UR QL STRIP: NEGATIVE
IMM GRANULOCYTES # BLD: 0.4 10E3/UL
IMM GRANULOCYTES NFR BLD: 5 %
INR PPP: 1.09 (ref 0.85–1.15)
KETONES UR STRIP-MCNC: ABNORMAL MG/DL
LACTATE BLD-SCNC: 9.7 MMOL/L (ref 0.7–2)
LACTATE SERPL-SCNC: 1.1 MMOL/L (ref 0.7–2)
LACTATE SERPL-SCNC: 2.4 MMOL/L (ref 0.7–2)
LACTATE SERPL-SCNC: 9.7 MMOL/L (ref 0.7–2)
LEUKOCYTE ESTERASE UR QL STRIP: NEGATIVE
LYMPHOCYTES # BLD AUTO: 1.7 10E3/UL (ref 0.8–5.3)
LYMPHOCYTES NFR BLD AUTO: 23 %
MAGNESIUM SERPL-MCNC: 2.4 MG/DL (ref 1.7–2.3)
MCH RBC QN AUTO: 28.5 PG (ref 26.5–33)
MCHC RBC AUTO-ENTMCNC: 33.3 G/DL (ref 31.5–36.5)
MCV RBC AUTO: 86 FL (ref 78–100)
MONOCYTES # BLD AUTO: 0.5 10E3/UL (ref 0–1.3)
MONOCYTES NFR BLD AUTO: 6 %
MUCOUS THREADS #/AREA URNS LPF: PRESENT /LPF
NEUTROPHILS # BLD AUTO: 4.8 10E3/UL (ref 1.6–8.3)
NEUTROPHILS NFR BLD AUTO: 64 %
NITRATE UR QL: NEGATIVE
NRBC # BLD AUTO: 0 10E3/UL
NRBC BLD AUTO-RTO: 0 /100
PCO2 BLDV: 26 MM HG (ref 40–50)
PH BLDV: 7.26 [PH] (ref 7.32–7.43)
PH UR STRIP: 5 [PH] (ref 5–7)
PLATELET # BLD AUTO: 213 10E3/UL (ref 150–450)
PO2 BLDV: 61 MM HG (ref 25–47)
POTASSIUM SERPL-SCNC: 4.1 MMOL/L (ref 3.4–5.3)
PROT SERPL-MCNC: 6.9 G/DL (ref 6.4–8.3)
PROTHROMBIN TIME: 14.1 SECONDS (ref 11.8–14.8)
RBC # BLD AUTO: 5.54 10E6/UL (ref 4.4–5.9)
RBC URINE: 0 /HPF
SAO2 % BLDV: 88 % (ref 70–75)
SODIUM SERPL-SCNC: 140 MMOL/L (ref 135–145)
SP GR UR STRIP: 1.03 (ref 1–1.03)
UROBILINOGEN UR STRIP-MCNC: NORMAL MG/DL
WBC # BLD AUTO: 7.5 10E3/UL (ref 4–11)
WBC URINE: 0 /HPF

## 2025-05-26 PROCEDURE — 83735 ASSAY OF MAGNESIUM: CPT | Performed by: INTERNAL MEDICINE

## 2025-05-26 PROCEDURE — 80053 COMPREHEN METABOLIC PANEL: CPT | Performed by: INTERNAL MEDICINE

## 2025-05-26 PROCEDURE — 36415 COLL VENOUS BLD VENIPUNCTURE: CPT | Performed by: EMERGENCY MEDICINE

## 2025-05-26 PROCEDURE — 85610 PROTHROMBIN TIME: CPT | Performed by: INTERNAL MEDICINE

## 2025-05-26 PROCEDURE — 99285 EMERGENCY DEPT VISIT HI MDM: CPT | Mod: 25 | Performed by: INTERNAL MEDICINE

## 2025-05-26 PROCEDURE — 70450 CT HEAD/BRAIN W/O DYE: CPT | Mod: 26 | Performed by: RADIOLOGY

## 2025-05-26 PROCEDURE — 36415 COLL VENOUS BLD VENIPUNCTURE: CPT | Performed by: INTERNAL MEDICINE

## 2025-05-26 PROCEDURE — 83605 ASSAY OF LACTIC ACID: CPT | Performed by: EMERGENCY MEDICINE

## 2025-05-26 PROCEDURE — 99285 EMERGENCY DEPT VISIT HI MDM: CPT | Performed by: INTERNAL MEDICINE

## 2025-05-26 PROCEDURE — 82803 BLOOD GASES ANY COMBINATION: CPT

## 2025-05-26 PROCEDURE — 250N000011 HC RX IP 250 OP 636: Performed by: INTERNAL MEDICINE

## 2025-05-26 PROCEDURE — 83605 ASSAY OF LACTIC ACID: CPT | Performed by: INTERNAL MEDICINE

## 2025-05-26 PROCEDURE — 85004 AUTOMATED DIFF WBC COUNT: CPT | Performed by: INTERNAL MEDICINE

## 2025-05-26 PROCEDURE — 36415 COLL VENOUS BLD VENIPUNCTURE: CPT

## 2025-05-26 PROCEDURE — 70450 CT HEAD/BRAIN W/O DYE: CPT

## 2025-05-26 PROCEDURE — 96361 HYDRATE IV INFUSION ADD-ON: CPT | Performed by: INTERNAL MEDICINE

## 2025-05-26 PROCEDURE — 83605 ASSAY OF LACTIC ACID: CPT

## 2025-05-26 PROCEDURE — 258N000003 HC RX IP 258 OP 636: Performed by: INTERNAL MEDICINE

## 2025-05-26 PROCEDURE — 81001 URINALYSIS AUTO W/SCOPE: CPT | Performed by: INTERNAL MEDICINE

## 2025-05-26 PROCEDURE — 96374 THER/PROPH/DIAG INJ IV PUSH: CPT | Performed by: INTERNAL MEDICINE

## 2025-05-26 RX ORDER — LEVETIRACETAM 750 MG/1
750 TABLET ORAL 2 TIMES DAILY
Qty: 60 TABLET | Refills: 0 | Status: SHIPPED | OUTPATIENT
Start: 2025-05-26

## 2025-05-26 RX ORDER — DEXAMETHASONE 1 MG
TABLET ORAL
Qty: 40 TABLET | Refills: 0 | Status: SHIPPED | OUTPATIENT
Start: 2025-05-26 | End: 2025-06-03

## 2025-05-26 RX ORDER — DEXAMETHASONE 1 MG
4 TABLET ORAL 2 TIMES DAILY WITH MEALS
Qty: 16 TABLET | Refills: 0 | Status: SHIPPED | OUTPATIENT
Start: 2025-05-26 | End: 2025-05-29

## 2025-05-26 RX ORDER — LEVETIRACETAM 500 MG/5ML
2000 INJECTION, SOLUTION, CONCENTRATE INTRAVENOUS ONCE
Status: COMPLETED | OUTPATIENT
Start: 2025-05-26 | End: 2025-05-26

## 2025-05-26 RX ADMIN — SODIUM CHLORIDE 1000 ML: 0.9 INJECTION, SOLUTION INTRAVENOUS at 11:51

## 2025-05-26 RX ADMIN — LEVETIRACETAM 2000 MG: 100 INJECTION, SOLUTION INTRAVENOUS at 11:52

## 2025-05-26 ASSESSMENT — ACTIVITIES OF DAILY LIVING (ADL)
ADLS_ACUITY_SCORE: 56
ADLS_ACUITY_SCORE: 58

## 2025-05-26 NOTE — ED PROVIDER NOTES
SIGN-OUT:  - Assumed care of this patient from Dr. Graff  - Pending at shift change: Neuro consult recommendations  - Tentative plan from original EM Physician:   --- Patient presented with first-time seizure in the setting of a known brain tumor  --- Report of original ED physician, patient had short-lived, perhaps couple of minutes GTC seizure episode with an episode of tongue biting.  No recurrence since.  Reportedly back to baseline.  --- Pending at shift change is the recommendations from the Neurology team who has seen and evaluated the patient here in the ED.    UPDATES / REASSESSMENT:  Reassessment:   - Patient reports feeling a little bit tired but he is mentating appropriately, normal alertness.  --- No acute issues or interventions necessary while still in the emergency department.    DISCUSSIONS:  - w/ Neuro: Discussed.  They are comfortable with discharge with plan to initiate oral Keppra.  They defer discussion/recommendation with regards to steroids to the Neurosurgery team  - w/ Neurosurgery: Consult requested  - w/ Patient: Reviewed available discussions w/ Neuro, tentative plan with the patient and his loved one/guest.    SIGNOUT:  Patient signed out to evening EM Physician.  IMPRESSION AT SHIFT CHANGE:  - First-time seizure in the setting of known brain tumor  PENDING AT SHIFT CHANGE:   - Repeat lactate  - Neurosurgery consult  TENTATIVE PLAN:   - F/U repeat lactate and NS consult.   - Likely able to discharge home after confirming medication/dosing              Cecille Washington MD  05/27/25 1394

## 2025-05-26 NOTE — ED TRIAGE NOTES
Pt BIBA following seizure activity. HX of new left posterolateral frontal lobe astrocytoma. Per EMS pt & wife were driving to a friends house, and during the ride pt's wife noticed he was no longer talking. Pt was seen shaking & having seizure like activity for about 1 minute. Pt took about 5-10 minutes to wake up, pt confused upon waking. Pt given droperidol IM per EMS postictal. Pt now tired but A&Ox4.      Triage Assessment (Adult)       Row Name 05/26/25 5609          Triage Assessment    Airway WDL WDL        Respiratory WDL    Respiratory WDL WDL        Skin Circulation/Temperature WDL    Skin Circulation/Temperature WDL WDL        Cardiac WDL    Cardiac WDL WDL        Peripheral/Neurovascular WDL    Peripheral Neurovascular WDL WDL        Cognitive/Neuro/Behavioral WDL    Cognitive/Neuro/Behavioral WDL WDL

## 2025-05-26 NOTE — CONSULTS
General acute hospital  Neurology Consultation    Patient Name:  Jeffry Younger  MRN:  [id number]    :  1980  Date of Service:  May 26, 2025  Primary care provider:  Russel Hsieh      Neurology consultation service was asked to see Jeffry Younger by Dr. Graff to evaluate seizure.    Chief Complaint:  seizure    History of Present Illness:   Jeffry Younger is a 45 year old right-handed male with history of WHO grade II astrocytoma (diagnosed 2025 via biopsy) and nonarteritic ischemic optic neuropathy of the left eye who presents with seizure.    Neurology is consulted regarding seizure management. History is provided by Vladimir and his spouse, Kailee (a pediatric gastroenterologist).    This morning at around 10:45 Kailee was driving when she noticed Vladimir having a seizure. Vladimir tells me that the last thing he remembers was feeling his head turn to the right, and being unable to turn back to the left. Kailee then saw Vladimir's left arm stiffen while he lost consciousness. She estimates that the seizure lasted for 10-15 seconds, though she was focused on driving.    An ambulance was called, and Vladimir's first memory is being in the ambulance. From that point forward his memory is continuous.    Vladimir's astrocytoma was diagnosed after presentation in 2025 with acute onset of monocular visual loss in the inferior field of the left eye (now felt to be nonarteritic ischemic optic neuropathy). As part of work-up for that, MRI was obtained which revealed expansile T2/FLAIR hyperintense lesion with patchy enhancement along the margins. He was hospitalized and underwent biopsy of the lesion on 2025.    He has established care with outpatient neuro-oncology and neurosurgery. Plan is for awake craniotomy with tumor resection, scheduled 2025. He was not taking anti-seizure medication prior to arrival today.    ROS  A comprehensive ROS was performed and pertinent  "findings were included in HPI.     PMH  Past Medical History:   Diagnosis Date    Diabetes (H) 07/2018    Treating metformin    Essential hypertension 06/13/2024    Obesity     Sleep apnea      Past Surgical History:   Procedure Laterality Date    AS ESOPHAGOSCOPY, DIAGNOSTIC      EGD    BIOPSY  2008    egd normal    OPTICAL TRACKING SYSTEM BIOPSY BRAIN Left 3/7/2025    Procedure: stealth assisted LEFT FRONTAL BIOPSY BRAIN;  Surgeon: Genaro Carver MD;  Location: [facility] OR       Medications   I have personally reviewed the patient's medication list.     Allergies  I have personally reviewed the patient's allergy list.     Social History  Accompanied by spouse, Kailee. Has had reduced sleep and additional stress recently with a home remodeling project as well as packing for a trip to [facility].    Family History    Denies known family history of epilepsy.      Physical Examination   Vitals: /82   Pulse 97   Temp 97.9  F (36.6  C) (Oral)   Resp 22   Ht 1.88 m (6' 2\")   Wt (!) 180.5 kg (398 lb)   SpO2 98%   BMI 51.10 kg/m    General: laying in bed, not in acute distress  Head: normocephalic  Eyes: no conjunctival injection  Cardiac: regular rate  Respiratory: breathing comfortably on room air  GI: soft, non-distended  Skin: Warm  Psych: Mood pleasant, affect congruent  Neuro:  Mental status: awakens to voice, alert, oriented to self, place (\"[facility] emergency room\"), situation, and accurately tells me that today is Memorial Day. Follows one step commands on both sides of body as well as a crossed command. Speech is fluent with intact naming and repetition.  Cranial nerves: Visual fields full on confrontational testing. Conjugate gaze with intact extraocular movements. Upper and lower face symmetric at rest and with activation. Hearing intact to conversation. Tongue protrusion midline.   Motor: Normal bulk and tone. No abnormal movements. BUE and BLE held antigravity without drift. 5/5 " "strength bilaterally of shoulder abduction, elbow flexion/extension, and hand .  Reflexes: 2/4 and symmetric at bilateral biceps, brachioradialis, patella, and achilles  Sensory: Intact to light touch throughout, no extinction on double simultaneous testing.  Coordination: Finger-nose-finger intact bilaterally.    Investigations   I have personally reviewed pertinent labs, tests, and radiological imaging. Discussion of notable findings is included under Impression.     Was patient transferred from outside hospital?   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    Semiology  Right-head version, left arm tonic extension, loss of consciousness, duration estimated 10-15 seconds    The semiology localizes to the patient's known astrocytoma. He has been loaded with Keppra 2,000 mg IV x1 and is clearing appropriately. Neurologic exam is intact. CT Head w/o contrast was obtained and per radiology reveals \"unchanged 3 mm of rightward midline shift.\"    Given elevated risk of recurrent seizure, recommend starting maintenance Keppra 750 mg BID.    I did  Mr. Younger about [place] State Law prohibiting driving for 3 months after seizure with loss of consciousness, as well as general seizure precautions. He expressed understanding.    Recommendations  - start Keppra 750 mg BID  - has neuro-oncology follow up with Dr. Jacinto scheduled 7/28/2025, should keep this appointment. Will also send her staff message to update her regarding this presentation.  - reasonable to discharge from neurology standpoint as long as patient clears appropriately    Thank you for involving Neurology in the care of Jeffry Younger.  Please do " not hesitate to call with questions/concerns (consult pager [phone]).      Patient was discussed over the phone with Dr. Ruiz.    Kin Lazcano MD  Neurology Resident

## 2025-05-26 NOTE — ED PROVIDER NOTES
ED Provider Note  [facility]      History     Chief Complaint   Patient presents with    Seizures     HPI  Jeffry Younger is a 45 year old male with a history of left frontal astrocytoma, HTN, DMII, who presents to the emergency department with his wife following a seizure.  This was patient's first seizure.  He was in the passenger seat and wife was driving.  During the ride patient's wife noticed he was no longer talking and he was rigid and shaking for about 30 seconds. He was very confused upon wakening.      Of note, he was recently prescribed Keppra for preventative measures as he was going to be leaving for a trip. Patient's wife reports that there has been some recent stressors.  Patient reports that he is currently thirsty and it is hard for him to breathe while on his back.  He endorses lower back pain, but denies headache.  He denies incontinence.      MRI BRAIN WITH AND WITHOUT CONTRAST (5/21/2025)  1.  Expansile area of abnormal T2 prolongation/tumor within the posterior left frontal lobe. The enhancing component within the center of the lesion has mildly increased in size in the interval. Subtlety elevated cerebral blood volume is seen throughout   the lesion.  2.  Localized mass effect with sulcal effacement and effacement of the left lateral ventricle. 1 to 2 mm left right midline shift, similar to prior.      Past Medical History  Past Medical History:   Diagnosis Date    Diabetes (H) 07/2018    Treating metformin    Essential hypertension 06/13/2024    Obesity     Sleep apnea      Past Surgical History:   Procedure Laterality Date    AS ESOPHAGOSCOPY, DIAGNOSTIC      EGD    BIOPSY  2008    egd normal    OPTICAL TRACKING SYSTEM BIOPSY BRAIN Left 3/7/2025    Procedure: stealth assisted LEFT FRONTAL BIOPSY BRAIN;  Surgeon: Genaro Carver MD;  Location:  OR     atorvastatin (LIPITOR) 10 MG tablet  blood glucose (ACCU-CHEK GUIDE) test strip  blood glucose monitoring  "(ACCU-CHEK FASTCLIX) lancets  Blood Glucose Monitoring Suppl (CONTOUR NEXT GEN MONITOR) SAMIA  Continuous Glucose Sensor (FREESTYLE KULWANT 3 PLUS SENSOR) MISC  dexAMETHasone (DECADRON) 4 MG tablet  empagliflozin (JARDIANCE) 25 MG TABS tablet  glipiZIDE (GLUCOTROL XL) 5 MG 24 hr tablet  insulin aspart (NOVOLOG FLEXPEN) 100 UNIT/ML pen  insulin glargine U-300 (TOUJEO) 300 UNIT/ML (1 units dial) pen  insulin pen needle (ULTICARE MINI) 31G X 6 MM miscellaneous  levETIRAcetam (KEPPRA) 750 MG tablet  LORazepam (ATIVAN) 0.5 MG tablet  losartan (COZAAR) 100 MG tablet  metFORMIN (GLUCOPHAGE XR) 500 MG 24 hr tablet  ondansetron (ZOFRAN ODT) 4 MG ODT tab      No Known Allergies  Family History  Family History   Problem Relation Age of Onset    Diabetes Father     Hypertension Father     Other Cancer Father         Kidney & Thyroid    Cerebrovascular Disease Paternal Grandfather     Other Cancer Brother         Lukemia    Other Cancer Maternal Grandmother         Pancreatic    Other Cancer Maternal Grandfather         Lung - smoker    Colon Cancer No family hx of     Prostate Cancer No family hx of     Cerebrovascular Disease No family hx of     Coronary Artery Disease No family hx of      Social History   Social History     Tobacco Use    Smoking status: Never     Passive exposure: Never    Smokeless tobacco: Never   Vaping Use    Vaping status: Never Used   Substance Use Topics    Alcohol use: Yes     Comment: 1 per month maybe    Drug use: No      A medically appropriate review of systems was performed with pertinent positives and negatives noted in the HPI, and all other systems negative.    Physical Exam   BP: (!) 150/71  Pulse: 118  Temp: 97.9  F (36.6  C)  Resp: 22  Height: 188 cm (6' 2\")  Weight: (!) 180.5 kg (398 lb)  SpO2: 93 %  Physical Exam  Constitutional:       General: He is not in acute distress.     Appearance: Normal appearance. He is not toxic-appearing or diaphoretic.   HENT:      Head: Atraumatic.      " Mouth/Throat:      Mouth: Injury present.   Eyes:      General: No scleral icterus.     Conjunctiva/sclera: Conjunctivae normal.      Pupils: Pupils are equal, round, and reactive to light.   Cardiovascular:      Rate and Rhythm: Normal rate and regular rhythm.      Heart sounds: Normal heart sounds. No murmur heard.     No friction rub. No gallop.   Pulmonary:      Effort: Pulmonary effort is normal. No respiratory distress.      Breath sounds: Normal breath sounds. No stridor. No wheezing, rhonchi or rales.   Chest:      Chest wall: No tenderness.   Abdominal:      General: Abdomen is flat. Bowel sounds are normal. There is no distension.      Palpations: Abdomen is soft. There is no mass.      Tenderness: There is no abdominal tenderness. There is no right CVA tenderness, left CVA tenderness, guarding or rebound.      Hernia: No hernia is present.   Musculoskeletal:         General: No tenderness or deformity.      Cervical back: Neck supple.   Skin:     General: Skin is warm and dry.      Findings: No rash.   Neurological:      General: No focal deficit present.      Mental Status: He is alert.      Cranial Nerves: No cranial nerve deficit.      Sensory: No sensory deficit.      Motor: No weakness.      Coordination: Coordination normal.      Deep Tendon Reflexes: Reflexes normal.   Psychiatric:         Mood and Affect: Mood normal.           ED Course, Procedures, & Data      Procedures       IV Antibiotics given and/or elevated Lactate of 9.7 and no sepsis note found - Delete this reminder and enter the sepsis note or '.edcms' before signing chart.>>>     Results for orders placed or performed during the hospital encounter of 05/26/25   CT Head w/o Contrast     Status: None    Narrative    EXAM: CT HEAD W/O CONTRAST  5/26/2025 1:36 PM     HISTORY: new seizure brain tumor       COMPARISON: CT 3/7/2025. Brain MRI 5/21/2025.    TECHNIQUE: Using multidetector thin collimation helical acquisition  technique, axial,  coronal and sagittal CT images from the skull base  to the vertex were obtained without intravenous contrast.   (topogram) image(s) also obtained and reviewed.    FINDINGS:  Area of edema in the left frontal lobe corresponding to known  intracranial mass with minimal/mild mass effect on the left lateral  ventricle. No acute intracranial hemorrhage, mass effect. Unchanged 3  mm of rightward midline shift.. No acute loss of gray-white matter  differentiation in the cerebral hemispheres. Ventricles are otherwise  proportionate to the cerebral sulci. Unchanged small focus of fat in  the right lateral ventricle trigone. Clear basal cisterns.    Prior left frontal negrita hole. The bony calvaria and the bones of the  skull base are normal. Clear paranasal sinuses and mastoid air cells.  Grossly normal orbits.       Impression    IMPRESSION:   1. No acute intracranial hemorrhage.  2. Known left frontal lobe mass with surrounding vasogenic edema.     I have personally reviewed the examination and initial interpretation  and I agree with the findings.    COLE LANGSTON MD         SYSTEM ID:  [id number]   [place] Draw     Status: None (In process)    Narrative    The following orders were created for panel order [place] Draw.  Procedure                               Abnormality         Status                     ---------                               -----------         ------                     Extra Blue Top Tube[[id number]]                             In process                 Extra Red Top Tube[[id number]]                              In process                 Extra Green Top (Lithiu...[[id number]]                      In process                 Extra Purple Top Tube[[id number]]                           In process                   Please view results for these tests on the individual orders.   Lactic acid whole blood     Status: Abnormal   Result Value Ref Range    Lactic Acid 9.7 (HH) 0.7 - 2.0 mmol/L   iStat Gases  (lactate) venous, POCT     Status: Abnormal   Result Value Ref Range    Lactic Acid POCT 9.7 (HH) 0.7 - 2.0 mmol/L    Bicarbonate Venous POCT 12 (L) 21 - 28 mmol/L    O2 Sat, Venous POCT 88 (H) 70 - 75 %    pCO2 Venous POCT 26 (L) 40 - 50 mm Hg    pH Venous POCT 7.26 (L) 7.32 - 7.43    pO2 Venous POCT 61 (H) 25 - 47 mm Hg    Base Excess/Deficit (+/-) POCT -13.0 (L) -3.0 - 3.0 mmol/L   Comprehensive metabolic panel     Status: Abnormal   Result Value Ref Range    Sodium 140 135 - 145 mmol/L    Potassium 4.1 3.4 - 5.3 mmol/L    Carbon Dioxide (CO2) 12 (L) 22 - 29 mmol/L    Anion Gap 22 (H) 7 - 15 mmol/L    Urea Nitrogen 16.0 6.0 - 20.0 mg/dL    Creatinine 0.95 0.67 - 1.17 mg/dL    GFR Estimate >90 >60 mL/min/1.73m2    Calcium 9.2 8.8 - 10.4 mg/dL    Chloride 106 98 - 107 mmol/L    Glucose 224 (H) 70 - 99 mg/dL    Alkaline Phosphatase 112 40 - 150 U/L    AST 26 0 - 45 U/L    ALT 24 0 - 70 U/L    Protein Total 6.9 6.4 - 8.3 g/dL    Albumin 4.1 3.5 - 5.2 g/dL    Bilirubin Total 0.7 <=1.2 mg/dL   Magnesium     Status: Abnormal   Result Value Ref Range    Magnesium 2.4 (H) 1.7 - 2.3 mg/dL   INR     Status: Normal   Result Value Ref Range    INR 1.09 0.85 - 1.15    PT 14.1 11.8 - 14.8 Seconds   UA with Microscopic reflex to Culture     Status: Abnormal    Specimen: Urine, Clean Catch   Result Value Ref Range    Color Urine Light Yellow Colorless, Straw, Light Yellow, Yellow    Appearance Urine Clear Clear    Glucose Urine >=1000 (A) Negative mg/dL    Bilirubin Urine Negative Negative    Ketones Urine Trace (A) Negative mg/dL    Specific Gravity Urine 1.032 1.003 - 1.035    Blood Urine Negative Negative    pH Urine 5.0 5.0 - 7.0    Protein Albumin Urine Negative Negative mg/dL    Urobilinogen Urine Normal Normal mg/dL    Nitrite Urine Negative Negative    Leukocyte Esterase Urine Negative Negative    Mucus Urine Present (A) None Seen /LPF    RBC Urine 0 <=2 /HPF    WBC Urine 0 <=5 /HPF    Narrative    Urine Culture not  indicated   CBC with platelets and differential     Status: None   Result Value Ref Range    WBC Count 7.5 4.0 - 11.0 10e3/uL    RBC Count 5.54 4.40 - 5.90 10e6/uL    Hemoglobin 15.8 13.3 - 17.7 g/dL    Hematocrit 47.5 40.0 - 53.0 %    MCV 86 78 - 100 fL    MCH 28.5 26.5 - 33.0 pg    MCHC 33.3 31.5 - 36.5 g/dL    RDW 13.2 10.0 - 15.0 %    Platelet Count 213 150 - 450 10e3/uL    % Neutrophils 64 %    % Lymphocytes 23 %    % Monocytes 6 %    % Eosinophils 2 %    % Basophils 1 %    % Immature Granulocytes 5 %    NRBCs per 100 WBC 0 <1 /100    Absolute Neutrophils 4.8 1.6 - 8.3 10e3/uL    Absolute Lymphocytes 1.7 0.8 - 5.3 10e3/uL    Absolute Monocytes 0.5 0.0 - 1.3 10e3/uL    Absolute Eosinophils 0.1 0.0 - 0.7 10e3/uL    Absolute Basophils 0.1 0.0 - 0.2 10e3/uL    Absolute Immature Granulocytes 0.4 <=0.4 10e3/uL    Absolute NRBCs 0.0 10e3/uL   Lactic acid whole blood     Status: Abnormal   Result Value Ref Range    Lactic Acid 2.4 (H) 0.7 - 2.0 mmol/L   CBC with platelets differential     Status: None    Narrative    The following orders were created for panel order CBC with platelets differential.  Procedure                               Abnormality         Status                     ---------                               -----------         ------                     CBC with platelets and ...[[id number]]                      Final result                 Please view results for these tests on the individual orders.     Medications   levETIRAcetam (KEPPRA) injection for EMERGENT loading dose 2,000 mg (2,000 mg Intravenous $Given 5/26/25 1152)   sodium chloride 0.9% BOLUS 1,000 mL (0 mLs Intravenous Stopped 5/26/25 1258)     Labs Ordered and Resulted from Time of ED Arrival to Time of ED Departure   LACTIC ACID WHOLE BLOOD - Abnormal       Result Value    Lactic Acid 9.7 (*)    ISTAT GASES LACTATE VENOUS POCT - Abnormal    Lactic Acid POCT 9.7 (*)     Bicarbonate Venous POCT 12 (*)     O2 Sat, Venous POCT 88 (*)      pCO2 Venous POCT 26 (*)     pH Venous POCT 7.26 (*)     pO2 Venous POCT 61 (*)     Base Excess/Deficit (+/-) POCT -13.0 (*)    COMPREHENSIVE METABOLIC PANEL - Abnormal    Sodium 140      Potassium 4.1      Carbon Dioxide (CO2) 12 (*)     Anion Gap 22 (*)     Urea Nitrogen 16.0      Creatinine 0.95      GFR Estimate >90      Calcium 9.2      Chloride 106      Glucose 224 (*)     Alkaline Phosphatase 112      AST 26      ALT 24      Protein Total 6.9      Albumin 4.1      Bilirubin Total 0.7     MAGNESIUM - Abnormal    Magnesium 2.4 (*)    ROUTINE UA WITH MICROSCOPIC REFLEX TO CULTURE - Abnormal    Color Urine Light Yellow      Appearance Urine Clear      Glucose Urine >=1000 (*)     Bilirubin Urine Negative      Ketones Urine Trace (*)     Specific Gravity Urine 1.032      Blood Urine Negative      pH Urine 5.0      Protein Albumin Urine Negative      Urobilinogen Urine Normal      Nitrite Urine Negative      Leukocyte Esterase Urine Negative      Mucus Urine Present (*)     RBC Urine 0      WBC Urine 0     LACTIC ACID WHOLE BLOOD - Abnormal    Lactic Acid 2.4 (*)    INR - Normal    INR 1.09      PT 14.1     CBC WITH PLATELETS AND DIFFERENTIAL    WBC Count 7.5      RBC Count 5.54      Hemoglobin 15.8      Hematocrit 47.5      MCV 86      MCH 28.5      MCHC 33.3      RDW 13.2      Platelet Count 213      % Neutrophils 64      % Lymphocytes 23      % Monocytes 6      % Eosinophils 2      % Basophils 1      % Immature Granulocytes 5      NRBCs per 100 WBC 0      Absolute Neutrophils 4.8      Absolute Lymphocytes 1.7      Absolute Monocytes 0.5      Absolute Eosinophils 0.1      Absolute Basophils 0.1      Absolute Immature Granulocytes 0.4      Absolute NRBCs 0.0       CT Head w/o Contrast   Final Result   IMPRESSION:    1. No acute intracranial hemorrhage.   2. Known left frontal lobe mass with surrounding vasogenic edema.       I have personally reviewed the examination and initial interpretation   and I agree with  the findings.      COLE LANGSTON MD            SYSTEM ID:  [id number]             Critical care was not performed.     Medical Decision Making  The patient's presentation was of high complexity (an acute health issue posing potential threat to life or bodily function).    The patient's evaluation involved:  ordering and/or review of 3+ test(s) in this encounter (see separate area of note for details)    The patient's management necessitated further care after sign-out to incoming EP (see their note for further management).    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    Will sign off to incoming EP.    I have reviewed the nursing notes. I have reviewed the findings, diagnosis, plan and need for follow up with the patient.    New Prescriptions    No medications on file       Final diagnoses:   New onset seizure (H)   Oligodendroglioma (H)   I, Catalina Pacheco, am serving as a trained medical scribe to document services personally performed by Radha Graff MD, based on the provider's statements to me.     IRadha MD, was physically present and have reviewed and verified the accuracy of this note documented by Catalina Pacheco.     Radha Graff MD  [facility] EMERGENCY DEPARTMENT  5/26/2025     Radha Graff MD  05/26/25 [id number]

## 2025-05-27 NOTE — ED PROVIDER NOTES
Emergency Department I-PASS Sign-out      Illness Severity: Stable    Patient Summary:  45 year old male with pertinent PMH of brain tumor who presented with fist time seizure    ED Course/treatment plan: CT neg, labs ok except elevated lactic, keppra loading, Neuro consult  - lactate still elevated but improving  - Neuro thinks likely okay to discharge as has clinically cleared but would like to start the patient on Keppra, 750 mg p.o., twice daily  - Neuro will also message the patient's Neuro Onc staff to let them know that this has happened so they can follow-up.    Clinical Impression:  No diagnosis found.    Edited by: Cecille Washington MD at 5/26/2025 1827    Action List:  -To do:  Repeat lactate  Neurosurgery consult      Disposition:  TBD - likely D/C if lactate improves and as cleared   - General Dispo plan if improved will likely be to follow-up with neuro, neuro messaging patient's usual provider and they should call as well in the morning, start oral Keppra, presumably steroids ; F/U NS rec's    Edited by: Cecille Washington MD at 5/26/2025 1912    Synthesis & Events after sign-out:  Patient has been seen by neurosurgery with recommendations for dexamethasone taper.  Neurosurgery has ordered the taper and sent to outpatient pharmacy.  He will also start Keppra, prescription has been sent.  Repeat lactate has normalized to 1.1.  Reviewed with patient and significant other as well as precautions regarding seizure such as no driving, swimming etc.  He was given close return precautions and voiced understanding.    Sharon Garcia MD   Emergency Medicine       Radha, Sharon Lawrence MD  05/26/25 1958

## 2025-05-27 NOTE — DISCHARGE INSTRUCTIONS
Take dexamethasone taper as prescribed.  Begin Keppra 750 mg twice a day.      Continue your outpatient neurology follow-up as scheduled.  Do not drive for at least 3 months after having a seizure or do other activities that would be dangerous if you lost consciousness such as swimming, taking a bath etc.      If you have any worsening symptoms return to the emergency department for reevaluation.

## 2025-05-27 NOTE — CONSULTS
[facility]       NEUROSURGERY CONSULTATION NOTE    This consultation was requested by Dr. Washington from the Emergency Medicine service.    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 Mccarthy, who presents today with a first time seizure. The mass is ~3cm with significant vasogenic edema centered in the LEFT frontal lobe. He is currently scheduled for an awake craniotomy with Dr. Mccarthy on 7/9/25 for resection of this lesion.    Patient is currently remodeling his house and preparing for a trip to [place] (now cancelled), but is otherwise in his usual state of health. No recent fevers, chills, nausea, vomiting, chest pain, shortness of breath, and denies headaches, weakness, LOC, numbness/weakness/paresthesias in extremities, changes in sensation, taste, smell, nor trouble speaking or other neurologic symptoms.    PAST MEDICAL HISTORY:   Past Medical History:   Diagnosis Date    Diabetes (H) 07/2018    Treating metformin    Essential hypertension 06/13/2024    Obesity     Sleep apnea        PAST SURGICAL HISTORY:   Past Surgical History:   Procedure Laterality Date    AS ESOPHAGOSCOPY, DIAGNOSTIC      EGD    BIOPSY  2008    egd normal    OPTICAL TRACKING SYSTEM BIOPSY BRAIN Left 3/7/2025    Procedure: stealth assisted LEFT FRONTAL BIOPSY BRAIN;  Surgeon: Genaro Carver MD;  Location:  OR       FAMILY HISTORY:   Family History   Problem Relation Age of Onset    Diabetes Father     Hypertension Father     Other Cancer Father         Kidney & Thyroid    Cerebrovascular Disease Paternal Grandfather     Other Cancer Brother         Lukemia    Other Cancer Maternal Grandmother         Pancreatic    Other Cancer Maternal Grandfather         Lung - smoker    Colon Cancer No family hx of     Prostate Cancer No family hx of     Cerebrovascular  Disease No family hx of     Coronary Artery Disease No family hx of        SOCIAL HISTORY:   Social History     Tobacco Use    Smoking status: Never     Passive exposure: Never    Smokeless tobacco: Never   Substance Use Topics    Alcohol use: Yes     Comment: 1 per month maybe       MEDICATIONS:  Current Outpatient Medications   Medication Sig Dispense Refill    levETIRAcetam (KEPPRA) 750 MG tablet Take 1 tablet (750 mg) by mouth 2 times daily. 60 tablet 0    atorvastatin (LIPITOR) 10 MG tablet Take 1 tablet (10 mg) by mouth daily. 90 tablet 3    blood glucose (ACCU-CHEK GUIDE) test strip Use to test blood sugar 1 times daily or as directed. 50 strip 1    blood glucose monitoring (ACCU-CHEK FASTCLIX) lancets 1 each daily 102 each 3    Blood Glucose Monitoring Suppl (CONTOUR NEXT GEN MONITOR) SAMIA 1 each daily. Use as directed. 1 each 0    Continuous Glucose Sensor (FREESTYLE KULWANT 3 PLUS SENSOR) MISC Change every 15 days. 6 each 1    dexAMETHasone (DECADRON) 4 MG tablet Take 1 tablet (4 mg) by mouth 2 times daily as needed (symptoms). 30 tablet 0    empagliflozin (JARDIANCE) 25 MG TABS tablet Take 1 tablet (25 mg) by mouth daily. 90 tablet 3    glipiZIDE (GLUCOTROL XL) 5 MG 24 hr tablet Take 1 tablet (5 mg) by mouth daily. May increase to 10 mg dose if time in range is below 70%. 90 tablet 0    insulin aspart (NOVOLOG FLEXPEN) 100 UNIT/ML pen Inject 20-28 Units subcutaneously 2 times daily (with meals). 45 mL 3    insulin glargine U-300 (TOUJEO) 300 UNIT/ML (1 units dial) pen Inject 20 Units subcutaneously daily. 15 mL 3    insulin pen needle (ULTICARE MINI) 31G X 6 MM miscellaneous Use 3-4 pen needles daily or as directed. 300 each 3    LORazepam (ATIVAN) 0.5 MG tablet Take 1 tablet (0.5 mg) by mouth every 6 hours as needed for anxiety. 20 tablet 0    losartan (COZAAR) 100 MG tablet Take 1 tablet (100 mg) by mouth daily. 90 tablet 0    metFORMIN (GLUCOPHAGE XR) 500 MG 24 hr tablet Take 2 tablets (1,000 mg) by  "mouth 2 times daily (with meals). 360 tablet 3    ondansetron (ZOFRAN ODT) 4 MG ODT tab Take 1 tablet (4 mg) by mouth every 8 hours as needed for nausea or vomiting. 10 tablet 0       Allergies:  No Known Allergies    ROS: 10 point ROS of systems including Constitutional, Eyes, Respiratory, Cardiovascular, Gastroenterology, Genitourinary, Integumentary, Muscularskeletal, Psychiatric were all negative except for pertinent positives noted in my HPI.    Physical exam:   Blood pressure (!) 163/92, pulse 102, temperature 98.4  F (36.9  C), temperature source Oral, resp. rate 21, height 1.88 m (6' 2\"), weight (!) 180.5 kg (398 lb), SpO2 98%.  General: Resting in bed in no acute distress  HEENT: Normocephalic, atraumatic  PULM: Breathing comfortably on room air  MSK: No gross deformities  : rectal tone deferred  NEUROLOGIC:  -- Awake; Alert; oriented x 3  -- Follows commands briskly  -- Speech fluent, spontaneous. No aphasia or dysarthria.  -- No gaze preference. No apparent hemineglect. Visual fields full to confrontation. LEFT inferior field \"funhouse mirror\" artifact, unchanged. VA 20/20 uncorrected bilaterally.    Cranial Nerves:  -- PERRL ~3-4 mm bilat and brisk, extraocular movements intact  -- sensory V1-V3 intact bilaterally  -- face symmetrical, tongue midline  -- hearing grossly intact bilat  -- palate elevates symmetrically, uvula midline  -- Trapezii 5/5 strength bilat symmetric    Motor:  -- Normal bulk / tone; no tremor, rigidity, or bradykinesia.  No muscle wasting or fasciculations  -- No Pronator Drift     Delt Bi Tri Hand Flexion/  Extension Iliopsoas Quadriceps Hamstrings Tibialis Anterior Gastroc    C5 C6 C7 C8/T1 L2 L3 L4-S1 L4 S1   R 5 5 5 5 5 5 5 5 5   L 5 5 5 5 5 5 5 5 5     Sensory:  intact to LT x 4 extremities       Reflexes: no clonus       Bi Tri BR Dae Pat Ach Bab     C5-6 C7-8 C6 UMN L2-4 S1 UMN   R 2+ 2+ 2+ Norm 2+ 2+ Norm   L 2+ 2+ 2+ Norm 2+ 2+ Norm      Gait: Deferred " "      IMAGING:  CT head today with no acute intracranial hemorrhage. Unchanged extent of vasogenic edema compared to MRI obtained 5/21.      LABS:   Last Comprehensive Metabolic Panel:  Lab Results   Component Value Date     05/26/2025    POTASSIUM 4.1 05/26/2025    CHLORIDE 106 05/26/2025    CO2 12 (L) 05/26/2025    ANIONGAP 22 (H) 05/26/2025     (H) 05/26/2025    BUN 16.0 05/26/2025    CR 0.95 05/26/2025    GFRESTIMATED >90 05/26/2025    CALLY 9.2 05/26/2025         Lab Results   Component Value Date    WBC 7.5 05/26/2025    WBC 9.3 03/22/2018     Lab Results   Component Value Date    RBC 5.54 05/26/2025     Lab Results   Component Value Date    HGB 15.8 05/26/2025     Lab Results   Component Value Date    HCT 47.5 05/26/2025     Lab Results   Component Value Date    MCV 86 05/26/2025     Lab Results   Component Value Date    MCH 28.5 05/26/2025     Lab Results   Component Value Date    MCHC 33.3 05/26/2025     Lab Results   Component Value Date    RDW 13.2 05/26/2025     Lab Results   Component Value Date     05/26/2025     INR   Date Value Ref Range Status   05/26/2025 1.09 0.85 - 1.15 Final      No results found for: \"PTT\"   @Fibrinogen1@    ASSESSMENT:    Jeffry Younger is a 44yo male with a known Astrocytoma, 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 Mccarthy, who presents today with a first time seizure. He has cleared appropriately and is at his neurologic baseline. There is no evidence of hemorrhage on his head CT, with the lesion largely unchanged from the prior MRI. He is currently scheduled for OR with Dr. Mccarthy on 7/9.      RECOMMENDATIONS:  Start Decadron 4mg BID, tapered to off over 1 week  Start Keppra 750mg BID, per Neurology  Okay for discharge, given return precautions for new seizures or changes in neurological exam      Russel Camara MD, PhD  Neurosurgery Resident, PGY-2    The patient was discussed with Dr. Aguirre, neurosurgery chief resident, and Dr." Rachid, neurosurgery staff.

## 2025-05-29 ENCOUNTER — OFFICE VISIT (OUTPATIENT)
Dept: PEDIATRICS | Facility: CLINIC | Age: 45
End: 2025-05-29
Payer: COMMERCIAL

## 2025-05-29 ENCOUNTER — TELEPHONE (OUTPATIENT)
Dept: FAMILY MEDICINE | Facility: CLINIC | Age: 45
End: 2025-05-29

## 2025-05-29 VITALS
HEART RATE: 93 BPM | OXYGEN SATURATION: 96 % | DIASTOLIC BLOOD PRESSURE: 75 MMHG | HEIGHT: 73 IN | SYSTOLIC BLOOD PRESSURE: 135 MMHG | WEIGHT: 315 LBS | BODY MASS INDEX: 41.75 KG/M2 | RESPIRATION RATE: 18 BRPM | TEMPERATURE: 99.1 F

## 2025-05-29 DIAGNOSIS — G47.33 OSA (OBSTRUCTIVE SLEEP APNEA): ICD-10-CM

## 2025-05-29 DIAGNOSIS — F41.1 GAD (GENERALIZED ANXIETY DISORDER): ICD-10-CM

## 2025-05-29 DIAGNOSIS — G40.909 SEIZURE DISORDER (H): ICD-10-CM

## 2025-05-29 DIAGNOSIS — Z01.818 PREOP GENERAL PHYSICAL EXAM: Primary | ICD-10-CM

## 2025-05-29 DIAGNOSIS — C71.1 OLIGODENDROGLIOMA OF FRONTAL LOBE (H): ICD-10-CM

## 2025-05-29 DIAGNOSIS — S39.012A STRAIN OF LUMBAR REGION, INITIAL ENCOUNTER: ICD-10-CM

## 2025-05-29 DIAGNOSIS — E11.65 TYPE 2 DIABETES MELLITUS WITH HYPERGLYCEMIA, WITHOUT LONG-TERM CURRENT USE OF INSULIN (H): ICD-10-CM

## 2025-05-29 DIAGNOSIS — I10 ESSENTIAL HYPERTENSION: ICD-10-CM

## 2025-05-29 PROCEDURE — 3075F SYST BP GE 130 - 139MM HG: CPT | Performed by: INTERNAL MEDICINE

## 2025-05-29 PROCEDURE — 99214 OFFICE O/P EST MOD 30 MIN: CPT | Performed by: INTERNAL MEDICINE

## 2025-05-29 PROCEDURE — 1125F AMNT PAIN NOTED PAIN PRSNT: CPT | Performed by: INTERNAL MEDICINE

## 2025-05-29 PROCEDURE — 3078F DIAST BP <80 MM HG: CPT | Performed by: INTERNAL MEDICINE

## 2025-05-29 RX ORDER — CYCLOBENZAPRINE HCL 10 MG
10 TABLET ORAL 3 TIMES DAILY PRN
Qty: 30 TABLET | Refills: 0 | Status: SHIPPED | OUTPATIENT
Start: 2025-05-29

## 2025-05-29 ASSESSMENT — PAIN SCALES - GENERAL: PAINLEVEL_OUTOF10: SEVERE PAIN (7)

## 2025-05-29 NOTE — PATIENT INSTRUCTIONS
Patient Education     Decadron taper:  Take 4 tablets (4 mg) by mouth 2 times daily (with meals) for 2 days, THEN 3 tablets (3 mg) 2 times daily (with meals) for 2 days, THEN 2 tablets (2 mg) 2 times daily (with meals) for 2 days, THEN 1 tablet (1 mg) 2 times daily (with meals) for 2 days.     Preop Instructions:  Hold metformin, glipizide, novolog morning of procedure  Stop jardiance 3 days prior to surgery  Take Keppra and losartan morning of surgery with a sip of water  Take Toujeo 16units evening prior to surgery    Preparing for Your Surgery  For Adults  Getting started  In most cases, a nurse will call to review your health history and instructions. They will give you an arrival time based on your scheduled surgery time. Please be ready to share:  Your doctor's clinic name and phone number  Your medical, surgical, and anesthesia history  A list of allergies and sensitivities  A list of medicines, including herbal treatments and over-the-counter drugs  Whether the patient has a legal guardian (ask how to send us the papers in advance)  Note: You may not receive a call if you were seen at our PAC (Preoperative Assessment Center).  Please tell us if you're pregnant--or if there's any chance you might be pregnant. Some surgeries may injure a fetus (unborn baby), so they require a pregnancy test. Surgeries that are safe for a fetus don't always need a test, and you can choose whether to have one.   Preparing for surgery  Within 10 to 30 days of surgery: Have a pre-op exam (sometimes called an H&P, or History and Physical). This can be done at a clinic or pre-operative center.  If you're having a , you may not need this exam. Talk to your care team.  At your pre-op exam, talk to your care team about all medicines you take. (This includes CBD oil and any drugs, such as THC, marijuana, and other forms of cannabis.) If you need to stop any medicine before surgery, ask when to start taking it again.  This is  for your safety. Many medicines and drugs can make you bleed too much during surgery. Some change how well surgery (anesthesia) drugs work.  Call your insurance company to let them know you're having surgery. (If you don't have insurance, call [phone].)  Call your clinic if there's any change in your health. This includes a scrape or scratch near the surgery site, or any signs of a cold (sore throat, runny nose, cough, rash, fever).  Eating and drinking guidelines  For your safety: Unless your surgeon tells you otherwise, follow the guidelines below.  Eat and drink as normal until 8 hours before you arrive for surgery. After that, no food or milk. You can spit out gum when you arrive.  Drink clear liquids until 2 hours before you arrive. These are liquids you can see through, like water, Gatorade, and Propel Water. They also include plain black coffee and tea (no cream or milk).  No alcohol for 24 hours before you arrive. The night before surgery, stop any drinks that contain THC.  If your care team tells you to take medicine on the morning of surgery, it's okay to take it with a sip of water. No other medicines or drugs are allowed (including CBD oil)--follow your care team's instructions.  If you have questions the day of surgery, call your hospital or surgery center.   Preventing infection  Shower or bathe the night before and the morning of surgery. Follow the instructions your clinic gave you. (If no instructions, use regular soap.)  Don't shave or clip hair near your surgery site. We'll remove the hair if needed.  Don't smoke or vape the morning of surgery. No chewing tobacco for 6 hours before you arrive. A nicotine patch is okay. You may spit out nicotine gum when you arrive.  For some surgeries, the surgeon will tell you to fully quit smoking and nicotine.  We will make every effort to keep you safe from infection. We will:  Clean our hands often with soap and water (or an alcohol-based hand  rub).  Clean the skin at your surgery site with a special soap that kills germs.  Give you a special gown to keep you warm. (Cold raises the risk of infection.)  Wear hair covers, masks, gowns, and gloves during surgery.  Give antibiotic medicine, if prescribed. Not all surgeries need this medicine.  What to bring on the day of surgery  Photo ID and insurance card  Copy of your health care directive, if you have one  Glasses and hearing aids (bring cases)  You can't wear contacts during surgery  Inhaler and eye drops, if you use them (tell us about these when you arrive)  CPAP machine or breathing device, if you use them  A few personal items, if spending the night  If you have . . .  A pacemaker, ICD (cardiac defibrillator), or other implant: Bring the ID card.  An implanted stimulator: Bring the remote control.  A legal guardian: Bring a copy of the certified (court-stamped) guardianship papers.  Please remove any jewelry, including body piercings. Leave jewelry and other valuables at home.  If you're going home the day of surgery  You must have a support person drive you home. They should stay with you overnight, and they may need to help with your self-care.  If you don't have a support person, please tells us as soon as possible. We can help.  After surgery  If it's hard to control your pain or you need more pain medicine, please call your surgeon's office.  Questions?   If you have any questions for your care team, list them here:   ____________________________________________________________________________________________________________________________________________________________________________________________________________________________________________________________  For informational purposes only. Not to replace the advice of your health care provider. Copyright   2003, 2019 [facility]. All rights reserved. Clinically reviewed by Cholo Alonzo MD. [employer] 214486 - REV  02/25.

## 2025-05-29 NOTE — PROGRESS NOTES
Preoperative Evaluation  [facility] [place]  [address]  [address]  [place] [address]  Phone: [phone]  Fax: [phone]  Primary Provider: Russel Hsieh MD  Pre-op Performing Provider: Russel Hsieh MD  May 29, 2025   {Provider  Link to PREOP SmartSet  REQUIRED to apply standard patient instructions and medication directions to the AVS :968616}  {ROOMER review and update patient entered surgical information if needed :631983}        5/29/2025   Surgical Information   What procedure is being done? Awake craniotomy   Facility or Hospital where procedure/surgery will be performed: [facility]   Who is doing the procedure / surgery? Dr Mccarthy   Date of surgery / procedure: June 9   Time of surgery / procedure: 120pm   Where do you plan to recover after surgery? at home with family     Fax number for surgical facility: Note does not need to be faxed, will be available electronically in Epic.    {Provider Charting Preference for Preop :492739}    Rohini Gilbert is a 45 year old, presenting for the following:  Pre-Op Exam          5/29/2025     2:37 PM   Additional Questions   Roomed by Ann OLIVIA   Accompanied by spouse, Smith         5/29/2025     2:37 PM   Patient Reported Additional Medications   Patient reports taking the following new medications Yes, Dexa and Keppra     HPI: ***          5/29/2025   Pre-Op Questionnaire   Have you ever had a heart attack or stroke? No   Have you ever had surgery on your heart or blood vessels, such as a stent placement, a coronary artery bypass, or surgery on an artery in your head, neck, heart, or legs? No   Do you have chest pain with activity? No   Do you have a history of heart failure? No   Do you currently have a cold, bronchitis or symptoms of other infection? No   Do you have a cough, shortness of breath, or wheezing? No   Do you or anyone in your family have previous history of blood clots? No   Do you or does anyone in your family have  a serious bleeding problem such as prolonged bleeding following surgeries or cuts? No   Have you ever had problems with anemia or been told to take iron pills? No   Have you had any abnormal blood loss such as black, tarry or bloody stools? No   Have you ever had a blood transfusion? No   Are you willing to have a blood transfusion if it is medically needed before, during, or after your surgery? Yes   Have you or any of your relatives ever had problems with anesthesia? No   Do you have sleep apnea, excessive snoring or daytime drowsiness? (!) YES   Do you have a CPAP machine? Yes   Do you have any artifical heart valves or other implanted medical devices like a pacemaker, defibrillator, or continuous glucose monitor? No   Do you have artificial joints? No   Are you allergic to latex? No     Advance Care Planning  {The storyboard will display whether the patient has ACP docs on file. Hover over the Code section in the storyboard to access the ACP documents. 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     Priority: Medium    Monocular visual disturbance 03/17/2025     Priority: Medium    Essential hypertension 06/13/2024     Priority: Medium    Family history of thyroid cancer 11/15/2021     Priority: Medium    Type 2 diabetes mellitus with hyperglycemia, without long-term current use of insulin (H) 07/01/2018     Priority: Medium    Hyperlipidemia LDL goal <100 07/01/2018     Priority: Medium    ADAMA (generalized anxiety disorder) 06/26/2018     Priority: Medium    PAOLA (obstructive sleep apnea) 03/07/2017     Priority: Medium     Diagnostic Study  Sleep Study 3/5/2017 - (400.0 lbs) AHI 12.2, RDI 13.7, Supine AHI 45.0, REM AHI 32.5,  Low O2 85.0%, Time Spent <=88% 1.0 minutes / Time Spent <=89% 2.0 minutes      Morbid obesity with BMI of 50.0-59.9, adult (H) 09/29/2015     Priority: Medium      Past Medical History:   Diagnosis Date    Diabetes (H) 07/2018    Treating metformin    Essential hypertension 06/13/2024    Obesity     Sleep apnea      Past Surgical History:   Procedure Laterality Date    AS ESOPHAGOSCOPY, DIAGNOSTIC      EGD    BIOPSY  2008    egd normal    OPTICAL TRACKING SYSTEM BIOPSY BRAIN Left 3/7/2025    Procedure: stealth assisted LEFT FRONTAL BIOPSY BRAIN;  Surgeon: Genaro Carver MD;  Location: [facility] OR     Current Outpatient Medications   Medication Sig Dispense Refill    atorvastatin (LIPITOR) 10 MG tablet Take 1 tablet (10 mg) by mouth daily. 90 tablet 3    blood glucose (ACCU-CHEK GUIDE) test strip Use to test blood sugar 1 times daily or as directed. 50 strip 1    blood glucose monitoring (ACCU-CHEK FASTCLIX) lancets 1 each daily 102 each 3    Blood Glucose Monitoring Suppl (CONTOUR NEXT GEN MONITOR) SAMIA 1 each daily. Use as directed. 1 each 0    Continuous Glucose Sensor (FREESTYLE KULWANT 3 PLUS SENSOR) MISC Change every 15 days. 6 each 1    dexAMETHasone (DECADRON) 1 MG tablet Take 4 tablets (4 mg) by mouth 2 times daily (with meals) for 2 days. 16 tablet 0    dexAMETHasone (DECADRON) 1 MG tablet Take 4 tablets (4 mg) by mouth 2 times daily (with meals) for 2 days. 16 tablet 0    dexAMETHasone (DECADRON) 1 MG tablet Take 4 tablets (4 mg) by mouth 2 times daily (with meals) for 2 days, THEN 3 tablets (3 mg) 2 times daily (with meals) for 2 days, THEN 2 tablets (2 mg) 2 times daily (with meals) for 2 days, THEN 1 tablet (1 mg) 2 times daily (with meals) for 2 days. 40 tablet 0    empagliflozin (JARDIANCE) 25 MG TABS tablet Take 1 tablet (25 mg) by mouth daily. 90 tablet 3    glipiZIDE (GLUCOTROL XL) 5 MG 24 hr tablet Take 1 tablet (5 mg) by mouth daily. May increase to 10 mg dose if time in range is below 70%. 90 tablet 0  "   insulin aspart (NOVOLOG FLEXPEN) 100 UNIT/ML pen Inject 20-28 Units subcutaneously 2 times daily (with meals). 45 mL 3    insulin glargine U-300 (TOUJEO) 300 UNIT/ML (1 units dial) pen Inject 20 Units subcutaneously daily. 15 mL 3    insulin pen needle (ULTICARE MINI) 31G X 6 MM miscellaneous Use 3-4 pen needles daily or as directed. 300 each 3    levETIRAcetam (KEPPRA) 750 MG tablet Take 1 tablet (750 mg) by mouth 2 times daily. 60 tablet 0    LORazepam (ATIVAN) 0.5 MG tablet Take 1 tablet (0.5 mg) by mouth every 6 hours as needed for anxiety. 20 tablet 0    losartan (COZAAR) 100 MG tablet Take 1 tablet (100 mg) by mouth daily. 90 tablet 0    metFORMIN (GLUCOPHAGE XR) 500 MG 24 hr tablet Take 2 tablets (1,000 mg) by mouth 2 times daily (with meals). 360 tablet 3    ondansetron (ZOFRAN ODT) 4 MG ODT tab Take 1 tablet (4 mg) by mouth every 8 hours as needed for nausea or vomiting. 10 tablet 0       No Known Allergies     Social History     Tobacco Use    Smoking status: Never     Passive exposure: Never    Smokeless tobacco: Never   Substance Use Topics    Alcohol use: Yes     Comment: 1 per month maybe     {FAMILY HISTORY (Optional):957132201}  History   Drug Use No           {ROS Picklists (Optional):399734}    Objective    BP (!) 150/79 (BP Location: Right arm, Patient Position: Sitting, Cuff Size: Adult Large)   Pulse 93   Temp 99.1  F (37.3  C) (Temporal)   Resp 18   Ht 1.846 m (6' 0.68\")   Wt (!) 177.8 kg (392 lb)   SpO2 96%   BMI 52.18 kg/m     Estimated body mass index is 52.18 kg/m  as calculated from the following:    Height as of this encounter: 1.846 m (6' 0.68\").    Weight as of this encounter: 177.8 kg (392 lb).  Physical Exam  {Exam List :455086}    Recent Labs   Lab Test 05/26/25  1121 05/13/25  1002 03/08/25  0802 02/26/25  0558 02/25/25  0950 12/30/24  1116   HGB 15.8  --  14.6   < > 16.6  --      --  211   < > 224  --    INR 1.09  --   --   --  1.02  --     138 139   < > 138 " 139   POTASSIUM 4.1 4.6 4.1   < > 4.8 4.5   CR 0.95 0.99 0.72   < > 0.95 0.90   A1C  --  7.9*  --   --   --  6.4*    < > = values in this interval not displayed.        Diagnostics  {LABS:873220}   {EK}    Revised Cardiac Risk Index (RCRI)  The patient has the following serious cardiovascular risks for perioperative complications:  {PREOP REVISED CARDIAC RISK INDEX (RCRI) :770528}     RCRI Interpretation: {REVISED CARDIAC RISK INTERPRETATION :572993}         Signed Electronically by: Russel Hsieh MD  A copy of this evaluation report is provided to the requesting physician.    {Provider Resources  Preop [facility] Preop Guidelines  Revised Cardiac Risk Index :069464}   {Email feedback regarding this note to [email or web address]   :842059}       --  211   < > 224  --    INR 1.09  --   --   --  1.02  --     138 139   < > 138 139   POTASSIUM 4.1 4.6 4.1   < > 4.8 4.5   CR 0.95 0.99 0.72   < > 0.95 0.90   A1C  --  7.9*  --   --   --  6.4*    < > = values in this interval not displayed.        Diagnostics     No EKG required, no history of coronary heart disease, significant arrhythmia, peripheral arterial disease or other structural heart disease.    Revised Cardiac Risk Index (RCRI)  The patient has the following serious cardiovascular risks for perioperative complications:   - No serious cardiac risks = 0 points     RCRI Interpretation: 0 points: Class I (very low risk - 0.4% complication rate)         Signed Electronically by: Russel Hsieh MD  A copy of this evaluation report is provided to the requesting physician.

## 2025-05-31 PROBLEM — G40.909 SEIZURE DISORDER (H): Status: ACTIVE | Noted: 2025-05-31

## 2025-06-04 ENCOUNTER — OFFICE VISIT (OUTPATIENT)
Dept: NEUROPSYCHOLOGY | Facility: CLINIC | Age: 45
End: 2025-06-04
Payer: COMMERCIAL

## 2025-06-04 ENCOUNTER — THERAPY VISIT (OUTPATIENT)
Dept: PHYSICAL THERAPY | Facility: CLINIC | Age: 45
End: 2025-06-04
Attending: INTERNAL MEDICINE
Payer: COMMERCIAL

## 2025-06-04 DIAGNOSIS — S39.012A STRAIN OF LUMBAR REGION, INITIAL ENCOUNTER: ICD-10-CM

## 2025-06-04 DIAGNOSIS — C71.1 MALIGNANT NEOPLASM OF FRONTAL LOBE OF BRAIN (H): Primary | ICD-10-CM

## 2025-06-04 PROCEDURE — 97110 THERAPEUTIC EXERCISES: CPT | Mod: GP | Performed by: PHYSICAL THERAPIST

## 2025-06-04 PROCEDURE — 99207 PR NO CHARGE LOS: CPT

## 2025-06-04 PROCEDURE — 97161 PT EVAL LOW COMPLEX 20 MIN: CPT | Mod: GP | Performed by: PHYSICAL THERAPIST

## 2025-06-04 NOTE — PROGRESS NOTES
PHYSICAL THERAPY EVALUATION  Type of Visit: Evaluation       Fall Risk Screen:  Have you fallen 2 or more times in the past year?: No  Have you fallen and had an injury in the past year?: No    Subjective   Pt notes LBP at this time without radiating sx at this time. Central pain. Pt notes starting at about L4-5. Pt notes this started after his seizure about 1.5 weeks ago. Greater pain with rising from chair. Better when sitting. Pain with transition positions from sidelying to sitting. Stiffness once standing. Pain still present when ambulating. Decrease in pain with STS at this time since the start.      Presenting condition or subjective complaint: Lower back hurts when moving side to side or standing  Date of onset: 05/26/25 (seizure)    Relevant medical history: Cancer; Diabetes; High blood pressure; Overweight; Seizures; Vision problems   Dates & types of surgery: March 7 brain biopsy    Prior diagnostic imaging/testing results:       Prior therapy history for the same diagnosis, illness or injury: No      Prior Level of Function  Independent with all mobility    Living Environment  Social support: With a significant other or spouse   Type of home: House; Multi-level   Stairs to enter the home: Yes 3 Is there a railing: Yes     Ramp: No   Stairs inside the home: Yes 12 Is there a railing: Yes     Help at home: None  Equipment owned:       Employment: Yes   Hobbies/Interests: Board games and painting miniatures    Patient goals for therapy: Be pain freee    Pain assessment: Pain present     Objective   LUMBAR SPINE EVALUATION  PAIN: Pain Level at Rest: 3/10  Pain Level with Use: 5/10  Pain is Exacerbated By: transitions  GAIT: wide JOHNATHON, toeing out b/l, small steps, decreased trunk swing  ROM: lumbar flexion- able to complete with greater discomfort when rising and reaches midshin/knee;  lumbar ext- minimal motion with an increase in pain;  SB- reaches mid thigh with pain on ipsilateral side;  IR in  90/90- neutral position without motion  STRENGTH: pain with hip flexion, quad, HS, DF- WFL, ER- 5/5  FLEXIBILITY: HS good, tightness in hip flexors  LUMBAR/HIP Special Tests: - SLR, - SLUMP test    FUNCTIONAL TESTS: assistance with transfer from sidelying to sitting due to pain to the area,   PALPATION: no tenderness noted with pressure  SPINAL SEGMENTAL CONCLUSIONS: restricted with extension    Assessment & Plan   CLINICAL IMPRESSIONS  Medical Diagnosis: Strain of lumbar region, initial encounter (S39.012A)    Treatment Diagnosis: LBP   Impression/Assessment: Patient is a 45 year old male with LBP complaints.  The following significant findings have been identified: Pain, Decreased ROM/flexibility, Decreased strength, Impaired gait, Impaired muscle performance, and Decreased activity tolerance. These impairments interfere with their ability to perform self care tasks, work tasks, recreational activities, household chores, household mobility, and community mobility as compared to previous level of function.     Clinical Decision Making (Complexity):  Clinical Presentation: Evolving/Changing  Clinical Presentation Rationale: based on medical and personal factors listed in PT evaluation  Clinical Decision Making (Complexity): Low complexity    PLAN OF CARE  Treatment Interventions:  Modalities: Hot Pack  Interventions: Gait Training, Manual Therapy, Neuromuscular Re-education, Therapeutic Activity, Therapeutic Exercise, Self-Care/Home Management    Long Term Goals     PT Goal 1  Goal Identifier: STG  Goal Description: Pt will have no pain with transitions from chair and rising out of bed in 4 weeks to continue all activities.  Rationale: to maximize safety and independence within the home;to maximize safety and independence within the community  Target Date: 07/02/25  PT Goal 2  Goal Identifier: LTG  Goal Description: Pt will have 50% of lumbar extension motion to increase mobility for the pt in 8 weeks.  Rationale:  to maximize safety and independence within the community;to maximize safety and independence within the home  Target Date: 07/30/25      Frequency of Treatment: 1x/wk  Duration of Treatment: 8 weeks    Education Assessment:   Learner/Method: Patient;Listening;Demonstration;No Barriers to Learning    Risks and benefits of evaluation/treatment have been explained.   Patient/Family/caregiver agrees with Plan of Care.     Evaluation Time:     PT Eval, Low Complexity Minutes (66205): 20     Signing Clinician: Katerina Doshi PT

## 2025-06-04 NOTE — PROGRESS NOTES
Mr. Younger presented today to update his baseline on a picture naming task for mapping and monitoring of speech language functions during his upcoming awake craniotomy on 6/9/25 (99% accuracy).

## 2025-06-05 RX ORDER — ACYCLOVIR 800 MG/1
1 TABLET ORAL
Qty: 6 EACH | Refills: 11 | OUTPATIENT
Start: 2025-06-05

## 2025-06-06 ENCOUNTER — HOSPITAL ENCOUNTER (OUTPATIENT)
Dept: MRI IMAGING | Facility: HOSPITAL | Age: 45
Discharge: HOME | End: 2025-06-06
Attending: NEUROLOGICAL SURGERY | Admitting: NEUROLOGICAL SURGERY
Payer: COMMERCIAL

## 2025-06-06 DIAGNOSIS — C71.1 OLIGODENDROGLIOMA OF FRONTAL LOBE (H): ICD-10-CM

## 2025-06-06 PROCEDURE — 255N000002 HC RX 255 OP 636: Performed by: NEUROLOGICAL SURGERY

## 2025-06-06 PROCEDURE — 70552 MRI BRAIN STEM W/DYE: CPT

## 2025-06-06 PROCEDURE — A9585 GADOBUTROL INJECTION: HCPCS | Performed by: NEUROLOGICAL SURGERY

## 2025-06-06 RX ORDER — GADOBUTROL 604.72 MG/ML
0.1 INJECTION INTRAVENOUS ONCE
Status: COMPLETED | OUTPATIENT
Start: 2025-06-06 | End: 2025-06-06

## 2025-06-06 RX ADMIN — GADOBUTROL 15 ML: 604.72 INJECTION INTRAVENOUS at 11:01

## 2025-06-08 ENCOUNTER — ANESTHESIA EVENT (OUTPATIENT)
Dept: SURGERY | Facility: CLINIC | Age: 45
End: 2025-06-08
Payer: COMMERCIAL

## 2025-06-08 NOTE — ANESTHESIA PREPROCEDURE EVALUATION
Anesthesia Pre-Procedure Evaluation    Patient: Jeffry Younger   MRN: [id number] : 1980          Procedure : Procedure(s):  stealth assisted left awake craniotomy with tumor resection and speech and motor mapping         Past Medical History:   Diagnosis Date    Diabetes (H) 2018    Treating metformin    Essential hypertension 2024    Obesity     Sleep apnea       Past Surgical History:   Procedure Laterality Date    AS ESOPHAGOSCOPY, DIAGNOSTIC      EGD    BIOPSY      egd normal    OPTICAL TRACKING SYSTEM BIOPSY BRAIN Left 3/7/2025    Procedure: stealth assisted LEFT FRONTAL BIOPSY BRAIN;  Surgeon: Genaro Carver MD;  Location: [facility] OR      No Known Allergies   Social History     Tobacco Use    Smoking status: Never     Passive exposure: Never    Smokeless tobacco: Never   Substance Use Topics    Alcohol use: Yes     Comment: 1 per month maybe      Wt Readings from Last 1 Encounters:   25 (!) 177.8 kg (392 lb)        Anesthesia Evaluation   Pt has had prior anesthetic.     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         Obesity,       Psychiatric/Substance Use:  - neg psychiatric ROS   (+) psychiatric history anxiety       Infectious Disease:  - neg infectious disease ROS     Malignancy:  - neg malignancy ROS    "  Other:  - neg other ROS            Physical Exam  Airway  Mallampati: II  TM distance: >3 FB  Upper bite lip test: II  Mouth opening: >= 4 cm    Cardiovascular   Rhythm: regular     Dental   (+) Modest Abnormalities - crowns, retainers, 1 or 2 missing teeth      Pulmonary - normal exam      Neurological   He appears awake, alert and oriented x3.    Other Findings       OUTSIDE LABS:  CBC:   Lab Results   Component Value Date    WBC 7.5 05/26/2025    WBC 9.8 03/08/2025    HGB 15.8 05/26/2025    HGB 14.6 03/08/2025    HCT 47.5 05/26/2025    HCT 44.1 03/08/2025     05/26/2025     03/08/2025     BMP:   Lab Results   Component Value Date     05/26/2025     05/13/2025    POTASSIUM 4.1 05/26/2025    POTASSIUM 4.6 05/13/2025    CHLORIDE 106 05/26/2025    CHLORIDE 105 05/13/2025    CO2 12 (L) 05/26/2025    CO2 23 05/13/2025    BUN 16.0 05/26/2025    BUN 14.7 05/13/2025    CR 0.95 05/26/2025    CR 0.99 05/13/2025     (H) 05/26/2025     (H) 05/13/2025     COAGS:   Lab Results   Component Value Date    INR 1.09 05/26/2025     POC: No results found for: \"BGM\", \"HCG\", \"HCGS\"  HEPATIC:   Lab Results   Component Value Date    ALBUMIN 4.1 05/26/2025    PROTTOTAL 6.9 05/26/2025    ALT 24 05/26/2025    AST 26 05/26/2025    ALKPHOS 112 05/26/2025    BILITOTAL 0.7 05/26/2025     OTHER:   Lab Results   Component Value Date    PH 7.42 03/06/2017    LACT 1.1 05/26/2025    A1C 7.9 (H) 05/13/2025    CALLY 9.2 05/26/2025    PHOS 3.3 03/08/2025    MAG 2.4 (H) 05/26/2025    TSH 0.95 11/15/2021    SED 7 02/26/2025       Anesthesia Plan    ASA Status:  3      NPO Status: NPO Appropriate   Anesthesia Type: MAC.  Airway: oral.  Induction: intravenous.  Maintenance: Balanced.   Techniques and Equipment:     - Airway:  Planned airway equipment includes video laryngoscope.     - Monitoring Plan: standard ASA monitoring, electrophysiologic monitoring, processed EEG monitor     Consents    Anesthesia Plan(s) and " "associated risks, benefits, and realistic alternatives discussed. Questions answered and patient/representative(s) expressed understanding.     - Discussed: anesthesiologist     - Discussed with:  Family, patient          Blood Consent:      - Discussed with: not discussed.     Postoperative Care    Pain management: non-narcotic analgesics, plan for postoperative opioid use, multimodal analgesia.     Comments:                   Katerina Samuel DO    I have reviewed the pertinent notes and labs in the chart from the past 30 days and (re)examined the patient.  Any updates or changes from those notes are reflected in this note.    Clinically Significant Risk Factors Present on Admission                   # Hypertension: Noted on problem list          # DMII: A1C = 7.9 % (Ref range: 0.0 - 5.6 %) within past 6 months    # Morbid Obesity: Estimated body mass index is 52.18 kg/m  as calculated from the following:    Height as of 5/29/25: 1.846 m (6' 0.68\").    Weight as of 5/29/25: 177.8 kg (392 lb).                    "

## 2025-06-09 ENCOUNTER — ANESTHESIA (OUTPATIENT)
Dept: SURGERY | Facility: CLINIC | Age: 45
End: 2025-06-09
Payer: COMMERCIAL

## 2025-06-09 ENCOUNTER — HOSPITAL ENCOUNTER (INPATIENT)
Facility: CLINIC | Age: 45
LOS: 3 days | Discharge: HOME | DRG: 025 | End: 2025-06-12
Attending: NEUROLOGICAL SURGERY | Admitting: NEUROLOGICAL SURGERY
Payer: COMMERCIAL

## 2025-06-09 DIAGNOSIS — Z98.890 S/P CRANIOTOMY: Primary | ICD-10-CM

## 2025-06-09 DIAGNOSIS — E11.65 TYPE 2 DIABETES MELLITUS WITH HYPERGLYCEMIA, WITHOUT LONG-TERM CURRENT USE OF INSULIN (H): ICD-10-CM

## 2025-06-09 LAB
ABO + RH BLD: NORMAL
BLD GP AB SCN SERPL QL: NEGATIVE
GLUCOSE BLDC GLUCOMTR-MCNC: 154 MG/DL (ref 70–99)
GLUCOSE BLDC GLUCOMTR-MCNC: 180 MG/DL (ref 70–99)
GLUCOSE BLDC GLUCOMTR-MCNC: 203 MG/DL (ref 70–99)
GLUCOSE BLDC GLUCOMTR-MCNC: 227 MG/DL (ref 70–99)
GLUCOSE BLDC GLUCOMTR-MCNC: 262 MG/DL (ref 70–99)
GLUCOSE BLDC GLUCOMTR-MCNC: 274 MG/DL (ref 70–99)
GLUCOSE BLDC GLUCOMTR-MCNC: 274 MG/DL (ref 70–99)
LAB DIRECTOR COMMENTS: ABNORMAL
LAB DIRECTOR DISCLAIMER: ABNORMAL
LAB DIRECTOR INTERPRETATION: ABNORMAL
LAB DIRECTOR METHODOLOGY: ABNORMAL
LAB DIRECTOR RESULTS: ABNORMAL
SPECIMEN EXP DATE BLD: NORMAL
SPECIMEN TYPE: ABNORMAL

## 2025-06-09 PROCEDURE — 250N000011 HC RX IP 250 OP 636

## 2025-06-09 PROCEDURE — 999N000141 HC STATISTIC PRE-PROCEDURE NURSING ASSESSMENT: Performed by: NEUROLOGICAL SURGERY

## 2025-06-09 PROCEDURE — 250N000009 HC RX 250: Performed by: NEUROLOGICAL SURGERY

## 2025-06-09 PROCEDURE — 88341 IMHCHEM/IMCYTCHM EA ADD ANTB: CPT | Mod: 26 | Performed by: STUDENT IN AN ORGANIZED HEALTH CARE EDUCATION/TRAINING PROGRAM

## 2025-06-09 PROCEDURE — 00B70ZZ EXCISION OF CEREBRAL HEMISPHERE, OPEN APPROACH: ICD-10-PCS | Performed by: NEUROLOGICAL SURGERY

## 2025-06-09 PROCEDURE — 86901 BLOOD TYPING SEROLOGIC RH(D): CPT | Performed by: NEUROLOGICAL SURGERY

## 2025-06-09 PROCEDURE — 250N000013 HC RX MED GY IP 250 OP 250 PS 637

## 2025-06-09 PROCEDURE — 8E09XBH COMPUTER ASSISTED PROCEDURE OF HEAD AND NECK REGION, WITH MAGNETIC RESONANCE IMAGING: ICD-10-PCS | Performed by: NEUROLOGICAL SURGERY

## 2025-06-09 PROCEDURE — 250N000011 HC RX IP 250 OP 636: Performed by: NEUROLOGICAL SURGERY

## 2025-06-09 PROCEDURE — 88360 TUMOR IMMUNOHISTOCHEM/MANUAL: CPT | Mod: TC | Performed by: NEUROLOGICAL SURGERY

## 2025-06-09 PROCEDURE — 999N000128 HC STATISTIC PERIPHERAL IV START W/O US GUIDANCE

## 2025-06-09 PROCEDURE — 61781 SCAN PROC CRANIAL INTRA: CPT | Mod: GC | Performed by: NEUROLOGICAL SURGERY

## 2025-06-09 PROCEDURE — 88342 IMHCHEM/IMCYTCHM 1ST ANTB: CPT | Mod: 26 | Performed by: STUDENT IN AN ORGANIZED HEALTH CARE EDUCATION/TRAINING PROGRAM

## 2025-06-09 PROCEDURE — 96132 NRPSYC TST EVAL PHYS/QHP 1ST: CPT

## 2025-06-09 PROCEDURE — 250N000012 HC RX MED GY IP 250 OP 636 PS 637

## 2025-06-09 PROCEDURE — 00D70ZZ EXTRACTION OF CEREBRAL HEMISPHERE, OPEN APPROACH: ICD-10-PCS | Performed by: NEUROLOGICAL SURGERY

## 2025-06-09 PROCEDURE — C1763 CONN TISS, NON-HUMAN: HCPCS | Performed by: NEUROLOGICAL SURGERY

## 2025-06-09 PROCEDURE — C1713 ANCHOR/SCREW BN/BN,TIS/BN: HCPCS | Performed by: NEUROLOGICAL SURGERY

## 2025-06-09 PROCEDURE — 88307 TISSUE EXAM BY PATHOLOGIST: CPT | Mod: 26 | Performed by: STUDENT IN AN ORGANIZED HEALTH CARE EDUCATION/TRAINING PROGRAM

## 2025-06-09 PROCEDURE — 272N000001 HC OR GENERAL SUPPLY STERILE: Performed by: NEUROLOGICAL SURGERY

## 2025-06-09 PROCEDURE — 96133 NRPSYC TST EVAL PHYS/QHP EA: CPT

## 2025-06-09 PROCEDURE — 86900 BLOOD TYPING SEROLOGIC ABO: CPT | Performed by: NEUROLOGICAL SURGERY

## 2025-06-09 PROCEDURE — 200N000002 HC R&B ICU UMMC

## 2025-06-09 PROCEDURE — 258N000003 HC RX IP 258 OP 636: Performed by: NURSE ANESTHETIST, CERTIFIED REGISTERED

## 2025-06-09 PROCEDURE — 61510 CRNEC TREPH EXC BRN TUM STTL: CPT | Mod: GC | Performed by: NEUROLOGICAL SURGERY

## 2025-06-09 PROCEDURE — 250N000011 HC RX IP 250 OP 636: Mod: JZ | Performed by: NEUROLOGICAL SURGERY

## 2025-06-09 PROCEDURE — 258N000003 HC RX IP 258 OP 636

## 2025-06-09 PROCEDURE — 4A1004G MONITORING OF CENTRAL NERVOUS ELECTRICAL ACTIVITY, INTRAOPERATIVE, OPEN APPROACH: ICD-10-PCS | Performed by: NEUROLOGICAL SURGERY

## 2025-06-09 PROCEDURE — 88360 TUMOR IMMUNOHISTOCHEM/MANUAL: CPT | Mod: 26 | Performed by: STUDENT IN AN ORGANIZED HEALTH CARE EDUCATION/TRAINING PROGRAM

## 2025-06-09 PROCEDURE — 88341 IMHCHEM/IMCYTCHM EA ADD ANTB: CPT | Mod: TC | Performed by: NEUROLOGICAL SURGERY

## 2025-06-09 PROCEDURE — 370N000017 HC ANESTHESIA TECHNICAL FEE, PER MIN: Performed by: NEUROLOGICAL SURGERY

## 2025-06-09 PROCEDURE — 710N000010 HC RECOVERY PHASE 1, LEVEL 2, PER MIN: Performed by: NEUROLOGICAL SURGERY

## 2025-06-09 PROCEDURE — 250N000009 HC RX 250

## 2025-06-09 PROCEDURE — 360N000077 HC SURGERY LEVEL 4, PER MIN: Performed by: NEUROLOGICAL SURGERY

## 2025-06-09 DEVICE — GRAFT DURAGEN 3X3" ID330: Type: IMPLANTABLE DEVICE | Site: BRAIN | Status: FUNCTIONAL

## 2025-06-09 DEVICE — IMP SCR SYN MATRIX LOW PRO 1.5X04MM SELF DRILL 04.503.104.01: Type: IMPLANTABLE DEVICE | Site: CRANIAL | Status: FUNCTIONAL

## 2025-06-09 DEVICE — IMP PLATE SYN MATRIXNEURO STR 2 HOLE 12MM  04.503.062: Type: IMPLANTABLE DEVICE | Site: CRANIAL | Status: FUNCTIONAL

## 2025-06-09 DEVICE — IMP PLATE SYN BURR HOLE COVER 17MM 04.503.023: Type: IMPLANTABLE DEVICE | Site: CRANIAL | Status: FUNCTIONAL

## 2025-06-09 RX ORDER — ONDANSETRON 2 MG/ML
4 INJECTION INTRAMUSCULAR; INTRAVENOUS EVERY 30 MIN PRN
Status: DISCONTINUED | OUTPATIENT
Start: 2025-06-09 | End: 2025-06-09 | Stop reason: HOSPADM

## 2025-06-09 RX ORDER — DEXAMETHASONE SODIUM PHOSPHATE 4 MG/ML
INJECTION, SOLUTION INTRA-ARTICULAR; INTRALESIONAL; INTRAMUSCULAR; INTRAVENOUS; SOFT TISSUE PRN
Status: DISCONTINUED | OUTPATIENT
Start: 2025-06-09 | End: 2025-06-09

## 2025-06-09 RX ORDER — HYDROMORPHONE HCL IN WATER/PF 6 MG/30 ML
0.2 PATIENT CONTROLLED ANALGESIA SYRINGE INTRAVENOUS EVERY 5 MIN PRN
Status: DISCONTINUED | OUTPATIENT
Start: 2025-06-09 | End: 2025-06-09 | Stop reason: HOSPADM

## 2025-06-09 RX ORDER — ACETAMINOPHEN 325 MG/1
975 TABLET ORAL EVERY 8 HOURS
Status: DISCONTINUED | OUTPATIENT
Start: 2025-06-09 | End: 2025-06-11

## 2025-06-09 RX ORDER — SODIUM CHLORIDE, SODIUM LACTATE, POTASSIUM CHLORIDE, CALCIUM CHLORIDE 600; 310; 30; 20 MG/100ML; MG/100ML; MG/100ML; MG/100ML
INJECTION, SOLUTION INTRAVENOUS CONTINUOUS
Status: DISCONTINUED | OUTPATIENT
Start: 2025-06-09 | End: 2025-06-09 | Stop reason: HOSPADM

## 2025-06-09 RX ORDER — LIDOCAINE 40 MG/G
CREAM TOPICAL
Status: DISCONTINUED | OUTPATIENT
Start: 2025-06-09 | End: 2025-06-09 | Stop reason: HOSPADM

## 2025-06-09 RX ORDER — OXYCODONE HYDROCHLORIDE 5 MG/1
5 TABLET ORAL EVERY 4 HOURS PRN
Status: DISCONTINUED | OUTPATIENT
Start: 2025-06-09 | End: 2025-06-12 | Stop reason: HOSPADM

## 2025-06-09 RX ORDER — DEXAMETHASONE 2 MG/1
2 TABLET ORAL EVERY 12 HOURS SCHEDULED
Status: DISCONTINUED | OUTPATIENT
Start: 2025-06-11 | End: 2025-06-12 | Stop reason: HOSPADM

## 2025-06-09 RX ORDER — METHOCARBAMOL 500 MG/1
500 TABLET, FILM COATED ORAL EVERY 6 HOURS PRN
Status: DISCONTINUED | OUTPATIENT
Start: 2025-06-09 | End: 2025-06-12 | Stop reason: HOSPADM

## 2025-06-09 RX ORDER — OXYCODONE HYDROCHLORIDE 10 MG/1
10 TABLET ORAL EVERY 4 HOURS PRN
Status: DISCONTINUED | OUTPATIENT
Start: 2025-06-09 | End: 2025-06-12 | Stop reason: HOSPADM

## 2025-06-09 RX ORDER — POLYETHYLENE GLYCOL 3350 17 G/17G
17 POWDER, FOR SOLUTION ORAL DAILY
Status: DISCONTINUED | OUTPATIENT
Start: 2025-06-10 | End: 2025-06-12 | Stop reason: HOSPADM

## 2025-06-09 RX ORDER — DEXMEDETOMIDINE HYDROCHLORIDE 4 UG/ML
INJECTION, SOLUTION INTRAVENOUS CONTINUOUS PRN
Status: DISCONTINUED | OUTPATIENT
Start: 2025-06-09 | End: 2025-06-09

## 2025-06-09 RX ORDER — HYDRALAZINE HYDROCHLORIDE 20 MG/ML
10 INJECTION INTRAMUSCULAR; INTRAVENOUS EVERY 30 MIN PRN
Status: DISCONTINUED | OUTPATIENT
Start: 2025-06-09 | End: 2025-06-12 | Stop reason: HOSPADM

## 2025-06-09 RX ORDER — NALOXONE HYDROCHLORIDE 0.4 MG/ML
0.1 INJECTION, SOLUTION INTRAMUSCULAR; INTRAVENOUS; SUBCUTANEOUS
Status: DISCONTINUED | OUTPATIENT
Start: 2025-06-09 | End: 2025-06-09 | Stop reason: HOSPADM

## 2025-06-09 RX ORDER — FENTANYL CITRATE 50 UG/ML
25 INJECTION, SOLUTION INTRAMUSCULAR; INTRAVENOUS EVERY 5 MIN PRN
Status: DISCONTINUED | OUTPATIENT
Start: 2025-06-09 | End: 2025-06-09 | Stop reason: HOSPADM

## 2025-06-09 RX ORDER — ATORVASTATIN CALCIUM 10 MG/1
10 TABLET, FILM COATED ORAL DAILY
Status: DISCONTINUED | OUTPATIENT
Start: 2025-06-10 | End: 2025-06-12 | Stop reason: HOSPADM

## 2025-06-09 RX ORDER — DEXAMETHASONE SODIUM PHOSPHATE 4 MG/ML
4 INJECTION, SOLUTION INTRA-ARTICULAR; INTRALESIONAL; INTRAMUSCULAR; INTRAVENOUS; SOFT TISSUE
Status: DISCONTINUED | OUTPATIENT
Start: 2025-06-09 | End: 2025-06-09 | Stop reason: HOSPADM

## 2025-06-09 RX ORDER — DEXAMETHASONE 1 MG
1 TABLET ORAL DAILY
Status: DISCONTINUED | OUTPATIENT
Start: 2025-06-16 | End: 2025-06-12 | Stop reason: HOSPADM

## 2025-06-09 RX ORDER — NALOXONE HYDROCHLORIDE 0.4 MG/ML
0.4 INJECTION, SOLUTION INTRAMUSCULAR; INTRAVENOUS; SUBCUTANEOUS
Status: DISCONTINUED | OUTPATIENT
Start: 2025-06-09 | End: 2025-06-12 | Stop reason: HOSPADM

## 2025-06-09 RX ORDER — HYDROMORPHONE HCL IN WATER/PF 6 MG/30 ML
0.2 PATIENT CONTROLLED ANALGESIA SYRINGE INTRAVENOUS
Status: DISCONTINUED | OUTPATIENT
Start: 2025-06-09 | End: 2025-06-12 | Stop reason: HOSPADM

## 2025-06-09 RX ORDER — LIDOCAINE 40 MG/G
CREAM TOPICAL
Status: DISCONTINUED | OUTPATIENT
Start: 2025-06-09 | End: 2025-06-12 | Stop reason: HOSPADM

## 2025-06-09 RX ORDER — FENTANYL CITRATE 50 UG/ML
INJECTION, SOLUTION INTRAMUSCULAR; INTRAVENOUS PRN
Status: DISCONTINUED | OUTPATIENT
Start: 2025-06-09 | End: 2025-06-09

## 2025-06-09 RX ORDER — NALOXONE HYDROCHLORIDE 0.4 MG/ML
0.2 INJECTION, SOLUTION INTRAMUSCULAR; INTRAVENOUS; SUBCUTANEOUS
Status: DISCONTINUED | OUTPATIENT
Start: 2025-06-09 | End: 2025-06-12 | Stop reason: HOSPADM

## 2025-06-09 RX ORDER — LABETALOL HYDROCHLORIDE 5 MG/ML
10 INJECTION, SOLUTION INTRAVENOUS EVERY 10 MIN PRN
Status: DISCONTINUED | OUTPATIENT
Start: 2025-06-09 | End: 2025-06-12 | Stop reason: HOSPADM

## 2025-06-09 RX ORDER — ONDANSETRON 2 MG/ML
4 INJECTION INTRAMUSCULAR; INTRAVENOUS EVERY 6 HOURS PRN
Status: DISCONTINUED | OUTPATIENT
Start: 2025-06-09 | End: 2025-06-12 | Stop reason: HOSPADM

## 2025-06-09 RX ORDER — ONDANSETRON 4 MG/1
4 TABLET, ORALLY DISINTEGRATING ORAL EVERY 30 MIN PRN
Status: DISCONTINUED | OUTPATIENT
Start: 2025-06-09 | End: 2025-06-09 | Stop reason: HOSPADM

## 2025-06-09 RX ORDER — LEVETIRACETAM 750 MG/1
750 TABLET ORAL 2 TIMES DAILY
Status: DISCONTINUED | OUTPATIENT
Start: 2025-06-09 | End: 2025-06-12 | Stop reason: HOSPADM

## 2025-06-09 RX ORDER — DEXTROSE MONOHYDRATE 25 G/50ML
25-50 INJECTION, SOLUTION INTRAVENOUS
Status: DISCONTINUED | OUTPATIENT
Start: 2025-06-09 | End: 2025-06-10

## 2025-06-09 RX ORDER — ONDANSETRON 4 MG/1
4 TABLET, ORALLY DISINTEGRATING ORAL EVERY 8 HOURS PRN
Status: DISCONTINUED | OUTPATIENT
Start: 2025-06-09 | End: 2025-06-09 | Stop reason: DRUGHIGH

## 2025-06-09 RX ORDER — CEFAZOLIN SODIUM/WATER 3 G/30 ML
3 SYRINGE (ML) INTRAVENOUS SEE ADMIN INSTRUCTIONS
Status: DISCONTINUED | OUTPATIENT
Start: 2025-06-09 | End: 2025-06-09 | Stop reason: HOSPADM

## 2025-06-09 RX ORDER — ONDANSETRON 4 MG/1
4 TABLET, ORALLY DISINTEGRATING ORAL EVERY 6 HOURS PRN
Status: DISCONTINUED | OUTPATIENT
Start: 2025-06-09 | End: 2025-06-12 | Stop reason: HOSPADM

## 2025-06-09 RX ORDER — CYCLOBENZAPRINE HCL 10 MG
10 TABLET ORAL 3 TIMES DAILY PRN
Status: DISCONTINUED | OUTPATIENT
Start: 2025-06-09 | End: 2025-06-12 | Stop reason: HOSPADM

## 2025-06-09 RX ORDER — PROCHLORPERAZINE MALEATE 10 MG
10 TABLET ORAL EVERY 6 HOURS PRN
Status: DISCONTINUED | OUTPATIENT
Start: 2025-06-09 | End: 2025-06-12 | Stop reason: HOSPADM

## 2025-06-09 RX ORDER — PANTOPRAZOLE SODIUM 40 MG/1
40 TABLET, DELAYED RELEASE ORAL
Status: DISCONTINUED | OUTPATIENT
Start: 2025-06-10 | End: 2025-06-12 | Stop reason: HOSPADM

## 2025-06-09 RX ORDER — SODIUM CHLORIDE, SODIUM LACTATE, POTASSIUM CHLORIDE, CALCIUM CHLORIDE 600; 310; 30; 20 MG/100ML; MG/100ML; MG/100ML; MG/100ML
INJECTION, SOLUTION INTRAVENOUS CONTINUOUS PRN
Status: DISCONTINUED | OUTPATIENT
Start: 2025-06-09 | End: 2025-06-09

## 2025-06-09 RX ORDER — FENTANYL CITRATE 50 UG/ML
50 INJECTION, SOLUTION INTRAMUSCULAR; INTRAVENOUS EVERY 5 MIN PRN
Status: DISCONTINUED | OUTPATIENT
Start: 2025-06-09 | End: 2025-06-09 | Stop reason: HOSPADM

## 2025-06-09 RX ORDER — HYDROMORPHONE HCL IN WATER/PF 6 MG/30 ML
0.4 PATIENT CONTROLLED ANALGESIA SYRINGE INTRAVENOUS
Status: DISCONTINUED | OUTPATIENT
Start: 2025-06-09 | End: 2025-06-12 | Stop reason: HOSPADM

## 2025-06-09 RX ORDER — AMOXICILLIN 250 MG
1 CAPSULE ORAL 2 TIMES DAILY
Status: DISCONTINUED | OUTPATIENT
Start: 2025-06-09 | End: 2025-06-12 | Stop reason: HOSPADM

## 2025-06-09 RX ORDER — DEXAMETHASONE 1 MG
1 TABLET ORAL EVERY 12 HOURS SCHEDULED
Status: DISCONTINUED | OUTPATIENT
Start: 2025-06-13 | End: 2025-06-12 | Stop reason: HOSPADM

## 2025-06-09 RX ORDER — NICOTINE POLACRILEX 4 MG
15-30 LOZENGE BUCCAL
Status: DISCONTINUED | OUTPATIENT
Start: 2025-06-09 | End: 2025-06-10

## 2025-06-09 RX ORDER — DEXAMETHASONE 4 MG/1
4 TABLET ORAL EVERY 12 HOURS SCHEDULED
Status: COMPLETED | OUTPATIENT
Start: 2025-06-09 | End: 2025-06-11

## 2025-06-09 RX ORDER — PROPOFOL 10 MG/ML
INJECTION, EMULSION INTRAVENOUS PRN
Status: DISCONTINUED | OUTPATIENT
Start: 2025-06-09 | End: 2025-06-09

## 2025-06-09 RX ORDER — LORAZEPAM 0.5 MG/1
0.5 TABLET ORAL EVERY 6 HOURS PRN
Status: DISCONTINUED | OUTPATIENT
Start: 2025-06-09 | End: 2025-06-12 | Stop reason: HOSPADM

## 2025-06-09 RX ORDER — HYDROMORPHONE HCL IN WATER/PF 6 MG/30 ML
0.4 PATIENT CONTROLLED ANALGESIA SYRINGE INTRAVENOUS EVERY 5 MIN PRN
Status: DISCONTINUED | OUTPATIENT
Start: 2025-06-09 | End: 2025-06-09 | Stop reason: HOSPADM

## 2025-06-09 RX ORDER — SODIUM CHLORIDE 9 MG/ML
INJECTION, SOLUTION INTRAVENOUS CONTINUOUS
Status: DISCONTINUED | OUTPATIENT
Start: 2025-06-09 | End: 2025-06-10

## 2025-06-09 RX ORDER — CEFAZOLIN SODIUM/WATER 3 G/30 ML
3 SYRINGE (ML) INTRAVENOUS
Status: COMPLETED | OUTPATIENT
Start: 2025-06-09 | End: 2025-06-09

## 2025-06-09 RX ORDER — LOSARTAN POTASSIUM 50 MG/1
100 TABLET ORAL DAILY
Status: DISCONTINUED | OUTPATIENT
Start: 2025-06-10 | End: 2025-06-10

## 2025-06-09 RX ORDER — PROPOFOL 10 MG/ML
INJECTION, EMULSION INTRAVENOUS CONTINUOUS PRN
Status: DISCONTINUED | OUTPATIENT
Start: 2025-06-09 | End: 2025-06-09

## 2025-06-09 RX ORDER — BISACODYL 10 MG
10 SUPPOSITORY, RECTAL RECTAL DAILY PRN
Status: DISCONTINUED | OUTPATIENT
Start: 2025-06-12 | End: 2025-06-12 | Stop reason: HOSPADM

## 2025-06-09 RX ORDER — CEFAZOLIN SODIUM 2 G/50ML
2 SOLUTION INTRAVENOUS EVERY 8 HOURS
Status: COMPLETED | OUTPATIENT
Start: 2025-06-09 | End: 2025-06-10

## 2025-06-09 RX ADMIN — CEFAZOLIN SODIUM 2 G: 2 SOLUTION INTRAVENOUS at 22:12

## 2025-06-09 RX ADMIN — ACETAMINOPHEN 975 MG: 325 TABLET, FILM COATED ORAL at 22:10

## 2025-06-09 RX ADMIN — FENTANYL CITRATE 50 MCG: 50 INJECTION INTRAMUSCULAR; INTRAVENOUS at 13:07

## 2025-06-09 RX ADMIN — LEVETIRACETAM 750 MG: 750 TABLET, FILM COATED ORAL at 22:10

## 2025-06-09 RX ADMIN — SODIUM CHLORIDE: 0.9 INJECTION, SOLUTION INTRAVENOUS at 16:51

## 2025-06-09 RX ADMIN — HYDRALAZINE HYDROCHLORIDE 10 MG: 20 INJECTION INTRAMUSCULAR; INTRAVENOUS at 17:05

## 2025-06-09 RX ADMIN — Medication 0.05 MCG/KG/HR: at 13:00

## 2025-06-09 RX ADMIN — LABETALOL HYDROCHLORIDE 10 MG: 5 INJECTION, SOLUTION INTRAVENOUS at 16:49

## 2025-06-09 RX ADMIN — LABETALOL HYDROCHLORIDE 10 MG: 5 INJECTION, SOLUTION INTRAVENOUS at 16:39

## 2025-06-09 RX ADMIN — PROPOFOL 20 MCG/KG/MIN: 10 INJECTION, EMULSION INTRAVENOUS at 12:59

## 2025-06-09 RX ADMIN — PROPOFOL 20 MG: 10 INJECTION, EMULSION INTRAVENOUS at 14:01

## 2025-06-09 RX ADMIN — PROPOFOL 20 MG: 10 INJECTION, EMULSION INTRAVENOUS at 13:24

## 2025-06-09 RX ADMIN — NICARDIPINE HYDROCHLORIDE 7.5 MG/HR: 0.2 INJECTION INTRAVENOUS at 23:35

## 2025-06-09 RX ADMIN — PROPOFOL 40 MG: 10 INJECTION, EMULSION INTRAVENOUS at 13:10

## 2025-06-09 RX ADMIN — DEXAMETHASONE SODIUM PHOSPHATE 10 MG: 4 INJECTION, SOLUTION INTRAMUSCULAR; INTRAVENOUS at 13:46

## 2025-06-09 RX ADMIN — PROPOFOL 20 MG: 10 INJECTION, EMULSION INTRAVENOUS at 13:11

## 2025-06-09 RX ADMIN — FENTANYL CITRATE 50 MCG: 50 INJECTION INTRAMUSCULAR; INTRAVENOUS at 13:24

## 2025-06-09 RX ADMIN — NICARDIPINE HYDROCHLORIDE 2.5 MG/HR: 0.2 INJECTION INTRAVENOUS at 17:52

## 2025-06-09 RX ADMIN — PROPOFOL 20 MG: 10 INJECTION, EMULSION INTRAVENOUS at 13:33

## 2025-06-09 RX ADMIN — DEXAMETHASONE 4 MG: 4 TABLET ORAL at 20:18

## 2025-06-09 RX ADMIN — PROPOFOL 20 MG: 10 INJECTION, EMULSION INTRAVENOUS at 13:30

## 2025-06-09 RX ADMIN — SODIUM CHLORIDE, SODIUM LACTATE, POTASSIUM CHLORIDE, AND CALCIUM CHLORIDE: .6; .31; .03; .02 INJECTION, SOLUTION INTRAVENOUS at 12:52

## 2025-06-09 RX ADMIN — PROPOFOL 20 MG: 10 INJECTION, EMULSION INTRAVENOUS at 13:27

## 2025-06-09 RX ADMIN — INSULIN GLARGINE 14 UNITS: 100 INJECTION, SOLUTION SUBCUTANEOUS at 22:24

## 2025-06-09 RX ADMIN — Medication 3 G: at 13:07

## 2025-06-09 ASSESSMENT — ACTIVITIES OF DAILY LIVING (ADL)
ADLS_ACUITY_SCORE: 36
ADLS_ACUITY_SCORE: 33
ADLS_ACUITY_SCORE: 36

## 2025-06-09 ASSESSMENT — ENCOUNTER SYMPTOMS: SEIZURES: 1

## 2025-06-09 NOTE — ANESTHESIA POSTPROCEDURE EVALUATION
Patient: Jeffry Younger    Procedure: Procedure(s):  stealth assisted left awake craniotomy with tumor resection and speech and motor mapping       Anesthesia Type:  MAC    Note:  Disposition: ICU            ICU Sign Out: Unable to perform physician to physician sign out   Postop Pain Control: Uneventful            Sign Out: Well controlled pain   PONV: No   Neuro/Psych: Uneventful            Sign Out: Acceptable/Baseline neuro status   Airway/Respiratory: Uneventful            Sign Out: Acceptable/Baseline resp. status   CV/Hemodynamics: Uneventful            Sign Out: Acceptable CV status; No obvious hypovolemia; No obvious fluid overload   Other NRE: NONE   DID A NON-ROUTINE EVENT OCCUR? No           Last vitals:  Vitals Value Taken Time   /89 06/09/25 16:30   Temp 36.6  C (97.9  F) 06/09/25 16:20   Pulse 97 06/09/25 16:38   Resp 18 06/09/25 16:38   SpO2 97 % 06/09/25 16:38   Vitals shown include unfiled device data.    Electronically Signed By: Kin Mcknight MD  June 9, 2025  4:39 PM

## 2025-06-09 NOTE — OR NURSING
[facility]  [facility]   Patient given a total of 20mg of labetalol and hydralazine 10mg. PT b/p still 153/74 did you want a drip ordered? Kinga [phone]

## 2025-06-09 NOTE — ANESTHESIA CARE TRANSFER NOTE
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    Anesthesia Type:   MAC     Note:    Oropharynx: oropharynx clear of all foreign objects  Level of Consciousness: awake  Oxygen Supplementation: nasal cannula    Independent Airway: airway patency satisfactory and stable  Dentition: dentition unchanged  Vital Signs Stable: post-procedure vital signs reviewed and stable  Report to RN Given: handoff report given  Patient transferred to: PACU    Handoff Report: Identifed the Patient, Identified the Reponsible Provider, Reviewed the pertinent medical history, Discussed the surgical course, Reviewed Intra-OP anesthesia mangement and issues during anesthesia, Set expectations for post-procedure period and Allowed opportunity for questions and acknowledgement of understanding  Vitals:  Vitals Value Taken Time   BP     Temp     Pulse 100 06/09/25 16:22   Resp     SpO2 98 % 06/09/25 16:22   Vitals shown include unfiled device data.    Electronically Signed By: EMMANUEL Longo CRNA  June 9, 2025  4:23 PM

## 2025-06-09 NOTE — OP NOTE
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]   Post-operative diagnosis Same   Procedure: Procedure(s):  stealth assisted left awake craniotomy with tumor resection and speech and motor mapping    Surgeon(s): Surgeons and Role:     * Alon Mccarthy MD - Primary     * Peterson Aguirre MD - Resident - Assisting   Estimated blood loss:  100ml   Specimens: ID Type Source Tests Collected by Time Destination   1 : Left Frontal Tumor Tissue Brain SURGICAL PATHOLOGY EXAM Alon Mccarthy MD 6/9/2025  3:19 PM    2 : Left Frontal Tumor (sonopet contents) Tissue Brain SURGICAL PATHOLOGY EXAM Alon Mccarthy MD 6/9/2025  3:20 PM       Findings: Left frontal craniotomy performed with intraoperative motor and speech mapping.  Motor cortex clearly identified.  Tumor resected anterior to motor cortex and superior to speech areas.         Indications: Patient is a 45-year-old male who presented with a seizure and a left frontal tumor.  Initial biopsy demonstrated an oligodendroglioma.  He is brought for surgical resection with motor and speech mapping.    Description of procedure: Patient was brought to the operating room.  Sedation was begun and the patient was placed in the Posada head smith with the head turned to the right.  The Stealth neuronavigation system was registered and used to plan the craniotomy and incision.  The left side of the head was prepped and draped in sterile fashion after the scalp was infiltrated with local anesthetic and a ring block along with field block of the intended incision.  A linear incision was made over the intended craniotomy.  The previous bur hole was exposed and the bur hole cover removed.  The craniotomy was then turned from the previous bur hole over the intended area of resection.  The dura was opened and reflected inferiorly.  Electrocorticography  was begun with a strip electrode to monitor for seizures.  Using the Ojemann probe cortical stimulation was performed in the face motor areas clearly identified in the gyrus behind the tumor.  The tumor was then debulked at the superior aspect and posterior aspect in order to provide some brain relaxation and help access some of the inferior aspects of the tumor.  Further stimulation inferiorly showed some intermittent areas of speech arrest.  Using Stealth guidance, sono PET aspiration as well as blunt and sharp dissection the tumor was serially debulked.  It had a very infiltrative nature but areas of more normal white matter were identified at the periphery of the tumor.  Intermittent cortical stimulation with speech mapping and motor mapping was performed to help guide the resection as well.  Once the area of tumor was felt to be well resected, hemostasis was achieved.  The dura was reapproximated with 4-0 Nurolon.  A piece of DuraGen was placed over the dural defect.  The bone flap was plated back to place with a Synthes low-profile cranial plating system.  The temporalis muscle and fascia were closed with 0 Vicryl.  2-0 Vicryl was used for the galea.  3-0 nylon was used for skin.

## 2025-06-09 NOTE — PROVIDER NOTIFICATION
MD Mcknight notified of  after reading was taken, declined new orders.  Michelle Juarez RN on 6/9/2025 at 5:23 PM

## 2025-06-10 ENCOUNTER — APPOINTMENT (OUTPATIENT)
Dept: OCCUPATIONAL THERAPY | Facility: CLINIC | Age: 45
DRG: 025 | End: 2025-06-10
Payer: COMMERCIAL

## 2025-06-10 ENCOUNTER — APPOINTMENT (OUTPATIENT)
Dept: MRI IMAGING | Facility: CLINIC | Age: 45
DRG: 025 | End: 2025-06-10
Payer: COMMERCIAL

## 2025-06-10 LAB
ANION GAP SERPL CALCULATED.3IONS-SCNC: 12 MMOL/L (ref 7–15)
BUN SERPL-MCNC: 13.1 MG/DL (ref 6–20)
CALCIUM SERPL-MCNC: 9.5 MG/DL (ref 8.8–10.4)
CHLORIDE SERPL-SCNC: 105 MMOL/L (ref 98–107)
CREAT SERPL-MCNC: 0.67 MG/DL (ref 0.67–1.17)
EGFRCR SERPLBLD CKD-EPI 2021: >90 ML/MIN/1.73M2
ERYTHROCYTE [DISTWIDTH] IN BLOOD BY AUTOMATED COUNT: 13 % (ref 10–15)
EST. AVERAGE GLUCOSE BLD GHB EST-MCNC: 183 MG/DL
GLUCOSE BLDC GLUCOMTR-MCNC: 145 MG/DL (ref 70–99)
GLUCOSE BLDC GLUCOMTR-MCNC: 151 MG/DL (ref 70–99)
GLUCOSE BLDC GLUCOMTR-MCNC: 169 MG/DL (ref 70–99)
GLUCOSE BLDC GLUCOMTR-MCNC: 175 MG/DL (ref 70–99)
GLUCOSE BLDC GLUCOMTR-MCNC: 177 MG/DL (ref 70–99)
GLUCOSE BLDC GLUCOMTR-MCNC: 192 MG/DL (ref 70–99)
GLUCOSE BLDC GLUCOMTR-MCNC: 194 MG/DL (ref 70–99)
GLUCOSE BLDC GLUCOMTR-MCNC: 211 MG/DL (ref 70–99)
GLUCOSE BLDC GLUCOMTR-MCNC: 218 MG/DL (ref 70–99)
GLUCOSE BLDC GLUCOMTR-MCNC: 221 MG/DL (ref 70–99)
GLUCOSE BLDC GLUCOMTR-MCNC: 233 MG/DL (ref 70–99)
GLUCOSE BLDC GLUCOMTR-MCNC: 237 MG/DL (ref 70–99)
GLUCOSE BLDC GLUCOMTR-MCNC: 253 MG/DL (ref 70–99)
GLUCOSE BLDC GLUCOMTR-MCNC: 256 MG/DL (ref 70–99)
GLUCOSE BLDC GLUCOMTR-MCNC: 258 MG/DL (ref 70–99)
GLUCOSE BLDC GLUCOMTR-MCNC: 270 MG/DL (ref 70–99)
GLUCOSE BLDC GLUCOMTR-MCNC: 290 MG/DL (ref 70–99)
GLUCOSE BLDC GLUCOMTR-MCNC: 295 MG/DL (ref 70–99)
GLUCOSE BLDC GLUCOMTR-MCNC: 373 MG/DL (ref 70–99)
GLUCOSE SERPL-MCNC: 256 MG/DL (ref 70–99)
HBA1C MFR BLD: 8 %
HCO3 SERPL-SCNC: 17 MMOL/L (ref 22–29)
HCT VFR BLD AUTO: 49.1 % (ref 40–53)
HGB BLD-MCNC: 16.5 G/DL (ref 13.3–17.7)
MCH RBC QN AUTO: 28.7 PG (ref 26.5–33)
MCHC RBC AUTO-ENTMCNC: 33.6 G/DL (ref 31.5–36.5)
MCV RBC AUTO: 86 FL (ref 78–100)
PLATELET # BLD AUTO: 216 10E3/UL (ref 150–450)
POTASSIUM SERPL-SCNC: 4.5 MMOL/L (ref 3.4–5.3)
RADIOLOGIST FLAGS: ABNORMAL
RBC # BLD AUTO: 5.74 10E6/UL (ref 4.4–5.9)
SODIUM SERPL-SCNC: 134 MMOL/L (ref 135–145)
WBC # BLD AUTO: 15.2 10E3/UL (ref 4–11)

## 2025-06-10 PROCEDURE — 258N000003 HC RX IP 258 OP 636

## 2025-06-10 PROCEDURE — 99418 PROLNG IP/OBS E/M EA 15 MIN: CPT | Mod: 24

## 2025-06-10 PROCEDURE — 36415 COLL VENOUS BLD VENIPUNCTURE: CPT

## 2025-06-10 PROCEDURE — 120N000002 HC R&B MED SURG/OB UMMC

## 2025-06-10 PROCEDURE — 250N000013 HC RX MED GY IP 250 OP 250 PS 637

## 2025-06-10 PROCEDURE — 85014 HEMATOCRIT: CPT

## 2025-06-10 PROCEDURE — A9585 GADOBUTROL INJECTION: HCPCS

## 2025-06-10 PROCEDURE — 99255 IP/OBS CONSLTJ NEW/EST HI 80: CPT | Mod: 24

## 2025-06-10 PROCEDURE — 999N000147 HC STATISTIC PT IP EVAL DEFER

## 2025-06-10 PROCEDURE — 97535 SELF CARE MNGMENT TRAINING: CPT | Mod: GO

## 2025-06-10 PROCEDURE — 70553 MRI BRAIN STEM W/O & W/DYE: CPT | Mod: 26 | Performed by: RADIOLOGY

## 2025-06-10 PROCEDURE — 250N000012 HC RX MED GY IP 250 OP 636 PS 637: Performed by: NEUROLOGICAL SURGERY

## 2025-06-10 PROCEDURE — 250N000011 HC RX IP 250 OP 636

## 2025-06-10 PROCEDURE — 250N000009 HC RX 250

## 2025-06-10 PROCEDURE — 83036 HEMOGLOBIN GLYCOSYLATED A1C: CPT

## 2025-06-10 PROCEDURE — 250N000012 HC RX MED GY IP 250 OP 636 PS 637

## 2025-06-10 PROCEDURE — 97530 THERAPEUTIC ACTIVITIES: CPT | Mod: GO

## 2025-06-10 PROCEDURE — 255N000002 HC RX 255 OP 636

## 2025-06-10 PROCEDURE — 97166 OT EVAL MOD COMPLEX 45 MIN: CPT | Mod: GO

## 2025-06-10 PROCEDURE — 80048 BASIC METABOLIC PNL TOTAL CA: CPT

## 2025-06-10 PROCEDURE — 70553 MRI BRAIN STEM W/O & W/DYE: CPT

## 2025-06-10 RX ORDER — GADOBUTROL 604.72 MG/ML
10 INJECTION INTRAVENOUS ONCE
Status: COMPLETED | OUTPATIENT
Start: 2025-06-10 | End: 2025-06-10

## 2025-06-10 RX ORDER — DEXTROSE MONOHYDRATE 100 MG/ML
INJECTION, SOLUTION INTRAVENOUS CONTINUOUS PRN
Status: DISCONTINUED | OUTPATIENT
Start: 2025-06-10 | End: 2025-06-12 | Stop reason: HOSPADM

## 2025-06-10 RX ORDER — NICOTINE POLACRILEX 4 MG
15-30 LOZENGE BUCCAL
Status: DISCONTINUED | OUTPATIENT
Start: 2025-06-10 | End: 2025-06-12 | Stop reason: HOSPADM

## 2025-06-10 RX ORDER — SODIUM CHLORIDE 9 MG/ML
INJECTION, SOLUTION INTRAVENOUS CONTINUOUS
Status: DISCONTINUED | OUTPATIENT
Start: 2025-06-10 | End: 2025-06-12 | Stop reason: HOSPADM

## 2025-06-10 RX ORDER — LOSARTAN POTASSIUM 50 MG/1
50 TABLET ORAL DAILY
Status: DISCONTINUED | OUTPATIENT
Start: 2025-06-10 | End: 2025-06-11

## 2025-06-10 RX ORDER — DEXTROSE MONOHYDRATE 25 G/50ML
25-50 INJECTION, SOLUTION INTRAVENOUS
Status: DISCONTINUED | OUTPATIENT
Start: 2025-06-10 | End: 2025-06-12 | Stop reason: HOSPADM

## 2025-06-10 RX ADMIN — INSULIN HUMAN 11.5 UNITS/HR: 1 INJECTION, SOLUTION INTRAVENOUS at 14:09

## 2025-06-10 RX ADMIN — SODIUM CHLORIDE: 0.9 INJECTION, SOLUTION INTRAVENOUS at 04:08

## 2025-06-10 RX ADMIN — SENNOSIDES AND DOCUSATE SODIUM 1 TABLET: 50; 8.6 TABLET ORAL at 08:37

## 2025-06-10 RX ADMIN — DEXAMETHASONE 4 MG: 4 TABLET ORAL at 19:34

## 2025-06-10 RX ADMIN — ACETAMINOPHEN 975 MG: 325 TABLET, FILM COATED ORAL at 22:20

## 2025-06-10 RX ADMIN — ACETAMINOPHEN 975 MG: 325 TABLET, FILM COATED ORAL at 14:11

## 2025-06-10 RX ADMIN — LEVETIRACETAM 750 MG: 750 TABLET, FILM COATED ORAL at 08:37

## 2025-06-10 RX ADMIN — LOSARTAN POTASSIUM 50 MG: 50 TABLET, FILM COATED ORAL at 08:38

## 2025-06-10 RX ADMIN — SENNOSIDES AND DOCUSATE SODIUM 1 TABLET: 50; 8.6 TABLET ORAL at 19:34

## 2025-06-10 RX ADMIN — DEXAMETHASONE 4 MG: 4 TABLET ORAL at 08:37

## 2025-06-10 RX ADMIN — CEFAZOLIN SODIUM 2 G: 2 SOLUTION INTRAVENOUS at 06:26

## 2025-06-10 RX ADMIN — INSULIN HUMAN 10 UNITS/HR: 1 INJECTION, SOLUTION INTRAVENOUS at 21:09

## 2025-06-10 RX ADMIN — GADOBUTROL 10 ML: 604.72 INJECTION INTRAVENOUS at 18:00

## 2025-06-10 RX ADMIN — INSULIN HUMAN 3 UNITS/HR: 1 INJECTION, SOLUTION INTRAVENOUS at 03:47

## 2025-06-10 RX ADMIN — PANTOPRAZOLE SODIUM 40 MG: 40 TABLET, DELAYED RELEASE ORAL at 08:37

## 2025-06-10 RX ADMIN — ATORVASTATIN CALCIUM 10 MG: 10 TABLET, FILM COATED ORAL at 08:38

## 2025-06-10 RX ADMIN — CEFAZOLIN SODIUM 2 G: 2 SOLUTION INTRAVENOUS at 14:11

## 2025-06-10 RX ADMIN — ACETAMINOPHEN 975 MG: 325 TABLET, FILM COATED ORAL at 06:26

## 2025-06-10 RX ADMIN — LEVETIRACETAM 750 MG: 750 TABLET, FILM COATED ORAL at 19:34

## 2025-06-10 RX ADMIN — INSULIN GLARGINE 60 UNITS: 100 INJECTION, SOLUTION SUBCUTANEOUS at 14:38

## 2025-06-10 ASSESSMENT — ACTIVITIES OF DAILY LIVING (ADL)
ADLS_ACUITY_SCORE: 43
ADLS_ACUITY_SCORE: 41
ADLS_ACUITY_SCORE: 43
ADLS_ACUITY_SCORE: 41
ADLS_ACUITY_SCORE: 43
ADLS_ACUITY_SCORE: 36
ADLS_ACUITY_SCORE: 43
ADLS_ACUITY_SCORE: 43

## 2025-06-10 NOTE — PLAN OF CARE
Goal Outcome Evaluation:    Neuro: A&Ox4. Mild word finding difficultly. Pupils equal, reactive. Mild headache, tylenol given. Strong extremities, walking halls with staff.  CV: -120s. SBP <140 goal. Afebrile.   Pulm: RA, clear lungs.  GI: Regular diet, carb counting for insulin coverage.   : Voiding via urinal.  IV/Gtt: PIVs. Insulin dx.   Endo: Started long acting insulin and carb coverage insulin, continue drip at this time.     Plan: MRI completed.   Transfer to  when bed available.    Kanwal Walker, RN     Plan of Care Reviewed With: patient, spouse  Overall Patient Progress: improvingOverall Patient Progress: improving  Outcome Evaluation: Transfer orders recieved

## 2025-06-10 NOTE — PROGRESS NOTES
[facility], [place]   06/10/2025  Neurosurgery Progress Note:      Interval History:   OR as below  Mild word-finding difficulty post-op but intact repetition, naming, comprehension, otherwise neurologically intact  Titrating off nicardipine drip, restarted losartan at half home dose (100 -> 50mg)  Started on insulin gtt for elevated blood glucose; inpatient Diabetes team consulted      Assessment:  Jeffry Younger is a 45 year old male with past medical history of HTN, T2DM, PAOLA, and recent seizure (on Keppra) who presented 6/9/25 stealth assisted left awake craniotomy with Astrocytoma, 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 Mccarthy.    He is currently post-operative day 1.    Plan:  - Serial neuro exams  - tylenlol, oxycodone prn  - Keppra PTA  - Decadron 8d taper  - HOB > 30    - SBP <140  - labetalol/hydralazine prn    - RA  - Incentive spirometry    - prn antiemetics  - bowel regimen: miralax, senna    - Advance diet as tolerated  - Inpatient Endocrinology consulted, appreciate reccs   - insulin gtt  - strict I/O  - IVF until adequate PO intake  - Electrolyte replacement protocol    - Hgb > 7  - plts > 100k  - INR < 1.5    - monitor for fever    - SCDs for DVT proph    - PT/OT      Wound Cares - Incision on the LEFT scalp has been closed with non-absorbable sutures. The dressing may be removed on post-op day 2 (6/11/25) after which the wound can be undressed and open to air. The patient is allowed to take showers and get the wound wet on post-op day 3 (6/12/25) but may not scrub or soak the wound or keep it submerged under water. Pat wound dry. Do not scrub the incision. Sutures should be removed on or around post-op day 14 (6/23/25).     -----------------------------------  Russel Camara MD  Neurosurgery PGY2  On call pager #[phone]   -----------------------------------    Objective:   Temp:  [97.9  F (36.6  C)-99  F (37.2  C)] 98.3  F (36.8  C)  Pulse:   "[] 105  Resp:  [14-25] 20  BP: (103-168)/(61-96) 142/78  SpO2:  [93 %-100 %] 94 %  I/O last 3 completed shifts:  In: 2377.3 [P.O.:480; I.V.:1847.3; IV Piggyback:50]  Out: 2525 [Urine:2525]    Gen: Appears comfortable, NAD  Wound: clean, dry, intact dressing in place  Neurologic:  - Alert & Oriented to person, place, time, and situation  - Follows commands briskly  - Speech fluent, spontaneous. Mild word-finding difficulty with intact naming, repetition, and comprehension  - No gaze preference. No apparent hemineglect. Stable visual \"funhouse mirror\" artifact in LEFT temporal and difficulty with perception in inferior fields.  - PERRL, EOMI  - Face sensation intact to light touch, and activates symmetrically  - Hearing intact to finger rustle bilaterally  - Palate elevates symmetrically with uvula midline  - Tongue protrudes midline    - Trapezii muscles 5/5 bilaterally  - No pronator drift     Del Bi Tri Grp FE   R 5 5 5 5 5   L 5 5 5 5 5    HF KE DF EHL PF   R 5 5 5 5 5   L 5 5 5 5 5     Reflexes 2+ throughout  No Johnson's or Clonus, with down-going toes    Sensation intact and symmetric to light touch throughout      LABS:  Recent Labs   Lab 06/10/25  0142 06/09/25  2223 06/09/25 2024   * 262* 274*       No lab results found in last 7 days.    IMAGING:  No results found for this or any previous visit (from the past 24 hours).      "

## 2025-06-10 NOTE — PROGRESS NOTES
Physical Therapy: Orders received. Chart reviewed and discussed with care team.? Physical Therapy not indicated due to lack of mobility deficits. Per OT, pt ambulating in hallways with good tolerance and stability.? Defer discharge recommendations to OT.? Will complete orders.

## 2025-06-10 NOTE — CONSULTS
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 Mccarthy. Inpatient Diabetes Service consulted for steroid induced hyperglycemia and glycemic management recommendations .     History obtained via the patient, chart review and discussion with primary team.       Additional Historian:  wife - Kailee     Patient is not known to the Inpatient Diabetes Service from past admission(s).    Vladimir is a pleasant 45 year old who reports a diagnosis of type 2 diabetes since about [id number]. He has been well controlled on PO medications and insulin PTA, following with Dr. Pickard in Endocrinology Clinic. Stopped taking Ozempic a few months ago due to concern from opthomologist regarding ischemic optic neuropathy, but says his blood sugars have remained fairly well controlled despite stopping this as he has continued on mealtime insulin. Does note that he was on steroids a few weeks ago after he presented to the ED following seizure, and saw some elevations in meal time BGs into the 200-300 range, but would come down overnight.      He is admitted for resection of Oligodendroglioma with Dr Mccarthy and has been started on high dose steroids post operatively. Glucoses in the 200s with initiation of steroids, thus IV insulin was started with very high hourly dosing noted. IDS now consulted for assistance.      Last dose insulin PTA: Lantus 14 units in the two days leading up to surgery (usually takes 20 units at night)    Instructed to HOLD Jardiance 3 days prior to surgery and metformin the day before surgery which he endorses doing.   Current inpatient regimen:  IV insulin drip     BG at time of consult: 192  Planned Procedures/Surgeries: none    Relevant Labs on Admission:  if contributory, otherwise see labs below   Latest Reference Range & Units 06/10/25 03:53   Sodium 135 - 145 mmol/L 134 (L)   Potassium 3.4 - 5.3 mmol/L 4.5   Chloride 98 - 107 mmol/L 105   Carbon Dioxide (CO2) 22 - 29 mmol/L 17 (L)   Urea Nitrogen 6.0 - 20.0 mg/dL 13.1   Creatinine 0.67 - 1.17 mg/dL 0.67   GFR Estimate >60 mL/min/1.73m2 >90   Calcium 8.8 - 10.4 mg/dL 9.5   Anion Gap 7 - 15 mmol/L 12   Glucose 70 - 99 mg/dL 256 (H)   Estimated Average Glucose <117 mg/dL 183 (H)   Hemoglobin A1C <5.7 % 8.0 (H)   (L): Data is abnormally low  (H): Data is abnormally high    Inpatient Glucose Trends:           Diabetes History:   Diabetes Type and Duration: Type 2 Dm since 2015 - 2018 per his recollection   GAD65 antibody, zinc transporter 8 antibody, islet antibody, insulin antibody, and c-peptide  not available on epic search     Started on PO medications then added Insulin. Has never been on insulin pump     PTA Medication Regimen:   Glipizide XL 5 mg in the AM--> increased to 10 mg added 4/14/25 by MTM after stopping Ozempic   Jardiance 25 mg daily   Metformin XR 1000 mg twice daily   Toujeo 20 units daily in the evening  Novolog 20-24 units - wife has type 1 DM so patient does well with carb estimates        Missing doses?: sometimes missing meal time insulin   Historical Diabetes Medications:   Victoza- stopped taking out of caution when father diagnosed with Medullary thyroid cancer  Later restarted on ozempic in 2024 by Endocrinology- however stopped in 5/2025 due to concern for ischemic optic neuropathy noted by neuro-opthomologist      Glucose monitoring device/frequency/trends: Manolo 3 + - trends reviewed, rare lows. Typically with prandial hyperglycemia up to the 150-200 range (higher if missed meal insulin) and fastings in the 100-120 range   Hypoglycemia PTA:   - Frequency: rare, maybe once every 2-3 weeks, he will treat if BG in the 70-80s   - Severity:  no history of severe unconscious lows   - Awareness:  intact    - Treatment: Peanut butter and jelly sandwich or juice box      Outpatient Diabetes Provider: [facility]- Dr. Pickard (LOV: 11/11/24) also following with [facility], LOV 4/14/25  Formal Diabetes Education/Educator: in the past     ------------------------  Complications:  no peripheral neuropathy, + ischemic optic neuropathy, thought to be related to ozempic diagnosed 2/2025 (last eye exam: 5/22/25), no nephropathy, no gastroparesis, no macrovascular disease      Most recent A1c: 8.0    Hemoglobin at time of most recent A1c: 16.5  RBC transfusion 3 months prior to most recent A1c:  none      History of DKA: no      Safety Plan:   - Glucagon: none   - Ketone Strips: none    Diet/Lifestyle/Living Situation: lives with wife who herself has type 1 diabetes. Eats two meals per day, fairly regimented, such as chicken and rice, chili or other soups- says he usually has about 75 g CHO with meals. Popcorn for snacks     Ability to Hematite Prescribed Regimen:  yes   Food/Housing Insecurity: no          Medications Impacting Glycemia:    Steroids:   6/9: Dexamethasone 10 mg x1 in OR   6/10: Start Dexamethasone 4 mg q 12 hrs x 2 days   6/11: at 2000 taper to Dexamethasone 2 mg x2 days  6/13: at 2000 taper to Dexamethasone 1 mg x2 days  6/16: start Dexamethasone 1 mg daily at 0800 x2 days   D5W containing solutions/medications: none presently- was on Ancef 2 g in 50 ml Dextrose q 8 hrs (3 doses)   Other medications impacting glucose: none         Diet/Nutrition:    Orders Placed This Encounter      Advance Diet as  Tolerated: Regular Diet Adult     Supplements:  none  TF: none  TPN: none          Review of Systems:    CC: word finding difficulty, high blood sugars  Constitutional: denies fever and chills. denies recent weight gain or loss.    HEENT: denies headache, vision changes, hearing changes, sinus congestion, and swollen glands.   Cardiac: denies chest pain, palpitations, or racing heart.    Respiratory: denies dyspnea on exertion and at rest. denies wheezing and cough,  no active sputum production.    GI: denies abdominal pain, tenderness and bloating. denies nausea and vomiting. denies changes in stool pattern, constipation and diarrhea.    : denies difficulty urinating, dysuria, and incontinence.    MSK/Integumentary:denies swelling/edema. denies weakness. denies new rashes, wounds, and bruising.    Endocrine: denies polyuria, polydipsia           Past Medical History:       Past Medical History:   Diagnosis Date    Diabetes (H) 07/2018    Treating metformin    Essential hypertension 06/13/2024    Obesity     Sleep apnea              Past Surgical History:      Past Surgical History:   Procedure Laterality Date    AS ESOPHAGOSCOPY, DIAGNOSTIC      EGD    BIOPSY  2008    egd normal    OPTICAL TRACKING SYSTEM BIOPSY BRAIN Left 3/7/2025    Procedure: stealth assisted LEFT FRONTAL BIOPSY BRAIN;  Surgeon: Genaro Carver MD;  Location: [facility] OR             Social History:      Social History     Tobacco Use    Smoking status: Never     Passive exposure: Never    Smokeless tobacco: Never   Substance Use Topics    Alcohol use: Yes     Comment: 1 per month maybe        History   Sexual Activity    Sexual activity: Yes    Partners: Female    Birth control/ protection: Abstinence, Pull-out method, Condom             Family History:    Family History of Diabetes: father with type 2 DM     Family History   Problem Relation Age of Onset    Diabetes Father     Hypertension Father     Other Cancer Father         Kidney & Thyroid     "Cerebrovascular Disease Paternal Grandfather     Other Cancer Brother         Lunelida    Other Cancer Maternal Grandmother         Pancreatic    Other Cancer Maternal Grandfather         Lung - smoker    Other Cancer Brother         Lukemia    Other Cancer Brother         Lukemia    Colon Cancer No family hx of     Prostate Cancer No family hx of     Cerebrovascular Disease No family hx of     Coronary Artery Disease No family hx of                Physical Exam:    /83   Pulse 98   Temp 98.1  F (36.7  C) (Oral)   Resp 22   Ht 1.88 m (6' 2\")   Wt (!) 173.3 kg (382 lb 1.6 oz)   SpO2 93%   BMI 49.06 kg/m     General Appearance: up in chair, awake and alert.   HEENT: generalized swelling. Incision covered.   Heart: Regular rate and rhythm.    Lungs:  Breathing comfortably   Abdomen: Round, nondistended. Soft, nontender.   Extremities:  2+ bilateral lower extremity edema. Fluid movement of extremities. Dusky extremities at baseline   Neuro:  Oriented x3, able to speak clearly.    Psych:  Calm, appropriate mood and affect.        Feet:    warm, marquez, ecchymosis,  without evidence of wounds, corns, calluses, pulses 1+,   CMS intact            Laboratory Data:      Lab Results   Component Value Date    A1C 8.0 (H) 06/10/2025    A1C 7.9 (H) 05/13/2025    A1C 6.4 (H) 12/30/2024    A1C 7.3 (H) 08/05/2024    A1C 7.6 (H) 03/11/2024    A1C 7.4 (H) 03/01/2021    A1C 8.8 (H) 12/29/2020    A1C 8.8 (H) 08/24/2020    A1C 10.4 (H) 01/22/2020    A1C 7.4 (H) 06/11/2019     Recent Labs   Lab Test 06/10/25  0353   WBC 15.2*   RBC 5.74   HGB 16.5   HCT 49.1   MCV 86   MCH 28.7   MCHC 33.6   RDW 13.0        Recent Labs   Lab Test 06/10/25  0830 06/10/25  0700 06/10/25  0503 06/10/25  0353 06/09/25  0930 05/26/25  1121   NA  --   --   --  134*  --  140   POTASSIUM  --   --   --  4.5  --  4.1   CHLORIDE  --   --   --  105  --  106   CO2  --   --   --  17*  --  12*   ANIONGAP  --   --   --  12  --  22*   * 211*   < > " 256*   < > 224*   BUN  --   --   --  13.1  --  16.0   CR  --   --   --  0.67  --  0.95   CALLY  --   --   --  9.5  --  9.2    < > = values in this interval not displayed.     Recent Labs   Lab Test 05/26/25  1121   PROTTOTAL 6.9   ALBUMIN 4.1   BILITOTAL 0.7   ALKPHOS 112   AST 26   ALT 24            Assessment and Recommendations:       Assessment:   Type 2 Diabetes Mellitus, complicated by optic neuropathy. Sub-optimal, but improving control  (A1c 8.0 %, Hgb: 16.5)  Steroid hyperglycemia   Stress hyperglycemia   S/P stealth assisted left awake craniotomy with tumor resection and speech and motor mapping   BMI 49    Plan/Recommendations:    - Continue with IV insulin drip today- non DKA protocol   - Add Lantus 60 units at 1400 (discussed with pharmacy given high IV insulin needs)   - Start Novolog Meal Coverage: 1 unit per 8 grams CHO --> Increase to 1 units per 3 g CHO, TID AC and PRN with snacks/supplements   - BG monitoring:  Every 1-2 hours on insulin drip     - Hypoglycemia protocol    - Carb counting protocol     Discussion:   Patient with previously fairly well controlled type 2 diabetes on insulin and multiple oral diabetes agents. Now on high dose steroids following craniotomy for tumor resection with IV insulin initiated overnight and very high insulin needs, up to  6-10 units per hour while not eating, lowest rate only 4 units per hour around 11 am, which translates to a Lantus dose of about 96 units. Started ICR of 1 :8 g CHO, though patient had profound response with IV insulin drip rates increasing to 22 units per hour. After discussing with patient, appears his baseline ICR is closer to 1:4 so will increase him to a 1:3 to start at his next meal. Will also add Lantus 60 units to be given today to help lower IV insulin needs, but will remain on IV insulin and monitor trends. Possible that patient will require BID Lantus dosing or significant increase to lantus tomorrow. Plan discussed with neurosurgery  resident and ICU pharmacist given high insulin needs. Will take time to come up with stable insulin plan and taper plan with steroids given ongoing hyperglycemia. Patient and wife aware that IV insulin will continue at least today, and possibly through tomorrow pending trends.. IDS will follow closely.     Discharge Planning: (tentative)    Medications: TBD    Test Claims:  none needed.   Education:  Needs to be assessed closer to discharge.    Outpatient Follow-up:  recommend with [facility] Endocrinology provider Dr. Pickard (scheduled 6.30/25) and [facility] (scheduled 6/13/25) , also scheduled with Kamini Caceres 7/16/25    Thank you for this consult. IDS will continue to follow.      Please notify Inpatient Diabetes Service if changes are planned to steroids, nutrition, TPN/TF and anticipated procedures requiring prolonged NPO status.     EMMANUEL Jain, CNP   Inpatient Diabetes Management Service   Pager: [phone]   Available on [id number]      To contact Inpatient Diabetes Service:     7 AM - 5 PM: Page the IDS KARMEN following the patient that day (see filed or incomplete progress notes/consult notes under Endocrinology)    OR if uncertain of provider assignment: page job code [phone]    5 PM - 7 AM: First call after hours is to primary service.    For urgent after-hours questions, page job code for on call fellow: [phone]     I spent a total of 110 minutes on the date of the encounter doing prep/post-work, chart review, history and exam, documentation and further activities per the note including lab review, multidisciplinary communication, counseling the patient and/or coordinating care regarding acute hyper/hypoglycemic management, as well as discharge management and planning/communication.  See note for details.

## 2025-06-10 NOTE — PROGRESS NOTES
Admitted/transferred from: PACU  Reason for admission/transfer: S/p left awake craniotomy with tumor resection and speech and motor mapping. Freq neuro exam and titrating nicardipine drip to SBP<140  Patient status upon admission/transfer: A&Ox4 rated pain 1/10 and nicardipine drip infusing at 2.5  Interventions: re-oriented to ICU  Plan:   Freq neuro exam and titrating nicardipine drip to SBP<140  2 RN skin assessment:   Sexual Orientation and Gender Identity (SOGI) smartfom completed: Done/Not Done  Result of skin assessment and interventions/actions:  Height, weight, drug calc weight: Done  Patient belongings (see Flowsheet - Adult Profile for details):   MDRO education (if applicable):

## 2025-06-10 NOTE — CONSULTS
Intraoperative Language and Motor Mapping     Name: Vladimir Younger   MRN: [id number]    : 1980   DOHERTY: 2025   Referral: Alon Mccarthy MD  Neurological Surgery     BACKGROUND INFORMATION  Mr. Younger is a 45-year-old, right-handed White male undergoing awake craniotomy with language and motor mapping for resection of a left posterior frontal lobe grade 2 lesion.    In brief review, Mr. Younger presented with visual disturbance suspicious for optic neuropathy/optic disc edema. MRI of the brain on 2025 was read as showing a lesion involving the posterior left frontal lobe. 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    Preoperative neuropsychological evaluation revealed subtle weaknesses in aspects of executive functioning in the context of other well preserved cognitive abilities. These weaknesses on tests including speeded inhibition and novel problem-solving were consistent with his known left frontal lobe lesion with mass effect and midline shift, suggestive of very subtle frontal lobe dysfunction impacting executive functioning networks.     PROCEDURES  The patient underwent baseline testing using the [employer]ers testing platform on 2025 and again on 2025. The object naming (99% accuracy) and nonword repetition (100% accuracy) subtests were administered. Accuracy and reaction time data was collected. Items that were correctly answered within a prespecified time (within 5 seconds) to allow for stimulation mapping were selected for testing in the operating room.     Intraoperatively, the patient woke from anesthesia with no complications and was performing at his baseline level prior to starting surgical mapping. Language mapping was performed using a picture naming task on the PST Tankers platform. Electrical stimulation was initiated by the surgeon 1 second prior to and for the duration of stimulus presentation. Following cortical mapping,  neuropsychology continued to monitor language (i.e., naming, repetition) and motor functions for the duration of the resection.    Cortical mapping identified positive face motor sites identified in the gyrus behind the tumor. Subcortical mapping identified speech arrest. Language monitoring during the tumor resection identified brief anomia and perseveration. Picture naming at the conclusion of the resection was 10/10. Nonword repetition and face motor movement remained at baseline throughout the resection. His emotional/behavioral status was stable throughout and he tolerated the procedures without complications.    Thank you for the opportunity to participate in Mr. Younger's care. If you have any questions, please do not hesitate to contact me.     Zuleyka Monk, PhD, LP, ABPP  Board Certified Clinical Neuropsychologist      Activities included in this evaluation: CPT Code #Units   Test evaluation services by professional; first hour. 00509 1   Test evaluation services by professional, additional hour (+) [phone]

## 2025-06-10 NOTE — PROGRESS NOTES
06/10/25 0940   Appointment Info   Signing Clinician's Name / Credentials (OT) Jael Mata OTR/L   Rehab Comments (OT) Crani; Seizure; OT only   Living Environment   People in Home spouse   Current Living Arrangements house   Home Accessibility stairs within home   Number of Stairs, Within Home, Primary four   Stair Railings, Within Home, Primary none   Transportation Anticipated family or friend will provide   Living Environment Comments Pt/spouse reside in multi-level home, able to access w/out stairs. Currently remodeling thus living in lower level (walkout at this time); no BACILIO, 5 steps down to basement w/out railing (does have alternate way to enter lower level w/ railing if needed). BATHROOM: WIS   Self-Care   Usual Activity Tolerance good   Current Activity Tolerance good   Regular Exercise Yes   Activity/Exercise Type walking   Exercise Amount/Frequency   (~1 mile)   Equipment Currently Used at Home none   Fall history within last six months no   Activity/Exercise/Self-Care Comment Pt IND w/ ADLs and functional mobility w/out AD. Not driving since visual changes/recent seizure. Otherwise IND w/ iADLs (med mgmt, does majority of cooking/cleaning). Currently taking time off work - .   General Information   Onset of Illness/Injury or Date of Surgery 06/09/25   Referring Physician Agnes Smith PA-C   Patient/Family Therapy Goal Statement (OT) Return home   Additional Occupational Profile Info/Pertinent History of Current Problem Jeffry Younger is a 45 year old male with past medical history of HTN, T2DM, PAOLA, and recent seizure (on Keppra) who presented 6/9/25 stealth assisted left awake craniotomy with Astrocytoma, 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 Mccarthy.   Existing Precautions/Restrictions fall;seizures  (craniotomy)   Limitations/Impairments visual   General Observations and Info ADULT ICU Early Mobility Protocol   Cognitive Status Examination    Cognitive Status Comments WFL, mild word finding difficulties   Visual Perception   Visual Impairment/Limitations corrective lenses for distance;visual/perceptual impairments present   Impact of Vision Impairment on Function (Vision) stable visual occlusion in L temporal field; reports no acute changes post-op, declines functional impact   Sensory   Sensory Quick Adds sensation intact   Pain Assessment   Patient Currently in Pain No   Range of Motion Comprehensive   General Range of Motion no range of motion deficits identified   Comment, General Range of Motion end ROM slight limitations, grossly WFL   Strength Comprehensive (MMT)   General Manual Muscle Testing (MMT) Assessment no strength deficits identified   Comment, General Manual Muscle Testing (MMT) Assessment WFL; symmetrical   Bed Mobility   Bed Mobility supine-sit;sit-supine   Comment (Bed Mobility) CGA   Transfers   Transfers shower transfer;toilet transfer;sit-stand transfer   Transfer Comments CGA x1 UE support on IV pole   Balance   Balance Comments x1 UE intermittent support   Activities of Daily Living   BADL Assessment/Intervention bathing;lower body dressing;grooming;toileting   Bathing Assessment/Intervention   Comment, (Bathing) MIN A per clinical judgement   Lower Body Dressing Assessment/Training   Comment, (Lower Body Dressing) assist x 1 per clinical judgement   Grooming Assessment/Training   Comment, (Grooming) assist x 1 per clinical judgement   Toileting   Comment, (Toileting) assist x 1 per clinical judgement   Clinical Impression   Criteria for Skilled Therapeutic Interventions Met (OT) Yes, treatment indicated   OT Diagnosis impaired ADLs   OT Problem List-Impairments impacting ADL problems related to;activity tolerance impaired;communication;mobility;strength;pain;post-surgical precautions;vision   Assessment of Occupational Performance 3-5 Performance Deficits   Identified Performance Deficits bathing, dressing, g/h, toileting,  functional mobility, leisure, work mgmt, home mgmt, vision   Planned Therapy Interventions (OT) ADL retraining;IADL retraining;balance training;bed mobility training;neuromuscular re-education;strengthening;transfer training;visual perception;home program guidelines;progressive activity/exercise;risk factor education   Clinical Decision Making Complexity (OT) detailed assessment/moderate complexity   Risk & Benefits of therapy have been explained evaluation/treatment results reviewed;care plan/treatment goals reviewed;risks/benefits reviewed;current/potential barriers reviewed;participants included;participants voiced agreement with care plan;patient;spouse/significant other   Clinical Impression Comments Pt remains below his baseline functional status, will benefit from ongoing skilled OT while IP to progress IND/safety/engagement within daily routine.   OT Total Evaluation Time   OT Eval, Moderate Complexity Minutes (18686) 6   OT Goals   Therapy Frequency (OT) 6 times/week   OT Predicted Duration/Target Date for Goal Attainment 07/10/25   OT Goals Hygiene/Grooming;Lower Body Dressing;Lower Body Bathing;Bed Mobility;Transfers;Toilet Transfer/Toileting;OT Goal 1;OT Goal 2   OT: Hygiene/Grooming modified independent;while standing;within precautions   OT: Lower Body Dressing Modified independent;within precautions   OT: Lower Body Bathing Modified independent;with precautions   OT: Bed Mobility Modified independent;within precautions   OT: Transfer Modified independent;within precautions   OT: Toilet Transfer/Toileting Modified independent;toilet transfer;cleaning and garment management;within precautions   OT: Goal 1 Pt/family will INDly integrate crani precautions into daily routine, as evidenced by not requiring any cuing for adherence.   OT: Goal 2 Pt will navigate 5 steps w/out railing with SBA in order to safely navigate home environment.   OT Discharge Planning   OT Plan ongoing prec edu; LBD; standing ADLs;  progress func mobility w/ou UE support; 5 stairs before d/c   OT Discharge Recommendation (DC Rec) home with assist;home with outpatient occupational therapy   OT Rationale for DC Rec Pt remains primarily limited by: acute post-op pain, prec, ongoing functional vision changes (stable post-op) and dec activity tolerance. He is currently completing bADLs with Ax1 and functional mobility with CGA. Rec d/c home with assist prn for heavy i/ADLs which his spouse can provide. Would benefit from OP OT f/up for ongoing vision rehab, return to driving as appropriate.   OT Brief overview of current status SBA/IV pole   OT Total Distance Amb During Session (feet) 250   Total Session Time   Total Session Time (sum of timed and untimed services) 6

## 2025-06-10 NOTE — PHARMACY-ADMISSION MEDICATION HISTORY
Pharmacist Admission Medication History    Admission medication history is complete. The information provided in this note is only as accurate as the sources available at the time of the update.    Information Source(s): Patient, family member (spouse), dispense history, and [facility] via [facility]    Pertinent Information:   - Conducted interview at the bedside with patient and spouse. Both were quite pleasant and excellent historians. Both were able to confidently answer al questions asked of them.  - Of note, patient has recent fills for the following medications: amlodipine, amlodipine-benazepril, dexamethasone, and semaglutide (Ozempic). Patient confirmed he is no longer taking any of these.  - Freestyle Manolo 3 Plus Sensor: Patient has fill history for multiple different CGMs. Per patient Manolo 3 Plus is his current CGM d/t preferred insurance coverage.  - Cyclobenzaprine: Patient and spouse report patient experienced side effects from cyclobenzaprine (drowsiness) and stopped taking cyclobenzaprine.  - Insulin aspart: Per patient, usually takes ~25 units with each meal and stated typical range to be 20-28 units.  - Insulin glargine (Toujeo): Patient confirmed he was taking 24 units every evening prior to admission. Patient also confirms he reduced the last 2 doses prior to admission from 24 to 14 units.    Changes made to PTA medication list:  Added: None  Deleted: Cyclobenzaprine 10 mg tablet  Changed: None    Allergies reviewed with patient and updates made in EHR: yes    Medication History Completed By: Aissatou Guzman RPH 6/10/2025 10:38 AM    PTA Med List   Medication Sig Note Last Dose/Taking    atorvastatin (LIPITOR) 10 MG tablet Take 1 tablet (10 mg) by mouth daily. (Patient taking differently: Take 10 mg by mouth daily. Takes every morning)  6/9/2025 at  6:00 AM    Continuous Glucose Sensor (FREESTYLE MANOLO 3 PLUS SENSOR) MISC Change every 15 days.  6/9/2025 Morning     empagliflozin (JARDIANCE) 25 MG TABS tablet Take 1 tablet (25 mg) by mouth daily. 6/4/2025: Last dose 6/5/25 6/5/2025 Morning    glipiZIDE (GLUCOTROL XL) 5 MG 24 hr tablet Take 1 tablet (5 mg) by mouth daily. May increase to 10 mg dose if time in range is below 70%. 6/4/2025: Hold DOS 6/8/2025 Morning    insulin aspart (NOVOLOG FLEXPEN) 100 UNIT/ML pen Inject 20-28 Units subcutaneously 2 times daily (with meals). 6/4/2025: Hold DOS 6/8/2025 Evening    insulin glargine U-300 (TOUJEO) 300 UNIT/ML (1 units dial) pen Inject 20 Units subcutaneously daily. 6/4/2025: Pt normally takes 24 units every evening @ 10 pm.      Pt advised to decrease dose to 60% two days prior to procedure on 6/9/25.  He will give 14 units on Saturday 6/7 and 14 units Sunday 6/8 before bed. 6/8/2025 Bedtime    insulin pen needle (ULTICARE MINI) 31G X 6 MM miscellaneous Use 3-4 pen needles daily or as directed.  6/8/2025    levETIRAcetam (KEPPRA) 750 MG tablet Take 1 tablet (750 mg) by mouth 2 times daily.  6/9/2025 Morning    LORazepam (ATIVAN) 0.5 MG tablet Take 1 tablet (0.5 mg) by mouth every 6 hours as needed for anxiety.  6/9/2025 Morning    losartan (COZAAR) 100 MG tablet Take 1 tablet (100 mg) by mouth daily. 6/4/2025: Hold DOS 6/8/2025 Morning    metFORMIN (GLUCOPHAGE XR) 500 MG 24 hr tablet Take 2 tablets (1,000 mg) by mouth 2 times daily (with meals). 6/4/2025: Hold DOS 6/8/2025 Evening    ondansetron (ZOFRAN ODT) 4 MG ODT tab Take 1 tablet (4 mg) by mouth every 8 hours as needed for nausea or vomiting.  6/9/2025 Morning

## 2025-06-10 NOTE — PLAN OF CARE
Major Shift Events:   Neuro: A&Ox4, Neuros & CMS intact ex word finding difficulty & L sided vision changes (present pre-op)  Cardiac: ST 100s, SBP < 140 off nicardipine, pulses palpable, afebrile  Resp: lung sounds clear, sating > 92% RA   GI: passing gas, bowel sounds active, LBM 6/9  : voiding spontaneously, adequate amount of yellow urine   Skin: crani incision CDI  Pain/Nausea: head pain treated with tylenol; denies nausea  LDA: L PIV x 2 infusing insulin gtt (alg 3) & abx   Diet: advanced to regular this AM   Labs: reviewed, Na 134  Plan: continue POC   For vital signs and complete assessments, please see documentation flowsheets.

## 2025-06-11 ENCOUNTER — APPOINTMENT (OUTPATIENT)
Dept: OCCUPATIONAL THERAPY | Facility: CLINIC | Age: 45
DRG: 025 | End: 2025-06-11
Attending: NEUROLOGICAL SURGERY
Payer: COMMERCIAL

## 2025-06-11 LAB
ANION GAP SERPL CALCULATED.3IONS-SCNC: 11 MMOL/L (ref 7–15)
BUN SERPL-MCNC: 17.3 MG/DL (ref 6–20)
CALCIUM SERPL-MCNC: 9.7 MG/DL (ref 8.8–10.4)
CHLORIDE SERPL-SCNC: 105 MMOL/L (ref 98–107)
CREAT SERPL-MCNC: 0.78 MG/DL (ref 0.67–1.17)
EGFRCR SERPLBLD CKD-EPI 2021: >90 ML/MIN/1.73M2
ERYTHROCYTE [DISTWIDTH] IN BLOOD BY AUTOMATED COUNT: 13.2 % (ref 10–15)
GLUCOSE BLDC GLUCOMTR-MCNC: 126 MG/DL (ref 70–99)
GLUCOSE BLDC GLUCOMTR-MCNC: 126 MG/DL (ref 70–99)
GLUCOSE BLDC GLUCOMTR-MCNC: 132 MG/DL (ref 70–99)
GLUCOSE BLDC GLUCOMTR-MCNC: 138 MG/DL (ref 70–99)
GLUCOSE BLDC GLUCOMTR-MCNC: 145 MG/DL (ref 70–99)
GLUCOSE BLDC GLUCOMTR-MCNC: 148 MG/DL (ref 70–99)
GLUCOSE BLDC GLUCOMTR-MCNC: 150 MG/DL (ref 70–99)
GLUCOSE BLDC GLUCOMTR-MCNC: 151 MG/DL (ref 70–99)
GLUCOSE BLDC GLUCOMTR-MCNC: 155 MG/DL (ref 70–99)
GLUCOSE BLDC GLUCOMTR-MCNC: 156 MG/DL (ref 70–99)
GLUCOSE BLDC GLUCOMTR-MCNC: 158 MG/DL (ref 70–99)
GLUCOSE BLDC GLUCOMTR-MCNC: 161 MG/DL (ref 70–99)
GLUCOSE BLDC GLUCOMTR-MCNC: 168 MG/DL (ref 70–99)
GLUCOSE BLDC GLUCOMTR-MCNC: 172 MG/DL (ref 70–99)
GLUCOSE BLDC GLUCOMTR-MCNC: 176 MG/DL (ref 70–99)
GLUCOSE BLDC GLUCOMTR-MCNC: 178 MG/DL (ref 70–99)
GLUCOSE BLDC GLUCOMTR-MCNC: 180 MG/DL (ref 70–99)
GLUCOSE SERPL-MCNC: 167 MG/DL (ref 70–99)
HCO3 SERPL-SCNC: 20 MMOL/L (ref 22–29)
HCT VFR BLD AUTO: 48.6 % (ref 40–53)
HGB BLD-MCNC: 15.8 G/DL (ref 13.3–17.7)
MCH RBC QN AUTO: 28.3 PG (ref 26.5–33)
MCHC RBC AUTO-ENTMCNC: 32.5 G/DL (ref 31.5–36.5)
MCV RBC AUTO: 87 FL (ref 78–100)
PLATELET # BLD AUTO: 237 10E3/UL (ref 150–450)
POTASSIUM SERPL-SCNC: 4.4 MMOL/L (ref 3.4–5.3)
RBC # BLD AUTO: 5.58 10E6/UL (ref 4.4–5.9)
SODIUM SERPL-SCNC: 136 MMOL/L (ref 135–145)
WBC # BLD AUTO: 15.9 10E3/UL (ref 4–11)

## 2025-06-11 PROCEDURE — 97530 THERAPEUTIC ACTIVITIES: CPT | Mod: GO | Performed by: OCCUPATIONAL THERAPIST

## 2025-06-11 PROCEDURE — 250N000013 HC RX MED GY IP 250 OP 250 PS 637

## 2025-06-11 PROCEDURE — 97535 SELF CARE MNGMENT TRAINING: CPT | Mod: GO | Performed by: OCCUPATIONAL THERAPIST

## 2025-06-11 PROCEDURE — 99232 SBSQ HOSP IP/OBS MODERATE 35: CPT | Mod: 24 | Performed by: NURSE PRACTITIONER

## 2025-06-11 PROCEDURE — 120N000002 HC R&B MED SURG/OB UMMC

## 2025-06-11 PROCEDURE — 999N000127 HC STATISTIC PERIPHERAL IV START W US GUIDANCE

## 2025-06-11 PROCEDURE — 85041 AUTOMATED RBC COUNT: CPT

## 2025-06-11 PROCEDURE — 250N000011 HC RX IP 250 OP 636

## 2025-06-11 PROCEDURE — 250N000011 HC RX IP 250 OP 636: Mod: JW

## 2025-06-11 PROCEDURE — 250N000012 HC RX MED GY IP 250 OP 636 PS 637

## 2025-06-11 PROCEDURE — 250N000013 HC RX MED GY IP 250 OP 250 PS 637: Performed by: NURSE PRACTITIONER

## 2025-06-11 PROCEDURE — 250N000009 HC RX 250

## 2025-06-11 PROCEDURE — 80048 BASIC METABOLIC PNL TOTAL CA: CPT

## 2025-06-11 PROCEDURE — 85014 HEMATOCRIT: CPT

## 2025-06-11 PROCEDURE — 36415 COLL VENOUS BLD VENIPUNCTURE: CPT

## 2025-06-11 RX ORDER — ACETAMINOPHEN 325 MG/1
650 TABLET ORAL EVERY 6 HOURS PRN
Status: DISCONTINUED | OUTPATIENT
Start: 2025-06-11 | End: 2025-06-12 | Stop reason: HOSPADM

## 2025-06-11 RX ORDER — HEPARIN SODIUM 5000 [USP'U]/.5ML
5000 INJECTION, SOLUTION INTRAVENOUS; SUBCUTANEOUS EVERY 8 HOURS SCHEDULED
Status: DISCONTINUED | OUTPATIENT
Start: 2025-06-11 | End: 2025-06-12 | Stop reason: HOSPADM

## 2025-06-11 RX ORDER — METFORMIN HYDROCHLORIDE 500 MG/1
1000 TABLET, EXTENDED RELEASE ORAL 2 TIMES DAILY WITH MEALS
Status: DISCONTINUED | OUTPATIENT
Start: 2025-06-11 | End: 2025-06-12 | Stop reason: HOSPADM

## 2025-06-11 RX ORDER — LOSARTAN POTASSIUM 50 MG/1
50 TABLET ORAL ONCE
Status: COMPLETED | OUTPATIENT
Start: 2025-06-11 | End: 2025-06-11

## 2025-06-11 RX ORDER — LOSARTAN POTASSIUM 100 MG/1
100 TABLET ORAL DAILY
Status: DISCONTINUED | OUTPATIENT
Start: 2025-06-12 | End: 2025-06-12 | Stop reason: HOSPADM

## 2025-06-11 RX ADMIN — ACETAMINOPHEN 650 MG: 325 TABLET, FILM COATED ORAL at 15:17

## 2025-06-11 RX ADMIN — DEXAMETHASONE 4 MG: 4 TABLET ORAL at 08:06

## 2025-06-11 RX ADMIN — LEVETIRACETAM 750 MG: 750 TABLET, FILM COATED ORAL at 08:06

## 2025-06-11 RX ADMIN — ACETAMINOPHEN 650 MG: 325 TABLET, FILM COATED ORAL at 09:15

## 2025-06-11 RX ADMIN — ATORVASTATIN CALCIUM 10 MG: 10 TABLET, FILM COATED ORAL at 08:06

## 2025-06-11 RX ADMIN — DEXAMETHASONE 2 MG: 2 TABLET ORAL at 20:12

## 2025-06-11 RX ADMIN — HEPARIN SODIUM 5000 UNITS: 5000 INJECTION, SOLUTION INTRAVENOUS; SUBCUTANEOUS at 20:12

## 2025-06-11 RX ADMIN — LEVETIRACETAM 750 MG: 750 TABLET, FILM COATED ORAL at 20:12

## 2025-06-11 RX ADMIN — METFORMIN ER 500 MG 1000 MG: 500 TABLET ORAL at 19:02

## 2025-06-11 RX ADMIN — SENNOSIDES AND DOCUSATE SODIUM 1 TABLET: 50; 8.6 TABLET ORAL at 20:12

## 2025-06-11 RX ADMIN — LOSARTAN POTASSIUM 50 MG: 50 TABLET, FILM COATED ORAL at 13:06

## 2025-06-11 RX ADMIN — SENNOSIDES AND DOCUSATE SODIUM 1 TABLET: 50; 8.6 TABLET ORAL at 08:07

## 2025-06-11 RX ADMIN — PANTOPRAZOLE SODIUM 40 MG: 40 TABLET, DELAYED RELEASE ORAL at 08:06

## 2025-06-11 RX ADMIN — LABETALOL HYDROCHLORIDE 10 MG: 5 INJECTION, SOLUTION INTRAVENOUS at 20:31

## 2025-06-11 RX ADMIN — ACETAMINOPHEN 650 MG: 325 TABLET, FILM COATED ORAL at 22:03

## 2025-06-11 RX ADMIN — EMPAGLIFLOZIN 25 MG: 25 TABLET, FILM COATED ORAL at 15:59

## 2025-06-11 RX ADMIN — LOSARTAN POTASSIUM 50 MG: 50 TABLET, FILM COATED ORAL at 08:06

## 2025-06-11 ASSESSMENT — ACTIVITIES OF DAILY LIVING (ADL)
ADLS_ACUITY_SCORE: 39
ADLS_ACUITY_SCORE: 41
ADLS_ACUITY_SCORE: 39

## 2025-06-11 NOTE — PLAN OF CARE
Status: S/p left awake craniotomy  6/9.  Hx of HTN, T2DM, PAOLA, and recent seizure (on Keppra)   Vitals: VSS. PRN available for SBP >140 Continuous pulse ox in place  Neuros: A&O x4. Mild WFD. Denies N/T. 5/5 strengths throughout with generalized weakness  IV: PIV x2, one infusing insuline gtt,Alg 3, other SL.  Labs/Electrolytes: draws in the AM. Q1H BG check for insulin drip. Next BG check is @ 0800  Resp: LSC on RA, denies SOB. Hx of PAOLA -wears CPAP at baseline (no CPAP d/t to head incision)  Diet: Regular, denies nausea. Carb coverage  GI: LBM 6/9, passing gas  : voiding spontaneously via urinal/bathroom  Skin: Left crani incision covered w/ primapore  dressing marked, no extension. 2+ edema to BLE, marquez  Pain: mild pain managed with scheduled tylenol  Activity: SBA/GB  Social: Wife at bedside, helpful and supportive.  Plan: Decadron 8 days taper. Continue to monitor and follow POC

## 2025-06-11 NOTE — PROGRESS NOTES
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 Mccarthy. Inpatient Diabetes Service consulted for steroid induced hyperglycemia and glycemic management recommendations .          Assessment/Plan:     Assessment:   Type 2 Diabetes Mellitus, complicated by optic neuropathy. Sub-optimal, but improving control  (A1c 8.0 %, Hgb: 16.5)  Steroid hyperglycemia   Stress hyperglycemia   S/P stealth assisted left awake craniotomy with tumor resection and speech and motor mapping   BMI 49     Plan/Recommendations:    -Add  Lantus 30 units at 0900  - Continue with IV insulin drip today- non DKA protocol   - Increase  Lantus 60 to 80  units at 1500 and in 2 hours discontinue IV insulin drip.   - Increase Novolog Meal Coverage: 1 unit per 2 grams CHO TID AC and PRN with snacks/supplements   - When insulin drip is discontinued Start Novolog correction scale:  very high insulin  (ISF 10) >140 TID AC and >200 HS and 0200.   -Add PTA Metformin 1000 mg XL BID and Jardiance 25 mg daily.   - BG monitoring:  Every 1-2 hours on insulin drip  then TID AC HS and 0200   - Hypoglycemia protocol    - Carb counting protocol      Discussion:   Insulin drip needs ranging from 4 units to 10 units. Per bedside nurse this am, there was not a carrier for his insulin, so unsure of how much insulin he was actually getting.  Erroring on the side of caution, this am, will add an additional 30 units of Lantus (1.25/hr)  in addition to the 60 units of Lantus circulating from 1500 yesterday. Totaling 90 units of Lantus circulating.  Insulin drip rates escalated during meal time yesterday  to 6/10/5.5 units. Will increase carb ratio to 1 per 2.  Cr 0.78 GFR >90 Bicarb 20 Anion gap 11    Addendum: Lantus 60 units will be out of circulation at 1500  Drip rates continued to be elevated  at minimum 4 units an hour. with the additional 30 units this am. Accounting for the 4 units an hour, will reduce 20% as patient's dex will taper down to 2 mg this evening. Patient is anticipating discharge, he is hoping tomorrow. Patient was concerned last time he was on a dex taper, he remained on his oral medications and toujeo dose and was titrating his Novolog doses and BGs were stable. Patient reports taking up to 25 units of Novolog with meals. He voices his frustration with how much more insulin requirement he is needing in the hospital. Reviewed with patient, he received an additional Dex 8 mg in OR in addition the stress of surgery are different factors that are driving his blood sugars up. He expresses concern about not starting back on his oral diabetes medications while in the hospital. Reviewed  case with endocrine colleague. Patient's insulin needs are so high, adding Metformin and Jardiance PTA doses will not have much effect on his overall insulin requirements. Will add then today, but will not adjust insulin doses.  Will not add Glipizide and meal time insulin will take the place of Glipizide during the staroid taper. With coming off the drip tonight, will most likely have elevated BG's. Will further trend BGs overnight and assess Lantus doses in am. Patient reports he does have a follow up with his MTM on Friday.        Discharge Planning: (tentative)    Medications: TBD    Test Claims:  none needed.   Education:  Needs to be assessed closer to discharge.    Outpatient Follow-up:  recommend with [facility] Endocrinology provider Dr. Pickard (scheduled 6.30/25) and [facility] Serene Hamm (scheduled 6/13/25) , also scheduled with Kamini Caceres 7/16/25     Thank you for this consult. IDS will continue  to follow.      Please notify Inpatient Diabetes Service if changes are planned to steroids, nutrition, TPN/TF and anticipated procedures requiring prolonged NPO status.         Interval History/Review of Systems :   The last 24 hours progress and nursing notes reviewed.  No events overnight.   Only ate 25 grams of carbs.     Planned Procedures/Surgeries: none    Inpatient Glucose Control:       Recent Labs   Lab 06/11/25  0701 06/11/25  0607 06/11/25  0553 06/11/25  0507 06/11/25  0409 06/11/25  0303   * 178* 167* 158* 176* 161*             Medications Impacting Glycemia:   Steroids:   6/9: Dexamethasone 10 mg x1 in OR   6/10: Start Dexamethasone 4 mg q 12 hrs x 2 days   6/11: at 2000 taper to Dexamethasone 2 mg x2 days  6/13: at 2000 taper to Dexamethasone 1 mg x2 days  6/16: start Dexamethasone 1 mg daily at 0800 x2 days   D5W containing solutions/medications: none presently- was on Ancef 2 g in 50 ml Dextrose q 8 hrs (3 doses)   Other medications impacting glucose: none        Nutrition:   Orders Placed This Encounter      Advance Diet as Tolerated: Regular Diet Adult    Supplements:  none  TF: none  TPN: none         Diabetes History: see full consult note for complete diabetes history   Diabetes Type and Duration: Type 2 Dm since 2015 - 2018 per his recollection   GAD65 antibody, zinc transporter 8 antibody, islet antibody, insulin antibody, and c-peptide  not available on epic search      Started on PO medications then added Insulin. Has never been on insulin pump      PTA Medication Regimen:   Glipizide XL 5 mg in the AM--> increased to 10 mg added 4/14/25 by MTM after stopping Ozempic   Jardiance 25 mg daily   Metformin XR 1000 mg twice daily   Toujeo 20 units daily in the evening  Novolog 20-24 units - wife has type 1 DM so patient does well with carb estimates        Missing doses?: sometimes missing meal time insulin   Historical Diabetes Medications:   Victoza- stopped taking out of caution when  "father diagnosed with Medullary thyroid cancer  Later restarted on ozempic in 2024 by Endocrinology- however stopped in 5/2025 due to concern for ischemic optic neuropathy noted by neuro-opthomologist      Glucose monitoring device/frequency/trends: Manolo 3 + - trends reviewed, rare lows. Typically with prandial hyperglycemia up to the 150-200 range (higher if missed meal insulin) and fastings in the 100-120 range   Hypoglycemia PTA:   - Frequency: rare, maybe once every 2-3 weeks, he will treat if BG in the 70-80s   - Severity:  no history of severe unconscious lows   - Awareness:  intact    - Treatment: Peanut butter and jelly sandwich or juice box       Outpatient Diabetes Provider: [facility]ocr[facility]ology- Dr. Pickard (LOV: 11/11/24) also following with [facility] Serene Hamm, LOV 4/14/25  Formal Diabetes Education/Educator: in the past            Physical Exam:   /89 (BP Location: Right arm)   Pulse 90   Temp 98.7  F (37.1  C) (Oral)   Resp 17   Ht 1.88 m (6' 2\")   Wt (!) 173.3 kg (382 lb 1.6 oz)   SpO2 95%   BMI 49.06 kg/m    General Appearance: up in chair, awake and alert.   HEENT: generalized swelling. Incision covered.   Heart: Regular rate and rhythm.    Lungs:  Breathing comfortably   Abdomen: Round, nondistended. Soft, nontender.   Extremities:  2+ bilateral lower extremity edema. Fluid movement of extremities. Dusky extremities at baseline   Neuro:  Oriented x3, able to speak clearly.    Psych:  Calm, appropriate mood and affect.                   Data:     Lab Results   Component Value Date    A1C 8.0 (H) 06/10/2025    A1C 7.9 (H) 05/13/2025    A1C 6.4 (H) 12/30/2024    A1C 7.3 (H) 08/05/2024    A1C 7.6 (H) 03/11/2024    A1C 7.4 (H) 03/01/2021    A1C 8.8 (H) 12/29/2020    A1C 8.8 (H) 08/24/2020    A1C 10.4 (H) 01/22/2020    A1C 7.4 (H) 06/11/2019       ROUTINE IP LABS (Last four results)  BMP  Recent Labs   Lab 06/11/25  0701 06/11/25  0607 06/11/25  0553 06/11/25  0507 06/10/25  0503 " 06/10/25  0353   NA  --   --  136  --   --  134*   POTASSIUM  --   --  4.4  --   --  4.5   CHLORIDE  --   --  105  --   --  105   CALLY  --   --  9.7  --   --  9.5   CO2  --   --  20*  --   --  17*   BUN  --   --  17.3  --   --  13.1   CR  --   --  0.78  --   --  0.67   * 178* 167* 158*   < > 256*    < > = values in this interval not displayed.     CBC  Recent Labs   Lab 06/11/25  0553 06/10/25  0353   WBC 15.9* 15.2*   RBC 5.58 5.74   HGB 15.8 16.5   HCT 48.6 49.1   MCV 87 86   MCH 28.3 28.7   MCHC 32.5 33.6   RDW 13.2 13.0    216     INRNo lab results found in last 7 days.    Inpatient Diabetes Service will continue to follow, please don't hesitate to contact the team with any questions or concerns.     EMMANUEL Schuster CNP    Plan discussed with patient, bedside RN, and primary team via this note.    To contact Inpatient Diabetes Service:     7 AM - 5 PM: Page the [employer] KARMEN following the patient that day (see filed or incomplete progress notes/consult notes under Endocrinology)    OR if uncertain of provider assignment: page job code [phone]    5 PM - 7 AM: First call after hours is to primary service.    For urgent after-hours questions, page job code for on call fellow: [phone]     I spent a total of 45 minutes on the date of the encounter doing prep/post-work, chart review, history and exam, documentation and further activities per the note including lab review, multidisciplinary communication, counseling the patient and/or coordinating care regarding acute hyper/hypoglycemic management, as well as discharge management and planning/communication.

## 2025-06-11 NOTE — PLAN OF CARE
Vitals: VSS on RA, except HTN- MD aware and added second half of home losartan dose.  Neuros: Alert and oriented x4. 5/5 throughout. WFD-improving.   IV: PIV infusing insulin gtt at 4 units on alg. 4. Stop at 1700.  Resp: WNL on RA  Diet: Regular diet with carb counting.   GI: Last BM 6/9.  : Voiding  Skin: Head incision YORDY, Edema to BLE-marquez.  Pain: Utilizing prn tylenol for incisional pain.    Activity: Up with SBA, ambulating in hallway with wife.  Social: Wife at bedside and supportive.  Plan: Plan to give lantus, then stop insulin gtt 2 hours later. Decadron taper.

## 2025-06-11 NOTE — PLAN OF CARE
Transferred to: Unit 6[address] at (time) 2120  Belongings: with pt  Yañez removed? No yañez   Central line removed? NA  Chart and medications sent with patient Yes  Family notified: Yes

## 2025-06-11 NOTE — PROGRESS NOTES
Care Management     Length of Stay (days): 2    Expected Discharge Date: 06/13/2025     Concerns to be Addressed:    Social Determinants of Health - Housing Stability   Patient plan of care discussed at interdisciplinary rounds:   Rounds have not yet been held this am    Anticipated Discharge Disposition:  Home with wife     Anticipated Discharge Services:   To be determined  Anticipated Discharge DME:    Not applicable at this time    Patient/family educated on Medicare website which has current facility and service quality ratings:   Not applicable at this time  Education Provided on the Discharge Plan:  Not applicable at this time  Patient/Family in Agreement with the Plan:  Not applicable at this time    Referrals Placed by CM/SW:    Not applicable at this time  Private pay costs discussed: Not applicable at this time    Discussed  Partnership in Safe Discharge Planning  document with patient/family: No     Handoff Completed: No, handoff not indicated or clinically appropriate    Additional Information:  Upon admit to [facility], nursing staff completed Social Determinants of Health questions with pt.  Pt's response to Housing Stability questions triggered the need for SW follow up.    Pt's response to housing stability questions were as follows:    Housing Stability  Do you have housing?  No  Are you worried about losing your housing?    SW met with pt and wife this am and introduced role of SW.  Per discussion, pt has stable housing.  Pt and wife own a home.  Pt and wife indicate that they have no concerns related to housing stability.        Next Steps: SW will sign off as pt has stable housing.  SW available should add'l needs arise.    ZULEMA Johnson  Social Work, 6A  Phone:  [phone]  Pager:  [phone]  6/11/2025       KARLA Dodson

## 2025-06-11 NOTE — PROGRESS NOTES
"[facility], [place]   06/11/2025  Neurosurgery Progress Note:      Interval History:   Mild word-finding difficulty improving  Continue on insulin gtt for elevated blood glucose; inpatient Diabetes team managing     Clinically Significant Risk Factors         # Hyponatremia: Lowest Na = 134 mmol/L in last 2 days, will monitor as appropriate           # Hypertension: Noted on problem list           # DMII: A1C = 8.0 % (Ref range: <5.7 %) within past 6 months, PRESENT ON ADMISSION  # Morbid Obesity: Estimated body mass index is 49.06 kg/m  as calculated from the following:    Height as of this encounter: 1.88 m (6' 2\").    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 Mccarthy.    He is currently post-operative day 2.    Plan:  - Serial neuro exams  - tylenlol, oxycodone prn  - Keppra PTA  - Decadron 8d taper  - HOB > 30  - SBP <140  - labetalol/hydralazine prn  - prn antiemetics  - bowel regimen: miralax, senna  - Advance diet as tolerated  - Inpatient Endocrinology consulted, appreciate recommendations   - insulin gtt  - Electrolyte replacement protocol  - SCDs  and subcutaneous heparin  for DVT proph  - PT/OT      Wound Cares - Incision on the LEFT scalp has been closed with non-absorbable sutures. The dressing may be removed on post-op day 2 (6/11/25) after which the wound can be undressed and open to air. The patient is allowed to take showers and get the wound wet on post-op day 3 (6/12/25) but may not scrub or soak the wound or keep it submerged under water. Pat wound dry. Do not scrub the incision. Sutures should be removed on or around post-op day 14 " "(6/23/25).     -----------------------------------  EMMANUEL Caruso, [facility]  Neurosurgery  Pager [phone]    -----------------------------------    Objective:   Temp:  [97.6  F (36.4  C)-98.7  F (37.1  C)] 97.6  F (36.4  C)  Pulse:  [] 86  Resp:  [16-18] 16  BP: (118-150)/(71-98) 150/89  SpO2:  [94 %-97 %] 95 %  I/O last 3 completed shifts:  In: 2609.75 [P.O.:2280; I.V.:279.75; IV Piggyback:50]  Out: 3025 [Urine:3025]    Gen: Appears comfortable, NAD  Wound: clean, dry, intact dressing in place  Neurologic:  - Alert & Oriented to person, place, time, and situation  - Follows commands briskly  - Speech fluent, spontaneous. Mild word-finding difficulty with intact naming, repetition, and comprehension  - No gaze preference. No apparent hemineglect. Stable visual \"funhouse mirror\" artifact in LEFT temporal and difficulty with perception in inferior fields.  - PERRL, EOMI  - Face sensation intact to light touch, and activates symmetrically  - Hearing intact to finger rustle bilaterally  - Palate elevates symmetrically with uvula midline  - Tongue protrudes midline    - Trapezii muscles 5/5 bilaterally  - No pronator drift     Del Bi Tri Grp FE   R 5 5 5 5 5   L 5 5 5 5 5    HF KE DF EHL PF   R 5 5 5 5 5   L 5 5 5 5 5     Reflexes 2+ throughout  No Johnson's or Clonus, with down-going toes    Sensation intact and symmetric to light touch throughout      LABS:  Recent Labs   Lab 06/11/25  0903 06/11/25  0810 06/11/25  0701 06/11/25  0607 06/11/25  0553 06/10/25  0503 06/10/25  0353   NA  --   --   --   --  136  --  134*   POTASSIUM  --   --   --   --  4.4  --  4.5   CHLORIDE  --   --   --   --  105  --  105   CO2  --   --   --   --  20*  --  17*   ANIONGAP  --   --   --   --  11  --  12   * 145* 178*   < > 167*   < > 256*   BUN  --   --   --   --  17.3  --  13.1   CR  --   --   --   --  0.78  --  0.67   CALLY  --   --   --   --  9.7  --  9.5    < > = values in this interval not displayed.       Recent Labs   Lab " 06/11/25  0553   WBC 15.9*   RBC 5.58   HGB 15.8   HCT 48.6   MCV 87   MCH 28.3   MCHC 32.5   RDW 13.2          IMAGING:  Recent Results (from the past 24 hours)   MR Brain w/o & w Contrast   Result Value    Radiologist flags (Urgent)     Focus of acute infarction about the resection cavity    Narrative    EXAM: MR BRAIN W/O & W CONTRAST  6/10/2025 5:58 PM     HISTORY: s/p resection of tumor       COMPARISON: MRI brain 6/6/2025, 5/21/2025    TECHNIQUE: MR head: Multiplanar T1-weighted, axial FLAIR, and  susceptibility images were obtained without intravenous contrast.  Following intravenous gadolinium-based contrast administration, axial  T2-weighted, diffusion, and T1-weighted images (in multiple planes)  were obtained.    CONTRAST: 10 mL Gadavist.    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 Increased extent of parenchymal T2 hyperintensity  surrounding the resection cavity in the left frontal lobe. There is  overlying dural thickening and enhancement.  Mild peripheral  enhancement of the resection cavity that is likely postoperative.  Stable rightward midline shift of 3 mm at the level of the septum  pellucidum. Surrounding the resection cavity, there is increased  signal on diffusion-weighted imaging with associated loss of signal on  the ADC map, most notably along the medial aspect within the left  centrum semiovale. Layering T2 hyperintense and T1 hypointense air  fluid level in the resection bed with associated susceptibility  artifact. Partial effacement of the left lateral ventricle secondary  to the edema.     Major intracranial vascular structures are within normal limits.    No suspicious abnormality of the skull marrow signal. Polypoid mucosal  thickening of the maxillary sinuses. Mastoid air cells are clear. No  focal abnormality of the pituitary gland, sella, skull base and upper  cervical spinal structures on sagittal images. The orbits are  normal.      Impression    IMPRESSION:  1. Postsurgical changes of left frontal craniotomy for  oligodendroglioma resection. Small focus of diffusion restriction  medial to the resection cavity in the left centrum semiovale,  suggestive of acute infarction. There is mild circumferential  restricted diffusion along the resection cavity which is presumably  postoperative. Faint peripheral enhancement of the resection cavity  with layering internal postoperative hemorrhage.  2. Stable rightward midline shift of 3 mm.    [Urgent Result: Focus of acute infarction about the resection cavity  in the left centrum semiovale.]    Finding was identified on 6/10/2025 6:32 PM.     Dr. Filiberto Pate was contacted by Dr. Yuen at 6/10/2025 6:50 PM and  verbalized understanding of the urgent finding.     I have personally reviewed the examination and initial interpretation  and I agree with the findings.    COLE LANGSTON MD         SYSTEM ID:  [id number]

## 2025-06-12 VITALS
DIASTOLIC BLOOD PRESSURE: 83 MMHG | HEART RATE: 86 BPM | HEIGHT: 74 IN | SYSTOLIC BLOOD PRESSURE: 132 MMHG | RESPIRATION RATE: 16 BRPM | TEMPERATURE: 97.8 F | WEIGHT: 315 LBS | OXYGEN SATURATION: 95 % | BODY MASS INDEX: 40.43 KG/M2

## 2025-06-12 LAB
ANION GAP SERPL CALCULATED.3IONS-SCNC: 12 MMOL/L (ref 7–15)
BUN SERPL-MCNC: 18.7 MG/DL (ref 6–20)
CALCIUM SERPL-MCNC: 9.4 MG/DL (ref 8.8–10.4)
CHLORIDE SERPL-SCNC: 103 MMOL/L (ref 98–107)
CREAT SERPL-MCNC: 0.93 MG/DL (ref 0.67–1.17)
EGFRCR SERPLBLD CKD-EPI 2021: >90 ML/MIN/1.73M2
ERYTHROCYTE [DISTWIDTH] IN BLOOD BY AUTOMATED COUNT: 13.2 % (ref 10–15)
GLUCOSE BLDC GLUCOMTR-MCNC: 116 MG/DL (ref 70–99)
GLUCOSE BLDC GLUCOMTR-MCNC: 128 MG/DL (ref 70–99)
GLUCOSE SERPL-MCNC: 118 MG/DL (ref 70–99)
HCO3 SERPL-SCNC: 23 MMOL/L (ref 22–29)
HCT VFR BLD AUTO: 47.9 % (ref 40–53)
HGB BLD-MCNC: 15.5 G/DL (ref 13.3–17.7)
MCH RBC QN AUTO: 28.3 PG (ref 26.5–33)
MCHC RBC AUTO-ENTMCNC: 32.4 G/DL (ref 31.5–36.5)
MCV RBC AUTO: 88 FL (ref 78–100)
PLATELET # BLD AUTO: 222 10E3/UL (ref 150–450)
POTASSIUM SERPL-SCNC: 4.1 MMOL/L (ref 3.4–5.3)
RBC # BLD AUTO: 5.47 10E6/UL (ref 4.4–5.9)
SODIUM SERPL-SCNC: 138 MMOL/L (ref 135–145)
WBC # BLD AUTO: 11.5 10E3/UL (ref 4–11)

## 2025-06-12 PROCEDURE — 250N000013 HC RX MED GY IP 250 OP 250 PS 637: Performed by: NURSE PRACTITIONER

## 2025-06-12 PROCEDURE — 250N000012 HC RX MED GY IP 250 OP 636 PS 637

## 2025-06-12 PROCEDURE — 250N000013 HC RX MED GY IP 250 OP 250 PS 637

## 2025-06-12 PROCEDURE — 99232 SBSQ HOSP IP/OBS MODERATE 35: CPT | Mod: 24 | Performed by: NURSE PRACTITIONER

## 2025-06-12 PROCEDURE — 80048 BASIC METABOLIC PNL TOTAL CA: CPT

## 2025-06-12 PROCEDURE — 250N000011 HC RX IP 250 OP 636

## 2025-06-12 PROCEDURE — 82565 ASSAY OF CREATININE: CPT

## 2025-06-12 PROCEDURE — 85014 HEMATOCRIT: CPT

## 2025-06-12 PROCEDURE — 36415 COLL VENOUS BLD VENIPUNCTURE: CPT

## 2025-06-12 PROCEDURE — 85041 AUTOMATED RBC COUNT: CPT

## 2025-06-12 RX ORDER — CYCLOBENZAPRINE HCL 10 MG
10 TABLET ORAL 3 TIMES DAILY PRN
COMMUNITY
Start: 2025-06-12

## 2025-06-12 RX ORDER — OXYCODONE HYDROCHLORIDE 5 MG/1
5 TABLET ORAL EVERY 6 HOURS PRN
Qty: 10 TABLET | Refills: 0 | Status: SHIPPED | OUTPATIENT
Start: 2025-06-12

## 2025-06-12 RX ORDER — DEXAMETHASONE 1 MG
TABLET ORAL
Qty: 10 TABLET | Refills: 0 | Status: SHIPPED | OUTPATIENT
Start: 2025-06-12 | End: 2025-06-17

## 2025-06-12 RX ORDER — AMOXICILLIN 250 MG
1 CAPSULE ORAL 2 TIMES DAILY
Qty: 20 TABLET | Refills: 0 | Status: SHIPPED | OUTPATIENT
Start: 2025-06-12

## 2025-06-12 RX ADMIN — DEXAMETHASONE 2 MG: 2 TABLET ORAL at 09:04

## 2025-06-12 RX ADMIN — SENNOSIDES AND DOCUSATE SODIUM 1 TABLET: 50; 8.6 TABLET ORAL at 09:04

## 2025-06-12 RX ADMIN — LEVETIRACETAM 750 MG: 750 TABLET, FILM COATED ORAL at 09:04

## 2025-06-12 RX ADMIN — LOSARTAN POTASSIUM 100 MG: 100 TABLET, FILM COATED ORAL at 09:04

## 2025-06-12 RX ADMIN — ATORVASTATIN CALCIUM 10 MG: 10 TABLET, FILM COATED ORAL at 09:04

## 2025-06-12 RX ADMIN — PANTOPRAZOLE SODIUM 40 MG: 40 TABLET, DELAYED RELEASE ORAL at 09:04

## 2025-06-12 RX ADMIN — METFORMIN ER 500 MG 1000 MG: 500 TABLET ORAL at 09:03

## 2025-06-12 RX ADMIN — ACETAMINOPHEN 650 MG: 325 TABLET, FILM COATED ORAL at 06:00

## 2025-06-12 RX ADMIN — EMPAGLIFLOZIN 25 MG: 25 TABLET, FILM COATED ORAL at 09:04

## 2025-06-12 RX ADMIN — HEPARIN SODIUM 5000 UNITS: 5000 INJECTION, SOLUTION INTRAVENOUS; SUBCUTANEOUS at 04:48

## 2025-06-12 RX ADMIN — ACETAMINOPHEN 650 MG: 325 TABLET, FILM COATED ORAL at 12:11

## 2025-06-12 ASSESSMENT — ACTIVITIES OF DAILY LIVING (ADL)
ADLS_ACUITY_SCORE: 36
ADLS_ACUITY_SCORE: 39
ADLS_ACUITY_SCORE: 36
ADLS_ACUITY_SCORE: 36
DEPENDENT_IADLS:: INDEPENDENT
ADLS_ACUITY_SCORE: 36
ADLS_ACUITY_SCORE: 39
ADLS_ACUITY_SCORE: 36

## 2025-06-12 NOTE — PLAN OF CARE
Goal Outcome Evaluation:      Plan of Care Reviewed With: patient, spouse    Overall Patient Progress: improvingOverall Patient Progress: improving

## 2025-06-12 NOTE — DISCHARGE INSTRUCTIONS
Diabetes Management Discharge Plan   Blood glucose monitoring: Three times daily before meals, and at bedtime.  Blood Glucose (BG) goals:  mg/dL before meals, less than 180 mg/dL 2 hrs after meals.   Glucose Control Regimen:  1) Discontinue/Resume prior to admission medication:   Continue Metformin and Jardiance at discharge.   On 6/18, can restart Glipizide. You may continue to run high 36 -48 hours after your last dose of Decadron.    2)               3) Rapid-acting insulin: aspart (Novolog) correction - see chart below. Take for high blood glucoses three times daily before meals and at bedtime.     You may add the correction dose to the carbohydrate coverage/mealtime insulin dose and give in one injection--ideally 10-15 minutes before a meal.    You may take the correction dose even if you skip a meal (as long as it has been 4 hours since previous correction dose).       Pre-meal correction scale:     Blood Glucose Insulin Novolog Before Meals:   Less than 140 0 units   140 -164  1 units   165 -189 2 units   190 - 214 3 units   215 - 239 4 units    240 - 264  5 units   265 - 289 6 units   290 - 314 7 units   315 - 339 8 units   340 - 364 9 units   365 or more 10 units         Bedtime correction scale:      Blood Glucose Insulin Novolog At Bedtime:   Less than 200 0 units   200 - 224 1 units   225 - 249 2 units   250 - 274 3 units   275 - 299  4 units    300 - 324  5 units   325 - 349 6 units   350 or more 7 units         Outpatient Diabetes Follow-Up:  recommend with [facility] Endocrinology provider Dr. Pickard (scheduled 6.30/25) and [facility] Serene Hamm (scheduled 6/13/25) , also scheduled with Kamini Caceres 7/16/25  Follow up sooner if blood glucose runs consistently greater than 200 mg/dL or if having more than two episodes less than 70 mg/dL.     If you have urgent questions or concerns regarding your blood sugars or insulin, you may contact [phone] (the main hospital ). Ask to  speak with the Endocrinologist on call.    Hypoglycemia (Low Blood Glucose) Management:  Signs/symptoms:  Shaking, sweating, fast heartbeat  Feeling dizzy, tired, or weak   Feeling anxious and easy to irritate  Feeling nervous, crabby, or confused  Hunger  Vision problems, headache  Numb or tingling mouth    If blood glucose is 51 to 70:   Eat or drink 15 grams of carbohydrate. Examples:   1/2 cup (4 ounces) apple juice or regular soda pop, or   1 cup (8 ounces) milk, or   15 skittles, or   3 to 4 glucose tablets.   Re-check your blood glucose in 15 minutes.   Repeat these steps every 15 minutes until your blood glucose is above 80.       If blood glucose is 50 or less:   Eat or drink 30 grams of carbohydrate. Examples:   1 cup (8 ounces) apple juice or regular soda pop, or   2 cups (16 ounces) milk, or   1 banana, or   6 to 8 glucose tablets.   Re-check your blood glucose in 15 minutes.   Repeat these steps every 15 minutes until your blood glucose is above 80.

## 2025-06-12 NOTE — PLAN OF CARE
Occupational Therapy Discharge Summary    Reason for therapy discharge:    Discharged to home with outpatient therapy.    Progress towards therapy goal(s). See goals on Care Plan in [facility] electronic health record for goal details.  Goals partially met.  Barriers to achieving goals:   discharge from facility.    Therapy recommendation(s):    Continued therapy is recommended.  Rationale/Recommendations:  decreased ADL I.    Goal Outcome Evaluation:

## 2025-06-12 NOTE — PLAN OF CARE
Goal Outcome Evaluation:      Plan of Care Reviewed With: patient, spouse    Overall Patient Progress: no changeOverall Patient Progress: no change    Outcome Evaluation: Pain well managed with scheduled meds, IV BP meds given x1    Status: S/p L crani with astrocytoma resection on 6/9  Vitals: VSS ex HTN outside parameters of SBP<140, labetalol given x1   Neuros: A&Ox4, strengths 5/5 throughout, denies N/T, WFD   IV: PIV SL   Labs/Electrolytes: BG checks ACHS with carb coverage   Resp: LSC on RA during day, CPAP at night   Diet: Regular  GI: LBM 6/9, BS+, senna given   : Voiding spontaneously   Skin: L crani incision YORDY, BLE maruqez with 2/3+ edema   Pain: Headache managed with PRN tylenol   Activity: SBA   Social: Wife and family visited, supportive   Plan/Updates this shift: Insulin drip discontinued at 1700 and switched to BG checks ACHS, plan to get full home dose of losartan tomorrow AM, potential discharge home tomorrow.

## 2025-06-12 NOTE — DISCHARGE SUMMARY
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    Weight as of this encounter: 173.3 kg (382 lb 1.6 oz).  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 Mccarthy.             Hospital Course:   Patient underwent above-mentioned procedure on 6/9/2025.  Following the procedure the patient was transferred to surgical ICU. Patient underwent imaging revealing expected post-operative changes.  Patient was started on 1 week decadron taper for cerebral edema. Endocrinology Diabetes was consulted for insulin management.  On post operative day 1 he was doing well and transferred to the floor. On post operative day 3, he was ambulating, voiding without a yañez, eating a regular diet, pain was well controlled and therefore he was discharged to home. Patient will discharge " on insulin taper plan that correlates with decadron taper.  Patient will follow-up with Neurosurgery in 2 weeks for wound check.           Discharge Medications:     Current Discharge Medication List        START taking these medications    Details   dexAMETHasone (DECADRON) 1 MG tablet Take 2 tablets (2 mg) by mouth every 12 hours for 1 day, THEN 1 tablet (1 mg) every 12 hours for 2 days, THEN 1 tablet (1 mg) daily for 2 days.  Qty: 10 tablet, Refills: 0    Associated Diagnoses: S/P craniotomy      !! insulin aspart (NOVOLOG PEN) 100 UNIT/ML pen Inject 2-16 Units subcutaneously 3 times daily (with meals).  Qty: 15 mL, Refills: 0    Associated Diagnoses: S/P craniotomy; Type 2 diabetes mellitus with hyperglycemia, without long-term current use of insulin (H)      !! insulin aspart (NOVOLOG PEN) 100 UNIT/ML pen Inject 2-12 Units subcutaneously Take with snacks or supplements for high blood sugar.  Qty: 15 mL, Refills: 0    Associated Diagnoses: S/P craniotomy; Type 2 diabetes mellitus with hyperglycemia, without long-term current use of insulin (H)      insulin glargine (LANTUS PEN) 100 UNIT/ML pen Inject 80 Units subcutaneously every 24 hours.  Qty: 15 mL, Refills: 2    Comments: If Lantus is not covered by insurance, may substitute Basaglar or Semglee or other insulin glargine product per insurance preference at same dose and frequency.    Associated Diagnoses: S/P craniotomy; Type 2 diabetes mellitus with hyperglycemia, without long-term current use of insulin (H)      oxyCODONE (ROXICODONE) 5 MG tablet Take 1 tablet (5 mg) by mouth every 6 hours as needed for moderate to severe pain.  Qty: 10 tablet, Refills: 0    Associated Diagnoses: S/P craniotomy      senna-docusate (SENOKOT-S/PERICOLACE) 8.6-50 MG tablet Take 1 tablet by mouth 2 times daily.  Qty: 20 tablet, Refills: 0    Associated Diagnoses: S/P craniotomy       !! - Potential duplicate medications found. Please discuss with provider.        CONTINUE these  medications which have CHANGED    Details   cyclobenzaprine (FLEXERIL) 10 MG tablet Take 1 tablet (10 mg) by mouth 3 times daily as needed for muscle spasms.           CONTINUE these medications which have NOT CHANGED    Details   atorvastatin (LIPITOR) 10 MG tablet Take 1 tablet (10 mg) by mouth daily.  Qty: 90 tablet, Refills: 3    Associated Diagnoses: Hyperlipidemia LDL goal <100      Continuous Glucose Sensor (FREESTYLE KULWANT 3 PLUS SENSOR) MISC Change every 15 days.  Qty: 6 each, Refills: 1    Associated Diagnoses: Type 2 diabetes mellitus with hyperglycemia, without long-term current use of insulin (H)      empagliflozin (JARDIANCE) 25 MG TABS tablet Take 1 tablet (25 mg) by mouth daily.  Qty: 90 tablet, Refills: 3    Associated Diagnoses: Type 2 diabetes mellitus with hyperglycemia, without long-term current use of insulin (H)      glipiZIDE (GLUCOTROL XL) 5 MG 24 hr tablet Take 1 tablet (5 mg) by mouth daily. May increase to 10 mg dose if time in range is below 70%.  Qty: 90 tablet, Refills: 0    Associated Diagnoses: Type 2 diabetes mellitus with hyperglycemia, without long-term current use of insulin (H)      !! insulin aspart (NOVOLOG FLEXPEN) 100 UNIT/ML pen Inject 20-28 Units subcutaneously 2 times daily (with meals).  Qty: 45 mL, Refills: 3    Associated Diagnoses: Type 2 diabetes mellitus with hyperglycemia, without long-term current use of insulin (H)      insulin glargine U-300 (TOUJEO) 300 UNIT/ML (1 units dial) pen Inject 20 Units subcutaneously daily.  Qty: 15 mL, Refills: 3    Associated Diagnoses: Type 2 diabetes mellitus with hyperglycemia, without long-term current use of insulin (H)      insulin pen needle (ULTICARE MINI) 31G X 6 MM miscellaneous Use 3-4 pen needles daily or as directed.  Qty: 300 each, Refills: 3    Associated Diagnoses: Type 2 diabetes mellitus with hyperglycemia, without long-term current use of insulin (H)      levETIRAcetam (KEPPRA) 750 MG tablet Take 1 tablet (750 mg)  "by mouth 2 times daily.  Qty: 60 tablet, Refills: 0      LORazepam (ATIVAN) 0.5 MG tablet Take 1 tablet (0.5 mg) by mouth every 6 hours as needed for anxiety.  Qty: 20 tablet, Refills: 0    Associated Diagnoses: Anxiety      losartan (COZAAR) 100 MG tablet Take 1 tablet (100 mg) by mouth daily.  Qty: 90 tablet, Refills: 0    Associated Diagnoses: Essential hypertension      metFORMIN (GLUCOPHAGE XR) 500 MG 24 hr tablet Take 2 tablets (1,000 mg) by mouth 2 times daily (with meals).  Qty: 360 tablet, Refills: 3    Associated Diagnoses: Type 2 diabetes mellitus with hyperglycemia, without long-term current use of insulin (H)      ondansetron (ZOFRAN ODT) 4 MG ODT tab Take 1 tablet (4 mg) by mouth every 8 hours as needed for nausea or vomiting.  Qty: 10 tablet, Refills: 0    Associated Diagnoses: Type 2 diabetes mellitus with hyperglycemia, without long-term current use of insulin (H)      blood glucose (ACCU-CHEK GUIDE) test strip Use to test blood sugar 1 times daily or as directed.  Qty: 50 strip, Refills: 1    Associated Diagnoses: Type 2 diabetes mellitus with hyperglycemia, without long-term current use of insulin (H)      blood glucose monitoring (ACCU-CHEK FASTCLIX) lancets 1 each daily  Qty: 102 each, Refills: 3    Associated Diagnoses: Type 2 diabetes mellitus with hyperglycemia, without long-term current use of insulin (H)      Blood Glucose Monitoring Suppl (CONTOUR NEXT GEN MONITOR) SAMIA 1 each daily. Use as directed.  Qty: 1 each, Refills: 0    Associated Diagnoses: Type 2 diabetes mellitus with hyperglycemia, without long-term current use of insulin (H)       !! - Potential duplicate medications found. Please discuss with provider.                 Exam:   Physical Exam  BP (!) 141/93   Pulse 86   Temp 98.4  F (36.9  C) (Oral)   Resp 16   Ht 1.88 m (6' 2\")   Wt (!) 173.3 kg (382 lb 1.6 oz)   SpO2 98%   BMI 49.06 kg/m    General: Appears comfortable, NAD  Wound: Incision, clean, dry, intact without " "strikethrough  Neurologic:  - Alert & Oriented to person, place, time, and situation  - Follows commands briskly  - Speech fluent, spontaneous. Mild word-finding difficulty with intact naming, repetition, and comprehension  - No gaze preference. No apparent hemineglect. Stable visual \"funhouse mirror\" artifact in LEFT temporal and difficulty with perception in inferior fields.  - PERRL, EOMI  - Face sensation intact to light touch, and activates symmetrically  - Hearing intact to finger rustle bilaterally  - Palate elevates symmetrically with uvula midline  - Tongue protrudes midline     - Trapezii muscles 5/5 bilaterally  - No pronator drift       Del Bi Tri Grp FE   R 5 5 5 5 5   L 5 5 5 5 5     HF KE DF EHL PF   R 5 5 5 5 5   L 5 5 5 5 5      Reflexes 2+ throughout  No Johnson's or Clonus, with down-going toes     Sensation intact and symmetric to light touch throughout         Discharge Instructions and Follow-Up:     Discharge diet: Regular   Discharge activity: Do not do any bending, twisting, strenuous exercise, or heavy lifting (greater than 10 pounds) for 4-6 weeks. Be careful and ask for assistance when walking or going up and down stairs. Avoid any activities that could result in trauma to the surgical wound. Do not drive within 3 months of having your last seizure or while using narcotics or other sedating medications, such as sleep aids, muscle relaxants, etc.     Discharge follow-up: Follow-up with Neurosurgery [facility] in 2 weeks for wound check/post-hospital evaluation, all additional follow-up visits to be determined by Dr. Alon Mccarthy MD    Wound check appointment on 6/25/2025    Follow-up with Dr. Jacinto will be rescheduled   Wound care: Ok to shower,however no scrubbing of the wound and no soaking of the wound, meaning no bathtubs or swimming pools. Pat dry only. Leave wound open to air.  Patient to have wound checked 2 weeks following surgery.    Wound location: scalp  Closure technique: " sutures  Dressing needs: none  Post-op care at follow-up: Keep dry and clean    Wound Cares - Incision on the LEFT scalp has been closed with non-absorbable sutures. The dressing may be removed on post-op day 2 (6/11/25) after which the wound can be undressed and open to air. The patient is allowed to take showers and get the wound wet on post-op day 3 (6/12/25) but may not scrub or soak the wound or keep it submerged under water. Pat wound dry. Do not scrub the incision. Sutures should be removed on or around post-op day 14 (6/23/25).      Please call if you have:  1. increased pain, redness, drainage, swelling at your incision  2. fevers > 101.5 F degrees  3. with any questions or concerns.  You may reach the Neurosurgery clinic at [phone] during regular work hours. ER at [phone].    and ask for the Neurosurgery Resident on call at [phone], during off hours or weekends.         Discharge Disposition:     Discharged to home        EMMANUEL Caruso, CNP  Neurosurgery  Pager [phone]

## 2025-06-12 NOTE — PROGRESS NOTES
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 Mccarthy. Inpatient Diabetes Service consulted for steroid induced hyperglycemia and glycemic management recommendations .          Assessment/Plan:     Assessment:   Type 2 Diabetes Mellitus, complicated by optic neuropathy. Sub-optimal, but improving control  (A1c 8.0 %, Hgb: 16.5)  Steroid hyperglycemia   Stress hyperglycemia   S/P stealth assisted left awake craniotomy with tumor resection and speech and motor mapping   BMI 49     Plan/Recommendations:    - Decrease Lantus to 80 units at 1300  - Decrease Novolog Meal Coverage: 1 unit per 3 grams CHO TID AC and PRN with snacks/supplements   - Decrease Novolog correction scale: custom correction scale 2 per 30  insulin  (ISF 15) >140 TID AC and >200 HS and 0200.   -Continue PTA Metformin 1000 mg XL BID and Jardiance 25 mg daily.   - BG monitoring:   TID AC HS and 0200   - Hypoglycemia protocol    - Carb counting protocol      Discussion:   BG stlabe in the 100's overnight. Patient is hesitant to eat as he is concerned about food causing a rise in BG and having to stay in hospital as a result. As Decadron is tapering today to total 3 mg will decrease Lantus dose 75% to 80 units and move up closer to am (1300 today and 10 am tomorrow) Cr 0.93 Anion gap 12 Bicarb 23 SBC 11.5 Hgb 15.5          Diabetes Management Discharge Plan  Instructions to patient were posted in AVS and discussed on day of discharge.   Medications and supplies are to be ordered by primary service on discharge.   *please use the DIAB non-branded discharge supply order set  "([id number])*    Patient will need the following supplies prescribed:   Lantus Solostar pens  Novolog Flexpens  \"BD\" (32G x 4mm) insulin pen needles  Glucometer (if brand of meter not known, can be ordered \"no brand specified\" and note to pharmacy: \"can substitute per insurance coverage\")  Lancets  Test strips  Sharps container  Alcohol swabs     Blood glucose monitoring: Three times daily before meals, and at bedtime.  Blood Glucose (BG) goals:  mg/dL before meals, less than 180 mg/dL 2 hrs after meals.     Glucose Control Regimen:  1) Discontinue/Resume prior to admission medication:   Continue Metformin and Jardiance at discharge.   On 6/18, can restart Glipizide. You may continue to run high 36 -48 hours after your last dose of Decadron.       2)         3) Rapid-acting insulin: aspart (Novolog) correction - see chart below. Take for high blood glucoses three times daily before meals and at bedtime.   You may add the correction dose to the carbohydrate coverage/mealtime insulin dose and give in one injection--ideally 10-15 minutes before a meal.  You may take the correction dose even if you skip a meal (as long as it has been 4 hours since previous correction dose).      Pre-meal correction scale:    Blood Glucose Insulin Novolog Before Meals:   Less than 140 0 units   140 -164  1 units   165 -189 2 units   190 - 214 3 units   215 - 239 4 units    240 - 264  5 units   265 - 289 6 units   290 - 314 7 units   315 - 339 8 units   340 - 364 9 units   365 or more 10 units       Bedtime correction scale:     Blood Glucose Insulin Novolog At Bedtime:   Less than 200 0 units   200 - 224 1 units   225 - 249 2 units   250 - 274 3 units   275 - 299  4 units    300 - 324  5 units   325 - 349 6 units   350 or more 7 units       Outpatient Diabetes Follow-Up:  recommend with [facility] Endocrinology provider Dr. Pickard (scheduled 6.30/25) and [facility] Serene Hamm (scheduled 6/13/25) , also scheduled with Kamini" Morris 7/16/25  Follow up sooner if blood glucose runs consistently greater than 200 mg/dL or if having more than two episodes less than 70 mg/dL.     If you have urgent questions or concerns regarding your blood sugars or insulin, you may contact [phone] (the main hospital ). Ask to speak with the Endocrinologist on call.       Thank you for letting the Diabetes Management Team be involved in your care!    Please notify Inpatient Diabetes Service if changes are planned to steroids, nutrition, TPN/TF and anticipated procedures requiring prolonged NPO status.         Interval History/Review of Systems :   The last 24 hours progress and nursing notes reviewed.  No events overnight   Denies any nausea or vomiting.     Planned Procedures/Surgeries: none    Inpatient Glucose Control:       Recent Labs   Lab 06/12/25  0611 06/12/25  0230 06/11/25  2200 06/11/25  1902 06/11/25  1601 06/11/25  1503   * 116* 126* 126* 150* 148*             Medications Impacting Glycemia:   Steroids:   6/9: Dexamethasone 10 mg x1 in OR   6/10: Start Dexamethasone 4 mg q 12 hrs x 2 days   6/11: at 2000 taper to Dexamethasone 2 mg x2 days  6/13: at 2000 taper to Dexamethasone 1 mg x2 days  6/16: start Dexamethasone 1 mg daily at 0800 x2 days   D5W containing solutions/medications: none presently- was on Ancef 2 g in 50 ml Dextrose q 8 hrs (3 doses)   Other medications impacting glucose: none           Nutrition:   Orders Placed This Encounter      Advance Diet as Tolerated: Regular Diet Adult    Supplements:  none  TF: none  TPN: none         Diabetes History: see full consult note for complete diabetes history   Diabetes Type and Duration: Type 2 Dm since 2015 - 2018 per his recollection   GAD65 antibody, zinc transporter 8 antibody, islet antibody, insulin antibody, and c-peptide  not available on epic search      Started on PO medications then added Insulin. Has never been on insulin pump      PTA Medication Regimen:  "  Glipizide XL 5 mg in the AM--> increased to 10 mg added 4/14/25 by [facility] after stopping Ozempic   Jardiance 25 mg daily   Metformin XR 1000 mg twice daily   Toujeo 20 units daily in the evening  Novolog 20-24 units - wife has type 1 DM so patient does well with carb estimates        Missing doses?: sometimes missing meal time insulin   Historical Diabetes Medications:   Victoza- stopped taking out of caution when father diagnosed with Medullary thyroid cancer  Later restarted on ozempic in 2024 by Endocrinology- however stopped in 5/2025 due to concern for ischemic optic neuropathy noted by neuro-opthomologist      Glucose monitoring device/frequency/trends: Manolo 3 + - trends reviewed, rare lows. Typically with prandial hyperglycemia up to the 150-200 range (higher if missed meal insulin) and fastings in the 100-120 range   Hypoglycemia PTA:   - Frequency: rare, maybe once every 2-3 weeks, he will treat if BG in the 70-80s   - Severity:  no history of severe unconscious lows   - Awareness:  intact    - Treatment: Peanut butter and jelly sandwich or juice box       Outpatient Diabetes Provider: [facility] Enocrinology- Dr. Pickard (LOV: 11/11/24) also following with [facility] Serene Hamm, LOV 4/14/25  Formal Diabetes Education/Educator: in the past         Physical Exam:   BP (!) 141/93   Pulse 86   Temp 98.4  F (36.9  C) (Oral)   Resp 16   Ht 1.88 m (6' 2\")   Wt (!) 173.3 kg (382 lb 1.6 oz)   SpO2 98%   BMI 49.06 kg/m    General Appearance: up in chair, awake and alert.   HEENT: generalized swelling. Incision covered.   Heart: Regular rate and rhythm.    Lungs:  Breathing comfortably   Abdomen: Round, nondistended. Soft, nontender.   Extremities:  2+ bilateral lower extremity edema. Fluid movement of extremities. Dusky extremities at baseline   Neuro:  Oriented x3, able to speak clearly.    Psych:  Calm, appropriate mood and affect.             Data:     Lab Results   Component Value Date    A1C 8.0 (H) " 06/10/2025    A1C 7.9 (H) 05/13/2025    A1C 6.4 (H) 12/30/2024    A1C 7.3 (H) 08/05/2024    A1C 7.6 (H) 03/11/2024    A1C 7.4 (H) 03/01/2021    A1C 8.8 (H) 12/29/2020    A1C 8.8 (H) 08/24/2020    A1C 10.4 (H) 01/22/2020    A1C 7.4 (H) 06/11/2019       ROUTINE IP LABS (Last four results)  BMP  Recent Labs   Lab 06/12/25  0611 06/12/25  0230 06/11/25  2200 06/11/25  1902 06/11/25  0607 06/11/25  0553 06/10/25  0503 06/10/25  0353     --   --   --   --  136  --  134*   POTASSIUM 4.1  --   --   --   --  4.4  --  4.5   CHLORIDE 103  --   --   --   --  105  --  105   CALLY 9.4  --   --   --   --  9.7  --  9.5   CO2 23  --   --   --   --  20*  --  17*   BUN 18.7  --   --   --   --  17.3  --  13.1   CR 0.93  --   --   --   --  0.78  --  0.67   * 116* 126* 126*   < > 167*   < > 256*    < > = values in this interval not displayed.     CBC  Recent Labs   Lab 06/12/25  0611 06/11/25  0553 06/10/25  0353   WBC 11.5* 15.9* 15.2*   RBC 5.47 5.58 5.74   HGB 15.5 15.8 16.5   HCT 47.9 48.6 49.1   MCV 88 87 86   MCH 28.3 28.3 28.7   MCHC 32.4 32.5 33.6   RDW 13.2 13.2 13.0    237 216     INRNo lab results found in last 7 days.    Inpatient Diabetes Service will continue to follow, please don't hesitate to contact the team with any questions or concerns.     EMMANUEL Schuster CNP    Plan discussed with patient, bedside RN, and primary team via this note.    To contact Inpatient Diabetes Service:     7 AM - 5 PM: Page the IDS KARMEN following the patient that day (see filed or incomplete progress notes/consult notes under Endocrinology)    OR if uncertain of provider assignment: page job code [phone]    5 PM - 7 AM: First call after hours is to primary service.    For urgent after-hours questions, page job code for on call fellow: [phone]     I spent a total of 45 minutes on the date of the encounter doing prep/post-work, chart review, history and exam, documentation and further activities per the note including lab review,  multidisciplinary communication, counseling the patient and/or coordinating care regarding acute hyper/hypoglycemic management, as well as discharge management and planning/communication.

## 2025-06-12 NOTE — PLAN OF CARE
Status: s/p L crani with astrocytoma resection on 6/9  Vitals: VSS ex int HTN, plan to start PTA BP meds this am  Neuros: Intact ex mild/intermittent WFD   IV: PIV SL   Labs/Electrolytes: BG checks ACHS with carb coverage   Resp: WNL. CPAP at night   Diet: Regular  GI: LBM 6/9  : Voiding spontaneously   Skin: L crani incision WNL, YORDY   Pain: PRN tylenol x1   Activity: SBA   Social: Wife at bedside  Plan: Possible discharge today

## 2025-06-12 NOTE — CONSULTS
Care Management Initial Consult    General Information  Assessment completed with: PatientVladimir  Type of CM/SW Visit: Initial Assessment    Primary Care Provider verified and updated as needed: Yes   Readmission within the last 30 days: no previous admission in last 30 days   Reason for Consult: discharge planning  Advance Care Planning: Advance Care Planning Reviewed: present on chart        Communication Assessment  Patient's communication style: spoken language (English or Bilingual)    Hearing Difficulty or Deaf: no   Wear Glasses or Blind: yes    Cognitive  Cognitive/Neuro/Behavioral: .WDL except, speech  Level of Consciousness: alert  Arousal Level: opens eyes spontaneously  Orientation: oriented x 4  Mood/Behavior: calm, cooperative  Best Language: 0 - No aphasia  Speech: clear, spontaneous, logical, other (see comments) (intermittent WFD)    Living Environment:   People in home: spouse  Kailee  Current living Arrangements: house      Able to return to prior arrangements: yes     Family/Social Support:  Care provided by: self  Provides care for: no one  Marital Status:   Support system: Wife  Kailee       Description of Support System: Involved, Supportive    Support Assessment: Adequate family and caregiver support    Current Resources:   Patient receiving home care services: No     Community Resources: None  Equipment currently used at home: none  Supplies currently used at home: None    Employment/Financial:  Employment Status: other (see comments) (currently not working, 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  Yes   Which insurance plan 3 day waiver is available? Alternative insurance waiver  Will the waiver be used for post-acute placement? No    Lifestyle & Psychosocial Needs:  Social Drivers of Health     Food Insecurity: Low Risk  (6/10/2025)     Food Insecurity     Within the past 12 months, did you worry that your food would run out before you got money to buy more?: No     Within the past 12 months, did the food you bought just not last and you didn t have money to get more?: No   Depression: Not at risk (2/3/2025)    PHQ-2     PHQ-2 Score: 0   Housing Stability: High Risk (6/10/2025)    Housing Stability     Do you have housing? : No     Are you worried about losing your housing?: No   Tobacco Use: Low Risk  (5/29/2025)    Patient History     Smoking Tobacco Use: Never     Smokeless Tobacco Use: Never     Passive Exposure: Never   Financial Resource Strain: Low Risk  (6/10/2025)    Financial Resource Strain     Within the past 12 months, have you or your family members you live with been unable to get utilities (heat, electricity) when it was really needed?: No   Alcohol Use: Not At Risk (12/20/2022)    AUDIT-C     Frequency of Alcohol Consumption: Monthly or less     Average Number of Drinks: 1 or 2     Frequency of Binge Drinking: Never   Transportation Needs: Low Risk  (6/10/2025)    Transportation Needs     Within the past 12 months, has lack of transportation kept you from medical appointments, getting your medicines, non-medical meetings or appointments, work, or from getting things that you need?: No   Physical Activity: Insufficiently Active (12/28/2024)    Exercise Vital Sign     Days of Exercise per Week: 3 days     Minutes of Exercise per Session: 20 min   Interpersonal Safety: Low Risk  (6/9/2025)    Interpersonal Safety     Do you feel physically and emotionally safe where you currently live?: Yes     Within the past 12 months, have you been hit, slapped, kicked or otherwise physically hurt by someone?: No     Within the past 12 months, have you been humiliated or emotionally abused in other ways by your partner or ex-partner?: No   Stress: No Stress Concern Present (12/28/2024)    [employer] [place] [employer] - Occupational  Stress Questionnaire     Feeling of Stress : Only a little   Social Connections: Unknown (12/28/2024)    Social Connection and Isolation Panel [NHANES]     Frequency of Communication with Friends and Family: Not on file     Frequency of Social Gatherings with Friends and Family: Twice a week     Attends Bahai Services: Not on file     Active Member of Clubs or Organizations: Not on file     Attends Club or Organization Meetings: Not on file     Marital Status: Not on file   Health Literacy: Not on file     Functional Status:  Prior to admission patient needed assistance:   Dependent ADLs:: Independent  Dependent IADLs:: Independent     Mental Health Status:  Mental Health Status: No Current Concerns       Chemical Dependency Status:  Chemical Dependency Status: No Current Concerns           Values/Beliefs:  Spiritual, Cultural Beliefs, Bahai Practices, Values that affect care: no             Discussed  Partnership in Safe Discharge Planning  document with patient/family: No    Additional Information:  Met with patient to complete initial care management assessment. Patient currently lives in a house in [facility] with his wife Kailee. He states he is independent with ADLs/IADLs at baseline. He denies use of assistive devices. He has a CPAP from [facility]. He denied current home or community services. He has his wife at home for good support and anticipates she will be his ride home at discharge.     OT recommends home with assist and PT deferred. No discharge needs identified.     Next Steps:   No further care management intervention anticipated at this time.  Please re-consult if further needs arise.  Care management signing off.      Kami Ng, RN, BSN  6A Nurse Care Coordinator  [facility] Acute Care Management  Phone: [phone]   Vocera: 6A Neuro RNCC

## 2025-06-13 ENCOUNTER — MYC MEDICAL ADVICE (OUTPATIENT)
Dept: ONCOLOGY | Facility: CLINIC | Age: 45
End: 2025-06-13

## 2025-06-13 PROCEDURE — G0452 MOLECULAR PATHOLOGY INTERPR: HCPCS | Mod: 26 | Performed by: PATHOLOGY

## 2025-06-13 PROCEDURE — 81287 MGMT GENE PRMTR MTHYLTN ALYS: CPT | Performed by: NEUROLOGICAL SURGERY

## 2025-06-16 LAB
PATH REPORT.COMMENTS IMP SPEC: ABNORMAL
PATH REPORT.COMMENTS IMP SPEC: YES
PATH REPORT.FINAL DX SPEC: ABNORMAL
PATH REPORT.GROSS SPEC: ABNORMAL
PATH REPORT.MICROSCOPIC SPEC OTHER STN: ABNORMAL
PATH REPORT.RELEVANT HX SPEC: ABNORMAL
PHOTO IMAGE: ABNORMAL
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
## 2025-06-16 NOTE — TELEPHONE ENCOUNTER
"Oncology Nurse Triage - Reporting Symptoms    Situation:   Kailee sent in the following Yekrat message, \"Thank you for letting us know that Vladimir s appointment needs to be changed because the pathology will not be back by Monday.  That makes perfect sense and we want the information that we need to be available for the appointment.       He is having some trouble with word finding after his surgery so asked me to write this as he didn t think of saying it on the phone when he was called.       We would like you to know that if the path is back and an earlier appointment becomes available we of course would be interested in taking that earlier appointment.\"    Triage RN call to spouse to follow up.     Background:   Treating Provider: Dr. Jacinto/ Dr. Mccarthy     Date of last office visit: 3/21/25/ 6/9/25 for procedure below    Recent treatments: Yes: 6/9/25   stealth assisted left awake craniotomy with tumor resection and speech and motor mapping (Left: Head)     Assessment  Since pt has been home and gotten sleep, difficulty word finding has improved. Speech is still a little slurred, but overall is improving.   Denies fevers, is eating and drinking okay, pain is overall well managed. Spouse states he has taken Tylenol only once in past few days.   He did have some numbness on tip and down side of pointer and thumb fingers on right side, said this happened while in the operating room. This is overall this is also improving.     Recommendations:   Encouraged spouse to call triage if any symptoms occur, especially any changes in speech, weakness, facial drooping. Spouse voiced understanding.          "

## 2025-06-19 ENCOUNTER — MYC MEDICAL ADVICE (OUTPATIENT)
Dept: NEUROSURGERY | Facility: CLINIC | Age: 45
End: 2025-06-19
Payer: COMMERCIAL

## 2025-06-19 DIAGNOSIS — Z98.890 S/P CRANIOTOMY: Primary | ICD-10-CM

## 2025-06-19 DIAGNOSIS — R56.9 SEIZURE (H): ICD-10-CM

## 2025-06-19 RX ORDER — LEVETIRACETAM 750 MG/1
750 TABLET ORAL 2 TIMES DAILY
Qty: 60 TABLET | Refills: 0 | Status: SHIPPED | OUTPATIENT
Start: 2025-06-19

## 2025-06-19 NOTE — TELEPHONE ENCOUNTER
Pt is s/p   stealth assisted left awake craniotomy with tumor resection and speech and motor mapping     On 6/9/25 with Dr. Mccarthy.     He is asking about:  - keppra refills. Nothing in discharge summary. Pt would like to continue   - driving forms since his szr    Does not actively follow with neurology. Will ask if we need to place a referral

## 2025-06-19 NOTE — TELEPHONE ENCOUNTER
"Isha Bird, CNP states:  Yeah I would refer to neurology. Per their consult note     \"Given elevated risk of recurrent seizure, recommend starting maintenance Keppra 750 mg BID.     I did  Mr. Younger about [place] State Law prohibiting driving for 3 months after seizure with loss of consciousness, as well as general seizure precautions. He expressed understanding.     Recommendations  - start Keppra 750 mg BID  - has neuro-oncology follow up with Dr. Jacinto scheduled 7/28/2025, should keep this appointment. Will also send her staff message to update her regarding this presentation.  - reasonable to discharge from neurology standpoint as long as patient clears appropriately\"     They didn't explicitly say to follow up with them, but they would be the ones to manage the Keppra and seizure/driving thing. We can refill Keppra until his appointment with them.    Update:  Neuro referral placed. Keppra refill signed. Pt updated we will continue refills until neuro appt. Will also give external options.   "

## 2025-06-23 ENCOUNTER — TELEPHONE (OUTPATIENT)
Dept: NEUROLOGY | Facility: CLINIC | Age: 45
End: 2025-06-23

## 2025-06-23 NOTE — TELEPHONE ENCOUNTER
Was your seizure or loss of consciousness related to substance abuse or alcohol use?   No - Is this your first seizure? Yes - Are you over the age of 60? No - Schedule with a general neurologist

## 2025-06-25 ENCOUNTER — TRANSFERRED RECORDS (OUTPATIENT)
Dept: HEALTH INFORMATION MANAGEMENT | Facility: CLINIC | Age: 45
End: 2025-06-25
Payer: COMMERCIAL

## 2025-06-25 ENCOUNTER — ALLIED HEALTH/NURSE VISIT (OUTPATIENT)
Dept: NEUROSURGERY | Facility: CLINIC | Age: 45
End: 2025-06-25
Attending: PHYSICIAN ASSISTANT
Payer: COMMERCIAL

## 2025-06-25 VITALS
OXYGEN SATURATION: 97 % | SYSTOLIC BLOOD PRESSURE: 154 MMHG | DIASTOLIC BLOOD PRESSURE: 89 MMHG | RESPIRATION RATE: 18 BRPM | HEART RATE: 89 BPM | TEMPERATURE: 98.3 F

## 2025-06-25 DIAGNOSIS — Z98.890 S/P CRANIOTOMY: Primary | ICD-10-CM

## 2025-06-25 LAB — RETINOPATHY: NEGATIVE

## 2025-06-25 PROCEDURE — 3077F SYST BP >= 140 MM HG: CPT

## 2025-06-25 PROCEDURE — 3079F DIAST BP 80-89 MM HG: CPT

## 2025-06-25 PROCEDURE — 99207 PR NO CHARGE NURSE ONLY: CPT

## 2025-06-25 NOTE — PATIENT INSTRUCTIONS
Instructions for Patient    Incision  Keep your incision clean and dry at all times.   You may get your incision wet in the shower. Use a gentle shampoo with no fragrance, such as baby shampoo, until incision is completely healed. Allow soap and water to run over the incision and gently pat it dry.   Look at your incision site every day. You  may need a mirror or family member to help you.   Watch for signs of infection  Redness, swelling, warmth, drainage (Green or yellow drainage (pus) from your incision or increased bloody drainage), and fever of 101 degrees or higher  Notify clinic [phone]  No submerging incision in water such as pools, hot tubs, or baths for at least 8 weeks and until the incision is healed  Do not apply lotions or ointments to incision  Avoid coloring your hair or permanent styling until cleared by your surgeon    Activity  Post operative activity limitations for 4 weeks after surgery: No bending forward, no lifting anything greater than 10 pounds (a gallon of milk weighs approximately 8 pounds)  Ok to start driving if it has been 2 weeks since surgery and you have not had seizure activity and you are not taking narcotics.  If you have had a seizure, you may not drive for at least 3 months according to [place] law.  No strenuous activity  We encourage short frequent walks. You may gradually increase the distance as tolerated.    Medications   Refills of pain medication:   Please call the neurosurgery clinic to request 2-3 days before you run out  Weaning from narcotic pain medications  When it is time, start weaning by extending the time between doses.   For example, if you're taking 2 tabs every 4 hours, spread it out to 2 tabs over 4.5, 5, 6 hours. At that point you can certainly cut down to 1 tab, then wean to an as needed basis until completely done with them.Refills: call our clinic 2-3 business days before you are out of medication. A nurse will call you back to obtain a pain  assessment.   Don't take more than 3,000 mg of Acetaminophen in 24 hours  Ok to begin taking Aspirin and NSAIDs (ex: ibuprofen/Advil) Encouraged icing for at least 3-4 times throughout the day for 20-30 minutes at a time. Avoid heat to the incision area.   Taking stool softeners regularly can reduce constipation commonly caused by narcotic pain medications.    Call the Clinic or go to the Emergency Room  Development of new pain/symptoms or worsening of symptoms  Incision infection (incisional redness, swelling, warmth, drainage, or fever)    Go to the Emergency Room   If sudden onset of Acute changes in the level of consciousness (increased confusion, memory loss, speech abnormalities), Any change in hearing or vision , increased headaches, or New onset of seizures  Chest pain   Trouble breathing     Post-Op Follow Up Appointments  6 weeks post-op with nurse practitioner or physician assistant  3 month post-op with Dr. Fabio LEWIS [facility]  [facility] - [address] [address]  Telephone:  [phone]        Fax:  [phone]

## 2025-06-25 NOTE — PROGRESS NOTES
Post-op Nurse Visit:    Patient presents today s/p stealth assisted left awake craniotomy with tumor resection and speech and motor mapping  on 6/9/25 per the order of Alon Mccarthy MD . Wife accompanies patient.    Pain  Pain is a 1-2/10 near incision. Word finding difficulty improving per patient. No new symptoms or concerns.     Pain Relief Measures:  Oxycodone: not needed  Tylenol: using throughout the day  Robaxin: not needed  Ice: not icing    Weaned from steroid?: Yes  Weaned from anti-seizure med?: No    Neuro Assessment  Denies new onset of weakness, acute changes in the level of consciousness (increased confusion, memory loss, speech abnormalities), any change in hearing or vision, increased headaches, or new onset of seizures.    Symptoms compared to pre-op: stable. Word finding difficulty improving  Equal strength to bilateral upper extremities      Incision   Incision inspected. Edges well-approximated. No redness, swelling, drainage, warmth, or fever noted. suture(s) removed without difficulty.     Return to work discussed: self employed     All of patient's questions addressed today. Patient was instructed to call with any additional questions/concerns.

## 2025-06-29 ENCOUNTER — HOSPITAL ENCOUNTER (EMERGENCY)
Facility: CLINIC | Age: 45
Discharge: HOME | End: 2025-06-29
Attending: EMERGENCY MEDICINE | Admitting: EMERGENCY MEDICINE
Payer: COMMERCIAL

## 2025-06-29 ENCOUNTER — APPOINTMENT (OUTPATIENT)
Dept: CT IMAGING | Facility: CLINIC | Age: 45
End: 2025-06-29
Attending: EMERGENCY MEDICINE
Payer: COMMERCIAL

## 2025-06-29 VITALS
DIASTOLIC BLOOD PRESSURE: 102 MMHG | TEMPERATURE: 97.5 F | SYSTOLIC BLOOD PRESSURE: 156 MMHG | HEART RATE: 90 BPM | OXYGEN SATURATION: 97 % | RESPIRATION RATE: 16 BRPM

## 2025-06-29 DIAGNOSIS — S00.01XA ABRASION OF SCALP, INITIAL ENCOUNTER: ICD-10-CM

## 2025-06-29 DIAGNOSIS — S09.90XA CLOSED HEAD INJURY, INITIAL ENCOUNTER: ICD-10-CM

## 2025-06-29 PROCEDURE — 99284 EMERGENCY DEPT VISIT MOD MDM: CPT | Mod: 25

## 2025-06-29 PROCEDURE — 70450 CT HEAD/BRAIN W/O DYE: CPT

## 2025-06-29 ASSESSMENT — ACTIVITIES OF DAILY LIVING (ADL)
ADLS_ACUITY_SCORE: 58

## 2025-06-29 ASSESSMENT — COLUMBIA-SUICIDE SEVERITY RATING SCALE - C-SSRS
2. HAVE YOU ACTUALLY HAD ANY THOUGHTS OF KILLING YOURSELF IN THE PAST MONTH?: NO
6. HAVE YOU EVER DONE ANYTHING, STARTED TO DO ANYTHING, OR PREPARED TO DO ANYTHING TO END YOUR LIFE?: NO
1. IN THE PAST MONTH, HAVE YOU WISHED YOU WERE DEAD OR WISHED YOU COULD GO TO SLEEP AND NOT WAKE UP?: NO
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
## 2025-06-30 ENCOUNTER — OFFICE VISIT (OUTPATIENT)
Dept: PEDIATRICS | Facility: CLINIC | Age: 45
End: 2025-06-30
Payer: COMMERCIAL

## 2025-06-30 ENCOUNTER — TUMOR CONFERENCE (OUTPATIENT)
Dept: ONCOLOGY | Facility: CLINIC | Age: 45
End: 2025-06-30
Payer: COMMERCIAL

## 2025-06-30 ENCOUNTER — VIRTUAL VISIT (OUTPATIENT)
Dept: ENDOCRINOLOGY | Facility: CLINIC | Age: 45
End: 2025-06-30
Payer: COMMERCIAL

## 2025-06-30 ENCOUNTER — PATIENT OUTREACH (OUTPATIENT)
Dept: CARE COORDINATION | Facility: CLINIC | Age: 45
End: 2025-06-30

## 2025-06-30 VITALS
BODY MASS INDEX: 40.43 KG/M2 | HEART RATE: 84 BPM | WEIGHT: 315 LBS | DIASTOLIC BLOOD PRESSURE: 82 MMHG | TEMPERATURE: 99 F | OXYGEN SATURATION: 97 % | SYSTOLIC BLOOD PRESSURE: 128 MMHG | HEIGHT: 74 IN | RESPIRATION RATE: 16 BRPM

## 2025-06-30 DIAGNOSIS — E78.5 HYPERLIPIDEMIA LDL GOAL <100: ICD-10-CM

## 2025-06-30 DIAGNOSIS — I10 ESSENTIAL HYPERTENSION: ICD-10-CM

## 2025-06-30 DIAGNOSIS — Z98.890 S/P CRANIOTOMY: ICD-10-CM

## 2025-06-30 DIAGNOSIS — E11.65 TYPE 2 DIABETES MELLITUS WITH HYPERGLYCEMIA, WITHOUT LONG-TERM CURRENT USE OF INSULIN (H): Primary | ICD-10-CM

## 2025-06-30 DIAGNOSIS — C71.1 OLIGODENDROGLIOMA OF FRONTAL LOBE (H): ICD-10-CM

## 2025-06-30 DIAGNOSIS — I10 ESSENTIAL HYPERTENSION: Primary | ICD-10-CM

## 2025-06-30 DIAGNOSIS — F41.9 ANXIETY: ICD-10-CM

## 2025-06-30 PROBLEM — C71.9 GRADE III ASTROCYTOMA (H): Status: ACTIVE | Noted: 2025-06-30

## 2025-06-30 PROCEDURE — 1126F AMNT PAIN NOTED NONE PRSNT: CPT | Performed by: INTERNAL MEDICINE

## 2025-06-30 PROCEDURE — 3079F DIAST BP 80-89 MM HG: CPT | Performed by: INTERNAL MEDICINE

## 2025-06-30 PROCEDURE — 99214 OFFICE O/P EST MOD 30 MIN: CPT | Mod: 24 | Performed by: INTERNAL MEDICINE

## 2025-06-30 PROCEDURE — 3074F SYST BP LT 130 MM HG: CPT | Performed by: INTERNAL MEDICINE

## 2025-06-30 PROCEDURE — 98007 SYNCH AUDIO-VIDEO EST HI 40: CPT | Performed by: INTERNAL MEDICINE

## 2025-06-30 RX ORDER — LORAZEPAM 0.5 MG/1
0.5 TABLET ORAL EVERY 6 HOURS PRN
Qty: 30 TABLET | Refills: 0 | Status: SHIPPED | OUTPATIENT
Start: 2025-06-30

## 2025-06-30 ASSESSMENT — PAIN SCALES - GENERAL: PAINLEVEL_OUTOF10: NO PAIN (0)

## 2025-06-30 NOTE — PROGRESS NOTES
[facility]    Background: Primary Care-Care Coordination initial outreach identified per system criteria and reviewed by [facility] team.    Assessment: Upon chart review, CCR Team member will not proceed with patient outreach related to this episode of Primary Care-Care Coordination program due to reason below:    Patient has an appointment today that is appropriate for ED/Hospital discharge and follow-up. No further outreach needed at this time.    Plan: Primary Care-Care Coordination episode addressed appropriately per reason noted above.      Concha Rahman MA  [facility] [place], [facility]    *Connected Care Resource Team does NOT follow patient ongoing. Referrals are identified based on internal discharge reports and the outreach is to ensure patient has an understanding of their discharge instructions.

## 2025-06-30 NOTE — NURSING NOTE
Current patient location: [place]    Is the patient currently in the state of [place]? YES    Visit mode: VIDEO    If the visit is dropped, the patient can be reconnected by:VIDEO VISIT: Text to cell phone:   Telephone Information:   Mobile [phone]       Will anyone else be joining the visit? NO  (If patient encounters technical issues they should call [phone])    Are changes needed to the allergy or medication list? E-check in was reviewed/completed for today's visit. VF did not review e-check in information again with Vladimir due to this.       Are refills needed on medications prescribed by this physician? Discuss with provider    Rooming Documentation:  Not applicable    Reason for visit: RECHECK    Mary RODRIGUEZ

## 2025-06-30 NOTE — LETTER
"6/30/2025      Jeffry Younger  [address] [facility] [address]      Dear Colleague,    Thank you for referring your patient, Jeffry Younger, to the [facility]. Please see a copy of my visit note below.    Outcome for 06/16/25 9:12 AM: Data uploaded on Manolo Rausch MA  Outcome for 06/26/25 12:46 PM: Manolo emailed to provider  Isha Rausch MA      THIS IS A VIDEO VISIT:    Phone call visit/virtual visit encounter:    Name of patient: Jeffry Younger    Date of encounter: 6/30/2025    Time of start of video visit: 9:01    Video started: 9:09    Video ended: 9:19    Provider location: working from home/ [facility]    Patient location: patients home.    Mode of transmission: [employer] video/ [employer]    Verbal consent: obtained before starting visit. Pt is agreeable.      The patient has been notified of following:      \"This VIDEO visit will be conducted via a call between you and your physician/provider. We have found that certain health care needs can be provided without the need for a physical exam.  This service lets us provide the care you need with a short phone conversation.  If a prescription is necessary we can send it directly to your pharmacy.  If lab work is needed we can place an order for that and you can then stop by our lab to have the test done at a later time.     With new updates with corona virus patient might be billed as clinic visit.     If during the course of the call the physician/provider feels a telephone visit is not appropriate, you will not be charged for this service.\"      Past medical history, social history, family history, allergy and medications were reviewed and updated as appropriate.  Reviewed pertinent labs, notes, imaging studies personally.    ENDOCRINOLOGY CLINIC NOTE:  Name: Jeffry Younger  Seen for follow up of type 2 Diabetes.  HPI:  Jeffry Younger is a 45 year old male who presents for the evaluation/management of " Diabetes.   has a past medical history of Diabetes (H) (07/2018), Essential hypertension (06/13/2024), Obesity, and Sleep apnea.    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  He was on decadrone for 5 days following surgery.    He was on Victoza and it was helping with BG.  His father was diagnosed with thyroid growth? and he stopped taking Victoza  out of caution.  He has never been on insulin pump. His wife has type 1 DM.  In December 2023 noted higher A1c and following that glipizide was restarted.  A1c improved after that    Ozempic: It was started in early 2024 and he was tolerating okay.  Father was diagnosed with thyroid cancer.  He had thyroid ultrasound done which showed 3 subcentimeter thyroid nodule.  He is no longer taking Ozempic-- he was recommended to stop ozempic by optho in the setting of ischemic optic neuropathy.  Off since feb 20205    Using judy 3.    Weight: lost some weight.  Wt Readings from Last 2 Encounters:   06/10/25 (!) 173.3 kg (382 lb 1.6 oz)   05/29/25 (!) 177.8 kg (392 lb)       1. Type 2 DM:  Orginally diagnosed in 2015  Current Regimen:   Metformin XR 1000 mg twice a day  Glipizide XL 5 mg/day  Jardinace 25 mg/day  Toujeo 22 units/day at night  Novolog 10/20-24/20-24 before light breakfast, lunch and supper  (Mostly estimate)    yes:     Diabetes Medication(s)       Biguanides       metFORMIN (GLUCOPHAGE XR) 500 MG 24 hr tablet Take 2 tablets (1,000 mg) by mouth 2 times daily (with meals).       Insulin       insulin aspart (NOVOLOG FLEXPEN) 100 UNIT/ML pen Inject 20-28 Units subcutaneously 2 times daily (with meals).     insulin aspart (NOVOLOG PEN) 100 UNIT/ML pen Inject 2-16 Units subcutaneously 3 times daily (with meals).     insulin glargine (LANTUS PEN) 100 UNIT/ML pen Inject 80 Units subcutaneously every 24 hours.     insulin glargine U-300 (TOUJEO) 300 UNIT/ML (1 units dial) pen Inject 20 Units  subcutaneously daily.       Sodium-Glucose Co-Transporter 2 (SGLT2) Inhibitors       empagliflozin (JARDIANCE) 25 MG TABS tablet Take 1 tablet (25 mg) by mouth daily.       Sulfonylureas       glipiZIDE (GLUCOTROL XL) 5 MG 24 hr tablet Take 1 tablet (5 mg) by mouth daily. May increase to 10 mg dose if time in range is below 70%.            BS checks: Using judy  Symptoms of hypoglycemia (low blood sugar):  Gets symptoms of hypoglycemia.  Episodes of hypoglycemia:     DM Complications:   Complications:   Diabetes Complications  Description / Detail    Diabetic Retinopathy  No   CAD / PAD  No   Neuropathy  No   Nephropathy / Microalbuminuria  No        Gastroparesis  No   Hypoglycemia Unawarness  No       PMH/PSH:  Past Medical History:   Diagnosis Date     Diabetes (H) 07/2018    Treating metformin     Essential hypertension 06/13/2024     Obesity      Sleep apnea      Past Surgical History:   Procedure Laterality Date     AS ESOPHAGOSCOPY, DIAGNOSTIC      EGD     BIOPSY  2008    egd normal     OPTICAL TRACKING SYSTEM BIOPSY BRAIN Left 3/7/2025    Procedure: stealth assisted LEFT FRONTAL BIOPSY BRAIN;  Surgeon: Genaro Carver MD;  Location: [facility] OR     OPTICAL TRACKING SYSTEM CRANIOTOMY, EXCISE TUMOR WITH MAPPING, COMBINED Left 6/9/2025    Procedure: stealth assisted left awake craniotomy with tumor resection and speech and motor mapping;  Surgeon: Alon Mccarthy MD;  Location: [facility] OR     Family Hx:  Family History   Problem Relation Age of Onset     Diabetes Father      Hypertension Father      Other Cancer Father         Kidney & Thyroid     Cerebrovascular Disease Paternal Grandfather      Other Cancer Brother         Lukemia     Other Cancer Maternal Grandmother         Pancreatic     Other Cancer Maternal Grandfather         Lung - smoker     Other Cancer Brother         Lukemia     Other Cancer Brother         Lukemia     Colon Cancer No family hx of      Prostate Cancer No family hx of      Cerebrovascular  Disease No family hx of      Coronary Artery Disease No family hx of        Diabetes:    Social Hx:  Social History     Socioeconomic History     Marital status:      Spouse name: Not on file     Number of children: Not on file     Years of education: Not on file     Highest education level: Professional school degree (e.g., MD, DDS, DVM, SMITA)   Occupational History     Not on file   Tobacco Use     Smoking status: Never     Passive exposure: Never     Smokeless tobacco: Never   Vaping Use     Vaping status: Never Used   Substance and Sexual Activity     Alcohol use: Yes     Comment: 1 per month maybe     Drug use: No     Sexual activity: Yes     Partners: Female     Birth control/protection: Abstinence, Pull-out method, Condom   Other Topics Concern     Parent/sibling w/ CABG, MI or angioplasty before 65F 55M? No   Social History Narrative     Not on file     Social Drivers of Health     Financial Resource Strain: Low Risk  (6/10/2025)    Financial Resource Strain      Within the past 12 months, have you or your family members you live with been unable to get utilities (heat, electricity) when it was really needed?: No   Food Insecurity: Low Risk  (6/10/2025)    Food Insecurity      Within the past 12 months, did you worry that your food would run out before you got money to buy more?: No      Within the past 12 months, did the food you bought just not last and you didn t have money to get more?: No   Transportation Needs: Low Risk  (6/10/2025)    Transportation Needs      Within the past 12 months, has lack of transportation kept you from medical appointments, getting your medicines, non-medical meetings or appointments, work, or from getting things that you need?: No   Physical Activity: Insufficiently Active (12/28/2024)    Exercise Vital Sign      Days of Exercise per Week: 3 days      Minutes of Exercise per Session: 20 min   Stress: No Stress Concern Present (12/28/2024)    [employer] [employer]  [employer]upational [employer] - Occupational Stress Questionnaire      Feeling of Stress : Only a little   Social Connections: Unknown (12/28/2024)    Social Connection and Isolation Panel [NHANES]      Frequency of Communication with Friends and Family: Not on file      Frequency of Social Gatherings with Friends and Family: Twice a week      Attends Jainism Services: Not on file      Active Member of Clubs or Organizations: Not on file      Attends Club or Organization Meetings: Not on file      Marital Status: Not on file   Interpersonal Safety: Low Risk  (6/9/2025)    Interpersonal Safety      Do you feel physically and emotionally safe where you currently live?: Yes      Within the past 12 months, have you been hit, slapped, kicked or otherwise physically hurt by someone?: No      Within the past 12 months, have you been humiliated or emotionally abused in other ways by your partner or ex-partner?: No   Housing Stability: High Risk (6/10/2025)    Housing Stability      Do you have housing? : No      Are you worried about losing your housing?: No          MEDICATIONS:  has a current medication list which includes the following prescription(s): atorvastatin, blood glucose, blood glucose monitoring, contour next gen monitor, freestyle judy 3 plus sensor, cyclobenzaprine, dexamethasone, empagliflozin, glipizide, novolog flexpen, insulin aspart, insulin glargine, insulin glargine u-300, ulticare mini, levetiracetam, lorazepam, losartan, metformin, ondansetron, oxycodone, and senna-docusate.    ROS     ROS: 10 point ROS neg other than the symptoms noted above in the HPI.    Physical Exam   VS: There were no vitals taken for this visit.  GENERAL: healthy, alert and no distress  EYES: Eyes grossly normal to inspection, conjunctivae and sclerae normal  ENT: no nose swelling, nasal discharge.  Thyroid: no apparent thyroid nodules  RESP: no audible wheeze, cough, or visible cyanosis.  No visible retractions or increased work of  breathing.  Able to speak fully in complete sentences.  ABDO: not evaluated.  EXTREMITIES: no hand tremors.  NEURO: Cranial nerves grossly intact, mentation intact and speech normal  SKIN: No apparent skin lesions, rash or edema seen   PSYCH: mentation appears normal, affect normal/bright, judgement and insight intact, normal speech and appearance well-groomed      LABS:  A1c:  Lab Results   Component Value Date    A1C 8.0 06/10/2025    A1C 7.9 05/13/2025    A1C 6.4 12/30/2024    A1C 7.3 08/05/2024    A1C 7.6 03/11/2024    A1C 7.4 03/01/2021    A1C 8.8 12/29/2020    A1C 8.8 08/24/2020    A1C 10.4 01/22/2020    A1C 7.4 06/11/2019     Creatinine:  Creatinine   Date Value Ref Range Status   06/12/2025 0.93 0.67 - 1.17 mg/dL Final   08/24/2020 0.90 0.66 - 1.25 mg/dL Final     Urine Micro:  Lab Results   Component Value Date    UMALCR 8.76 05/13/2025    UMALCR 6.47 04/18/2022    UMALCR 18.63 03/01/2021     LFTs/Lipids:  Recent Labs   Lab Test 02/26/25  0558 12/30/24  1116   CHOL 119 121   HDL 34* 38*   LDL 67 59   TRIG 92 120     All pertinent notes, labs, and images personally reviewed by me.     Glucometer/ insulin pump (if applicable)/ CGM data (if applicable) downloaded, Personally reviewed and interpreted.  All Blood sugar data reviewed personally and discussed with pt.      A/P  Mr.Jeffrey NARA Younger is a 45 year old here for the evaluation/management of diabetes:    1. DM2 - Under fair control.  A1c 8.0%.  Average  with TIR 70% .noted post meal high Bg after BF and lunch.  Few low BG in the setting of over correction and some false alarms.  Ozempic was stopped 2/2025 as noted above.  Plan:  Discussed diagnosis, pathophysiology, management and treatment options of condition with pt.  I also discussed importance of strict blood sugar control to prevent complications associated with uncontrolled diabetes.  Continue to hold off ozempic.  Continue metformin XR 1000 mg twice a day  Continue glipizide XL 5  mg/day  Continue Jardinace 25 mg/day  Continue Toujeo 22 units/day at night  Novololg: increase at breakfast and lunch by 2 units. Take 12/24-26/20-24 before light breakfast, lunch and supper.  Continue to use judy 3 plus.  Follow up with Kamini Caceres PA-C as scheduled 7/2025.  Follow up with me or Kamini GRAY in 3-4 months with labs prior after that.  Repeat labs and follow up with  in 4-5 month after that.  Please make a lab appointment for blood work and follow up clinic appointment in 1 week after that to discuss results.     2. Hypertension -not on medication?  No microalbuminuria.    3. Hyperlipidemia - Under Good control.  On atorvastatin 10 mg/day.  LDL 67.    4. Prevention:  Opthalmology-recommend annually  ASA-no secondary to age  Smoking-no    Most Recent Immunizations   Administered Date(s) Administered     COVID-19 12+ (MODERNA) 09/10/2024     COVID-19 Bivalent 18+ (Moderna) 10/11/2022     COVID-19 Monovalent 18+ (Moderna) 11/08/2021     Flu, Unspecified 09/15/2021     Hepatitis A (VAQTA)(ADULT 19+) 02/22/2017     Hepatitis A/B (Twinrix) 09/29/2015     Hepatitis B, Adult (Energix-B/Recombivax HB) 08/11/2004     Influenza (H1N1) 12/31/2009     Influenza (IIV3) PF 09/14/2021     Influenza (prior to 2024) 11/30/2011     Influenza Vaccine >6 months,quad, PF 09/18/2023     Influenza,INJ,MDCK,PF,Quad >6mo(Flucelvax) 10/11/2022     Meningococcal (Menomune ) 08/11/2004     Meningococcal ACWY (Menactra ) 02/22/2017     Pneumococcal 20 valent Conjugate (Prevnar 20) 12/22/2022     Pneumococcal 23 valent 09/07/2018     TDAP (Adacel,Boostrix) 01/03/2024     TDAP Vaccine (Adacel) 02/01/2015     Typhoid Oral 01/03/2024     Yellow Fever 02/22/2017            Recommend checking blood sugars before meals and at bedtime.    If Blood glucose are low more often-> 2-3 times/week- give us a call.  The patient is advised to Make better food choices: reduce carbs, Reduce portion size, weight loss and exercise 3-4  times a week.  Discussed hypoglycemia signs and symptoms as well as management in detail.    There is some variability among people, most will usually develop symptoms suggestive of hypoglycemia when blood glucose levels are lowered to the mid 60's. The first set of symptoms are called adrenergic. Patients may experience any of the following nervousness, sweating, intense hunger, trembling, weakness, palpitations, and difficulty speaking.   The acute management of hypoglycemia involves the rapid delivery of a source of easily absorbed sugar. Regular soda, juice, lifesavers, table sugar, are good options. 15 grams of glucose is the dose that is given, followed by an assessment of symptoms and a blood glucose check if possible. If after 10 minutes there is no improvement, another 10-15 grams should be given. This can be repeated up to three times. The equivalency of 10-15 grams of glucose (approximate servings) are: Four lifesavers, 4 teaspoons of sugar, or 1/2 can of regular soda or juice.     About 40 minutes spent by me on the date of the encounter doing chart review, history and exam, documentation and further activities per the note  Discussed indications, risks and benefits of all medications prescribed, and answered questions to patient's satisfaction.  The longitudinal plan of care for the diagnosis(es)/condition(s) as documented were addressed during this visit. Due to the added complexity in care, I will continue to support Vladimir in the subsequent management and with ongoing continuity of care.  All questions were answered.  The patient indicates understanding of the above issues and agrees with the plan set forth.     Follow-up:  As noted in AVS    Jocelynn Pickard M.D  Endocrinology  [facility]  CC: Russel Hsieh    Disclaimer: This note consists of symbols derived from keyboarding, dictation and/or voice recognition software. As a result, there may be errors in the script that have  gone undetected. Please consider this when interpreting information found in this chart.    Addendum to above note and clinic visit:    Labs reviewed.    See result note/telephone encounter.               Again, thank you for allowing me to participate in the care of your patient.        Sincerely,        Jocelynn Pickard MD    Electronically signed

## 2025-06-30 NOTE — PROGRESS NOTES
"  Assessment & Plan     (I10) Essential hypertension  (primary encounter diagnosis)  Comment:   Plan: Adequate control.  Continue current medication.     (F41.9) Anxiety  Comment:   Plan: LORazepam (ATIVAN) 0.5 MG tablet        Concerns regarding recurrent anxiety.  Anxiety symptoms worse following prior seizure and brain tumor diagnosis.  Lorazepam has helped-continue as needed.  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  Following up with oncology this week.  Has noted some mild word finding difficulties post procedure, but without other neurologic symptoms.          BMI  Estimated body mass index is 49.41 kg/m  as calculated from the following:    Height as of this encounter: 1.88 m (6' 2.02\").    Weight as of this encounter: 174.6 kg (385 lb).             Rohini Gilbert is a 45 year old, presenting for the following health issues:  RECHECK        6/30/2025    12:59 PM   Additional Questions   Roomed by Ann OLIVIA   Accompanied by Self         6/30/2025    12:59 PM   Patient Reported Additional Medications   Patient reports taking the following new medications No           Patient Active Problem List   Diagnosis    Morbid obesity with BMI of 50.0-59.9, adult (H)    PAOLA (obstructive sleep apnea)    Type 2 diabetes mellitus with hyperglycemia, without long-term current use of insulin (H)    Hyperlipidemia LDL goal <100    ADAMA (generalized anxiety disorder)    Family history of thyroid cancer    Essential hypertension    Oligodendroglioma of frontal lobe (H)    Monocular visual disturbance    Seizure disorder (H)    Strain of lumbar region, initial encounter    S/P craniotomy     Current Outpatient Medications   Medication Sig Dispense Refill    blood glucose (ACCU-CHEK GUIDE) test strip Use to test blood sugar 1 times daily or as directed. 50 strip 1    blood glucose monitoring (ACCU-CHEK " FASTCLIX) lancets 1 each daily 102 each 3    Blood Glucose Monitoring Suppl (CONTOUR NEXT GEN MONITOR) SAMIA 1 each daily. Use as directed. 1 each 0    Continuous Glucose Sensor (FREESTYLE KULWANT 3 PLUS SENSOR) MISC Change every 15 days. 6 each 1    empagliflozin (JARDIANCE) 25 MG TABS tablet Take 1 tablet (25 mg) by mouth daily. 90 tablet 3    glipiZIDE (GLUCOTROL XL) 5 MG 24 hr tablet Take 1 tablet (5 mg) by mouth daily. May increase to 10 mg dose if time in range is below 70%. 90 tablet 0    insulin aspart (NOVOLOG FLEXPEN) 100 UNIT/ML pen Inject 20-28 Units subcutaneously 2 times daily (with meals). 45 mL 3    insulin aspart (NOVOLOG PEN) 100 UNIT/ML pen Take 12/24-26/20-24 before light breakfast, lunch and supper. 30 mL 1    insulin glargine (LANTUS PEN) 100 UNIT/ML pen Inject 80 Units subcutaneously every 24 hours. 15 mL 2    insulin glargine U-300 (TOUJEO) 300 UNIT/ML (1 units dial) pen Inject 20 Units subcutaneously daily. 15 mL 3    insulin pen needle (ULTICARE MINI) 31G X 6 MM miscellaneous Use 3-4 pen needles daily or as directed. 300 each 3    levETIRAcetam (KEPPRA) 750 MG tablet Take 1 tablet (750 mg) by mouth 2 times daily. 60 tablet 0    LORazepam (ATIVAN) 0.5 MG tablet Take 1 tablet (0.5 mg) by mouth every 6 hours as needed for anxiety. 30 tablet 0    losartan (COZAAR) 100 MG tablet Take 1 tablet (100 mg) by mouth daily. 90 tablet 0    metFORMIN (GLUCOPHAGE XR) 500 MG 24 hr tablet Take 2 tablets (1,000 mg) by mouth 2 times daily (with meals). 360 tablet 3    ondansetron (ZOFRAN ODT) 4 MG ODT tab Take 1 tablet (4 mg) by mouth every 8 hours as needed for nausea or vomiting. 10 tablet 0    senna-docusate (SENOKOT-S/PERICOLACE) 8.6-50 MG tablet Take 1 tablet by mouth 2 times daily. 20 tablet 0    atorvastatin (LIPITOR) 10 MG tablet Take 1 tablet (10 mg) by mouth daily. (Patient taking differently: Take 10 mg by mouth daily. Takes every morning) 90 tablet 3    cyclobenzaprine (FLEXERIL) 10 MG tablet Take 1  "tablet (10 mg) by mouth 3 times daily as needed for muscle spasms. (Patient not taking: Reported on 6/30/2025)      dexAMETHasone (DECADRON) 1 MG tablet Take 2 tablets (2 mg) by mouth every 12 hours for 1 day, THEN 1 tablet (1 mg) every 12 hours for 2 days, THEN 1 tablet (1 mg) daily for 2 days. 10 tablet 0    oxyCODONE (ROXICODONE) 5 MG tablet Take 1 tablet (5 mg) by mouth every 6 hours as needed for moderate to severe pain. (Patient not taking: Reported on 6/30/2025) 10 tablet 0                    Objective    /82 (BP Location: Right arm, Patient Position: Sitting, Cuff Size: Adult Large)   Pulse 84   Temp 99  F (37.2  C) (Tympanic)   Resp 16   Ht 1.88 m (6' 2.02\")   Wt (!) 174.6 kg (385 lb)   SpO2 97%   BMI 49.41 kg/m    Body mass index is 49.41 kg/m .  Physical Exam   GENERAL: alert and no distress  EYES: Eyes grossly normal to inspection, PERRL and conjunctivae and sclerae normal  MS: no gross musculoskeletal defects noted, no edema  SKIN: no suspicious lesions or rashes  NEURO: Normal strength and tone, mentation intact and speech normal  PSYCH: mentation appears normal, affect normal/bright            Signed Electronically by: Russel Hsieh MD    Answers submitted by the patient for this visit:  Hypertension Visit (Submitted on 6/28/2025)  Chief Complaint: Chronic problems general questions HPI Form  Do you check your blood pressure regularly outside of the clinic?: No  Are your blood pressures ever more than 140 on the top number (systolic) OR more than 90 on the bottom number (diastolic)? (For example, greater than 140/90): Yes  Are you following a low salt diet?: No  Diabetes Visit (Submitted on 6/28/2025)  Chief Complaint: Chronic problems general questions HPI Form  Frequency of checking blood sugars:: continous glucose monitor  What time of day are you checking your blood sugars : before and after meals, at bedtime  Have you had any blood sugars above 200?: Yes  Have you had any blood " sugars below 70?: Yes  Hypoglycemia symptoms:: shakiness, dizziness  Diabetic concerns:: none  Paraesthesia present:: none of these symptoms  General Questionnaire (Submitted on 6/28/2025)  Chief Complaint: Chronic problems general questions HPI Form  How many servings of fruits and vegetables do you eat daily?: 2-3  On average, how many sweetened beverages do you drink each day (Examples: soda, juice, sweet tea, etc.  Do NOT count diet or artificially sweetened beverages)?: 2  How many minutes a day do you exercise enough to make your heart beat faster?: 10 to 19  How many days a week do you exercise enough to make your heart beat faster?: 4  How many days per week do you miss taking your medication?: 0  Questionnaire about: Chronic problems general questions HPI Form (Submitted on 6/28/2025)  Chief Complaint: Chronic problems general questions HPI Form

## 2025-06-30 NOTE — PATIENT INSTRUCTIONS
[facility]  Dr Pickard, Endocrinology Department    [facility]   [address] [facility] [address]  [place] [address]  Appointment [phone]  Fax: [phone]  [place]: Monday - Thursday       To provide the best diabetic care, please bring your blood glucose meter to each and every visit with your Endocrinologist.  Your blood glucose meter/insulin pump will be downloaded at every appointment.    Please arrive 15 minutes before your scheduled appointment.  This will allow for your blood glucose meter/insulin pump to be downloaded.  If you are wearing DEXCOM please bring  or sharing code so that it can be downloaded.  If you are using freestyle judy personal sensors please bring the reader.  If you are using TANDEM insulin pump please have your username and password to get info from [employer] website.      Continue metformin XR 1000 mg twice a day  Continue glipizide XL 5 mg/day  Continue Jardinace 25 mg/day  Continue Toujeo 22 units/day at night  Novololg: increase at breakfast and lunch by 2 units. Take -/-24 before light breakfast, lunch and supper.  Continue to use judy 3 plus.  Follow up with Kamini Caceres PA-C as scheduled 2025.  Follow up with me or Kamini GARY in 3-4 months with labs prior after that.  Repeat labs and follow up with  in 4-5 month after that.  Please make a lab appointment for blood work and follow up clinic appointment in 1 week after that to discuss results.    Recommend checking blood sugars before meals and at bedtime.    If Blood glucose are low more often-> 2-3 times/week- give us a call.  Make better food choices: reduce carbs, Reduce portion size, weight loss and exercise 3-4 times a week.    What is hypoglycemia:  Hypoglycemia is when blood sugar levels become too low - below 70 m/dl.      What causes hypoglycemia?  - using too much insulin  -taking too many diabetes pills  -not eating enough, or skipping  meals or snacks  -not eating enough carbohydrate with meals  -changing your exercise routine  -drinking alcohol in excess    It is also possible to have hypoglycemia even when you are carefully managing your blood sugar levels.    What does it feel like when blood sugars get too low?  You may feel:  - anxious  -confused  -dizzy  -hungry  -light-headed  -nervous  -shaky  -sleepy  -sweaty    You may have  -blurred or cloudy vision  -heart palpitations (heart skips a beat or races)  -tingling or numbness around the mouth and tongue  -tremors    What to do if you have symptoms of hypoglycmemia:  If you think your blood sugar is too low, check it with a glucose meter.  If its below 70 mg/dl, consume one of the following:  Fruit juice (1/2 cup)  Glucose tablets (15 grams)  Hard candy (5 to 7 pieces)  Honey or sugar (2 teaspoons)  Milk (1/2 cup)  Soft drink (non-diet, 1/2 cup)    Wait 15 minutes and check your blood glucose again.  IF it is still below 70 mg/dl, have another food item listed above. Wait another 15 minutes and repeat the blood glucose test.  Have a small meal or snack that contains some carbohydrate after your blood glucose rises above 70 mg/dl.    If you are at risk of hypoglycemia, always carry with you glucose tablets or one of the foods listed above.      To prevent Hypoglycemia:  Avoid situations that may cause hypoglycemia  Before making any change to your diet or exercise routine, discuss them with your healthcare provider  Keep a record of your blood glucose levels.  Include the time of day, diabetes medications, when you had your last meal or snack, and what you were doing at the time (e.g. Watching TV, gardening, jogging, etc).    Talk to your healthcare provider if your blood glucose levels are often low        Patient guide on hypoglycemia    http://www.hormone.org/Resources/upload/patient-guide-diagnosis-and-management-hypoglycemia-[email or web address]

## 2025-06-30 NOTE — PROGRESS NOTES
"THIS IS A VIDEO VISIT:    Phone call visit/virtual visit encounter:    Name of patient: Jeffry Younger    Date of encounter: 6/30/2025    Time of start of video visit: 9:01    Video started: 9:09    Video ended: 9:19    Provider location: working from home/ [facility]    Patient location: patients home.    Mode of transmission: Hyperpot video/ [employer]    Verbal consent: obtained before starting visit. Pt is agreeable.      The patient has been notified of following:      \"This VIDEO visit will be conducted via a call between you and your physician/provider. We have found that certain health care needs can be provided without the need for a physical exam.  This service lets us provide the care you need with a short phone conversation.  If a prescription is necessary we can send it directly to your pharmacy.  If lab work is needed we can place an order for that and you can then stop by our lab to have the test done at a later time.     With new updates with corona virus patient might be billed as clinic visit.     If during the course of the call the physician/provider feels a telephone visit is not appropriate, you will not be charged for this service.\"      Past medical history, social history, family history, allergy and medications were reviewed and updated as appropriate.  Reviewed pertinent labs, notes, imaging studies personally.    ENDOCRINOLOGY CLINIC NOTE:  Name: Jeffry Younger  Seen for follow up of type 2 Diabetes.  HPI:  Jeffry Younger is a 45 year old male who presents for the evaluation/management of Diabetes.   has a past medical history of Diabetes (H) (07/2018), Essential hypertension (06/13/2024), Obesity, and Sleep apnea.    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  He was on decadrone for 5 days following surgery.    He was on Victoza and it was helping with BG.  His father was diagnosed with thyroid growth? and he stopped " taking Victoza  out of caution.  He has never been on insulin pump. His wife has type 1 DM.  In December 2023 noted higher A1c and following that glipizide was restarted.  A1c improved after that    Ozempic: It was started in early 2024 and he was tolerating okay.  Father was diagnosed with thyroid cancer.  He had thyroid ultrasound done which showed 3 subcentimeter thyroid nodule.  He is no longer taking Ozempic-- he was recommended to stop ozempic by optho in the setting of ischemic optic neuropathy.  Off since feb 20205    Using judy 3.    Weight: lost some weight.  Wt Readings from Last 2 Encounters:   06/10/25 (!) 173.3 kg (382 lb 1.6 oz)   05/29/25 (!) 177.8 kg (392 lb)       1. Type 2 DM:  Orginally diagnosed in 2015  Current Regimen:   Metformin XR 1000 mg twice a day  Glipizide XL 5 mg/day  Jardinace 25 mg/day  Toujeo 22 units/day at night  Novolog 10/20-24/20-24 before light breakfast, lunch and supper  (Mostly estimate)    yes:     Diabetes Medication(s)       Biguanides       metFORMIN (GLUCOPHAGE XR) 500 MG 24 hr tablet Take 2 tablets (1,000 mg) by mouth 2 times daily (with meals).       Insulin       insulin aspart (NOVOLOG FLEXPEN) 100 UNIT/ML pen Inject 20-28 Units subcutaneously 2 times daily (with meals).     insulin aspart (NOVOLOG PEN) 100 UNIT/ML pen Inject 2-16 Units subcutaneously 3 times daily (with meals).     insulin glargine (LANTUS PEN) 100 UNIT/ML pen Inject 80 Units subcutaneously every 24 hours.     insulin glargine U-300 (TOUJEO) 300 UNIT/ML (1 units dial) pen Inject 20 Units subcutaneously daily.       Sodium-Glucose Co-Transporter 2 (SGLT2) Inhibitors       empagliflozin (JARDIANCE) 25 MG TABS tablet Take 1 tablet (25 mg) by mouth daily.       Sulfonylureas       glipiZIDE (GLUCOTROL XL) 5 MG 24 hr tablet Take 1 tablet (5 mg) by mouth daily. May increase to 10 mg dose if time in range is below 70%.            BS checks: Using judy  Symptoms of hypoglycemia (low blood sugar):  Gets  symptoms of hypoglycemia.  Episodes of hypoglycemia:     DM Complications:   Complications:   Diabetes Complications  Description / Detail    Diabetic Retinopathy  No   CAD / PAD  No   Neuropathy  No   Nephropathy / Microalbuminuria  No        Gastroparesis  No   Hypoglycemia Unawarness  No       PMH/PSH:  Past Medical History:   Diagnosis Date    Diabetes (H) 07/2018    Treating metformin    Essential hypertension 06/13/2024    Obesity     Sleep apnea      Past Surgical History:   Procedure Laterality Date    AS ESOPHAGOSCOPY, DIAGNOSTIC      EGD    BIOPSY  2008    egd normal    OPTICAL TRACKING SYSTEM BIOPSY BRAIN Left 3/7/2025    Procedure: stealth assisted LEFT FRONTAL BIOPSY BRAIN;  Surgeon: Genaro Carver MD;  Location: [facility] OR    OPTICAL TRACKING SYSTEM CRANIOTOMY, EXCISE TUMOR WITH MAPPING, COMBINED Left 6/9/2025    Procedure: stealth assisted left awake craniotomy with tumor resection and speech and motor mapping;  Surgeon: Alon Mccarthy MD;  Location: [facility] OR     Family Hx:  Family History   Problem Relation Age of Onset    Diabetes Father     Hypertension Father     Other Cancer Father         Kidney & Thyroid    Cerebrovascular Disease Paternal Grandfather     Other Cancer Brother         Lukemia    Other Cancer Maternal Grandmother         Pancreatic    Other Cancer Maternal Grandfather         Lung - smoker    Other Cancer Brother         Lukemia    Other Cancer Brother         Lukemia    Colon Cancer No family hx of     Prostate Cancer No family hx of     Cerebrovascular Disease No family hx of     Coronary Artery Disease No family hx of        Diabetes:    Social Hx:  Social History     Socioeconomic History    Marital status:      Spouse name: Not on file    Number of children: Not on file    Years of education: Not on file    Highest education level: Professional school degree (e.g., MD, DDS, DVM, SMITA)   Occupational History    Not on file   Tobacco Use    Smoking status: Never     Passive  exposure: Never    Smokeless tobacco: Never   Vaping Use    Vaping status: Never Used   Substance and Sexual Activity    Alcohol use: Yes     Comment: 1 per month maybe    Drug use: No    Sexual activity: Yes     Partners: Female     Birth control/protection: Abstinence, Pull-out method, Condom   Other Topics Concern    Parent/sibling w/ CABG, MI or angioplasty before 65F 55M? No   Social History Narrative    Not on file     Social Drivers of Health     Financial Resource Strain: Low Risk  (6/10/2025)    Financial Resource Strain     Within the past 12 months, have you or your family members you live with been unable to get utilities (heat, electricity) when it was really needed?: No   Food Insecurity: Low Risk  (6/10/2025)    Food Insecurity     Within the past 12 months, did you worry that your food would run out before you got money to buy more?: No     Within the past 12 months, did the food you bought just not last and you didn t have money to get more?: No   Transportation Needs: Low Risk  (6/10/2025)    Transportation Needs     Within the past 12 months, has lack of transportation kept you from medical appointments, getting your medicines, non-medical meetings or appointments, work, or from getting things that you need?: No   Physical Activity: Insufficiently Active (12/28/2024)    Exercise Vital Sign     Days of Exercise per Week: 3 days     Minutes of Exercise per Session: 20 min   Stress: No Stress Concern Present (12/28/2024)    [employer] - Occupational Stress Questionnaire     Feeling of Stress : Only a little   Social Connections: Unknown (12/28/2024)    Social Connection and Isolation Panel [NHANES]     Frequency of Communication with Friends and Family: Not on file     Frequency of Social Gatherings with Friends and Family: Twice a week     Attends Sikh Services: Not on file     Active Member of Clubs or Organizations: Not on file     Attends Club or Organization  Meetings: Not on file     Marital Status: Not on file   Interpersonal Safety: Low Risk  (6/9/2025)    Interpersonal Safety     Do you feel physically and emotionally safe where you currently live?: Yes     Within the past 12 months, have you been hit, slapped, kicked or otherwise physically hurt by someone?: No     Within the past 12 months, have you been humiliated or emotionally abused in other ways by your partner or ex-partner?: No   Housing Stability: High Risk (6/10/2025)    Housing Stability     Do you have housing? : No     Are you worried about losing your housing?: No          MEDICATIONS:  has a current medication list which includes the following prescription(s): atorvastatin, blood glucose, blood glucose monitoring, contour next gen monitor, freestyle judy 3 plus sensor, cyclobenzaprine, dexamethasone, empagliflozin, glipizide, novolog flexpen, insulin aspart, insulin glargine, insulin glargine u-300, ulticare mini, levetiracetam, lorazepam, losartan, metformin, ondansetron, oxycodone, and senna-docusate.    ROS     ROS: 10 point ROS neg other than the symptoms noted above in the HPI.    Physical Exam   VS: There were no vitals taken for this visit.  GENERAL: healthy, alert and no distress  EYES: Eyes grossly normal to inspection, conjunctivae and sclerae normal  ENT: no nose swelling, nasal discharge.  Thyroid: no apparent thyroid nodules  RESP: no audible wheeze, cough, or visible cyanosis.  No visible retractions or increased work of breathing.  Able to speak fully in complete sentences.  ABDO: not evaluated.  EXTREMITIES: no hand tremors.  NEURO: Cranial nerves grossly intact, mentation intact and speech normal  SKIN: No apparent skin lesions, rash or edema seen   PSYCH: mentation appears normal, affect normal/bright, judgement and insight intact, normal speech and appearance well-groomed      LABS:  A1c:  Lab Results   Component Value Date    A1C 8.0 06/10/2025    A1C 7.9 05/13/2025    A1C 6.4  12/30/2024    A1C 7.3 08/05/2024    A1C 7.6 03/11/2024    A1C 7.4 03/01/2021    A1C 8.8 12/29/2020    A1C 8.8 08/24/2020    A1C 10.4 01/22/2020    A1C 7.4 06/11/2019     Creatinine:  Creatinine   Date Value Ref Range Status   06/12/2025 0.93 0.67 - 1.17 mg/dL Final   08/24/2020 0.90 0.66 - 1.25 mg/dL Final     Urine Micro:  Lab Results   Component Value Date    UMALCR 8.76 05/13/2025    UMALCR 6.47 04/18/2022    UMALCR 18.63 03/01/2021     LFTs/Lipids:  Recent Labs   Lab Test 02/26/25  0558 12/30/24  1116   CHOL 119 121   HDL 34* 38*   LDL 67 59   TRIG 92 120     All pertinent notes, labs, and images personally reviewed by me.     Glucometer/ insulin pump (if applicable)/ CGM data (if applicable) downloaded, Personally reviewed and interpreted.  All Blood sugar data reviewed personally and discussed with pt.      A/P  Mr.Jeffrey NARA Younger is a 45 year old here for the evaluation/management of diabetes:    1. DM2 - Under fair control.  A1c 8.0%.  Average  with TIR 70% .noted post meal high Bg after BF and lunch.  Few low BG in the setting of over correction and some false alarms.  Ozempic was stopped 2/2025 as noted above.  Plan:  Discussed diagnosis, pathophysiology, management and treatment options of condition with pt.  I also discussed importance of strict blood sugar control to prevent complications associated with uncontrolled diabetes.  Continue to hold off ozempic.  Continue metformin XR 1000 mg twice a day  Continue glipizide XL 5 mg/day  Continue Jardinace 25 mg/day  Continue Toujeo 22 units/day at night  Novololg: increase at breakfast and lunch by 2 units. Take 12/24-26/20-24 before light breakfast, lunch and supper.  Continue to use judy 3 plus.  Follow up with Kamini Caceres PA-C as scheduled 7/2025.  Follow up with me or Kamini GRAY in 3-4 months with labs prior after that.  Repeat labs and follow up with  in 4-5 month after that.  Please make a lab appointment for blood work and follow  up clinic appointment in 1 week after that to discuss results.     2. Hypertension -not on medication?  No microalbuminuria.    3. Hyperlipidemia - Under Good control.  On atorvastatin 10 mg/day.  LDL 67.    4. Prevention:  Opthalmology-recommend annually  ASA-no secondary to age  Smoking-no    Most Recent Immunizations   Administered Date(s) Administered    COVID-19 12+ (MODERNA) 09/10/2024    COVID-19 Bivalent 18+ (Moderna) 10/11/2022    COVID-19 Monovalent 18+ (Moderna) 11/08/2021    Flu, Unspecified 09/15/2021    Hepatitis A (VAQTA)(ADULT 19+) 02/22/2017    Hepatitis A/B (Twinrix) 09/29/2015    Hepatitis B, Adult (Energix-B/Recombivax HB) 08/11/2004    Influenza (H1N1) 12/31/2009    Influenza (IIV3) PF 09/14/2021    Influenza (prior to 2024) 11/30/2011    Influenza Vaccine >6 months,quad, PF 09/18/2023    Influenza,INJ,MDCK,PF,Quad >6mo(Flucelvax) 10/11/2022    Meningococcal (Menomune ) 08/11/2004    Meningococcal ACWY (Menactra ) 02/22/2017    Pneumococcal 20 valent Conjugate (Prevnar 20) 12/22/2022    Pneumococcal 23 valent 09/07/2018    TDAP (Adacel,Boostrix) 01/03/2024    TDAP Vaccine (Adacel) 02/01/2015    Typhoid Oral 01/03/2024    Yellow Fever 02/22/2017            Recommend checking blood sugars before meals and at bedtime.    If Blood glucose are low more often-> 2-3 times/week- give us a call.  The patient is advised to Make better food choices: reduce carbs, Reduce portion size, weight loss and exercise 3-4 times a week.  Discussed hypoglycemia signs and symptoms as well as management in detail.    There is some variability among people, most will usually develop symptoms suggestive of hypoglycemia when blood glucose levels are lowered to the mid 60's. The first set of symptoms are called adrenergic. Patients may experience any of the following nervousness, sweating, intense hunger, trembling, weakness, palpitations, and difficulty speaking.   The acute management of hypoglycemia involves the rapid  delivery of a source of easily absorbed sugar. Regular soda, juice, lifesavers, table sugar, are good options. 15 grams of glucose is the dose that is given, followed by an assessment of symptoms and a blood glucose check if possible. If after 10 minutes there is no improvement, another 10-15 grams should be given. This can be repeated up to three times. The equivalency of 10-15 grams of glucose (approximate servings) are: Four lifesavers, 4 teaspoons of sugar, or 1/2 can of regular soda or juice.     About 40 minutes spent by me on the date of the encounter doing chart review, history and exam, documentation and further activities per the note  Discussed indications, risks and benefits of all medications prescribed, and answered questions to patient's satisfaction.  The longitudinal plan of care for the diagnosis(es)/condition(s) as documented were addressed during this visit. Due to the added complexity in care, I will continue to support Vladimir in the subsequent management and with ongoing continuity of care.  All questions were answered.  The patient indicates understanding of the above issues and agrees with the plan set forth.     Follow-up:  As noted in AVS    Jocelynn Pickard M.D  Endocrinology  [facility]  CC: Russel Hsieh    Disclaimer: This note consists of symbols derived from keyboarding, dictation and/or voice recognition software. As a result, there may be errors in the script that have gone undetected. Please consider this when interpreting information found in this chart.    Addendum to above note and clinic visit:    Labs reviewed.    See result note/telephone encounter.

## 2025-07-01 ENCOUNTER — ONCOLOGY VISIT (OUTPATIENT)
Dept: ONCOLOGY | Facility: CLINIC | Age: 45
End: 2025-07-01
Attending: PSYCHIATRY & NEUROLOGY
Payer: COMMERCIAL

## 2025-07-01 VITALS
BODY MASS INDEX: 49.96 KG/M2 | WEIGHT: 315 LBS | TEMPERATURE: 97.9 F | SYSTOLIC BLOOD PRESSURE: 143 MMHG | HEART RATE: 85 BPM | OXYGEN SATURATION: 97 % | DIASTOLIC BLOOD PRESSURE: 91 MMHG | RESPIRATION RATE: 16 BRPM

## 2025-07-01 DIAGNOSIS — E78.5 HYPERLIPIDEMIA LDL GOAL <100: ICD-10-CM

## 2025-07-01 DIAGNOSIS — E11.65 TYPE 2 DIABETES MELLITUS WITH HYPERGLYCEMIA, WITHOUT LONG-TERM CURRENT USE OF INSULIN (H): ICD-10-CM

## 2025-07-01 DIAGNOSIS — I10 ESSENTIAL HYPERTENSION: ICD-10-CM

## 2025-07-01 DIAGNOSIS — C71.9 GRADE III ASTROCYTOMA (H): Primary | ICD-10-CM

## 2025-07-01 PROCEDURE — 99213 OFFICE O/P EST LOW 20 MIN: CPT | Performed by: PSYCHIATRY & NEUROLOGY

## 2025-07-01 PROCEDURE — 99417 PROLNG OP E/M EACH 15 MIN: CPT | Performed by: PSYCHIATRY & NEUROLOGY

## 2025-07-01 PROCEDURE — G2211 COMPLEX E/M VISIT ADD ON: HCPCS | Performed by: PSYCHIATRY & NEUROLOGY

## 2025-07-01 PROCEDURE — 99215 OFFICE O/P EST HI 40 MIN: CPT | Mod: 24 | Performed by: PSYCHIATRY & NEUROLOGY

## 2025-07-01 ASSESSMENT — PAIN SCALES - GENERAL: PAINLEVEL_OUTOF10: NO PAIN (0)

## 2025-07-01 NOTE — LETTER
"7/1/2025      Jeffry Younger  [address] [facility] [place] [address]      Dear Colleague,    Thank you for referring your patient, Jeffry Younger, to the [facility]. Please see a copy of my visit note below.    NEURO-ONCOLOGY VISIT  Jul 1, 2025    CHIEF COMPLAINT: Mr. 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 Initial diagnostic ability was limited by sample size following a biopsy on 3/7/2025. Then, Vladimir then underwent a craniotomy for cancer resection on 6/9/2025. Pathology was thoroughly reviewed and consistent with a WHO grade 3 astrocytoma. The recommendation is for cancer-directed therapy with radiation following by adjuvant temozolomide.     Vladimir is presenting in follow-up today and he is accompanied by Kailee (wife).     HISTORY OF PRESENT ILLNESS  -Since surgery, Vladimir has recovered well;   No headaches.   No changes in mood/ behavior.   No weakness.   No changes in sensation.   No episodes concerning for a recurrent seizure.  -Mild issues with word finding; improving.    Mispronunciation when pressured/ talking too fast.   -Mild issues with comprehension; improving.   -No recurrent seizures.   -Off steroids.       MEDICATIONS   Current Outpatient Medications   Medication Sig Dispense Refill     atorvastatin (LIPITOR) 10 MG tablet Take 1 tablet (10 mg) by mouth daily. Takes every morning       blood glucose (ACCU-CHEK GUIDE) test strip Use to test blood sugar 1 times daily or as directed. 50 strip 1     blood glucose monitoring (ACCU-CHEK FASTCLIX) lancets 1 each daily 102 each 3     Blood Glucose Monitoring Suppl (CONTOUR NEXT GEN MONITOR) SAMIA 1 each daily. Use as directed. 1 each 0     Continuous Glucose Sensor (FREESTYLE KULWANT 3 PLUS SENSOR) MISC Change every 15 days. 6 each 1     empagliflozin (JARDIANCE) 25 MG TABS tablet Take 1 tablet (25 " mg) by mouth daily. 90 tablet 3     glipiZIDE (GLUCOTROL XL) 5 MG 24 hr tablet Take 1 tablet (5 mg) by mouth daily. May increase to 10 mg dose if time in range is below 70%. 90 tablet 0     insulin aspart (NOVOLOG FLEXPEN) 100 UNIT/ML pen Inject 20-28 Units subcutaneously 2 times daily (with meals). 45 mL 3     insulin aspart (NOVOLOG PEN) 100 UNIT/ML pen Take 12/24-26/20-24 before light breakfast, lunch and supper. 30 mL 1     insulin glargine (LANTUS PEN) 100 UNIT/ML pen Inject 80 Units subcutaneously every 24 hours. 15 mL 2     insulin glargine U-300 (TOUJEO) 300 UNIT/ML (1 units dial) pen Inject 20 Units subcutaneously daily. 15 mL 3     insulin pen needle (ULTICARE MINI) 31G X 6 MM miscellaneous Use 3-4 pen needles daily or as directed. 300 each 3     levETIRAcetam (KEPPRA) 750 MG tablet Take 1 tablet (750 mg) by mouth 2 times daily. 60 tablet 0     LORazepam (ATIVAN) 0.5 MG tablet Take 1 tablet (0.5 mg) by mouth every 6 hours as needed for anxiety. 30 tablet 0     losartan (COZAAR) 100 MG tablet Take 1 tablet (100 mg) by mouth daily. 90 tablet 0     metFORMIN (GLUCOPHAGE XR) 500 MG 24 hr tablet Take 2 tablets (1,000 mg) by mouth 2 times daily (with meals). 360 tablet 3     ondansetron (ZOFRAN ODT) 4 MG ODT tab Take 1 tablet (4 mg) by mouth every 8 hours as needed for nausea or vomiting. 10 tablet 0     oxyCODONE (ROXICODONE) 5 MG tablet Take 1 tablet (5 mg) by mouth every 6 hours as needed for moderate to severe pain. 10 tablet 0     senna-docusate (SENOKOT-S/PERICOLACE) 8.6-50 MG tablet Take 1 tablet by mouth 2 times daily. 20 tablet 0     DRUG ALLERGIES No Known Allergies      IMMUNIZATIONS   Immunization History   Administered Date(s) Administered     COVID-19 12+ (MODERNA) 09/15/2023, 09/10/2024     COVID-19 Bivalent 18+ (Moderna) 10/11/2022     COVID-19 Monovalent 18+ (Moderna) 03/10/2021, 04/07/2021, 11/08/2021     Flu, Unspecified 09/16/2019, 09/15/2021     Hepatitis A (VAQTA)(ADULT 19+) 02/22/2017      Hepatitis A/B (Twinrix) 01/22/2015, 09/29/2015     Hepatitis B, Adult (Energix-B/Recombivax HB) 08/11/2004     Influenza (H1N1) 12/31/2009     Influenza (IIV3) PF 12/17/2008, 10/08/2009, 10/24/2010, 09/14/2021     Influenza (prior to 2024) 11/30/2011     Influenza Vaccine >6 months,quad, PF 09/29/2015, 12/13/2016, 09/07/2018, 10/16/2019, 09/11/2020, 09/18/2023     Influenza,INJ,MDCK,PF,Quad >6mo(Flucelvax) 10/10/2017, 10/11/2022     Meningococcal (Menomune ) 08/11/2004     Meningococcal ACWY (Menactra ) 02/22/2017     Pneumococcal 20 valent Conjugate (Prevnar 20) 12/22/2022     Pneumococcal 23 valent 09/07/2018     TDAP (Adacel,Boostrix) 05/29/2009, 02/01/2015, 01/03/2024     TDAP Vaccine (Adacel) 05/29/2009, 02/01/2015     Typhoid Oral 03/01/2015, 01/03/2024     Yellow Fever 02/22/2017       ONCOLOGIC HISTORY  -2/2025 PRESENTATION: Blurring of vision.   -2/25/2025 MR brain imaging with an expansile T2-weighted/FLAIR hyperintense lesion involving the left frontal lobe measuring approximately 3.6 x 4.0 x 4.1 cm. There is patchy amorphous enhancement along the medial and posterior margins of the mass. There is also additional T2-weighted/FLAIR hyperintensity along the margins of the mass. There is mild mass effect upon the left lateral ventricle and 0.2 cm rightward shift of midline structures. There is mild cupping of the left optic disc which can be seen in the setting of papilledema.  -2/25/2025 CTA and CTV Head/ Neck Head CTA demonstrates no aneurysm or stenosis of the major intracranial arteries. Neck CTA demonstrates no stenosis of the major cervical arteries. Head CTV demonstrates no significant stenosis or filling defects within the dural venous sinus system.  -2/25/2025 CT c/a/p with a 2.2 cm intermediate density lesion in the right kidney interpolar region could represent a neoplasm or hemorrhagic cyst. No evidence of metastasis in the chest, abdomen or pelvis.  -2/26/2025 MR renal imaging with no evidence  of renal mass or neoplasm. Lesions questioned on CT 2/25/2025 represent hemorrhagic/proteinaceous cysts. There are also additional simple cortical and sinus cysts. Mild hepatic steatosis. Lesion in segment 6 of the liver consistent with a benign cyst.  -2/26/2025 LP for CSF analysis; TNC 0, protein 116, glucose 95. Flow cytometry with rare to absent B-cells. Cytology with no morphologic evidence of malignancy.  -3/7/2025 SURGERY: Stealth assisted biopsy using auto-guide robot per Dr. Carver.   PATHOLOGY: Astrocytoma, IDH-mutant.   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 However, neither homozygous deletion of CDKN2A/B, nor 1p/19q-codeletion were identified. Additionally, MTAP expression is retained in tumor cells by immunohistochemistry. Histologically, there are multiple mitoses in the limited biopsy material evaluated, though no more than 1 per 10 contiguous high-power fields. Microvascular proliferation and tumor necrosis are absent. Thus, this tumor is best classified as grade 2 on the biopsy material, though the presence of multiple mitoses within a limited biopsy specimen from a contrast-enhancing astrocytoma raises the possibility of higher grade components elsewhere in the tumor. More confident grading may be performed on a subsequent resection specimen, if performed.     Chromosomal microarray alterations;  - Copy number losses on chromosomes 3q, 10q, 11p, 12q, 15q, 21  - No evidence of CDKN2A/B deletion (either heterozygous or homozygous)  - No evidence of 1p/19q-codeletion  -3/17/2025 NEURO-ONC: Final integrated diagnosis and definitive grade pending completion of chromosomal microarray analysis. Consideration for additional surgery with a craniotomy for mass resection.   -3/21/2025 NEURO-ONC: Re-discussed case and recommendations for treatment in conjunction with Dr. Mccarthy; recommendations for pre-surgical work-up as we  "await final pathology results.    -5/2025 SZ: New onset, started Keppra.   -6/9/2025 SURGERY: Stealth assisted left awake craniotomy.  Post-operative MR brain imaging from 6/10 demonstrated postsurgical changes of left frontal craniotomy. Small focus of diffusion restriction medial to the resection cavity in the left centrum semiovale, suggestive of acute infarction. There is mild circumferential restricted diffusion along the resection cavity which is presumably postoperative. Faint peripheral enhancement of the resection cavity with layering internal postoperative hemorrhage. Stable rightward midline shift of 3 mm.  PATHOLOGY: Astrocytoma, IDH-mutant (CNS WHO grade 3).    This IDH-mutant astrocytoma harbors regions with moderate to severe hypercellularity and cytologic atypia. Proliferative activity is mildly elevated, with up to 4 mitoses in 10 high-power fields focally in some regions with up to 8-10% of nuclei labeling with Ki-67. There are multiple foci with microvascular changes suspicious but not definitive for microvascular proliferation. The features are overall most consistent with a grade 3 designation.  -7/1/2025 NEURO-ONC: Post-operative aphasia; improving. Discussed post-operative imaging and final pathology. Recommending radiotherapy followed by adjuvant temozolomide; referral placed to Dr. Fonseca.       PHYSICAL EXAMINATION  BP (!) 143/91 (BP Location: Right arm, Patient Position: Sitting, Cuff Size: Adult Large)   Pulse 85   Temp 97.9  F (36.6  C) (Oral)   Resp 16   Wt (!) 176.6 kg (389 lb 4.8 oz)   SpO2 97%   BMI 49.96 kg/m     Wt Readings from Last 2 Encounters:   07/01/25 (!) 176.6 kg (389 lb 4.8 oz)   06/30/25 (!) 174.6 kg (385 lb)      Ht Readings from Last 2 Encounters:   06/30/25 1.88 m (6' 2.02\")   06/09/25 1.88 m (6' 2\")     KPS: 90    -Generally well appearing.  -Respiratory: Normal breath sounds, no audible wheezing.   -Skin: Healing head incision.  -Psychiatric: Normal mood and " affect. Pleasant, talkative.  -Neurologic:   MENTAL STATUS:     Alert, oriented.    Recall: Intact    Speech largely fluent, but with some hesitation, mispronouncing several words.  Comprehension intact.     CRANIAL NERVES:     Visual fields full on the right.      Left eye with right inferior field blurred, midline with a visual distortion.    Symmetric facial movements.   Hearing intact.   No dysarthria.   GAIT:  Walks without assistance.      MEDICAL RECORDS  Personally reviewed admission encounter, indicated for surgery.     LABS  Personally reviewed all available lab results; Pathology results. CBC (platelets 222K) on 6/12.     IMAGING  Personally reviewed pre- and post-operative MR brain imaging as detailed above.     Imaging was shown to and results were reviewed with Vladimir and Kailee.       IMPRESSION  On date of service, 47 minutes was spent in clinic and 29 minutes was spent preparing for the visit through extensive chart review and coordinating care for this high complexity visit. The following is in explanation for the recommendations used to define the plan.       Prior to Vladimir's appointment today, I have remained in close communication with Dr. Mccarthy. I have also again reviewed his case with neuro-pathology in which pathology demonstrates moderate to severe hypercellularity and cytologic atypia. While the proliferative activity is only mildly elevated, with up to 4 mitoses in 10 high-power fields (below the high-grade cut off), there are multiple foci with microvascular changes suspicious but not definitive for microvascular proliferation. The features are overall most consistent with a grade 3 designation. I also personally reviewed Vladimir's imaging and case at [facility].      Today in clinic, we reviewed his post-operating imaging that demonstrated a highly successful resection. Clinically, Vladimir is doing well with no new neurological concerns aside from continuously improving post-operative  aphasia. He denies any recurrent seizures and we discussed the common risk factors for breakthrough events as noted below. He is off dexamethasone.    Next we discussed his final pathology results as previously stated. For patients with a newly diagnosed WHO grade 3 astrocytoma, while upfront treatment is indicated for all patients, there are no formal treatment standards for WHO grade 3 astrocytomas. Recommendations include one of the 2 following options; radiotherapy + concurrent and adjuvant temozolomide versus radiation alone followed by adjuvant temozolomide. Based on the ongoing EORT 04542 (CATNON) trial, longer follow-up is needed to determine whether there is a clinically benefit to concurrent temozolomide in patients with IDH-mutant tumors. The risks/ side effects and benefits of concurrent temozolomide were discussed today. Anticipated side effects of this treatment can include fatigue, nausea, and constipation. Bone marrow suppression can also be seen. My recommendation is for radiation therapy alone follow by adjuvant temozolomide. Vladimir and Kailee are in agreement. I will place a referral for Vladimir to see Dr. Fonseca in consultation to discuss the risks/ benefits of radiotherapy. I notified Dr. Fonseca of this referral. In addition, she has a non-therapeutic clinical trial for which he could be a candidate.     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 Fonseca in radiation oncology.     -STEROIDS-  -Tapered off dexamethasone.    -SEIZURE MANAGEMENT-  -History of a seizure (as of 5/2025), continue prescribed an antiepileptics.  -Continue Keppra.   -Discussed risk factors that can lower one's seizure threshold, making them more vulnerable to breakthrough seizure events; sleep deprivation, alcohol use, illness/ infection, and not taking seizure medications as prescribed.  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   Following with Dr. Arteaga, neuro-ophtho.     -Quality of life/ MOOD/ FATIGUE-  -Denies any mood issues.  -Continue to monitor mood as untreated/ undertreated depression can worsen fatigue, dysorexia, and quality of life.    Return to clinic pending discussion with Dr. Fonseca.      In the meantime, Vladimir and Kailee know to call the clinic with any concerns and he can be seen sooner if needed.     The longitudinal plan of care for the diagnosis(es)/condition(s) as documented were addressed during this visit. Due to the added complexity in care, I will continue to support Vladimir in the subsequent management and with ongoing continuity of care.     Concha Jacinto MD  Neuro-oncology      Oncology Rooming Note    July 1, 2025 3:43 PM   Jeffry Younger is a 45 year old male who presents for:    Chief Complaint   Patient presents with     Oncology Clinic Visit     Grade III astrocytoma (H)       Initial Vitals: Pulse 85   Temp 97.9  F (36.6  C) (Oral)   Resp 16   SpO2 97%  Estimated body mass index is 49.41 kg/m  as calculated from the following:    Height as of 6/30/25: 1.88 m (6' 2.02\").    Weight as of 6/30/25: 174.6 kg (385 lb). There is no height or weight on file to calculate BSA.  No Pain (0) Comment: Data Unavailable   No LMP for male patient.  Allergies reviewed: Yes  Medications reviewed: Yes    Medications: Medication refills not needed today.  Pharmacy name entered into [employer]:    [id number] - [place], [place] - [address] AT [id number] - [place] - [address] AT [facility] - [place], [place] - [address] AT [address]  WRITTEN PRESCRIPTION REQUESTED    Frailty Screening:   Is the patient here for a new oncology consult visit in cancer care? 2. No    PHQ9:  Did this patient require a " PHQ9?: No      Clinical concerns: no     Sallie Ureña CMA                Again, thank you for allowing me to participate in the care of your patient.        Sincerely,        Concha Jacinto MD    Electronically signed

## 2025-07-01 NOTE — PROGRESS NOTES
"Oncology Rooming Note    July 1, 2025 3:43 PM   Jeffry Younger is a 45 year old male who presents for:    Chief Complaint   Patient presents with    Oncology Clinic Visit     Grade III astrocytoma (H)       Initial Vitals: Pulse 85   Temp 97.9  F (36.6  C) (Oral)   Resp 16   SpO2 97%  Estimated body mass index is 49.41 kg/m  as calculated from the following:    Height as of 6/30/25: 1.88 m (6' 2.02\").    Weight as of 6/30/25: 174.6 kg (385 lb). There is no height or weight on file to calculate BSA.  No Pain (0) Comment: Data Unavailable   No LMP for male patient.  Allergies reviewed: Yes  Medications reviewed: Yes    Medications: Medication refills not needed today.  Pharmacy name entered into [facility]:    [facility][id number] - [place], [place] - [address] AT [address])  [id number] - [place], [place] - [address] AT [address] OF [facility] - [place], [place] - [address]  WRITTEN PRESCRIPTION REQUESTED    Frailty Screening:   Is the patient here for a new oncology consult visit in cancer care? 2. No    PHQ9:  Did this patient require a PHQ9?: No      Clinical concerns: no     Sallie Ureña CMA              "

## 2025-07-01 NOTE — PROGRESS NOTES
"NEURO-ONCOLOGY VISIT  Jul 1, 2025    CHIEF COMPLAINT: Mr. 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 Initial diagnostic ability was limited by sample size following a biopsy on 3/7/2025. Then, Vladimir then underwent a craniotomy for cancer resection on 6/9/2025. Pathology was thoroughly reviewed and consistent with a WHO grade 3 astrocytoma. The recommendation is for cancer-directed therapy with radiation following by adjuvant temozolomide.     Vladimir is presenting in follow-up today and he is accompanied by Kailee (wife).     HISTORY OF PRESENT ILLNESS  -Since surgery, Vladimir has recovered well;   No headaches.   No changes in mood/ behavior.   No weakness.   No changes in sensation.   No episodes concerning for a recurrent seizure.  -Mild issues with word finding; improving.    Mispronunciation when pressured/ talking too fast.   -Mild issues with comprehension; improving.   -No recurrent seizures.   -Off steroids.       MEDICATIONS   Current Outpatient Medications   Medication Sig Dispense Refill    atorvastatin (LIPITOR) 10 MG tablet Take 1 tablet (10 mg) by mouth daily. Takes every morning      blood glucose (ACCU-CHEK GUIDE) test strip Use to test blood sugar 1 times daily or as directed. 50 strip 1    blood glucose monitoring (ACCU-CHEK FASTCLIX) lancets 1 each daily 102 each 3    Blood Glucose Monitoring Suppl (CONTOUR NEXT GEN MONITOR) SAMIA 1 each daily. Use as directed. 1 each 0    Continuous Glucose Sensor (FREESTYLE KULWANT 3 PLUS SENSOR) MISC Change every 15 days. 6 each 1    empagliflozin (JARDIANCE) 25 MG TABS tablet Take 1 tablet (25 mg) by mouth daily. 90 tablet 3    glipiZIDE (GLUCOTROL XL) 5 MG 24 hr tablet Take 1 tablet (5 mg) by mouth daily. May increase to 10 mg dose if time in range is below 70%. 90 tablet 0    insulin aspart (NOVOLOG FLEXPEN) 100 UNIT/ML pen Inject " 20-28 Units subcutaneously 2 times daily (with meals). 45 mL 3    insulin aspart (NOVOLOG PEN) 100 UNIT/ML pen Take 12/24-26/20-24 before light breakfast, lunch and supper. 30 mL 1    insulin glargine (LANTUS PEN) 100 UNIT/ML pen Inject 80 Units subcutaneously every 24 hours. 15 mL 2    insulin glargine U-300 (TOUJEO) 300 UNIT/ML (1 units dial) pen Inject 20 Units subcutaneously daily. 15 mL 3    insulin pen needle (ULTICARE MINI) 31G X 6 MM miscellaneous Use 3-4 pen needles daily or as directed. 300 each 3    levETIRAcetam (KEPPRA) 750 MG tablet Take 1 tablet (750 mg) by mouth 2 times daily. 60 tablet 0    LORazepam (ATIVAN) 0.5 MG tablet Take 1 tablet (0.5 mg) by mouth every 6 hours as needed for anxiety. 30 tablet 0    losartan (COZAAR) 100 MG tablet Take 1 tablet (100 mg) by mouth daily. 90 tablet 0    metFORMIN (GLUCOPHAGE XR) 500 MG 24 hr tablet Take 2 tablets (1,000 mg) by mouth 2 times daily (with meals). 360 tablet 3    ondansetron (ZOFRAN ODT) 4 MG ODT tab Take 1 tablet (4 mg) by mouth every 8 hours as needed for nausea or vomiting. 10 tablet 0    oxyCODONE (ROXICODONE) 5 MG tablet Take 1 tablet (5 mg) by mouth every 6 hours as needed for moderate to severe pain. 10 tablet 0    senna-docusate (SENOKOT-S/PERICOLACE) 8.6-50 MG tablet Take 1 tablet by mouth 2 times daily. 20 tablet 0     DRUG ALLERGIES No Known Allergies      IMMUNIZATIONS   Immunization History   Administered Date(s) Administered    COVID-19 12+ (MODERNA) 09/15/2023, 09/10/2024    COVID-19 Bivalent 18+ (Moderna) 10/11/2022    COVID-19 Monovalent 18+ (Moderna) 03/10/2021, 04/07/2021, 11/08/2021    Flu, Unspecified 09/16/2019, 09/15/2021    Hepatitis A (VAQTA)(ADULT 19+) 02/22/2017    Hepatitis A/B (Twinrix) 01/22/2015, 09/29/2015    Hepatitis B, Adult (Energix-B/Recombivax HB) 08/11/2004    Influenza (H1N1) 12/31/2009    Influenza (IIV3) PF 12/17/2008, 10/08/2009, 10/24/2010, 09/14/2021    Influenza (prior to 2024) 11/30/2011    Influenza  Vaccine >6 months,quad, PF 09/29/2015, 12/13/2016, 09/07/2018, 10/16/2019, 09/11/2020, 09/18/2023    Influenza,INJ,MDCK,PF,Quad >6mo(Flucelvax) 10/10/2017, 10/11/2022    Meningococcal (Menomune ) 08/11/2004    Meningococcal ACWY (Menactra ) 02/22/2017    Pneumococcal 20 valent Conjugate (Prevnar 20) 12/22/2022    Pneumococcal 23 valent 09/07/2018    TDAP (Adacel,Boostrix) 05/29/2009, 02/01/2015, 01/03/2024    TDAP Vaccine (Adacel) 05/29/2009, 02/01/2015    Typhoid Oral 03/01/2015, 01/03/2024    Yellow Fever 02/22/2017       ONCOLOGIC HISTORY  -2/2025 PRESENTATION: Blurring of vision.   -2/25/2025 MR brain imaging with an expansile T2-weighted/FLAIR hyperintense lesion involving the left frontal lobe measuring approximately 3.6 x 4.0 x 4.1 cm. There is patchy amorphous enhancement along the medial and posterior margins of the mass. There is also additional T2-weighted/FLAIR hyperintensity along the margins of the mass. There is mild mass effect upon the left lateral ventricle and 0.2 cm rightward shift of midline structures. There is mild cupping of the left optic disc which can be seen in the setting of papilledema.  -2/25/2025 CTA and CTV Head/ Neck Head CTA demonstrates no aneurysm or stenosis of the major intracranial arteries. Neck CTA demonstrates no stenosis of the major cervical arteries. Head CTV demonstrates no significant stenosis or filling defects within the dural venous sinus system.  -2/25/2025 CT c/a/p with a 2.2 cm intermediate density lesion in the right kidney interpolar region could represent a neoplasm or hemorrhagic cyst. No evidence of metastasis in the chest, abdomen or pelvis.  -2/26/2025 MR renal imaging with no evidence of renal mass or neoplasm. Lesions questioned on CT 2/25/2025 represent hemorrhagic/proteinaceous cysts. There are also additional simple cortical and sinus cysts. Mild hepatic steatosis. Lesion in segment 6 of the liver consistent with a benign cyst.  -2/26/2025 LP for CSF  analysis; TNC 0, protein 116, glucose 95. Flow cytometry with rare to absent B-cells. Cytology with no morphologic evidence of malignancy.  -3/7/2025 SURGERY: Stealth assisted biopsy using auto-guide robot per Dr. Carver.   PATHOLOGY: Astrocytoma, IDH-mutant.   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 However, neither homozygous deletion of CDKN2A/B, nor 1p/19q-codeletion were identified. Additionally, MTAP expression is retained in tumor cells by immunohistochemistry. Histologically, there are multiple mitoses in the limited biopsy material evaluated, though no more than 1 per 10 contiguous high-power fields. Microvascular proliferation and tumor necrosis are absent. Thus, this tumor is best classified as grade 2 on the biopsy material, though the presence of multiple mitoses within a limited biopsy specimen from a contrast-enhancing astrocytoma raises the possibility of higher grade components elsewhere in the tumor. More confident grading may be performed on a subsequent resection specimen, if performed.     Chromosomal microarray alterations;  - Copy number losses on chromosomes 3q, 10q, 11p, 12q, 15q, 21  - No evidence of CDKN2A/B deletion (either heterozygous or homozygous)  - No evidence of 1p/19q-codeletion  -3/17/2025 NEURO-ONC: Final integrated diagnosis and definitive grade pending completion of chromosomal microarray analysis. Consideration for additional surgery with a craniotomy for mass resection.   -3/21/2025 NEURO-ONC: Re-discussed case and recommendations for treatment in conjunction with Dr. Mccarthy; recommendations for pre-surgical work-up as we await final pathology results.    -5/2025 SZ: New onset, started Keppra.   -6/9/2025 SURGERY: Stealth assisted left awake craniotomy.  Post-operative MR brain imaging from 6/10 demonstrated postsurgical changes of left frontal craniotomy. Small focus of diffusion restriction  "medial to the resection cavity in the left centrum semiovale, suggestive of acute infarction. There is mild circumferential restricted diffusion along the resection cavity which is presumably postoperative. Faint peripheral enhancement of the resection cavity with layering internal postoperative hemorrhage. Stable rightward midline shift of 3 mm.  PATHOLOGY: Astrocytoma, IDH-mutant (CNS WHO grade 3).    This IDH-mutant astrocytoma harbors regions with moderate to severe hypercellularity and cytologic atypia. Proliferative activity is mildly elevated, with up to 4 mitoses in 10 high-power fields focally in some regions with up to 8-10% of nuclei labeling with Ki-67. There are multiple foci with microvascular changes suspicious but not definitive for microvascular proliferation. The features are overall most consistent with a grade 3 designation.  -7/1/2025 NEURO-ONC: Post-operative aphasia; improving. Discussed post-operative imaging and final pathology. Recommending radiotherapy followed by adjuvant temozolomide; referral placed to Dr. Fonseca.       PHYSICAL EXAMINATION  BP (!) 143/91 (BP Location: Right arm, Patient Position: Sitting, Cuff Size: Adult Large)   Pulse 85   Temp 97.9  F (36.6  C) (Oral)   Resp 16   Wt (!) 176.6 kg (389 lb 4.8 oz)   SpO2 97%   BMI 49.96 kg/m     Wt Readings from Last 2 Encounters:   07/01/25 (!) 176.6 kg (389 lb 4.8 oz)   06/30/25 (!) 174.6 kg (385 lb)      Ht Readings from Last 2 Encounters:   06/30/25 1.88 m (6' 2.02\")   06/09/25 1.88 m (6' 2\")     KPS: 90    -Generally well appearing.  -Respiratory: Normal breath sounds, no audible wheezing.   -Skin: Healing head incision.  -Psychiatric: Normal mood and affect. Pleasant, talkative.  -Neurologic:   MENTAL STATUS:     Alert, oriented.    Recall: Intact    Speech largely fluent, but with some hesitation, mispronouncing several words.  Comprehension intact.     CRANIAL NERVES:     Visual fields full on the right.      Left eye with " right inferior field blurred, midline with a visual distortion.    Symmetric facial movements.   Hearing intact.   No dysarthria.   GAIT:  Walks without assistance.      MEDICAL RECORDS  Personally reviewed admission encounter, indicated for surgery.     LABS  Personally reviewed all available lab results; Pathology results. CBC (platelets 222K) on 6/12.     IMAGING  Personally reviewed pre- and post-operative MR brain imaging as detailed above.     Imaging was shown to and results were reviewed with Vladimir and Kailee.       IMPRESSION  On date of service, 47 minutes was spent in clinic and 29 minutes was spent preparing for the visit through extensive chart review and coordinating care for this high complexity visit. The following is in explanation for the recommendations used to define the plan.       Prior to Vladimir's appointment today, I have remained in close communication with Dr. Mccarthy. I have also again reviewed his case with neuro-pathology in which pathology demonstrates moderate to severe hypercellularity and cytologic atypia. While the proliferative activity is only mildly elevated, with up to 4 mitoses in 10 high-power fields (below the high-grade cut off), there are multiple foci with microvascular changes suspicious but not definitive for microvascular proliferation. The features are overall most consistent with a grade 3 designation. I also personally reviewed Vladimir's imaging and case at [facility].      Today in clinic, we reviewed his post-operating imaging that demonstrated a highly successful resection. Clinically, Vladimir is doing well with no new neurological concerns aside from continuously improving post-operative aphasia. He denies any recurrent seizures and we discussed the common risk factors for breakthrough events as noted below. He is off dexamethasone.    Next we discussed his final pathology results as previously stated. For patients with a newly diagnosed WHO grade 3 astrocytoma,  while upfront treatment is indicated for all patients, there are no formal treatment standards for WHO grade 3 astrocytomas. Recommendations include one of the 2 following options; radiotherapy + concurrent and adjuvant temozolomide versus radiation alone followed by adjuvant temozolomide. Based on the ongoing EORTC 29188 (CATNON) trial, longer follow-up is needed to determine whether there is a clinically benefit to concurrent temozolomide in patients with IDH-mutant tumors. The risks/ side effects and benefits of concurrent temozolomide were discussed today. Anticipated side effects of this treatment can include fatigue, nausea, and constipation. Bone marrow suppression can also be seen. My recommendation is for radiation therapy alone follow by adjuvant temozolomide. Vladimir and Kailee are in agreement. I will place a referral for Vladimir to see Dr. Fonseca in consultation to discuss the risks/ benefits of radiotherapy. I notified Dr. Fonseca of this referral. In addition, she has a non-therapeutic clinical trial for which he could be a candidate.     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 Fonseca in radiation oncology.     -STEROIDS-  -Tapered off dexamethasone.    -SEIZURE MANAGEMENT-  -History of a seizure (as of 5/2025), continue prescribed an antiepileptics.  -Continue Keppra.   -Discussed risk factors that can lower one's seizure threshold, making them more vulnerable to breakthrough seizure events; sleep deprivation, alcohol use, illness/ infection, and not taking seizure medications as prescribed.     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   Following with Dr. Arteaga, neuro-ophtho.     -Quality of life/ MOOD/ FATIGUE-  -Denies any mood issues.  -Continue to monitor mood as untreated/ undertreated depression can worsen fatigue,  dysorexia, and quality of life.    Return to clinic pending discussion with Dr. Fonseca.      In the meantime, Vladimir and Kailee know to call the clinic with any concerns and he can be seen sooner if needed.     The longitudinal plan of care for the diagnosis(es)/condition(s) as documented were addressed during this visit. Due to the added complexity in care, I will continue to support Vladimir in the subsequent management and with ongoing continuity of care.     Concha Jacinto MD  Neuro-oncology

## 2025-07-02 ENCOUNTER — OFFICE VISIT (OUTPATIENT)
Dept: RADIATION ONCOLOGY | Facility: CLINIC | Age: 45
End: 2025-07-02
Attending: PSYCHIATRY & NEUROLOGY
Payer: COMMERCIAL

## 2025-07-02 VITALS
SYSTOLIC BLOOD PRESSURE: 149 MMHG | WEIGHT: 315 LBS | OXYGEN SATURATION: 94 % | HEART RATE: 97 BPM | DIASTOLIC BLOOD PRESSURE: 84 MMHG | BODY MASS INDEX: 49.95 KG/M2 | RESPIRATION RATE: 16 BRPM

## 2025-07-02 DIAGNOSIS — C71.9 GRADE III ASTROCYTOMA (H): ICD-10-CM

## 2025-07-02 PROCEDURE — 99212 OFFICE O/P EST SF 10 MIN: CPT | Performed by: STUDENT IN AN ORGANIZED HEALTH CARE EDUCATION/TRAINING PROGRAM

## 2025-07-02 RX ORDER — MEMANTINE HYDROCHLORIDE 10 MG/1
10 TABLET ORAL 2 TIMES DAILY
Qty: 60 TABLET | Refills: 4 | Status: SHIPPED | OUTPATIENT
Start: 2025-07-02 | End: 2025-11-29

## 2025-07-02 RX ORDER — GLIPIZIDE 5 MG/1
5 TABLET, FILM COATED, EXTENDED RELEASE ORAL DAILY
Qty: 90 TABLET | Refills: 1 | Status: SHIPPED | OUTPATIENT
Start: 2025-07-02

## 2025-07-02 RX ORDER — LOSARTAN POTASSIUM 100 MG/1
100 TABLET ORAL DAILY
Qty: 90 TABLET | Refills: 0 | OUTPATIENT
Start: 2025-07-02

## 2025-07-02 RX ORDER — ATORVASTATIN CALCIUM 10 MG/1
10 TABLET, FILM COATED ORAL DAILY
COMMUNITY
Start: 2025-07-02

## 2025-07-02 RX ORDER — MEMANTINE HYDROCHLORIDE 5 MG/1
5 TABLET ORAL 2 TIMES DAILY
Qty: 78 TABLET | Refills: 0 | Status: SHIPPED | OUTPATIENT
Start: 2025-07-02

## 2025-07-02 NOTE — PROGRESS NOTES
"Oncology Rooming Note    July 2, 2025 2:47 PM   Jeffry Younger is a 45 year old male who presents for:    Chief Complaint   Patient presents with    Oncology Clinic Visit     Radiation Oncology Consultation       Initial Vitals: There were no vitals taken for this visit. Estimated body mass index is 49.96 kg/m  as calculated from the following:    Height as of 6/30/25: 1.88 m (6' 2.02\").    Weight as of 7/1/25: 176.6 kg (389 lb 4.8 oz). There is no height or weight on file to calculate BSA.  Data Unavailable Comment: Data Unavailable   No LMP for male patient.  Allergies reviewed: per Dr. Marks  Medications reviewed: per Dr. Marks    Medications: Medication refills not needed today.  Pharmacy name entered into [employer]:    [id number] - [place], [place] - [address] AT SEC OF [id number] - [place], [place] - [address] AT SEC OF [facility]  [facility] - [place], [place] - [address] AT [address]  WRITTEN PRESCRIPTION REQUESTED    Frailty Screening:   Is the patient here for a new oncology consult visit in cancer care? 2. No    PHQ9:  Did this patient require a PHQ9?: No    Considerations for radiation treatment   Pregnancy status: Male   Implanted Cardiac Devices: No   Any previous radiation therapy: No      Clinical concerns: Radiation Oncology Consultation. Patient medical assessment by Dr. Marks and Dr. Fonseca. This RN introduced self and discussed next steps for MRI brain/CT simulation for treatment planning, reviewed timeframe for start of treatment, reviewed treatment visits and OTVs. Of note, patient's wife is a pediatric gastroenterologist here at [facility].     Dr. Fonseca was notified.      Yancy Stewart RN            "

## 2025-07-02 NOTE — LETTER
2025      Jeffry Younger  [address] [facility] [address]      Dear Colleague,    Thank you for referring your patient, Jeffry Younger, to the [facility] RADIATION ONCOLOGY. Please see a copy of my visit note below.    [facility]   RADIATION ONCOLOGY INITIAL CONSULTATION NOTE    Patient Name: Jeffry Younger  MRN: [id number]  : 1980  Neuro-oncologist: Concha Jacinto MD  Neurosurgeon: Dr. Fabio PABON and Dr. 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    HPI:  The patient first presented in 2025 with blurred vision and was found on MRI to have a 3.6x4.0x4.1 cm T2/FLAIR hyperintense expansile mass in the left middle frontal gyrus with patchy enhancement, mild left lateral ventricular compression, and 2 mm midline shift; CTA head/neck and CT chest/abdomen/pelvis revealed no vascular stenoses or systemic metastases, respectively, and renal MRI clarified an interpolar renal CT lesion as benign. On 3/7/25 he underwent robotic stealth-guided biopsy of the mass, which showed an [id number] astrocytoma without microvascular proliferation or necrosis, raising concern for grade 2 but with insufficient sampling to exclude higher-grade foci. Neuro-oncology recommended definitive resection, and on 25 he underwent an awake left frontal craniotomy achieving gross-total resection; postoperative MRI demonstrated expected cavity changes, small foci of diffusion restriction consistent with infarction, and stable 3 mm midline shift. Final surgical pathology confirmed WHO grade 3 IDH-mutant astrocytoma with moderate hypercellularity, up to 4  mitoses per 10 HPF, 8-10% Ki-67 labeling, and suspicious microvascular proliferation. His postoperative course included mild expressive aphasia that is improving, a seizure treated with Keppra, and tapering steroids. He is now referred to radiation oncology to initiate focal radiotherapy to the resection bed followed by six cycles of adjuvant temozolomide.    Initial disease (pre-operative)      After biopsy      After resection      Area of post-op stroke [bright diff, dark ADC]    Current systemic therapy: Adjuvant temozolomide is planned  Anti-epileptic: Keppra  Steroids: None currently    Brain ROS:  Headaches-   Denies  Seizures- Denies  Nausea/vomiting-  Denies  Changes in cognition/behavior-  Denies  Speech-  YES, has aphasia that is mild, patient notes some difficulty with word finding, but has improved since his operation  Vision/hearing changes- Denies  Gait symptoms or imbalance-  Denies  Other focal neuro deficits-  Denies    Spine ROS:    Back pain-  Denies  Radicular pain-  Denies  Motor weakness-  Denies  Numbness/tingling-  Denies  Saddle anaesthesia-   Denies  Bowel/bladder dysfunction-   Denies    Past Medical History:   Past Medical History:   Diagnosis Date     Diabetes (H) 07/2018    Treating metformin     Essential hypertension 06/13/2024     Obesity      Sleep apnea        Past Surgical History:   Past Surgical History:   Procedure Laterality Date     AS ESOPHAGOSCOPY, DIAGNOSTIC      EGD     BIOPSY  2008    egd normal     OPTICAL TRACKING SYSTEM BIOPSY BRAIN Left 3/7/2025    Procedure: stealth assisted LEFT FRONTAL BIOPSY BRAIN;  Surgeon: Genaro Carver MD;  Location: [facility] OR     OPTICAL TRACKING SYSTEM CRANIOTOMY, EXCISE TUMOR WITH MAPPING, COMBINED Left 6/9/2025    Procedure: stealth assisted left awake craniotomy with tumor resection and speech and motor mapping;  Surgeon: Alon Mccarthy MD;  Location: [facility] OR       Past Radiation History: The patient denies any history of prior  radiation therapy or exposure to ionizing radiation.  The patient denies a history of lupus, scleroderma, connective tissue disorders and collagen vascular disease.  They have not had any other malignancy or chemotherapy.    Allergies: No Known Allergies    Medications:   Current Outpatient Medications   Medication Sig Dispense Refill     memantine (NAMENDA) 10 MG tablet Take 1 tablet (10 mg) by mouth 2 times daily. Script 2 Continuation for total therapy of six months. 30 days supply 10mg tabs x 60tabs FOUR refills Take 10mg (one 10mg tab) in the morning  and 10mg (one 10mg tab) in the evening 60 tablet 4     memantine (NAMENDA) 5 MG tablet Take 1 tablet (5 mg) by mouth 2 times daily. Script 1  Ramp up: 31 days supply 5mg tabs x74 tabs no refills Days 1-7 Take 5mg (one 5mg tab) in the morning Days 8-14-  Take  5mg (one 5mg tab) in the morning and 5mg (one 5mgtab) in the evening Days 15-21- Take 10mg in the morning (two 5mg tabs) and 5mg (one 5mg tab) in the evening evening Day 22 -31  Take 10mg in the morning (two 5mg tabs) and 10mg (two 5mg tabs) in the evening 78 tablet 0     atorvastatin (LIPITOR) 10 MG tablet Take 1 tablet (10 mg) by mouth daily. Takes every morning       blood glucose (ACCU-CHEK GUIDE) test strip Use to test blood sugar 1 times daily or as directed. 50 strip 1     blood glucose monitoring (ACCU-CHEK FASTCLIX) lancets 1 each daily 102 each 3     Blood Glucose Monitoring Suppl (CONTOUR NEXT GEN MONITOR) SAMIA 1 each daily. Use as directed. 1 each 0     Continuous Glucose Sensor (FREESTYLE KULWANT 3 PLUS SENSOR) MISC Change every 15 days. 6 each 1     empagliflozin (JARDIANCE) 25 MG TABS tablet Take 1 tablet (25 mg) by mouth daily. 90 tablet 3     glipiZIDE (GLUCOTROL XL) 5 MG 24 hr tablet Take 1 tablet (5 mg) by mouth daily. May increase to 10 mg dose if time in range is below 70%. 90 tablet 1     insulin aspart (NOVOLOG FLEXPEN) 100 UNIT/ML pen Inject 20-28 Units subcutaneously 2 times daily (with  meals). 45 mL 3     insulin aspart (NOVOLOG PEN) 100 UNIT/ML pen Take 12/24-26/20-24 before light breakfast, lunch and supper. 30 mL 1     insulin glargine (LANTUS PEN) 100 UNIT/ML pen Inject 80 Units subcutaneously every 24 hours. 15 mL 2     insulin glargine U-300 (TOUJEO) 300 UNIT/ML (1 units dial) pen Inject 20 Units subcutaneously daily. 15 mL 3     insulin pen needle (ULTICARE MINI) 31G X 6 MM miscellaneous Use 3-4 pen needles daily or as directed. 300 each 3     levETIRAcetam (KEPPRA) 750 MG tablet Take 1 tablet (750 mg) by mouth 2 times daily. 60 tablet 0     LORazepam (ATIVAN) 0.5 MG tablet Take 1 tablet (0.5 mg) by mouth every 6 hours as needed for anxiety. 30 tablet 0     losartan (COZAAR) 100 MG tablet Take 1 tablet (100 mg) by mouth daily. 90 tablet 0     metFORMIN (GLUCOPHAGE XR) 500 MG 24 hr tablet Take 2 tablets (1,000 mg) by mouth 2 times daily (with meals). 360 tablet 3     ondansetron (ZOFRAN ODT) 4 MG ODT tab Take 1 tablet (4 mg) by mouth every 8 hours as needed for nausea or vomiting. 10 tablet 0     oxyCODONE (ROXICODONE) 5 MG tablet Take 1 tablet (5 mg) by mouth every 6 hours as needed for moderate to severe pain. 10 tablet 0     senna-docusate (SENOKOT-S/PERICOLACE) 8.6-50 MG tablet Take 1 tablet by mouth 2 times daily. 20 tablet 0     No current facility-administered medications for this visit.       Family History:   Family History   Problem Relation Age of Onset     Diabetes Father      Hypertension Father      Other Cancer Father         Kidney & Thyroid     Cerebrovascular Disease Paternal Grandfather      Other Cancer Brother         Lukemia     Other Cancer Maternal Grandmother         Pancreatic     Other Cancer Maternal Grandfather         Lung - smoker     Other Cancer Brother         Lukemia     Other Cancer Brother         Lukemia     Colon Cancer No family hx of      Prostate Cancer No family hx of      Cerebrovascular Disease No family hx of      Coronary Artery Disease No  family hx of        Social History:   Social History     Socioeconomic History     Marital status:      Spouse name: Not on file     Number of children: Not on file     Years of education: Not on file     Highest education level: Professional school degree (e.g., MD, DDS, DVM, SMITA)   Occupational History     Not on file   Tobacco Use     Smoking status: Never     Passive exposure: Never     Smokeless tobacco: Never   Vaping Use     Vaping status: Never Used   Substance and Sexual Activity     Alcohol use: Yes     Comment: 1 per month maybe     Drug use: No     Sexual activity: Yes     Partners: Female     Birth control/protection: Abstinence, Pull-out method, Condom   Other Topics Concern     Parent/sibling w/ CABG, MI or angioplasty before 65F 55M? No   Social History Narrative     Not on file     Social Drivers of Health     Financial Resource Strain: Low Risk  (6/10/2025)    Financial Resource Strain      Within the past 12 months, have you or your family members you live with been unable to get utilities (heat, electricity) when it was really needed?: No   Food Insecurity: Low Risk  (6/10/2025)    Food Insecurity      Within the past 12 months, did you worry that your food would run out before you got money to buy more?: No      Within the past 12 months, did the food you bought just not last and you didn t have money to get more?: No   Transportation Needs: Low Risk  (6/10/2025)    Transportation Needs      Within the past 12 months, has lack of transportation kept you from medical appointments, getting your medicines, non-medical meetings or appointments, work, or from getting things that you need?: No   Physical Activity: Insufficiently Active (12/28/2024)    Exercise Vital Sign      Days of Exercise per Week: 3 days      Minutes of Exercise per Session: 20 min   Stress: No Stress Concern Present (12/28/2024)    [employer] - Occupational Stress Questionnaire      Feeling of  Stress : Only a little   Social Connections: Unknown (12/28/2024)    Social Connection and Isolation Panel [NHANES]      Frequency of Communication with Friends and Family: Not on file      Frequency of Social Gatherings with Friends and Family: Twice a week      Attends [employer] Services: Not on file      Active Member of Clubs or Organizations: Not on file      Attends Club or Organization Meetings: Not on file      Marital Status: Not on file   Interpersonal Safety: Low Risk  (6/9/2025)    Interpersonal Safety      Do you feel physically and emotionally safe where you currently live?: Yes      Within the past 12 months, have you been hit, slapped, kicked or otherwise physically hurt by someone?: No      Within the past 12 months, have you been humiliated or emotionally abused in other ways by your partner or ex-partner?: No   Housing Stability: High Risk (6/10/2025)    Housing Stability      Do you have housing? : No      Are you worried about losing your housing?: No       Review of Systems: A complete 12 system review was negative except as noted in the HPI above.     Karnofsky Performance Status: 90  ECOG Performance Status: 1    Vital Signs:  BP (!) 149/84   Pulse 97   Resp 16   Wt (!) 176.5 kg (389 lb 3.2 oz)   SpO2 94%   BMI 49.95 kg/m  .    Physical Exam:    General: NAD, patient well appearing.  Extremities: No acute findings. No edema   Skin: color, texture and turgor wnl. No rashes and lesions. Incision lft scalp with small area superiorly with dried blood from recent injury - but closed and intact and healing well   Neuro:   MS: AAO x 3, appropriately interactive, normal affect, speech fluent  CN:   III,IV,VI -- EOMI, no ptosis;   V -- sensation intact to LT/PP  VII -- no facial weakness/droop;   VIII -- hears finger rub equally bilaterally;   IX,X -- voice normal  Motor: Normal tone, no tremor. 5/5 strength bilateral extremities   Sensation: Normal sensation to LT intact bilaterally upper and  lower extremities  Gait: Natural gait intact.     Last CBC with Diff:  WBC   Date Value Ref Range Status   03/22/2018 9.3 4.0 - 11.0 10e9/L Final     WBC Count   Date Value Ref Range Status   06/12/2025 11.5 (H) 4.0 - 11.0 10e3/uL Final     RBC Count   Date Value Ref Range Status   06/12/2025 5.47 4.40 - 5.90 10e6/uL Final     Hemoglobin   Date Value Ref Range Status   06/12/2025 15.5 13.3 - 17.7 g/dL Final     Hematocrit   Date Value Ref Range Status   06/12/2025 47.9 40.0 - 53.0 % Final     MCV   Date Value Ref Range Status   06/12/2025 88 78 - 100 fL Final     MCH   Date Value Ref Range Status   06/12/2025 28.3 26.5 - 33.0 pg Final     MCHC   Date Value Ref Range Status   06/12/2025 32.4 31.5 - 36.5 g/dL Final     RDW   Date Value Ref Range Status   06/12/2025 13.2 10.0 - 15.0 % Final     Platelet Count   Date Value Ref Range Status   06/12/2025 222 150 - 450 10e3/uL Final     Diff Method   Date Value Ref Range Status   03/22/2018 Automated Method  Final     % Neutrophils   Date Value Ref Range Status   05/26/2025 64 % Final   03/22/2018 70.6 % Final     % Lymphocytes   Date Value Ref Range Status   05/26/2025 23 % Final   03/22/2018 18.6 % Final     % Monocytes   Date Value Ref Range Status   05/26/2025 6 % Final   03/22/2018 8.6 % Final     % Eosinophils   Date Value Ref Range Status   05/26/2025 2 % Final   03/22/2018 2.0 % Final     % Basophils   Date Value Ref Range Status   05/26/2025 1 % Final   03/22/2018 0.2 % Final     Absolute Neutrophil   Date Value Ref Range Status   03/22/2018 6.5 1.6 - 8.3 10e9/L Final     Absolute Neutrophils   Date Value Ref Range Status   05/26/2025 4.8 1.6 - 8.3 10e3/uL Final     Absolute Lymphocytes   Date Value Ref Range Status   05/26/2025 1.7 0.8 - 5.3 10e3/uL Final   03/22/2018 1.7 0.8 - 5.3 10e9/L Final     Absolute Monocytes   Date Value Ref Range Status   05/26/2025 0.5 0.0 - 1.3 10e3/uL Final   03/22/2018 0.8 0.0 - 1.3 10e9/L Final        Last Comprehensive Metabolic  Panel:  Sodium   Date Value Ref Range Status   06/12/2025 138 135 - 145 mmol/L Final   08/24/2020 135 133 - 144 mmol/L Final     Potassium   Date Value Ref Range Status   06/12/2025 4.1 3.4 - 5.3 mmol/L Final   04/18/2022 4.3 3.4 - 5.3 mmol/L Final   08/24/2020 4.1 3.4 - 5.3 mmol/L Final     Chloride   Date Value Ref Range Status   06/12/2025 103 98 - 107 mmol/L Final   04/18/2022 106 94 - 109 mmol/L Final   08/24/2020 103 94 - 109 mmol/L Final     Carbon Dioxide   Date Value Ref Range Status   08/24/2020 22 20 - 32 mmol/L Final     Carbon Dioxide (CO2)   Date Value Ref Range Status   06/12/2025 23 22 - 29 mmol/L Final   04/18/2022 24 20 - 32 mmol/L Final     Anion Gap   Date Value Ref Range Status   06/12/2025 12 7 - 15 mmol/L Final   04/18/2022 8 3 - 14 mmol/L Final   08/24/2020 10 3 - 14 mmol/L Final     Glucose   Date Value Ref Range Status   06/12/2025 118 (H) 70 - 99 mg/dL Final   04/18/2022 152 (H) 70 - 99 mg/dL Final   08/24/2020 191 (H) 70 - 99 mg/dL Final     Comment:     Fasting specimen     GLUCOSE BY METER POCT   Date Value Ref Range Status   06/12/2025 128 (H) 70 - 99 mg/dL Final     Urea Nitrogen   Date Value Ref Range Status   06/12/2025 18.7 6.0 - 20.0 mg/dL Final   04/18/2022 16 7 - 30 mg/dL Final   08/24/2020 15 7 - 30 mg/dL Final     Creatinine   Date Value Ref Range Status   06/12/2025 0.93 0.67 - 1.17 mg/dL Final   08/24/2020 0.90 0.66 - 1.25 mg/dL Final     GFR Estimate   Date Value Ref Range Status   06/12/2025 >90 >60 mL/min/1.73m2 Final     Comment:     eGFR calculated using 2021 CKD-EPI equation.   08/24/2020 >90 >60 mL/min/[1.73_m2] Final     Comment:     Non  GFR Calc  Starting 12/18/2018, serum creatinine based estimated GFR (eGFR) will be   calculated using the Chronic Kidney Disease Epidemiology Collaboration   (CKD-EPI) equation.       Calcium   Date Value Ref Range Status   06/12/2025 9.4 8.8 - 10.4 mg/dL Final   08/24/2020 9.9 8.5 - 10.1 mg/dL Final      SURGICAL  HISTORY:  Initial biopsy on 3/7/25  Description of procedure: Patient was brought to the operating room and was placed under general anesthesia and intubated. All pressure points were padded for the duration of the case.The head was then fixed in a [place] head smith and secured to the bed. His head was slightly turned to the right.  He was then registered to the Stealth machine, and the trajectory was confirmed. We marked a linear incision which was made to conform to our trajectory.  After prepping and draping this area, 1% lidocaine with epinephrine was injected into the incision. Timeout was performed.     We then made an incision through skin down to the temporal fascia and muscle.  Temporalis fascia and muscle was excised using monopolar cautery and dissection was continued to the periosteum and bone.  Skin edges were retracted using self-retaining retractor.  We then used a high-speed drill to make a bur hole and used bone wax to stop all bony bleeding. We then coagulated the opened the underlying dura with a number 15-blade, then performed a corticectomy.  We then brought the auto guide robot to the field and aligned the trajectory to the target.  We then inserted the biopsy needle to the predetermined  depth under Stealth guidance aiming for the lesion.  We made sure that the patient's blood pressure was less than 140 systolic throughout. We then took a biopsy at the target and sent it for the frozen section.  We then took for additional biopsies at the target at 12, 9,3,  6 o clock positions.  We then advanced the biopsy needle to a depth of 5 mm plus target and took 4 additional biopsies at 12:00, 9, 3 and 6 o'clock position.  In total of 8 biopsies were taken.  On frozen examination, the biopsy sample was diagnostic and was suggestive of likely glioma.  Following satisfactory biopsy results and obtaining tissue for permanent pathological examination, we placed thrombin along the biopsy trajectory and  copiously irrigate the wound for any bleeding.     Following satisfactory hemostasis, we placed a Gelfoam into the bur hole and cover the bur hole with a mini plate and screws.  We then irrigated the field with antibiotic solution and turned our attention to closure. We closed the galea with 2-0 vicryl stitch in an inverted interrupted fashion, followed by 3-0 monocryl stitch for the skin in a running simple fashion.     The incision was cleaned with wet to dry sponges and sterile dressing was applied.  Patient was extubated after completion of the procedure and brought to the post-anesthesia care unit for recovery.  We will get a CT scan to rule out any bleeding.All sponge, needle, and instrument counts were correct x2 at the end of the procedure.     I was scrubbed throughout the entirety of the procedure.     I updated patient's spouse following the procedure.     Genaro Carver MD     RESECTION on 6/9/25  Description of procedure: Patient was brought to the operating room.  Sedation was begun and the patient was placed in the [place] head smith with the head turned to the right.  The Stealth neuronavigation system was registered and used to plan the craniotomy and incision.  The left side of the head was prepped and draped in sterile fashion after the scalp was infiltrated with local anesthetic and a ring block along with field block of the intended incision.  A linear incision was made over the intended craniotomy.  The previous bur hole was exposed and the bur hole cover removed.  The craniotomy was then turned from the previous bur hole over the intended area of resection.  The dura was opened and reflected inferiorly.  Electrocorticography was begun with a strip electrode to monitor for seizures.  Using the Ojemann probe cortical stimulation was performed in the face motor areas clearly identified in the gyrus behind the tumor.  The tumor was then debulked at the superior aspect and posterior aspect in  order to provide some brain relaxation and help access some of the inferior aspects of the tumor.  Further stimulation inferiorly showed some intermittent areas of speech arrest.  Using Stealth guidance, sono PET aspiration as well as blunt and sharp dissection the tumor was serially debulked.  It had a very infiltrative nature but areas of more normal white matter were identified at the periphery of the tumor.  Intermittent cortical stimulation with speech mapping and motor mapping was performed to help guide the resection as well.  Once the area of tumor was felt to be well resected, hemostasis was achieved.  The dura was reapproximated with 4-0 Nurolon.  A piece of DuraGen was placed over the dural defect.  The bone flap was plated back to place with a JoopLoop low-profile cranial plating system.  The temporalis muscle and fascia were closed with 0 Vicryl.  2-0 Vicryl was used for the galea.  3-0 nylon was used for skin.     PATHOLOGY:  Surgical Pathology Report                         Case: [id number]                                   Authorizing Provider:  Genaro Carver MD          Collected:           03/07/2025 09:05 AM           Ordering Location:      MAIN OR                 Received:            03/07/2025 09:10 AM           Pathologist:           Dale Case MD                                                     Intraop:               Wes Francis MD                                                         Specimens:   A) - Brain, left frontal biopsy                                                                      B) - Brain, left frontal biopsy                                                                      C) - Brain, left frontal biopsy-5mm below target                                          Addendum   FINAL INTEGRATED DIAGNOSIS:     A-C. Brain, left frontal, biopsy:  - Astrocytoma, IDH-mutant (CNS WHO grade 2). See comment.        Case Report   Surgical Pathology Report                          Case: [id number]                                   Authorizing Provider:  Alon Mccarthy MD    Collected:           06/09/2025 03:19 PM           Ordering Location:     [facility] MAIN OR                 Received:            06/09/2025 05:07 PM           Pathologist:           Dale Case MD                                                     Specimens:   A) - Brain, Left Frontal Tumor                                                                      B) - Brain, Left Frontal Tumor (sonopet contents)                                          Final Diagnosis   A-B. Brain, left frontal tumor, resection:  - Astrocytoma, IDH-mutant (CNS WHO grade 3). See comment.       Radiology:  POST SURGICAL MRI:  EXAM: MR BRAIN W/O & W CONTRAST  6/10/2025 5:58 PM      HISTORY: s/p resection of tumor        COMPARISON: MRI brain 6/6/2025, 5/21/2025     TECHNIQUE: MR head: Multiplanar T1-weighted, axial FLAIR, and  susceptibility images were obtained without intravenous contrast.  Following intravenous gadolinium-based contrast administration, axial  T2-weighted, diffusion, and T1-weighted images (in multiple planes)  were obtained.     CONTRAST: 10 mL Gadavist.     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 Increased extent of parenchymal T2 hyperintensity  surrounding the resection cavity in the left frontal lobe. There is  overlying dural thickening and enhancement.  Mild peripheral  enhancement of the resection cavity that is likely postoperative.  Stable rightward midline shift of 3 mm at the level of the septum  pellucidum. Surrounding the resection cavity, there is increased  signal on diffusion-weighted imaging with associated loss of signal on  the ADC map, most notably along the medial aspect within the left  centrum semiovale. Layering T2 hyperintense and T1 hypointense air  fluid level in the resection bed with associated  susceptibility  artifact. Partial effacement of the left lateral ventricle secondary  to the edema.      Major intracranial vascular structures are within normal limits.     No suspicious abnormality of the skull marrow signal. Polypoid mucosal  thickening of the maxillary sinuses. Mastoid air cells are clear. No  focal abnormality of the pituitary gland, sella, skull base and upper  cervical spinal structures on sagittal images. The orbits are normal.                                                                      IMPRESSION:  1. Postsurgical changes of left frontal craniotomy for  oligodendroglioma resection. Small focus of diffusion restriction  medial to the resection cavity in the left centrum semiovale,  suggestive of acute infarction. There is mild circumferential  restricted diffusion along the resection cavity which is presumably  postoperative. Faint peripheral enhancement of the resection cavity  with layering internal postoperative hemorrhage.  2. Stable rightward midline shift of 3 mm.    EXAM: MR BRAIN W/O and W CONTRAST  LOCATION: [facility]  DATE: 5/21/2025     INDICATION: Left frontal lobe mass.  COMPARISON: MRI brain 02/25/2025. CT brain 03/07/2025.  CONTRAST: 17 mL Gadavist.  TECHNIQUE: Multiplanar multisequence head MRI without and with intravenous contrast including dynamic susceptibility contrast perfusion imaging.     FINDINGS:  INTRACRANIAL CONTENTS: There are no areas of abnormally restricted diffusion to suggest acute or subacute infarct. Again seen is an expansile area of abnormal T2 prolongation/tumor within the posterior left frontal lobe. The lesion demonstrates a solidly   enhancing component along its posterior and medial aspect with the area of enhancing tissue measuring up to 3.6 cm in maximal AP dimension and 3.6 cm in transverse dimension compared with 3.2 and 3.6 cm on the 02/25/2025 prior. New small area of focal   susceptibility is seen within the  center of the enhancing component compatible with hemosiderin staining from prior biopsy. The surrounding nonenhancing component compatible with a combination of nonenhancing tumor and surrounding vasogenic edema   measures 7.0 x 5.4 cm in size (AP, TRV) compared with 7.4 x 5.0 cm on the prior. Subtle increased cerebral blood volume is seen throughout the lesion. Localized mass effect with sulcal effacement and effacement of the left lateral ventricle. 1 to 2 mm   left to right midline shift, similar to prior. Ventricles are small in size other than prior. Gray-white matter differentiation otherwise preserved.     Cerebellar tonsils are normally positioned. Sella is unremarkable for technique. Corpus callosum is normally formed. Major skull base vascular flow-voids are preserved.     OSSEOUS STRUCTURES/SOFT TISSUES: Left parietal negrita hole. Visualized marrow space otherwise unremarkable. Expected postoperative changes overlying scalp.     ORBITS: No abnormality accounting for technique.      SINUSES/MASTOIDS: Mild paranasal sinus mucosal thickening. No air-fluid levels. Middle ear cavities and mastoid air cells are clear.                                                                       IMPRESSION:  1.  Expansile area of abnormal T2 prolongation/tumor within the posterior left frontal lobe. The enhancing component within the center of the lesion has mildly increased in size in the interval. Subtlety elevated cerebral blood volume is seen throughout   the lesion.  2.  Localized mass effect with sulcal effacement and effacement of the left lateral ventricle. 1 to 2 mm left right midline shift, similar to prior.    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 Following intravenous gadolinium-based contrast  administration, axial T2-weighted, diffusion, and T1-weighted images  (in multiple planes) were obtained.  MR Perfusion: During and following intravenous gadolinium-based  contrast administration, dynamic susceptibility-based images were  obtained for MR Perfusion evaluation. The images were constructed and  reviewed on a dedicated postprocessing workstation after being  coregistered to the volumetric sequences     CONTRAST: 10 ml Gadavist.     FINDINGS:  A T2 hyperintense and enhancing mass within the posterior left frontal  lobe measures up to 4.6 x 4.0 x 4.0 cm, with the enhancing component  measuring approximately 3.7 x 3.6 x 2.2 cm. The lesion demonstrates  moderate mass effect upon the overlying cerebral sulci as well as upon  the left lateral ventricle and contributes to approximately 2 mm of  rightward shift of the midline. The mass demonstrates multiple cystic  areas of necrosis and a large amount of T2 shine through without  definite evidence for restricted diffusion. Susceptibility weighted  imaging reveals no intracranial hemorrhage. Perfusion imaging  demonstrates no abnormal elevated relative cerebral blood volume.     Ventricles are proportionate to the cerebral sulci. No evidence for  hydrocephalus. Diffusion-weighted imaging reveals no evidence for  acute infarct. Basilar cisterns are patent.     Normal skull marrow signal. No substantial paranasal sinus mucosal  disease. Clear mastoid air cells. The orbits are normal.                                                                      IMPRESSION:  1. 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  2. Perfusion imaging demonstrates no elevated relative cerebral blood  volume.    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 We recommend 5940 cGy in 33 fractions using IMRT.    Plan:   We recommend treatment with radiation therapy.  The patient will be meeting with neuro-oncology to discuss systemic therapy, and we will defer discussion of details of this aspect of  their management to the Neuro-oncology team. He will received TMZ adjuvantly.     We had a detailed discussion with Jeffry Younger regarding the role of radiation therapy for the treatment of grade 3 Astrocytoma. We discussed the logistics, timing, risks, benefits, and side effects associated with radiation therapy, which would likely be delivered 5 days per week for approximately 6 and a half weeks.      We will prescribe memantine to start on day 1 of RT for neuroprotection. He will take this for 6 months if he does not experience any side effects.     Jeffry Younger would like to proceed with radiotherapy at [facility].  We have scheduled them for CT-based simulation and MRI in the treatment position for radiation. The patient was encouraged to contact us with questions or concerns should they arise in the interim. All questions were answered to the patients's satisfaction, and they were provided with our contact information should any questions or concerns arise.    We are planning to get this patient simulated and have a stat planning MRI stat. We plan to start radiotherapy approximately 2 weeks after his planning simulation CT and MRI. We provided an option for SLP/speech referral, and  "the patient declined at this time. Further, we discussed option for genetics referral, and patient declined at this time.    Irma Marks MD MS PGY3    A medical resident participated in the care of this patient and in the preparation of this note. I have verified and edited this note. I personally performed key elements of the physical exam and medical decision making for this patient.  I agree with the assessment and plan of care as documented in the note above.      The overall time I spent on direct patient care including self review of records, labs, and radiographic studies, as well as, direct face-to-face interaction with the patient and coordination of care with other providers was 60 minutes.      Lupe Fonseca MD, PhD     Department of Radiation Oncology  [facility]     Referring Provider:  Concha Jacinto MD  [address] [place] [id number]      Oncology Rooming Note    July 2, 2025 2:47 PM   Jeffry Younger is a 45 year old male who presents for:    Chief Complaint   Patient presents with     Oncology Clinic Visit     Radiation Oncology Consultation       Initial Vitals: There were no vitals taken for this visit. Estimated body mass index is 49.96 kg/m  as calculated from the following:    Height as of 6/30/25: 1.88 m (6' 2.02\").    Weight as of 7/1/25: 176.6 kg (389 lb 4.8 oz). There is no height or weight on file to calculate BSA.  Data Unavailable Comment: Data Unavailable   No LMP for male patient.  Allergies reviewed: per Dr. Marks  Medications reviewed: per Dr. Marks    Medications: Medication refills not needed today.  Pharmacy name entered into [employer]:    [id number] - [place], [place] - [address] AT [id number] - [place], [place] - [address] AT [facility] OF [facility] [address]  [facility] - [place], [place] - [address] AT [address]  WRITTEN PRESCRIPTION REQUESTED    Frailty Screening:   Is the patient here for a new oncology consult visit in cancer care? 2. No    PHQ9:  Did this patient require a PHQ9?: No    Considerations for radiation treatment   Pregnancy status: Male   Implanted Cardiac Devices: No   Any previous radiation therapy: No      Clinical concerns: Radiation Oncology Consultation. Patient medical assessment by Dr. Marks and Dr. Fonseca. This RN introduced self and discussed next steps for MRI brain/CT simulation for treatment planning, reviewed timeframe for start of treatment, reviewed treatment visits and OTVs. Of note, patient's wife is a pediatric gastroenterologist here at [facility].     Dr. Fonseca was notified.      Yancy Stewart RN              Again, thank you for allowing me to participate in the care of your patient.        Sincerely,        Lupe Fonseca MD PhD    Electronically signed

## 2025-07-02 NOTE — PROGRESS NOTES
[facility]   RADIATION ONCOLOGY INITIAL CONSULTATION NOTE    Patient Name: Jeffry Younger  MRN: [id number]  : 1980  Neuro-oncologist: Concha Jacinto MD  Neurosurgeon: Dr. Fabio PABON and Dr. 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    HPI:  The patient first presented in 2025 with blurred vision and was found on MRI to have a 3.6x4.0x4.1 cm T2/FLAIR hyperintense expansile mass in the left middle frontal gyrus with patchy enhancement, mild left lateral ventricular compression, and 2 mm midline shift; CTA head/neck and CT chest/abdomen/pelvis revealed no vascular stenoses or systemic metastases, respectively, and renal MRI clarified an interpolar renal CT lesion as benign. On 3/7/25 he underwent robotic stealth-guided biopsy of the mass, which showed an [id number] astrocytoma without microvascular proliferation or necrosis, raising concern for grade 2 but with insufficient sampling to exclude higher-grade foci. Neuro-oncology recommended definitive resection, and on 25 he underwent an awake left frontal craniotomy achieving gross-total resection; postoperative MRI demonstrated expected cavity changes, small foci of diffusion restriction consistent with infarction, and stable 3 mm midline shift. Final surgical pathology confirmed WHO grade 3 IDH-mutant astrocytoma with moderate hypercellularity, up to 4 mitoses per 10 HPF, 8-10% Ki-67 labeling, and suspicious microvascular proliferation. His postoperative course included mild expressive aphasia that is improving, a seizure treated with Keppra, and tapering steroids. He is now referred to  radiation oncology to initiate focal radiotherapy to the resection bed followed by six cycles of adjuvant temozolomide.    Initial disease (pre-operative)      After biopsy      After resection      Area of post-op stroke [bright diff, dark ADC]    Current systemic therapy: Adjuvant temozolomide is planned  Anti-epileptic: Keppra  Steroids: None currently    Brain ROS:  Headaches-   Denies  Seizures- Denies  Nausea/vomiting-  Denies  Changes in cognition/behavior-  Denies  Speech-  YES, has aphasia that is mild, patient notes some difficulty with word finding, but has improved since his operation  Vision/hearing changes- Denies  Gait symptoms or imbalance-  Denies  Other focal neuro deficits-  Denies    Spine ROS:    Back pain-  Denies  Radicular pain-  Denies  Motor weakness-  Denies  Numbness/tingling-  Denies  Saddle anaesthesia-   Denies  Bowel/bladder dysfunction-   Denies    Past Medical History:   Past Medical History:   Diagnosis Date    Diabetes (H) 07/2018    Treating metformin    Essential hypertension 06/13/2024    Obesity     Sleep apnea        Past Surgical History:   Past Surgical History:   Procedure Laterality Date    AS ESOPHAGOSCOPY, DIAGNOSTIC      EGD    BIOPSY  2008    egd normal    OPTICAL TRACKING SYSTEM BIOPSY BRAIN Left 3/7/2025    Procedure: stealth assisted LEFT FRONTAL BIOPSY BRAIN;  Surgeon: Genaro Carver MD;  Location: [facility] OR    OPTICAL TRACKING SYSTEM CRANIOTOMY, EXCISE TUMOR WITH MAPPING, COMBINED Left 6/9/2025    Procedure: stealth assisted left awake craniotomy with tumor resection and speech and motor mapping;  Surgeon: Alon Mccarthy MD;  Location: [facility] OR       Past Radiation History: The patient denies any history of prior radiation therapy or exposure to ionizing radiation.  The patient denies a history of lupus, scleroderma, connective tissue disorders and collagen vascular disease.  They have not had any other malignancy or chemotherapy.    Allergies: No Known  Allergies    Medications:   Current Outpatient Medications   Medication Sig Dispense Refill    memantine (NAMENDA) 10 MG tablet Take 1 tablet (10 mg) by mouth 2 times daily. Script 2 Continuation for total therapy of six months. 30 days supply 10mg tabs x 60tabs FOUR refills Take 10mg (one 10mg tab) in the morning  and 10mg (one 10mg tab) in the evening 60 tablet 4    memantine (NAMENDA) 5 MG tablet Take 1 tablet (5 mg) by mouth 2 times daily. Script 1  Ramp up: 31 days supply 5mg tabs x74 tabs no refills Days 1-7 Take 5mg (one 5mg tab) in the morning Days 8-14-  Take  5mg (one 5mg tab) in the morning and 5mg (one 5mgtab) in the evening Days 15-21- Take 10mg in the morning (two 5mg tabs) and 5mg (one 5mg tab) in the evening evening Day 22 -31  Take 10mg in the morning (two 5mg tabs) and 10mg (two 5mg tabs) in the evening 78 tablet 0    atorvastatin (LIPITOR) 10 MG tablet Take 1 tablet (10 mg) by mouth daily. Takes every morning      blood glucose (ACCU-CHEK GUIDE) test strip Use to test blood sugar 1 times daily or as directed. 50 strip 1    blood glucose monitoring (ACCU-CHEK FASTCLIX) lancets 1 each daily 102 each 3    Blood Glucose Monitoring Suppl (CONTOUR NEXT GEN MONITOR) SAMIA 1 each daily. Use as directed. 1 each 0    Continuous Glucose Sensor (FREESTYLE KULWANT 3 PLUS SENSOR) MISC Change every 15 days. 6 each 1    empagliflozin (JARDIANCE) 25 MG TABS tablet Take 1 tablet (25 mg) by mouth daily. 90 tablet 3    glipiZIDE (GLUCOTROL XL) 5 MG 24 hr tablet Take 1 tablet (5 mg) by mouth daily. May increase to 10 mg dose if time in range is below 70%. 90 tablet 1    insulin aspart (NOVOLOG FLEXPEN) 100 UNIT/ML pen Inject 20-28 Units subcutaneously 2 times daily (with meals). 45 mL 3    insulin aspart (NOVOLOG PEN) 100 UNIT/ML pen Take 12/24-26/20-24 before light breakfast, lunch and supper. 30 mL 1    insulin glargine (LANTUS PEN) 100 UNIT/ML pen Inject 80 Units subcutaneously every 24 hours. 15 mL 2    insulin  glargine U-300 (TOUJEO) 300 UNIT/ML (1 units dial) pen Inject 20 Units subcutaneously daily. 15 mL 3    insulin pen needle (ULTICARE MINI) 31G X 6 MM miscellaneous Use 3-4 pen needles daily or as directed. 300 each 3    levETIRAcetam (KEPPRA) 750 MG tablet Take 1 tablet (750 mg) by mouth 2 times daily. 60 tablet 0    LORazepam (ATIVAN) 0.5 MG tablet Take 1 tablet (0.5 mg) by mouth every 6 hours as needed for anxiety. 30 tablet 0    losartan (COZAAR) 100 MG tablet Take 1 tablet (100 mg) by mouth daily. 90 tablet 0    metFORMIN (GLUCOPHAGE XR) 500 MG 24 hr tablet Take 2 tablets (1,000 mg) by mouth 2 times daily (with meals). 360 tablet 3    ondansetron (ZOFRAN ODT) 4 MG ODT tab Take 1 tablet (4 mg) by mouth every 8 hours as needed for nausea or vomiting. 10 tablet 0    oxyCODONE (ROXICODONE) 5 MG tablet Take 1 tablet (5 mg) by mouth every 6 hours as needed for moderate to severe pain. 10 tablet 0    senna-docusate (SENOKOT-S/PERICOLACE) 8.6-50 MG tablet Take 1 tablet by mouth 2 times daily. 20 tablet 0     No current facility-administered medications for this visit.       Family History:   Family History   Problem Relation Age of Onset    Diabetes Father     Hypertension Father     Other Cancer Father         Kidney & Thyroid    Cerebrovascular Disease Paternal Grandfather     Other Cancer Brother         Lukemia    Other Cancer Maternal Grandmother         Pancreatic    Other Cancer Maternal Grandfather         Lung - smoker    Other Cancer Brother         Lukemia    Other Cancer Brother         Lukemia    Colon Cancer No family hx of     Prostate Cancer No family hx of     Cerebrovascular Disease No family hx of     Coronary Artery Disease No family hx of        Social History:   Social History     Socioeconomic History    Marital status:      Spouse name: Not on file    Number of children: Not on file    Years of education: Not on file    Highest education level: Professional school degree (e.g., MD, [employer],  [employer])   Occupational History    Not on file   Tobacco Use    Smoking status: Never     Passive exposure: Never    Smokeless tobacco: Never   Vaping Use    Vaping status: Never Used   Substance and Sexual Activity    Alcohol use: Yes     Comment: 1 per month maybe    Drug use: No    Sexual activity: Yes     Partners: Female     Birth control/protection: Abstinence, Pull-out method, Condom   Other Topics Concern    Parent/sibling w/ CABG, MI or angioplasty before 65F 55M? No   Social History Narrative    Not on file     Social Drivers of Health     Financial Resource Strain: Low Risk  (6/10/2025)    Financial Resource Strain     Within the past 12 months, have you or your family members you live with been unable to get utilities (heat, electricity) when it was really needed?: No   Food Insecurity: Low Risk  (6/10/2025)    Food Insecurity     Within the past 12 months, did you worry that your food would run out before you got money to buy more?: No     Within the past 12 months, did the food you bought just not last and you didn t have money to get more?: No   Transportation Needs: Low Risk  (6/10/2025)    Transportation Needs     Within the past 12 months, has lack of transportation kept you from medical appointments, getting your medicines, non-medical meetings or appointments, work, or from getting things that you need?: No   Physical Activity: Insufficiently Active (12/28/2024)    Exercise Vital Sign     Days of Exercise per Week: 3 days     Minutes of Exercise per Session: 20 min   Stress: No Stress Concern Present (12/28/2024)    [employer] - Occupational Stress Questionnaire     Feeling of Stress : Only a little   Social Connections: Unknown (12/28/2024)    Social Connection and Isolation Panel [NHANES]     Frequency of Communication with Friends and Family: Not on file     Frequency of Social Gatherings with Friends and Family: Twice a week     Attends Oriental orthodox Services: Not on  file     Active Member of Clubs or Organizations: Not on file     Attends Club or Organization Meetings: Not on file     Marital Status: Not on file   Interpersonal Safety: Low Risk  (6/9/2025)    Interpersonal Safety     Do you feel physically and emotionally safe where you currently live?: Yes     Within the past 12 months, have you been hit, slapped, kicked or otherwise physically hurt by someone?: No     Within the past 12 months, have you been humiliated or emotionally abused in other ways by your partner or ex-partner?: No   Housing Stability: High Risk (6/10/2025)    Housing Stability     Do you have housing? : No     Are you worried about losing your housing?: No       Review of Systems: A complete 12 system review was negative except as noted in the HPI above.     Karnofsky Performance Status: 90  ECOG Performance Status: 1    Vital Signs:  BP (!) 149/84   Pulse 97   Resp 16   Wt (!) 176.5 kg (389 lb 3.2 oz)   SpO2 94%   BMI 49.95 kg/m  .    Physical Exam:    General: NAD, patient well appearing.  Extremities: No acute findings. No edema   Skin: color, texture and turgor wnl. No rashes and lesions. Incision lft scalp with small area superiorly with dried blood from recent injury - but closed and intact and healing well   Neuro:   MS: AAO x 3, appropriately interactive, normal affect, speech fluent  CN:   III,IV,VI -- EOMI, no ptosis;   V -- sensation intact to LT/PP  VII -- no facial weakness/droop;   VIII -- hears finger rub equally bilaterally;   IX,X -- voice normal  Motor: Normal tone, no tremor. 5/5 strength bilateral extremities   Sensation: Normal sensation to LT intact bilaterally upper and lower extremities  Gait: Natural gait intact.     Last CBC with Diff:  WBC   Date Value Ref Range Status   03/22/2018 9.3 4.0 - 11.0 10e9/L Final     WBC Count   Date Value Ref Range Status   06/12/2025 11.5 (H) 4.0 - 11.0 10e3/uL Final     RBC Count   Date Value Ref Range Status   06/12/2025 5.47 4.40 -  5.90 10e6/uL Final     Hemoglobin   Date Value Ref Range Status   06/12/2025 15.5 13.3 - 17.7 g/dL Final     Hematocrit   Date Value Ref Range Status   06/12/2025 47.9 40.0 - 53.0 % Final     MCV   Date Value Ref Range Status   06/12/2025 88 78 - 100 fL Final     MCH   Date Value Ref Range Status   06/12/2025 28.3 26.5 - 33.0 pg Final     MCHC   Date Value Ref Range Status   06/12/2025 32.4 31.5 - 36.5 g/dL Final     RDW   Date Value Ref Range Status   06/12/2025 13.2 10.0 - 15.0 % Final     Platelet Count   Date Value Ref Range Status   06/12/2025 222 150 - 450 10e3/uL Final     Diff Method   Date Value Ref Range Status   03/22/2018 Automated Method  Final     % Neutrophils   Date Value Ref Range Status   05/26/2025 64 % Final   03/22/2018 70.6 % Final     % Lymphocytes   Date Value Ref Range Status   05/26/2025 23 % Final   03/22/2018 18.6 % Final     % Monocytes   Date Value Ref Range Status   05/26/2025 6 % Final   03/22/2018 8.6 % Final     % Eosinophils   Date Value Ref Range Status   05/26/2025 2 % Final   03/22/2018 2.0 % Final     % Basophils   Date Value Ref Range Status   05/26/2025 1 % Final   03/22/2018 0.2 % Final     Absolute Neutrophil   Date Value Ref Range Status   03/22/2018 6.5 1.6 - 8.3 10e9/L Final     Absolute Neutrophils   Date Value Ref Range Status   05/26/2025 4.8 1.6 - 8.3 10e3/uL Final     Absolute Lymphocytes   Date Value Ref Range Status   05/26/2025 1.7 0.8 - 5.3 10e3/uL Final   03/22/2018 1.7 0.8 - 5.3 10e9/L Final     Absolute Monocytes   Date Value Ref Range Status   05/26/2025 0.5 0.0 - 1.3 10e3/uL Final   03/22/2018 0.8 0.0 - 1.3 10e9/L Final        Last Comprehensive Metabolic Panel:  Sodium   Date Value Ref Range Status   06/12/2025 138 135 - 145 mmol/L Final   08/24/2020 135 133 - 144 mmol/L Final     Potassium   Date Value Ref Range Status   06/12/2025 4.1 3.4 - 5.3 mmol/L Final   04/18/2022 4.3 3.4 - 5.3 mmol/L Final   08/24/2020 4.1 3.4 - 5.3 mmol/L Final     Chloride    Date Value Ref Range Status   06/12/2025 103 98 - 107 mmol/L Final   04/18/2022 106 94 - 109 mmol/L Final   08/24/2020 103 94 - 109 mmol/L Final     Carbon Dioxide   Date Value Ref Range Status   08/24/2020 22 20 - 32 mmol/L Final     Carbon Dioxide (CO2)   Date Value Ref Range Status   06/12/2025 23 22 - 29 mmol/L Final   04/18/2022 24 20 - 32 mmol/L Final     Anion Gap   Date Value Ref Range Status   06/12/2025 12 7 - 15 mmol/L Final   04/18/2022 8 3 - 14 mmol/L Final   08/24/2020 10 3 - 14 mmol/L Final     Glucose   Date Value Ref Range Status   06/12/2025 118 (H) 70 - 99 mg/dL Final   04/18/2022 152 (H) 70 - 99 mg/dL Final   08/24/2020 191 (H) 70 - 99 mg/dL Final     Comment:     Fasting specimen     GLUCOSE BY METER POCT   Date Value Ref Range Status   06/12/2025 128 (H) 70 - 99 mg/dL Final     Urea Nitrogen   Date Value Ref Range Status   06/12/2025 18.7 6.0 - 20.0 mg/dL Final   04/18/2022 16 7 - 30 mg/dL Final   08/24/2020 15 7 - 30 mg/dL Final     Creatinine   Date Value Ref Range Status   06/12/2025 0.93 0.67 - 1.17 mg/dL Final   08/24/2020 0.90 0.66 - 1.25 mg/dL Final     GFR Estimate   Date Value Ref Range Status   06/12/2025 >90 >60 mL/min/1.73m2 Final     Comment:     eGFR calculated using 2021 CKD-EPI equation.   08/24/2020 >90 >60 mL/min/[1.73_m2] Final     Comment:     Non  GFR Calc  Starting 12/18/2018, serum creatinine based estimated GFR (eGFR) will be   calculated using the Chronic Kidney Disease Epidemiology Collaboration   (CKD-EPI) equation.       Calcium   Date Value Ref Range Status   06/12/2025 9.4 8.8 - 10.4 mg/dL Final   08/24/2020 9.9 8.5 - 10.1 mg/dL Final      SURGICAL HISTORY:  Initial biopsy on 3/7/25  Description of procedure: Patient was brought to the operating room and was placed under general anesthesia and intubated. All pressure points were padded for the duration of the case.The head was then fixed in a [place] head smith and secured to the bed. His head  was slightly turned to the right.  He was then registered to the Stealth machine, and the trajectory was confirmed. We marked a linear incision which was made to conform to our trajectory.  After prepping and draping this area, 1% lidocaine with epinephrine was injected into the incision. Timeout was performed.     We then made an incision through skin down to the temporal fascia and muscle.  Temporalis fascia and muscle was excised using monopolar cautery and dissection was continued to the periosteum and bone.  Skin edges were retracted using self-retaining retractor.  We then used a high-speed drill to make a bur hole and used bone wax to stop all bony bleeding. We then coagulated the opened the underlying dura with a number 15-blade, then performed a corticectomy.  We then brought the auto guide robot to the field and aligned the trajectory to the target.  We then inserted the biopsy needle to the predetermined  depth under Stealth guidance aiming for the lesion.  We made sure that the patient's blood pressure was less than 140 systolic throughout. We then took a biopsy at the target and sent it for the frozen section.  We then took for additional biopsies at the target at 12, 9,3,  6 o clock positions.  We then advanced the biopsy needle to a depth of 5 mm plus target and took 4 additional biopsies at 12:00, 9, 3 and 6 o'clock position.  In total of 8 biopsies were taken.  On frozen examination, the biopsy sample was diagnostic and was suggestive of likely glioma.  Following satisfactory biopsy results and obtaining tissue for permanent pathological examination, we placed thrombin along the biopsy trajectory and copiously irrigate the wound for any bleeding.     Following satisfactory hemostasis, we placed a Gelfoam into the bur hole and cover the bur hole with a mini plate and screws.  We then irrigated the field with antibiotic solution and turned our attention to closure. We closed the galea with 2-0 vicryl  stitch in an inverted interrupted fashion, followed by 3-0 monocryl stitch for the skin in a running simple fashion.     The incision was cleaned with wet to dry sponges and sterile dressing was applied.  Patient was extubated after completion of the procedure and brought to the post-anesthesia care unit for recovery.  We will get a CT scan to rule out any bleeding.All sponge, needle, and instrument counts were correct x2 at the end of the procedure.     I was scrubbed throughout the entirety of the procedure.     I updated patient's spouse following the procedure.     Genaro Carver MD     RESECTION on 6/9/25  Description of procedure: Patient was brought to the operating room.  Sedation was begun and the patient was placed in the [place] head smith with the head turned to the right.  The Stealth neuronavigation system was registered and used to plan the craniotomy and incision.  The left side of the head was prepped and draped in sterile fashion after the scalp was infiltrated with local anesthetic and a ring block along with field block of the intended incision.  A linear incision was made over the intended craniotomy.  The previous bur hole was exposed and the bur hole cover removed.  The craniotomy was then turned from the previous bur hole over the intended area of resection.  The dura was opened and reflected inferiorly.  Electrocorticography was begun with a strip electrode to monitor for seizures.  Using the Ojemann probe cortical stimulation was performed in the face motor areas clearly identified in the gyrus behind the tumor.  The tumor was then debulked at the superior aspect and posterior aspect in order to provide some brain relaxation and help access some of the inferior aspects of the tumor.  Further stimulation inferiorly showed some intermittent areas of speech arrest.  Using Stealth guidance, sono PET aspiration as well as blunt and sharp dissection the tumor was serially debulked.  It had a  very infiltrative nature but areas of more normal white matter were identified at the periphery of the tumor.  Intermittent cortical stimulation with speech mapping and motor mapping was performed to help guide the resection as well.  Once the area of tumor was felt to be well resected, hemostasis was achieved.  The dura was reapproximated with 4-0 Nurolon.  A piece of DuraGen was placed over the dural defect.  The bone flap was plated back to place with a Synthes low-profile cranial plating system.  The temporalis muscle and fascia were closed with 0 Vicryl.  2-0 Vicryl was used for the galea.  3-0 nylon was used for skin.     PATHOLOGY:  Surgical Pathology Report                         Case: [id number]                                   Authorizing Provider:  Genaro Carver MD          Collected:           03/07/2025 09:05 AM           Ordering Location:     [facility]                 Received:            03/07/2025 09:10 AM           Pathologist:           Dale Case MD                                                     Intraop:               Wes Francis MD                                                         Specimens:   A) - Brain, left frontal biopsy                                                                      B) - Brain, left frontal biopsy                                                                      C) - Brain, left frontal biopsy-5mm below target                                          Addendum   FINAL INTEGRATED DIAGNOSIS:     A-C. Brain, left frontal, biopsy:  - Astrocytoma, IDH-mutant (CNS WHO grade 2). See comment.        Case Report   Surgical Pathology Report                         Case: [id number]                                   Authorizing Provider:  Alon Mccarthy MD    Collected:           06/09/2025 03:19 PM           Ordering Location:      [facility] OR                 Received:            06/09/2025 05:07 PM           Pathologist:            Dale Case MD                                                     Specimens:   A) - Brain, Left Frontal Tumor                                                                      B) - Brain, Left Frontal Tumor (sonopet contents)                                          Final Diagnosis   A-B. Brain, left frontal tumor, resection:  - Astrocytoma, IDH-mutant (CNS WHO grade 3). See comment.       Radiology:  POST SURGICAL MRI:  EXAM: MR BRAIN W/O & W CONTRAST  6/10/2025 5:58 PM      HISTORY: s/p resection of tumor        COMPARISON: MRI brain 6/6/2025, 5/21/2025     TECHNIQUE: MR head: Multiplanar T1-weighted, axial FLAIR, and  susceptibility images were obtained without intravenous contrast.  Following intravenous gadolinium-based contrast administration, axial  T2-weighted, diffusion, and T1-weighted images (in multiple planes)  were obtained.     CONTRAST: 10 mL Gadavist.     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 Increased extent of parenchymal T2 hyperintensity  surrounding the resection cavity in the left frontal lobe. There is  overlying dural thickening and enhancement.  Mild peripheral  enhancement of the resection cavity that is likely postoperative.  Stable rightward midline shift of 3 mm at the level of the septum  pellucidum. Surrounding the resection cavity, there is increased  signal on diffusion-weighted imaging with associated loss of signal on  the ADC map, most notably along the medial aspect within the left  centrum semiovale. Layering T2 hyperintense and T1 hypointense air  fluid level in the resection bed with associated susceptibility  artifact. Partial effacement of the left lateral ventricle secondary  to the edema.      Major intracranial vascular structures are within normal limits.     No suspicious abnormality of the skull marrow signal. Polypoid mucosal  thickening of the maxillary sinuses. Mastoid air cells are clear.  No  focal abnormality of the pituitary gland, sella, skull base and upper  cervical spinal structures on sagittal images. The orbits are normal.                                                                      IMPRESSION:  1. Postsurgical changes of left frontal craniotomy for  oligodendroglioma resection. Small focus of diffusion restriction  medial to the resection cavity in the left centrum semiovale,  suggestive of acute infarction. There is mild circumferential  restricted diffusion along the resection cavity which is presumably  postoperative. Faint peripheral enhancement of the resection cavity  with layering internal postoperative hemorrhage.  2. Stable rightward midline shift of 3 mm.    EXAM: MR BRAIN W/O and W CONTRAST  LOCATION: [facility]  DATE: 5/21/2025     INDICATION: Left frontal lobe mass.  COMPARISON: MRI brain 02/25/2025. CT brain 03/07/2025.  CONTRAST: 17 mL Gadavist.  TECHNIQUE: Multiplanar multisequence head MRI without and with intravenous contrast including dynamic susceptibility contrast perfusion imaging.     FINDINGS:  INTRACRANIAL CONTENTS: There are no areas of abnormally restricted diffusion to suggest acute or subacute infarct. Again seen is an expansile area of abnormal T2 prolongation/tumor within the posterior left frontal lobe. The lesion demonstrates a solidly   enhancing component along its posterior and medial aspect with the area of enhancing tissue measuring up to 3.6 cm in maximal AP dimension and 3.6 cm in transverse dimension compared with 3.2 and 3.6 cm on the 02/25/2025 prior. New small area of focal   susceptibility is seen within the center of the enhancing component compatible with hemosiderin staining from prior biopsy. The surrounding nonenhancing component compatible with a combination of nonenhancing tumor and surrounding vasogenic edema   measures 7.0 x 5.4 cm in size (AP, TRV) compared with 7.4 x 5.0 cm on the prior. Subtle increased  cerebral blood volume is seen throughout the lesion. Localized mass effect with sulcal effacement and effacement of the left lateral ventricle. 1 to 2 mm   left to right midline shift, similar to prior. Ventricles are small in size other than prior. Gray-white matter differentiation otherwise preserved.     Cerebellar tonsils are normally positioned. Sella is unremarkable for technique. Corpus callosum is normally formed. Major skull base vascular flow-voids are preserved.     OSSEOUS STRUCTURES/SOFT TISSUES: Left parietal negrita hole. Visualized marrow space otherwise unremarkable. Expected postoperative changes overlying scalp.     ORBITS: No abnormality accounting for technique.      SINUSES/MASTOIDS: Mild paranasal sinus mucosal thickening. No air-fluid levels. Middle ear cavities and mastoid air cells are clear.                                                                       IMPRESSION:  1.  Expansile area of abnormal T2 prolongation/tumor within the posterior left frontal lobe. The enhancing component within the center of the lesion has mildly increased in size in the interval. Subtlety elevated cerebral blood volume is seen throughout   the lesion.  2.  Localized mass effect with sulcal effacement and effacement of the left lateral ventricle. 1 to 2 mm left right midline shift, similar to prior.    PRE-OP MRI  EXAM: MR BRAIN PERFUSION W/O & W CONTRAST  2/26/2025 2:24 AM      HISTORY: Concern for primary glial neoplasm        COMPARISON: 2/25/2024     TECHNIQUE: MR head: Sagittal and axial T1-weighted, axial diffusion,  multiplanar T2 FLAIR with fat saturation images were obtained without  intravenous contrast. Following intravenous gadolinium-based contrast  administration, axial T2-weighted, diffusion, and T1-weighted images  (in multiple planes) were obtained.  MR Perfusion: During and following intravenous gadolinium-based  contrast administration, dynamic susceptibility-based images were  obtained for  MR Perfusion evaluation. The images were constructed and  reviewed on a dedicated postprocessing workstation after being  coregistered to the volumetric sequences     CONTRAST: 10 ml Gadavist.     FINDINGS:  A T2 hyperintense and enhancing mass within the posterior left frontal  lobe measures up to 4.6 x 4.0 x 4.0 cm, with the enhancing component  measuring approximately 3.7 x 3.6 x 2.2 cm. The lesion demonstrates  moderate mass effect upon the overlying cerebral sulci as well as upon  the left lateral ventricle and contributes to approximately 2 mm of  rightward shift of the midline. The mass demonstrates multiple cystic  areas of necrosis and a large amount of T2 shine through without  definite evidence for restricted diffusion. Susceptibility weighted  imaging reveals no intracranial hemorrhage. Perfusion imaging  demonstrates no abnormal elevated relative cerebral blood volume.     Ventricles are proportionate to the cerebral sulci. No evidence for  hydrocephalus. Diffusion-weighted imaging reveals no evidence for  acute infarct. Basilar cisterns are patent.     Normal skull marrow signal. No substantial paranasal sinus mucosal  disease. Clear mastoid air cells. The orbits are normal.                                                                      IMPRESSION:  1. 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  2. Perfusion imaging demonstrates no elevated relative cerebral blood  volume.    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 We recommend 5940 cGy in 33 fractions using IMRT.    Plan:   We recommend treatment with radiation therapy.  The patient will be meeting with neuro-oncology to discuss systemic therapy, and we will defer discussion of details of this aspect of  their management to the Neuro-oncology team. He will received TMZ adjuvantly.     We had a detailed discussion with Jeffry Younger regarding the role of radiation therapy for the treatment of grade 3 Astrocytoma. We discussed the logistics, timing, risks, benefits, and side effects associated with radiation therapy, which would likely be delivered 5 days per week for approximately 6 and a half weeks.      We will prescribe memantine to start on day 1 of RT for neuroprotection. He will take this for 6 months if he does not experience any side effects.     Jeffry Younger would like to proceed with radiotherapy at [facility].  We have scheduled them for CT-based simulation and MRI in the treatment position for radiation. The patient was encouraged to contact us with questions or concerns should they arise in the interim. All questions were answered to the patients's satisfaction, and they were provided with our contact information should any questions or concerns arise.    We are planning to get this patient simulated and have a stat planning MRI stat. We plan to start radiotherapy approximately 2 weeks after his planning simulation CT and MRI. We provided an option for SLP/speech referral, and the patient declined at this time. Further, we discussed option for genetics referral, and patient declined at this time.    Irma Marks MD MS PGY3    A medical resident participated in the care of this patient and in the preparation of this note. I have verified and edited this note. I personally performed key elements of the  physical exam and medical decision making for this patient.  I agree with the assessment and plan of care as documented in the note above.      The overall time I spent on direct patient care including self review of records, labs, and radiographic studies, as well as, direct face-to-face interaction with the patient and coordination of care with other providers was 60 minutes.      Lupe Fonseca MD, PhD     Department of Radiation Oncology  [facility]     Referring Provider:  Concha Jacinto MD  [address] [place] [id number]

## 2025-07-07 ENCOUNTER — ANCILLARY PROCEDURE (OUTPATIENT)
Dept: MRI IMAGING | Facility: CLINIC | Age: 45
End: 2025-07-07
Attending: STUDENT IN AN ORGANIZED HEALTH CARE EDUCATION/TRAINING PROGRAM
Payer: COMMERCIAL

## 2025-07-07 DIAGNOSIS — C71.9 GRADE III ASTROCYTOMA (H): ICD-10-CM

## 2025-07-07 PROCEDURE — A9585 GADOBUTROL INJECTION: HCPCS | Mod: JZ | Performed by: STUDENT IN AN ORGANIZED HEALTH CARE EDUCATION/TRAINING PROGRAM

## 2025-07-07 PROCEDURE — 70553 MRI BRAIN STEM W/O & W/DYE: CPT | Mod: GC | Performed by: STUDENT IN AN ORGANIZED HEALTH CARE EDUCATION/TRAINING PROGRAM

## 2025-07-07 RX ORDER — GADOBUTROL 604.72 MG/ML
15 INJECTION INTRAVENOUS ONCE
Status: COMPLETED | OUTPATIENT
Start: 2025-07-07 | End: 2025-07-07

## 2025-07-07 RX ADMIN — GADOBUTROL 15 ML: 604.72 INJECTION INTRAVENOUS at 11:26

## 2025-07-08 ENCOUNTER — THERAPY VISIT (OUTPATIENT)
Dept: PHYSICAL THERAPY | Facility: CLINIC | Age: 45
End: 2025-07-08
Payer: COMMERCIAL

## 2025-07-08 ENCOUNTER — OFFICE VISIT (OUTPATIENT)
Dept: RADIATION ONCOLOGY | Facility: CLINIC | Age: 45
End: 2025-07-08
Attending: PSYCHIATRY & NEUROLOGY
Payer: COMMERCIAL

## 2025-07-08 DIAGNOSIS — C71.9 GRADE III ASTROCYTOMA (H): Primary | ICD-10-CM

## 2025-07-08 DIAGNOSIS — S39.012A STRAIN OF LUMBAR REGION, INITIAL ENCOUNTER: Primary | ICD-10-CM

## 2025-07-08 PROCEDURE — 97110 THERAPEUTIC EXERCISES: CPT | Mod: GP | Performed by: PHYSICAL THERAPIST

## 2025-07-08 PROCEDURE — 77334 RADIATION TREATMENT AID(S): CPT | Performed by: RADIOLOGY

## 2025-07-08 PROCEDURE — 77334 RADIATION TREATMENT AID(S): CPT | Mod: 26 | Performed by: RADIOLOGY

## 2025-07-08 NOTE — PROGRESS NOTES
Radiation Therapy Patient Education    Person involved with teaching: Patient and Wife    Patient educational needs for self management of treatment-related side effects assessment completed.  [facility] Patient Ed tab contains Patient Learning Assessment    Education Materials Given  Treatment schedule  Contact information    parking pass    Educational Topics Discussed  Side effects expected, Skin care, and When to call MD/RN    Response To Teaching  Verbalizes understanding    GYN Only  Vaginal Dilator-given and educated: N/A    Referrals sent: None    Chemotherapy?  No

## 2025-07-08 NOTE — PROGRESS NOTES
Virtual Visit Details    Type of service:  Video Visit     Originating Location (pt. Location): Home    Distant Location (provider location):  Off-site  Platform used for Video Visit: Raj    Outcome for 07/08/25 10:46 AM: [employer] message sent  Óscar Lofton MA  Outcome for 07/16/25 6:43 AM: Data obtained via [employer] website  Óscar Lofton MA      Patient is showing 3/5 MNCM met. BP out range and A1c not in range   Óscar Lofton MA

## 2025-07-08 NOTE — LETTER
7/8/2025      Jeffry Younger  [address] [facility] [address]      Dear Colleague,    Thank you for referring your patient, Jeffry Younger, to the [facility] RADI[facility]COLOGY. Please see a copy of my visit note below.    Radiation Therapy Patient Education    Person involved with teaching: Patient and Wife    Patient educational needs for self management of treatment-related side effects assessment completed.  EPIC Patient Ed tab contains Patient Learning Assessment    Education Materials Given  Treatment schedule  Contact information    parking pass    Educational Topics Discussed  Side effects expected, Skin care, and When to call MD/RN    Response To Teaching  Verbalizes understanding    GYN Only  Vaginal Dilator-given and educated: N/A    Referrals sent: None    Chemotherapy?  No       Again, thank you for allowing me to participate in the care of your patient.        Sincerely,        Melinda Hobson MD    Electronically signed

## 2025-07-16 ENCOUNTER — VIRTUAL VISIT (OUTPATIENT)
Dept: ENDOCRINOLOGY | Facility: CLINIC | Age: 45
End: 2025-07-16
Payer: COMMERCIAL

## 2025-07-16 ENCOUNTER — ALLIED HEALTH/NURSE VISIT (OUTPATIENT)
Dept: RADIATION ONCOLOGY | Facility: CLINIC | Age: 45
End: 2025-07-16

## 2025-07-16 VITALS — HEIGHT: 74 IN | BODY MASS INDEX: 40.43 KG/M2 | WEIGHT: 315 LBS

## 2025-07-16 DIAGNOSIS — Z00.6 PATIENT IN CANCER RELATED RESEARCH STUDY: Primary | ICD-10-CM

## 2025-07-16 DIAGNOSIS — E11.65 TYPE 2 DIABETES MELLITUS WITH HYPERGLYCEMIA, WITHOUT LONG-TERM CURRENT USE OF INSULIN (H): Primary | ICD-10-CM

## 2025-07-16 PROCEDURE — 1126F AMNT PAIN NOTED NONE PRSNT: CPT | Mod: 95 | Performed by: PHYSICIAN ASSISTANT

## 2025-07-16 PROCEDURE — 98005 SYNCH AUDIO-VIDEO EST LOW 20: CPT | Performed by: PHYSICIAN ASSISTANT

## 2025-07-16 ASSESSMENT — PAIN SCALES - GENERAL: PAINLEVEL_OUTOF10: NO PAIN (0)

## 2025-07-16 NOTE — NURSING NOTE
Current patient location: [address] [facility] [address]    Is the patient currently in the state of [place]? YES    Visit mode: VIDEO    If the visit is dropped, the patient can be reconnected by:VIDEO VISIT: Text to cell phone:   Telephone Information:   Mobile [phone]       Will anyone else be joining the visit? NO  (If patient encounters technical issues they should call [phone]    Are changes needed to the allergy or medication list? No    Are refills needed on medications prescribed by this physician? NO    Rooming Documentation:  Not applicable    Reason for visit: RECHDEL LEVYF

## 2025-07-16 NOTE — PROGRESS NOTES
Informed Consent Note: Immune Characterization of Biospecimens    The consent form, including purpose, risks and benefits, was reviewed with Jeffry Younger, and all questions were answered before signing the consent form.   Present during the discussion was Jeffry Younger. A copy of the signed form was provided to the patient. No procedures specific to this study were performed prior to the patient signing the consent form.    Consent + HIPAA Version Date: 11-JAN-2024  Consent obtained by:  Agata River  Date: July 16, 2025

## 2025-07-16 NOTE — LETTER
7/16/2025      Jeffry Yougner  [address] [facility] [address]      Dear Colleague,    Thank you for referring your patient, Jeffry Younger, to the [facility]. Please see a copy of my visit note below.    Virtual Visit Details    Type of service:  Video Visit     Originating Location (pt. Location): Home    Distant Location (provider location):  Off-site  Platform used for Video Visit: [employer]    Outcome for 07/08/25 10:46 AM: [employer] message sent  Óscar Lofton MA  Outcome for 07/16/25 6:43 AM: Data obtained via [employer] website  Óscar Lofton MA      Patient is showing 3/5 MNCM met. BP out range and A1c not in range   Óscar Lofton MA                Assessment/Plan :   Type 2 DM. Vladimir is doing okay. His blood sugars were elevated after the craniotomy due to the short dose of decadron. The numbers have improved. We reviewed his recent CGMS report and I do not see any reason to make adjustments to his current medications. We did discuss insulin pump therapy. He is still considering it but not ready to make a change, at this time. I encouraged him to keep up the good work.He will follow-up with Dr. Pickard in November and I will see him back in about 6 mos.     Due to the COVID 19 pandemic this visit was a telephone/video visit in order to help prevent spread of infection in this patient and the general population. The patient gave verbal consent for the visit today. I have independently reviewed and interpreted labs, imaging as indicated.       Distant Location (provider location):  Off-site  Mode of Communication:  Video Conference via [employer]  Chart review/prep time 5    Joined the call at 7/16/2025, 12:30:37 pm.  Left the call at 7/16/2025, 12:40:19 pm.  You were on the call for 9 minutes 41 seconds.  18 minutes spent on the date of the encounter doing chart review, history and exam, documentation and further activities as noted above.      Chief complaint:  Jeffry is a 45 year old  male who returns for follow-up of type 2 diabetes.    I have reviewed Care Everywhere including [facility], [facility], [facility], [facility], [facility], [facility] , [facility], [place] lab reports, imaging reports and provider notes as indicated.      HISTORY OF PRESENT ILLNESS  Vladimir feels like things are going better. He has been working hard to get his blood sugars back under tight control. He is currently taking Lantus 80 unit(s) daily, Jardiance 25 mg daily, glipizide XL 5 mg daily and Novolog 20-28 unit(s) with meals. He feels like this combination is working well. He has not had any adverse effects related to his current medications.     He is monitoring his blood sugars with the FreeStyle Manolo 3 plus sensor. He has not had any issues with severe hyperglycemia and/or hypoglycemia. He has been working hard to keep his blood sugars within target range. He is still considering switching to an insulin pump but he feels like things are stable. He has not noticed any significant changes in his vision or worsening issues with numbness/tingling in his feet.     Vladimir was diagnosed with diabetes in 2015. He has used a combination of oral medications and insulin for many years. Recently, he was taking Ozempic to help improve insulin resistance but discontinued it in the setting of ischemic optic neuropathy. His wife has Type 1 DM and he has been seriously considering insulin pump therapy but he just has not found the time to investigate this further. His diabetes is complicated by HTN, obesity, sleep apnea, and grade III astrocytoma. He did undergo a craniotomy in June of 2025 followed by a short course of decadron.    Endocrine relevant labs are as follows:   Latest Reference Range & Units 06/10/25 03:53   Hemoglobin A1C <5.7 % 8.0 (H)   (H): Data is abnormally high   Latest Reference Range & Units 05/13/25 10:02   Hemoglobin A1C 0.0 - 5.6 % 7.9 (H)   (H): Data is abnormally high   Latest Reference Range  & Units 05/13/25 10:08   Albumin Urine mg/g Cr 0.00 - 17.00 mg/g Cr 8.76      Latest Reference Range & Units 05/13/25 10:08   Albumin Urine mg/L mg/L 14.3     REVIEW OF SYSTEMS    10 system ROS otherwise as per the HPI or negative    Past Medical History  Past Medical History:   Diagnosis Date     Diabetes (H) 07/2018    Treating metformin     Essential hypertension 06/13/2024     Obesity      Sleep apnea        Medications  Current Outpatient Medications   Medication Sig Dispense Refill     atorvastatin (LIPITOR) 10 MG tablet Take 1 tablet (10 mg) by mouth daily. Takes every morning       blood glucose (ACCU-CHEK GUIDE) test strip Use to test blood sugar 1 times daily or as directed. 50 strip 1     blood glucose monitoring (ACCU-CHEK FASTCLIX) lancets 1 each daily 102 each 3     Blood Glucose Monitoring Suppl (CONTOUR NEXT GEN MONITOR) SAMIA 1 each daily. Use as directed. 1 each 0     Continuous Glucose Sensor (FREESTYLE KULWANT 3 PLUS SENSOR) MISC Change every 15 days. 6 each 1     empagliflozin (JARDIANCE) 25 MG TABS tablet Take 1 tablet (25 mg) by mouth daily. 90 tablet 3     glipiZIDE (GLUCOTROL XL) 5 MG 24 hr tablet Take 1 tablet (5 mg) by mouth daily. May increase to 10 mg dose if time in range is below 70%. 90 tablet 1     insulin aspart (NOVOLOG FLEXPEN) 100 UNIT/ML pen Inject 20-28 Units subcutaneously 2 times daily (with meals). 45 mL 3     insulin aspart (NOVOLOG PEN) 100 UNIT/ML pen Take 12/24-26/20-24 before light breakfast, lunch and supper. 30 mL 1     insulin glargine (LANTUS PEN) 100 UNIT/ML pen Inject 80 Units subcutaneously every 24 hours. 15 mL 2     insulin glargine U-300 (TOUJEO) 300 UNIT/ML (1 units dial) pen Inject 20 Units subcutaneously daily. 15 mL 3     insulin pen needle (ULTICARE MINI) 31G X 6 MM miscellaneous Use 3-4 pen needles daily or as directed. 300 each 3     levETIRAcetam (KEPPRA) 750 MG tablet Take 1 tablet (750 mg) by mouth 2 times daily. 60 tablet 0     LORazepam (ATIVAN) 0.5  MG tablet Take 1 tablet (0.5 mg) by mouth every 6 hours as needed for anxiety. 30 tablet 0     losartan (COZAAR) 100 MG tablet Take 1 tablet (100 mg) by mouth daily. 90 tablet 0     memantine (NAMENDA) 10 MG tablet Take 1 tablet (10 mg) by mouth 2 times daily. Script 2 Continuation for total therapy of six months. 30 days supply 10mg tabs x 60tabs FOUR refills Take 10mg (one 10mg tab) in the morning  and 10mg (one 10mg tab) in the evening 60 tablet 4     memantine (NAMENDA) 5 MG tablet Take 1 tablet (5 mg) by mouth 2 times daily. Script 1  Ramp up: 31 days supply 5mg tabs x74 tabs no refills Days 1-7 Take 5mg (one 5mg tab) in the morning Days 8-14-  Take  5mg (one 5mg tab) in the morning and 5mg (one 5mgtab) in the evening Days 15-21- Take 10mg in the morning (two 5mg tabs) and 5mg (one 5mg tab) in the evening evening Day 22 -31  Take 10mg in the morning (two 5mg tabs) and 10mg (two 5mg tabs) in the evening 78 tablet 0     metFORMIN (GLUCOPHAGE XR) 500 MG 24 hr tablet Take 2 tablets (1,000 mg) by mouth 2 times daily (with meals). 360 tablet 3     ondansetron (ZOFRAN ODT) 4 MG ODT tab Take 1 tablet (4 mg) by mouth every 8 hours as needed for nausea or vomiting. 10 tablet 0     oxyCODONE (ROXICODONE) 5 MG tablet Take 1 tablet (5 mg) by mouth every 6 hours as needed for moderate to severe pain. 10 tablet 0     senna-docusate (SENOKOT-S/PERICOLACE) 8.6-50 MG tablet Take 1 tablet by mouth 2 times daily. 20 tablet 0       Allergies  No Known Allergies    Family History  family history includes Cerebrovascular Disease in his paternal grandfather; Diabetes in his father; Hypertension in his father; Other Cancer in his brother, brother, brother, father, maternal grandfather, and maternal grandmother.    Social History  Social History     Tobacco Use     Smoking status: Never     Passive exposure: Never     Smokeless tobacco: Never   Vaping Use     Vaping status: Never Used   Substance Use Topics     Alcohol use: Yes      Comment: 1 per month maybe     Drug use: No       Physical Exam  Body mass index is 48.15 kg/m .  GENERAL: no distress  SKIN: Visible skin clear. No significant rash, abnormal pigmentation or lesions.  EYES: Eyes grossly normal to inspection.  No discharge or erythema, or obvious scleral/conjunctival abnormalities.  NECK: visible goiter is not present; no visible neck masses  RESP: No audible wheeze, cough, or visible cyanosis.  No visible retractions or increased work of breathing.    NEURO: Awake, alert, responds appropriately to questions.  Mentation and speech fluent.  PSYCH:affect normal and appearance well-groomed.      DATA REVIEW  FreeStyle LibreView  Time in target range 71%  Low 1%, High 22%, Very High 6%  Current Ave  mg/dl        Again, thank you for allowing me to participate in the care of your patient.        Sincerely,        Kamini Caceres PA-C    Electronically signed

## 2025-07-16 NOTE — PROGRESS NOTES
Assessment/Plan :   Type 2 DM. Vladimir is doing okay. His blood sugars were elevated after the craniotomy due to the short dose of decadron. The numbers have improved. We reviewed his recent CGMS report and I do not see any reason to make adjustments to his current medications. We did discuss insulin pump therapy. He is still considering it but not ready to make a change, at this time. I encouraged him to keep up the good work.He will follow-up with Dr. Pickard in November and I will see him back in about 6 mos.     Due to the COVID 19 pandemic this visit was a telephone/video visit in order to help prevent spread of infection in this patient and the general population. The patient gave verbal consent for the visit today. I have independently reviewed and interpreted labs, imaging as indicated.       Distant Location (provider location):  Off-site  Mode of Communication:  Video Conference via [employer]  Chart review/prep time 5    Joined the call at 7/16/2025, 12:30:37 pm.  Left the call at 7/16/2025, 12:40:19 pm.  You were on the call for 9 minutes 41 seconds.  18 minutes spent on the date of the encounter doing chart review, history and exam, documentation and further activities as noted above.      Chief complaint:  Jeffry is a 45 year old male who returns for follow-up of type 2 diabetes.    I have reviewed Care Everywhere including [facility], [facility], [facility], [facility], [facility] , [facility], [facility] lab reports, imaging reports and provider notes as indicated.      HISTORY OF PRESENT ILLNESS  Vladimir feels like things are going better. He has been working hard to get his blood sugars back under tight control. He is currently taking Lantus 80 unit(s) daily, Jardiance 25 mg daily, glipizide XL 5 mg daily and Novolog 20-28 unit(s) with meals. He feels like this combination is working well. He has not had any adverse effects related to his current medications.     He is monitoring  his blood sugars with the FreeStyle Manolo 3 plus sensor. He has not had any issues with severe hyperglycemia and/or hypoglycemia. He has been working hard to keep his blood sugars within target range. He is still considering switching to an insulin pump but he feels like things are stable. He has not noticed any significant changes in his vision or worsening issues with numbness/tingling in his feet.     Vladimir was diagnosed with diabetes in 2015. He has used a combination of oral medications and insulin for many years. Recently, he was taking Ozempic to help improve insulin resistance but discontinued it in the setting of ischemic optic neuropathy. His wife has Type 1 DM and he has been seriously considering insulin pump therapy but he just has not found the time to investigate this further. His diabetes is complicated by HTN, obesity, sleep apnea, and grade III astrocytoma. He did undergo a craniotomy in June of 2025 followed by a short course of decadron.    Endocrine relevant labs are as follows:   Latest Reference Range & Units 06/10/25 03:53   Hemoglobin A1C <5.7 % 8.0 (H)   (H): Data is abnormally high   Latest Reference Range & Units 05/13/25 10:02   Hemoglobin A1C 0.0 - 5.6 % 7.9 (H)   (H): Data is abnormally high   Latest Reference Range & Units 05/13/25 10:08   Albumin Urine mg/g Cr 0.00 - 17.00 mg/g Cr 8.76      Latest Reference Range & Units 05/13/25 10:08   Albumin Urine mg/L mg/L 14.3     REVIEW OF SYSTEMS    10 system ROS otherwise as per the HPI or negative    Past Medical History  Past Medical History:   Diagnosis Date    Diabetes (H) 07/2018    Treating metformin    Essential hypertension 06/13/2024    Obesity     Sleep apnea        Medications  Current Outpatient Medications   Medication Sig Dispense Refill    atorvastatin (LIPITOR) 10 MG tablet Take 1 tablet (10 mg) by mouth daily. Takes every morning      blood glucose (ACCU-CHEK GUIDE) test strip Use to test blood sugar 1 times daily or as  directed. 50 strip 1    blood glucose monitoring (ACCU-CHEK FASTCLIX) lancets 1 each daily 102 each 3    Blood Glucose Monitoring Suppl (CONTOUR NEXT GEN MONITOR) SAMIA 1 each daily. Use as directed. 1 each 0    Continuous Glucose Sensor (FREESTYLE KULWANT 3 PLUS SENSOR) MISC Change every 15 days. 6 each 1    empagliflozin (JARDIANCE) 25 MG TABS tablet Take 1 tablet (25 mg) by mouth daily. 90 tablet 3    glipiZIDE (GLUCOTROL XL) 5 MG 24 hr tablet Take 1 tablet (5 mg) by mouth daily. May increase to 10 mg dose if time in range is below 70%. 90 tablet 1    insulin aspart (NOVOLOG FLEXPEN) 100 UNIT/ML pen Inject 20-28 Units subcutaneously 2 times daily (with meals). 45 mL 3    insulin aspart (NOVOLOG PEN) 100 UNIT/ML pen Take 12/24-26/20-24 before light breakfast, lunch and supper. 30 mL 1    insulin glargine (LANTUS PEN) 100 UNIT/ML pen Inject 80 Units subcutaneously every 24 hours. 15 mL 2    insulin glargine U-300 (TOUJEO) 300 UNIT/ML (1 units dial) pen Inject 20 Units subcutaneously daily. 15 mL 3    insulin pen needle (ULTICARE MINI) 31G X 6 MM miscellaneous Use 3-4 pen needles daily or as directed. 300 each 3    levETIRAcetam (KEPPRA) 750 MG tablet Take 1 tablet (750 mg) by mouth 2 times daily. 60 tablet 0    LORazepam (ATIVAN) 0.5 MG tablet Take 1 tablet (0.5 mg) by mouth every 6 hours as needed for anxiety. 30 tablet 0    losartan (COZAAR) 100 MG tablet Take 1 tablet (100 mg) by mouth daily. 90 tablet 0    memantine (NAMENDA) 10 MG tablet Take 1 tablet (10 mg) by mouth 2 times daily. Script 2 Continuation for total therapy of six months. 30 days supply 10mg tabs x 60tabs FOUR refills Take 10mg (one 10mg tab) in the morning  and 10mg (one 10mg tab) in the evening 60 tablet 4    memantine (NAMENDA) 5 MG tablet Take 1 tablet (5 mg) by mouth 2 times daily. Script 1  Ramp up: 31 days supply 5mg tabs x74 tabs no refills Days 1-7 Take 5mg (one 5mg tab) in the morning Days 8-14-  Take  5mg (one 5mg tab) in the morning and  5mg (one 5mgtab) in the evening Days 15-21- Take 10mg in the morning (two 5mg tabs) and 5mg (one 5mg tab) in the evening evening Day 22 -31  Take 10mg in the morning (two 5mg tabs) and 10mg (two 5mg tabs) in the evening 78 tablet 0    metFORMIN (GLUCOPHAGE XR) 500 MG 24 hr tablet Take 2 tablets (1,000 mg) by mouth 2 times daily (with meals). 360 tablet 3    ondansetron (ZOFRAN ODT) 4 MG ODT tab Take 1 tablet (4 mg) by mouth every 8 hours as needed for nausea or vomiting. 10 tablet 0    oxyCODONE (ROXICODONE) 5 MG tablet Take 1 tablet (5 mg) by mouth every 6 hours as needed for moderate to severe pain. 10 tablet 0    senna-docusate (SENOKOT-S/PERICOLACE) 8.6-50 MG tablet Take 1 tablet by mouth 2 times daily. 20 tablet 0       Allergies  No Known Allergies    Family History  family history includes Cerebrovascular Disease in his paternal grandfather; Diabetes in his father; Hypertension in his father; Other Cancer in his brother, brother, brother, father, maternal grandfather, and maternal grandmother.    Social History  Social History     Tobacco Use    Smoking status: Never     Passive exposure: Never    Smokeless tobacco: Never   Vaping Use    Vaping status: Never Used   Substance Use Topics    Alcohol use: Yes     Comment: 1 per month maybe    Drug use: No       Physical Exam  Body mass index is 48.15 kg/m .  GENERAL: no distress  SKIN: Visible skin clear. No significant rash, abnormal pigmentation or lesions.  EYES: Eyes grossly normal to inspection.  No discharge or erythema, or obvious scleral/conjunctival abnormalities.  NECK: visible goiter is not present; no visible neck masses  RESP: No audible wheeze, cough, or visible cyanosis.  No visible retractions or increased work of breathing.    NEURO: Awake, alert, responds appropriately to questions.  Mentation and speech fluent.  PSYCH:affect normal and appearance well-groomed.      DATA REVIEW  FreeStyle LibreView  Time in target range 71%  Low 1%, High 22%,  Very High 6%  Current Ave  mg/dl

## 2025-07-20 ENCOUNTER — MYC REFILL (OUTPATIENT)
Dept: PHARMACY | Facility: CLINIC | Age: 45
End: 2025-07-20
Payer: COMMERCIAL

## 2025-07-20 DIAGNOSIS — I10 ESSENTIAL HYPERTENSION: ICD-10-CM

## 2025-07-21 ENCOUNTER — APPOINTMENT (OUTPATIENT)
Dept: RADIATION ONCOLOGY | Facility: CLINIC | Age: 45
End: 2025-07-21
Attending: PSYCHIATRY & NEUROLOGY
Payer: COMMERCIAL

## 2025-07-21 ENCOUNTER — HOSPITAL ENCOUNTER (OUTPATIENT)
Dept: RESEARCH | Facility: CLINIC | Age: 45
Discharge: HOME | End: 2025-07-21
Attending: STUDENT IN AN ORGANIZED HEALTH CARE EDUCATION/TRAINING PROGRAM | Admitting: STUDENT IN AN ORGANIZED HEALTH CARE EDUCATION/TRAINING PROGRAM
Payer: COMMERCIAL

## 2025-07-21 DIAGNOSIS — C71.9 GRADE III ASTROCYTOMA (H): ICD-10-CM

## 2025-07-21 LAB
ALBUMIN SERPL BCG-MCNC: 3.9 G/DL (ref 3.5–5.2)
ALP SERPL-CCNC: 99 U/L (ref 40–150)
ALT SERPL W P-5'-P-CCNC: 19 U/L (ref 0–70)
ANION GAP SERPL CALCULATED.3IONS-SCNC: 11 MMOL/L (ref 7–15)
AST SERPL W P-5'-P-CCNC: 15 U/L (ref 0–45)
BASOPHILS # BLD AUTO: 0 10E3/UL (ref 0–0.2)
BASOPHILS NFR BLD AUTO: 1 %
BILIRUB SERPL-MCNC: 0.7 MG/DL
BUN SERPL-MCNC: 19.6 MG/DL (ref 6–20)
CALCIUM SERPL-MCNC: 9.1 MG/DL (ref 8.8–10.4)
CHLORIDE SERPL-SCNC: 106 MMOL/L (ref 98–107)
CREAT SERPL-MCNC: 0.96 MG/DL (ref 0.67–1.17)
EGFRCR SERPLBLD CKD-EPI 2021: >90 ML/MIN/1.73M2
EOSINOPHIL # BLD AUTO: 0.1 10E3/UL (ref 0–0.7)
EOSINOPHIL NFR BLD AUTO: 2 %
ERYTHROCYTE [DISTWIDTH] IN BLOOD BY AUTOMATED COUNT: 13.4 % (ref 10–15)
GLUCOSE SERPL-MCNC: 178 MG/DL (ref 70–99)
HCO3 SERPL-SCNC: 22 MMOL/L (ref 22–29)
HCT VFR BLD AUTO: 46 % (ref 40–53)
HGB BLD-MCNC: 15.4 G/DL (ref 13.3–17.7)
IMM GRANULOCYTES # BLD: 0 10E3/UL
IMM GRANULOCYTES NFR BLD: 1 %
LYMPHOCYTES # BLD AUTO: 1.9 10E3/UL (ref 0.8–5.3)
LYMPHOCYTES NFR BLD AUTO: 30 %
MCH RBC QN AUTO: 28.3 PG (ref 26.5–33)
MCHC RBC AUTO-ENTMCNC: 33.5 G/DL (ref 31.5–36.5)
MCV RBC AUTO: 85 FL (ref 78–100)
MONOCYTES # BLD AUTO: 0.6 10E3/UL (ref 0–1.3)
MONOCYTES NFR BLD AUTO: 9 %
NEUTROPHILS # BLD AUTO: 3.7 10E3/UL (ref 1.6–8.3)
NEUTROPHILS NFR BLD AUTO: 58 %
NRBC # BLD AUTO: 0 10E3/UL
NRBC BLD AUTO-RTO: 0 /100
PLATELET # BLD AUTO: 188 10E3/UL (ref 150–450)
POTASSIUM SERPL-SCNC: 4.8 MMOL/L (ref 3.4–5.3)
PROT SERPL-MCNC: 6.4 G/DL (ref 6.4–8.3)
RBC # BLD AUTO: 5.44 10E6/UL (ref 4.4–5.9)
SODIUM SERPL-SCNC: 139 MMOL/L (ref 135–145)
WBC # BLD AUTO: 6.3 10E3/UL (ref 4–11)

## 2025-07-21 PROCEDURE — 77386 HC IMRT TREATMENT DELIVERY, COMPLEX: CPT | Performed by: STUDENT IN AN ORGANIZED HEALTH CARE EDUCATION/TRAINING PROGRAM

## 2025-07-21 PROCEDURE — 510N000009 HC RESEARCH FACILITY, PER 15 MIN

## 2025-07-21 PROCEDURE — 85025 COMPLETE CBC W/AUTO DIFF WBC: CPT

## 2025-07-21 PROCEDURE — 510N000017 HC CRU PATIENT CARE, PER 15 MIN

## 2025-07-21 PROCEDURE — 82040 ASSAY OF SERUM ALBUMIN: CPT

## 2025-07-21 PROCEDURE — 36415 COLL VENOUS BLD VENIPUNCTURE: CPT

## 2025-07-21 RX ORDER — LOSARTAN POTASSIUM 100 MG/1
100 TABLET ORAL DAILY
Qty: 90 TABLET | Refills: 3 | Status: SHIPPED | OUTPATIENT
Start: 2025-07-21

## 2025-07-22 ENCOUNTER — APPOINTMENT (OUTPATIENT)
Dept: RADIATION ONCOLOGY | Facility: CLINIC | Age: 45
End: 2025-07-22
Attending: PSYCHIATRY & NEUROLOGY
Payer: COMMERCIAL

## 2025-07-22 PROCEDURE — 77386 HC IMRT TREATMENT DELIVERY, COMPLEX: CPT | Performed by: STUDENT IN AN ORGANIZED HEALTH CARE EDUCATION/TRAINING PROGRAM

## 2025-07-22 PROCEDURE — 77014 PR CT GUIDE FOR PLACEMENT RADIATION THERAPY FIELDS: CPT | Mod: 26 | Performed by: STUDENT IN AN ORGANIZED HEALTH CARE EDUCATION/TRAINING PROGRAM

## 2025-07-23 ENCOUNTER — APPOINTMENT (OUTPATIENT)
Dept: RADIATION ONCOLOGY | Facility: CLINIC | Age: 45
End: 2025-07-23
Attending: PSYCHIATRY & NEUROLOGY
Payer: COMMERCIAL

## 2025-07-23 PROCEDURE — 77386 HC IMRT TREATMENT DELIVERY, COMPLEX: CPT | Performed by: STUDENT IN AN ORGANIZED HEALTH CARE EDUCATION/TRAINING PROGRAM

## 2025-07-23 PROCEDURE — 77014 PR CT GUIDE FOR PLACEMENT RADIATION THERAPY FIELDS: CPT | Mod: 26 | Performed by: STUDENT IN AN ORGANIZED HEALTH CARE EDUCATION/TRAINING PROGRAM

## 2025-07-24 ENCOUNTER — APPOINTMENT (OUTPATIENT)
Dept: RADIATION ONCOLOGY | Facility: CLINIC | Age: 45
End: 2025-07-24
Attending: PSYCHIATRY & NEUROLOGY
Payer: COMMERCIAL

## 2025-07-24 VITALS
WEIGHT: 315 LBS | DIASTOLIC BLOOD PRESSURE: 87 MMHG | BODY MASS INDEX: 49.29 KG/M2 | SYSTOLIC BLOOD PRESSURE: 142 MMHG | HEART RATE: 97 BPM | OXYGEN SATURATION: 98 %

## 2025-07-24 DIAGNOSIS — C71.9 GRADE III ASTROCYTOMA (H): Primary | ICD-10-CM

## 2025-07-24 PROCEDURE — 77386 HC IMRT TREATMENT DELIVERY, COMPLEX: CPT | Performed by: RADIOLOGY

## 2025-07-24 NOTE — PROGRESS NOTES
[facility]  SPECIALIZING IN BREAKTHROUGHS  Radiation Oncology    On Treatment Visit Note      Jeffry Younger      Date: 2025   MRN: [id number]   : 1980  Diagnosis: Grade 3 astrocytoma      Reason for Visit:  On Radiation Treatment Visit     Treatment Summary to Date  Site:    Treatment Site: [facility] Current Dose:    Current Dose: 720/5940 cGy Fractions:    Fractions:       ED Visit/Hospital Admission: None    Treatment Breaks: None    Subjective: Patient is feeling well today for this first OTV. He does see blue lights during RT treatments. He has started Namenda.      Objective:   BP (!) 142/87   Pulse 97   Wt (!) 174.1 kg (383 lb 14.4 oz)   SpO2 98%   BMI 49.29 kg/m    Gen: Appears well, in no acute distress  Skin: No erythema  Mr. Younger is pleasantly conversant, euthymic in mood, breathing comfortably on room air without any audible wheeze and noncyanotic.    Labs:  CBC RESULTS:   Recent Labs   Lab Test 25  1030   WBC 6.3   RBC 5.44   HGB 15.4   HCT 46.0   MCV 85   MCH 28.3   MCHC 33.5   RDW 13.4        ELECTROLYTES:  Recent Labs   Lab Test 25  1030      POTASSIUM 4.8   CHLORIDE 106   CALLY 9.1   CO2 22   BUN 19.6   CR 0.96   *     Assessment:    Tolerating radiation therapy well.  All questions and concerns addressed.    Toxicities:  Fatigue: Grade 0: No toxicity    Plan:   Continue current therapy.    Reviewed his plan in Mosaiq extensively, patient had many excellent questions    Mosaiq chart and setup information reviewed. Imaging reviewed.     Patient was seen and discussed with my attending physician, Dr. Oates.    Irma Marks MD, MS PGY-4  PGY-4 Radiation Oncology  Department of Radiation Oncology  [facility]  Phone: [phone]    I saw and examined the patient with the resident.  I have reviewed and edited the resident's note and agree with the plan of care.             Angeline Oates MD

## 2025-07-24 NOTE — LETTER
2025      Jeffry Younger  [address] [facility] [address]      Dear Colleague,    Thank you for referring your patient, Jeffry Younger, to the [facility] RADIATION ONCOLOGY. Please see a copy of my visit note below.    [facility]YS[facility]ANS  SPECIALIZING IN BREAKTHROUGHS  Radiation Oncology    On Treatment Visit Note      Jeffry Younger      Date: 2025   MRN: [id number]   : 1980  Diagnosis: Grade 3 astrocytoma      Reason for Visit:  On Radiation Treatment Visit     Treatment Summary to Date  Site:    Treatment Site:  Front Current Dose:    Current Dose: 720/5940 cGy Fractions:    Fractions:       ED Visit/Hospital Admission: None    Treatment Breaks: None    Subjective: Patient is feeling well today for this first OTV. He does see blue lights during RT treatments. He has started Namenda.      Objective:   BP (!) 142/87   Pulse 97   Wt (!) 174.1 kg (383 lb 14.4 oz)   SpO2 98%   BMI 49.29 kg/m    Gen: Appears well, in no acute distress  Skin: No erythema  Mr. Younger is pleasantly conversant, euthymic in mood, breathing comfortably on room air without any audible wheeze and noncyanotic.    Labs:  CBC RESULTS:   Recent Labs   Lab Test 25  1030   WBC 6.3   RBC 5.44   HGB 15.4   HCT 46.0   MCV 85   MCH 28.3   MCHC 33.5   RDW 13.4        ELECTROLYTES:  Recent Labs   Lab Test 25  1030      POTASSIUM 4.8   CHLORIDE 106   CALLY 9.1   CO2 22   BUN 19.6   CR 0.96   *     Assessment:    Tolerating radiation therapy well.  All questions and concerns addressed.    Toxicities:  Fatigue: Grade 0: No toxicity    Plan:   Continue current therapy.    Reviewed his plan in Mosaiq extensively, patient had many excellent questions    Mosaiq chart and setup information reviewed. Imaging reviewed.     Patient was seen and discussed with my attending physician, Dr. Oates.    Irma Marks MD, MS PGY-4  PGY-4 Radiation Oncology  Department of Radiation  Oncology  [facility] [place]  Phone: [phone]    I saw and examined the patient with the resident.  I have reviewed and edited the resident's note and agree with the plan of care.             Angeline Oates MD     Again, thank you for allowing me to participate in the care of your patient.        Sincerely,        Angeline Oates MD    Electronically signed

## 2025-07-25 ENCOUNTER — APPOINTMENT (OUTPATIENT)
Dept: RADIATION ONCOLOGY | Facility: CLINIC | Age: 45
End: 2025-07-25
Attending: PSYCHIATRY & NEUROLOGY
Payer: COMMERCIAL

## 2025-07-25 PROCEDURE — 77427 RADIATION TX MANAGEMENT X5: CPT | Mod: GC | Performed by: RADIOLOGY

## 2025-07-25 PROCEDURE — 77014 PR CT GUIDE FOR PLACEMENT RADIATION THERAPY FIELDS: CPT | Mod: 26 | Performed by: STUDENT IN AN ORGANIZED HEALTH CARE EDUCATION/TRAINING PROGRAM

## 2025-07-25 PROCEDURE — 77386 HC IMRT TREATMENT DELIVERY, COMPLEX: CPT | Performed by: STUDENT IN AN ORGANIZED HEALTH CARE EDUCATION/TRAINING PROGRAM

## 2025-07-25 PROCEDURE — 77336 RADIATION PHYSICS CONSULT: CPT | Performed by: STUDENT IN AN ORGANIZED HEALTH CARE EDUCATION/TRAINING PROGRAM

## 2025-07-28 ENCOUNTER — APPOINTMENT (OUTPATIENT)
Dept: RADIATION ONCOLOGY | Facility: CLINIC | Age: 45
End: 2025-07-28
Attending: PSYCHIATRY & NEUROLOGY
Payer: COMMERCIAL

## 2025-07-28 PROCEDURE — 77014 PR CT GUIDE FOR PLACEMENT RADIATION THERAPY FIELDS: CPT | Mod: 26 | Performed by: STUDENT IN AN ORGANIZED HEALTH CARE EDUCATION/TRAINING PROGRAM

## 2025-07-28 PROCEDURE — 77386 HC IMRT TREATMENT DELIVERY, COMPLEX: CPT | Performed by: STUDENT IN AN ORGANIZED HEALTH CARE EDUCATION/TRAINING PROGRAM

## 2025-07-29 ENCOUNTER — MYC REFILL (OUTPATIENT)
Dept: PHARMACY | Facility: CLINIC | Age: 45
End: 2025-07-29

## 2025-07-29 ENCOUNTER — APPOINTMENT (OUTPATIENT)
Dept: RADIATION ONCOLOGY | Facility: CLINIC | Age: 45
End: 2025-07-29
Attending: PSYCHIATRY & NEUROLOGY
Payer: COMMERCIAL

## 2025-07-29 DIAGNOSIS — E11.65 TYPE 2 DIABETES MELLITUS WITH HYPERGLYCEMIA, WITHOUT LONG-TERM CURRENT USE OF INSULIN (H): ICD-10-CM

## 2025-07-29 PROCEDURE — 77386 HC IMRT TREATMENT DELIVERY, COMPLEX: CPT | Performed by: STUDENT IN AN ORGANIZED HEALTH CARE EDUCATION/TRAINING PROGRAM

## 2025-07-30 RX ORDER — FLURBIPROFEN SODIUM 0.3 MG/ML
SOLUTION/ DROPS OPHTHALMIC
Qty: 300 EACH | Refills: 0 | Status: SHIPPED | OUTPATIENT
Start: 2025-07-30

## 2025-07-31 ENCOUNTER — APPOINTMENT (OUTPATIENT)
Dept: RADIATION ONCOLOGY | Facility: CLINIC | Age: 45
End: 2025-07-31
Attending: PSYCHIATRY & NEUROLOGY
Payer: COMMERCIAL

## 2025-07-31 VITALS
SYSTOLIC BLOOD PRESSURE: 128 MMHG | WEIGHT: 315 LBS | OXYGEN SATURATION: 97 % | BODY MASS INDEX: 49.71 KG/M2 | DIASTOLIC BLOOD PRESSURE: 89 MMHG | HEART RATE: 89 BPM

## 2025-07-31 DIAGNOSIS — C71.9 GRADE III ASTROCYTOMA (H): Primary | ICD-10-CM

## 2025-07-31 PROCEDURE — 3074F SYST BP LT 130 MM HG: CPT | Performed by: STUDENT IN AN ORGANIZED HEALTH CARE EDUCATION/TRAINING PROGRAM

## 2025-07-31 PROCEDURE — 1126F AMNT PAIN NOTED NONE PRSNT: CPT | Performed by: STUDENT IN AN ORGANIZED HEALTH CARE EDUCATION/TRAINING PROGRAM

## 2025-07-31 PROCEDURE — 77427 RADIATION TX MANAGEMENT X5: CPT | Performed by: STUDENT IN AN ORGANIZED HEALTH CARE EDUCATION/TRAINING PROGRAM

## 2025-07-31 PROCEDURE — 77386 HC IMRT TREATMENT DELIVERY, COMPLEX: CPT | Performed by: STUDENT IN AN ORGANIZED HEALTH CARE EDUCATION/TRAINING PROGRAM

## 2025-07-31 PROCEDURE — 3079F DIAST BP 80-89 MM HG: CPT | Performed by: STUDENT IN AN ORGANIZED HEALTH CARE EDUCATION/TRAINING PROGRAM

## 2025-07-31 ASSESSMENT — PAIN SCALES - GENERAL: PAINLEVEL_OUTOF10: NO PAIN (0)

## 2025-08-01 ENCOUNTER — APPOINTMENT (OUTPATIENT)
Dept: RADIATION ONCOLOGY | Facility: CLINIC | Age: 45
End: 2025-08-01
Attending: PSYCHIATRY & NEUROLOGY
Payer: COMMERCIAL

## 2025-08-04 ENCOUNTER — APPOINTMENT (OUTPATIENT)
Dept: RADIATION ONCOLOGY | Facility: CLINIC | Age: 45
End: 2025-08-04
Attending: PSYCHIATRY & NEUROLOGY
Payer: COMMERCIAL

## 2025-08-04 PROCEDURE — 77336 RADIATION PHYSICS CONSULT: CPT | Performed by: STUDENT IN AN ORGANIZED HEALTH CARE EDUCATION/TRAINING PROGRAM

## 2025-08-04 PROCEDURE — 77014 PR CT GUIDE FOR PLACEMENT RADIATION THERAPY FIELDS: CPT | Mod: 26 | Performed by: RADIOLOGY

## 2025-08-04 PROCEDURE — 77386 HC IMRT TREATMENT DELIVERY, COMPLEX: CPT | Performed by: STUDENT IN AN ORGANIZED HEALTH CARE EDUCATION/TRAINING PROGRAM

## 2025-08-05 ENCOUNTER — ONCOLOGY VISIT (OUTPATIENT)
Dept: ONCOLOGY | Facility: CLINIC | Age: 45
End: 2025-08-05
Attending: NURSE PRACTITIONER
Payer: COMMERCIAL

## 2025-08-05 ENCOUNTER — APPOINTMENT (OUTPATIENT)
Dept: RADIATION ONCOLOGY | Facility: CLINIC | Age: 45
End: 2025-08-05
Attending: PSYCHIATRY & NEUROLOGY
Payer: COMMERCIAL

## 2025-08-05 VITALS
DIASTOLIC BLOOD PRESSURE: 84 MMHG | SYSTOLIC BLOOD PRESSURE: 120 MMHG | HEART RATE: 97 BPM | RESPIRATION RATE: 16 BRPM | OXYGEN SATURATION: 97 %

## 2025-08-05 DIAGNOSIS — R56.9 SEIZURE (H): ICD-10-CM

## 2025-08-05 DIAGNOSIS — Z98.890 S/P CRANIOTOMY: ICD-10-CM

## 2025-08-05 DIAGNOSIS — C71.9 GRADE III ASTROCYTOMA (H): Primary | ICD-10-CM

## 2025-08-05 PROCEDURE — 77014 PR CT GUIDE FOR PLACEMENT RADIATION THERAPY FIELDS: CPT | Mod: 26 | Performed by: STUDENT IN AN ORGANIZED HEALTH CARE EDUCATION/TRAINING PROGRAM

## 2025-08-05 PROCEDURE — 77386 HC IMRT TREATMENT DELIVERY, COMPLEX: CPT | Performed by: STUDENT IN AN ORGANIZED HEALTH CARE EDUCATION/TRAINING PROGRAM

## 2025-08-05 PROCEDURE — 99213 OFFICE O/P EST LOW 20 MIN: CPT | Performed by: NURSE PRACTITIONER

## 2025-08-05 PROCEDURE — G2211 COMPLEX E/M VISIT ADD ON: HCPCS | Performed by: NURSE PRACTITIONER

## 2025-08-05 PROCEDURE — 99214 OFFICE O/P EST MOD 30 MIN: CPT | Performed by: NURSE PRACTITIONER

## 2025-08-05 RX ORDER — LEVETIRACETAM 750 MG/1
750 TABLET ORAL 2 TIMES DAILY
Qty: 180 TABLET | Refills: 3 | Status: SHIPPED | OUTPATIENT
Start: 2025-08-05

## 2025-08-05 ASSESSMENT — PAIN SCALES - GENERAL: PAINLEVEL_OUTOF10: NO PAIN (0)

## 2025-08-06 ENCOUNTER — OFFICE VISIT (OUTPATIENT)
Dept: NEUROSURGERY | Facility: CLINIC | Age: 45
End: 2025-08-06
Attending: PHYSICIAN ASSISTANT
Payer: COMMERCIAL

## 2025-08-06 ENCOUNTER — APPOINTMENT (OUTPATIENT)
Dept: RADIATION ONCOLOGY | Facility: CLINIC | Age: 45
End: 2025-08-06
Attending: PSYCHIATRY & NEUROLOGY
Payer: COMMERCIAL

## 2025-08-06 VITALS — WEIGHT: 315 LBS | BODY MASS INDEX: 40.43 KG/M2 | HEIGHT: 74 IN

## 2025-08-06 DIAGNOSIS — Z98.890 S/P CRANIOTOMY: Primary | ICD-10-CM

## 2025-08-06 PROCEDURE — 77386 HC IMRT TREATMENT DELIVERY, COMPLEX: CPT | Performed by: STUDENT IN AN ORGANIZED HEALTH CARE EDUCATION/TRAINING PROGRAM

## 2025-08-06 PROCEDURE — 1126F AMNT PAIN NOTED NONE PRSNT: CPT | Performed by: PHYSICIAN ASSISTANT

## 2025-08-06 PROCEDURE — 99024 POSTOP FOLLOW-UP VISIT: CPT | Performed by: PHYSICIAN ASSISTANT

## 2025-08-06 PROCEDURE — 99211 OFF/OP EST MAY X REQ PHY/QHP: CPT | Performed by: PHYSICIAN ASSISTANT

## 2025-08-06 ASSESSMENT — PAIN SCALES - GENERAL: PAINLEVEL_OUTOF10: NO PAIN (0)

## 2025-08-11 ENCOUNTER — OFFICE VISIT (OUTPATIENT)
Dept: RADIATION ONCOLOGY | Facility: CLINIC | Age: 45
End: 2025-08-11
Attending: PSYCHIATRY & NEUROLOGY
Payer: COMMERCIAL

## 2025-08-11 VITALS
BODY MASS INDEX: 50.2 KG/M2 | DIASTOLIC BLOOD PRESSURE: 91 MMHG | HEART RATE: 88 BPM | SYSTOLIC BLOOD PRESSURE: 142 MMHG | WEIGHT: 315 LBS | OXYGEN SATURATION: 97 %

## 2025-08-11 DIAGNOSIS — C71.9 GRADE III ASTROCYTOMA (H): Primary | ICD-10-CM

## 2025-08-11 PROCEDURE — 77386 HC IMRT TREATMENT DELIVERY, COMPLEX: CPT | Performed by: STUDENT IN AN ORGANIZED HEALTH CARE EDUCATION/TRAINING PROGRAM

## 2025-08-11 PROCEDURE — 3077F SYST BP >= 140 MM HG: CPT | Performed by: STUDENT IN AN ORGANIZED HEALTH CARE EDUCATION/TRAINING PROGRAM

## 2025-08-11 PROCEDURE — 1126F AMNT PAIN NOTED NONE PRSNT: CPT | Performed by: STUDENT IN AN ORGANIZED HEALTH CARE EDUCATION/TRAINING PROGRAM

## 2025-08-11 PROCEDURE — 3080F DIAST BP >= 90 MM HG: CPT | Performed by: STUDENT IN AN ORGANIZED HEALTH CARE EDUCATION/TRAINING PROGRAM

## 2025-08-12 ENCOUNTER — APPOINTMENT (OUTPATIENT)
Dept: RADIATION ONCOLOGY | Facility: CLINIC | Age: 45
End: 2025-08-12
Attending: PSYCHIATRY & NEUROLOGY
Payer: COMMERCIAL

## 2025-08-12 PROCEDURE — 77386 HC IMRT TREATMENT DELIVERY, COMPLEX: CPT | Performed by: STUDENT IN AN ORGANIZED HEALTH CARE EDUCATION/TRAINING PROGRAM

## 2025-08-12 PROCEDURE — 77014 PR CT GUIDE FOR PLACEMENT RADIATION THERAPY FIELDS: CPT | Mod: 26 | Performed by: STUDENT IN AN ORGANIZED HEALTH CARE EDUCATION/TRAINING PROGRAM

## 2025-08-13 ENCOUNTER — HOSPITAL ENCOUNTER (OUTPATIENT)
Dept: RESEARCH | Facility: CLINIC | Age: 45
Discharge: HOME | End: 2025-08-13
Attending: STUDENT IN AN ORGANIZED HEALTH CARE EDUCATION/TRAINING PROGRAM | Admitting: STUDENT IN AN ORGANIZED HEALTH CARE EDUCATION/TRAINING PROGRAM
Payer: COMMERCIAL

## 2025-08-13 ENCOUNTER — APPOINTMENT (OUTPATIENT)
Dept: RADIATION ONCOLOGY | Facility: CLINIC | Age: 45
End: 2025-08-13
Attending: PSYCHIATRY & NEUROLOGY
Payer: COMMERCIAL

## 2025-08-13 DIAGNOSIS — C71.9 GRADE III ASTROCYTOMA (H): ICD-10-CM

## 2025-08-13 LAB
ALBUMIN SERPL BCG-MCNC: 4.2 G/DL (ref 3.5–5.2)
ALP SERPL-CCNC: 104 U/L (ref 40–150)
ALT SERPL W P-5'-P-CCNC: 21 U/L (ref 0–70)
ANION GAP SERPL CALCULATED.3IONS-SCNC: 11 MMOL/L (ref 7–15)
AST SERPL W P-5'-P-CCNC: 15 U/L (ref 0–45)
BASOPHILS # BLD AUTO: 0.03 10E3/UL (ref 0–0.2)
BASOPHILS NFR BLD AUTO: 0.4 %
BILIRUB SERPL-MCNC: 0.6 MG/DL
BUN SERPL-MCNC: 12.4 MG/DL (ref 6–20)
CALCIUM SERPL-MCNC: 9.3 MG/DL (ref 8.8–10.4)
CHLORIDE SERPL-SCNC: 107 MMOL/L (ref 98–107)
CREAT SERPL-MCNC: 1.02 MG/DL (ref 0.67–1.17)
EGFRCR SERPLBLD CKD-EPI 2021: >90 ML/MIN/1.73M2
EOSINOPHIL # BLD AUTO: 0.16 10E3/UL (ref 0–0.7)
EOSINOPHIL NFR BLD AUTO: 2.2 %
ERYTHROCYTE [DISTWIDTH] IN BLOOD BY AUTOMATED COUNT: 13.4 % (ref 10–15)
GLUCOSE SERPL-MCNC: 173 MG/DL (ref 70–99)
HCO3 SERPL-SCNC: 24 MMOL/L (ref 22–29)
HCT VFR BLD AUTO: 46.9 % (ref 40–53)
HGB BLD-MCNC: 15.6 G/DL (ref 13.3–17.7)
IMM GRANULOCYTES # BLD: 0.04 10E3/UL
IMM GRANULOCYTES NFR BLD: 0.6 %
LYMPHOCYTES # BLD AUTO: 1.84 10E3/UL (ref 0.8–5.3)
LYMPHOCYTES NFR BLD AUTO: 25.4 %
MCH RBC QN AUTO: 28.6 PG (ref 26.5–33)
MCHC RBC AUTO-ENTMCNC: 33.3 G/DL (ref 31.5–36.5)
MCV RBC AUTO: 85.9 FL (ref 78–100)
MONOCYTES # BLD AUTO: 0.46 10E3/UL (ref 0–1.3)
MONOCYTES NFR BLD AUTO: 6.3 %
NEUTROPHILS # BLD AUTO: 4.72 10E3/UL (ref 1.6–8.3)
NEUTROPHILS NFR BLD AUTO: 65.1 %
NRBC # BLD AUTO: <0.03 10E3/UL
NRBC BLD AUTO-RTO: 0 /100
PLATELET # BLD AUTO: 209 10E3/UL (ref 150–450)
POTASSIUM SERPL-SCNC: 4.8 MMOL/L (ref 3.4–5.3)
PROT SERPL-MCNC: 6.8 G/DL (ref 6.4–8.3)
RBC # BLD AUTO: 5.46 10E6/UL (ref 4.4–5.9)
SODIUM SERPL-SCNC: 142 MMOL/L (ref 135–145)
WBC # BLD AUTO: 7.25 10E3/UL (ref 4–11)

## 2025-08-13 PROCEDURE — 77336 RADIATION PHYSICS CONSULT: CPT | Performed by: STUDENT IN AN ORGANIZED HEALTH CARE EDUCATION/TRAINING PROGRAM

## 2025-08-13 PROCEDURE — 85014 HEMATOCRIT: CPT

## 2025-08-13 PROCEDURE — 77427 RADIATION TX MANAGEMENT X5: CPT | Performed by: STUDENT IN AN ORGANIZED HEALTH CARE EDUCATION/TRAINING PROGRAM

## 2025-08-13 PROCEDURE — 77386 HC IMRT TREATMENT DELIVERY, COMPLEX: CPT | Performed by: STUDENT IN AN ORGANIZED HEALTH CARE EDUCATION/TRAINING PROGRAM

## 2025-08-13 PROCEDURE — 36415 COLL VENOUS BLD VENIPUNCTURE: CPT

## 2025-08-13 PROCEDURE — 82565 ASSAY OF CREATININE: CPT

## 2025-08-13 PROCEDURE — 510N000009 HC RESEARCH FACILITY, PER 15 MIN

## 2025-08-13 PROCEDURE — 510N000017 HC CRU PATIENT CARE, PER 15 MIN

## 2025-08-14 ENCOUNTER — APPOINTMENT (OUTPATIENT)
Dept: RADIATION ONCOLOGY | Facility: CLINIC | Age: 45
End: 2025-08-14
Attending: PSYCHIATRY & NEUROLOGY
Payer: COMMERCIAL

## 2025-08-14 VITALS
HEART RATE: 90 BPM | BODY MASS INDEX: 49.85 KG/M2 | WEIGHT: 315 LBS | DIASTOLIC BLOOD PRESSURE: 95 MMHG | SYSTOLIC BLOOD PRESSURE: 154 MMHG | RESPIRATION RATE: 16 BRPM | OXYGEN SATURATION: 96 %

## 2025-08-14 DIAGNOSIS — C71.9 GRADE III ASTROCYTOMA (H): Primary | ICD-10-CM

## 2025-08-14 PROCEDURE — 77386 HC IMRT TREATMENT DELIVERY, COMPLEX: CPT | Performed by: STUDENT IN AN ORGANIZED HEALTH CARE EDUCATION/TRAINING PROGRAM

## 2025-08-14 ASSESSMENT — PAIN SCALES - GENERAL: PAINLEVEL_OUTOF10: MILD PAIN (2)

## 2025-08-15 ENCOUNTER — APPOINTMENT (OUTPATIENT)
Dept: RADIATION ONCOLOGY | Facility: CLINIC | Age: 45
End: 2025-08-15
Attending: PSYCHIATRY & NEUROLOGY
Payer: COMMERCIAL

## 2025-08-18 ENCOUNTER — APPOINTMENT (OUTPATIENT)
Dept: RADIATION ONCOLOGY | Facility: CLINIC | Age: 45
End: 2025-08-18
Attending: PSYCHIATRY & NEUROLOGY
Payer: COMMERCIAL

## 2025-08-18 PROCEDURE — 77014 PR CT GUIDE FOR PLACEMENT RADIATION THERAPY FIELDS: CPT | Mod: 26 | Performed by: STUDENT IN AN ORGANIZED HEALTH CARE EDUCATION/TRAINING PROGRAM

## 2025-08-18 PROCEDURE — 77386 HC IMRT TREATMENT DELIVERY, COMPLEX: CPT | Performed by: STUDENT IN AN ORGANIZED HEALTH CARE EDUCATION/TRAINING PROGRAM

## 2025-08-19 ENCOUNTER — APPOINTMENT (OUTPATIENT)
Dept: RADIATION ONCOLOGY | Facility: CLINIC | Age: 45
End: 2025-08-19
Attending: PSYCHIATRY & NEUROLOGY
Payer: COMMERCIAL

## 2025-08-19 PROCEDURE — 77014 PR CT GUIDE FOR PLACEMENT RADIATION THERAPY FIELDS: CPT | Mod: 26 | Performed by: STUDENT IN AN ORGANIZED HEALTH CARE EDUCATION/TRAINING PROGRAM

## 2025-08-19 PROCEDURE — 77386 HC IMRT TREATMENT DELIVERY, COMPLEX: CPT | Performed by: STUDENT IN AN ORGANIZED HEALTH CARE EDUCATION/TRAINING PROGRAM

## 2025-08-20 ENCOUNTER — APPOINTMENT (OUTPATIENT)
Dept: RADIATION ONCOLOGY | Facility: CLINIC | Age: 45
End: 2025-08-20
Attending: STUDENT IN AN ORGANIZED HEALTH CARE EDUCATION/TRAINING PROGRAM
Payer: COMMERCIAL

## 2025-08-20 ENCOUNTER — OFFICE VISIT (OUTPATIENT)
Dept: RADIATION ONCOLOGY | Facility: CLINIC | Age: 45
End: 2025-08-20
Attending: PSYCHIATRY & NEUROLOGY
Payer: COMMERCIAL

## 2025-08-20 VITALS
HEART RATE: 92 BPM | RESPIRATION RATE: 16 BRPM | WEIGHT: 315 LBS | SYSTOLIC BLOOD PRESSURE: 145 MMHG | BODY MASS INDEX: 49.47 KG/M2 | OXYGEN SATURATION: 100 % | DIASTOLIC BLOOD PRESSURE: 93 MMHG

## 2025-08-20 DIAGNOSIS — C71.9 GRADE III ASTROCYTOMA (H): Primary | ICD-10-CM

## 2025-08-20 PROCEDURE — 3080F DIAST BP >= 90 MM HG: CPT | Performed by: STUDENT IN AN ORGANIZED HEALTH CARE EDUCATION/TRAINING PROGRAM

## 2025-08-20 PROCEDURE — 1126F AMNT PAIN NOTED NONE PRSNT: CPT | Performed by: STUDENT IN AN ORGANIZED HEALTH CARE EDUCATION/TRAINING PROGRAM

## 2025-08-20 PROCEDURE — 77336 RADIATION PHYSICS CONSULT: CPT | Performed by: STUDENT IN AN ORGANIZED HEALTH CARE EDUCATION/TRAINING PROGRAM

## 2025-08-20 PROCEDURE — 77386 HC IMRT TREATMENT DELIVERY, COMPLEX: CPT | Performed by: STUDENT IN AN ORGANIZED HEALTH CARE EDUCATION/TRAINING PROGRAM

## 2025-08-20 PROCEDURE — 3077F SYST BP >= 140 MM HG: CPT | Performed by: STUDENT IN AN ORGANIZED HEALTH CARE EDUCATION/TRAINING PROGRAM

## 2025-08-20 PROCEDURE — 77427 RADIATION TX MANAGEMENT X5: CPT | Performed by: STUDENT IN AN ORGANIZED HEALTH CARE EDUCATION/TRAINING PROGRAM

## 2025-08-20 ASSESSMENT — PAIN SCALES - GENERAL: PAINLEVEL_OUTOF10: NO PAIN (0)

## 2025-08-21 ENCOUNTER — APPOINTMENT (OUTPATIENT)
Dept: RADIATION ONCOLOGY | Facility: CLINIC | Age: 45
End: 2025-08-21
Attending: STUDENT IN AN ORGANIZED HEALTH CARE EDUCATION/TRAINING PROGRAM
Payer: COMMERCIAL

## 2025-08-21 PROCEDURE — 77014 PR CT GUIDE FOR PLACEMENT RADIATION THERAPY FIELDS: CPT | Mod: 26 | Performed by: STUDENT IN AN ORGANIZED HEALTH CARE EDUCATION/TRAINING PROGRAM

## 2025-08-21 PROCEDURE — 77386 HC IMRT TREATMENT DELIVERY, COMPLEX: CPT | Performed by: STUDENT IN AN ORGANIZED HEALTH CARE EDUCATION/TRAINING PROGRAM

## 2025-08-22 ENCOUNTER — APPOINTMENT (OUTPATIENT)
Dept: RADIATION ONCOLOGY | Facility: CLINIC | Age: 45
End: 2025-08-22
Attending: STUDENT IN AN ORGANIZED HEALTH CARE EDUCATION/TRAINING PROGRAM
Payer: COMMERCIAL

## 2025-08-25 ENCOUNTER — APPOINTMENT (OUTPATIENT)
Dept: RADIATION ONCOLOGY | Facility: CLINIC | Age: 45
End: 2025-08-25
Attending: STUDENT IN AN ORGANIZED HEALTH CARE EDUCATION/TRAINING PROGRAM
Payer: COMMERCIAL

## 2025-08-25 PROCEDURE — 77386 HC IMRT TREATMENT DELIVERY, COMPLEX: CPT | Performed by: STUDENT IN AN ORGANIZED HEALTH CARE EDUCATION/TRAINING PROGRAM

## 2025-08-25 PROCEDURE — 77014 PR CT GUIDE FOR PLACEMENT RADIATION THERAPY FIELDS: CPT | Mod: 26 | Performed by: STUDENT IN AN ORGANIZED HEALTH CARE EDUCATION/TRAINING PROGRAM

## 2025-08-26 ENCOUNTER — APPOINTMENT (OUTPATIENT)
Dept: RADIATION ONCOLOGY | Facility: CLINIC | Age: 45
End: 2025-08-26
Attending: STUDENT IN AN ORGANIZED HEALTH CARE EDUCATION/TRAINING PROGRAM
Payer: COMMERCIAL

## 2025-08-26 PROCEDURE — 77386 HC IMRT TREATMENT DELIVERY, COMPLEX: CPT | Performed by: STUDENT IN AN ORGANIZED HEALTH CARE EDUCATION/TRAINING PROGRAM

## 2025-08-26 PROCEDURE — 77014 PR CT GUIDE FOR PLACEMENT RADIATION THERAPY FIELDS: CPT | Mod: 26 | Performed by: RADIOLOGY

## 2025-08-27 ENCOUNTER — APPOINTMENT (OUTPATIENT)
Dept: RADIATION ONCOLOGY | Facility: CLINIC | Age: 45
End: 2025-08-27
Attending: STUDENT IN AN ORGANIZED HEALTH CARE EDUCATION/TRAINING PROGRAM
Payer: COMMERCIAL

## 2025-08-27 PROCEDURE — 77014 PR CT GUIDE FOR PLACEMENT RADIATION THERAPY FIELDS: CPT | Mod: 26 | Performed by: STUDENT IN AN ORGANIZED HEALTH CARE EDUCATION/TRAINING PROGRAM

## 2025-08-27 PROCEDURE — 77386 HC IMRT TREATMENT DELIVERY, COMPLEX: CPT | Performed by: STUDENT IN AN ORGANIZED HEALTH CARE EDUCATION/TRAINING PROGRAM

## 2025-08-27 PROCEDURE — 77336 RADIATION PHYSICS CONSULT: CPT | Performed by: STUDENT IN AN ORGANIZED HEALTH CARE EDUCATION/TRAINING PROGRAM

## 2025-08-28 ENCOUNTER — OFFICE VISIT (OUTPATIENT)
Dept: RADIATION ONCOLOGY | Facility: CLINIC | Age: 45
End: 2025-08-28
Attending: PSYCHIATRY & NEUROLOGY
Payer: COMMERCIAL

## 2025-08-28 ENCOUNTER — APPOINTMENT (OUTPATIENT)
Dept: RADIATION ONCOLOGY | Facility: CLINIC | Age: 45
End: 2025-08-28
Attending: STUDENT IN AN ORGANIZED HEALTH CARE EDUCATION/TRAINING PROGRAM
Payer: COMMERCIAL

## 2025-08-28 VITALS
OXYGEN SATURATION: 98 % | BODY MASS INDEX: 48.85 KG/M2 | HEART RATE: 97 BPM | SYSTOLIC BLOOD PRESSURE: 131 MMHG | WEIGHT: 315 LBS | DIASTOLIC BLOOD PRESSURE: 82 MMHG

## 2025-08-28 DIAGNOSIS — Z00.6 PATIENT IN CANCER RELATED RESEARCH STUDY: ICD-10-CM

## 2025-08-28 DIAGNOSIS — C71.9 GRADE III ASTROCYTOMA (H): Primary | ICD-10-CM

## 2025-08-28 PROCEDURE — 1126F AMNT PAIN NOTED NONE PRSNT: CPT | Performed by: STUDENT IN AN ORGANIZED HEALTH CARE EDUCATION/TRAINING PROGRAM

## 2025-08-28 PROCEDURE — 3079F DIAST BP 80-89 MM HG: CPT | Performed by: STUDENT IN AN ORGANIZED HEALTH CARE EDUCATION/TRAINING PROGRAM

## 2025-08-28 PROCEDURE — 77386 HC IMRT TREATMENT DELIVERY, COMPLEX: CPT | Performed by: STUDENT IN AN ORGANIZED HEALTH CARE EDUCATION/TRAINING PROGRAM

## 2025-08-28 PROCEDURE — 3075F SYST BP GE 130 - 139MM HG: CPT | Performed by: STUDENT IN AN ORGANIZED HEALTH CARE EDUCATION/TRAINING PROGRAM

## 2025-08-28 ASSESSMENT — PAIN SCALES - GENERAL: PAINLEVEL_OUTOF10: NO PAIN (0)

## 2025-08-29 ENCOUNTER — APPOINTMENT (OUTPATIENT)
Dept: RADIATION ONCOLOGY | Facility: CLINIC | Age: 45
End: 2025-08-29
Attending: STUDENT IN AN ORGANIZED HEALTH CARE EDUCATION/TRAINING PROGRAM
Payer: COMMERCIAL

## 2025-08-29 PROCEDURE — 77386 HC IMRT TREATMENT DELIVERY, COMPLEX: CPT | Performed by: STUDENT IN AN ORGANIZED HEALTH CARE EDUCATION/TRAINING PROGRAM

## 2025-08-29 PROCEDURE — 77014 PR CT GUIDE FOR PLACEMENT RADIATION THERAPY FIELDS: CPT | Mod: 26 | Performed by: STUDENT IN AN ORGANIZED HEALTH CARE EDUCATION/TRAINING PROGRAM

## 2025-09-04 ENCOUNTER — OFFICE VISIT (OUTPATIENT)
Dept: RADIATION ONCOLOGY | Facility: CLINIC | Age: 45
End: 2025-09-04
Attending: PSYCHIATRY & NEUROLOGY
Payer: COMMERCIAL

## 2025-09-04 VITALS
HEART RATE: 82 BPM | OXYGEN SATURATION: 99 % | WEIGHT: 315 LBS | DIASTOLIC BLOOD PRESSURE: 89 MMHG | SYSTOLIC BLOOD PRESSURE: 133 MMHG | BODY MASS INDEX: 49.28 KG/M2

## 2025-09-04 DIAGNOSIS — C71.9 GRADE III ASTROCYTOMA (H): Primary | ICD-10-CM

## 2025-09-04 RX ORDER — DEXAMETHASONE 2 MG/1
TABLET ORAL
Qty: 30 TABLET | Refills: 1 | Status: SHIPPED | OUTPATIENT
Start: 2025-09-04

## 2025-09-04 ASSESSMENT — PAIN SCALES - GENERAL: PAINLEVEL_OUTOF10: NO PAIN (0)

## (undated) DEVICE — SOL WATER IRRIG 1000ML BOTTLE 2F7114

## (undated) DEVICE — ESU GROUND PAD ADULT W/CORD E7507

## (undated) DEVICE — PROTECTOR ARM ONE-STEP TRENDELENBURG 40418 (COI)

## (undated) DEVICE — TIP ASPIRATOR SONOPET IQ LARGE 5500-25S-308

## (undated) DEVICE — PREP CHLORAPREP CLEAR 3ML 930400

## (undated) DEVICE — SOL RINGERS LACTATED 1000ML BAG 07953-09

## (undated) DEVICE — PIN SKULL MAYFIELD ADULT TITANIUM 3/PK A1120

## (undated) DEVICE — DRAPE SHEET REV FOLD 3/4 9349

## (undated) DEVICE — COVER CAMERA VIDEO LASER 9X96" 04-CC227

## (undated) DEVICE — SYR 30ML LL W/O NDL 302832

## (undated) DEVICE — SYR 10ML FINGER CONTROL W/O NDL 309695

## (undated) DEVICE — PERFORATOR CRANIAL DGR-O SURGICAL 11-14MM PEDS 200-271

## (undated) DEVICE — PREP POVIDONE-IODINE 7.5% SCRUB 4OZ BOTTLE MDS093945

## (undated) DEVICE — GLOVE BIOGEL PI MICRO INDICATOR UNDERGLOVE SZ 7.5 48975

## (undated) DEVICE — DRSG PRIMAPORE 02X3" 7133

## (undated) DEVICE — SU MONOCRYL 3-0 PS-1 27" Y936H

## (undated) DEVICE — OINTMENT ANTIBIOTIC BACITRACIN ZINC .9 G 1171

## (undated) DEVICE — DRAPE POUCH IRR 1016

## (undated) DEVICE — SOL RINGERS LACTATED 500ML BAG 2B2323QA

## (undated) DEVICE — IOM SUPPLIES/CASE FEE

## (undated) DEVICE — BLADE CLIPPER 4412A

## (undated) DEVICE — SOL NACL 0.9% IRRIG 1000ML BOTTLE 2F7124

## (undated) DEVICE — SUCTION MANIFOLD NEPTUNE 2 SYS 4 PORT 0702-020-000

## (undated) DEVICE — NDL 25GA 1.5" 305127

## (undated) DEVICE — Device

## (undated) DEVICE — DRAPE POUCH INSTRUMENT 1018

## (undated) DEVICE — SYR 10ML LL W/O NDL 302995

## (undated) DEVICE — SU NUROLON 4-0 TF CR 8X18" C584D

## (undated) DEVICE — GLOVE BIOGEL PI MICRO SZ 7.0 48570

## (undated) DEVICE — SU VICRYL 2-0 CT-2 CR 8X18" J726D

## (undated) DEVICE — DRAPE NEW SLUSH/WARMER HUSHSLUSH 2.0 ESD340 ESD340

## (undated) DEVICE — SYR BULB IRRIG DOVER 60 ML LATEX FREE 67000

## (undated) DEVICE — NDL BLUNT 17GA 1.5" 8881202330

## (undated) DEVICE — SPONGE COTTONOID 1/4X1/4" 20-01S

## (undated) DEVICE — PACK CRANIOTOMY

## (undated) DEVICE — DRAPE U SPLIT 74X120" 29440

## (undated) DEVICE — LINEN TOWEL PACK X30 5481

## (undated) DEVICE — LINEN TOWEL PACK X6 WHITE 5487

## (undated) DEVICE — NDL BX BRAIN STEALTH KIT PASSIVE  9733068

## (undated) DEVICE — RX SURGIFLO HEMOSTATIC MATRIX W/THROMBIN 8ML 2994

## (undated) DEVICE — DRAPE MAYO STAND 23X54 8337

## (undated) DEVICE — PREP SKIN SCRUB TRAY 4461A

## (undated) DEVICE — PREP POVIDONE-IODINE 10% SOLUTION 4OZ BOTTLE MDS093944

## (undated) DEVICE — ESU CORD BIPOLAR AND IRR TUBING AESCULAP US355

## (undated) DEVICE — SPONGE COTTONOID 1/2X1 1/2" 20-06S

## (undated) DEVICE — SU ETHILON 3-0 PS-1 18" 1663H

## (undated) DEVICE — SURGICEL HEMOSTAT 4X8" 1952

## (undated) DEVICE — PAD CHUX UNDERPAD 23X24" 7136

## (undated) DEVICE — SPONGE COTTONOID 1X3" 20-10S

## (undated) DEVICE — CATH TRAY FOLEY SURESTEP 16FR W/URNE MTR STLK LATEX A303316A

## (undated) DEVICE — SPONGE COTTONOID 1/2X1/2" 20-04S

## (undated) DEVICE — BUR STRK CARBIDE ROUND 5.0MM 5820-110-050C

## (undated) DEVICE — ESU CORD BIPOLAR GREEN 10-4000

## (undated) DEVICE — MARKER SPHERES PASSIVE MEDT PACK 5 8801075

## (undated) DEVICE — SPONGE SURGIFOAM 100 1974

## (undated) DEVICE — SUBDURAL ELECTRODE

## (undated) DEVICE — APPLIER CLIP SCAL RANEYS DISPOSABLE 201037

## (undated) DEVICE — DRSG STERI STRIP 1/2X4" R1547

## (undated) DEVICE — DRAPE SHEET MED 44X70" 9355

## (undated) DEVICE — SPONGE COTTONOID 1/2X3" 20-07S

## (undated) DEVICE — DRSG PRIMAPORE 03 1/8X6" 66000318

## (undated) DEVICE — TARGETING UNIT COVER

## (undated) DEVICE — HOLSTER ELTRD PNCL STRL LF DISP

## (undated) DEVICE — LINEN TOWEL PACK X5 5464

## (undated) DEVICE — DRAPE MICROSCOPE LEICA 54X150" 09-MK653

## (undated) DEVICE — GLOVE BIOGEL PI MICRO SZ 7.5 48575

## (undated) DEVICE — DECANTER BAG 2002S

## (undated) DEVICE — BUR STRK 2.3MM TAPERED ROUTER - FA2 5407-FA2-023

## (undated) DEVICE — PREP CHLORAPREP 26ML TINTED HI-LITE ORANGE 930815

## (undated) DEVICE — PAD CHUX UNDERPAD 23X36" 676105

## (undated) RX ORDER — FENTANYL CITRATE 50 UG/ML
INJECTION, SOLUTION INTRAMUSCULAR; INTRAVENOUS
Status: DISPENSED
Start: 2025-03-07

## (undated) RX ORDER — LABETALOL HYDROCHLORIDE 5 MG/ML
INJECTION, SOLUTION INTRAVENOUS
Status: DISPENSED
Start: 2025-06-09

## (undated) RX ORDER — CEFAZOLIN SODIUM/WATER 3 G/30 ML
SYRINGE (ML) INTRAVENOUS
Status: DISPENSED
Start: 2025-06-09

## (undated) RX ORDER — FENTANYL CITRATE-0.9 % NACL/PF 10 MCG/ML
PLASTIC BAG, INJECTION (ML) INTRAVENOUS
Status: DISPENSED
Start: 2025-03-07

## (undated) RX ORDER — LABETALOL HYDROCHLORIDE 5 MG/ML
INJECTION, SOLUTION INTRAVENOUS
Status: DISPENSED
Start: 2025-03-07

## (undated) RX ORDER — ACETAMINOPHEN 325 MG/1
TABLET ORAL
Status: DISPENSED
Start: 2025-03-07

## (undated) RX ORDER — BUPIVACAINE HYDROCHLORIDE 5 MG/ML
INJECTION, SOLUTION EPIDURAL; INTRACAUDAL; PERINEURAL
Status: DISPENSED
Start: 2025-06-09

## (undated) RX ORDER — HYDRALAZINE HYDROCHLORIDE 20 MG/ML
INJECTION INTRAMUSCULAR; INTRAVENOUS
Status: DISPENSED
Start: 2025-06-09

## (undated) RX ORDER — BUPIVACAINE HYDROCHLORIDE AND EPINEPHRINE 2.5; 5 MG/ML; UG/ML
INJECTION, SOLUTION EPIDURAL; INFILTRATION; INTRACAUDAL; PERINEURAL
Status: DISPENSED
Start: 2025-03-07

## (undated) RX ORDER — FENTANYL CITRATE 50 UG/ML
INJECTION, SOLUTION INTRAMUSCULAR; INTRAVENOUS
Status: DISPENSED
Start: 2025-06-09

## (undated) RX ORDER — LIDOCAINE HYDROCHLORIDE 10 MG/ML
INJECTION, SOLUTION EPIDURAL; INFILTRATION; INTRACAUDAL; PERINEURAL
Status: DISPENSED
Start: 2025-02-26

## (undated) RX ORDER — SODIUM CHLORIDE 9 MG/ML
INJECTION, SOLUTION INTRAVENOUS
Status: DISPENSED
Start: 2025-03-07

## (undated) RX ORDER — ESMOLOL HYDROCHLORIDE 10 MG/ML
INJECTION INTRAVENOUS
Status: DISPENSED
Start: 2025-03-07

## (undated) RX ORDER — PROPOFOL 10 MG/ML
INJECTION, EMULSION INTRAVENOUS
Status: DISPENSED
Start: 2025-03-07